# Patient Record
Sex: FEMALE | Race: WHITE | Employment: UNEMPLOYED | URBAN - METROPOLITAN AREA
[De-identification: names, ages, dates, MRNs, and addresses within clinical notes are randomized per-mention and may not be internally consistent; named-entity substitution may affect disease eponyms.]

---

## 2018-10-08 ENCOUNTER — HOSPITAL ENCOUNTER (OUTPATIENT)
Dept: PREADMISSION TESTING | Age: 48
Discharge: HOME | End: 2018-10-08
Payer: COMMERCIAL

## 2018-10-08 VITALS
SYSTOLIC BLOOD PRESSURE: 157 MMHG | TEMPERATURE: 98.2 F | DIASTOLIC BLOOD PRESSURE: 97 MMHG | BODY MASS INDEX: 33.73 KG/M2 | HEART RATE: 103 BPM | RESPIRATION RATE: 16 BRPM | HEIGHT: 62 IN | WEIGHT: 183.3 LBS

## 2018-10-08 LAB
ABO + RH BLD: NORMAL
ANION GAP SERPL CALC-SCNC: 8 MMOL/L (ref 5–15)
APPEARANCE UR: ABNORMAL
BACTERIA URNS QL MICRO: NEGATIVE /HPF
BILIRUB UR QL: NEGATIVE
BLOOD GROUP ANTIBODIES SERPL: NORMAL
BUN SERPL-MCNC: 18 MG/DL (ref 6–20)
BUN/CREAT SERPL: 24 (ref 12–20)
CALCIUM SERPL-MCNC: 8.7 MG/DL (ref 8.5–10.1)
CAOX CRY URNS QL MICRO: ABNORMAL
CHLORIDE SERPL-SCNC: 104 MMOL/L (ref 97–108)
CO2 SERPL-SCNC: 28 MMOL/L (ref 21–32)
COLOR UR: ABNORMAL
CREAT SERPL-MCNC: 0.75 MG/DL (ref 0.55–1.02)
EPITH CASTS URNS QL MICRO: ABNORMAL /LPF
ERYTHROCYTE [DISTWIDTH] IN BLOOD BY AUTOMATED COUNT: 13.8 % (ref 11.5–14.5)
EST. AVERAGE GLUCOSE BLD GHB EST-MCNC: NORMAL MG/DL
GLUCOSE SERPL-MCNC: 75 MG/DL (ref 65–100)
GLUCOSE UR STRIP.AUTO-MCNC: NEGATIVE MG/DL
HBA1C MFR BLD: 4.9 % (ref 4.2–6.3)
HCG SERPL QL: NEGATIVE
HCT VFR BLD AUTO: 37.7 % (ref 35–47)
HGB BLD-MCNC: 12 G/DL (ref 11.5–16)
HGB UR QL STRIP: ABNORMAL
INR PPP: 1.1 (ref 0.9–1.1)
KETONES UR QL STRIP.AUTO: NEGATIVE MG/DL
LEUKOCYTE ESTERASE UR QL STRIP.AUTO: ABNORMAL
MCH RBC QN AUTO: 28.6 PG (ref 26–34)
MCHC RBC AUTO-ENTMCNC: 31.8 G/DL (ref 30–36.5)
MCV RBC AUTO: 89.8 FL (ref 80–99)
NITRITE UR QL STRIP.AUTO: NEGATIVE
NRBC # BLD: 0 K/UL (ref 0–0.01)
NRBC BLD-RTO: 0 PER 100 WBC
PH UR STRIP: 6 [PH] (ref 5–8)
PLATELET # BLD AUTO: 246 K/UL (ref 150–400)
PMV BLD AUTO: 10.8 FL (ref 8.9–12.9)
POTASSIUM SERPL-SCNC: 4.2 MMOL/L (ref 3.5–5.1)
PROT UR STRIP-MCNC: ABNORMAL MG/DL
PROTHROMBIN TIME: 10.9 SEC (ref 9–11.1)
RBC # BLD AUTO: 4.2 M/UL (ref 3.8–5.2)
RBC #/AREA URNS HPF: >100 /HPF (ref 0–5)
SODIUM SERPL-SCNC: 140 MMOL/L (ref 136–145)
SP GR UR REFRACTOMETRY: 1.02 (ref 1–1.03)
SPECIMEN EXP DATE BLD: NORMAL
UA: UC IF INDICATED,UAUC: ABNORMAL
UROBILINOGEN UR QL STRIP.AUTO: 0.2 EU/DL (ref 0.2–1)
WBC # BLD AUTO: 8 K/UL (ref 3.6–11)
WBC URNS QL MICRO: ABNORMAL /HPF (ref 0–4)

## 2018-10-08 PROCEDURE — 80048 BASIC METABOLIC PNL TOTAL CA: CPT | Performed by: ORTHOPAEDIC SURGERY

## 2018-10-08 PROCEDURE — 81001 URINALYSIS AUTO W/SCOPE: CPT | Performed by: ORTHOPAEDIC SURGERY

## 2018-10-08 PROCEDURE — 84703 CHORIONIC GONADOTROPIN ASSAY: CPT | Performed by: ORTHOPAEDIC SURGERY

## 2018-10-08 PROCEDURE — 85610 PROTHROMBIN TIME: CPT | Performed by: ORTHOPAEDIC SURGERY

## 2018-10-08 PROCEDURE — 93005 ELECTROCARDIOGRAM TRACING: CPT

## 2018-10-08 PROCEDURE — 86900 BLOOD TYPING SEROLOGIC ABO: CPT | Performed by: ORTHOPAEDIC SURGERY

## 2018-10-08 PROCEDURE — 85027 COMPLETE CBC AUTOMATED: CPT | Performed by: ORTHOPAEDIC SURGERY

## 2018-10-08 PROCEDURE — 83036 HEMOGLOBIN GLYCOSYLATED A1C: CPT | Performed by: ORTHOPAEDIC SURGERY

## 2018-10-08 PROCEDURE — 36415 COLL VENOUS BLD VENIPUNCTURE: CPT | Performed by: ORTHOPAEDIC SURGERY

## 2018-10-08 RX ORDER — TRAZODONE HYDROCHLORIDE 100 MG/1
100 TABLET ORAL
COMMUNITY
End: 2019-12-04

## 2018-10-08 RX ORDER — FUROSEMIDE 20 MG/1
20 TABLET ORAL AS NEEDED
COMMUNITY

## 2018-10-08 RX ORDER — OXYCODONE HYDROCHLORIDE 15 MG/1
15 TABLET ORAL
COMMUNITY
End: 2018-11-24

## 2018-10-08 RX ORDER — AMPHETAMINE SULFATE 10 MG/1
10 TABLET ORAL AS NEEDED
COMMUNITY

## 2018-10-08 RX ORDER — BACLOFEN 10 MG/1
10 TABLET ORAL
COMMUNITY

## 2018-10-08 RX ORDER — POTASSIUM CHLORIDE 750 MG/1
10 TABLET, FILM COATED, EXTENDED RELEASE ORAL DAILY
COMMUNITY

## 2018-10-08 RX ORDER — LEVOTHYROXINE SODIUM 100 UG/1
100 TABLET ORAL
COMMUNITY

## 2018-10-08 NOTE — PERIOP NOTES
DO NOT EAT ANYTHING AFTER MIDNIGHT, except as instructed THE NIGHT BEFORE SURGERY. PT GIVEN OPPORTUNITY TO ASK ADDITIONAL QUESTIONS. PRE-OPERATIVE INSTRUCTIONS REVIEWED WITH PATIENT. PATIENT GIVEN 6-PACK OF CHG WIPES. INSTRUCTIONS REVIEWED ON USE OF CHG WIPES. PATIENT GIVEN SSI INFECTION FAQ SHEET. MRSA / MSSA TREATMENT INSTRUCTION SHEET GIVEN WITH AN EXPLANATION TO PATIENT THAT THEY WILL BE NOTIFIED IF TREATMENT INSTRUCTIONS NEED TO BE INITIATED. PATIENT WAS GIVEN THE OPPORTUNITY TO ASK QUESTIONS ON THE INFORMATION PROVIDED. Serial BPs: 157/105 
                    155/81 Pt denies c/o headache.

## 2018-10-09 PROBLEM — R31.9 HEMATURIA: Status: ACTIVE | Noted: 2018-10-09

## 2018-10-09 PROBLEM — Z22.322 MRSA CARRIER: Status: ACTIVE | Noted: 2018-10-09

## 2018-10-09 LAB
ATRIAL RATE: 96 BPM
BACTERIA SPEC CULT: ABNORMAL
BACTERIA SPEC CULT: ABNORMAL
CALCULATED P AXIS, ECG09: 46 DEGREES
CALCULATED R AXIS, ECG10: 2 DEGREES
CALCULATED T AXIS, ECG11: 30 DEGREES
DIAGNOSIS, 93000: NORMAL
P-R INTERVAL, ECG05: 132 MS
Q-T INTERVAL, ECG07: 346 MS
QRS DURATION, ECG06: 70 MS
QTC CALCULATION (BEZET), ECG08: 437 MS
SERVICE CMNT-IMP: ABNORMAL
VENTRICULAR RATE, ECG03: 96 BPM

## 2018-10-09 NOTE — ADVANCED PRACTICE NURSE
Patient was called (identity confirmed with 2 patient identifiers) and informed of positive MRSA, patient has current mupirocin ointment at home, states she had already been diagnosed as carrier, and will obtain Chlorhexidine liquid soap from pharmacy per protocol. Written instructions given at time of Preadmission Testing were reinforced with patient. Patient was given an opportunity to have questions answered and contact information if needed. Patient also informed of need for follow up with PCP regarding large blood and crystals in urine. States she is on chronic pain medication and cannot tell if she's having back or abdominal pain, but denies fever, and states she has noticed pink tinged urine. Advised she may have kidney stones and to see PCP.  Patient does not have PCP at this time, but states she is making appointment with [facility] nephrologist.

## 2018-10-12 RX ORDER — PREGABALIN 75 MG/1
75 CAPSULE ORAL ONCE
Status: CANCELLED | OUTPATIENT
Start: 2018-10-24 | End: 2018-10-24

## 2018-10-12 RX ORDER — DEXAMETHASONE SODIUM PHOSPHATE 10 MG/ML
4 INJECTION INTRAMUSCULAR; INTRAVENOUS ONCE
Status: CANCELLED | OUTPATIENT
Start: 2018-10-24 | End: 2018-10-24

## 2018-10-12 RX ORDER — CELECOXIB 200 MG/1
200 CAPSULE ORAL ONCE
Status: CANCELLED | OUTPATIENT
Start: 2018-10-24 | End: 2018-10-24

## 2018-10-12 RX ORDER — ACETAMINOPHEN 500 MG
1000 TABLET ORAL ONCE
Status: CANCELLED | OUTPATIENT
Start: 2018-10-24 | End: 2018-10-24

## 2018-10-12 RX ORDER — CEFAZOLIN SODIUM/WATER 2 G/20 ML
2 SYRINGE (ML) INTRAVENOUS ONCE
Status: CANCELLED | OUTPATIENT
Start: 2018-10-24 | End: 2018-10-24

## 2018-10-23 ENCOUNTER — ANESTHESIA EVENT (OUTPATIENT)
Dept: SURGERY | Age: 48
End: 2018-10-23
Payer: COMMERCIAL

## 2018-10-24 ENCOUNTER — HOSPITAL ENCOUNTER (OUTPATIENT)
Age: 48
Setting detail: OBSERVATION
Discharge: HOME | End: 2018-10-28
Attending: ORTHOPAEDIC SURGERY | Admitting: ORTHOPAEDIC SURGERY
Payer: COMMERCIAL

## 2018-10-24 ENCOUNTER — ANESTHESIA (OUTPATIENT)
Dept: SURGERY | Age: 48
End: 2018-10-24
Payer: COMMERCIAL

## 2018-10-24 PROBLEM — M17.0 PRIMARY OSTEOARTHRITIS OF BOTH KNEES: Status: ACTIVE | Noted: 2018-10-24

## 2018-10-24 LAB
ABO + RH BLD: NORMAL
BLOOD GROUP ANTIBODIES SERPL: NORMAL
GLUCOSE BLD STRIP.AUTO-MCNC: 88 MG/DL (ref 65–100)
HCG UR QL: NEGATIVE
SERVICE CMNT-IMP: NORMAL
SPECIMEN EXP DATE BLD: NORMAL

## 2018-10-24 PROCEDURE — 77030002933 HC SUT MCRYL J&J -A: Performed by: ORTHOPAEDIC SURGERY

## 2018-10-24 PROCEDURE — 77030000032 HC CUF TRNQT ZIMM -B: Performed by: ORTHOPAEDIC SURGERY

## 2018-10-24 PROCEDURE — 64450 NJX AA&/STRD OTHER PN/BRANCH: CPT | Performed by: ANESTHESIOLOGY

## 2018-10-24 PROCEDURE — 74011250636 HC RX REV CODE- 250/636

## 2018-10-24 PROCEDURE — 99218 HC RM OBSERVATION: CPT

## 2018-10-24 PROCEDURE — 74011250636 HC RX REV CODE- 250/636: Performed by: ORTHOPAEDIC SURGERY

## 2018-10-24 PROCEDURE — 77030018836 HC SOL IRR NACL ICUM -A: Performed by: ORTHOPAEDIC SURGERY

## 2018-10-24 PROCEDURE — 77030011943: Performed by: ORTHOPAEDIC SURGERY

## 2018-10-24 PROCEDURE — 36415 COLL VENOUS BLD VENIPUNCTURE: CPT | Performed by: ORTHOPAEDIC SURGERY

## 2018-10-24 PROCEDURE — 77030020782 HC GWN BAIR PAWS FLX 3M -B

## 2018-10-24 PROCEDURE — 76210000016 HC OR PH I REC 1 TO 1.5 HR: Performed by: ORTHOPAEDIC SURGERY

## 2018-10-24 PROCEDURE — C1713 ANCHOR/SCREW BN/BN,TIS/BN: HCPCS | Performed by: ORTHOPAEDIC SURGERY

## 2018-10-24 PROCEDURE — C1776 JOINT DEVICE (IMPLANTABLE): HCPCS | Performed by: ORTHOPAEDIC SURGERY

## 2018-10-24 PROCEDURE — 74011000250 HC RX REV CODE- 250: Performed by: ORTHOPAEDIC SURGERY

## 2018-10-24 PROCEDURE — 77030010783 HC BOWL MX BN CEM J&J -B: Performed by: ORTHOPAEDIC SURGERY

## 2018-10-24 PROCEDURE — 77030018846 HC SOL IRR STRL H20 ICUM -A: Performed by: ORTHOPAEDIC SURGERY

## 2018-10-24 PROCEDURE — 76010000173 HC OR TIME 3 TO 3.5 HR INTENSV-TIER 1: Performed by: ORTHOPAEDIC SURGERY

## 2018-10-24 PROCEDURE — 77030020788: Performed by: ORTHOPAEDIC SURGERY

## 2018-10-24 PROCEDURE — 77030007866 HC KT SPN ANES BBMI -B: Performed by: ANESTHESIOLOGY

## 2018-10-24 PROCEDURE — 74011000258 HC RX REV CODE- 258: Performed by: ORTHOPAEDIC SURGERY

## 2018-10-24 PROCEDURE — 86900 BLOOD TYPING SEROLOGIC ABO: CPT | Performed by: ORTHOPAEDIC SURGERY

## 2018-10-24 PROCEDURE — 77030006822 HC BLD SAW SAG BRSM -B: Performed by: ORTHOPAEDIC SURGERY

## 2018-10-24 PROCEDURE — 77030011640 HC PAD GRND REM COVD -A: Performed by: ORTHOPAEDIC SURGERY

## 2018-10-24 PROCEDURE — 81025 URINE PREGNANCY TEST: CPT

## 2018-10-24 PROCEDURE — 82962 GLUCOSE BLOOD TEST: CPT

## 2018-10-24 PROCEDURE — 77030028224 HC PDNG CST BSNM -A: Performed by: ORTHOPAEDIC SURGERY

## 2018-10-24 PROCEDURE — 74011250637 HC RX REV CODE- 250/637: Performed by: ORTHOPAEDIC SURGERY

## 2018-10-24 PROCEDURE — 77030031139 HC SUT VCRL2 J&J -A: Performed by: ORTHOPAEDIC SURGERY

## 2018-10-24 PROCEDURE — 77030038020 HC MANFLD NEPTUNE STRY -B: Performed by: ORTHOPAEDIC SURGERY

## 2018-10-24 PROCEDURE — C9290 INJ, BUPIVACAINE LIPOSOME: HCPCS | Performed by: ORTHOPAEDIC SURGERY

## 2018-10-24 PROCEDURE — 77030018842 HC SOL IRR SOD CL 9% BAXT -A: Performed by: ORTHOPAEDIC SURGERY

## 2018-10-24 PROCEDURE — 77030039266 HC ADH SKN EXOFIN S2SG -A: Performed by: ORTHOPAEDIC SURGERY

## 2018-10-24 PROCEDURE — 77030003601 HC NDL NRV BLK BBMI -A

## 2018-10-24 PROCEDURE — 77030035236 HC SUT PDS STRATFX BARB J&J -B: Performed by: ORTHOPAEDIC SURGERY

## 2018-10-24 PROCEDURE — 77030012935 HC DRSG AQUACEL BMS -B: Performed by: ORTHOPAEDIC SURGERY

## 2018-10-24 PROCEDURE — 74011000250 HC RX REV CODE- 250

## 2018-10-24 PROCEDURE — 76060000037 HC ANESTHESIA 3 TO 3.5 HR: Performed by: ORTHOPAEDIC SURGERY

## 2018-10-24 DEVICE — UNIVERSAL TIBIAL BASEPLATE
Type: IMPLANTABLE DEVICE | Site: KNEE | Status: FUNCTIONAL
Brand: TRIATHLON

## 2018-10-24 DEVICE — ASYMMETRIC PATELLA
Type: IMPLANTABLE DEVICE | Site: KNEE | Status: FUNCTIONAL
Brand: TRIATHLON

## 2018-10-24 DEVICE — TIBIAL BEARING INSERT - CR
Type: IMPLANTABLE DEVICE | Site: KNEE | Status: FUNCTIONAL
Brand: TRIATHLON

## 2018-10-24 DEVICE — SMARTSET GHV GENTAMICIN HIGH VISCOSITY BONE CEMENT 40G
Type: IMPLANTABLE DEVICE | Site: KNEE | Status: FUNCTIONAL
Brand: SMARTSET

## 2018-10-24 DEVICE — COMPONENT KNEE CEM X3 TRIATHLON: Type: IMPLANTABLE DEVICE | Site: KNEE | Status: FUNCTIONAL

## 2018-10-24 DEVICE — CRUCIATE RETAINING FEMORAL
Type: IMPLANTABLE DEVICE | Site: KNEE | Status: FUNCTIONAL
Brand: TRIATHLON

## 2018-10-24 RX ORDER — BUPIVACAINE HYDROCHLORIDE 5 MG/ML
INJECTION, SOLUTION EPIDURAL; INTRACAUDAL AS NEEDED
Status: DISCONTINUED | OUTPATIENT
Start: 2018-10-24 | End: 2018-10-24

## 2018-10-24 RX ORDER — TRAZODONE HYDROCHLORIDE 100 MG/1
100 TABLET ORAL
Status: DISCONTINUED | OUTPATIENT
Start: 2018-10-24 | End: 2018-10-28 | Stop reason: HOSPADM

## 2018-10-24 RX ORDER — SODIUM CHLORIDE 0.9 % (FLUSH) 0.9 %
5-10 SYRINGE (ML) INJECTION AS NEEDED
Status: DISCONTINUED | OUTPATIENT
Start: 2018-10-24 | End: 2018-10-28 | Stop reason: HOSPADM

## 2018-10-24 RX ORDER — ROPIVACAINE HYDROCHLORIDE 5 MG/ML
INJECTION, SOLUTION EPIDURAL; INFILTRATION; PERINEURAL
Status: COMPLETED | OUTPATIENT
Start: 2018-10-24 | End: 2018-10-24

## 2018-10-24 RX ORDER — NALOXONE HYDROCHLORIDE 0.4 MG/ML
0.4 INJECTION, SOLUTION INTRAMUSCULAR; INTRAVENOUS; SUBCUTANEOUS AS NEEDED
Status: DISCONTINUED | OUTPATIENT
Start: 2018-10-24 | End: 2018-10-28 | Stop reason: HOSPADM

## 2018-10-24 RX ORDER — BUPIVACAINE HYDROCHLORIDE 7.5 MG/ML
INJECTION, SOLUTION EPIDURAL; RETROBULBAR AS NEEDED
Status: DISCONTINUED | OUTPATIENT
Start: 2018-10-24 | End: 2018-10-24 | Stop reason: HOSPADM

## 2018-10-24 RX ORDER — AMOXICILLIN 250 MG
1 CAPSULE ORAL 2 TIMES DAILY
Status: DISCONTINUED | OUTPATIENT
Start: 2018-10-24 | End: 2018-10-28 | Stop reason: HOSPADM

## 2018-10-24 RX ORDER — SODIUM CHLORIDE 9 MG/ML
125 INJECTION, SOLUTION INTRAVENOUS CONTINUOUS
Status: DISPENSED | OUTPATIENT
Start: 2018-10-24 | End: 2018-10-25

## 2018-10-24 RX ORDER — BUPIVACAINE HYDROCHLORIDE AND EPINEPHRINE 5; 5 MG/ML; UG/ML
INJECTION, SOLUTION EPIDURAL; INTRACAUDAL; PERINEURAL AS NEEDED
Status: DISCONTINUED | OUTPATIENT
Start: 2018-10-24 | End: 2018-10-24 | Stop reason: HOSPADM

## 2018-10-24 RX ORDER — PROPOFOL 10 MG/ML
INJECTION, EMULSION INTRAVENOUS AS NEEDED
Status: DISCONTINUED | OUTPATIENT
Start: 2018-10-24 | End: 2018-10-24 | Stop reason: HOSPADM

## 2018-10-24 RX ORDER — SODIUM CHLORIDE, SODIUM LACTATE, POTASSIUM CHLORIDE, CALCIUM CHLORIDE 600; 310; 30; 20 MG/100ML; MG/100ML; MG/100ML; MG/100ML
INJECTION, SOLUTION INTRAVENOUS
Status: DISCONTINUED | OUTPATIENT
Start: 2018-10-24 | End: 2018-10-24 | Stop reason: HOSPADM

## 2018-10-24 RX ORDER — BACLOFEN 10 MG/1
10 TABLET ORAL
Status: DISCONTINUED | OUTPATIENT
Start: 2018-10-24 | End: 2018-10-28 | Stop reason: HOSPADM

## 2018-10-24 RX ORDER — SODIUM CHLORIDE 9 MG/ML
25 INJECTION, SOLUTION INTRAVENOUS CONTINUOUS
Status: DISCONTINUED | OUTPATIENT
Start: 2018-10-24 | End: 2018-10-24 | Stop reason: HOSPADM

## 2018-10-24 RX ORDER — SODIUM CHLORIDE 0.9 % (FLUSH) 0.9 %
5-10 SYRINGE (ML) INJECTION AS NEEDED
Status: DISCONTINUED | OUTPATIENT
Start: 2018-10-24 | End: 2018-10-24 | Stop reason: HOSPADM

## 2018-10-24 RX ORDER — SODIUM CHLORIDE 0.9 % (FLUSH) 0.9 %
5-10 SYRINGE (ML) INJECTION EVERY 8 HOURS
Status: DISCONTINUED | OUTPATIENT
Start: 2018-10-24 | End: 2018-10-24 | Stop reason: HOSPADM

## 2018-10-24 RX ORDER — ONDANSETRON 2 MG/ML
INJECTION INTRAMUSCULAR; INTRAVENOUS AS NEEDED
Status: DISCONTINUED | OUTPATIENT
Start: 2018-10-24 | End: 2018-10-24 | Stop reason: HOSPADM

## 2018-10-24 RX ORDER — MIDAZOLAM HYDROCHLORIDE 1 MG/ML
1 INJECTION, SOLUTION INTRAMUSCULAR; INTRAVENOUS AS NEEDED
Status: DISCONTINUED | OUTPATIENT
Start: 2018-10-24 | End: 2018-10-24 | Stop reason: HOSPADM

## 2018-10-24 RX ORDER — VANCOMYCIN HYDROCHLORIDE
1250 ONCE
Status: COMPLETED | OUTPATIENT
Start: 2018-10-24 | End: 2018-10-25

## 2018-10-24 RX ORDER — HYDROCHLOROTHIAZIDE 25 MG/1
25 TABLET ORAL DAILY
Status: DISCONTINUED | OUTPATIENT
Start: 2018-10-25 | End: 2018-10-26

## 2018-10-24 RX ORDER — DEXAMETHASONE SODIUM PHOSPHATE 10 MG/ML
4 INJECTION INTRAMUSCULAR; INTRAVENOUS ONCE
Status: COMPLETED | OUTPATIENT
Start: 2018-10-24 | End: 2018-10-24

## 2018-10-24 RX ORDER — CEFAZOLIN SODIUM/WATER 2 G/20 ML
2 SYRINGE (ML) INTRAVENOUS EVERY 8 HOURS
Status: COMPLETED | OUTPATIENT
Start: 2018-10-24 | End: 2018-10-25

## 2018-10-24 RX ORDER — LOSARTAN POTASSIUM 50 MG/1
100 TABLET ORAL DAILY
Status: DISCONTINUED | OUTPATIENT
Start: 2018-10-25 | End: 2018-10-26

## 2018-10-24 RX ORDER — KETOROLAC TROMETHAMINE 30 MG/ML
30 INJECTION, SOLUTION INTRAMUSCULAR; INTRAVENOUS EVERY 6 HOURS
Status: COMPLETED | OUTPATIENT
Start: 2018-10-24 | End: 2018-10-25

## 2018-10-24 RX ORDER — FAMOTIDINE 20 MG/1
20 TABLET, FILM COATED ORAL 2 TIMES DAILY
Status: DISCONTINUED | OUTPATIENT
Start: 2018-10-24 | End: 2018-10-28 | Stop reason: HOSPADM

## 2018-10-24 RX ORDER — FENTANYL CITRATE 50 UG/ML
50 INJECTION, SOLUTION INTRAMUSCULAR; INTRAVENOUS AS NEEDED
Status: DISCONTINUED | OUTPATIENT
Start: 2018-10-24 | End: 2018-10-24 | Stop reason: HOSPADM

## 2018-10-24 RX ORDER — EPHEDRINE SULFATE 50 MG/ML
INJECTION, SOLUTION INTRAVENOUS AS NEEDED
Status: DISCONTINUED | OUTPATIENT
Start: 2018-10-24 | End: 2018-10-24 | Stop reason: HOSPADM

## 2018-10-24 RX ORDER — TRANEXAMIC ACID 100 MG/ML
INJECTION, SOLUTION INTRAVENOUS AS NEEDED
Status: DISCONTINUED | OUTPATIENT
Start: 2018-10-24 | End: 2018-10-24 | Stop reason: HOSPADM

## 2018-10-24 RX ORDER — LEVOTHYROXINE SODIUM 100 UG/1
100 TABLET ORAL
Status: DISCONTINUED | OUTPATIENT
Start: 2018-10-25 | End: 2018-10-28 | Stop reason: HOSPADM

## 2018-10-24 RX ORDER — ACETAMINOPHEN 325 MG/1
650 TABLET ORAL EVERY 6 HOURS
Status: DISCONTINUED | OUTPATIENT
Start: 2018-10-24 | End: 2018-10-28 | Stop reason: HOSPADM

## 2018-10-24 RX ORDER — LIDOCAINE HYDROCHLORIDE 10 MG/ML
0.1 INJECTION, SOLUTION EPIDURAL; INFILTRATION; INTRACAUDAL; PERINEURAL AS NEEDED
Status: DISCONTINUED | OUTPATIENT
Start: 2018-10-24 | End: 2018-10-24 | Stop reason: HOSPADM

## 2018-10-24 RX ORDER — METHYLPREDNISOLONE ACETATE 40 MG/ML
INJECTION, SUSPENSION INTRA-ARTICULAR; INTRALESIONAL; INTRAMUSCULAR; SOFT TISSUE AS NEEDED
Status: DISCONTINUED | OUTPATIENT
Start: 2018-10-24 | End: 2018-10-24 | Stop reason: HOSPADM

## 2018-10-24 RX ORDER — ONDANSETRON 2 MG/ML
4 INJECTION INTRAMUSCULAR; INTRAVENOUS
Status: ACTIVE | OUTPATIENT
Start: 2018-10-24 | End: 2018-10-25

## 2018-10-24 RX ORDER — POTASSIUM CHLORIDE 750 MG/1
10 TABLET, FILM COATED, EXTENDED RELEASE ORAL DAILY
Status: DISCONTINUED | OUTPATIENT
Start: 2018-10-25 | End: 2018-10-28 | Stop reason: HOSPADM

## 2018-10-24 RX ORDER — MORPHINE SULFATE 10 MG/ML
2 INJECTION, SOLUTION INTRAMUSCULAR; INTRAVENOUS
Status: DISCONTINUED | OUTPATIENT
Start: 2018-10-24 | End: 2018-10-24 | Stop reason: HOSPADM

## 2018-10-24 RX ORDER — HYDROXYZINE HYDROCHLORIDE 10 MG/1
10 TABLET, FILM COATED ORAL
Status: DISCONTINUED | OUTPATIENT
Start: 2018-10-24 | End: 2018-10-28 | Stop reason: HOSPADM

## 2018-10-24 RX ORDER — EPINEPHRINE 1 MG/ML
INJECTION, SOLUTION, CONCENTRATE INTRAVENOUS AS NEEDED
Status: DISCONTINUED | OUTPATIENT
Start: 2018-10-24 | End: 2018-10-24 | Stop reason: HOSPADM

## 2018-10-24 RX ORDER — POLYETHYLENE GLYCOL 3350 17 G/17G
17 POWDER, FOR SOLUTION ORAL DAILY
Status: DISCONTINUED | OUTPATIENT
Start: 2018-10-25 | End: 2018-10-28 | Stop reason: HOSPADM

## 2018-10-24 RX ORDER — SODIUM CHLORIDE 0.9 % (FLUSH) 0.9 %
5-10 SYRINGE (ML) INJECTION EVERY 8 HOURS
Status: DISCONTINUED | OUTPATIENT
Start: 2018-10-24 | End: 2018-10-28 | Stop reason: HOSPADM

## 2018-10-24 RX ORDER — ROPIVACAINE HYDROCHLORIDE 5 MG/ML
30 INJECTION, SOLUTION EPIDURAL; INFILTRATION; PERINEURAL ONCE
Status: DISCONTINUED | OUTPATIENT
Start: 2018-10-24 | End: 2018-10-24 | Stop reason: HOSPADM

## 2018-10-24 RX ORDER — CEFAZOLIN SODIUM/WATER 2 G/20 ML
2 SYRINGE (ML) INTRAVENOUS ONCE
Status: DISCONTINUED | OUTPATIENT
Start: 2018-10-24 | End: 2018-10-24 | Stop reason: HOSPADM

## 2018-10-24 RX ORDER — SODIUM CHLORIDE, SODIUM LACTATE, POTASSIUM CHLORIDE, CALCIUM CHLORIDE 600; 310; 30; 20 MG/100ML; MG/100ML; MG/100ML; MG/100ML
75 INJECTION, SOLUTION INTRAVENOUS CONTINUOUS
Status: DISCONTINUED | OUTPATIENT
Start: 2018-10-24 | End: 2018-10-24 | Stop reason: HOSPADM

## 2018-10-24 RX ORDER — FACIAL-BODY WIPES
10 EACH TOPICAL DAILY PRN
Status: DISCONTINUED | OUTPATIENT
Start: 2018-10-25 | End: 2018-10-28 | Stop reason: HOSPADM

## 2018-10-24 RX ORDER — ALBUTEROL SULFATE 0.83 MG/ML
2.5 SOLUTION RESPIRATORY (INHALATION)
Status: DISCONTINUED | OUTPATIENT
Start: 2018-10-24 | End: 2018-10-28 | Stop reason: HOSPADM

## 2018-10-24 RX ORDER — SODIUM CHLORIDE, SODIUM LACTATE, POTASSIUM CHLORIDE, CALCIUM CHLORIDE 600; 310; 30; 20 MG/100ML; MG/100ML; MG/100ML; MG/100ML
125 INJECTION, SOLUTION INTRAVENOUS CONTINUOUS
Status: DISCONTINUED | OUTPATIENT
Start: 2018-10-24 | End: 2018-10-24 | Stop reason: HOSPADM

## 2018-10-24 RX ORDER — DIVALPROEX SODIUM 250 MG/1
250 TABLET, DELAYED RELEASE ORAL EVERY 12 HOURS
Status: DISCONTINUED | OUTPATIENT
Start: 2018-10-24 | End: 2018-10-28 | Stop reason: HOSPADM

## 2018-10-24 RX ORDER — ASPIRIN 81 MG/1
81 TABLET ORAL 2 TIMES DAILY
Status: DISCONTINUED | OUTPATIENT
Start: 2018-10-24 | End: 2018-10-28 | Stop reason: HOSPADM

## 2018-10-24 RX ORDER — VANCOMYCIN 1.75 GRAM/500 ML IN 0.9 % SODIUM CHLORIDE INTRAVENOUS
1750 ONCE
Status: COMPLETED | OUTPATIENT
Start: 2018-10-24 | End: 2018-10-24

## 2018-10-24 RX ORDER — FENTANYL CITRATE 50 UG/ML
25 INJECTION, SOLUTION INTRAMUSCULAR; INTRAVENOUS
Status: DISCONTINUED | OUTPATIENT
Start: 2018-10-24 | End: 2018-10-24 | Stop reason: HOSPADM

## 2018-10-24 RX ORDER — PREGABALIN 75 MG/1
75 CAPSULE ORAL ONCE
Status: COMPLETED | OUTPATIENT
Start: 2018-10-24 | End: 2018-10-24

## 2018-10-24 RX ORDER — PROPOFOL 10 MG/ML
INJECTION, EMULSION INTRAVENOUS
Status: DISCONTINUED | OUTPATIENT
Start: 2018-10-24 | End: 2018-10-24 | Stop reason: HOSPADM

## 2018-10-24 RX ORDER — ACETAMINOPHEN 500 MG
1000 TABLET ORAL ONCE
Status: COMPLETED | OUTPATIENT
Start: 2018-10-24 | End: 2018-10-24

## 2018-10-24 RX ORDER — SODIUM CHLORIDE, SODIUM LACTATE, POTASSIUM CHLORIDE, CALCIUM CHLORIDE 600; 310; 30; 20 MG/100ML; MG/100ML; MG/100ML; MG/100ML
INJECTION, SOLUTION INTRAVENOUS
Status: DISCONTINUED | OUTPATIENT
Start: 2018-10-24 | End: 2018-10-24

## 2018-10-24 RX ORDER — ONDANSETRON 2 MG/ML
4 INJECTION INTRAMUSCULAR; INTRAVENOUS AS NEEDED
Status: DISCONTINUED | OUTPATIENT
Start: 2018-10-24 | End: 2018-10-24 | Stop reason: HOSPADM

## 2018-10-24 RX ORDER — DIPHENHYDRAMINE HYDROCHLORIDE 50 MG/ML
12.5 INJECTION, SOLUTION INTRAMUSCULAR; INTRAVENOUS AS NEEDED
Status: DISCONTINUED | OUTPATIENT
Start: 2018-10-24 | End: 2018-10-24 | Stop reason: HOSPADM

## 2018-10-24 RX ORDER — OXYCODONE HYDROCHLORIDE 5 MG/1
15 TABLET ORAL
Status: DISCONTINUED | OUTPATIENT
Start: 2018-10-24 | End: 2018-10-26

## 2018-10-24 RX ORDER — MIDAZOLAM HYDROCHLORIDE 1 MG/ML
0.5 INJECTION, SOLUTION INTRAMUSCULAR; INTRAVENOUS
Status: DISCONTINUED | OUTPATIENT
Start: 2018-10-24 | End: 2018-10-24 | Stop reason: HOSPADM

## 2018-10-24 RX ADMIN — Medication 2 G: at 17:33

## 2018-10-24 RX ADMIN — STANDARDIZED SENNA CONCENTRATE AND DOCUSATE SODIUM 1 TABLET: 8.6; 5 TABLET, FILM COATED ORAL at 17:09

## 2018-10-24 RX ADMIN — ACETAMINOPHEN 1000 MG: 500 TABLET ORAL at 11:08

## 2018-10-24 RX ADMIN — EPHEDRINE SULFATE 10 MG: 50 INJECTION, SOLUTION INTRAVENOUS at 13:05

## 2018-10-24 RX ADMIN — BUPIVACAINE HYDROCHLORIDE 2 MG: 7.5 INJECTION, SOLUTION EPIDURAL; RETROBULBAR at 12:00

## 2018-10-24 RX ADMIN — DEXAMETHASONE SODIUM PHOSPHATE 4 MG: 10 INJECTION, SOLUTION INTRAMUSCULAR; INTRAVENOUS at 12:22

## 2018-10-24 RX ADMIN — VANCOMYCIN HYDROCHLORIDE 1250 MG: 10 INJECTION, POWDER, LYOPHILIZED, FOR SOLUTION INTRAVENOUS at 23:10

## 2018-10-24 RX ADMIN — PREGABALIN 75 MG: 75 CAPSULE ORAL at 11:08

## 2018-10-24 RX ADMIN — VANCOMYCIN HYDROCHLORIDE 1750 MG: 10 INJECTION, POWDER, LYOPHILIZED, FOR SOLUTION INTRAVENOUS at 11:51

## 2018-10-24 RX ADMIN — MIDAZOLAM HYDROCHLORIDE 2 MG: 1 INJECTION, SOLUTION INTRAMUSCULAR; INTRAVENOUS at 11:38

## 2018-10-24 RX ADMIN — PROPOFOL 50 MG: 10 INJECTION, EMULSION INTRAVENOUS at 12:04

## 2018-10-24 RX ADMIN — SODIUM CHLORIDE, SODIUM LACTATE, POTASSIUM CHLORIDE, CALCIUM CHLORIDE: 600; 310; 30; 20 INJECTION, SOLUTION INTRAVENOUS at 11:20

## 2018-10-24 RX ADMIN — SODIUM CHLORIDE, SODIUM LACTATE, POTASSIUM CHLORIDE, CALCIUM CHLORIDE: 600; 310; 30; 20 INJECTION, SOLUTION INTRAVENOUS at 12:57

## 2018-10-24 RX ADMIN — EPHEDRINE SULFATE 5 MG: 50 INJECTION, SOLUTION INTRAVENOUS at 12:48

## 2018-10-24 RX ADMIN — ROPIVACAINE HYDROCHLORIDE 30 ML: 5 INJECTION, SOLUTION EPIDURAL; INFILTRATION; PERINEURAL at 12:11

## 2018-10-24 RX ADMIN — TRAZODONE HYDROCHLORIDE 100 MG: 100 TABLET ORAL at 22:12

## 2018-10-24 RX ADMIN — PROPOFOL 150 MCG/KG/MIN: 10 INJECTION, EMULSION INTRAVENOUS at 12:11

## 2018-10-24 RX ADMIN — EPHEDRINE SULFATE 5 MG: 50 INJECTION, SOLUTION INTRAVENOUS at 13:09

## 2018-10-24 RX ADMIN — Medication 10 ML: at 22:13

## 2018-10-24 RX ADMIN — FENTANYL CITRATE 50 MCG: 50 INJECTION, SOLUTION INTRAMUSCULAR; INTRAVENOUS at 11:25

## 2018-10-24 RX ADMIN — ACETAMINOPHEN 650 MG: 325 TABLET ORAL at 17:08

## 2018-10-24 RX ADMIN — PROPOFOL 50 MG: 10 INJECTION, EMULSION INTRAVENOUS at 12:14

## 2018-10-24 RX ADMIN — TRANEXAMIC ACID 1 G: 100 INJECTION, SOLUTION INTRAVENOUS at 12:13

## 2018-10-24 RX ADMIN — DIVALPROEX SODIUM 250 MG: 250 TABLET, DELAYED RELEASE ORAL at 22:12

## 2018-10-24 RX ADMIN — OXYCODONE HYDROCHLORIDE 15 MG: 5 TABLET ORAL at 18:45

## 2018-10-24 RX ADMIN — KETOROLAC TROMETHAMINE 30 MG: 30 INJECTION, SOLUTION INTRAMUSCULAR at 17:08

## 2018-10-24 RX ADMIN — OXYCODONE HYDROCHLORIDE 15 MG: 5 TABLET ORAL at 22:12

## 2018-10-24 RX ADMIN — FAMOTIDINE 20 MG: 20 TABLET ORAL at 17:08

## 2018-10-24 RX ADMIN — PROPOFOL 50 MG: 10 INJECTION, EMULSION INTRAVENOUS at 11:56

## 2018-10-24 RX ADMIN — ASPIRIN 81 MG: 81 TABLET, COATED ORAL at 17:08

## 2018-10-24 RX ADMIN — ONDANSETRON 4 MG: 2 INJECTION INTRAMUSCULAR; INTRAVENOUS at 14:15

## 2018-10-24 NOTE — PROGRESS NOTES
Bedside shift change report given to Angelina Llanes (oncoming nurse) by Marlon Obrien (offgoing nurse). Report included the following information SBAR, Kardex, Intake/Output and MAR.

## 2018-10-24 NOTE — ANESTHESIA PROCEDURE NOTES
Spinal Block Start time: 10/24/2018 11:51 AM 
End time: 10/24/2018 11:56 AM 
Performed by: Sg Page MD 
Authorized by: Sg Page MD  
 
Pre-procedure: Indications: primary anesthetic  Preanesthetic Checklist: patient identified, risks and benefits discussed, anesthesia consent, site marked, patient being monitored and timeout performed Timeout Time: 11:51 Spinal Block:  
Patient Position:  Seated Prep: Betadine Location:  L3-4 Technique:  Single shot Needle:  
Needle Type:  Pencil-tip Needle Gauge:  25 G Attempts:  1 Events: CSF confirmed, no blood with aspiration and no paresthesia Assessment: 
Insertion:  Uncomplicated Patient tolerance:  Patient tolerated the procedure well with no immediate complications

## 2018-10-24 NOTE — H&P
Date of Surgery Update: 
Jeremiah Barker was seen and examined. History and physical has been reviewed. The patient has been examined. There have been no significant clinical changes since the completion of the originally dated History and Physical. 
Patient identified by surgeon; surgical site was confirmed by patient and surgeon.  
 
Signed By: Taina Moreno MD   
 October 24, 2018 10:00 AM

## 2018-10-24 NOTE — PROGRESS NOTES
Physical Therapy 10/24/2018 Chart reviewed. RN asked PT for assistance for patient transfer from stretcher to hospital bed. Patient remains numb (x 3 people to slide patient over to hospital bed) - unable to DF/PF or localize light touch or deep pressure sensations at this time. PT to follow up in 30 minutes. 1710: Patient only able to flex/extend digits. Not appropriate for skilled therapy at this time. PT to follow up tomorrow. Thank you.  
Maty Obrien, PT, DPT

## 2018-10-24 NOTE — ANESTHESIA POSTPROCEDURE EVALUATION
Post-Anesthesia Evaluation and Assessment Patient: Joyce Brennan MRN: [id number]  SSN: [id number] YOB: 1970  Age: 50 y.o. Sex: female I have evaluated the patient and they are stable and ready for discharge from the PACU. Cardiovascular Function/Vital Signs Visit Vitals /73 Pulse 66 Temp 36.7 °C (98.1 °F) Resp 10 Ht 5' 2\" (1.575 m) Wt 83 kg (183 lb) SpO2 100% BMI 33.47 kg/m² Patient is status post Spinal anesthesia for Procedure(s): LEFT TOTAL KNEE ARTHROPLASTY. Nausea/Vomiting: None Postoperative hydration reviewed and adequate. Pain: 
Pain Scale 1: Numeric (0 - 10) (10/24/18 1536) Pain Intensity 1: 0 (10/24/18 1536) Managed Neurological Status:  
Neuro [facility]): (numbness in both lower extremities) (10/24/18 1042) Neuro LUE Motor Response: Purposeful (10/24/18 1536) LLE Motor Response: Pharmacologically paralyzed (10/24/18 1536) RUE Motor Response: Purposeful (10/24/18 1536) RLE Motor Response: Pharmacologically paralyzed (10/24/18 1536) At baseline Mental Status, Level of Consciousness: Alert and  oriented to person, place, and time Pulmonary Status:  
O2 Device: Room air (10/24/18 1536) Adequate oxygenation and airway patent Complications related to anesthesia: None Post-anesthesia assessment completed. No concerns Signed By: Peyman Menard MD   
 October 24, 2018 Procedure(s): LEFT TOTAL KNEE ARTHROPLASTY. <BSHSIANPOST> Visit Vitals /73 Pulse 66 Temp 36.7 °C (98.1 °F) Resp 10 Ht 5' 2\" (1.575 m) Wt 83 kg (183 lb) SpO2 100% BMI 33.47 kg/m²

## 2018-10-24 NOTE — ANESTHESIA PREPROCEDURE EVALUATION
Anesthetic History No history of anesthetic complications Review of Systems / Medical History Patient summary reviewed, nursing notes reviewed and pertinent labs reviewed Pulmonary Within defined limits Neuro/Psych  
 
seizures: well controlled Cardiovascular Hypertension: well controlled GI/Hepatic/Renal 
  
GERD: well controlled Endo/Other Hypothyroidism: well controlled Arthritis Other Findings Physical Exam 
 
Airway Mallampati: I 
TM Distance: > 6 cm Neck ROM: normal range of motion Mouth opening: Normal 
 
 Cardiovascular Regular rate and rhythm,  S1 and S2 normal,  no murmur, click, rub, or gallop Dental 
No notable dental hx Pulmonary Breath sounds clear to auscultation Abdominal 
GI exam deferred Other Findings Anesthetic Plan ASA: 2 Anesthesia type: regional 
 
 
 
 
Induction: Intravenous Anesthetic plan and risks discussed with: Patient

## 2018-10-24 NOTE — PERIOP NOTES
TRANSFER - OUT REPORT: 
 
Verbal report given to Vanda (name) on Nicolette Jiang  being transferred to [facility](unit) for routine post - op Report consisted of patients Situation, Background, Assessment and  
Recommendations(SBAR). Time Pre op antibiotic given:1151 Anesthesia Stop time: 5794 Petty Present on Transfer to floor:n Order for Petty on Chart:n 
Discharge Prescriptions with Chart:n Information from the following report(s) SBAR, OR Summary, Intake/Output, MAR and Accordion was reviewed with the receiving nurse. Opportunity for questions and clarification was provided. Is the patient on 02? NO 
     L/Min Other Is the patient on a monitor? NO Is the nurse transporting with the patient? NO Surgical Waiting Area notified of patient's transfer from PACU? YES The following personal items collected during your admission accompanied patient upon transfer:  
Dental Appliance: Dental Appliances: None Vision: Visual Aid: Glasses Hearing Aid:   
Jewelry:   
Clothing: Clothing: (clothes to pacu) Other Valuables: Other Valuables: Reid Doing Valuables sent to safe:   
 
Clothes and glasses to floor with pt

## 2018-10-24 NOTE — ROUTINE PROCESS
Patient: Christy Valle MRN: [id number]  SSN: [id number] YOB: 1970  Age: 50 y.o. Sex: female Patient is status post Procedure(s): LEFT TOTAL KNEE ARTHROPLASTY. Surgeon(s) and Role: 
   *Bhavik Sauceda MD - Primary Local/Dose/Irrigation:  SEE MAR Peripheral IV 10/24/18 Left Hand (Active) Dressing/Packing:  Wound Knee Anterior; Left-DRESSING TYPE: Aquacel; Topical skin adhesive/glue (10/24/18 1300) Splint/Cast:  ] Other:  Cast Padding and Ace Wrap Used

## 2018-10-24 NOTE — ANESTHESIA PROCEDURE NOTES
Peripheral Block Start time: 10/24/2018 11:32 AM 
End time: 10/24/2018 11:38 AM 
Performed by: Nobie Collet, MD 
Authorized by: Nobie Collet, MD  
 
 
Pre-procedure: Indications: at surgeon's request and post-op pain management Preanesthetic Checklist: patient identified, risks and benefits discussed, site marked, timeout performed and patient being monitored Timeout Time: 11:32 Block Type:  
Block Type: Adductor canal 
Laterality:  Left Monitoring:  Standard ASA monitoring, continuous pulse ox, frequent vital sign checks, heart rate, responsive to questions and oxygen Injection Technique:  Single shot Procedures: ultrasound guided Patient Position: supine Prep: DuraPrep Needle Localization:  Ultrasound guidance Assessment: 
Number of attempts:  1 Injection Assessment:  Incremental injection every 5 mL, local visualized surrounding nerve on ultrasound, negative aspiration for blood, no paresthesia and no intravascular symptoms Patient tolerance:  Patient tolerated the procedure well with no immediate complications

## 2018-10-24 NOTE — PERIOP NOTES
Straight Shore Cath performed at end of procedure 1000mL's out, Left leg was left straight during shore procedure

## 2018-10-24 NOTE — PROGRESS NOTES
Ortho Post-Op Note 
 
10/24/2018 5:40 PM 
 
POD:  Day of Surgery S/P:  Procedure(s): LEFT TOTAL KNEE ARTHROPLASTY Afebrile/VSS, NAD, A&O x 3 Doing well without complaints of nausea Pain well controlled Calves soft/NTTP Bilaterally Compartments soft Dressing clean and dry Moving lower extremities a little, spinal still effective Neurocirculatory exam intact and within normal range. Lab Results Component Value Date/Time HGB 12.0 10/08/2018 03:47 PM  
 INR 1.1 10/08/2018 03:47 PM  
 
Recent Labs 10/08/18 [address] CREA 0.75 BUN 18 Estimated Creatinine Clearance: 91.7 mL/min (based on SCr of 0.75 mg/dL). PLAN: Ancef 2 grams Q 8 hours for 3 doses DVT prophylaxis: ASA 81 mg BID WBAT with PT-mobilization Pain Control- Toradol and Oxycodone 15 mg Q4 Plan to D/C home with HH/PT in 1-2 days LETICIA Umanzor

## 2018-10-25 LAB
ANION GAP SERPL CALC-SCNC: 8 MMOL/L (ref 5–15)
BUN SERPL-MCNC: 10 MG/DL (ref 6–20)
BUN/CREAT SERPL: 15 (ref 12–20)
CALCIUM SERPL-MCNC: 7.6 MG/DL (ref 8.5–10.1)
CHLORIDE SERPL-SCNC: 113 MMOL/L (ref 97–108)
CO2 SERPL-SCNC: 20 MMOL/L (ref 21–32)
CREAT SERPL-MCNC: 0.66 MG/DL (ref 0.55–1.02)
GLUCOSE SERPL-MCNC: 98 MG/DL (ref 65–100)
HGB BLD-MCNC: 9.4 G/DL (ref 11.5–16)
POTASSIUM SERPL-SCNC: 4.2 MMOL/L (ref 3.5–5.1)
SODIUM SERPL-SCNC: 141 MMOL/L (ref 136–145)

## 2018-10-25 PROCEDURE — 97110 THERAPEUTIC EXERCISES: CPT | Performed by: PHYSICAL THERAPIST

## 2018-10-25 PROCEDURE — 85018 HEMOGLOBIN: CPT | Performed by: ORTHOPAEDIC SURGERY

## 2018-10-25 PROCEDURE — 80048 BASIC METABOLIC PNL TOTAL CA: CPT | Performed by: PHYSICIAN ASSISTANT

## 2018-10-25 PROCEDURE — 97535 SELF CARE MNGMENT TRAINING: CPT

## 2018-10-25 PROCEDURE — G8978 MOBILITY CURRENT STATUS: HCPCS | Performed by: PHYSICAL THERAPIST

## 2018-10-25 PROCEDURE — 74011000250 HC RX REV CODE- 250: Performed by: ORTHOPAEDIC SURGERY

## 2018-10-25 PROCEDURE — 97161 PT EVAL LOW COMPLEX 20 MIN: CPT | Performed by: PHYSICAL THERAPIST

## 2018-10-25 PROCEDURE — 97530 THERAPEUTIC ACTIVITIES: CPT

## 2018-10-25 PROCEDURE — 99218 HC RM OBSERVATION: CPT

## 2018-10-25 PROCEDURE — G8988 SELF CARE GOAL STATUS: HCPCS

## 2018-10-25 PROCEDURE — G8987 SELF CARE CURRENT STATUS: HCPCS

## 2018-10-25 PROCEDURE — G8979 MOBILITY GOAL STATUS: HCPCS | Performed by: PHYSICAL THERAPIST

## 2018-10-25 PROCEDURE — 97530 THERAPEUTIC ACTIVITIES: CPT | Performed by: PHYSICAL THERAPIST

## 2018-10-25 PROCEDURE — 74011250637 HC RX REV CODE- 250/637: Performed by: ORTHOPAEDIC SURGERY

## 2018-10-25 PROCEDURE — 74011250636 HC RX REV CODE- 250/636: Performed by: PHYSICIAN ASSISTANT

## 2018-10-25 PROCEDURE — 74011250636 HC RX REV CODE- 250/636: Performed by: ORTHOPAEDIC SURGERY

## 2018-10-25 PROCEDURE — 36415 COLL VENOUS BLD VENIPUNCTURE: CPT | Performed by: ORTHOPAEDIC SURGERY

## 2018-10-25 PROCEDURE — 77030028907 HC WRP KNEE WO BGS SOLM -B

## 2018-10-25 PROCEDURE — 97116 GAIT TRAINING THERAPY: CPT | Performed by: PHYSICAL THERAPIST

## 2018-10-25 PROCEDURE — 97165 OT EVAL LOW COMPLEX 30 MIN: CPT

## 2018-10-25 RX ADMIN — DIVALPROEX SODIUM 250 MG: 250 TABLET, DELAYED RELEASE ORAL at 21:23

## 2018-10-25 RX ADMIN — OXYCODONE HYDROCHLORIDE 15 MG: 5 TABLET ORAL at 01:17

## 2018-10-25 RX ADMIN — DIVALPROEX SODIUM 250 MG: 250 TABLET, DELAYED RELEASE ORAL at 08:56

## 2018-10-25 RX ADMIN — HYDROCHLOROTHIAZIDE 25 MG: 25 TABLET ORAL at 08:56

## 2018-10-25 RX ADMIN — ASPIRIN 81 MG: 81 TABLET, COATED ORAL at 18:52

## 2018-10-25 RX ADMIN — ACETAMINOPHEN 650 MG: 325 TABLET ORAL at 01:09

## 2018-10-25 RX ADMIN — Medication 2 G: at 01:10

## 2018-10-25 RX ADMIN — OXYCODONE HYDROCHLORIDE 15 MG: 5 TABLET ORAL at 14:48

## 2018-10-25 RX ADMIN — FAMOTIDINE 20 MG: 20 TABLET ORAL at 08:56

## 2018-10-25 RX ADMIN — ASPIRIN 81 MG: 81 TABLET, COATED ORAL at 08:56

## 2018-10-25 RX ADMIN — POTASSIUM CHLORIDE 10 MEQ: 750 TABLET, FILM COATED, EXTENDED RELEASE ORAL at 08:56

## 2018-10-25 RX ADMIN — ACETAMINOPHEN 650 MG: 325 TABLET ORAL at 12:25

## 2018-10-25 RX ADMIN — ACETAMINOPHEN 650 MG: 325 TABLET ORAL at 18:52

## 2018-10-25 RX ADMIN — KETOROLAC TROMETHAMINE 30 MG: 30 INJECTION, SOLUTION INTRAMUSCULAR at 01:10

## 2018-10-25 RX ADMIN — LOSARTAN POTASSIUM 100 MG: 50 TABLET ORAL at 09:01

## 2018-10-25 RX ADMIN — KETOROLAC TROMETHAMINE 30 MG: 30 INJECTION, SOLUTION INTRAMUSCULAR at 05:28

## 2018-10-25 RX ADMIN — OXYCODONE HYDROCHLORIDE 15 MG: 5 TABLET ORAL at 21:23

## 2018-10-25 RX ADMIN — Medication 10 ML: at 21:24

## 2018-10-25 RX ADMIN — OXYCODONE HYDROCHLORIDE 15 MG: 5 TABLET ORAL at 05:28

## 2018-10-25 RX ADMIN — LEVOTHYROXINE SODIUM 100 MCG: 100 TABLET ORAL at 05:53

## 2018-10-25 RX ADMIN — TRAZODONE HYDROCHLORIDE 100 MG: 100 TABLET ORAL at 21:23

## 2018-10-25 RX ADMIN — Medication 2 G: at 08:56

## 2018-10-25 RX ADMIN — ACETAMINOPHEN 650 MG: 325 TABLET ORAL at 05:28

## 2018-10-25 RX ADMIN — OXYCODONE HYDROCHLORIDE 15 MG: 5 TABLET ORAL at 10:15

## 2018-10-25 RX ADMIN — STANDARDIZED SENNA CONCENTRATE AND DOCUSATE SODIUM 1 TABLET: 8.6; 5 TABLET, FILM COATED ORAL at 08:56

## 2018-10-25 RX ADMIN — FAMOTIDINE 20 MG: 20 TABLET ORAL at 18:52

## 2018-10-25 RX ADMIN — KETOROLAC TROMETHAMINE 30 MG: 30 INJECTION, SOLUTION INTRAMUSCULAR at 12:55

## 2018-10-25 RX ADMIN — Medication 10 ML: at 05:29

## 2018-10-25 NOTE — PROGRESS NOTES
Care Management Interventions PCP Verified by CM: Yes 
Palliative Care Criteria Met (RRAT>21 & CHF Dx)?: No 
Mode of Transport at Discharge: Self(family/TBD) Transition of Care Consult (CM Consult): SNF(Referral to [facility]) Partner SNF: Yes MyChart Signup: No 
Discharge Durable Medical Equipment: No 
Health Maintenance Reviewed: Yes Physical Therapy Consult: Yes Occupational Therapy Consult: Yes Speech Therapy Consult: No 
Current Support Network: Lives with Spouse Confirm Follow Up Transport: Family Plan discussed with Pt/Family/Caregiver: Yes Freedom of Choice Offered: Yes [employer] Information Provided?: No 
Discharge Location Discharge Placement: Skilled nursing facility Reason for Admission:   Left Total Knee Replacement RRAT Score:        5 Plan for utilizing home health:   No, patient requesting to go to SNF rehab at [facility]. The doctor informed her that she would go for 7 days. Likelihood of Readmission:  low Transition of Care Plan:     SNF, Referral sent to [facility] via [address] and [employer].

## 2018-10-25 NOTE — PROGRESS NOTES
Problem: Self Care Deficits Care Plan (Adult) Goal: *Acute Goals and Plan of Care (Insert Text) Occupational Therapy Goals Initiated 10/25/2018 1. Patient will perform lower body ADLs with supervision/set-up within 4 day(s). 2.  Patient will perform upper body ADLs standing 5 mins without fatigue or LOB with supervision/set-up within 4 day(s). 3.  Patient will perform all aspects of toileting with supervision/set-up within 4 day(s). 4.  Patient will participate in upper extremity therapeutic exercise/activities with supervision/set-up for 10 minutes within 4 day(s). 5.  Patient will utilize energy conservation techniques during functional activities without cues within 4 day(s). Occupational Therapy EVALUATION Patient: Megan Alcala (32 y.o. female) Date: 10/25/2018 Primary Diagnosis: Primary osteoarthritis of both knees [M17.0] Procedure(s) (LRB): LEFT TOTAL KNEE ARTHROPLASTY (Left) 1 Day Post-Op Precautions:  Fall, WBAT 
 
ASSESSMENT : 
Based on the objective data described below, the patient presents with overall SBA-CGA x1 with RW for functional mobility, up to Min A for lower body ADLs, and independent for upper body ADLs s/p L TKA POD 1. Patient received supine in bed, agreeable to therapy. Presents with generalized weakness, decreased distal reach to BLEs (L surgical pain and R DJD limitations), and decreased insight into need for safety. Patient has rollator at baseline, demonstrating some unsafe techniques with use during bathroom mobility. Patient reporting she plans to d/c to rehab when medically stable. Patient will benefit from skilled intervention to address the above impairments. Patients rehabilitation potential is considered to be Good Factors which may influence rehabilitation potential include:  
[]                None noted []                Mental ability/status []                Medical condition []                Home/family situation and support systems []                Safety awareness []                Pain tolerance/management 
[]                Other: PLAN : 
Recommendations and Planned Interventions: 
[x]                  Self Care Training                  [x]           Therapeutic Activities [x]                  Functional Mobility Training    []           Cognitive Retraining 
[x]                  Therapeutic Exercises           [x]           Endurance Activities [x]                  Balance Training                    []           Neuromuscular Re-Education []                  Visual/Perceptual Training     [x]      Home Safety Training 
[x]                  Patient Education                 [x]           Family Training/Education []                  Other (comment): Frequency/Duration: Patient will be followed by occupational therapy 5 times a week to address goals. Discharge Recommendations: No further needs (patient requesting rehab) Further Equipment Recommendations for Discharge: TBD SUBJECTIVE:  
Patient stated The doctor told me it was the worst he's ever seen.  OBJECTIVE DATA SUMMARY:  
HISTORY:  
Past Medical History:  
Diagnosis Date  ADHD (attention deficit hyperactivity disorder)  Arthritis OSTEO  Chronic pain  GERD (gastroesophageal reflux disease)  Graves disease NO LONGER AN ISSUE  
 Hematuria 10/9/2018  
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   
 depression  Seizure ([address]  Seizures ([address]  &  REACTIVE TO HYPOGLYCEMIA / ALSO FROM FLASHING LIGHTS Past Surgical History:  
Procedure Laterality Date  DELIVERY     
 [facility]   [facility] [address] [facility]  HX  SECTION    HX GASTRIC BYPASS    
  [address] CHOLEYCYSTECTOMY  HX ORTHOPAEDIC Right CARPEL TUNNEL  
 HX ORTHOPAEDIC Left CARPEL TUNNEL  
 HX ORTHOPAEDIC Left ULNA SHORTENING  
 HX ORTHOPAEDIC Right 2011 [address] [facility] TONSILLECTOMY  7118 Prior Level of Function/Environment/Context: Per patient report, lives at home with . Is independent and active at baseline. Has been using rollator. Home Situation Home Environment: Private residence # Steps to Enter: 5 One/Two Story Residence: Two story # of Interior Steps: 15 Interior Rails: Left Lift Chair Available: No 
Living Alone: No 
Support Systems: Spouse/Significant Other/Partner Patient Expects to be Discharged to[de-identified] Private residence Current DME Used/Available at Home: [place] [employer], rollator Hand dominance: RightEXAMINATION OF PERFORMANCE DEFICITS: 
Cognitive/Behavioral Status: 
Neurologic State: Alert Orientation Level: Oriented X4 Cognition: Appropriate for age attention/concentration; Follows commands Perception: Appears intact Perseveration: No perseveration noted Safety/Judgement: Awareness of environment; Insight into deficits;Good awareness of safety precautions; Home safety Skin: Appears intact Edema: None noted in BUEs Vision/Perceptual:   
Tracking: Able to track stimulus in all quadrants w/o difficulty Acuity: Within Defined Limits Range of Motion: In [address] AROM: Within functional limits PROM: Within functional limits Strength: In [address] Strength: Within functional limits Coordination: 
Coordination: Within functional limits Fine Motor Skills-Upper: Left Intact; Right Intact Gross Motor Skills-Upper: Left Intact; Right Intact Tone & Sensation: In [address] Tone: Normal 
Sensation: Intact Balance: 
Sitting: Intact Standing: Intact; With support Functional Mobility and Transfers for ADLs:Bed Mobility: 
Rolling: Supervision Supine to Sit: Supervision Scooting: Supervision Transfers: 
Sit to Stand: Stand-by assistance Stand to Sit: Stand-by assistance Toilet Transfer : Supervision ADL Assessment: 
Feeding: Independent Oral Facial Hygiene/Grooming: Independent(Standing at sink) Bathing: Minimum assistance(Inferred per obs of functional mobility/reach) Upper Body Dressing: Independent Lower Body Dressing: Minimum assistance(Decreased distal reach to BLEs) Toileting: Independent ADL Intervention and task modifications: 
Lower Body Dressing Assistance Underpants: Independent Position Performed: Standing Toileting Bladder Hygiene: Independent Bowel Hygiene: Independent Clothing Management: Independent Cognitive Retraining Safety/Judgement: Awareness of environment; Insight into deficits;Good awareness of safety precautions; Home safety Bathing: Patient instructed and indicated understanding when bathing to not submerge wound in water, stand to shower or sponge bathe, cover wound with plastic and tape to ensure no water reaches bandage/wound without cues. Dressing joint: Patient instructed and demonstrated understanding to don/doff Left LE first/last with Modified independent. Patient instructed and demonstrated to don all clothing while sitting prior to standing, doff all clothing to knees while standing, then sit to doff clothing off from knees to feet in order to facilitate fall prevention, pain management, and energy conservation with Modified independent. Dressing joint reach exercise: To increase independence with lower body dressing, patient instructed and demonstrated to reach down Left LE in a seated position slowly to prevent tearing/shearing until slight pull is felt, hold at end range for 10 seconds, then return to starting upright position with Modified independent. Patient instructed to complete three sets of three repetitions each daily.   
Home safety: Patient instructed and indicated understanding on home modifications and safety (raise height of ADL objects, appropriate height of chair surfaces, recliner safety, change of floor surfaces, clear pathways) to increase independence and fall prevention. Standing: Patient instructed and demonstrated during ADLs to walk up to sink/counter top/surfaces, step into walker to increase safety of joint and fall prevention with Modified independent. Patient educated about knee anatomy verbally and with pictures and educated to avoid rotation of Left LE. Instructed to apply concept to ADLs within the home (no twisting of knee during reaching across body, square off while using objects, slide objects along surfaces). Patient instructed and indicated understanding to increase amount of time standing, observe standing position during ADLs in order to increase even weight bearing through bilateral LEs in order to increase independence with ADLs. Goal to be reached 30 days post - op, per orthopedic surgeon or per PT. Functional Measure: 
Barthel Index: 
 
Bathin Bladder: 5 Bowels: 10 
Groomin Dressin Feeding: 10 Mobility: 10 Stairs: 5 Toilet Use: 5 Transfer (Bed to Chair and Back): 10 Total: 70 Barthel and G-code impairment scale: 
Percentage of impairment CH 
0% CI 
1-19% CJ 
20-39% CK 
40-59% CL 
60-79% CM 
80-99% CN 
100% Barthel Score 0-100 100 99-80 79-60 59-40 20-39 1-19 
 0 Barthel Score 0-20 20 17-19 13-16 9-12 5-8 1-4 0 The Barthel ADL Index: Guidelines 1. The index should be used as a record of what a patient does, not as a record of what a patient could do. 2. The main aim is to establish degree of independence from any help, physical or verbal, however minor and for whatever reason. 3. The need for supervision renders the patient not independent. 4. A patient's performance should be established using the best available evidence. Asking the patient, friends/relatives and nurses are the usual sources, but direct observation and common sense are also important. However direct testing is not needed. 5. Usually the patient's performance over the preceding 24-48 hours is important, but occasionally longer periods will be relevant. 6. Middle categories imply that the patient supplies over 50 per cent of the effort. 7. Use of aids to be independent is allowed. Kirsty Dietz., Barthel, D.W. (8532). Functional evaluation: the Barthel Index. [address] [facility] (14)2. Jess Manzano david SOLANGE Fajardo Gloriann Northern., Sumaya Wan., Angie, [address] [facility] (1999). Measuring the change indisability after inpatient rehabilitation; comparison of the responsiveness of the Barthel Index and Functional Bradford Measure. Journal of Neurology, Neurosurgery, and Psychiatry, 66(4), 152-733. ALANA Sanders, LEONARDO Duff, & Pavithra Lorenzo M.A. (2004.) Assessment of post-stroke quality of life in cost-effectiveness studies: The usefulness of the Barthel Index and the EuroQoL-5D. [facility], 13, 391-65 G codes: In compliance with CMSs Claims Based Outcome Reporting, the following G-code set was chosen for this patient based on their primary functional limitation being treated: The outcome measure chosen to determine the severity of the functional limitation was the Barthel Index with a score of 70/100 which was correlated with the impairment scale. ? Self Care:  
  - CURRENT STATUS: CJ - 20%-39% impaired, limited or restricted  - GOAL STATUS: CI - 1%-19% impaired, limited or restricted  - D/C STATUS:  ---------------To be determined--------------- Occupational Therapy Evaluation Charge Determination History Examination Decision-Making LOW Complexity : Brief history review  LOW Complexity : 1-3 performance deficits relating to physical, cognitive , or psychosocial skils that result in activity limitations and / or participation restrictions  LOW Complexity : No comorbidities that affect functional and no verbal or physical assistance needed to complete eval tasks Based on the above components, the patient evaluation is determined to be of the following complexity level: LOW Pain: 
Pain Scale 1: Numeric (0 - 10) Pain Intensity 1: 8 Pain Location 1: Leg 
Pain Orientation 1: Anterior Pain Description 1: Aching Pain Intervention(s) 1: Medication (see MAR) Activity Tolerance:  
Good. Please refer to the flowsheet for vital signs taken during this treatment. After treatment:  
[x] Patient left in no apparent distress sitting up in chair 
[] Patient left in no apparent distress in bed 
[x] Call bell left within reach [x] Nursing notified 
[x] Caregiver present 
[] Bed alarm activated COMMUNICATION/EDUCATION:  
The patients plan of care was discussed with: Physical Therapist and Registered Nurse. [x]    Home safety education was provided and the patient/caregiver indicated understanding. [x]    Patient/family have participated as able in goal setting and plan of care. []    Patient/family agree to work toward stated goals and plan of care. []    Patient understands intent and goals of therapy, but is neutral about his/her participation. []    Patient is unable to participate in goal setting and plan of care. This patients plan of care is appropriate for delegation to [facility]. Thank you for this referral. 
Joseline Rebolledo OT Time Calculation: 24 mins

## 2018-10-25 NOTE — PROGRESS NOTES
Resting comfortably GEN:  NAD. AOx3 ABD:  S/NT/ND  
LLE:  Dressing C/D/I , 5/5 motor, Calf nttp (Bilat), Sensation rossly intact to light touch throughout, 1+ dp/pt pulses, foot perfused Patient Vitals for the past 24 hrs: 
 Temp Pulse Resp BP SpO2  
10/25/18 0330 97.9 °F (36.6 °C) (!) 56 16 106/67 94 % 10/24/18 2350 97.8 °F (36.6 °C) 78 16 113/61 95 % 10/24/18 2102 98.8 °F (37.1 °C) 77 16 147/74 97 % 10/24/18 1928 98 °F (36.7 °C) 99 16 125/78 96 % 10/24/18 1819 98.8 °F (37.1 °C) 84 16 (!) 150/96 98 % 10/24/18 1706 97.6 °F (36.4 °C) 77 16 138/83 100 % 10/24/18 1611 98.4 °F (36.9 °C) 77 15 115/75 100 % 10/24/18 1545  76 19  98 % 10/24/18 1530  66 10 124/73 100 % 10/24/18 1515  64 12 127/82 100 % 10/24/18 1505  75 13 125/83 99 % 10/24/18 1503  72 15 130/73 94 % 10/24/18 1500  75 16 (!) 146/98 100 % 10/24/18 1455  71 15 135/76 100 % 10/24/18 1453 98.1 °F (36.7 °C) 75 10 121/85 99 % 10/24/18 1150  86 18 133/89 100 % 10/24/18 1138  88 18 131/77 100 % 10/24/18 1032 98.5 °F (36.9 °C) 85 18 (!) 153/109 100 % Current Facility-Administered Medications Medication Dose Route Frequency  albuterol (PROVENTIL VENTOLIN) nebulizer solution 2.5 mg  2.5 mg Nebulization Q6H PRN  
 . PHARMACY TO SUBSTITUTE PER PROTOCOL (Reordered from: amphetamine sulfate (EVEKEO) 10 mg tab)    Per Protocol  baclofen (LIORESAL) tablet 10 mg  10 mg Oral Q12H PRN  
 divalproex DR (DEPAKOTE) tablet 250 mg  250 mg Oral Q12H  hydroCHLOROthiazide (HYDRODIURIL) tablet 25 mg  25 mg Oral DAILY  levothyroxine (SYNTHROID) tablet 100 mcg  100 mcg Oral ACB  losartan (COZAAR) tablet 100 mg  100 mg Oral DAILY  oxyCODONE IR (ROXICODONE) tablet 15 mg  15 mg Oral Q4H PRN  potassium chloride SR (KLOR-CON 10) tablet 10 mEq  10 mEq Oral DAILY  . PHARMACY TO SUBSTITUTE PER PROTOCOL (Reordered from: tapentadol (NUCYNTA ER) 250 mg Tb12)    Per Protocol  traZODone (DESYREL) tablet 100 mg  100 mg Oral QHS  . PHARMACY TO SUBSTITUTE PER PROTOCOL (Reordered from: vortioxetine (TRINTELLIX) 10 mg tablet)    Per Protocol  0.9% sodium chloride infusion  125 mL/hr IntraVENous CONTINUOUS  
 sodium chloride 0.9 % bolus infusion 500 mL  500 mL IntraVENous ONCE PRN  
 sodium chloride (NS) flush 5-10 mL  5-10 mL IntraVENous Q8H  
 sodium chloride (NS) flush 5-10 mL  5-10 mL IntraVENous PRN  
 acetaminophen (TYLENOL) tablet 650 mg  650 mg Oral Q6H  
 naloxone (NARCAN) injection 0.4 mg  0.4 mg IntraVENous PRN  
 ondansetron (ZOFRAN) injection 4 mg  4 mg IntraVENous Q4H PRN  prochlorperazine (COMPAZINE) with saline injection 5 mg  5 mg IntraVENous Q6H PRN  
 hydrOXYzine HCl (ATARAX) tablet 10 mg  10 mg Oral Q8H PRN  
 famotidine (PEPCID) tablet 20 mg  20 mg Oral BID  senna-docusate (PERICOLACE) 8.6-50 mg per tablet 1 Tab  1 Tab Oral BID  polyethylene glycol (MIRALAX) packet 17 g  17 g Oral DAILY  bisacodyl (DULCOLAX) suppository 10 mg  10 mg Rectal DAILY PRN  
 aspirin delayed-release tablet 81 mg  81 mg Oral BID  ketorolac (TORADOL) injection 30 mg  30 mg IntraVENous Q6H  
 ceFAZolin (ANCEF) 2 g/20 mL in sterile water IV syringe  2 g IntraVENous Q8H Lab Results Component Value Date/Time HGB 9.4 (L) 10/25/2018 05:38 AM  
 INR 1.1 10/08/2018 03:47 PM  
 
 
Lab Results Component Value Date/Time Sodium 141 10/25/2018 05:38 AM  
 Potassium 4.2 10/25/2018 05:38 AM  
 Chloride 113 (H) 10/25/2018 05:38 AM  
 CO2 20 (L) 10/25/2018 05:38 AM  
 BUN 10 10/25/2018 05:38 AM  
 Creatinine 0.66 10/25/2018 05:38 AM  
 Calcium 7.6 (L) 10/25/2018 05:38 AM  
 
 
 
  
50 y.o. female s/p left total knee arthroplasty on 10/24/2018  . Doing well. ABX: Complete 24 hours perioperative abx PATHWAY: Straight cath per protocol if needed DVT Prophylaxis: Aspirin 81 mg enteric coated BID Weight Bearing: WBAT LLE  
 Pain Control: Home meds-Nucynta, Toradol (if Cr normal), Diclofenac to begin the evening of POD 1, Scheduled Tylenol, tramadol, oxy 15 mg for breakthrough Disposition: Home, PT

## 2018-10-25 NOTE — PROGRESS NOTES
Problem: Risk for Spread of Infection Goal: Prevent transmission of infectious organism to others Prevent the transmission of infectious organisms to other patients, staff members, and visitors.  
Outcome: Progressing Towards Goal 
p

## 2018-10-25 NOTE — PROGRESS NOTES
Problem: Mobility Impaired (Adult and Pediatric) Goal: *Acute Goals and Plan of Care (Insert Text) Physical Therapy Goals Initiated 10/25/2018 1. Patient will move from supine to sit and sit to supine  in bed with modified independence within 4 days. 2. Patient will perform sit to stand with modified independence within 4 days. 3. Patient will ambulate with modified independence for 200 feet with the least restrictive device within 4 days. 4. Patient will ascend/descend 8 stairs with 1 handrail(s) with modified independence within 4 days. 5. Patient will perform home exercise program per protocol with modified independence within 4 days. 6. Patient will demonstrate AROM 0-90 degrees in operative joint within 4 days. physical Therapy TREATMENT Patient: Boone Mcgowan (65 y.o. female) Date: 10/25/2018 Diagnosis: Primary osteoarthritis of both knees [M17.0] <principal problem not specified> Procedure(s) (LRB): LEFT TOTAL KNEE ARTHROPLASTY (Left) 1 Day Post-Op Precautions: Fall, WBAT Chart, physical therapy assessment, plan of care and goals were reviewed. ASSESSMENT: 
Patient continues to make steady progress toward goals. Instructed in HEP and reviewed use of ice. Patient currently supervision for bed mobility and SBA for sit to stand transfers. Amb approx 150 feet with rollator walker and CGA with slow ector and decreased step clearance. Patient able to progress to step through pattern. Reports increased pain with increased distance. Patient hopeful to DC to rehab. Will continue to follow. Progression toward goals: 
[]      Improving appropriately and progressing toward goals [x]      Improving slowly and progressing toward goals 
[]      Not making progress toward goals and plan of care will be adjusted PLAN: 
Patient continues to benefit from skilled intervention to address the above impairments. Continue treatment per established plan of care. Discharge Recommendations:  [address] Further Equipment Recommendations for Discharge: Owns rollator SUBJECTIVE:  
Patient stated It's really starting to hurt but I need to get up and do this.  OBJECTIVE DATA SUMMARY:  
Critical Behavior: 
Neurologic State: Alert Orientation Level: Oriented X4 Cognition: Appropriate for age attention/concentration, Follows commands Safety/Judgement: Awareness of environment, Insight into deficits, Good awareness of safety precautions, Home safety Range of Motion: 
AROM: Within functional limits PROM: Within functional limits Functional Mobility Training: 
Bed Mobility: 
Rolling: Supervision Supine to Sit: Supervision Scooting: Supervision Transfers: 
Sit to Stand: Stand-by assistance Stand to Sit: Stand-by assistance Balance: 
Sitting: Intact Standing: Intact; With support Ambulation/Gait Training: 
Distance (ft): 150 Feet (ft) Assistive Device: Gait belt;Walker, rollator Ambulation - Level of Assistance: Stand-by assistance Gait Description (WDL): Exceptions to [facility] Gait Abnormalities: Antalgic;Decreased step clearance Base of Support: Widened Speed/Radha: Pace decreased (<100 feet/min); Slow Step Length: Left shortened;Right shortened Therapeutic Exercises:  
 
EXERCISE Sets Reps Active Active Assist  
Passive Self ROM Comments Ankle Pumps 1 10 []                                        []                                        []                                        []                                          
Quad Sets 1 5 []                                        []                                        []                                        []                                          
Hamstring Sets   []                                        []                                        []                                        [] Short Arc Quads   []                                        []                                        []                                        []                                          
Knee Extension Stretch    
[]                                         
Heel Slides 1 10 []                                        []                                        []                                        []                                          
Long Arc Quads   []                                        []                                        []                                        []                                          
Knee Flexion Stretch 1 5 []                                        []                                        []                                        []                                          
Straight Leg Raises   []                                        []                                        []                                        []                                          
 
Pain: 
Pain Scale 1: Numeric (0 - 10) Pain Intensity 1: 8 Pain Location 1: Leg 
Pain Orientation 1: Anterior Pain Description 1: Aching Pain Intervention(s) 1: Medication (see MAR) Activity Tolerance: VSS Please refer to the flowsheet for vital signs taken during this treatment. After treatment:  
[] Patient left in no apparent distress sitting up in chair 
[x] Patient left in no apparent distress in bed 
[x] Call bell left within reach [x] Nursing notified 
[] Caregiver present 
[] Bed alarm activated COMMUNICATION/COLLABORATION:  
The patients plan of care was discussed with: Physical Therapist, Occupational Therapist and Registered Nurse Dianne Holman PT, DPT Time Calculation: 23 mins

## 2018-10-25 NOTE — PROGRESS NOTES
Problem: Falls - Risk of 
Goal: *Absence of Falls Document Gilberto Shadow Fall Risk and appropriate interventions in the flowsheet. Outcome: Progressing Towards Goal 
Fall Risk Interventions: 
Mobility Interventions: OT consult for ADLs Medication Interventions: Teach patient to arise slowly Elimination Interventions: Call light in reach

## 2018-10-25 NOTE — PROGRESS NOTES
Pt walked to Bathroom at 2000. No dizziness or nausea. Tolerated walking very well. Walked to the bathroom a second time at 2215. Voiding sufficiently. Pt eager to help her self. Constant reminder to call out when needing to go to the bathroom.

## 2018-10-25 NOTE — PROGRESS NOTES
Problem: Mobility Impaired (Adult and Pediatric) Goal: *Acute Goals and Plan of Care (Insert Text) Physical Therapy Goals Initiated 10/25/2018 1. Patient will move from supine to sit and sit to supine  in bed with modified independence within 4 days. 2. Patient will perform sit to stand with modified independence within 4 days. 3. Patient will ambulate with modified independence for 200 feet with the least restrictive device within 4 days. 4. Patient will ascend/descend 8 stairs with 1 handrail(s) with modified independence within 4 days. 5. Patient will perform home exercise program per protocol with modified independence within 4 days. 6. Patient will demonstrate AROM 0-90 degrees in operative joint within 4 days. physical Therapy knee EVALUATION Patient: Cristal Palomares (2 y.o. female) Date: 10/25/2018 Primary Diagnosis: Primary osteoarthritis of both knees [M17.0] Procedure(s) (LRB): LEFT TOTAL KNEE ARTHROPLASTY (Left) 1 Day Post-Op Precautions:   Fall, WBAT 
 
ASSESSMENT : 
Based on the objective data described below, the patient presents with decreased functional mobility from baseline level of function. Prior to admit patient was independent with mobiltiy. Lives with  in a 2 level home with 5 KRISTINE. Patient currently needing supervision for bed mobility and SBA for transfers. Amb approx 125 feet with rollator and CGA with slow ector but no overt LOB. Patient very impulsive and intermittently lets go of walker despite cues for safety. Patient reporting high pain levels in knee but otherwise no complaints. Patient hopeful to DC to rehab setting. Will continue to follow. Patient will benefit from skilled intervention to address the above impairments. Patients rehabilitation potential is considered to be Good Factors which may influence rehabilitation potential include:  
[x]         None noted 
[]         Mental ability/status []         Medical condition []         Home/family situation and support systems 
[]         Safety awareness 
[]         Pain tolerance/management 
[]         Other: PLAN : 
Recommendations and Planned Interventions: 
[x]           Bed Mobility Training             [x]    Neuromuscular Re-Education 
[x]           Transfer Training                   []    Orthotic/Prosthetic Training 
[x]           Gait Training                         []    Modalities [x]           Therapeutic Exercises           []    Edema Management/Control 
[x]           Therapeutic Activities            [x]    Patient and Family Training/Education 
[]           Other (comment): Frequency/Duration: Patient will be followed by physical therapy twice daily to address goals. Discharge Recommendations: Home Health (patient wants rehab) Further Equipment Recommendations for Discharge: TBD SUBJECTIVE:  
Patient stated I think my  can come tmrw so he can bring me to rehab.  OBJECTIVE DATA SUMMARY:  
HISTORY:   
Past Medical History:  
Diagnosis Date  ADHD (attention deficit hyperactivity disorder)  Arthritis OSTEO  Chronic pain  GERD (gastroesophageal reflux disease)  Graves disease NO LONGER AN ISSUE  
 Hematuria 10/9/2018  
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   
 depression  Seizure ([address]  Seizures ([address]  &  REACTIVE TO HYPOGLYCEMIA / ALSO FROM FLASHING LIGHTS Past Surgical History:  
Procedure Laterality Date  DELIVERY     
 HX ABDOMINOPLASTY   [address] [facility] [address] [facility]  HX  SECTION    HX GASTRIC BYPASS    
  [address] CHOLEYCYSTECTOMY  HX ORTHOPAEDIC Right CARPEL TUNNEL  
 HX ORTHOPAEDIC Left CARPEL TUNNEL  
 HX ORTHOPAEDIC Left  [facility] SHORTENING  
  HX ORTHOPAEDIC Right  [address] [facility] [address] TONSILLECTOMY  7425 Prior Level of Function/Home Situation: Amb with rollator at baseline. Lives with  Personal factors and/or comorbidities impacting plan of care:  
 
Home Situation Home Environment: Private residence # Steps to Enter: 5 One/Two Story Residence: Two story # of Interior Steps: 15 Interior Rails: Left Lift Chair Available: No 
Living Alone: No 
Support Systems: Spouse/Significant Other/Partner Patient Expects to be Discharged to[de-identified] Private residence Current DME Used/Available at Home: daquan Carballo EXAMINATION/PRESENTATION/DECISION MAKING: Critical Behavior: 
Neurologic State: Alert Orientation Level: Oriented X4 Range Of Motion: 
AROM: Generally decreased, functional 
  
  
  
  
  
  
  
Strength:   
Strength: Generally decreased, functional 
  
  
  
  
  
  
Tone & Sensation:  
  
  
  
  
  
  
  
  
  
   
Coordination: 
  
Vision:  
  
Functional Mobility: 
Bed Mobility: 
Rolling: Supervision Supine to Sit: Supervision Scooting: Supervision Transfers: 
Sit to Stand: Stand-by assistance Stand to Sit: Stand-by assistance Balance:  
Sitting: Intact Standing: Intact; With supportAmbulation/Gait Training:Distance (ft): 125 Feet (ft) Assistive Device: Gait belt;Walker, rollator Ambulation - Level of Assistance: Contact guard assistance Gait Description (WDL): Exceptions to [facility] Gait Abnormalities: Antalgic;Decreased step clearance Base of Support: Widened Speed/Radha: Pace decreased (<100 feet/min); Slow Step Length: Left shortened;Right shortened Functional Measure: 
Barthel Index: 
 
Bathin Bladder: 5 Bowels: 10 
Groomin Dressin Feeding: 10 Mobility: 10 Stairs: 5 Toilet Use: 5 Transfer (Bed to Chair and Back): 10 Total: 70 Barthel and G-code impairment scale: 
Percentage of impairment CH 
0% CI 
1-19% CJ 
20-39% CK 
 40-59% CL 
60-79% CM 
80-99% CN 
100% Barthel Score 0-100 100 99-80 79-60 59-40 20-39 1-19 
 0 Barthel Score 0-20 20 17-19 13-16 9-12 5-8 1-4 0 The Barthel ADL Index: Guidelines 1. The index should be used as a record of what a patient does, not as a record of what a patient could do. 2. The main aim is to establish degree of independence from any help, physical or verbal, however minor and for whatever reason. 3. The need for supervision renders the patient not independent. 4. A patient's performance should be established using the best available evidence. Asking the patient, friends/relatives and nurses are the usual sources, but direct observation and common sense are also important. However direct testing is not needed. 5. Usually the patient's performance over the preceding 24-48 hours is important, but occasionally longer periods will be relevant. 6. Middle categories imply that the patient supplies over 50 per cent of the effort. 7. Use of aids to be independent is allowed. John Rivas., Barthel, D.W. (9108). Functional evaluation: the Barthel Index. [address] [facility] (14)2. Amadou Orosco david SOLANGE Fajardo, Tahira Hylton., Maria Luisa Millan., [place], [address] [place] [address] (1999). Measuring the change indisability after inpatient rehabilitation; comparison of the responsiveness of the Barthel Index and Functional [place] Measure. Journal of Neurology, Neurosurgery, and Psychiatry, 66(4), 394-934. Lillian Kuo N.J.EVE, LEONARDO Duff, & Luke Lopez MMargieA. (2004.) Assessment of post-stroke quality of life in cost-effectiveness studies: The usefulness of the Barthel Index and the EuroQoL-5D. [facility], 13, 335-32 G codes: In compliance with CMSs Claims Based Outcome Reporting, the following G-code set was chosen for this patient based on their primary functional limitation being treated:  
 
The outcome measure chosen to determine the severity of the functional limitation was the Barthel with a score of 70/100 which was correlated with the impairment scale. ? Mobility - Walking and Moving Around:  
  - CURRENT STATUS: CJ - 20%-39% impaired, limited or restricted  - GOAL STATUS: CI - 1%-19% impaired, limited or restricted  - D/C STATUS:  ---------------To be determined---------------  
 
  
 
Pain: 
Pain Scale 1: Numeric (0 - 10) Pain Intensity 1: 8 Pain Location 1: Leg 
Pain Orientation 1: Anterior Pain Description 1: Aching Pain Intervention(s) 1: Medication (see MAR) Activity Tolerance: VSS Please refer to the flowsheet for vital signs taken during this treatment. After treatment:  
[x]         Patient left in no apparent distress sitting up in chair 
[]         Patient left in no apparent distress in bed 
[x]         Call bell left within reach [x]         Nursing notified 
[]         Caregiver present 
[]         Bed alarm activated COMMUNICATION/EDUCATION:  
The patients plan of care was discussed with: Physical Therapist, Occupational Therapist and Registered Nurse. [x]         Fall prevention education was provided and the patient/caregiver indicated understanding. [x]         Patient/family have participated as able in goal setting and plan of care. [x]         Patient/family agree to work toward stated goals and plan of care. []         Patient understands intent and goals of therapy, but is neutral about his/her participation. []         Patient is unable to participate in goal setting and plan of care. Thank you for this referral. 
Marcos Paredes, PT, DPT Time Calculation: 23 mins

## 2018-10-25 NOTE — PROGRESS NOTES
Bedside and Verbal shift change report given to Nannette Ramos RN (oncoming nurse) by Belkis Mane RN (offgoing nurse). Report included the following information SBAR, ED Summary, Procedure Summary, MAR and Recent Results.

## 2018-10-25 NOTE — PROGRESS NOTES
Bedside and Verbal shift change report given to Ricardo Kim and ROBER Aiken (oncoming nurse) by Stef Burgess RN (offgoing nurse). Report included the following information SBAR, Kardex, OR Summary, Procedure Summary, Intake/Output, MAR and Recent Results.

## 2018-10-26 LAB — HGB BLD-MCNC: 9.3 G/DL (ref 11.5–16)

## 2018-10-26 PROCEDURE — 99218 HC RM OBSERVATION: CPT

## 2018-10-26 PROCEDURE — 85018 HEMOGLOBIN: CPT | Performed by: ORTHOPAEDIC SURGERY

## 2018-10-26 PROCEDURE — 74011250637 HC RX REV CODE- 250/637: Performed by: ORTHOPAEDIC SURGERY

## 2018-10-26 PROCEDURE — 74011250636 HC RX REV CODE- 250/636: Performed by: HOSPITALIST

## 2018-10-26 PROCEDURE — 74011250636 HC RX REV CODE- 250/636: Performed by: ORTHOPAEDIC SURGERY

## 2018-10-26 PROCEDURE — 36415 COLL VENOUS BLD VENIPUNCTURE: CPT | Performed by: ORTHOPAEDIC SURGERY

## 2018-10-26 PROCEDURE — 97535 SELF CARE MNGMENT TRAINING: CPT

## 2018-10-26 PROCEDURE — 97116 GAIT TRAINING THERAPY: CPT

## 2018-10-26 PROCEDURE — 97530 THERAPEUTIC ACTIVITIES: CPT

## 2018-10-26 RX ORDER — OXYCODONE HYDROCHLORIDE 5 MG/1
15 TABLET ORAL
Status: DISCONTINUED | OUTPATIENT
Start: 2018-10-26 | End: 2018-10-27

## 2018-10-26 RX ORDER — FAMOTIDINE 20 MG/1
20 TABLET, FILM COATED ORAL 2 TIMES DAILY
Qty: 60 TAB | Refills: 0 | Status: ON HOLD | OUTPATIENT
Start: 2018-10-26 | End: 2019-01-08

## 2018-10-26 RX ORDER — ASPIRIN 81 MG/1
81 TABLET ORAL 2 TIMES DAILY
Qty: 60 TAB | Refills: 0 | Status: SHIPPED | OUTPATIENT
Start: 2018-10-26 | End: 2019-02-01

## 2018-10-26 RX ORDER — DICLOFENAC SODIUM 50 MG/1
50 TABLET, DELAYED RELEASE ORAL 2 TIMES DAILY
Status: DISCONTINUED | OUTPATIENT
Start: 2018-10-26 | End: 2018-10-28 | Stop reason: HOSPADM

## 2018-10-26 RX ORDER — DICLOFENAC SODIUM 50 MG/1
50 TABLET, DELAYED RELEASE ORAL 2 TIMES DAILY
Qty: 60 TAB | Refills: 0 | Status: SHIPPED | OUTPATIENT
Start: 2018-10-26 | End: 2018-11-24

## 2018-10-26 RX ADMIN — ASPIRIN 81 MG: 81 TABLET, COATED ORAL at 18:08

## 2018-10-26 RX ADMIN — ACETAMINOPHEN 650 MG: 325 TABLET ORAL at 18:08

## 2018-10-26 RX ADMIN — ACETAMINOPHEN 650 MG: 325 TABLET ORAL at 06:47

## 2018-10-26 RX ADMIN — ACETAMINOPHEN 650 MG: 325 TABLET ORAL at 11:35

## 2018-10-26 RX ADMIN — DIVALPROEX SODIUM 250 MG: 250 TABLET, DELAYED RELEASE ORAL at 22:19

## 2018-10-26 RX ADMIN — OXYCODONE HYDROCHLORIDE 15 MG: 5 TABLET ORAL at 18:08

## 2018-10-26 RX ADMIN — Medication 10 ML: at 06:48

## 2018-10-26 RX ADMIN — LEVOTHYROXINE SODIUM 100 MCG: 100 TABLET ORAL at 06:47

## 2018-10-26 RX ADMIN — OXYCODONE HYDROCHLORIDE 15 MG: 5 TABLET ORAL at 11:35

## 2018-10-26 RX ADMIN — OXYCODONE HYDROCHLORIDE 15 MG: 5 TABLET ORAL at 08:13

## 2018-10-26 RX ADMIN — DIVALPROEX SODIUM 250 MG: 250 TABLET, DELAYED RELEASE ORAL at 09:21

## 2018-10-26 RX ADMIN — STANDARDIZED SENNA CONCENTRATE AND DOCUSATE SODIUM 1 TABLET: 8.6; 5 TABLET, FILM COATED ORAL at 09:21

## 2018-10-26 RX ADMIN — Medication 10 ML: at 22:19

## 2018-10-26 RX ADMIN — FAMOTIDINE 20 MG: 20 TABLET ORAL at 18:08

## 2018-10-26 RX ADMIN — FAMOTIDINE 20 MG: 20 TABLET ORAL at 09:22

## 2018-10-26 RX ADMIN — ASPIRIN 81 MG: 81 TABLET, COATED ORAL at 09:21

## 2018-10-26 RX ADMIN — SODIUM CHLORIDE 1000 ML: 900 INJECTION, SOLUTION INTRAVENOUS at 23:42

## 2018-10-26 RX ADMIN — POTASSIUM CHLORIDE 10 MEQ: 750 TABLET, FILM COATED, EXTENDED RELEASE ORAL at 09:21

## 2018-10-26 RX ADMIN — SODIUM CHLORIDE 1000 ML: 900 INJECTION, SOLUTION INTRAVENOUS at 21:52

## 2018-10-26 RX ADMIN — OXYCODONE HYDROCHLORIDE 15 MG: 5 TABLET ORAL at 14:40

## 2018-10-26 RX ADMIN — Medication 10 ML: at 14:41

## 2018-10-26 RX ADMIN — ACETAMINOPHEN 650 MG: 325 TABLET ORAL at 00:20

## 2018-10-26 RX ADMIN — DICLOFENAC SODIUM 50 MG: 50 TABLET, DELAYED RELEASE ORAL at 18:08

## 2018-10-26 RX ADMIN — LOSARTAN POTASSIUM 100 MG: 50 TABLET ORAL at 09:22

## 2018-10-26 RX ADMIN — HYDROCHLOROTHIAZIDE 25 MG: 25 TABLET ORAL at 09:21

## 2018-10-26 RX ADMIN — OXYCODONE HYDROCHLORIDE 15 MG: 5 TABLET ORAL at 00:49

## 2018-10-26 RX ADMIN — BACLOFEN 10 MG: 10 TABLET ORAL at 00:19

## 2018-10-26 RX ADMIN — OXYCODONE HYDROCHLORIDE 15 MG: 5 TABLET ORAL at 04:32

## 2018-10-26 RX ADMIN — DICLOFENAC SODIUM 50 MG: 50 TABLET, DELAYED RELEASE ORAL at 10:40

## 2018-10-26 NOTE — PROGRESS NOTES
10/26/2018 Chart reviewed; pt cleared for PT. Attempted to see pt fo AM PT, however pt currently deferring amb until she is able to receive next dose of pain medicine, in which she reports frequency has changed from every 4 hours to every 3 hrs. Also reported lack of sleep last night d/t increased pain and is feeling very tired. Pt experienced brief moment of nausea while PT present, provided wash basin for pt (emesis bags later provided along w/ ginger ale and refill of ice water at pt request). RN aware. Will f/u at later time as able and appropriate. 1505 Follwed up w/pt this afternoon for PT. OT currently working w/pt. Will f/u at later time.  
 
 
Dasia Santana, PTA

## 2018-10-26 NOTE — PROGRESS NOTES
Problem: Self Care Deficits Care Plan (Adult) Goal: *Acute Goals and Plan of Care (Insert Text) Occupational Therapy Goals Initiated 10/25/2018 1. Patient will perform lower body ADLs with supervision/set-up within 4 day(s). 2.  Patient will perform upper body ADLs standing 5 mins without fatigue or LOB with supervision/set-up within 4 day(s). 3.  Patient will perform all aspects of toileting with supervision/set-up within 4 day(s). 4.  Patient will participate in upper extremity therapeutic exercise/activities with supervision/set-up for 10 minutes within 4 day(s). 5.  Patient will utilize energy conservation techniques during functional activities without cues within 4 day(s). Occupational Therapy TREATMENT Patient: Aidan Stafford (64 y.o. female) Date: 10/26/2018 Diagnosis: Primary osteoarthritis of both knees [M17.0] <principal problem not specified> Procedure(s) (LRB): LEFT TOTAL KNEE ARTHROPLASTY (Left) 2 Days Post-Op Precautions: Fall, WBAT Chart, occupational therapy assessment, plan of care, and goals were reviewed. ASSESSMENT: 
Patient received seated EOB. Participated in active and simulated self care as well as ambulation to/from bathroom with her rollator with CGA to mod assist. Performance impacted by impaired reach to LEs, impaired standing balance and posture, impaired ability to lift feet from floor to get clothing under them to don, and complex medical history. Patient uses rollator at baseline. Patient reports at baseline that she lives with  who works from second floor of home during the day. Recommend rehab versus HH. Recommend OOB in chair 3 times a day, participation in self care, and ambulating to bathroom with rollator and assist of 1 to toilet. Progression toward goals: 
[x]       Improving appropriately and progressing toward goals 
[]       Improving slowly and progressing toward goals []       Not making progress toward goals and plan of care will be adjusted PLAN: 
Patient continues to benefit from skilled intervention to address the above impairments. Continue treatment per established plan of care. Discharge Recommendations:  Rehab versus [place] Further Equipment Recommendations for Discharge:  none SUBJECTIVE:  
Patient stated My feet are falling apart. My body is falling apart.  OBJECTIVE DATA SUMMARY:  
Cognitive/Behavioral Status: 
Neurologic State: Alert Orientation Level: Oriented X4 Cognition: Appropriate decision making; Appropriate for age attention/concentration; Appropriate safety awareness; Follows commands Perception: Appears intact Perseveration: Perseverates during conversation Safety/Judgement: Awareness of environment Functional Mobility and Transfers for ADLs:Bed Mobility: 
  
 
Transfers: 
Sit to Stand: Contact guard assistance;Assist x1 Bed to Chair: Contact guard assistance;Assist x1 Balance: 
Sitting: Intact Standing: Impaired Standing - Static: Fair Standing - Dynamic : Fair ADL Intervention: 
  
 
Grooming Washing Hands: Supervision/set-up(standing at sink with rollator) Upper Body Bathing Bathing Assistance: Supervision/set-up(in simulation) Position Performed: Seated edge of bed;Seated in chair Lower Body Bathing Lower Body : Minimum assistance Position Performed: Bending forward method Lower Body Dressing Assistance Pants With Elastic Waist: Minimum assistance Socks: Moderate assistance Leg Crossed Method Used: No 
Position Performed: Bending forward method;Standing Adaptive Equipment Used: Apurva Patton Cognitive Retraining Safety/Judgement: Awareness of environment Activity Tolerance:  
fair Please refer to the flowsheet for vital signs taken during this treatment. After treatment:  
[x] Patient left in no apparent distress sitting up in chair 
[] Patient left in no apparent distress in bed [x] Call bell left within reach [x] Nursing notified 
[] Caregiver present 
[] Bed alarm activated COMMUNICATION/COLLABORATION:  
The patients plan of care was discussed with: Registered Nurse Zeyad Sanderson Time Calculation: 21 mins

## 2018-10-26 NOTE — PROGRESS NOTES
Bedside shift change report given to [address] [place] [address] Casey (oncoming nurse) by Anil Woodson (offgoing nurse). Report included the following information SBAR, Kardex, Intake/Output and MAR.

## 2018-10-26 NOTE — DISCHARGE INSTRUCTIONS
Discharge Instructions Knee Replacement  Dr. Tom Hinton    Patient Name: Telma Maldonado  Date of procedure:10/24/2018                                   Procedure:Procedure(s):  LEFT TOTAL KNEE ARTHROPLASTY  Surgeon:Surgeon(s) and Role:     * Lizbeth Paul MD - Primary               PCP: Allison Holman MD  Date of discharge: No discharge date for patient encounter. Follow up appointments   Follow up with Dr. Tom Hinton in 4 weeks. Call [phone] extension [phone] Luci Turpin) to make an appointment.  If home health has been arranged for you the agency will contact you to arrange dates/times for visits. Please call them if you do not hear from them within 24 hours after you are discharged. When to call your Orthopaedic Surgeon: Call [phone]. If you need to reach us after 5pm or on a weekend call [phone] and the on call physician will be contacted.  Unrelieved   Signs of infection-if your incision is red; continues to have drainage; drainage has a foul odor or if you have a persistent fever over 101 degrees   Signs of a blood clot in your leg-calf pain, tenderness, redness, swelling of lower leg    When to call your Primary Care Physician:   Concerns about medical conditions such as diabetes, high blood pressure, asthma, congestive heart failure   Call if blood sugars are elevated, persistent headache or dizziness, coughing or congestion, constipation or diarrhea, burning with urination, abnormal heart rate     When to call [id number] go to the nearest emergency room   Sudden onset of chest pain, shortness of breath, difficulty breathing     Activity   Weight bearing as tolerated with walker or crutches putting as much weight on your leg as you can tolerate.  Refer to your handbook for instructions and pictures   Complete your Home Exercise Program daily as instructed by the physical therapist.  Refer to your handbook for instructions and pictures   Get up every one hour and walk (except at night when sleeping)   Do not drive or operate heavy machinery    Incision Care   The Aquacel (brown, waterproof) surgical dressing is to remain on your knee for 7 days. On the 7th day have someone gently peel the dressing off by carefully lifting the edge and stretching it slightly to break the adhesive seal and leave incision open to air. You may take a shower with the Aquacel dressing in place   Once the Aquacel is removed, you may get your incision wet but do not submerge your incision under water in a bath tub, hot tub or swimming pool for 6 weeks after surgery. Preventing blood clots    Take aspirin 81 mg twice daily as prescribed for one month following surgery        Pain management   Please take scheduled 650 mg tylenol every 6 hours for 4 weeks   Please take scheduled diclofenac 50 mg or celebrex 200 mg twice daily for 4 weeks (whichever was prescribed)   For breakthrough pain not relieved by above medications, please take 15 mg oxy and home Nucynta     While taking aspirin and diclofenac, please take famotidine (PEPCID) 20 mg twice daily as prescribed to prevent stomach ulcers/irritation.  If you have a history of stomach ulcers, do not take diclofenac.  Avoid alcoholic beverages while taking pain medication   Do not take any over-the-counter medication for pain except Tylenol (acetaminophen)   Keep ice wrap in place except when walking. Change gel packs every 4 hours   Lie down and elevate your leg on pillows for about 30 minutes after walking to decrease swelling and pain       Diet   Resume usual diet; drink plenty of fluids; eat foods high in fiber   Please take a stool softener (such as Senokot-S or Colace) to prevent constipation while you are taking oxycodone. If constipation occurs, take a laxative (such as Dulcolax tablets, Milk of Magnesia, or a suppository).

## 2018-10-26 NOTE — PROGRESS NOTES
Chart reviewed. [facility] has accepted this patient. They have started insurance authorization. Care management will continue to follow. MITA Wilson/CRM

## 2018-10-26 NOTE — PROGRESS NOTES
Bedside shift change report given to Yarely Henning (oncoming nurse) by Mague Chandler (offgoing nurse). Report included the following information SBAR, Kardex, OR Summary, Intake/Output, MAR and Recent Results.

## 2018-10-26 NOTE — PROGRESS NOTES
Resting comfortably GEN:  NAD. AOx3 ABD:  S/NT/ND  
LLE:  Dressing C/D/I , 5/5 motor, Calf nttp (Bilat), Sensation rossly intact to light touch throughout, 1+ dp/pt pulses, foot perfused Patient Vitals for the past 24 hrs: 
 Temp Pulse Resp BP SpO2  
10/26/18 0919 98.3 °F (36.8 °C) 78 16 131/67 98 % 10/26/18 0211 98 °F (36.7 °C) 88 16 107/68 98 % 10/25/18 2040 98.3 °F (36.8 °C) 73 15 120/73 99 % 10/25/18 1506 98.4 °F (36.9 °C) 80 16 103/66 96 % Current Facility-Administered Medications Medication Dose Route Frequency  diclofenac EC (VOLTAREN) tablet 50 mg  50 mg Oral BID  
 oxyCODONE IR (ROXICODONE) tablet 15 mg  15 mg Oral Q3H PRN  
 albuterol (PROVENTIL VENTOLIN) nebulizer solution 2.5 mg  2.5 mg Nebulization Q6H PRN  
 . PHARMACY TO SUBSTITUTE PER PROTOCOL (Reordered from: amphetamine sulfate (EVEKEO) 10 mg tab)    Per Protocol  baclofen (LIORESAL) tablet 10 mg  10 mg Oral Q12H PRN  
 divalproex DR (DEPAKOTE) tablet 250 mg  250 mg Oral Q12H  hydroCHLOROthiazide (HYDRODIURIL) tablet 25 mg  25 mg Oral DAILY  levothyroxine (SYNTHROID) tablet 100 mcg  100 mcg Oral ACB  losartan (COZAAR) tablet 100 mg  100 mg Oral DAILY  potassium chloride SR (KLOR-CON 10) tablet 10 mEq  10 mEq Oral DAILY  . PHARMACY TO SUBSTITUTE PER PROTOCOL (Reordered from: tapentadol (NUCYNTA ER) 250 mg Tb12)    Per Protocol  traZODone (DESYREL) tablet 100 mg  100 mg Oral QHS  . PHARMACY TO SUBSTITUTE PER PROTOCOL (Reordered from: vortioxetine (TRINTELLIX) 10 mg tablet)    Per Protocol  sodium chloride (NS) flush 5-10 mL  5-10 mL IntraVENous Q8H  
 sodium chloride (NS) flush 5-10 mL  5-10 mL IntraVENous PRN  
 acetaminophen (TYLENOL) tablet 650 mg  650 mg Oral Q6H  
 naloxone (NARCAN) injection 0.4 mg  0.4 mg IntraVENous PRN  
 hydrOXYzine HCl (ATARAX) tablet 10 mg  10 mg Oral Q8H PRN  
 famotidine (PEPCID) tablet 20 mg  20 mg Oral BID  
  senna-docusate (PERICOLACE) 8.6-50 mg per tablet 1 Tab  1 Tab Oral BID  polyethylene glycol (MIRALAX) packet 17 g  17 g Oral DAILY  bisacodyl (DULCOLAX) suppository 10 mg  10 mg Rectal DAILY PRN  
 aspirin delayed-release tablet 81 mg  81 mg Oral BID Lab Results Component Value Date/Time HGB 9.3 (L) 10/26/2018 03:43 AM  
 INR 1.1 10/08/2018 03:47 PM  
 
 
Lab Results Component Value Date/Time Sodium 141 10/25/2018 05:38 AM  
 Potassium 4.2 10/25/2018 05:38 AM  
 Chloride 113 (H) 10/25/2018 05:38 AM  
 CO2 20 (L) 10/25/2018 05:38 AM  
 BUN 10 10/25/2018 05:38 AM  
 Creatinine 0.66 10/25/2018 05:38 AM  
 Calcium 7.6 (L) 10/25/2018 05:38 AM  
 
 
 
  
50 y.o. female s/p left total knee arthroplasty on 10/24/2018  . Doing well. More pain today. Restarted diclofenac ABX: Complete 24 hours perioperative abx PATHWAY: Straight cath per protocol if needed DVT Prophylaxis: Aspirin 81 mg enteric coated BID Weight Bearing: WBAT LLE Pain Control: Home meds-Nucynta, Toradol (if Cr normal), Diclofenac to begin the evening of POD 1, Scheduled Tylenol, tramadol, oxy 15 mg every 3 Disposition: Home, HHPT Dc today likely

## 2018-10-26 NOTE — PROGRESS NOTES
Problem: Mobility Impaired (Adult and Pediatric) Goal: *Acute Goals and Plan of Care (Insert Text) Physical Therapy Goals Initiated 10/25/2018 1. Patient will move from supine to sit and sit to supine  in bed with modified independence within 4 days. 2. Patient will perform sit to stand with modified independence within 4 days. 3. Patient will ambulate with modified independence for 200 feet with the least restrictive device within 4 days. 4. Patient will ascend/descend 8 stairs with 1 handrail(s) with modified independence within 4 days. 5. Patient will perform home exercise program per protocol with modified independence within 4 days. 6. Patient will demonstrate AROM 0-90 degrees in operative joint within 4 days. physical Therapy TREATMENT Patient: Reena Cruz (36 y.o. female) Date: 10/26/2018 Diagnosis: Primary osteoarthritis of both knees [M17.0] <principal problem not specified> Procedure(s) (LRB): LEFT TOTAL KNEE ARTHROPLASTY (Left) 2 Days Post-Op Precautions: Fall, WBAT Chart, physical therapy assessment, plan of care and goals were reviewed. ASSESSMENT: 
Pt received sitting up in chair at bedside,agreeable to PT. Pt feeling better this afternoon, reports pain improved. Able to complete sit<>stand w/ CGA-SBA and amb w/ CGA x150 FT using rollator. Pt demonstrates \"crouched\", flexed posture  (flexed at trunk, hips, and knees) but able to amb w/ step through gait. Pt returned to bed w/ Min A for LLE into bed. Pt plans for d/c to rehab prior to home to increase strength and independence (pt as 2 children w/ Autism and  home caring for children at this time). Pt currently awaiting insurance auth for rehab. Will f/u tomorrow as able and appropriate. Progression toward goals: 
[x]      Improving appropriately and progressing toward goals 
[]      Improving slowly and progressing toward goals 
[]      Not making progress toward goals and plan of care will be adjusted PLAN: 
Patient continues to benefit from skilled intervention to address the above impairments. Continue treatment per established plan of care. Discharge Recommendations:  Rehab and Home Health Further Equipment Recommendations for Discharge:  TBD (owns rollator) SUBJECTIVE:  
\"I call her Pauline\" Referring to Rollator OBJECTIVE DATA SUMMARY:  
Range of Motion: 
  
  
  
  
  
  
  
 Functional Mobility Training: 
Bed Mobility: 
  
Supine to Sit: (pt received OOB in chair) Sit to Supine: Minimum assistance(LLE assist only ) Transfers: 
Sit to Stand: Contact guard assistance Stand to Sit: Stand-by assistance Bed to Chair: Contact guard assistance;Assist x1 Ambulation/Gait Training: 
Distance (ft): 150 Feet (ft) Assistive Device: Gait belt;Walker, rollator Ambulation - Level of Assistance: Contact guard assistance Gait Abnormalities: Antalgic;Decreased step clearance Base of Support: Widened Speed/Radha: Pace decreased (<100 feet/min) Step Length: Left shortened;Right shortened Therapeutic Exercises:  
Exercises performed per protocol. See morning treatment note for description. Pain: 
Pain Scale 1: Numeric (0 - 10) Pain Intensity 1: 8 Pain Location 1: Leg 
Pain Orientation 1: Anterior Pain Description 1: Aching Pain Intervention(s) 1: Relaxation technique;Medication (see MAR) Activity Tolerance:  
Pain beginning to increase w/amb. No c/o dizziness/lightheadedness Please refer to the flowsheet for vital signs taken during this treatment. After treatment:  
[] Patient left in no apparent distress sitting up in chair 
[x] Patient left in no apparent distress in bed 
[x] Call bell left within reach [x] Nursing notified 
[] Caregiver present 
[] Bed alarm activated COMMUNICATION/COLLABORATION:  
The patients plan of care was discussed with: Registered Nurse Terrie Nelson Time Calculation: 25 mins

## 2018-10-26 NOTE — PROGRESS NOTES
Occupational therapy: Patient received in room with nurse present. Patient reporting poor pain control at this time. Requested defer at this time. Nurse working on pain control. Will re attempt as able.  
AREN Miller/SIRENA

## 2018-10-26 NOTE — PROGRESS NOTES
Bedside and Verbal shift change report given to Louise Sutton (oncoming nurse) by Brian Ritchie RN (offgoing nurse). Report included the following information SBAR, Kardex, OR Summary, Procedure Summary, Intake/Output, MAR and Recent Results.

## 2018-10-27 PROCEDURE — 74011250637 HC RX REV CODE- 250/637: Performed by: ORTHOPAEDIC SURGERY

## 2018-10-27 PROCEDURE — 74011250636 HC RX REV CODE- 250/636: Performed by: HOSPITALIST

## 2018-10-27 PROCEDURE — 99218 HC RM OBSERVATION: CPT

## 2018-10-27 PROCEDURE — 97116 GAIT TRAINING THERAPY: CPT

## 2018-10-27 RX ORDER — SODIUM CHLORIDE 9 MG/ML
75 INJECTION, SOLUTION INTRAVENOUS CONTINUOUS
Status: DISCONTINUED | OUTPATIENT
Start: 2018-10-27 | End: 2018-10-28 | Stop reason: HOSPADM

## 2018-10-27 RX ORDER — OXYCODONE HYDROCHLORIDE 5 MG/1
5-10 TABLET ORAL
Status: DISCONTINUED | OUTPATIENT
Start: 2018-10-27 | End: 2018-10-28 | Stop reason: HOSPADM

## 2018-10-27 RX ORDER — OXYCODONE HCL 10 MG/1
40 TABLET, FILM COATED, EXTENDED RELEASE ORAL EVERY 12 HOURS
Status: DISCONTINUED | OUTPATIENT
Start: 2018-10-27 | End: 2018-10-28 | Stop reason: HOSPADM

## 2018-10-27 RX ADMIN — FAMOTIDINE 20 MG: 20 TABLET ORAL at 09:04

## 2018-10-27 RX ADMIN — ACETAMINOPHEN 650 MG: 325 TABLET ORAL at 17:48

## 2018-10-27 RX ADMIN — OXYCODONE HYDROCHLORIDE 15 MG: 5 TABLET ORAL at 15:41

## 2018-10-27 RX ADMIN — OXYCODONE HYDROCHLORIDE 40 MG: 10 TABLET, FILM COATED, EXTENDED RELEASE ORAL at 21:23

## 2018-10-27 RX ADMIN — OXYCODONE HYDROCHLORIDE 15 MG: 5 TABLET ORAL at 19:07

## 2018-10-27 RX ADMIN — ACETAMINOPHEN 650 MG: 325 TABLET ORAL at 00:20

## 2018-10-27 RX ADMIN — OXYCODONE HYDROCHLORIDE 15 MG: 5 TABLET ORAL at 11:33

## 2018-10-27 RX ADMIN — OXYCODONE HYDROCHLORIDE 15 MG: 5 TABLET ORAL at 01:27

## 2018-10-27 RX ADMIN — DICLOFENAC SODIUM 50 MG: 50 TABLET, DELAYED RELEASE ORAL at 09:04

## 2018-10-27 RX ADMIN — STANDARDIZED SENNA CONCENTRATE AND DOCUSATE SODIUM 1 TABLET: 8.6; 5 TABLET, FILM COATED ORAL at 17:48

## 2018-10-27 RX ADMIN — ASPIRIN 81 MG: 81 TABLET, COATED ORAL at 17:48

## 2018-10-27 RX ADMIN — DIVALPROEX SODIUM 250 MG: 250 TABLET, DELAYED RELEASE ORAL at 21:18

## 2018-10-27 RX ADMIN — SODIUM CHLORIDE 75 ML/HR: 900 INJECTION, SOLUTION INTRAVENOUS at 03:52

## 2018-10-27 RX ADMIN — DICLOFENAC SODIUM 50 MG: 50 TABLET, DELAYED RELEASE ORAL at 18:00

## 2018-10-27 RX ADMIN — ACETAMINOPHEN 650 MG: 325 TABLET ORAL at 11:33

## 2018-10-27 RX ADMIN — Medication 10 ML: at 05:53

## 2018-10-27 RX ADMIN — FAMOTIDINE 20 MG: 20 TABLET ORAL at 17:48

## 2018-10-27 RX ADMIN — TRAZODONE HYDROCHLORIDE 50 MG: 100 TABLET ORAL at 21:18

## 2018-10-27 RX ADMIN — POTASSIUM CHLORIDE 10 MEQ: 750 TABLET, FILM COATED, EXTENDED RELEASE ORAL at 09:04

## 2018-10-27 RX ADMIN — LEVOTHYROXINE SODIUM 100 MCG: 100 TABLET ORAL at 05:54

## 2018-10-27 RX ADMIN — ACETAMINOPHEN 650 MG: 325 TABLET ORAL at 05:53

## 2018-10-27 RX ADMIN — OXYCODONE HYDROCHLORIDE 15 MG: 5 TABLET ORAL at 05:53

## 2018-10-27 RX ADMIN — ASPIRIN 81 MG: 81 TABLET, COATED ORAL at 09:04

## 2018-10-27 RX ADMIN — DIVALPROEX SODIUM 250 MG: 250 TABLET, DELAYED RELEASE ORAL at 09:04

## 2018-10-27 NOTE — PROGRESS NOTES
10/27/2018 
 
1354 Followed up for afternoon PT. Pt received sleeping but easily awakens. Pt requesting therapy to come back to allow her to rest more. Will f/u at later time as able and appropriate.   
 
 
Dasia Means, PTA

## 2018-10-27 NOTE — PROGRESS NOTES
Hospitalist Progress Note Mahsa Bingham MD 
Office: [phone] Date of Service:  10/27/2018 NAME:  Stefan Dietz :  1970 MRN:  [id number] Admission Summary:  
48yo female underwent left TKR, noted to be hypotensive postop therefore hospitalist consult requested Interval history / Subjective:  
Doing well. Denies any complaints. hypotensionresolved with ivf. Wants to go home Assessment & Plan: Hypotension 
-resolved with ivf 
-cont to hold bp meds for now hctz/losartan/lasix H/O SZ 
-cont her AED ADHD/OCD 
-cont home meds Hypothyroid 
-synthroid S/P L TKR 
-as per prim ortho team 
 
 
Ok to d/c home per hospitalist standpoint when ok with prim ortho. D/w pt will hold her bp meds for now but as her activity increases towards her baseline, she may req reinitiation of her bp meds. To f/u pcp next wk if d/c today o/w will cont to follow while inpt and make further recs as appropriate Code status: full DVT prophylaxis: scds, further as per ortho Care Plan discussed with: Patient/Family Disposition: TBD Hospital Problems  Date Reviewed: 10/24/2013 Codes Class Noted POA Primary osteoarthritis of both knees ICD-10-CM: M17.0 ICD-9-CM: 715.16  10/24/2018 Unknown Review of Systems: A comprehensive review of systems was negative except for that written in the HPI. Vital Signs:  
 Last 24hrs VS reviewed since prior progress note. Most recent are: 
Visit Vitals /74 (BP 1 Location: Right arm, BP Patient Position: At rest) Pulse 73 Temp 98.5 °F (36.9 °C) Resp 16 Ht 5' 2\" (1.575 m) Wt 83 kg (183 lb) SpO2 100% BMI 33.47 kg/m² Intake/Output Summary (Last 24 hours) at 10/27/2018 6083 Last data filed at 10/26/2018 1142 Gross per 24 hour Intake 540 ml Output  Net 540 ml Physical Examination: Constitutional:  No acute distress, cooperative, pleasant   
ENT:  Oral mucous moist, oropharynx benign. Neck supple, Resp:  CTA bilaterally. No wheezing/rhonchi/rales. No accessory muscle use CV:  Regular rhythm, normal rate, no murmurs, gallops, rubs GI:  Soft, non distended, non tender. normoactive bowel sounds, no hepatosplenomegaly Musculoskeletal:  No edema, warm, 2+ pulses throughout. dressing to [facility] Neurologic:  Moves all extremities. AAOx3, CN II-XII reviewed Data Review:  
 Review and/or order of clinical lab test 
 
 
Labs:  
 
Recent Labs 10/26/18 
9323 10/25/18 
7840 HGB 9.3* 9.4* Recent Labs 10/25/18 
6786   
K 4.2 * CO2 20* BUN 10  
CREA 0.66 GLU 98  
CA 7.6* No results for input(s): SGOT, GPT, ALT, AP, TBIL, TBILI, TP, ALB, GLOB, GGT, AML, LPSE in the last 72 hours. No lab exists for component: AMYP, HLPSE No results for input(s): INR, PTP, APTT in the last 72 hours. No lab exists for component: INREXT No results for input(s): FE, TIBC, PSAT, FERR in the last 72 hours. No results found for: FOL, RBCF No results for input(s): PH, PCO2, PO2 in the last 72 hours. No results for input(s): CPK, CKNDX, TROIQ in the last 72 hours. No lab exists for component: CPKMB No results found for: CHOL, CHOLX, CHLST, CHOLV, HDL, LDL, LDLC, DLDLP, TGLX, TRIGL, TRIGP, CHHD, CHHDX Lab Results Component Value Date/Time Glucose (POC) 88 10/24/2018 10:51 AM  
 
Lab Results Component Value Date/Time  Color YELLOW/STRAW 10/08/2018 03:47 PM  
 Appearance CLOUDY (A) 10/08/2018 03:47 PM  
 Specific gravity 1.024 10/08/2018 03:47 PM  
 pH (UA) 6.0 10/08/2018 03:47 PM  
 Protein TRACE (A) 10/08/2018 03:47 PM  
 Glucose NEGATIVE  10/08/2018 03:47 PM  
 Ketone NEGATIVE  10/08/2018 03:47 PM  
 Bilirubin NEGATIVE  10/08/2018 03:47 PM  
 Urobilinogen 0.2 10/08/2018 03:47 PM  
 Nitrites NEGATIVE  10/08/2018 03:47 PM  
 Leukocyte Esterase TRACE (A) 10/08/2018 03:47 PM  
 Epithelial cells FEW 10/08/2018 03:47 PM  
 Bacteria NEGATIVE  10/08/2018 03:47 PM  
 WBC 0-4 10/08/2018 03:47 PM  
 RBC >100 (H) 10/08/2018 03:47 PM  
 
 
 
Medications Reviewed:  
 
Current Facility-Administered Medications Medication Dose Route Frequency  0.9% sodium chloride infusion  75 mL/hr IntraVENous CONTINUOUS  
 diclofenac EC (VOLTAREN) tablet 50 mg  50 mg Oral BID  
 oxyCODONE IR (ROXICODONE) tablet 15 mg  15 mg Oral Q3H PRN  
 albuterol (PROVENTIL VENTOLIN) nebulizer solution 2.5 mg  2.5 mg Nebulization Q6H PRN  
 . PHARMACY TO SUBSTITUTE PER PROTOCOL (Reordered from: amphetamine sulfate (EVEKEO) 10 mg tab)    Per Protocol  baclofen (LIORESAL) tablet 10 mg  10 mg Oral Q12H PRN  
 divalproex DR (DEPAKOTE) tablet 250 mg  250 mg Oral Q12H  levothyroxine (SYNTHROID) tablet 100 mcg  100 mcg Oral ACB  potassium chloride SR (KLOR-CON 10) tablet 10 mEq  10 mEq Oral DAILY  . PHARMACY TO SUBSTITUTE PER PROTOCOL (Reordered from: tapentadol (NUCYNTA ER) 250 mg Tb12)    Per Protocol  traZODone (DESYREL) tablet 100 mg  100 mg Oral QHS  . PHARMACY TO SUBSTITUTE PER PROTOCOL (Reordered from: vortioxetine (TRINTELLIX) 10 mg tablet)    Per Protocol  sodium chloride (NS) flush 5-10 mL  5-10 mL IntraVENous Q8H  
 sodium chloride (NS) flush 5-10 mL  5-10 mL IntraVENous PRN  
 acetaminophen (TYLENOL) tablet 650 mg  650 mg Oral Q6H  
 naloxone (NARCAN) injection 0.4 mg  0.4 mg IntraVENous PRN  
 hydrOXYzine HCl (ATARAX) tablet 10 mg  10 mg Oral Q8H PRN  
 famotidine (PEPCID) tablet 20 mg  20 mg Oral BID  senna-docusate (PERICOLACE) 8.6-50 mg per tablet 1 Tab  1 Tab Oral BID  polyethylene glycol (MIRALAX) packet 17 g  17 g Oral DAILY  bisacodyl (DULCOLAX) suppository 10 mg  10 mg Rectal DAILY PRN  
 aspirin delayed-release tablet 81 mg  81 mg Oral BID  
 
______________________________________________________________________ EXPECTED LENGTH OF STAY: - - - Katarina Cohen MD

## 2018-10-27 NOTE — CONSULTS
MEDICAL CONSULTATION      Reason for consult: hypotension  Consulting provider: Dr. Collins Bernardo      CC: low blood pressure      HPI: 50 y.o lady who is here for a left knee replacement. She was noted to be hypotensive last evening. Her only complaint at the time was drowsiness. No fever, dizziness, n/v/d, dyspnea, chest pain. She is normally hypertensive and takes antihypertensives. She attributes her hypertension to her stimulants for ADHD which she states she stopped taking a few days prior to surgery. She was bolused with IVNS with improvement of her BP. PMH/PSH:  Past Medical History:   Diagnosis Date    ADHD (attention deficit hyperactivity disorder)     Arthritis     OSTEO    Chronic pain     GERD (gastroesophageal reflux disease)     Graves disease     NO LONGER AN ISSUE    Hematuria 10/9/2018    HTN (hypertension)     Hypothyroidism     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    OCD (obsessive compulsive disorder)     Other ill-defined conditions(799.89)     graves    Psychiatric disorder     depression    Seizure ([address]     Seizures ([address]  &     REACTIVE TO HYPOGLYCEMIA / ALSO FROM FLASHING LIGHTS     Past Surgical History:   Procedure Laterality Date    DELIVERY       HX ABDOMINOPLASTY      HX ADENOIDECTOMY  1982    HX  SECTION  2000    HX  SECTION  2006    HX GASTRIC BYPASS      2001    HX GI  1993    CHOLEYCYSTECTOMY    HX ORTHOPAEDIC Right     CARPEL TUNNEL    HX ORTHOPAEDIC Left     CARPEL TUNNEL    HX ORTHOPAEDIC Left     ULNA SHORTENING    HX ORTHOPAEDIC Right 2011    COLLAR BONE SURGERY     HX TONSILLECTOMY         Home meds:   Prior to Admission medications    Medication Sig Start Date End Date Taking? Authorizing Provider   diclofenac EC (VOLTAREN) 50 mg EC tablet Take 1 Tab by mouth two (2) times a day. 10/26/18  Yes Harriet aBker MD   aspirin delayed-release 81 mg tablet Take 1 Tab by mouth two (2) times a day.  10/26/18  Yes Collins Bernardo Myrtle Spaulding MD   famotidine (PEPCID) 20 mg tablet Take 1 Tab by mouth two (2) times a day. 10/26/18  Yes Juvenal Desai MD   levothyroxine (SYNTHROID) 100 mcg tablet Take 100 mcg by mouth Daily (before breakfast). Yes Provider, Historical   vortioxetine (TRINTELLIX) 10 mg tablet Take  by mouth daily. PT TAKES A TOTAL OF 30 mg DAILY   Yes Provider, Historical   oxyCODONE IR (OXY-IR) 15 mg immediate release tablet Take 15 mg by mouth every four (4) hours as needed for Pain. TAKES 1.5 TABS IN MORNING  TAKES 2 TABS AT ~ 1430  TAKES 1.5 TABS IN EVENING   Yes Provider, Historical   tapentadol (NUCYNTA ER) 250 mg Tb12 Take 250 mg by mouth. Takes 1 in the morning  Takes 1 in the evening   Yes Provider, Historical   baclofen (LIORESAL) 10 mg tablet Take 10 mg by mouth as needed. Yes Provider, Historical   divalproex DR (DEPAKOTE) 250 mg tablet Take 1 tablet by mouth in  the morning and 2 tablets  by mouth in the evening 9/11/15  Yes Buddy King MD   Lisdexamfetamine (VYVANSE) 50 mg capsule Take 70 mg by mouth two (2) times a day. Takes in the morning and at noon daily         Yes Provider, Historical   losartan (COZAAR) 50 mg tablet Take 100 mg by mouth daily. Yes Provider, Historical   simethicone (GAS-X) 80 mg chewable tablet Take 80 mg by mouth every six (6) hours as needed. Yes Provider, Historical   lansoprazole (PREVACID) 15 mg disintegrating tablet Take  by mouth Daily (before breakfast). Yes Provider, Historical   cetirizine (ZYRTEC) 10 mg tablet Take  by mouth daily. Yes Provider, Historical   amphetamine sulfate (EVEKEO) 10 mg tab Take 10 mg by mouth. PT TAKES 2 TABS (20 MG) BID AT 0200 AND 1200    Provider, Historical   traZODone (DESYREL) 100 mg tablet Take 100 mg by mouth nightly. May take 1/2 tab to 1 tab every evening    Provider, Historical   potassium chloride SR (KLOR-CON 10) 10 mEq tablet Take 10 mEq by mouth daily.     Provider, Historical   furosemide (LASIX) 20 mg tablet Take 20 mg by mouth daily. MAY TAKE ONLY IF NEEDED FOR SEVERE ANKLE SWELLING    Provider, Historical   hydrochlorothiazide (HYDRODIURIL) 25 mg tablet Take 25 mg by mouth daily. MAY TAKE 1 OR 2 TABS AS NEEDED FOR ANKLE SWELLING    Provider, Historical   albuterol (PROAIR HFA) 90 mcg/actuation inhaler Take  by inhalation. Provider, Historical   benzoyl peroxide (DUAL ACTION) 3.5 % Clsr by Apply Externally route. Provider, Historical   acyclovir (ZOVIRAX) 400 mg tablet Take 400 mg by mouth every four (4) hours as needed. Other, MD Pawel   MULTIVIT WITH CALCIUM,IRON,MIN (ONE-A-DAY WOMENS FORMULA PO) Take  by mouth. Provider, Historical       Allergies: Allergies   Allergen Reactions    Sulfa (Sulfonamide Antibiotics) Hives    Pcn [Penicillins] Unproven on Challenge       FH:  Family History   Problem Relation Age of Onset    Heart Disease Mother     Heart Attack Mother     Hypertension Mother     Arthritis-osteo Mother     Rashes/Skin Problems Mother         PLAQUE PSORIASIS    Heart Disease Father     Thyroid Disease Father     Diabetes Father     Hypertension Father     Diabetes Maternal Grandmother     Heart Attack Maternal Grandmother     Hypertension Maternal Grandmother     Arthritis-osteo Maternal Grandmother     Hypertension Maternal Grandfather     Stroke Maternal Grandfather     High Cholesterol Sister     Anesth Problems Neg Hx        SH:  Social History     Tobacco Use    Smoking status: Former Smoker     Types: Cigarettes     Last attempt to quit: 1994     Years since quittin.1    Smokeless tobacco: Never Used   Substance Use Topics    Alcohol use: No       ROS: A comprehensive review of systems was negative except for that written in the HPI.       PHYSICAL EXAM:  Visit Vitals  /77 (BP 1 Location: Right arm, BP Patient Position: At rest)   Pulse 67   Temp 97.7 °F (36.5 °C)   Resp 16   Ht 5' 2\" (1.575 m)   Wt 83 kg (183 lb)   SpO2 96%   BMI 33.47 kg/m²       Gen: NAD, non-toxic  HEENT: anicteric sclerae, normal conjunctiva, oropharynx clear, MM moist  Neck: supple, trachea midline, no adenopathy  Heart: RRR, no MRG, no JVD, no peripheral edema  Lungs: CTA b/l, non-labored respirations  Abd: soft, NT, ND, BS+  Extr: warm, LLE s/p surgery  Skin: dry, no rash  Neuro: CN II-XII grossly intact, normal speech, moves all extremities  Psych: normal mood, appropriate affect      Labs/Imaging:  Recent Results (from the past 24 hour(s))   HEMOGLOBIN    Collection Time: 10/26/18  3:43 AM   Result Value Ref Range    HGB 9.3 (L) 11.5 - 16.0 g/dL       Recent Labs     10/26/18  0343 10/25/18  0538   HGB 9.3* 9.4*     Recent Labs     10/25/18  0538      K 4.2   *   CO2 20*   BUN 10   CREA 0.66   GLU 98   CA 7.6*     No results for input(s): SGOT, GPT, ALT, AP, TBIL, TBILI, TP, ALB, GLOB, GGT, AML, LPSE in the last 72 hours. No lab exists for component: AMYP, HLPSE    No results for input(s): CPK, CKNDX, TROIQ in the last 72 hours. No lab exists for component: CPKMB    No results for input(s): INR, PTP, APTT in the last 72 hours. No lab exists for component: INREXT     No results for input(s): PH, PCO2, PO2 in the last 72 hours.           Assessment & Plan:     Hypotension: this resolved with IVNS x 2 L  -continue @ 75 mL/hr  -hold antihypertensives (HCTZ, losartan, Lasix)    Will follow    Signed By: Sachin Abbott MD     October 27, 2018

## 2018-10-27 NOTE — PROGRESS NOTES
Physical Therapy 10/27/2018 Chart reviewed. Pt received in bed, RN present. Pt reported she had just returned to bed after getting up to use bathroom. Pt had not eaten breakfast yet and requesting to do so at this time; declined transfer to chair for breakfast and preferred to remain in bed to eat. Will f/u at later time as able and appropriate.   
 
 
Dasia Means, PTA

## 2018-10-27 NOTE — PROGRESS NOTES
Problem: Mobility Impaired (Adult and Pediatric) Goal: *Acute Goals and Plan of Care (Insert Text) Physical Therapy Goals Initiated 10/25/2018 1. Patient will move from supine to sit and sit to supine  in bed with modified independence within 4 days. 2. Patient will perform sit to stand with modified independence within 4 days. 3. Patient will ambulate with modified independence for 200 feet with the least restrictive device within 4 days. 4. Patient will ascend/descend 8 stairs with 1 handrail(s) with modified independence within 4 days. 5. Patient will perform home exercise program per protocol with modified independence within 4 days. 6. Patient will demonstrate AROM 0-90 degrees in operative joint within 4 days. Outcome: Progressing Towards Goal 
physical Therapy TREATMENT Patient: Joyce Brennan (75 y.o. female) Date: 10/27/2018 Diagnosis: Primary osteoarthritis of both knees [M17.0] <principal problem not specified> Procedure(s) (LRB): LEFT TOTAL KNEE ARTHROPLASTY (Left) 3 Days Post-Op Precautions: Fall, WBAT Chart, physical therapy assessment, plan of care and goals were reviewed. ASSESSMENT: 
Pt continues to work w/ PT. Remains Supervision-SBA level for bed mobility and transfers. Pt amb approx 160 Ft again this afternoon w/ rollator. Pt returned to bed w/all items In reach. Pt awaiting Rehab at this time (insurance auth). 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 Will f/u tomorrow for BID PT. Progression toward goals: 
[x]      Improving appropriately and progressing toward goals 
[]      Improving slowly and progressing toward goals 
[]      Not making progress toward goals and plan of care will be adjusted PLAN: 
Patient continues to benefit from skilled intervention to address the above impairments. Continue treatment per established plan of care. Discharge Recommendations:  Rehab Further Equipment Recommendations for Discharge:  TBD (pt does own rollator) SUBJECTIVE: \"Do you know when I'll be able to go home? \" OBJECTIVE DATA SUMMARY:  
Range of Motion: 
AROM: Generally decreased, functional 
  
  
  
  
  
  
 Functional Mobility Training: 
Bed Mobility: 
  
Supine to Sit: Supervision Sit to Supine: Supervision Transfers: 
Sit to Stand: Stand-by assistance;Contact guard assistance Stand to Sit: Stand-by assistance;Contact guard assistance Ambulation/Gait Training: 
Distance (ft): 160 Feet (ft) Assistive Device: Gait belt;Walker, rolling Ambulation - Level of Assistance: Contact guard assistance Gait Abnormalities: Antalgic;Decreased step clearance Base of Support: Widened Speed/Radha: Pace decreased (<100 feet/min) Step Length: Left shortened;Right shortened Pain: 
Pain Scale 1: Numeric (0 - 10) Pain Intensity 1: 0 Pain Location 1: Knee Pain Orientation 1: Left Pain Description 1: Throbbing Pain Intervention(s) 1: Medication (see MAR) Activity Tolerance: WFL's 
Please refer to the flowsheet for vital signs taken during this treatment. After treatment:  
[] Patient left in no apparent distress sitting up in chair 
[x] Patient left in no apparent distress in bed 
[x] Call bell left within reach [x] Nursing notified 
[] Caregiver present 
[] Bed alarm activated COMMUNICATION/COLLABORATION:  
The patients plan of care was discussed with: Registered Nurse Lora Howard Time Calculation: 15 mins

## 2018-10-27 NOTE — PROGRESS NOTES
Pt's BP 84/45 and re-checked bp 79/39. Dr. Onofre Vale was called and order NS 1000 bolus and hospitalist consult. Hospitalist was paged and waiting for call back

## 2018-10-27 NOTE — PROGRESS NOTES
Problem: Mobility Impaired (Adult and Pediatric) Goal: *Acute Goals and Plan of Care (Insert Text) Physical Therapy Goals Initiated 10/25/2018 1. Patient will move from supine to sit and sit to supine  in bed with modified independence within 4 days. 2. Patient will perform sit to stand with modified independence within 4 days. 3. Patient will ambulate with modified independence for 200 feet with the least restrictive device within 4 days. 4. Patient will ascend/descend 8 stairs with 1 handrail(s) with modified independence within 4 days. 5. Patient will perform home exercise program per protocol with modified independence within 4 days. 6. Patient will demonstrate AROM 0-90 degrees in operative joint within 4 days. Outcome: Progressing Towards Goal 
physical Therapy TREATMENT Patient: Christy Valle (32 y.o. female) Date: 10/27/2018 Diagnosis: Primary osteoarthritis of both knees [M17.0] <principal problem not specified> Procedure(s) (LRB): LEFT TOTAL KNEE ARTHROPLASTY (Left) 3 Days Post-Op Precautions: Fall, WBAT Chart, physical therapy assessment, plan of care and goals were reviewed. ASSESSMENT: 
Pt received in bed, agreeable to amb w/ Rehab Tech assist. Pt Supervision for bed mobility and CGA-SBA for sit<>stand transfers. Pt experiencing increased pain in left knee, pt also reported her cat had to be put down. Pt tolerated approx 160FT total of gait using Rollator (CGA). Returned to bed to rest w/ all items in reach and needs met. Will f/u this afternoon as able and appropriate. Progression toward goals: 
[x]      Improving appropriately and progressing toward goals 
[]      Improving slowly and progressing toward goals 
[]      Not making progress toward goals and plan of care will be adjusted PLAN: 
Patient continues to benefit from skilled intervention to address the above impairments. Continue treatment per established plan of care. Discharge Recommendations:  Rehab Further Equipment Recommendations for Discharge:  Rollator SUBJECTIVE:  
Patient reports increased pain/discomfort in L knee w/activity. OBJECTIVE DATA SUMMARY:  
Critical Behavior: 
Neurologic State: Alert Orientation Level: Oriented X4 Cognition: Appropriate decision making Safety/Judgement: Awareness of environment Range of Motion: 
  
  
  
  
  
  
  
 Functional Mobility Training: 
Bed Mobility: 
  
Supine to Sit: Supervision Sit to Supine: Supervision Transfers: 
Sit to Stand: Stand-by assistance;Contact guard assistance Stand to Sit: Stand-by assistance;Contact guard assistance Balance: 
Sitting: Intact Standing: Impaired; With support Ambulation/Gait Training: 
Distance (ft): 160 Feet (ft) Assistive Device: Gait belt;Walker, rollator Ambulation - Level of Assistance: Contact guard assistance Gait Abnormalities: Antalgic;Decreased step clearance Base of Support: Widened Speed/Radha: Pace decreased (<100 feet/min) Step Length: Left shortened;Right shortened Pain: 
Pain Scale 1: Numeric (0 - 10) Pain Intensity 1: 0 Pain Location 1: Knee Pain Orientation 1: Left Pain Description 1: Throbbing Pain Intervention(s) 1: Medication (see MAR) Activity Tolerance:  
Fair; no complaints of dizziness/lightheadedness w/ activity Please refer to the flowsheet for vital signs taken during this treatment. After treatment:  
[] Patient left in no apparent distress sitting up in chair 
[x] Patient left in no apparent distress in bed 
[x] Call bell left within reach [x] Nursing notified 
[] Caregiver present 
[] Bed alarm activated COMMUNICATION/COLLABORATION:  
The patients plan of care was discussed with: Registered Nurse Sheldon Peraza Time Calculation: 21 mins

## 2018-10-27 NOTE — PROGRESS NOTES
Patient upset that she wasn't receiving her home meds, including Nucynta. Pharmacy does not carry that med. I paged Dr. Tom Hinton and was told to call pharmacy to find a comparable med to prescribe. Spoke to [facility] in pharmacy who suggested 40mg Oxycontin BID and Changing Oxycodone from 15mg Q3 to 5-10mg Oxycodone Q 6 for breakthrough pain.

## 2018-10-27 NOTE — PROGRESS NOTES
Resting comfortably GEN:  NAD. AOx3 ABD:  S/NT/ND  
LLE:  Dressing C/D/I , 5/5 motor, Calf nttp (Bilat), Sensation rossly intact to light touch throughout, 1+ dp/pt pulses, foot perfused Patient Vitals for the past 24 hrs: 
 Temp Pulse Resp BP SpO2  
10/27/18 0805 98.5 °F (36.9 °C) 73 16 121/74 100 % 10/27/18 0547  69  110/71   
10/27/18 0212 97.7 °F (36.5 °C) 67 16 115/77 96 % 10/27/18 0123  71  126/72 99 % 10/27/18 0016    99/58   
10/26/18 2342  66  90/49 99 % 10/26/18 2324  (!) 58  90/52   
10/26/18 2322  (!) 56  91/52   
10/26/18 2309  62  (!) 82/48   
10/26/18 2245  60  (!) 86/52   
10/26/18 2243  60  (!) 80/45 99 % 10/26/18 2207  60  (!) 82/48 96 % 10/26/18 2126  63  (!) 74/40 97 % 10/26/18 2109  71  (!) 79/39   
10/26/18 2017 98 °F (36.7 °C) 77 16 (!) 84/45 96 % 10/26/18 1439 98.3 °F (36.8 °C) 79 16 129/84 92 % Current Facility-Administered Medications Medication Dose Route Frequency  0.9% sodium chloride infusion  75 mL/hr IntraVENous CONTINUOUS  
 diclofenac EC (VOLTAREN) tablet 50 mg  50 mg Oral BID  
 oxyCODONE IR (ROXICODONE) tablet 15 mg  15 mg Oral Q3H PRN  
 albuterol (PROVENTIL VENTOLIN) nebulizer solution 2.5 mg  2.5 mg Nebulization Q6H PRN  
 . PHARMACY TO SUBSTITUTE PER PROTOCOL (Reordered from: amphetamine sulfate (EVEKEO) 10 mg tab)    Per Protocol  baclofen (LIORESAL) tablet 10 mg  10 mg Oral Q12H PRN  
 divalproex DR (DEPAKOTE) tablet 250 mg  250 mg Oral Q12H  levothyroxine (SYNTHROID) tablet 100 mcg  100 mcg Oral ACB  potassium chloride SR (KLOR-CON 10) tablet 10 mEq  10 mEq Oral DAILY  . PHARMACY TO SUBSTITUTE PER PROTOCOL (Reordered from: tapentadol (NUCYNTA ER) 250 mg Tb12)    Per Protocol  traZODone (DESYREL) tablet 100 mg  100 mg Oral QHS  . PHARMACY TO SUBSTITUTE PER PROTOCOL (Reordered from: vortioxetine (TRINTELLIX) 10 mg tablet)    Per Protocol  sodium chloride (NS) flush 5-10 mL  5-10 mL IntraVENous Q8H  
 sodium chloride (NS) flush 5-10 mL  5-10 mL IntraVENous PRN  
 acetaminophen (TYLENOL) tablet 650 mg  650 mg Oral Q6H  
 naloxone (NARCAN) injection 0.4 mg  0.4 mg IntraVENous PRN  
 hydrOXYzine HCl (ATARAX) tablet 10 mg  10 mg Oral Q8H PRN  
 famotidine (PEPCID) tablet 20 mg  20 mg Oral BID  senna-docusate (PERICOLACE) 8.6-50 mg per tablet 1 Tab  1 Tab Oral BID  polyethylene glycol (MIRALAX) packet 17 g  17 g Oral DAILY  bisacodyl (DULCOLAX) suppository 10 mg  10 mg Rectal DAILY PRN  
 aspirin delayed-release tablet 81 mg  81 mg Oral BID Lab Results Component Value Date/Time HGB 9.3 (L) 10/26/2018 03:43 AM  
 INR 1.1 10/08/2018 03:47 PM  
 
 
Lab Results Component Value Date/Time Sodium 141 10/25/2018 05:38 AM  
 Potassium 4.2 10/25/2018 05:38 AM  
 Chloride 113 (H) 10/25/2018 05:38 AM  
 CO2 20 (L) 10/25/2018 05:38 AM  
 BUN 10 10/25/2018 05:38 AM  
 Creatinine 0.66 10/25/2018 05:38 AM  
 Calcium 7.6 (L) 10/25/2018 05:38 AM  
 
 
 
  
50 y.o. female s/p left total knee arthroplasty on 10/24/2018  . Doing well. Pain improved. ABX: Complete 24 hours perioperative abx PATHWAY: Straight cath per protocol if needed DVT Prophylaxis: Aspirin 81 mg enteric coated BID Weight Bearing: WBAT LLE Pain Control: Home meds-Nucynta,Diclofenac, Scheduled Tylenol, tramadol, oxy 15 mg every 3 Disposition: Home, PT Dc home today

## 2018-10-28 ENCOUNTER — HOME HEALTH ADMISSION (OUTPATIENT)
Dept: HOME HEALTH SERVICES | Facility: HOME HEALTH | Age: 48
End: 2018-10-28
Payer: COMMERCIAL

## 2018-10-28 VITALS
WEIGHT: 191.1 LBS | SYSTOLIC BLOOD PRESSURE: 115 MMHG | DIASTOLIC BLOOD PRESSURE: 74 MMHG | TEMPERATURE: 97.9 F | HEIGHT: 62 IN | BODY MASS INDEX: 35.17 KG/M2 | HEART RATE: 84 BPM | OXYGEN SATURATION: 97 % | RESPIRATION RATE: 16 BRPM

## 2018-10-28 PROCEDURE — 97116 GAIT TRAINING THERAPY: CPT

## 2018-10-28 PROCEDURE — 74011250636 HC RX REV CODE- 250/636: Performed by: ORTHOPAEDIC SURGERY

## 2018-10-28 PROCEDURE — 99218 HC RM OBSERVATION: CPT

## 2018-10-28 PROCEDURE — 90686 IIV4 VACC NO PRSV 0.5 ML IM: CPT | Performed by: ORTHOPAEDIC SURGERY

## 2018-10-28 PROCEDURE — 74011250637 HC RX REV CODE- 250/637: Performed by: ORTHOPAEDIC SURGERY

## 2018-10-28 PROCEDURE — 90471 IMMUNIZATION ADMIN: CPT

## 2018-10-28 RX ADMIN — DIVALPROEX SODIUM 250 MG: 250 TABLET, DELAYED RELEASE ORAL at 09:09

## 2018-10-28 RX ADMIN — ACETAMINOPHEN 650 MG: 325 TABLET ORAL at 13:51

## 2018-10-28 RX ADMIN — INFLUENZA VIRUS VACCINE 0.5 ML: 15; 15; 15; 15 SUSPENSION INTRAMUSCULAR at 13:49

## 2018-10-28 RX ADMIN — OXYCODONE HYDROCHLORIDE 40 MG: 10 TABLET, FILM COATED, EXTENDED RELEASE ORAL at 09:08

## 2018-10-28 RX ADMIN — Medication 10 ML: at 06:31

## 2018-10-28 RX ADMIN — Medication 10 ML: at 00:42

## 2018-10-28 RX ADMIN — DICLOFENAC SODIUM 50 MG: 50 TABLET, DELAYED RELEASE ORAL at 09:09

## 2018-10-28 RX ADMIN — OXYCODONE HYDROCHLORIDE 10 MG: 5 TABLET ORAL at 06:30

## 2018-10-28 RX ADMIN — ACETAMINOPHEN 650 MG: 325 TABLET ORAL at 00:41

## 2018-10-28 RX ADMIN — ASPIRIN 81 MG: 81 TABLET, COATED ORAL at 09:09

## 2018-10-28 RX ADMIN — POTASSIUM CHLORIDE 10 MEQ: 750 TABLET, FILM COATED, EXTENDED RELEASE ORAL at 09:08

## 2018-10-28 RX ADMIN — ACETAMINOPHEN 650 MG: 325 TABLET ORAL at 06:30

## 2018-10-28 RX ADMIN — OXYCODONE HYDROCHLORIDE 10 MG: 5 TABLET ORAL at 00:41

## 2018-10-28 RX ADMIN — FAMOTIDINE 20 MG: 20 TABLET ORAL at 09:09

## 2018-10-28 RX ADMIN — OXYCODONE HYDROCHLORIDE 10 MG: 5 TABLET ORAL at 15:11

## 2018-10-28 RX ADMIN — LEVOTHYROXINE SODIUM 100 MCG: 100 TABLET ORAL at 06:30

## 2018-10-28 NOTE — PROGRESS NOTES
Resting comfortably GEN:  NAD. AOx3 ABD:  S/NT/ND  
LLE:  Dressing C/D/I , 5/5 motor, Calf nttp (Bilat), Sensation rossly intact to light touch throughout, 1+ dp/pt pulses, foot perfused Patient Vitals for the past 24 hrs: 
 Temp Pulse Resp BP SpO2  
10/28/18 0825 97.9 °F (36.6 °C) 84 16 115/74 97 % 10/28/18 0403 98.7 °F (37.1 °C) 88 16 122/76 98 % 10/27/18 2342 98.7 °F (37.1 °C) 88 16 122/77 98 % 10/27/18 2105 98.3 °F (36.8 °C) 85 16 120/77 99 % 10/27/18 1514 97.9 °F (36.6 °C) 72 16 127/82 97 % 10/27/18 1132    128/78  Current Facility-Administered Medications Medication Dose Route Frequency  0.9% sodium chloride infusion  75 mL/hr IntraVENous CONTINUOUS  
 oxyCODONE ER (OxyCONTIN) tablet 40 mg  40 mg Oral Q12H  
 oxyCODONE IR (ROXICODONE) tablet 5-10 mg  5-10 mg Oral Q6H PRN  
 diclofenac EC (VOLTAREN) tablet 50 mg  50 mg Oral BID  albuterol (PROVENTIL VENTOLIN) nebulizer solution 2.5 mg  2.5 mg Nebulization Q6H PRN  
 . PHARMACY TO SUBSTITUTE PER PROTOCOL (Reordered from: amphetamine sulfate (EVEKEO) 10 mg tab)    Per Protocol  baclofen (LIORESAL) tablet 10 mg  10 mg Oral Q12H PRN  
 divalproex DR (DEPAKOTE) tablet 250 mg  250 mg Oral Q12H  levothyroxine (SYNTHROID) tablet 100 mcg  100 mcg Oral ACB  potassium chloride SR (KLOR-CON 10) tablet 10 mEq  10 mEq Oral DAILY  . PHARMACY TO SUBSTITUTE PER PROTOCOL (Reordered from: tapentadol (NUCYNTA ER) 250 mg Tb12)    Per Protocol  traZODone (DESYREL) tablet 100 mg  100 mg Oral QHS  . PHARMACY TO SUBSTITUTE PER PROTOCOL (Reordered from: vortioxetine (TRINTELLIX) 10 mg tablet)    Per Protocol  sodium chloride (NS) flush 5-10 mL  5-10 mL IntraVENous Q8H  
 sodium chloride (NS) flush 5-10 mL  5-10 mL IntraVENous PRN  
 acetaminophen (TYLENOL) tablet 650 mg  650 mg Oral Q6H  
 naloxone (NARCAN) injection 0.4 mg  0.4 mg IntraVENous PRN  
 hydrOXYzine HCl (ATARAX) tablet 10 mg  10 mg Oral Q8H PRN  
  famotidine (PEPCID) tablet 20 mg  20 mg Oral BID  senna-docusate (PERICOLACE) 8.6-50 mg per tablet 1 Tab  1 Tab Oral BID  polyethylene glycol (MIRALAX) packet 17 g  17 g Oral DAILY  bisacodyl (DULCOLAX) suppository 10 mg  10 mg Rectal DAILY PRN  
 aspirin delayed-release tablet 81 mg  81 mg Oral BID Lab Results Component Value Date/Time HGB 9.3 (L) 10/26/2018 03:43 AM  
 INR 1.1 10/08/2018 03:47 PM  
 
 
Lab Results Component Value Date/Time Sodium 141 10/25/2018 05:38 AM  
 Potassium 4.2 10/25/2018 05:38 AM  
 Chloride 113 (H) 10/25/2018 05:38 AM  
 CO2 20 (L) 10/25/2018 05:38 AM  
 BUN 10 10/25/2018 05:38 AM  
 Creatinine 0.66 10/25/2018 05:38 AM  
 Calcium 7.6 (L) 10/25/2018 05:38 AM  
 
 
 
  
50 y.o. female s/p left total knee arthroplasty on 10/24/2018  . Doing well. Pain improved. ABX: Complete 24 hours perioperative abx PATHWAY: Straight cath per protocol if needed DVT Prophylaxis: Aspirin 81 mg enteric coated BID Weight Bearing: WBAT LLE Pain Control: Home meds-Nucynta,Diclofenac, Scheduled Tylenol, tramadol, oxy 15 mg every 3 Disposition: Home, HHPT - She has decided to go home as opposed to rehab Dc home today

## 2018-10-28 NOTE — PROGRESS NOTES
Problem: Mobility Impaired (Adult and Pediatric) Goal: *Acute Goals and Plan of Care (Insert Text) Physical Therapy Goals Initiated 10/25/2018 1. Patient will move from supine to sit and sit to supine  in bed with modified independence within 4 days. 2. Patient will perform sit to stand with modified independence within 4 days. 3. Patient will ambulate with modified independence for 200 feet with the least restrictive device within 4 days. 4. Patient will ascend/descend 8 stairs with 1 handrail(s) with modified independence within 4 days. 5. Patient will perform home exercise program per protocol with modified independence within 4 days. 6. Patient will demonstrate AROM 0-90 degrees in operative joint within 4 days. physical Therapy TREATMENT Patient: Jessa Covarrubias (15 y.o. female) Date: 10/28/2018 Diagnosis: Primary osteoarthritis of both knees [M17.0] <principal problem not specified> Procedure(s) (LRB): LEFT TOTAL KNEE ARTHROPLASTY (Left) 4 Days Post-Op Precautions: Fall, WBAT Chart, physical therapy assessment, plan of care and goals were reviewed. ASSESSMENT: 
Pt independent with transfers up out of bed and ambulating with her rollator to and from the bathroom. Pt tolerated ambulating 150' with contact guarding x 1. Pt was instructed in and practiced climbing 8 steps with use of 1 rail or with both, pt stating that some of her rails at home are too wide to reach both at the same time. Pt was able to climb the steps safely with close contact guarding; pt stated that her  would be helping her at home. Pt is planning on discharging home later this afternoon with follow up [address] [facility]. She does not require any DME. Progression toward goals: 
[x]      Improving appropriately and progressing toward goals 
[]      Improving slowly and progressing toward goals 
[]      Not making progress toward goals and plan of care will be adjusted PLAN: 
 Patient continues to benefit from skilled intervention to address the above impairments. Continue treatment per established plan of care. Discharge Recommendations:  HHPT Further Equipment Recommendations for Discharge:  none SUBJECTIVE:  
Patient stated I'm now planning on going home with follow up [facility]. They say I don't need to go to rehab now.  \"I have been getting up on my own to go to the bathroom using my rollator. \" Pt rated her pain at 6/10. OBJECTIVE DATA SUMMARY:  
Critical Behavior: 
Neurologic State: Alert Orientation Level: Oriented X4 Cognition: Appropriate decision making Safety/Judgement: Awareness of environment Functional Mobility Training: 
Bed Mobility: 
  
Supine to Sit: Independent Sit to Supine: (pt sat up in chair) Scooting: Independent Transfers: 
Sit to Stand: Supervision Stand to Sit: Supervision Balance: 
Standing - Static: Good Standing - Dynamic : Good Ambulation/Gait Training: 
Distance (ft): 150 Feet (ft) Assistive Device: Gait belt;Walker, rollator Ambulation - Level of Assistance: Contact guard assistance;Assist x1 Gait Abnormalities: Antalgic;Decreased step clearance Base of Support: Widened Speed/Radha: Slow Step Length: Right shortened;Left shortened Stairs: 
Number of Stairs Trained: 8 Stairs - Level of Assistance: Minimum assistance;Assist X1(+ stand by assist x 1) Rail Use: Both(and with 1 rail with both hands) Pain: 
Pain Scale 1: Numeric (0 - 10) Pain Intensity 1: 7 Pain Location 1: Knee Pain Orientation 1: Left Pain Description 1: Aching Pain Intervention(s) 1: Medication (see MAR) Activity Tolerance:  
Good - independent with transfers, tolerated ambulating 150' with rollator, and climbing 8 steps using bilateral rails and also sideways with both hands on 1 rail. Please refer to the flowsheet for vital signs taken during this treatment. After treatment:  
[x] Patient left in no apparent distress sitting up in chair 
[] Patient left in no apparent distress in bed 
[x] Call bell left within reach [x] Nursing notified 
[] Caregiver present 
[] Bed alarm activated COMMUNICATION/COLLABORATION:  
The patients plan of care was discussed with: Registered Nurse Aiden uCllen, PT Time Calculation: 30 mins

## 2018-10-28 NOTE — PROGRESS NOTES
Problem: Discharge Planning Goal: *Discharge to safe environment Outcome: Resolved/Met Date Met: 10/28/18 Home with family and home health

## 2018-10-28 NOTE — PROGRESS NOTES
Bedside shift change report given to [id number] y 72 (oncoming nurse) by Mateus Maradiaga (offgoing nurse). Report included the following information SBAR.

## 2018-10-28 NOTE — PROGRESS NOTES
Patient has been accepted to [address] [place] [address] [phone], but awaiting authorization from insurance company. CM talked to her nurse, Hieu Mkceon, this am.  Patient is ready for discharge and has been working with therapy. Nurse and physician will talk with patient and if she feels she can be discharged home, CM will arrange for home health service. Celso Corral CM waiting to hear back from Hieu Mckeon. UPDATE  11:05 am   Patient decided to return home with home health   Orders placed in [facility] and patient given freedom of choice --chose [facility]  Referral sent via [employer] . Alva of choice letter singed and placed in chart. Family transport home  --waiting for response from [facility].  11:20 am  Patient accepted by [address]     Name and number placed on discharge instructions and patient advised to call patient if not contacted by shubham he day after discharge to inquire as to the day and time of initial visit

## 2018-10-28 NOTE — PROGRESS NOTES
Hospitalist Progress Note Jose Landa MD 
Office: [phone] Date of Service:  10/28/2018 NAME:  Jacob Abel :  1970 MRN:  [id number] Admission Summary:  
46yo female underwent left TKR, noted to be hypotensive postop therefore hospitalist consult requested Interval history / Subjective: No new complaints Assessment & Plan: Hypotension 
-resolved with ivf 
-cont to hold bp meds for now hctz/losartan/lasix H/O SZ 
-cont her AED ADHD/OCD 
-cont home meds Hypothyroid 
-synthroid S/P L TKR 
-as per prim ortho team 
 
 
Ok to d/c home per hospitalist standpoint when ok with prim ortho. D/w pt will hold her bp meds for now but as her activity increases towards her baseline, she may req reinitiation of her bp meds. To f/u pcp next wk Code status: full DVT prophylaxis: scds, further as per ortho Care Plan discussed with: Patient/Family Disposition: TBD Hospital Problems  Date Reviewed: 10/24/2013 Codes Class Noted POA Primary osteoarthritis of both knees ICD-10-CM: M17.0 ICD-9-CM: 715.16  10/24/2018 Unknown Review of Systems: A comprehensive review of systems was negative except for that written in the HPI. Vital Signs:  
 Last 24hrs VS reviewed since prior progress note. Most recent are: 
Visit Vitals /77 (BP 1 Location: Right arm, BP Patient Position: At rest) Pulse 88 Temp 98.7 °F (37.1 °C) Resp 16 Ht 5' 2\" (1.575 m) Wt 86.7 kg (191 lb 1.6 oz) SpO2 98% BMI 34.95 kg/m² Intake/Output Summary (Last 24 hours) at 10/28/2018 0745 Last data filed at 10/27/2018 0900 Gross per 24 hour Intake 350 ml Output  Net 350 ml Physical Examination:  
 
 
     
Constitutional:  No acute distress, cooperative, pleasant   
ENT:  Oral mucous moist, oropharynx benign.  Neck supple,   
 Resp: CTA bilaterally. No wheezing/rhonchi/rales. No accessory muscle use CV:  Regular rhythm, normal rate, no murmurs, gallops, rubs GI:  Soft, non distended, non tender. normoactive bowel sounds, no hepatosplenomegaly Musculoskeletal:  No edema, warm, 2+ pulses throughout. dressing to [facility] Neurologic:  Moves all extremities. AAOx3, CN II-XII reviewed Data Review:  
 Review and/or order of clinical lab test 
 
 
Labs:  
 
Recent Labs 10/26/18 
2508 HGB 9.3* No results for input(s): NA, K, CL, CO2, BUN, CREA, GLU, CA, MG, PHOS, URICA in the last 72 hours. No results for input(s): SGOT, GPT, ALT, AP, TBIL, TBILI, TP, ALB, GLOB, GGT, AML, LPSE in the last 72 hours. No lab exists for component: AMYP, HLPSE No results for input(s): INR, PTP, APTT in the last 72 hours. No lab exists for component: INREXT, INREXT No results for input(s): FE, TIBC, PSAT, FERR in the last 72 hours. No results found for: FOL, RBCF No results for input(s): PH, PCO2, PO2 in the last 72 hours. No results for input(s): CPK, CKNDX, TROIQ in the last 72 hours. No lab exists for component: CPKMB No results found for: CHOL, CHOLX, CHLST, CHOLV, HDL, LDL, LDLC, DLDLP, TGLX, TRIGL, TRIGP, CHHD, CHHDX Lab Results Component Value Date/Time Glucose (POC) 88 10/24/2018 10:51 AM  
 
Lab Results Component Value Date/Time  Color YELLOW/STRAW 10/08/2018 03:47 PM  
 Appearance CLOUDY (A) 10/08/2018 03:47 PM  
 Specific gravity 1.024 10/08/2018 03:47 PM  
 pH (UA) 6.0 10/08/2018 03:47 PM  
 Protein TRACE (A) 10/08/2018 03:47 PM  
 Glucose NEGATIVE  10/08/2018 03:47 PM  
 Ketone NEGATIVE  10/08/2018 03:47 PM  
 Bilirubin NEGATIVE  10/08/2018 03:47 PM  
 Urobilinogen 0.2 10/08/2018 03:47 PM  
 Nitrites NEGATIVE  10/08/2018 03:47 PM  
 Leukocyte Esterase TRACE (A) 10/08/2018 03:47 PM  
 Epithelial cells FEW 10/08/2018 03:47 PM  
 Bacteria NEGATIVE  10/08/2018 03:47 PM  
 WBC 0-4 10/08/2018 03:47 PM  
 RBC >100 (H) 10/08/2018 03:47 PM  
 
 
 
Medications Reviewed:  
 
Current Facility-Administered Medications Medication Dose Route Frequency  0.9% sodium chloride infusion  75 mL/hr IntraVENous CONTINUOUS  
 oxyCODONE ER (OxyCONTIN) tablet 40 mg  40 mg Oral Q12H  
 oxyCODONE IR (ROXICODONE) tablet 5-10 mg  5-10 mg Oral Q6H PRN  
 diclofenac EC (VOLTAREN) tablet 50 mg  50 mg Oral BID  albuterol (PROVENTIL VENTOLIN) nebulizer solution 2.5 mg  2.5 mg Nebulization Q6H PRN  
 . PHARMACY TO SUBSTITUTE PER PROTOCOL (Reordered from: amphetamine sulfate (EVEKEO) 10 mg tab)    Per Protocol  baclofen (LIORESAL) tablet 10 mg  10 mg Oral Q12H PRN  
 divalproex DR (DEPAKOTE) tablet 250 mg  250 mg Oral Q12H  levothyroxine (SYNTHROID) tablet 100 mcg  100 mcg Oral ACB  potassium chloride SR (KLOR-CON 10) tablet 10 mEq  10 mEq Oral DAILY  . PHARMACY TO SUBSTITUTE PER PROTOCOL (Reordered from: tapentadol (NUCYNTA ER) 250 mg Tb12)    Per Protocol  traZODone (DESYREL) tablet 100 mg  100 mg Oral QHS  . PHARMACY TO SUBSTITUTE PER PROTOCOL (Reordered from: vortioxetine (TRINTELLIX) 10 mg tablet)    Per Protocol  sodium chloride (NS) flush 5-10 mL  5-10 mL IntraVENous Q8H  
 sodium chloride (NS) flush 5-10 mL  5-10 mL IntraVENous PRN  
 acetaminophen (TYLENOL) tablet 650 mg  650 mg Oral Q6H  
 naloxone (NARCAN) injection 0.4 mg  0.4 mg IntraVENous PRN  
 hydrOXYzine HCl (ATARAX) tablet 10 mg  10 mg Oral Q8H PRN  
 famotidine (PEPCID) tablet 20 mg  20 mg Oral BID  senna-docusate (PERICOLACE) 8.6-50 mg per tablet 1 Tab  1 Tab Oral BID  polyethylene glycol (MIRALAX) packet 17 g  17 g Oral DAILY  bisacodyl (DULCOLAX) suppository 10 mg  10 mg Rectal DAILY PRN  
 aspirin delayed-release tablet 81 mg  81 mg Oral BID  
 
______________________________________________________________________ EXPECTED LENGTH OF STAY: Coy Canela MD

## 2018-10-28 NOTE — PROGRESS NOTES
Occupational Therapy: Patient received in bathroom post shower. She completed dressing with Mod I and use of her rollator. One cue for dressing LEs by placing left LE in pants first. Patient preparing for discharge. Reports and demonstrates no further OT needs. [address] Delane Hammans 5 min

## 2018-10-29 ENCOUNTER — HOME CARE VISIT (OUTPATIENT)
Dept: SCHEDULING | Facility: HOME HEALTH | Age: 48
End: 2018-10-29
Payer: COMMERCIAL

## 2018-10-29 VITALS
SYSTOLIC BLOOD PRESSURE: 135 MMHG | HEIGHT: 62 IN | OXYGEN SATURATION: 96 % | WEIGHT: 191.14 LBS | DIASTOLIC BLOOD PRESSURE: 80 MMHG | RESPIRATION RATE: 18 BRPM | HEART RATE: 80 BPM | BODY MASS INDEX: 35.17 KG/M2 | TEMPERATURE: 98.4 F

## 2018-10-29 PROCEDURE — G0151 HHCP-SERV OF PT,EA 15 MIN: HCPCS

## 2018-10-29 PROCEDURE — 400013 HH SOC

## 2018-10-29 NOTE — OP NOTES
Name: Daren Montes  MRN:  [id number]  : 1970  Age:  50 y.o. Surgery Date: 10/28/2018      OPERATIVE REPORT - LEFT TOTAL KNEE REPLACEMENT    PREOPERATIVE DIAGNOSIS: Osteoarthritis, left knee. POSTOPERATIVE DIAGNOSIS: Osteoarthritis, left knee. PROCEDURE PERFORMED: Left total knee arthroplasty. SURGEON: Dunia Pryor MD    FIRST ASSISTANT:  [address] staff    ANESTHESIA: Spinal    PRE-OP ANTIBIOTIC: Ancef 2g    COMPLICATIONS: None. ESTIMATED BLOOD LOSS: 100 mL. SPECIMENS REMOVED: None. COMPONENTS IMPLANTED:   Implant Name Type Inv. Item Serial No.  Lot No. LRB No. Used Action   FEM KNE CHAD CR SZ 2 LT -- TRIATHLON - SN/A  FEM KNE CHAD CR SZ 2 LT -- TRIATHLON N/A [employer] HOW [id number] Left 1 Implanted   BASEPLT TIB UNIV TRIATHLN 3 --  - SN/A  BASEPLT TIB UNIV TRIATHLN 3 --  N/A [employer] HOW [id number] Left 1 Implanted   PAT ASYM TRITHLON X3 [id number] -- TRIATHLON ASYMMETRIC X3 - SN/A  PAT ASYM TRITHLON X3 [id number] -- TRIATHLON ASYMMETRIC X3 N/A [employer] HOW [id number] Left 1 Implanted   CEMENT BNE GENTAMC GHV 40GM -- SMARTSET - SN/A  CEMENT BNE GENTAMC GHV 40GM -- SMARTSET N/A [id number] [facility] ORTHOPEDICS [id number] Left 2 Implanted   INSERT TIB CR TRIATHLN X3 3 13 --  - SN/A  INSERT TIB CR TRIATHLN X3 3 13 --  N/A [employer] HOW [id number] Left 1 Implanted   KNEE CHAD TRIATHLON X3 -- K5 BSV - [id number]  KNEE CHAD TRIATHLON X3 -- K5 BSV  [employer] HOW  Left 1 Implanted       INDICATIONS: The patient is an 50 yrs female with progressive debilitating left knee pain due to severe osteoarthritis. Symptoms have progressed despite comprehensive conservative treatment and they presents for left total knee replacement. Risks, benefits, alternatives of the procedure were reviewed in detail and the patient desired to proceed. Risks including bleeding, infection, damage to surrounding structures, blood clots, pulmonary embolism, and death were discussed.  The patient understood understands the increased risk for perioperative medical complications due to his medical comorbidities. DESCRIPTION OF PROCEDURE:DESCRIPTION OF PROCEDURE: Epidural/spinal anesthesia was initiated. Two grams of cefazolin were administered within 30 minutes of incision. The left lower extremity was prepped and draped in the usual sterile fashion. The skin was covered with Ioban occlusive dressing. A tourniquet was only employed for cementation- total time- 16 minutes. A midline skin incision was made with a 10 blade and small flaps were elevated. A medial parapatellar incision was made to the knee. The knee was exposed and the distal femur was cut at 5 degrees distal femoral valgus. The tibia was subluxed and a neutral varus/valgus proximal tibial cut was made matching the patient's slope. The meniscal remnants were removed. The posterior osteophytes were removed from the femur. The extension gap was determined using spacer blocks and appropriate releases were made. Femur was sized per above. An AP cutting block was placed, rotated using a gap balancing techniqe. Anterior, posterior, and chamfer cuts were carried out. The tibial was then sized and correct rotation was identified using the medial 1/3 of the tibial tubercle as a landmark. The tibia was prepared using a drill followed by a keel punch. A reciprocating saw was used to begin the cuts for the keel punch to avoid tibial fracture. Trials were placed. The patella was sized using a caliper and an appropriate resection  was performed. Lug holes were drilled and a trial was fitted. The knee tracked well with all trial implants. The trials were then removed. Bony surfaces were drilled, lavaged, and dried. All components were cemented. Excess cement was removed. Polyethylene component was placed. After the cement had fully cured, the knee was ranged and  irrigated copiously with pulsatile lavage.      All surrounding soft tissues were infiltrated with local anesthetic. The arthrotomy  was closed with running quill suture and 0 vicryl suture. Skin and subcutaneous tissues were irrigated and closed in standard fashion with 2-0 vicryl and 3-0 monocryl. An aquacel dressing was placed. The patient underwent straight catheterization at the end of the case. There were no complications. The procedure was a LEFT TOTAL KNEE REPLACEMENT. A [facility] total knee construct was utilized. No specimens were obtained/sent. The patient was transferred to the recovery room in stable condition.       Emi Webster MD

## 2018-11-02 ENCOUNTER — HOME CARE VISIT (OUTPATIENT)
Dept: SCHEDULING | Facility: HOME HEALTH | Age: 48
End: 2018-11-02
Payer: COMMERCIAL

## 2018-11-02 PROCEDURE — G0157 HHC PT ASSISTANT EA 15: HCPCS

## 2018-11-03 VITALS
SYSTOLIC BLOOD PRESSURE: 132 MMHG | DIASTOLIC BLOOD PRESSURE: 82 MMHG | TEMPERATURE: 99.3 F | RESPIRATION RATE: 17 BRPM | OXYGEN SATURATION: 96 % | HEART RATE: 111 BPM

## 2018-11-05 ENCOUNTER — HOME CARE VISIT (OUTPATIENT)
Dept: SCHEDULING | Facility: HOME HEALTH | Age: 48
End: 2018-11-05
Payer: COMMERCIAL

## 2018-11-05 VITALS
TEMPERATURE: 99.6 F | OXYGEN SATURATION: 97 % | HEART RATE: 101 BPM | DIASTOLIC BLOOD PRESSURE: 115 MMHG | SYSTOLIC BLOOD PRESSURE: 163 MMHG

## 2018-11-05 PROCEDURE — G0157 HHC PT ASSISTANT EA 15: HCPCS

## 2018-11-08 ENCOUNTER — HOME CARE VISIT (OUTPATIENT)
Dept: SCHEDULING | Facility: HOME HEALTH | Age: 48
End: 2018-11-08
Payer: COMMERCIAL

## 2018-11-08 PROCEDURE — G0157 HHC PT ASSISTANT EA 15: HCPCS

## 2018-11-09 VITALS
DIASTOLIC BLOOD PRESSURE: 113 MMHG | HEART RATE: 110 BPM | RESPIRATION RATE: 16 BRPM | SYSTOLIC BLOOD PRESSURE: 142 MMHG | TEMPERATURE: 99.8 F | OXYGEN SATURATION: 95 %

## 2018-11-12 ENCOUNTER — HOME CARE VISIT (OUTPATIENT)
Dept: SCHEDULING | Facility: HOME HEALTH | Age: 48
End: 2018-11-12
Payer: COMMERCIAL

## 2018-11-12 VITALS
HEART RATE: 73 BPM | OXYGEN SATURATION: 96 % | DIASTOLIC BLOOD PRESSURE: 86 MMHG | TEMPERATURE: 97.3 F | RESPIRATION RATE: 18 BRPM | SYSTOLIC BLOOD PRESSURE: 132 MMHG

## 2018-11-12 PROCEDURE — G0157 HHC PT ASSISTANT EA 15: HCPCS

## 2018-11-16 ENCOUNTER — HOME CARE VISIT (OUTPATIENT)
Dept: SCHEDULING | Facility: HOME HEALTH | Age: 48
End: 2018-11-16
Payer: COMMERCIAL

## 2018-11-16 ENCOUNTER — HOSPITAL ENCOUNTER (INPATIENT)
Age: 48
LOS: 8 days | Discharge: NURSING, REHAB OR LONG-TERM CARE | DRG: 486 | End: 2018-11-24
Attending: ORTHOPAEDIC SURGERY | Admitting: ORTHOPAEDIC SURGERY
Payer: COMMERCIAL

## 2018-11-16 ENCOUNTER — ANESTHESIA EVENT (OUTPATIENT)
Dept: SURGERY | Age: 48
DRG: 486 | End: 2018-11-16
Payer: COMMERCIAL

## 2018-11-16 ENCOUNTER — ANESTHESIA (OUTPATIENT)
Dept: SURGERY | Age: 48
DRG: 486 | End: 2018-11-16
Payer: COMMERCIAL

## 2018-11-16 PROBLEM — T81.31XA DEHISCENCE OF INCISION: Status: ACTIVE | Noted: 2018-11-16

## 2018-11-16 PROCEDURE — 87186 SC STD MICRODIL/AGAR DIL: CPT

## 2018-11-16 PROCEDURE — 74011250636 HC RX REV CODE- 250/636: Performed by: ANESTHESIOLOGY

## 2018-11-16 PROCEDURE — 87075 CULTR BACTERIA EXCEPT BLOOD: CPT

## 2018-11-16 PROCEDURE — 87070 CULTURE OTHR SPECIMN AEROBIC: CPT

## 2018-11-16 PROCEDURE — 77030008467 HC STPLR SKN COVD -B: Performed by: ORTHOPAEDIC SURGERY

## 2018-11-16 PROCEDURE — 85018 HEMOGLOBIN: CPT

## 2018-11-16 PROCEDURE — 76010000149 HC OR TIME 1 TO 1.5 HR: Performed by: ORTHOPAEDIC SURGERY

## 2018-11-16 PROCEDURE — 77030011640 HC PAD GRND REM COVD -A: Performed by: ORTHOPAEDIC SURGERY

## 2018-11-16 PROCEDURE — 74011250636 HC RX REV CODE- 250/636

## 2018-11-16 PROCEDURE — 87147 CULTURE TYPE IMMUNOLOGIC: CPT

## 2018-11-16 PROCEDURE — 77010033678 HC OXYGEN DAILY

## 2018-11-16 PROCEDURE — 77030002916 HC SUT ETHLN J&J -A: Performed by: ORTHOPAEDIC SURGERY

## 2018-11-16 PROCEDURE — C1776 JOINT DEVICE (IMPLANTABLE): HCPCS | Performed by: ORTHOPAEDIC SURGERY

## 2018-11-16 PROCEDURE — 74011250637 HC RX REV CODE- 250/637: Performed by: ORTHOPAEDIC SURGERY

## 2018-11-16 PROCEDURE — 74011000250 HC RX REV CODE- 250

## 2018-11-16 PROCEDURE — 77030010509 HC AIRWY LMA MSK TELE -A: Performed by: ANESTHESIOLOGY

## 2018-11-16 PROCEDURE — 87077 CULTURE AEROBIC IDENTIFY: CPT

## 2018-11-16 PROCEDURE — 77030020782 HC GWN BAIR PAWS FLX 3M -B

## 2018-11-16 PROCEDURE — 77030018836 HC SOL IRR NACL ICUM -A: Performed by: ORTHOPAEDIC SURGERY

## 2018-11-16 PROCEDURE — 74011000250 HC RX REV CODE- 250: Performed by: ORTHOPAEDIC SURGERY

## 2018-11-16 PROCEDURE — 74011000258 HC RX REV CODE- 258: Performed by: ORTHOPAEDIC SURGERY

## 2018-11-16 PROCEDURE — 74011250636 HC RX REV CODE- 250/636: Performed by: ORTHOPAEDIC SURGERY

## 2018-11-16 PROCEDURE — 77030002966 HC SUT PDS J&J -A: Performed by: ORTHOPAEDIC SURGERY

## 2018-11-16 PROCEDURE — 76060000034 HC ANESTHESIA 1.5 TO 2 HR: Performed by: ORTHOPAEDIC SURGERY

## 2018-11-16 PROCEDURE — 76210000017 HC OR PH I REC 1.5 TO 2 HR: Performed by: ORTHOPAEDIC SURGERY

## 2018-11-16 PROCEDURE — 0SPD09Z REMOVAL OF LINER FROM LEFT KNEE JOINT, OPEN APPROACH: ICD-10-PCS | Performed by: ORTHOPAEDIC SURGERY

## 2018-11-16 PROCEDURE — 99218 HC RM OBSERVATION: CPT

## 2018-11-16 PROCEDURE — 0SUW09Z SUPPLEMENT LEFT KNEE JOINT, TIBIAL SURFACE WITH LINER, OPEN APPROACH: ICD-10-PCS | Performed by: ORTHOPAEDIC SURGERY

## 2018-11-16 DEVICE — TIBIAL BEARING INSERT
Type: IMPLANTABLE DEVICE | Site: KNEE | Status: FUNCTIONAL
Brand: TRIATHLON

## 2018-11-16 RX ORDER — PROPOFOL 10 MG/ML
INJECTION, EMULSION INTRAVENOUS AS NEEDED
Status: DISCONTINUED | OUTPATIENT
Start: 2018-11-16 | End: 2018-11-16 | Stop reason: HOSPADM

## 2018-11-16 RX ORDER — CALCIUM CARBONATE 200(500)MG
400 TABLET,CHEWABLE ORAL ONCE
Status: COMPLETED | OUTPATIENT
Start: 2018-11-16 | End: 2018-11-16

## 2018-11-16 RX ORDER — HYDROMORPHONE HYDROCHLORIDE 2 MG/ML
INJECTION, SOLUTION INTRAMUSCULAR; INTRAVENOUS; SUBCUTANEOUS AS NEEDED
Status: DISCONTINUED | OUTPATIENT
Start: 2018-11-16 | End: 2018-11-16 | Stop reason: HOSPADM

## 2018-11-16 RX ORDER — MIDAZOLAM HYDROCHLORIDE 1 MG/ML
0.5 INJECTION, SOLUTION INTRAMUSCULAR; INTRAVENOUS
Status: COMPLETED | OUTPATIENT
Start: 2018-11-16 | End: 2018-11-16

## 2018-11-16 RX ORDER — SODIUM CHLORIDE, SODIUM LACTATE, POTASSIUM CHLORIDE, CALCIUM CHLORIDE 600; 310; 30; 20 MG/100ML; MG/100ML; MG/100ML; MG/100ML
INJECTION, SOLUTION INTRAVENOUS
Status: DISCONTINUED | OUTPATIENT
Start: 2018-11-16 | End: 2018-11-16 | Stop reason: HOSPADM

## 2018-11-16 RX ORDER — MORPHINE SULFATE 10 MG/ML
2 INJECTION, SOLUTION INTRAMUSCULAR; INTRAVENOUS
Status: DISCONTINUED | OUTPATIENT
Start: 2018-11-16 | End: 2018-11-16 | Stop reason: HOSPADM

## 2018-11-16 RX ORDER — LORAZEPAM 2 MG/ML
INJECTION INTRAMUSCULAR
Status: COMPLETED
Start: 2018-11-16 | End: 2018-11-16

## 2018-11-16 RX ORDER — SODIUM CHLORIDE 0.9 % (FLUSH) 0.9 %
5-10 SYRINGE (ML) INJECTION AS NEEDED
Status: DISCONTINUED | OUTPATIENT
Start: 2018-11-16 | End: 2018-11-16 | Stop reason: HOSPADM

## 2018-11-16 RX ORDER — ONDANSETRON 2 MG/ML
INJECTION INTRAMUSCULAR; INTRAVENOUS AS NEEDED
Status: DISCONTINUED | OUTPATIENT
Start: 2018-11-16 | End: 2018-11-16 | Stop reason: HOSPADM

## 2018-11-16 RX ORDER — BACLOFEN 10 MG/1
10 TABLET ORAL
Status: DISCONTINUED | OUTPATIENT
Start: 2018-11-16 | End: 2018-11-24 | Stop reason: HOSPADM

## 2018-11-16 RX ORDER — POTASSIUM CHLORIDE 750 MG/1
10 TABLET, FILM COATED, EXTENDED RELEASE ORAL DAILY
Status: DISCONTINUED | OUTPATIENT
Start: 2018-11-17 | End: 2018-11-24 | Stop reason: HOSPADM

## 2018-11-16 RX ORDER — DEXAMETHASONE SODIUM PHOSPHATE 4 MG/ML
INJECTION, SOLUTION INTRA-ARTICULAR; INTRALESIONAL; INTRAMUSCULAR; INTRAVENOUS; SOFT TISSUE AS NEEDED
Status: DISCONTINUED | OUTPATIENT
Start: 2018-11-16 | End: 2018-11-16 | Stop reason: HOSPADM

## 2018-11-16 RX ORDER — ACYCLOVIR 800 MG/1
400 TABLET ORAL
Status: DISCONTINUED | OUTPATIENT
Start: 2018-11-16 | End: 2018-11-16

## 2018-11-16 RX ORDER — AMOXICILLIN 250 MG
1 CAPSULE ORAL 2 TIMES DAILY
Status: DISCONTINUED | OUTPATIENT
Start: 2018-11-16 | End: 2018-11-24 | Stop reason: HOSPADM

## 2018-11-16 RX ORDER — MIDAZOLAM HYDROCHLORIDE 1 MG/ML
1 INJECTION, SOLUTION INTRAMUSCULAR; INTRAVENOUS AS NEEDED
Status: DISCONTINUED | OUTPATIENT
Start: 2018-11-16 | End: 2018-11-16 | Stop reason: HOSPADM

## 2018-11-16 RX ORDER — TRAZODONE HYDROCHLORIDE 100 MG/1
50-100 TABLET ORAL
Status: DISCONTINUED | OUTPATIENT
Start: 2018-11-16 | End: 2018-11-24 | Stop reason: HOSPADM

## 2018-11-16 RX ORDER — KETOROLAC TROMETHAMINE 30 MG/ML
15 INJECTION, SOLUTION INTRAMUSCULAR; INTRAVENOUS EVERY 6 HOURS
Status: COMPLETED | OUTPATIENT
Start: 2018-11-17 | End: 2018-11-17

## 2018-11-16 RX ORDER — SODIUM CHLORIDE 0.9 % (FLUSH) 0.9 %
5-10 SYRINGE (ML) INJECTION AS NEEDED
Status: DISCONTINUED | OUTPATIENT
Start: 2018-11-16 | End: 2018-11-24 | Stop reason: HOSPADM

## 2018-11-16 RX ORDER — FAMOTIDINE 20 MG/1
20 TABLET, FILM COATED ORAL 2 TIMES DAILY
Status: DISCONTINUED | OUTPATIENT
Start: 2018-11-16 | End: 2018-11-16 | Stop reason: SDUPTHER

## 2018-11-16 RX ORDER — ROPIVACAINE HYDROCHLORIDE 5 MG/ML
30 INJECTION, SOLUTION EPIDURAL; INFILTRATION; PERINEURAL ONCE
Status: DISCONTINUED | OUTPATIENT
Start: 2018-11-16 | End: 2018-11-16 | Stop reason: HOSPADM

## 2018-11-16 RX ORDER — VANCOMYCIN/0.9 % SOD CHLORIDE 1.5G/250ML
1500 PLASTIC BAG, INJECTION (ML) INTRAVENOUS
Status: DISCONTINUED | OUTPATIENT
Start: 2018-11-16 | End: 2018-11-16

## 2018-11-16 RX ORDER — FENTANYL CITRATE 50 UG/ML
50 INJECTION, SOLUTION INTRAMUSCULAR; INTRAVENOUS AS NEEDED
Status: DISCONTINUED | OUTPATIENT
Start: 2018-11-16 | End: 2018-11-16 | Stop reason: HOSPADM

## 2018-11-16 RX ORDER — POLYETHYLENE GLYCOL 3350 17 G/17G
17 POWDER, FOR SOLUTION ORAL DAILY
Status: DISCONTINUED | OUTPATIENT
Start: 2018-11-17 | End: 2018-11-24 | Stop reason: HOSPADM

## 2018-11-16 RX ORDER — SODIUM CHLORIDE, SODIUM LACTATE, POTASSIUM CHLORIDE, CALCIUM CHLORIDE 600; 310; 30; 20 MG/100ML; MG/100ML; MG/100ML; MG/100ML
125 INJECTION, SOLUTION INTRAVENOUS CONTINUOUS
Status: DISCONTINUED | OUTPATIENT
Start: 2018-11-16 | End: 2018-11-16 | Stop reason: HOSPADM

## 2018-11-16 RX ORDER — PANTOPRAZOLE SODIUM 40 MG/1
40 TABLET, DELAYED RELEASE ORAL
Status: DISCONTINUED | OUTPATIENT
Start: 2018-11-17 | End: 2018-11-24 | Stop reason: HOSPADM

## 2018-11-16 RX ORDER — LIDOCAINE HYDROCHLORIDE 10 MG/ML
0.1 INJECTION, SOLUTION EPIDURAL; INFILTRATION; INTRACAUDAL; PERINEURAL AS NEEDED
Status: DISCONTINUED | OUTPATIENT
Start: 2018-11-16 | End: 2018-11-16 | Stop reason: HOSPADM

## 2018-11-16 RX ORDER — ASPIRIN 81 MG/1
81 TABLET ORAL 2 TIMES DAILY
Status: DISCONTINUED | OUTPATIENT
Start: 2018-11-16 | End: 2018-11-24 | Stop reason: HOSPADM

## 2018-11-16 RX ORDER — OXYCODONE HYDROCHLORIDE 5 MG/1
15 TABLET ORAL
Status: DISCONTINUED | OUTPATIENT
Start: 2018-11-16 | End: 2018-11-24 | Stop reason: HOSPADM

## 2018-11-16 RX ORDER — SODIUM CHLORIDE 9 MG/ML
25 INJECTION, SOLUTION INTRAVENOUS CONTINUOUS
Status: DISCONTINUED | OUTPATIENT
Start: 2018-11-16 | End: 2018-11-16 | Stop reason: HOSPADM

## 2018-11-16 RX ORDER — HYDROMORPHONE HYDROCHLORIDE 2 MG/ML
INJECTION, SOLUTION INTRAMUSCULAR; INTRAVENOUS; SUBCUTANEOUS
Status: COMPLETED
Start: 2018-11-16 | End: 2018-11-16

## 2018-11-16 RX ORDER — FENTANYL CITRATE 50 UG/ML
25 INJECTION, SOLUTION INTRAMUSCULAR; INTRAVENOUS
Status: COMPLETED | OUTPATIENT
Start: 2018-11-16 | End: 2018-11-16

## 2018-11-16 RX ORDER — LEVOTHYROXINE SODIUM 100 UG/1
100 TABLET ORAL
Status: DISCONTINUED | OUTPATIENT
Start: 2018-11-17 | End: 2018-11-24 | Stop reason: HOSPADM

## 2018-11-16 RX ORDER — DIVALPROEX SODIUM 250 MG/1
250 TABLET, DELAYED RELEASE ORAL DAILY
Status: DISCONTINUED | OUTPATIENT
Start: 2018-11-17 | End: 2018-11-24 | Stop reason: HOSPADM

## 2018-11-16 RX ORDER — KETAMINE HYDROCHLORIDE 10 MG/ML
INJECTION, SOLUTION INTRAMUSCULAR; INTRAVENOUS AS NEEDED
Status: DISCONTINUED | OUTPATIENT
Start: 2018-11-16 | End: 2018-11-16 | Stop reason: HOSPADM

## 2018-11-16 RX ORDER — FAMOTIDINE 20 MG/1
20 TABLET, FILM COATED ORAL 2 TIMES DAILY
Status: DISCONTINUED | OUTPATIENT
Start: 2018-11-16 | End: 2018-11-24 | Stop reason: HOSPADM

## 2018-11-16 RX ORDER — HYDROMORPHONE HYDROCHLORIDE 2 MG/ML
2 INJECTION, SOLUTION INTRAMUSCULAR; INTRAVENOUS; SUBCUTANEOUS
Status: COMPLETED | OUTPATIENT
Start: 2018-11-16 | End: 2018-11-16

## 2018-11-16 RX ORDER — DICLOFENAC SODIUM 50 MG/1
50 TABLET, DELAYED RELEASE ORAL 2 TIMES DAILY
Status: DISCONTINUED | OUTPATIENT
Start: 2018-11-16 | End: 2018-11-16 | Stop reason: SDUPTHER

## 2018-11-16 RX ORDER — FUROSEMIDE 20 MG/1
20 TABLET ORAL DAILY
Status: DISCONTINUED | OUTPATIENT
Start: 2018-11-17 | End: 2018-11-24 | Stop reason: HOSPADM

## 2018-11-16 RX ORDER — ONDANSETRON 2 MG/ML
4 INJECTION INTRAMUSCULAR; INTRAVENOUS
Status: DISPENSED | OUTPATIENT
Start: 2018-11-16 | End: 2018-11-17

## 2018-11-16 RX ORDER — SODIUM CHLORIDE 0.9 % (FLUSH) 0.9 %
5-10 SYRINGE (ML) INJECTION EVERY 8 HOURS
Status: DISCONTINUED | OUTPATIENT
Start: 2018-11-16 | End: 2018-11-16 | Stop reason: HOSPADM

## 2018-11-16 RX ORDER — ALBUTEROL SULFATE 90 UG/1
1 AEROSOL, METERED RESPIRATORY (INHALATION)
Status: DISCONTINUED | OUTPATIENT
Start: 2018-11-16 | End: 2018-11-16 | Stop reason: CLARIF

## 2018-11-16 RX ORDER — CEFAZOLIN SODIUM/WATER 2 G/20 ML
2 SYRINGE (ML) INTRAVENOUS
Status: COMPLETED | OUTPATIENT
Start: 2018-11-16 | End: 2018-11-16

## 2018-11-16 RX ORDER — DICLOFENAC SODIUM 50 MG/1
50 TABLET, DELAYED RELEASE ORAL 2 TIMES DAILY
Status: DISCONTINUED | OUTPATIENT
Start: 2018-11-18 | End: 2018-11-21

## 2018-11-16 RX ORDER — LIDOCAINE HYDROCHLORIDE 20 MG/ML
INJECTION, SOLUTION EPIDURAL; INFILTRATION; INTRACAUDAL; PERINEURAL AS NEEDED
Status: DISCONTINUED | OUTPATIENT
Start: 2018-11-16 | End: 2018-11-16 | Stop reason: HOSPADM

## 2018-11-16 RX ORDER — NALOXONE HYDROCHLORIDE 0.4 MG/ML
0.4 INJECTION, SOLUTION INTRAMUSCULAR; INTRAVENOUS; SUBCUTANEOUS AS NEEDED
Status: DISCONTINUED | OUTPATIENT
Start: 2018-11-16 | End: 2018-11-24 | Stop reason: HOSPADM

## 2018-11-16 RX ORDER — MIDAZOLAM HYDROCHLORIDE 1 MG/ML
INJECTION, SOLUTION INTRAMUSCULAR; INTRAVENOUS
Status: COMPLETED
Start: 2018-11-16 | End: 2018-11-16

## 2018-11-16 RX ORDER — HYDROXYZINE HYDROCHLORIDE 10 MG/1
10 TABLET, FILM COATED ORAL
Status: COMPLETED | OUTPATIENT
Start: 2018-11-16 | End: 2018-11-22

## 2018-11-16 RX ORDER — LOSARTAN POTASSIUM 50 MG/1
100 TABLET ORAL DAILY
Status: DISCONTINUED | OUTPATIENT
Start: 2018-11-17 | End: 2018-11-24 | Stop reason: HOSPADM

## 2018-11-16 RX ORDER — LORAZEPAM 2 MG/ML
1 INJECTION INTRAMUSCULAR
Status: COMPLETED | OUTPATIENT
Start: 2018-11-16 | End: 2018-11-16

## 2018-11-16 RX ORDER — SODIUM CHLORIDE, SODIUM LACTATE, POTASSIUM CHLORIDE, CALCIUM CHLORIDE 600; 310; 30; 20 MG/100ML; MG/100ML; MG/100ML; MG/100ML
75 INJECTION, SOLUTION INTRAVENOUS CONTINUOUS
Status: DISCONTINUED | OUTPATIENT
Start: 2018-11-16 | End: 2018-11-16 | Stop reason: HOSPADM

## 2018-11-16 RX ORDER — HYDROCHLOROTHIAZIDE 25 MG/1
25 TABLET ORAL DAILY
Status: DISCONTINUED | OUTPATIENT
Start: 2018-11-17 | End: 2018-11-24 | Stop reason: HOSPADM

## 2018-11-16 RX ORDER — ALBUTEROL SULFATE 0.83 MG/ML
2.5 SOLUTION RESPIRATORY (INHALATION)
Status: DISCONTINUED | OUTPATIENT
Start: 2018-11-16 | End: 2018-11-24 | Stop reason: HOSPADM

## 2018-11-16 RX ORDER — SODIUM CHLORIDE 9 MG/ML
125 INJECTION, SOLUTION INTRAVENOUS CONTINUOUS
Status: DISPENSED | OUTPATIENT
Start: 2018-11-16 | End: 2018-11-17

## 2018-11-16 RX ORDER — DEXMEDETOMIDINE HYDROCHLORIDE 4 UG/ML
INJECTION, SOLUTION INTRAVENOUS AS NEEDED
Status: DISCONTINUED | OUTPATIENT
Start: 2018-11-16 | End: 2018-11-16 | Stop reason: HOSPADM

## 2018-11-16 RX ORDER — CEFAZOLIN SODIUM/WATER 2 G/20 ML
2 SYRINGE (ML) INTRAVENOUS EVERY 8 HOURS
Status: COMPLETED | OUTPATIENT
Start: 2018-11-16 | End: 2018-11-17

## 2018-11-16 RX ORDER — SODIUM CHLORIDE 0.9 % (FLUSH) 0.9 %
5-10 SYRINGE (ML) INJECTION EVERY 8 HOURS
Status: DISCONTINUED | OUTPATIENT
Start: 2018-11-17 | End: 2018-11-24 | Stop reason: HOSPADM

## 2018-11-16 RX ORDER — ONDANSETRON 2 MG/ML
4 INJECTION INTRAMUSCULAR; INTRAVENOUS AS NEEDED
Status: DISCONTINUED | OUTPATIENT
Start: 2018-11-16 | End: 2018-11-16 | Stop reason: HOSPADM

## 2018-11-16 RX ORDER — MIDAZOLAM HYDROCHLORIDE 1 MG/ML
INJECTION, SOLUTION INTRAMUSCULAR; INTRAVENOUS AS NEEDED
Status: DISCONTINUED | OUTPATIENT
Start: 2018-11-16 | End: 2018-11-16 | Stop reason: HOSPADM

## 2018-11-16 RX ORDER — DIVALPROEX SODIUM 250 MG/1
500 TABLET, DELAYED RELEASE ORAL
Status: DISCONTINUED | OUTPATIENT
Start: 2018-11-16 | End: 2018-11-24 | Stop reason: HOSPADM

## 2018-11-16 RX ORDER — ACETAMINOPHEN 325 MG/1
650 TABLET ORAL EVERY 6 HOURS
Status: DISCONTINUED | OUTPATIENT
Start: 2018-11-16 | End: 2018-11-24 | Stop reason: HOSPADM

## 2018-11-16 RX ORDER — VANCOMYCIN/0.9 % SOD CHLORIDE 1.5G/250ML
1500 PLASTIC BAG, INJECTION (ML) INTRAVENOUS EVERY 12 HOURS
Status: COMPLETED | OUTPATIENT
Start: 2018-11-16 | End: 2018-11-17

## 2018-11-16 RX ORDER — FENTANYL CITRATE 50 UG/ML
INJECTION, SOLUTION INTRAMUSCULAR; INTRAVENOUS AS NEEDED
Status: DISCONTINUED | OUTPATIENT
Start: 2018-11-16 | End: 2018-11-16 | Stop reason: HOSPADM

## 2018-11-16 RX ORDER — FACIAL-BODY WIPES
10 EACH TOPICAL DAILY PRN
Status: DISCONTINUED | OUTPATIENT
Start: 2018-11-18 | End: 2018-11-24 | Stop reason: HOSPADM

## 2018-11-16 RX ADMIN — OXYCODONE HYDROCHLORIDE 15 MG: 5 TABLET ORAL at 22:56

## 2018-11-16 RX ADMIN — KETAMINE HYDROCHLORIDE 10 MG: 10 INJECTION, SOLUTION INTRAMUSCULAR; INTRAVENOUS at 14:49

## 2018-11-16 RX ADMIN — FENTANYL CITRATE 25 MCG: 50 INJECTION INTRAMUSCULAR; INTRAVENOUS at 16:12

## 2018-11-16 RX ADMIN — FAMOTIDINE 20 MG: 20 TABLET ORAL at 22:42

## 2018-11-16 RX ADMIN — ONDANSETRON 4 MG: 2 INJECTION INTRAMUSCULAR; INTRAVENOUS at 15:20

## 2018-11-16 RX ADMIN — DEXMEDETOMIDINE HYDROCHLORIDE 8 MCG: 4 INJECTION, SOLUTION INTRAVENOUS at 14:37

## 2018-11-16 RX ADMIN — LIDOCAINE HYDROCHLORIDE 100 MG: 20 INJECTION, SOLUTION EPIDURAL; INFILTRATION; INTRACAUDAL; PERINEURAL at 14:15

## 2018-11-16 RX ADMIN — CALCIUM CARBONATE (ANTACID) CHEW TAB 500 MG 400 MG: 500 CHEW TAB at 21:18

## 2018-11-16 RX ADMIN — TRAZODONE HYDROCHLORIDE 100 MG: 100 TABLET ORAL at 21:19

## 2018-11-16 RX ADMIN — MIDAZOLAM HYDROCHLORIDE 0.5 MG: 1 INJECTION, SOLUTION INTRAMUSCULAR; INTRAVENOUS at 15:49

## 2018-11-16 RX ADMIN — DIVALPROEX SODIUM 500 MG: 250 TABLET, DELAYED RELEASE ORAL at 21:18

## 2018-11-16 RX ADMIN — SODIUM CHLORIDE 25 ML/HR: 900 INJECTION, SOLUTION INTRAVENOUS at 16:06

## 2018-11-16 RX ADMIN — HYDROMORPHONE HYDROCHLORIDE 0.5 MG: 2 INJECTION, SOLUTION INTRAMUSCULAR; INTRAVENOUS; SUBCUTANEOUS at 15:44

## 2018-11-16 RX ADMIN — HYDROMORPHONE HYDROCHLORIDE 0.5 MG: 2 INJECTION, SOLUTION INTRAMUSCULAR; INTRAVENOUS; SUBCUTANEOUS at 14:47

## 2018-11-16 RX ADMIN — MIDAZOLAM HYDROCHLORIDE 2 MG: 1 INJECTION, SOLUTION INTRAMUSCULAR; INTRAVENOUS at 14:10

## 2018-11-16 RX ADMIN — FENTANYL CITRATE 50 MCG: 50 INJECTION, SOLUTION INTRAMUSCULAR; INTRAVENOUS at 14:38

## 2018-11-16 RX ADMIN — Medication 2 G: at 14:21

## 2018-11-16 RX ADMIN — HYDROMORPHONE HYDROCHLORIDE 0.5 MG: 2 INJECTION, SOLUTION INTRAMUSCULAR; INTRAVENOUS; SUBCUTANEOUS at 15:34

## 2018-11-16 RX ADMIN — MORPHINE SULFATE 2 MG: 10 INJECTION INTRAVENOUS at 16:44

## 2018-11-16 RX ADMIN — PROPOFOL 30 MG: 10 INJECTION, EMULSION INTRAVENOUS at 14:38

## 2018-11-16 RX ADMIN — SODIUM CHLORIDE, SODIUM LACTATE, POTASSIUM CHLORIDE, CALCIUM CHLORIDE: 600; 310; 30; 20 INJECTION, SOLUTION INTRAVENOUS at 14:10

## 2018-11-16 RX ADMIN — ASPIRIN 81 MG: 81 TABLET, COATED ORAL at 19:03

## 2018-11-16 RX ADMIN — MIDAZOLAM HYDROCHLORIDE 0.5 MG: 1 INJECTION, SOLUTION INTRAMUSCULAR; INTRAVENOUS at 15:53

## 2018-11-16 RX ADMIN — FENTANYL CITRATE 25 MCG: 50 INJECTION INTRAMUSCULAR; INTRAVENOUS at 16:02

## 2018-11-16 RX ADMIN — HYDROMORPHONE HYDROCHLORIDE 2 MG: 2 INJECTION, SOLUTION INTRAMUSCULAR; INTRAVENOUS; SUBCUTANEOUS at 16:55

## 2018-11-16 RX ADMIN — FENTANYL CITRATE 25 MCG: 50 INJECTION INTRAMUSCULAR; INTRAVENOUS at 15:58

## 2018-11-16 RX ADMIN — MIDAZOLAM HYDROCHLORIDE 0.5 MG: 1 INJECTION, SOLUTION INTRAMUSCULAR; INTRAVENOUS at 15:59

## 2018-11-16 RX ADMIN — VANCOMYCIN HYDROCHLORIDE 1500 MG: 10 INJECTION, POWDER, LYOPHILIZED, FOR SOLUTION INTRAVENOUS at 22:40

## 2018-11-16 RX ADMIN — DICLOFENAC SODIUM 50 MG: 50 TABLET, DELAYED RELEASE ORAL at 19:03

## 2018-11-16 RX ADMIN — FENTANYL CITRATE 25 MCG: 50 INJECTION INTRAMUSCULAR; INTRAVENOUS at 16:07

## 2018-11-16 RX ADMIN — DEXMEDETOMIDINE HYDROCHLORIDE 8 MCG: 4 INJECTION, SOLUTION INTRAVENOUS at 14:28

## 2018-11-16 RX ADMIN — HYDROMORPHONE HYDROCHLORIDE 2 MG: 2 INJECTION INTRAMUSCULAR; INTRAVENOUS; SUBCUTANEOUS at 16:55

## 2018-11-16 RX ADMIN — DEXAMETHASONE SODIUM PHOSPHATE 4 MG: 4 INJECTION, SOLUTION INTRA-ARTICULAR; INTRALESIONAL; INTRAMUSCULAR; INTRAVENOUS; SOFT TISSUE at 14:35

## 2018-11-16 RX ADMIN — PIPERACILLIN SODIUM,TAZOBACTAM SODIUM 3.38 G: 3; .375 INJECTION, POWDER, FOR SOLUTION INTRAVENOUS at 22:42

## 2018-11-16 RX ADMIN — MIDAZOLAM HYDROCHLORIDE 0.5 MG: 1 INJECTION, SOLUTION INTRAMUSCULAR; INTRAVENOUS at 16:01

## 2018-11-16 RX ADMIN — DEXAMETHASONE SODIUM PHOSPHATE 4 MG: 4 INJECTION, SOLUTION INTRA-ARTICULAR; INTRALESIONAL; INTRAMUSCULAR; INTRAVENOUS; SOFT TISSUE at 14:23

## 2018-11-16 RX ADMIN — HYDROMORPHONE HYDROCHLORIDE 0.5 MG: 2 INJECTION, SOLUTION INTRAMUSCULAR; INTRAVENOUS; SUBCUTANEOUS at 15:12

## 2018-11-16 RX ADMIN — FENTANYL CITRATE 50 MCG: 50 INJECTION, SOLUTION INTRAMUSCULAR; INTRAVENOUS at 14:37

## 2018-11-16 RX ADMIN — LORAZEPAM 1 MG: 2 INJECTION INTRAMUSCULAR; INTRAVENOUS at 17:15

## 2018-11-16 RX ADMIN — KETAMINE HYDROCHLORIDE 20 MG: 10 INJECTION, SOLUTION INTRAMUSCULAR; INTRAVENOUS at 14:20

## 2018-11-16 RX ADMIN — MIDAZOLAM HYDROCHLORIDE 2 MG: 1 INJECTION, SOLUTION INTRAMUSCULAR; INTRAVENOUS at 15:34

## 2018-11-16 RX ADMIN — PROPOFOL 50 MG: 10 INJECTION, EMULSION INTRAVENOUS at 14:45

## 2018-11-16 RX ADMIN — DEXMEDETOMIDINE HYDROCHLORIDE 4 MCG: 4 INJECTION, SOLUTION INTRAVENOUS at 14:34

## 2018-11-16 RX ADMIN — SODIUM CHLORIDE 125 ML/HR: 900 INJECTION, SOLUTION INTRAVENOUS at 22:43

## 2018-11-16 RX ADMIN — ACETAMINOPHEN 650 MG: 325 TABLET ORAL at 19:03

## 2018-11-16 RX ADMIN — MORPHINE SULFATE 2 MG: 10 INJECTION INTRAVENOUS at 16:30

## 2018-11-16 RX ADMIN — PROPOFOL 120 MG: 10 INJECTION, EMULSION INTRAVENOUS at 14:15

## 2018-11-16 RX ADMIN — OXYCODONE HYDROCHLORIDE 15 MG: 5 TABLET ORAL at 19:13

## 2018-11-16 RX ADMIN — MORPHINE SULFATE 2 MG: 10 INJECTION INTRAVENOUS at 16:38

## 2018-11-16 RX ADMIN — MORPHINE SULFATE 2 MG: 10 INJECTION INTRAVENOUS at 16:25

## 2018-11-16 RX ADMIN — ONDANSETRON 4 MG: 2 INJECTION INTRAMUSCULAR; INTRAVENOUS at 14:23

## 2018-11-16 RX ADMIN — PROPOFOL 50 MG: 10 INJECTION, EMULSION INTRAVENOUS at 14:50

## 2018-11-16 RX ADMIN — FAMOTIDINE 20 MG: 20 TABLET ORAL at 19:03

## 2018-11-16 RX ADMIN — MORPHINE SULFATE 2 MG: 10 INJECTION INTRAVENOUS at 16:49

## 2018-11-16 RX ADMIN — Medication 2 G: at 22:38

## 2018-11-16 RX ADMIN — LORAZEPAM 1 MG: 2 INJECTION INTRAMUSCULAR at 17:15

## 2018-11-16 RX ADMIN — MIDAZOLAM 0.5 MG: 1 INJECTION INTRAMUSCULAR; INTRAVENOUS at 15:49

## 2018-11-16 NOTE — ANESTHESIA PREPROCEDURE EVALUATION
Anesthetic History No history of anesthetic complications Review of Systems / Medical History Patient summary reviewed, nursing notes reviewed and pertinent labs reviewed Pulmonary Comments: Smoking Status: Former Smoker Quit [id number] Neuro/Psych  
 
seizures: well controlled Psychiatric history Cardiovascular Hypertension Exercise tolerance: >4 METS 
  
GI/Hepatic/Renal 
  
GERD: well controlled Endo/Other Hypothyroidism Obesity and arthritis Other Findings Anesthetic History No history of anesthetic complications Review of Systems / Medical History Anesthetic History No history of anesthetic complications Review of Systems / Medical History Patient summary reviewed, nursing notes reviewed and pertinent labs reviewed Pulmonary Within defined limits Neuro/Psych  
 
seizures: well controlled Cardiovascular Hypertension: well controlled GI/Hepatic/Renal 
  
GERD: well controlled Endo/Other Hypothyroidism: well controlled Arthritis Other Findings Physical Exam 
 
Airway Mallampati: I 
TM Distance: > 6 cm Neck ROM: normal range of motion Mouth opening: Normal 
 
 Cardiovascular Regular rate and rhythm,  S1 and S2 normal,  no murmur, click, rub, or gallop Dental 
No notable dental hx Pulmonary Breath sounds clear to auscultation Abdominal 
GI exam deferred Other Findings Anesthetic Plan ASA: 2 Anesthesia type: regional 
 
 
 
 
Induction: Intravenous Anesthetic plan and risks discussed with: Patient Patient summary reviewed, nursing notes reviewed and pertinent labs reviewed Pulmonary Within defined limits Neuro/Psych  
 
seizures: well controlled Cardiovascular Hypertension: well controlled GI/Hepatic/Renal 
  
GERD: well controlled Endo/Other Hypothyroidism: well controlled Arthritis Other Findings Physical Exam 
 
Airway Mallampati: I 
TM Distance: > 6 cm Neck ROM: normal range of motion Mouth opening: Normal 
 
 Cardiovascular Regular rate and rhythm,  S1 and S2 normal,  no murmur, click, rub, or gallop Dental 
No notable dental hx Pulmonary Breath sounds clear to auscultation Abdominal 
GI exam deferred Other Findings Anesthetic Plan ASA: 2 Anesthesia type: regional 
 
 
 
 
Induction: Intravenous Anesthetic plan and risks discussed with: Patient Physical Exam 
 
Airway Mallampati: III 
TM Distance: 4 - 6 cm Neck ROM: normal range of motion Mouth opening: Diminished (comment) Cardiovascular Regular rate and rhythm,  S1 and S2 normal,  no murmur, click, rub, or gallop Rhythm: regular Rate: normal 
 
 
 
 Dental 
No notable dental hx Pulmonary Breath sounds clear to auscultation Abdominal 
GI exam deferred Other Findings Anesthetic Plan ASA: 2 Anesthesia type: general 
 
 
 
 
Induction: Intravenous Anesthetic plan and risks discussed with: Patient

## 2018-11-16 NOTE — PERIOP NOTES
TRANSFER - OUT REPORT: 
 
Verbal report given to 5 S RN Emely(name) on Shanelle Grewal  being transferred to [facility](unit) for routine post - op Report consisted of patients Situation, Background, Assessment and  
Recommendations(SBAR). Time Pre op antibiotic given:1421 Anesthesia Stop time: [phone] Petty Present on Transfer to floor:n Order for Petty on Chart:n 
Discharge Prescriptions with Chart:n Information from the following report(s) SBAR, OR Summary, Intake/Output, MAR and Cardiac Rhythm NSR was reviewed with the receiving nurse. Opportunity for questions and clarification was provided. Is the patient on 02? YES 
     L/Min 2 Other Is the patient on a monitor? NO Is the nurse transporting with the patient? NO Surgical Waiting Area notified of patient's transfer from PACU? YES, pacu rn phoned surgical waiting, unable to reach family The following personal items collected during your admission accompanied patient upon transfer:  
Dental Appliance:   
Vision: Hearing Aid:   
Jewelry:   
Clothing:  CLOTHING BAG PLACED ON PT'S STRETCHER IN PACU;  [address] Other Valuables:   
Valuables sent to safe:

## 2018-11-16 NOTE — PERIOP NOTES
Family called and informed of patient's progress. RN spoke with patient's contact listed on yellow form.

## 2018-11-16 NOTE — H&P
H and P Subjective:  
 
 
 
Tony Montoya is a 50 y.o. female who presents with knee wound dehiscence left knee after TKA 3 weeks ago Patient Active Problem List  
 Diagnosis Date Noted  Dehiscence of incision 2018  Primary osteoarthritis of both knees 10/24/2018  Hematuria 10/09/2018  MRSA carrier 10/09/2018  Hyperthyroidism 10/17/2012 Family History Problem Relation Age of Onset  Heart Disease Mother  Heart Attack Mother  Hypertension Mother [employer] Arthritis-osteo Mother  Rashes/Skin Problems Mother PLAQUE PSORIASIS  
 Heart Disease Father  Thyroid Disease Father  Diabetes Father  Hypertension Father  Diabetes Maternal Grandmother  Heart Attack Maternal Grandmother  Hypertension Maternal Grandmother  Arthritis-osteo Maternal Grandmother  Hypertension Maternal Grandfather  Stroke Maternal Grandfather  High Cholesterol Sister  Anesth Problems Neg Hx Social History Tobacco Use  Smoking status: Former Smoker Types: Cigarettes Last attempt to quit: 1994 Years since quittin.1  Smokeless tobacco: Never Used Substance Use Topics  Alcohol use: No  
 
Past Medical History:  
Diagnosis Date  ADHD (attention deficit hyperactivity disorder)  Arthritis OSTEO  Chronic pain  GERD (gastroesophageal reflux disease)  Graves disease NO LONGER AN ISSUE  
 Hematuria 10/9/2018  
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   
 depression  Seizure ([address]  Seizures ([address]  &  REACTIVE TO HYPOGLYCEMIA / ALSO FROM FLASHING LIGHTS Past Surgical History:  
Procedure Laterality Date  DELIVERY     
 HX ABDOMINOPLASTY   [address]  HX  SECTION    HX GASTRIC BYPASS    
  [address] CHOLEYCYSTECTOMY  HX ORTHOPAEDIC Right CARPEL TUNNEL  
 HX ORTHOPAEDIC Left CARPEL TUNNEL  
 HX ORTHOPAEDIC Left ULNA SHORTENING  
 HX ORTHOPAEDIC Right  [facility] TONSILLECTOMY  0812 Prior to Admission medications Medication Sig Start Date End Date Taking? Authorizing Provider  
cholecalciferol, VITAMIN D3, (VITAMIN D3) 5,000 unit tab tablet Take 1 Cap by mouth daily. Yes Kaveh Freeman MD  
diclofenac EC (VOLTAREN) 50 mg EC tablet Take 1 Tab by mouth two (2) times a day. 10/26/18  Yes Carlos Bhat MD  
famotidine (PEPCID) 20 mg tablet Take 1 Tab by mouth two (2) times a day. 10/26/18  Yes Carlos Bhat MD  
levothyroxine (SYNTHROID) 100 mcg tablet Take 100 mcg by mouth Daily (before breakfast). Yes Provider, Historical  
vortioxetine (TRINTELLIX) 10 mg tablet Take  by mouth daily. PT TAKES A TOTAL OF 30 mg DAILY   Yes Provider, Historical  
oxyCODONE IR (OXY-IR) 15 mg immediate release tablet Take 15 mg by mouth every four (4) hours as needed for Pain. TAKES 1.5 TABS IN MORNING 
TAKES 2 TABS AT ~ 1430 TAKES 1.5 TABS IN EVENING   Yes Provider, Historical  
tapentadol (NUCYNTA ER) 250 mg Tb12 Take 250 mg by mouth. Takes 1 in the morning Takes 1 in the evening   Yes Provider, Historical  
traZODone (DESYREL) 100 mg tablet Take 100 mg by mouth nightly. May take 1/2 tab to 1 tab every evening   Yes Provider, Historical  
divalproex DR (DEPAKOTE) 250 mg tablet Take 1 tablet by mouth in  the morning and 2 tablets  by mouth in the evening 9/11/15  Yes Sean Brandt MD  
losartan (COZAAR) 50 mg tablet Take 100 mg by mouth daily. Yes Provider, Historical  
cetirizine (ZYRTEC) 10 mg tablet Take 1 Tab by mouth daily. Yes Provider, Historical  
alpha-d-galactosidase (BEANO) 150 unit tab tablet Take 2 Tabs by mouth as needed for Flatulence.     Kaveh Freeman MD  
 acetaminophen (TYLENOL) 325 mg tablet Take 2 Tabs by mouth every six (6) hours as needed for Pain. for 14 days    Moe Angel MD  
aspirin delayed-release 81 mg tablet Take 1 Tab by mouth two (2) times a day. 10/26/18   Barbara Moss MD  
amphetamine sulfate (EVEKEO) 10 mg tab Take 10 mg by mouth. PT TAKES 2 TABS (20 MG) BID AT 0200 AND 1200    Provider, Historical  
potassium chloride SR (KLOR-CON 10) 10 mEq tablet Take 10 mEq by mouth daily. Provider, Historical  
furosemide (LASIX) 20 mg tablet Take 20 mg by mouth daily. MAY TAKE ONLY IF NEEDED FOR SEVERE ANKLE SWELLING    Provider, Historical  
baclofen (LIORESAL) 10 mg tablet Take 10 mg by mouth as needed for Pain. Provider, Historical  
Lisdexamfetamine (VYVANSE) 50 mg capsule Take 70 mg by mouth two (2) times a day. Takes in the morning and at noon daily Provider, Historical  
hydrochlorothiazide (HYDRODIURIL) 25 mg tablet Take 25 mg by mouth daily. MAY TAKE 1 OR 2 TABS AS NEEDED FOR ANKLE SWELLING    Provider, Historical  
albuterol (PROAIR HFA) 90 mcg/actuation inhaler Take 1 Puff by inhalation every four (4) hours as needed for Wheezing. Provider, Historical  
benzoyl peroxide (DUAL ACTION) 3.5 % Clsr by Apply Externally route. Provider, Historical  
acyclovir (ZOVIRAX) 400 mg tablet Take 400 mg by mouth every four (4) hours as needed for Other (fever blisters). Other, MD Pawel  
simethicone (GAS-X) 80 mg chewable tablet Take 80 mg by mouth every six (6) hours as needed. Provider, Historical  
lansoprazole (PREVACID) 15 mg disintegrating tablet Take 1 Tab by mouth Daily (before breakfast). Provider, Historical  
MULTIVIT WITH CALCIUM,IRON,MIN (ONE-A-DAY WOMENS FORMULA PO) Take  by mouth. Provider, Historical  
 
Current Facility-Administered Medications Medication Dose Route Frequency  lactated Ringers infusion  125 mL/hr IntraVENous CONTINUOUS  
  0.9% sodium chloride infusion  25 mL/hr IntraVENous CONTINUOUS  
 sodium chloride (NS) flush 5-10 mL  5-10 mL IntraVENous Q8H  
 sodium chloride (NS) flush 5-10 mL  5-10 mL IntraVENous PRN  
 lidocaine (PF) (XYLOCAINE) 10 mg/mL (1 %) injection 0.1 mL  0.1 mL SubCUTAneous PRN  
 fentaNYL citrate (PF) injection 50 mcg  50 mcg IntraVENous PRN  
 midazolam (VERSED) injection 1 mg  1 mg IntraVENous PRN  
 midazolam (VERSED) injection 1 mg  1 mg IntraVENous PRN  
 ropivacaine (PF) (NAROPIN) 5 mg/mL (0.5 %) injection 150 mg  30 mL Peripheral Nerve Block ONCE  
 ceFAZolin (ANCEF) 2 g/20 mL in sterile water IV syringe  2 g IntraVENous ONCE Allergies Allergen Reactions  Sulfa (Sulfonamide Antibiotics) Hives  Pcn [Penicillins] Unproven on Challenge Review of Systems:   
Negative for fevers, chills, nausea, vomiting, chest pain, shortness of breath, headaches. Objective:  
 
Patient Vitals for the past 24 hrs: 
 Temp Pulse Resp BP SpO2  
18 1248 98.7 °F (37.1 °C) 73 17 125/70 95 % Temp (24hrs), Av.7 °F (37.1 °C), Min:98.7 °F (37.1 °C), Max:98.7 °F (37.1 °C) Gen: NAD, A&Ox3 Resp: Non-labored breathing CV: Extremities well perfused Abd: soft, NT 
LLE:draiange from incision- clear, minimal erythema, skin intact, warm well perfused, SILT in al distributions L3-S1, palpable pedal pulses, no calf tenderness Imaging Review:  None Labs: No results found for this or any previous visit (from the past 24 hour(s)). Current Facility-Administered Medications Medication Dose Route Frequency  lactated Ringers infusion  125 mL/hr IntraVENous CONTINUOUS  
 0.9% sodium chloride infusion  25 mL/hr IntraVENous CONTINUOUS  
 sodium chloride (NS) flush 5-10 mL  5-10 mL IntraVENous Q8H  
 sodium chloride (NS) flush 5-10 mL  5-10 mL IntraVENous PRN  
 lidocaine (PF) (XYLOCAINE) 10 mg/mL (1 %) injection 0.1 mL  0.1 mL SubCUTAneous PRN  
  fentaNYL citrate (PF) injection 50 mcg  50 mcg IntraVENous PRN  
 midazolam (VERSED) injection 1 mg  1 mg IntraVENous PRN  
 midazolam (VERSED) injection 1 mg  1 mg IntraVENous PRN  
 ropivacaine (PF) (NAROPIN) 5 mg/mL (0.5 %) injection 150 mg  30 mL Peripheral Nerve Block ONCE  
 ceFAZolin (ANCEF) 2 g/20 mL in sterile water IV syringe  2 g IntraVENous ONCE Impression: Active Problems: 
  Dehiscence of incision (11/16/2018) Plan:  
Plan for I and D with possible poly exchange left knee González Moran MD

## 2018-11-16 NOTE — PROGRESS NOTES
TRANSFER - IN REPORT: 
 
Verbal report received from Randy(name) on Gail Gandhi  being received from [email or web address]) for routine post - op Report consisted of patients Situation, Background, Assessment and  
Recommendations(SBAR). Information from the following report(s) SBAR, Kardex, Intake/Output, MAR and Recent Results was reviewed with the receiving nurse. Opportunity for questions and clarification was provided. Assessment completed upon patients arrival to unit and care assumed.

## 2018-11-16 NOTE — PERIOP NOTES
Old implant removed, inspected by surgeon, and discarded per hospital policy. New implant is Ref # [id number], lot # [id number] by [facility]

## 2018-11-17 LAB
ANION GAP SERPL CALC-SCNC: 8 MMOL/L (ref 5–15)
BUN SERPL-MCNC: 11 MG/DL (ref 6–20)
BUN/CREAT SERPL: 18 (ref 12–20)
CALCIUM SERPL-MCNC: 7.7 MG/DL (ref 8.5–10.1)
CHLORIDE SERPL-SCNC: 108 MMOL/L (ref 97–108)
CO2 SERPL-SCNC: 23 MMOL/L (ref 21–32)
CREAT SERPL-MCNC: 0.61 MG/DL (ref 0.55–1.02)
GLUCOSE SERPL-MCNC: 128 MG/DL (ref 65–100)
HGB BLD-MCNC: 8.6 G/DL (ref 11.5–16)
POTASSIUM SERPL-SCNC: 4.3 MMOL/L (ref 3.5–5.1)
SODIUM SERPL-SCNC: 139 MMOL/L (ref 136–145)

## 2018-11-17 PROCEDURE — 74011250637 HC RX REV CODE- 250/637: Performed by: ORTHOPAEDIC SURGERY

## 2018-11-17 PROCEDURE — 74011000258 HC RX REV CODE- 258: Performed by: ORTHOPAEDIC SURGERY

## 2018-11-17 PROCEDURE — 65270000029 HC RM PRIVATE

## 2018-11-17 PROCEDURE — 99218 HC RM OBSERVATION: CPT

## 2018-11-17 PROCEDURE — 74011250636 HC RX REV CODE- 250/636: Performed by: INTERNAL MEDICINE

## 2018-11-17 PROCEDURE — 80048 BASIC METABOLIC PNL TOTAL CA: CPT

## 2018-11-17 PROCEDURE — 74011250636 HC RX REV CODE- 250/636: Performed by: ORTHOPAEDIC SURGERY

## 2018-11-17 PROCEDURE — 36415 COLL VENOUS BLD VENIPUNCTURE: CPT

## 2018-11-17 PROCEDURE — 85018 HEMOGLOBIN: CPT

## 2018-11-17 RX ADMIN — OXYCODONE HYDROCHLORIDE 15 MG: 5 TABLET ORAL at 14:00

## 2018-11-17 RX ADMIN — PIPERACILLIN SODIUM,TAZOBACTAM SODIUM 3.38 G: 3; .375 INJECTION, POWDER, FOR SOLUTION INTRAVENOUS at 05:18

## 2018-11-17 RX ADMIN — TRAZODONE HYDROCHLORIDE 100 MG: 100 TABLET ORAL at 21:00

## 2018-11-17 RX ADMIN — ONDANSETRON 4 MG: 2 INJECTION INTRAMUSCULAR; INTRAVENOUS at 03:19

## 2018-11-17 RX ADMIN — KETOROLAC TROMETHAMINE 15 MG: 30 INJECTION, SOLUTION INTRAMUSCULAR at 20:56

## 2018-11-17 RX ADMIN — DIVALPROEX SODIUM 250 MG: 250 TABLET, DELAYED RELEASE ORAL at 09:30

## 2018-11-17 RX ADMIN — ACETAMINOPHEN 650 MG: 325 TABLET ORAL at 18:33

## 2018-11-17 RX ADMIN — FAMOTIDINE 20 MG: 20 TABLET ORAL at 18:33

## 2018-11-17 RX ADMIN — ACETAMINOPHEN 650 MG: 325 TABLET ORAL at 00:34

## 2018-11-17 RX ADMIN — KETOROLAC TROMETHAMINE 15 MG: 30 INJECTION, SOLUTION INTRAMUSCULAR at 14:00

## 2018-11-17 RX ADMIN — OXYCODONE HYDROCHLORIDE 15 MG: 5 TABLET ORAL at 05:16

## 2018-11-17 RX ADMIN — VANCOMYCIN HYDROCHLORIDE 1000 MG: 1 INJECTION, POWDER, LYOPHILIZED, FOR SOLUTION INTRAVENOUS at 16:52

## 2018-11-17 RX ADMIN — ACETAMINOPHEN 650 MG: 325 TABLET ORAL at 05:17

## 2018-11-17 RX ADMIN — FAMOTIDINE 20 MG: 20 TABLET ORAL at 09:30

## 2018-11-17 RX ADMIN — ASPIRIN 81 MG: 81 TABLET, COATED ORAL at 09:29

## 2018-11-17 RX ADMIN — ASPIRIN 81 MG: 81 TABLET, COATED ORAL at 18:33

## 2018-11-17 RX ADMIN — ACETAMINOPHEN 650 MG: 325 TABLET ORAL at 23:47

## 2018-11-17 RX ADMIN — OXYCODONE HYDROCHLORIDE 15 MG: 5 TABLET ORAL at 09:29

## 2018-11-17 RX ADMIN — Medication 10 ML: at 05:18

## 2018-11-17 RX ADMIN — OXYCODONE HYDROCHLORIDE 15 MG: 5 TABLET ORAL at 18:33

## 2018-11-17 RX ADMIN — DIVALPROEX SODIUM 500 MG: 250 TABLET, DELAYED RELEASE ORAL at 20:56

## 2018-11-17 RX ADMIN — VANCOMYCIN HYDROCHLORIDE 1000 MG: 1 INJECTION, POWDER, LYOPHILIZED, FOR SOLUTION INTRAVENOUS at 23:48

## 2018-11-17 RX ADMIN — KETOROLAC TROMETHAMINE 15 MG: 30 INJECTION, SOLUTION INTRAMUSCULAR at 09:29

## 2018-11-17 RX ADMIN — PANTOPRAZOLE SODIUM 40 MG: 40 TABLET, DELAYED RELEASE ORAL at 06:29

## 2018-11-17 RX ADMIN — Medication 10 ML: at 21:00

## 2018-11-17 RX ADMIN — POTASSIUM CHLORIDE 10 MEQ: 750 TABLET, EXTENDED RELEASE ORAL at 09:29

## 2018-11-17 RX ADMIN — Medication 2 G: at 05:18

## 2018-11-17 RX ADMIN — KETOROLAC TROMETHAMINE 15 MG: 30 INJECTION, SOLUTION INTRAMUSCULAR at 03:16

## 2018-11-17 RX ADMIN — OXYCODONE HYDROCHLORIDE 15 MG: 5 TABLET ORAL at 23:48

## 2018-11-17 RX ADMIN — LEVOTHYROXINE SODIUM 100 MCG: 100 TABLET ORAL at 06:29

## 2018-11-17 RX ADMIN — ACETAMINOPHEN 650 MG: 325 TABLET ORAL at 14:00

## 2018-11-17 NOTE — PROGRESS NOTES
Spiritual Care Assessment/Progress Note [address] 
 
 
NAME: Hamp Cabot      MRN: [id number] AGE: 50 y.o. SEX: female Lutheran Affiliation: Taoist  
Language: English  
 
11/17/2018     Total Time (in minutes): 45  
 
[facility] [address] conversation with: 
  
    [x]Patient        [] Family    [] Friend(s) Reason for Consult: Request by patient Spiritual beliefs: (Please include comment if needed) [x] Identifies with a genaro tradition:     
   [] Supported by a genaro community:        
   [] Claims no spiritual orientation:       
   [] Seeking spiritual identity:            
   [] Adheres to an individual form of spirituality:       
   [] Not able to assess:                   
 
    
Identified resources for coping:  
   [x] Prayer                           
   [] Music                  [] Guided Imagery 
   [] Family/friends                 [] Pet visits [] Devotional reading                         [] Unknown 
   [] Other:                                        
 
 
Interventions offered during this visit: (See comments for more details) Patient Interventions: Affirmation of emotions/emotional suffering, Affirmation of genaro, Catharsis/review of pertinent events in supportive environment, Coping skills reviewed/reinforced, Iconic (affirming the presence of God/Higher Power), Normalization of emotional/spiritual concerns, Prayer (assurance of) Plan of Care: 
 
 [x] Support spiritual and/or cultural needs  
 [] Support AMD and/or advance care planning process    
 [] Support grieving process 
 [] Coordinate Rites and/or Rituals  
 [] Coordination with community clergy [] No spiritual needs identified at this time 
 [] Detailed Plan of Care below (See Comments)  [] Make referral to Music Therapy 
[] Make referral to Pet Therapy    
[] Make referral to Addiction services 
[] Make referral to [facility] [] Make referral to Spiritual Care Partner 
[] No future visits requested       
[x] Follow up visits as needed Visited pt on 5S at her request. Pt was tearful, somewhat overwhelmed by things in her life now. She is disappointed by the medical setback in her knee and she anticipates six weeks of IV at home or elsewhere. She is disabled. She and her  have two sons, 25and 15years of age. Both are on the autism spectrum and now her  is the primary caregiver for both. She spoke at length about the struggle she has had caring for them and ensuring that they received the best and most appropriate education. No family in the area and almost no friends. She reports that her  is very supportive, however she worries about him having to care for her as well as the two sons. Chaplains will follow as needed. Chaplain Darío, MDiv, MS, [facility] 
[phone] [phone])

## 2018-11-17 NOTE — PROGRESS NOTES
Bedside and Verbal shift change report given to [facility] (oncoming nurse) by Zoya Diaz (offgoing nurse). Report included the following information SBAR, Kardex, Intake/Output, MAR and Recent Results.

## 2018-11-17 NOTE — ANESTHESIA POSTPROCEDURE EVALUATION
Procedure(s): 
INCISION AND DRAINAGE AND POLY EXCHANGE LEFT KNEE. 
 
<BSHSIANPOST> Visit Vitals /77 (BP 1 Location: Right arm, BP Patient Position: At rest) Pulse 88 Temp 36.9 °C (98.5 °F) Resp 16 Ht 5' 2\" (1.575 m) Wt 86.7 kg (191 lb 2.2 oz) SpO2 92% BMI 34.96 kg/m²

## 2018-11-17 NOTE — PROGRESS NOTES
Pharmacist Note - Vancomycin Dosing Consult provided for this 50 y.o. female for indication of left TKA site. -s/p I and D  Left knee on 2018 Antibiotic regimen(s): Vanc (Ancef x3 doses pre/post op; Zosyn x 2 doses) Recent Labs 18 
0326 CREA 0.61  
BUN 11 Frequency of BMP: daily x3 Height: 157.5 cm Weight: 86.7 kg Est CrCl: >100 ml/min Temp (24hrs), Av.3 °F (36.8 °C), Min:97.8 °F (36.6 °C), Max:98.5 °F (36.9 °C) Cultures: 
 wound - MRSA by antigen detection Goal trough = 15 - 20 mcg/mL Vancomycin 1500 mg x 1 dose given post-op yesterday @ 0178. Pre-op dose had been ordered but not give. Will order a maintenance dose of 1000 mg IV every 8 hours. Pharmacy to follow patient daily and order levels / make dose adjustments as appropriate.

## 2018-11-17 NOTE — PROGRESS NOTES
Nurse Sanjay Kim gave bedside report to oncoming nurse Juana José RN by [employer] [facility]

## 2018-11-17 NOTE — PROGRESS NOTES
Infectious Diseases Consultation Please see dictated note Impression 1. Early postop MRSA infection of the left TKR with surgical I&D and poly exchange on 11/16/2018 -- left total knee arthroplasty originally done on 10/24/2018 2. DJD 3. HTN 
 
4. Hypothyroidism S/P treatment for Graves disease 5. 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 Vancomycin IV 
 
2. Will consider adding Rifampin in a few days unless potential drug interactions preclude its use.   
 
Thanks,  
Jaja Mahan MD 
11/17/2018 
2:25 PM

## 2018-11-17 NOTE — PROGRESS NOTES
Informed Dr. Jarod Carpio that pt's BP 96/60. Per Dr. Jarod Carpio, hold pt's lasix, losartan, and hydrochlorothiazide. Per Dr. Jarod Carpio, ok for pt to take at home nucynta as pt takes at home.

## 2018-11-17 NOTE — PROGRESS NOTES
Resting comfortably GEN:  NAD. AOx3 ABD:  S/NT/ND  
LLE:  Dressing C/D/I , 5/5 motor, Calf nttp (Bilat), Sensation rossly intact to light touch throughout, 1+ dp/pt pulses, foot perfused Patient Vitals for the past 24 hrs: 
 Temp Pulse Resp BP SpO2  
11/17/18 0840 98.2 °F (36.8 °C) 76 18 103/55 95 % 11/17/18 0324 98.3 °F (36.8 °C) 78 16 112/54 98 % 11/16/18 2342 98.1 °F (36.7 °C) 72 16 112/62 94 % 11/16/18 2100 98.4 °F (36.9 °C) 89 16 109/67 95 % 11/16/18 2026 98.5 °F (36.9 °C) 88 16 124/77 92 % 11/16/18 1904 98.4 °F (36.9 °C) 84 14 131/83 96 % 11/16/18 1757 97.8 °F (36.6 °C) 70 12 143/87 100 % 11/16/18 1731  78 12 158/81 97 % 11/16/18 1715  77 12 153/87 97 % 11/16/18 1700  82 18 140/90 98 % 11/16/18 1650  84 17  98 % 11/16/18 1645  84 19 (!) 146/92 96 % 11/16/18 1640  89 17  96 % 11/16/18 1635  95 30  94 % 11/16/18 1630  93 12 (!) 128/99 97 % 11/16/18 1625  88 20  96 % 11/16/18 1620  80 15  97 % 11/16/18 1615  82 18  96 % 11/16/18 1610  83 18  93 % 11/16/18 1605  77 14  95 % 11/16/18 1600    154/88   
11/16/18 1555  87 17  95 % 11/16/18 1550  95 15 (!) 148/95 97 % 11/16/18 1545  92 16 (!) 128/100 97 % 11/16/18 1541 98.1 °F (36.7 °C) 87 18 151/79 96 % 11/16/18 1540    (!) 166/97   
11/16/18 1248 98.7 °F (37.1 °C) 73 17 125/70 95 % Current Facility-Administered Medications Medication Dose Route Frequency  acetaminophen (TYLENOL) tablet 650 mg  650 mg Oral Q6H  
 . PHARMACY TO SUBSTITUTE PER PROTOCOL (Reordered from: amphetamine sulfate (EVEKEO) 10 mg tab)    Per Protocol  aspirin delayed-release tablet 81 mg  81 mg Oral BID  
 baclofen (LIORESAL) tablet 10 mg  10 mg Oral Q8H PRN  
 divalproex DR (DEPAKOTE) tablet 250 mg  250 mg Oral DAILY  furosemide (LASIX) tablet 20 mg  20 mg Oral DAILY  hydroCHLOROthiazide (HYDRODIURIL) tablet 25 mg  25 mg Oral DAILY  pantoprazole (PROTONIX) tablet 40 mg  40 mg Oral ACB  levothyroxine (SYNTHROID) tablet 100 mcg  100 mcg Oral ACB  losartan (COZAAR) tablet 100 mg  100 mg Oral DAILY  oxyCODONE IR (ROXICODONE) tablet 15 mg  15 mg Oral Q4H PRN  potassium chloride SR (KLOR-CON 10) tablet 10 mEq  10 mEq Oral DAILY  . PHARMACY TO SUBSTITUTE PER PROTOCOL (Reordered from: tapentadol (NUCYNTA ER) 250 mg Tb12)    Per Protocol  traZODone (DESYREL) tablet  mg   mg Oral QHS  . PHARMACY TO SUBSTITUTE PER PROTOCOL (Reordered from: vortioxetine (TRINTELLIX) 10 mg tablet)    Per Protocol  divalproex DR (DEPAKOTE) tablet 500 mg  500 mg Oral QHS  albuterol (PROVENTIL VENTOLIN) nebulizer solution 2.5 mg  2.5 mg Nebulization Q4H PRN  
 0.9% sodium chloride infusion  125 mL/hr IntraVENous CONTINUOUS  
 sodium chloride 0.9 % bolus infusion 500 mL  500 mL IntraVENous ONCE PRN  
 sodium chloride (NS) flush 5-10 mL  5-10 mL IntraVENous Q8H  
 sodium chloride (NS) flush 5-10 mL  5-10 mL IntraVENous PRN  
 naloxone (NARCAN) injection 0.4 mg  0.4 mg IntraVENous PRN  
 hydrOXYzine HCl (ATARAX) tablet 10 mg  10 mg Oral Q8H PRN  
 famotidine (PEPCID) tablet 20 mg  20 mg Oral BID  senna-docusate (PERICOLACE) 8.6-50 mg per tablet 1 Tab  1 Tab Oral BID  polyethylene glycol (MIRALAX) packet 17 g  17 g Oral DAILY  [START ON 11/18/2018] bisacodyl (DULCOLAX) suppository 10 mg  10 mg Rectal DAILY PRN  
 ketorolac (TORADOL) injection 15 mg  15 mg IntraVENous Q6H  
 ondansetron (ZOFRAN) injection 4 mg  4 mg IntraVENous Q4H PRN  piperacillin-tazobactam (ZOSYN) 3.375 g in 0.9% sodium chloride (MBP/ADV) 100 mL  3.375 g IntraVENous Q8H  
 [START ON 11/18/2018] diclofenac EC (VOLTAREN) tablet 50 mg  50 mg Oral BID Lab Results Component Value Date/Time HGB 8.6 (L) 11/17/2018 03:26 AM  
 INR 1.1 10/08/2018 03:47 PM  
 
 
Lab Results Component Value Date/Time  Sodium 139 11/17/2018 03:26 AM  
 Potassium 4.3 11/17/2018 03:26 AM  
 Chloride 108 11/17/2018 03:26 AM  
 CO2 23 11/17/2018 03:26 AM  
 BUN 11 11/17/2018 03:26 AM  
 Creatinine 0.61 11/17/2018 03:26 AM  
 Calcium 7.7 (L) 11/17/2018 03:26 AM  
 
 
 
  
50 y.o. female s/p I and D  Left knee on 11/16/2018  . Doing well. Prelim cx growing gram pos cocci- awaiting ID input, currently on ancef, zosyn, vanc ABX: Complete 24 hours perioperative abx PATHWAY: Straight cath per protocol if needed DVT Prophylaxis: Aspirin 81 mg enteric coated BID Weight Bearing: WBAT LLE in knee immobilizer, no bending Pain Control: Toradol (if Cr normal), Diclofenac to begin the evening of POD 1, Home regimen of 15 mg oxy, dilaudid Disposition: Home, HHPT

## 2018-11-18 LAB
ANION GAP SERPL CALC-SCNC: 4 MMOL/L (ref 5–15)
BACTERIA SPEC CULT: ABNORMAL
BACTERIA SPEC CULT: ABNORMAL
BACTERIA SPEC CULT: NORMAL
BUN SERPL-MCNC: 9 MG/DL (ref 6–20)
BUN/CREAT SERPL: 17 (ref 12–20)
CALCIUM SERPL-MCNC: 7.5 MG/DL (ref 8.5–10.1)
CHLORIDE SERPL-SCNC: 112 MMOL/L (ref 97–108)
CO2 SERPL-SCNC: 27 MMOL/L (ref 21–32)
CREAT SERPL-MCNC: 0.54 MG/DL (ref 0.55–1.02)
ERYTHROCYTE [DISTWIDTH] IN BLOOD BY AUTOMATED COUNT: 14.6 % (ref 11.5–14.5)
GLUCOSE SERPL-MCNC: 84 MG/DL (ref 65–100)
GRAM STN SPEC: ABNORMAL
GRAM STN SPEC: ABNORMAL
HCT VFR BLD AUTO: 21.5 % (ref 35–47)
HGB BLD-MCNC: 6.4 G/DL (ref 11.5–16)
HGB BLD-MCNC: 6.9 G/DL (ref 11.5–16)
MCH RBC QN AUTO: 26.7 PG (ref 26–34)
MCHC RBC AUTO-ENTMCNC: 29.8 G/DL (ref 30–36.5)
MCV RBC AUTO: 89.6 FL (ref 80–99)
NRBC # BLD: 0 K/UL (ref 0–0.01)
NRBC BLD-RTO: 0 PER 100 WBC
PLATELET # BLD AUTO: 317 K/UL (ref 150–400)
PMV BLD AUTO: 9.3 FL (ref 8.9–12.9)
POTASSIUM SERPL-SCNC: 4.2 MMOL/L (ref 3.5–5.1)
RBC # BLD AUTO: 2.4 M/UL (ref 3.8–5.2)
SERVICE CMNT-IMP: ABNORMAL
SERVICE CMNT-IMP: NORMAL
SODIUM SERPL-SCNC: 143 MMOL/L (ref 136–145)
WBC # BLD AUTO: 8.6 K/UL (ref 3.6–11)

## 2018-11-18 PROCEDURE — 97535 SELF CARE MNGMENT TRAINING: CPT

## 2018-11-18 PROCEDURE — 80048 BASIC METABOLIC PNL TOTAL CA: CPT

## 2018-11-18 PROCEDURE — G8979 MOBILITY GOAL STATUS: HCPCS

## 2018-11-18 PROCEDURE — 97116 GAIT TRAINING THERAPY: CPT

## 2018-11-18 PROCEDURE — 97165 OT EVAL LOW COMPLEX 30 MIN: CPT

## 2018-11-18 PROCEDURE — 85018 HEMOGLOBIN: CPT

## 2018-11-18 PROCEDURE — 74011250636 HC RX REV CODE- 250/636: Performed by: INTERNAL MEDICINE

## 2018-11-18 PROCEDURE — 97161 PT EVAL LOW COMPLEX 20 MIN: CPT

## 2018-11-18 PROCEDURE — 86901 BLOOD TYPING SEROLOGIC RH(D): CPT

## 2018-11-18 PROCEDURE — 36415 COLL VENOUS BLD VENIPUNCTURE: CPT

## 2018-11-18 PROCEDURE — G8978 MOBILITY CURRENT STATUS: HCPCS

## 2018-11-18 PROCEDURE — 30233N1 TRANSFUSION OF NONAUTOLOGOUS RED BLOOD CELLS INTO PERIPHERAL VEIN, PERCUTANEOUS APPROACH: ICD-10-PCS | Performed by: ORTHOPAEDIC SURGERY

## 2018-11-18 PROCEDURE — 65270000029 HC RM PRIVATE

## 2018-11-18 PROCEDURE — 74011250637 HC RX REV CODE- 250/637: Performed by: ORTHOPAEDIC SURGERY

## 2018-11-18 PROCEDURE — 36430 TRANSFUSION BLD/BLD COMPNT: CPT

## 2018-11-18 PROCEDURE — 86923 COMPATIBILITY TEST ELECTRIC: CPT

## 2018-11-18 PROCEDURE — P9016 RBC LEUKOCYTES REDUCED: HCPCS

## 2018-11-18 PROCEDURE — 85027 COMPLETE CBC AUTOMATED: CPT

## 2018-11-18 RX ORDER — SODIUM CHLORIDE 9 MG/ML
250 INJECTION, SOLUTION INTRAVENOUS AS NEEDED
Status: DISCONTINUED | OUTPATIENT
Start: 2018-11-18 | End: 2018-11-24 | Stop reason: HOSPADM

## 2018-11-18 RX ADMIN — OXYCODONE HYDROCHLORIDE 15 MG: 5 TABLET ORAL at 06:40

## 2018-11-18 RX ADMIN — VANCOMYCIN HYDROCHLORIDE 1000 MG: 1 INJECTION, POWDER, LYOPHILIZED, FOR SOLUTION INTRAVENOUS at 07:50

## 2018-11-18 RX ADMIN — ACETAMINOPHEN 650 MG: 325 TABLET ORAL at 13:27

## 2018-11-18 RX ADMIN — Medication 10 ML: at 06:41

## 2018-11-18 RX ADMIN — DICLOFENAC SODIUM 50 MG: 50 TABLET, DELAYED RELEASE ORAL at 18:01

## 2018-11-18 RX ADMIN — PANTOPRAZOLE SODIUM 40 MG: 40 TABLET, DELAYED RELEASE ORAL at 06:41

## 2018-11-18 RX ADMIN — OXYCODONE HYDROCHLORIDE 15 MG: 5 TABLET ORAL at 15:59

## 2018-11-18 RX ADMIN — ACETAMINOPHEN 650 MG: 325 TABLET ORAL at 06:40

## 2018-11-18 RX ADMIN — VANCOMYCIN HYDROCHLORIDE 1000 MG: 1 INJECTION, POWDER, LYOPHILIZED, FOR SOLUTION INTRAVENOUS at 17:55

## 2018-11-18 RX ADMIN — FAMOTIDINE 20 MG: 20 TABLET ORAL at 09:40

## 2018-11-18 RX ADMIN — Medication 10 ML: at 21:21

## 2018-11-18 RX ADMIN — FAMOTIDINE 20 MG: 20 TABLET ORAL at 18:01

## 2018-11-18 RX ADMIN — DIVALPROEX SODIUM 250 MG: 250 TABLET, DELAYED RELEASE ORAL at 09:40

## 2018-11-18 RX ADMIN — POTASSIUM CHLORIDE 10 MEQ: 750 TABLET, EXTENDED RELEASE ORAL at 09:40

## 2018-11-18 RX ADMIN — DICLOFENAC SODIUM 50 MG: 50 TABLET, DELAYED RELEASE ORAL at 09:40

## 2018-11-18 RX ADMIN — OXYCODONE HYDROCHLORIDE 15 MG: 5 TABLET ORAL at 20:33

## 2018-11-18 RX ADMIN — ASPIRIN 81 MG: 81 TABLET, COATED ORAL at 18:01

## 2018-11-18 RX ADMIN — DIVALPROEX SODIUM 500 MG: 250 TABLET, DELAYED RELEASE ORAL at 20:33

## 2018-11-18 RX ADMIN — OXYCODONE HYDROCHLORIDE 15 MG: 5 TABLET ORAL at 10:21

## 2018-11-18 RX ADMIN — ACETAMINOPHEN 650 MG: 325 TABLET ORAL at 18:01

## 2018-11-18 RX ADMIN — LEVOTHYROXINE SODIUM 100 MCG: 100 TABLET ORAL at 06:41

## 2018-11-18 RX ADMIN — TRAZODONE HYDROCHLORIDE 100 MG: 100 TABLET ORAL at 21:21

## 2018-11-18 RX ADMIN — ASPIRIN 81 MG: 81 TABLET, COATED ORAL at 09:40

## 2018-11-18 NOTE — PROGRESS NOTES
Resting comfortably, appears pale GEN:  NAD. AOx3 ABD:  S/NT/ND  
LLE:  Dressing C/D/I , 5/5 motor, Calf nttp (Bilat), Sensation rossly intact to light touch throughout, 1+ dp/pt pulses, foot perfused Patient Vitals for the past 24 hrs: 
 Temp Pulse Resp BP SpO2  
11/18/18 0831 98.5 °F (36.9 °C) 69 16 125/69 99 % 11/18/18 0428 98.1 °F (36.7 °C) 64 16 103/61 100 % 11/17/18 2053 98 °F (36.7 °C) 75 16 96/53 97 % 11/17/18 1459 98.3 °F (36.8 °C) 91 18 114/74 92 % 11/17/18 1233 98.5 °F (36.9 °C) 93 18 96/60 91 % Current Facility-Administered Medications Medication Dose Route Frequency  0.9% sodium chloride infusion 250 mL  250 mL IntraVENous PRN  Vancomycin - pharmacy to dose   Other Rx Dosing/Monitoring  vancomycin (VANCOCIN) 1,000 mg in 0.9% sodium chloride 250 mL ([id number])  1,000 mg IntraVENous Q8H  
 acetaminophen (TYLENOL) tablet 650 mg  650 mg Oral Q6H  
 . PHARMACY TO SUBSTITUTE PER PROTOCOL (Reordered from: amphetamine sulfate (EVEKEO) 10 mg tab)    Per Protocol  aspirin delayed-release tablet 81 mg  81 mg Oral BID  
 baclofen (LIORESAL) tablet 10 mg  10 mg Oral Q8H PRN  
 divalproex DR (DEPAKOTE) tablet 250 mg  250 mg Oral DAILY  furosemide (LASIX) tablet 20 mg  20 mg Oral DAILY  hydroCHLOROthiazide (HYDRODIURIL) tablet 25 mg  25 mg Oral DAILY  pantoprazole (PROTONIX) tablet 40 mg  40 mg Oral ACB  levothyroxine (SYNTHROID) tablet 100 mcg  100 mcg Oral ACB  losartan (COZAAR) tablet 100 mg  100 mg Oral DAILY  oxyCODONE IR (ROXICODONE) tablet 15 mg  15 mg Oral Q4H PRN  potassium chloride SR (KLOR-CON 10) tablet 10 mEq  10 mEq Oral DAILY  tapentadol Tb12 250 mg (Patient Supplied)  250 mg Oral BID  traZODone (DESYREL) tablet  mg   mg Oral QHS  . PHARMACY TO SUBSTITUTE PER PROTOCOL (Reordered from: vortioxetine (TRINTELLIX) 10 mg tablet)    Per Protocol  divalproex DR (DEPAKOTE) tablet 500 mg  500 mg Oral QHS  albuterol (PROVENTIL VENTOLIN) nebulizer solution 2.5 mg  2.5 mg Nebulization Q4H PRN  
 sodium chloride (NS) flush 5-10 mL  5-10 mL IntraVENous Q8H  
 sodium chloride (NS) flush 5-10 mL  5-10 mL IntraVENous PRN  
 naloxone (NARCAN) injection 0.4 mg  0.4 mg IntraVENous PRN  
 hydrOXYzine HCl (ATARAX) tablet 10 mg  10 mg Oral Q8H PRN  
 famotidine (PEPCID) tablet 20 mg  20 mg Oral BID  senna-docusate (PERICOLACE) 8.6-50 mg per tablet 1 Tab  1 Tab Oral BID  polyethylene glycol (MIRALAX) packet 17 g  17 g Oral DAILY  bisacodyl (DULCOLAX) suppository 10 mg  10 mg Rectal DAILY PRN  
 diclofenac EC (VOLTAREN) tablet 50 mg  50 mg Oral BID Lab Results Component Value Date/Time HGB 6.9 (L) 11/18/2018 08:11 AM  
 INR 1.1 10/08/2018 03:47 PM  
 
 
Lab Results Component Value Date/Time Sodium 143 11/18/2018 04:22 AM  
 Potassium 4.2 11/18/2018 04:22 AM  
 Chloride 112 (H) 11/18/2018 04:22 AM  
 CO2 27 11/18/2018 04:22 AM  
 BUN 9 11/18/2018 04:22 AM  
 Creatinine 0.54 (L) 11/18/2018 04:22 AM  
 Calcium 7.5 (L) 11/18/2018 04:22 AM  
 
 
 
  
50 y.o. female s/p I and D  Left knee on 11/16/2018  . Doing well. Prelim cx growing gram pos cocci- awaiting ID input, currently on ancef, zosyn, vanc Acute bloos loss anemia- will transfuse 2 units- Hgb 6.9 ABX: Per ID 
PATHWAY: Straight cath per protocol if needed DVT Prophylaxis: Aspirin 81 mg enteric coated BID Weight Bearing: WBAT LLE in knee immobilizer, no bending Pain Control: Toradol (if Cr normal), Diclofenac to begin the evening of POD 1, Home regimen of 15 mg oxy, dilaudid Disposition: Home, HHPT

## 2018-11-18 NOTE — PROGRESS NOTES
Bedside and Verbal shift change report given to [facility] (oncoming nurse) by Juana José (offgoing nurse). Report included the following information SBAR, Kardex, Intake/Output, MAR and Recent Results.

## 2018-11-18 NOTE — PROGRESS NOTES
PT Note: 
 
Chart reviewed and attempted to see for BID session. Patient currently receiving blood transfusion. Rehab tech reviewed supine exercises: Ankle pumps, quad sets, and glute sets. Patient agreeable to complete throughout the evening. May be OOB in chair with nursing assistance for dinner. Will follow up tomorrow.

## 2018-11-18 NOTE — PROGRESS NOTES
Problem: Mobility Impaired (Adult and Pediatric) Goal: *Acute Goals and Plan of Care (Insert Text) Physical Therapy Goals Initiated 11/18/2018 1. Patient will move from supine to sit and sit to supine , scoot up and down and roll side to side in bed with modified independence within 4 days. 2. Patient will perform sit to stand with modified independence within 4 days. 3. Patient will ambulate with modified independence for 150 feet with the least restrictive device within 4 days. 4. Patient will ascend/descend 13 stairs with 1 handrail(s) with modified independence within 4 days. 5. Patient will perform home exercise program per protocol with modified independence within 4 days. physical Therapy knee EVALUATION Patient: Suman Jimenez (85 y.o. female) Date: 11/18/2018 Primary Diagnosis: status post left knee replacement Dehiscence of incision Dehiscence of incision Procedure(s) (LRB): 
INCISION AND DRAINAGE AND POLY EXCHANGE LEFT KNEE (Left) 2 Days Post-Op Precautions:   Fall, WBAT(knee immobilizer, no flexion) ASSESSMENT : 
Based on the objective data described below, the patient presents with increased pain with all mobility and decreased independence with mobility following I&*D with poly exchange of L knee POD 2. PTA patient was using rollator for ambulation. She's agreeable to participate with encouragement, primarily limited by pain. Patient overall min A for bed mobility and transfers with RW. Patient ambulated 12 feet around room with RW and CGA/min A. Patient with very little WB through L LE during gait. Tearful once returning to chair, agreeable to remain OOB in chair x 30 minutes if able. Patient currently with order to keep knee immobilizer donned and for no knee flexion-patient verbalizing understanding. Anticipate good gains once pain improves. Recommend HHPT. Patient will benefit from skilled intervention to address the above impairments. Patients rehabilitation potential is considered to be Good Factors which may influence rehabilitation potential include:  
[]         None noted 
[]         Mental ability/status []         Medical condition 
[]         Home/family situation and support systems 
[]         Safety awareness [x]         Pain tolerance/management 
[]         Other: PLAN : 
Recommendations and Planned Interventions: 
[x]           Bed Mobility Training             [x]    Neuromuscular Re-Education 
[x]           Transfer Training                   []    Orthotic/Prosthetic Training 
[x]           Gait Training                         []    Modalities [x]           Therapeutic Exercises           []    Edema Management/Control 
[x]           Therapeutic Activities            [x]    Patient and Family Training/Education 
[]           Other (comment): Frequency/Duration: Patient will be followed by physical therapy twice daily to address goals. Discharge Recommendations: Home Health Further Equipment Recommendations for Discharge: Owns all SUBJECTIVE:  
Patient stated The pain from this brace is just so bad.  OBJECTIVE DATA SUMMARY:  
HISTORY:   
Past Medical History:  
Diagnosis Date  ADHD (attention deficit hyperactivity disorder)  Arthritis OSTEO  Chronic pain  GERD (gastroesophageal reflux disease)  Graves disease NO LONGER AN ISSUE  
 Hematuria 10/9/2018  
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   
 depression  Seizure ([address]  Seizures ([facility] [address]  &  REACTIVE TO HYPOGLYCEMIA / ALSO FROM FLASHING LIGHTS Past Surgical History:  
Procedure Laterality Date  DELIVERY     
 HX ABDOMINOPLASTY   [address]  HX  SECTION    HX GASTRIC BYPASS [address] [place] [address] CHOLEYCYSTECTOMY  HX ORTHOPAEDIC Right CARPEL TUNNEL  
 HX ORTHOPAEDIC Left CARPEL TUNNEL  
 HX ORTHOPAEDIC Left ULNA SHORTENING  
 HX ORTHOPAEDIC Right 2011 [address] TONSILLECTOMY  6[address] Prior Level of Function/Home Situation: mod I with rollator Personal factors and/or comorbidities impacting plan of care:  
 
Home Situation Home Environment: Private residence # Steps to Enter: 5 Rails to Enter: Yes Hand Rails : Bilateral(too wide to reach both) One/Two Story Residence: Two story # of Interior Steps: 15 Interior Rails: Left Living Alone: No 
Support Systems: Spouse/Significant Other/Partner, Family member(s) Current DME Used/Available at Home: Shower chair, [employer], Walker, rollator, Wheelchair, Vergie Late, [employer] [place] aides, Adaptive bathing aides Tub or Shower Type: Tub/Shower combination EXAMINATION/PRESENTATION/DECISION MAKING: Critical Behavior: 
Neurologic State: Alert Orientation Level: Oriented X4 Cognition: Appropriate for age attention/concentration, Follows commands Hearing: 
 Skin:   
Range Of Motion: 
AROM: Generally decreased, functional(except L knee) Strength:   
Strength: Generally decreased, functional(except L knee) Tone & Sensation:  
  
  
  
  
  
  
  
  
  
   
Coordination: 
  
Vision:  
Tracking: Able to track stimulus in all quadrants w/o difficulty Diplopia: No 
Acuity: Within Defined Limits Corrective Lenses: Glasses Functional Mobility: 
Bed Mobility: 
Rolling: Stand-by assistance Supine to Sit: Minimum assistance(Simultaneous filing. User may not have seen previous data.) Scooting: Minimum assistance(to manage LLE 2* immobilizer) Transfers: 
Sit to Stand: Minimum assistance(Simultaneous filing. User may not have seen previous data.) Stand to Sit: Minimum assistance(Simultaneous filing. User may not have seen previous data.) Stand Pivot Transfers: Minimum assistance Bed to Chair: Contact guard assistance;Assist x1 Balance:  
Sitting: Intact Standing: Impaired; With support Standing - Static: Good Standing - Dynamic : FairAmbulation/Gait Training:Distance (ft): 12 Feet (ft) Assistive Device: Gait belt;Brace/Splint; Walker, rolling Ambulation - Level of Assistance: Minimal assistance Gait Abnormalities: Antalgic;Decreased step clearance; Step to gait Left Side Weight Bearing: As tolerated Base of Support: Shift to right Stance: Left decreased Speed/Radha: Pace decreased (<100 feet/min) Step Length: Right shortened;Left shortened Stairs: Therapeutic Exercises:  
 
 
Functional Measure: 
Timed up and go: 
 
Timed Get Up And Go Test: 20 Timed Up and Go and G-code impairment scale: 
Percentage of Impairment CH 
 
0% 
 CI 
 
1-19% CJ 
 
20-39% CK 
 
40-59% CL 
 
60-79% CM 
 
80-99% CN  
 
100% Timed Score 0-56 10 11-12 13-14 15-16 17-18 19 20  
 
 
< than 10 seconds=Normal  Greater then 13.5 seconds (in elderly)=Increased fall risk Lisa SOTO. Predicting the probability for falls in community dwelling older adults using the Timed Up and Go Test. Phys Ther. 2000;80:896-903. G codes: In compliance with CMSs Claims Based Outcome Reporting, the following G-code set was chosen for this patient based on their primary functional limitation being treated: The outcome measure chosen to determine the severity of the functional limitation was the TUG with a score of 20 seconds/unable which was correlated with the impairment scale. ? Mobility - Walking and Moving Around:  
  - CURRENT STATUS: CN - 100% impaired, limited or restricted  - GOAL STATUS: CM - 80%-99% impaired, limited or restricted  - D/C STATUS:  ---------------To be determined---------------  
 
  
 
 
Pain: 
Pain Scale 1: Numeric (0 - 10) Pain Intensity 1: 4 Pain Location 1: Knee Pain Orientation 1: Left Pain Description 1: Aching Pain Intervention(s) 1: Repositioned Activity Tolerance:  
Limited by pain Please refer to the flowsheet for vital signs taken during this treatment. After treatment:  
[x]         Patient left in no apparent distress sitting up in chair 
[]         Patient left in no apparent distress in bed 
[x]         Call bell left within reach [x]         Nursing notified 
[]         Caregiver present 
[]         Bed alarm activated COMMUNICATION/EDUCATION:  
The patients plan of care was discussed with: Registered Nurse. [x]         Fall prevention education was provided and the patient/caregiver indicated understanding. [x]         Patient/family have participated as able in goal setting and plan of care. [x]         Patient/family agree to work toward stated goals and plan of care. []         Patient understands intent and goals of therapy, but is neutral about his/her participation. []         Patient is unable to participate in goal setting and plan of care. Thank you for this referral. 
Mini Berman, PT Time Calculation: 18 mins

## 2018-11-18 NOTE — CONSULTS
[address]    Ofelia Vaughan  MR#: [id number]  : 1970  ACCOUNT #: [de-identified]   DATE OF SERVICE: 2018    The patient is currently in room 557    ATTENDING:  Cass De Luna MD.      CHIEF COMPLAINT:  Pain and stiffness in the left knee with drainage from the left knee incision. REASON FOR CONSULTATION:  Possible infection of the left total knee replacement status post left total knee arthroplasty on 10/24/2018. The consultation was requested by Dr. Cass De Luna. The history is  obtained by interview of the patient and review of the medical records. HISTORY OF PRESENT ILLNESS:  This patient is a 59-year-old white female with a history of obesity and previous gastric bypass surgery in ,  degenerative joint disease, hypertension, and hypothyroidism following treatment for Graves' disease. This patient underwent a left total knee arthroplasty on 10/24/2018 by Dr. Cass De Luna. By her recollection she did well postop and went home on 10/27/2018. She began getting physical therapy at home. The patient recalls having physical therapy on 2018 or 2018. The following day her leg felt very stiff. On 2018 she had spontaneous drainage from the inferior aspect of the incision. She describes brownish-colored fluid. The patient was seen in the office by Dr. Hermilo Coates on 11/15/2018. He was concerned by the appearance and scheduled her for surgical debridement of the knee. She was admitted to the hospital on 2018. By then, the proximal aspect of the incision had also opened. The patient underwent surgical debridement of the left total knee replacement with poly exchange on 2018. The op note is not yet available for full review of the events of the procedure. The patient is now resting comfortably postop and has no new symptoms.   She does recall having a temperature of 100.4 on 2018 but does not recall any other fevers at home. Of note, the patient has a history of positive nasal swab for MRSA. Tobacco:  Patient smoked up to a pack per day and smoked for about 5 years before she quit in 1994. Alcohol:  None. MEDICATIONS PRIOR TO ADMISSION:  1. Acyclovir 400 mg p.o. q. 4 hours p.r.n. fever blisters. 2.  ProAir inhaler 1 inhalation every q. 4 hours for wheezing. 3.  Beano 150 units 2 p.o. daily for flatulence. 4.  Aspirin 81 mg p.o. b.i.d.  5.  Baclofen 10 mg p.r.n.  6.  Dual Action 3.5%. 7.  Zyrtec 10 mg p.o. daily. 8.  Vitamin D 5000 units p.o. daily. 9.  Diclofenac EC 50 mg p.o. b.i.d. 10.  Valproex (Depakote) 250 mg 1 tablet in the morning and 2 in the evening. 11.  Pepcid 20 mg p.o. b.i.d.  12.  Lasix 20 mg p.r.n. swelling. 13.  Hydrochlorothiazide 25 mg p.o. daily. 14.  Prevacid 15 mg p.o. daily. 15.  Synthroid 0.1 mg p.o. daily. 16.  Vyvanse 70 mg p.o. b.i.d. 17.  Losartan 100 mg p.o. daily. 18.  Multivitamin. 19.  Oxycodone IR 15 mg p.o. q. 4 hours p.r.n. pain. 20.  KCl 10 mEq p.o. daily. 21.  Simethicone. 22.  Tapentadol (Nucynta) 250 mg p.o. b.i.d.  23.  Trazodone 100 mg p.o. nightly. 24.  Trintellix 10 mg, she takes 30 mg a day. ALLERGIES:  1. PENICILLIN: SHE STATES THAT SHE HAD A RASH FROM PENICILLIN MANY YEARS AGO BUT CAN TAKE CEPHALOSPORINS WITHOUT DIFFICULTY. 2.  SULFA:  HIVES AND SIGNIFICANT PRURITUS. ILLNESSES:    1. Degenerative joint disease. 2.  History of nonunion of the right clavicle. 3.  Hypertension. 4.  Hypothyroidism following treatment for Grave's disease. 5.  Depression. SURGERIES:  1. Gastric bypass surgery in 2001.  2.  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   3.  Left total knee arthroplasty on 10/24/2018.  4.  Surgical I and D and poly exchange of the left total knee replacement on 11/16/2018.  5.  Surgery for nonunion of the right clavicle x3.  6.  Left ulna shortening. 7.   x2.  8.  Tummy tuck following her gastric bypass surgery. 9.  Surgery for right and left carpal tunnel syndrome. SOCIAL HISTORY:  The patient lives in [place] with her . She has 2 sons who are 15years old and 25years old and both are autistic. FAMILY HISTORY:  Her mother  of heart failure at the age of 48. Her father is  but she does not know the cause of his death. She knows that he did have hypertension and diabetes. She states that both grandmothers had DVT. Her sister also had Grave's disease. REVIEW OF SYSTEMS:  CONSTITUTIONAL:  She did have one temperature  to 100.4 on 2018 but no other fever. HEENT:  No headache, visual change, sore throat. LYMPHATIC:  No history of adenopathy. DERMATOLOGIC:  No recent rashes. RESPIRATORY:  No cough, pleuritic chest pain, hemoptysis. CARDIOVASCULAR:  No chest pain or history of heart murmurs. GASTROINTESTINAL:  No dysphagia, odynophagia, nausea, vomiting, abdominal pain, diarrhea. GENITOURINARY:  No dysuria. MUSCULOSKELETAL:  As in HPI. NEUROLOGIC:  No history of seizure or stroke. PHYSICAL EXAMINATION:    GENERAL:  No acute distress. VITAL SIGNS:   Blood pressure is 96/60, heart rate 93, respiratory rate 18. She has been afebrile. Weight 191 pounds, height 5 feet 2 inches. HEENT:  Sclerae and conjunctivae benign. EOMI. Oropharynx unremarkable. NECK:  Supple. NODES:  No adenopathy. SKIN:  No rashes. LUNGS:  Clear. HEART:  Regular rate and rhythm with no murmurs. ABDOMEN:  Soft, nontender. No masses are felt. Bowel sounds are present. BACK:  No CVA tenderness. EXTREMITIES:  Right leg: There is hammertoe deformity, right 2nd toe. Left leg: There is a fresh surgical dressing on the left knee and this was not removed. There is hammertoe deformity of the left 2nd toe as well. Pedal pulses are palpable.   MUSCULOSKELETAL:  Muscles nontender and other joints unremarkable. NEUROLOGIC:  Alert and oriented. LABORATORY DATA:  CBC reveals a hemoglobin of 8.6 but no other information is available. Chemistry data reveals BUN 11, creatinine 0.61. Microbiology data:  Nasal swab was positive for MRSA on 10/08/2018. Gram stain of the left knee synovial fluid revealed no white cells and rare gram-positive cocci but the culture has moderate growth of MRSA by preliminary report. Anaerobic cultures pending. ASSESSMENT AND PLAN:  1. Early postop Methicillin-resistant Staphylococcus aureus infection of the left total knee replacement with surgical incision and drainage and poly exchange on 11/16/2018: The left total knee arthroplasty was originally done on 10/24/2018. This represents a difficult problem. She has an early postop infection of the prosthetic left knee with an aggressive organism. She is not systemically ill. Hopefully she will respond to surgical debridement, poly exchange and antibiotics. If not she would need to consider resection arthroplasty. I will treat her with vancomycin for now. In a few days we will consider adding rifampin unless potential drug interactions preclude its safe use. 2.  Degenerative joint disease. 3.  Hypertension. 4.  Hypothyroidism status post treatment for Grave's disease. 5.  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 PLAN:  1. Resume IV vancomycin. 2.  I will consider adding rifampin in a few days unless potential drug interactions preclude its use. Thank you very much for letting us see this patient with you.       MD ALAN Smith /   D: 11/17/2018 14:49     T: 11/17/2018 20:24  JOB #: [id number]  CC: Hanna Jacome MD  CC: Yani Sen MD

## 2018-11-18 NOTE — PROGRESS NOTES
Orders received, chart reviewed and patient evaluated by occupational therapy. Recommend patient to discharge with [address] [place] [address] pending progression with skilled acute occupational therapy and pain management. Recommend with nursing patient to complete as able in order to maintain strength, endurance and independence: OOB to chair 3x/day, ADLs with supervision/setup and mobilizing to the bathroom for toileting with 2 assist (with RW, 2nd for lines). Thank you for your assistance. Full evaluation to follow. Steve Morales MS, OTR/L

## 2018-11-18 NOTE — PROGRESS NOTES
Pt has vanc due and blood running.  Three RNs have tried to get another IV.  
 
1520-Paged IV team.

## 2018-11-18 NOTE — PROGRESS NOTES
Problem: Self Care Deficits Care Plan (Adult) Goal: *Acute Goals and Plan of Care (Insert Text) Occupational Therapy Goals Initiated 11/18/2018 1. Patient will perform grooming with independence within 7 day(s). 2.  Patient will perform upper body ADLs with independence within 7 day(s). 3.  Patient will perform lower body ADLs using AE PRN with modified independence within 7 day(s). 4.  Patient will perform toilet transfers with modified independence within 7 day(s). 5.  Patient will perform all aspects of toileting with modified independence within 7 day(s). 6.  Patient will participate in upper extremity therapeutic exercise/activities with independence for 5 minutes within 7 day(s). 7.  Patient will utilize energy conservation techniques during functional activities with verbal cues within 7 day(s). Occupational Therapy EVALUATION Patient: Leydi Mckinley (47 y.o. female) Date: 11/18/2018 Primary Diagnosis: status post left knee replacement Dehiscence of incision Dehiscence of incision Procedure(s) (LRB): 
INCISION AND DRAINAGE AND POLY EXCHANGE LEFT KNEE (Left) 2 Days Post-Op Precautions:  Fall, WBAT(knee immobilizer, no flexion) ASSESSMENT : 
Based on the objective data described below, the patient presents with overall min A for bed mobility, min A for functional mobility with RW, s/u-IND for upper body ADLs and min- mod A for lower body ADLs s/p I&D of L knee POD 2. Patient with hx of L TKA 3 weeks ago. Today, patient ADLs limited by pain, limited ROm in LLE, anxiety, and generalized weakness. Anticipate with medical/pain management and continues work with rehab, patient will be able to return home with assist from family and [address] [place] [address].   
 
Recommend with nursing patient to complete as able in order to maintain strength, endurance and independence: ADLs with supervision/setup, OOB to chair 3x/day and mobilizing to the bathroom for toileting with 1 assist (RW and gait belt). Thank you for your assistance. Patient will benefit from skilled intervention to address the above impairments. Patients rehabilitation potential is considered to be Good Factors which may influence rehabilitation potential include:  
[]             None noted []             Mental ability/status []             Medical condition []             Home/family situation and support systems []             Safety awareness []             Pain tolerance/management 
[]             Other: PLAN : 
Recommendations and Planned Interventions: 
[x]               Self Care Training                  [x]        Therapeutic Activities [x]               Functional Mobility Training    []        Cognitive Retraining 
[x]               Therapeutic Exercises           [x]        Endurance Activities [x]               Balance Training                   []        Neuromuscular Re-Education []               Visual/Perceptual Training     [x]   Home Safety Training 
[x]               Patient Education                 [x]        Family Training/Education []               Other (comment): Frequency/Duration: Patient will be followed by occupational therapy 5 times a week to address goals. Discharge Recommendations: Home Health Further Equipment Recommendations for Discharge: none SUBJECTIVE:  
Patient stated It just hurts so bad, I can't just sit here.  OBJECTIVE DATA SUMMARY:  
HISTORY:  
Past Medical History:  
Diagnosis Date  ADHD (attention deficit hyperactivity disorder)  Arthritis OSTEO  Chronic pain  GERD (gastroesophageal reflux disease)  Graves disease NO LONGER AN ISSUE  
 Hematuria 10/9/2018  
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   
 depression  Seizure ([address]  Seizures ([facility] [address] 2003 & 2004 REACTIVE TO HYPOGLYCEMIA / ALSO FROM FLASHING LIGHTS Past Surgical History:  
Procedure Laterality Date  DELIVERY     
 HX ABDOMINOPLASTY   [address]  HX  SECTION  2006  HX GASTRIC BYPASS    
  [address] CHOLEYCYSTECTOMY  HX ORTHOPAEDIC Right CARPEL TUNNEL  
 HX ORTHOPAEDIC Left CARPEL TUNNEL  
 HX ORTHOPAEDIC Left ULNA SHORTENING  
 HX ORTHOPAEDIC Right  [address] [facility] TONSILLECTOMY  9363 Prior Level of Function/Environment/Context: Patient lives in 2 level home with  and 2 sons (both with Autism). Patient was requiring min-mod A PTA from  and has all needed DME. Home Situation Home Environment: Private residence # Steps to Enter: 5 Rails to Enter: Yes Hand Rails : Bilateral(too wide to reach both) One/Two Story Residence: Two story # of Interior Steps: 15 Interior Rails: Left Living Alone: No 
Support Systems: Spouse/Significant Other/Partner, Family member(s) Current DME Used/Available at Home: Shower chair, [employer], Walker, rollator, Wheelchair, Rocio , Maximino Minerva aides, Adaptive bathing aides Tub or Shower Type: Tub/Shower combination Hand dominance: RightEXAMINATION OF PERFORMANCE DEFICITS: 
Cognitive/Behavioral Status: 
Neurologic State: Alert Orientation Level: Oriented X4 Cognition: Appropriate for age attention/concentration; Follows commands Perception: Appears intact Perseveration: No perseveration noted Safety/Judgement: Awareness of environment; Fall prevention Skin: Appears grossly intact Edema: none noted in BUEs Hearing: 
  
Vision/Perceptual:   
Tracking: Able to track stimulus in all quadrants w/o difficulty Diplopia: No   
Acuity: Within Defined Limits Corrective Lenses: Glasses Range of Motion: In BUEs - WFL 
AROM: Generally decreased, functional(except L knee) Strength: In BUEs ( equal 5/5) Strength: Generally decreased, functional(except L knee) Coordination: 
Coordination: Within functional limits Fine Motor Skills-Upper: Left Intact; Right Intact Gross Motor Skills-Upper: Left Intact; Right Intact Tone & Sensation: In [address] Tone: Normal 
Sensation: Intact Balance: 
Sitting: Intact Standing: Impaired; With support Standing - Static: Good Standing - Dynamic : Fair Functional Mobility and Transfers for ADLs:Bed Mobility: 
Rolling: Stand-by assistance Supine to Sit: Minimum assistance(Simultaneous filing. User may not have seen previous data.) Scooting: Minimum assistance(to manage LLE 2* immobilizer) Transfers: 
Sit to Stand: Minimum assistance(Simultaneous filing. User may not have seen previous data.) Stand to Sit: Minimum assistance(Simultaneous filing. User may not have seen previous data.) Bed to Chair: Contact guard assistance;Assist x1 Toilet Transfer : Contact guard assistance;Assist x1(Infer) ADL Assessment: 
Feeding: Independent Oral Facial Hygiene/Grooming: Independent* Bathing: Minimum assistance* Upper Body Dressing: Independent* Lower Body Dressing: Moderate assistance Toileting: Minimum assistance* 
*Infer per obs of functional mobility, functional reach, BUE ROM, strength, balance, anxiety and pain tolerance ADL Intervention and task modifications: 
Feeding Feeding Assistance: Independent Container Management: Independent Cutting Food: Independent Utensil Management: Independent Food to Mouth: Independent Drink to Mouth: Independent Lower Body Dressing Assistance Socks: Moderate assistance(s/u with RLE, total with LLE 28 immobilizer) Leg Crossed Method Used: No 
Position Performed: Long sitting on bed Cues: Don;Physical assistance Cognitive Retraining Safety/Judgement: Awareness of environment; Fall prevention Functional Measure: 
Barthel Index: 
 
Bathin Bladder: 5 Bowels: 10 
Groomin Dressin Feeding: 10 
 Mobility: 10 Stairs: 0 Toilet Use: 5 Transfer (Bed to Chair and Back): 10 Total: 60 Barthel and G-code impairment scale: 
Percentage of impairment CH 
0% CI 
1-19% CJ 
20-39% CK 
40-59% CL 
60-79% CM 
80-99% CN 
100% Barthel Score 0-100 100 99-80 79-60 59-40 20-39 1-19 
 0 Barthel Score 0-20 20 17-19 13-16 9-12 5-8 1-4 0 The Barthel ADL Index: Guidelines 1. The index should be used as a record of what a patient does, not as a record of what a patient could do. 2. The main aim is to establish degree of independence from any help, physical or verbal, however minor and for whatever reason. 3. The need for supervision renders the patient not independent. 4. A patient's performance should be established using the best available evidence. Asking the patient, friends/relatives and nurses are the usual sources, but direct observation and common sense are also important. However direct testing is not needed. 5. Usually the patient's performance over the preceding 24-48 hours is important, but occasionally longer periods will be relevant. 6. Middle categories imply that the patient supplies over 50 per cent of the effort. 7. Use of aids to be independent is allowed. Sangita Quinteros., Barthel, D.W. (6761). Functional evaluation: the Barthel Index. [facility] (14)2. [facility] Scotty, CHRISTENF, Derrick Quintana., [facility] [place], [address] [place] [address] (1999). Measuring the change indisability after inpatient rehabilitation; comparison of the responsiveness of the Barthel Index and Functional [place] Measure. Journal of Neurology, Neurosurgery, and Psychiatry, 66(4), 159-845. Lee Lugo, N.J.A, María Rios,  W.J.M, & Godfrey Rodriguez MLEE ANN. (2004.) Assessment of post-stroke quality of life in cost-effectiveness studies: The usefulness of the Barthel Index and the EuroQoL-5D. [facility], 13, 221-98 G codes: In compliance with CMSs Claims Based Outcome Reporting, the following G-code set was chosen for this patient based on their primary functional limitation being treated: The outcome measure chosen to determine the severity of the functional limitation was the Barthel Index with a score of 60/100 which was correlated with the impairment scale. ? Self Care:  
  - CURRENT STATUS: CJ - 20%-39% impaired, limited or restricted  - GOAL STATUS: CI - 1%-19% impaired, limited or restricted  - D/C STATUS:  ---------------To be determined--------------- Occupational Therapy Evaluation Charge Determination History Examination Decision-Making LOW Complexity : Brief history review  LOW Complexity : 1-3 performance deficits relating to physical, cognitive , or psychosocial skils that result in activity limitations and / or participation restrictions  MEDIUM Complexity : Patient may present with comorbidities that affect occupational performnce. Miniml to moderate modification of tasks or assistance (eg, physical or verbal ) with assesment(s) is necessary to enable patient to complete evaluation Based on the above components, the patient evaluation is determined to be of the following complexity level: LOW Pain: 
Pain Scale 1: Numeric (0 - 10) Pain Intensity 1: 2 Pain Location 1: Knee Pain Orientation 1: Left Pain Description 1: Aching Pain Intervention(s) 1: Repositioned Activity Tolerance:  
Fair, VSS Please refer to the flowsheet for vital signs taken during this treatment. After treatment:  
[x] Patient left in no apparent distress sitting up in chair 
[] Patient left in no apparent distress in bed 
[x] Call bell left within reach [x] Nursing notified 
[] Caregiver present 
[] Bed alarm activated COMMUNICATION/EDUCATION:  
The patients plan of care was discussed with: Physical Therapist and Registered Nurse. [x] Home safety education was provided and the patient/caregiver indicated understanding. [x] Patient/family have participated as able in goal setting and plan of care. [] Patient/family agree to work toward stated goals and plan of care. [] Patient understands intent and goals of therapy, but is neutral about his/her participation. [] Patient is unable to participate in goal setting and plan of care. This patients plan of care is appropriate for delegation to BEATRICE. Thank you for this referral. 
Charlotte Longoria OT Time Calculation: 20 mins

## 2018-11-19 LAB
ABO + RH BLD: NORMAL
ANION GAP SERPL CALC-SCNC: 6 MMOL/L (ref 5–15)
BLD PROD TYP BPU: NORMAL
BLD PROD TYP BPU: NORMAL
BLOOD GROUP ANTIBODIES SERPL: NORMAL
BPU ID: NORMAL
BPU ID: NORMAL
BUN SERPL-MCNC: 8 MG/DL (ref 6–20)
BUN/CREAT SERPL: 12 (ref 12–20)
CALCIUM SERPL-MCNC: 8.3 MG/DL (ref 8.5–10.1)
CHLORIDE SERPL-SCNC: 108 MMOL/L (ref 97–108)
CO2 SERPL-SCNC: 28 MMOL/L (ref 21–32)
CREAT SERPL-MCNC: 0.68 MG/DL (ref 0.55–1.02)
CROSSMATCH RESULT,%XM: NORMAL
CROSSMATCH RESULT,%XM: NORMAL
CRP SERPL-MCNC: 7.22 MG/DL (ref 0–0.6)
DATE LAST DOSE: ABNORMAL
ERYTHROCYTE [SEDIMENTATION RATE] IN BLOOD: 52 MM/HR (ref 0–20)
GLUCOSE SERPL-MCNC: 80 MG/DL (ref 65–100)
HCT VFR BLD AUTO: 29.1 % (ref 35–47)
HGB BLD-MCNC: 8.9 G/DL (ref 11.5–16)
HGB BLD-MCNC: 9.3 G/DL (ref 11.5–16)
POTASSIUM SERPL-SCNC: 4 MMOL/L (ref 3.5–5.1)
REPORTED DOSE,DOSE: ABNORMAL UNITS
REPORTED DOSE/TIME,TMG: ABNORMAL
SODIUM SERPL-SCNC: 142 MMOL/L (ref 136–145)
SPECIMEN EXP DATE BLD: NORMAL
STATUS OF UNIT,%ST: NORMAL
STATUS OF UNIT,%ST: NORMAL
UNIT DIVISION, %UDIV: 0
UNIT DIVISION, %UDIV: 0
VANCOMYCIN TROUGH SERPL-MCNC: 19.3 UG/ML (ref 5–10)

## 2018-11-19 PROCEDURE — 36415 COLL VENOUS BLD VENIPUNCTURE: CPT

## 2018-11-19 PROCEDURE — 85652 RBC SED RATE AUTOMATED: CPT

## 2018-11-19 PROCEDURE — 74011250636 HC RX REV CODE- 250/636: Performed by: ORTHOPAEDIC SURGERY

## 2018-11-19 PROCEDURE — 97535 SELF CARE MNGMENT TRAINING: CPT | Performed by: OCCUPATIONAL THERAPIST

## 2018-11-19 PROCEDURE — 74011250636 HC RX REV CODE- 250/636: Performed by: INTERNAL MEDICINE

## 2018-11-19 PROCEDURE — 80048 BASIC METABOLIC PNL TOTAL CA: CPT

## 2018-11-19 PROCEDURE — 80202 ASSAY OF VANCOMYCIN: CPT

## 2018-11-19 PROCEDURE — 85014 HEMATOCRIT: CPT

## 2018-11-19 PROCEDURE — 86140 C-REACTIVE PROTEIN: CPT

## 2018-11-19 PROCEDURE — 65270000029 HC RM PRIVATE

## 2018-11-19 PROCEDURE — 74011250637 HC RX REV CODE- 250/637: Performed by: ORTHOPAEDIC SURGERY

## 2018-11-19 RX ORDER — VANCOMYCIN/0.9 % SOD CHLORIDE 1.5G/250ML
1500 PLASTIC BAG, INJECTION (ML) INTRAVENOUS EVERY 12 HOURS
Status: DISCONTINUED | OUTPATIENT
Start: 2018-11-19 | End: 2018-11-24 | Stop reason: HOSPADM

## 2018-11-19 RX ADMIN — ASPIRIN 81 MG: 81 TABLET, COATED ORAL at 18:33

## 2018-11-19 RX ADMIN — OXYCODONE HYDROCHLORIDE 15 MG: 5 TABLET ORAL at 05:10

## 2018-11-19 RX ADMIN — LOSARTAN POTASSIUM 100 MG: 50 TABLET, FILM COATED ORAL at 08:30

## 2018-11-19 RX ADMIN — FUROSEMIDE 20 MG: 20 TABLET ORAL at 08:31

## 2018-11-19 RX ADMIN — FAMOTIDINE 20 MG: 20 TABLET ORAL at 18:33

## 2018-11-19 RX ADMIN — VANCOMYCIN HYDROCHLORIDE 1500 MG: 10 INJECTION, POWDER, LYOPHILIZED, FOR SOLUTION INTRAVENOUS at 23:20

## 2018-11-19 RX ADMIN — VANCOMYCIN HYDROCHLORIDE 1000 MG: 1 INJECTION, POWDER, LYOPHILIZED, FOR SOLUTION INTRAVENOUS at 01:04

## 2018-11-19 RX ADMIN — OXYCODONE HYDROCHLORIDE 15 MG: 5 TABLET ORAL at 15:44

## 2018-11-19 RX ADMIN — OXYCODONE HYDROCHLORIDE 15 MG: 5 TABLET ORAL at 01:01

## 2018-11-19 RX ADMIN — DIVALPROEX SODIUM 500 MG: 250 TABLET, DELAYED RELEASE ORAL at 21:41

## 2018-11-19 RX ADMIN — POTASSIUM CHLORIDE 10 MEQ: 750 TABLET, EXTENDED RELEASE ORAL at 08:31

## 2018-11-19 RX ADMIN — LEVOTHYROXINE SODIUM 100 MCG: 100 TABLET ORAL at 06:51

## 2018-11-19 RX ADMIN — TRAZODONE HYDROCHLORIDE 100 MG: 100 TABLET ORAL at 21:41

## 2018-11-19 RX ADMIN — FAMOTIDINE 20 MG: 20 TABLET ORAL at 08:30

## 2018-11-19 RX ADMIN — ACETAMINOPHEN 650 MG: 325 TABLET ORAL at 06:51

## 2018-11-19 RX ADMIN — DICLOFENAC SODIUM 50 MG: 50 TABLET, DELAYED RELEASE ORAL at 18:33

## 2018-11-19 RX ADMIN — SENNOSIDES AND DOCUSATE SODIUM 1 TABLET: 8.6; 5 TABLET ORAL at 08:30

## 2018-11-19 RX ADMIN — DIVALPROEX SODIUM 250 MG: 250 TABLET, DELAYED RELEASE ORAL at 08:31

## 2018-11-19 RX ADMIN — Medication 10 ML: at 21:42

## 2018-11-19 RX ADMIN — ASPIRIN 81 MG: 81 TABLET, COATED ORAL at 08:31

## 2018-11-19 RX ADMIN — ACETAMINOPHEN 650 MG: 325 TABLET ORAL at 01:01

## 2018-11-19 RX ADMIN — OXYCODONE HYDROCHLORIDE 15 MG: 5 TABLET ORAL at 19:24

## 2018-11-19 RX ADMIN — DICLOFENAC SODIUM 50 MG: 50 TABLET, DELAYED RELEASE ORAL at 08:30

## 2018-11-19 RX ADMIN — PANTOPRAZOLE SODIUM 40 MG: 40 TABLET, DELAYED RELEASE ORAL at 06:51

## 2018-11-19 RX ADMIN — ACETAMINOPHEN 650 MG: 325 TABLET ORAL at 18:32

## 2018-11-19 RX ADMIN — ACETAMINOPHEN 650 MG: 325 TABLET ORAL at 12:09

## 2018-11-19 RX ADMIN — OXYCODONE HYDROCHLORIDE 15 MG: 5 TABLET ORAL at 12:10

## 2018-11-19 RX ADMIN — VANCOMYCIN HYDROCHLORIDE 1000 MG: 1 INJECTION, POWDER, LYOPHILIZED, FOR SOLUTION INTRAVENOUS at 10:16

## 2018-11-19 RX ADMIN — Medication 10 ML: at 05:10

## 2018-11-19 NOTE — PROGRESS NOTES
Problem: Self Care Deficits Care Plan (Adult) Goal: *Acute Goals and Plan of Care (Insert Text) Occupational Therapy Goals Initiated 11/18/2018 1. Patient will perform grooming with independence within 7 day(s). 2.  Patient will perform upper body ADLs with independence within 7 day(s). 3.  Patient will perform lower body ADLs using AE PRN with modified independence within 7 day(s). 4.  Patient will perform toilet transfers with modified independence within 7 day(s). 5.  Patient will perform all aspects of toileting with modified independence within 7 day(s). 6.  Patient will participate in upper extremity therapeutic exercise/activities with independence for 5 minutes within 7 day(s). 7.  Patient will utilize energy conservation techniques during functional activities with verbal cues within 7 day(s). Occupational Therapy TREATMENT Patient: Aidan Stafford (31 y.o. female) Date: 11/19/2018 Diagnosis: status post left knee replacement Dehiscence of incision Dehiscence of incision <principal problem not specified> Procedure(s) (LRB): 
INCISION AND DRAINAGE AND POLY EXCHANGE LEFT KNEE (Left) 3 Days Post-Op Precautions: Fall, WBAT(knee immobilizer, no flexion) Chart, occupational therapy assessment, plan of care, and goals were reviewed. ASSESSMENT: 
Pt stated she was exhausted from going to bathroom 3x already this am and declined OOB activity. Educated pt on LE ADLs following her LLE precautions and utilizing energy conservation techniques. Pt demonstrated good understanding. Recommend HH therapy at discharge. Progression toward goals: 
[]          Improving appropriately and progressing toward goals [x]          Improving slowly and progressing toward goals 
[]          Not making progress toward goals and plan of care will be adjusted PLAN: 
Patient continues to benefit from skilled intervention to address the above impairments. Continue treatment per established plan of care. Discharge Recommendations:  Home Health Further Equipment Recommendations for Discharge:  TBD SUBJECTIVE:  
Patient stated I am so tired. They gave me a water pill.  OBJECTIVE DATA SUMMARY:  
Cognitive/Behavioral Status: 
Neurologic State: Alert, Appropriate for age Orientation Level: Oriented X4 Cognition: Appropriate for age attention/concentration, Follows commands Safety/Judgement: Awareness of environment, Fall prevention, Insight into deficitsADL Intervention and Instruction: Lower Body Dressing Assistance Dressing Assistance: Minimum assistance Pants With Elastic Waist: Minimum assistance(to pull over hips) Socks: Supervision/set-up Leg Crossed Method Used: No 
Position Performed: Long sitting on bed;Supine Cues: Don;Physical assistance;Verbal cues provided;Visual cues provided Cognitive Retraining Safety/Judgement: Awareness of environment; Fall prevention; Insight into deficits Bathing: Patient instructed when bathing to not submerge wound in water, stand to shower or sponge bathe, cover wound with plastic and tape to ensure no water reaches bandage/wound without cues. Patient indicated understanding. Dressing joint: Patient instructed and demonstrated to don/doff Left LE first/last verbal cues. Patient instructed and demonstrated to don all clothing while sitting prior to standing, doff all clothing to knees while standing, then sit to doff clothing off from knees to feet in order to facilitate fall prevention, pain management, and energy conservation with Supervision. Dressing joint reach exercise:  To increase independence with lower body dressing, patient instructed and demonstrated to reach down Left LE in a seated position slowly to prevent tearing/shearing until slight pull is felt, hold at end range for 10 seconds, then return to starting upright position with Supervision. Patient instructed to complete three sets of three repetitions each daily. Home safety: Patient instructed on home modifications and safety (raise height of ADL objects, appropriate height of chair surfaces, recliner safety, change of floor surfaces, clear pathways) to increase independence and fall prevention. Patient indicated understanding. Standing: Patient instructed and demonstrated during ADLs to walk up to sink/counter top/surfaces, step into walker to increase safety of joint and fall prevention with Supervision. Patient educated about knee anatomy verbally and educated to avoid rotation of Left LE. Instructed to apply concept to ADLs within the home (no twisting of knee during reaching across body, square off while using objects, slide objects along surfaces). Patient instructed to increase amount of time standing, observe standing position during ADLs in order to increase even weight bearing through bilateral LEs in order to increase independence with ADLs. Goal to be reached 30 days post - op, per orthopedic surgeon or per PT. Patient indicated understanding. Tub transfer: Patient instructed regarding when it is safe to begin transfer into tub (complete stairs with PT, advance exercises with PT high enough to clear tub height). Patient instructed to use the same technique as used with stairs when entering and exiting tub (\"up with the non-surgical, down with the surgical leg\"). Patient indicated understanding. Pain: 
Pain Scale 1: Numeric (0 - 10) Pain Intensity 1: 7 Pain Location 1: Knee Pain Orientation 1: Left Pain Description 1: Throbbing Pain Intervention(s) 1: Medication (see MAR) Activity Tolerance:  
Fair Please refer to the flowsheet for vital signs taken during this treatment. After treatment:  
[] Patient left in no apparent distress sitting up in chair 
[x] Patient left in no apparent distress in bed 
[x] Call bell left within reach [x] Nursing notified 
[] Caregiver present 
[] Bed alarm activated COMMUNICATION/COLLABORATION:  
The patients plan of care was discussed with: Registered Nurse Norris Smith OT Time Calculation: 12 mins

## 2018-11-19 NOTE — PROGRESS NOTES
Bedside and Verbal shift change report given to [place]  (oncoming nurse) by Zoya Diaz (offgoing nurse). Report included the following information SBAR, Kardex, Intake/Output, MAR and Recent Results.

## 2018-11-19 NOTE — PROGRESS NOTES
11/19/2018 Chart reviewed. Noted pt Hgb 6.9. Spoke w/ RN who reported labs to be drawn later this AM for updated Hgb. Will defer therapy this AM until updated Hgb known. Will f/u at later time as able and appropriate. 1457 Chart reviewed. Noted Hgb 9.3 this afternoon. F/u w/ pt for OOB mobility. Pt received in supine w/ HOB elevated. Pt declining activity at this time, reports she is not having a good day and has been crying all night and day (pt teary-eyed while speaking w/ therapist). Pt also reported she is to receive pain meds and may be willing to work w/ PT after receiving. Would f/u at later time as able and appropriate. 1535 F/u w/pt. Pt sleeping as PT entered room. Pt reported she still had not received pain meds while EC. Pt wanting therapy to check back with her tomorrow. RN aware. Gold Hernandez

## 2018-11-19 NOTE — PROGRESS NOTES
Care Management Interventions PCP Verified by CM: Yes 
Palliative Care Criteria Met (RRAT>21 & CHF Dx)?: No 
Mode of Transport at Discharge: Self Transition of Care Consult (CM Consult): Home Health [address]: Yes Physical Therapy Consult: Yes Occupational Therapy Consult: Yes Speech Therapy Consult: No 
Current Support Network: Lives with Spouse Confirm Follow Up Transport: Family Plan discussed with Pt/Family/Caregiver: Yes Freedom of Choice Offered: Yes [employer] Information Provided?: No 
Discharge Location Discharge Placement: Home with home health Reason for Admission:   Dehiscence of Incision of Left Total Knee surgery (10/24) RRAT Score:        11 Plan for utilizing home health:      Yes, [facility], referral sent. She used them with the 10/24 surgery Likelihood of Readmission:  low Transition of Care Plan:       Home Health, ID plan pending This patient is open to [facility].  SKYLA

## 2018-11-19 NOTE — HOME CARE
Patient is open to [facility] for [address] with certification period through 12/20/18. Home care liaison will follow patients progress for Resumption of HH orders and any additional orders placed prior to discharge. Esthela Rodríguez RN Liaison

## 2018-11-19 NOTE — PROGRESS NOTES
Infectious Diseases Progress Note Antibiotic Summary: Ancef   --  (3 doses) Zosyn   --  Vancomycin  -- present Left TKR on 10/24/2018 I&D left TKR with poly exchange on 11/15/2018 Subjective: She feels about the same. Pain in the left knee is the same. No N, V, D. 
 
Objective:  
 
Vitals:  
Visit Vitals /63 Pulse 83 Temp 98.6 °F (37 °C) Resp 16 Ht 5' 2\" (1.575 m) Wt 86.7 kg (191 lb 2.2 oz) SpO2 96% BMI 34.96 kg/m² Tmax:  Temp (24hrs), Av.3 °F (36.8 °C), Min:97.8 °F (36.6 °C), Max:98.8 °F (37.1 °C) Exam:  General appearance: alert, no distress Eyes: sclera and conj WNL Throat: oropharynx benign Back: no CVAT Lungs: clear to auscultation bilaterally Heart: regular rate and rhythm Abdomen: soft, non-tender. Bowel sounds normal. No masses,  no organomegaly Left knee: dressed Skin: no rash Neurologic: A&O IV Lines: peripheral 
 
Labs:   
Recent Labs 18 
0199 18 
0689 18 
0422 18 
0326 WBC  --  8.6  --   --   
HGB 6.9* 6.4*  --  8.6* PLT  --  317  --   --   
BUN  --   --  9 11 CREA  --   --  0.54* 0.61 Intraop culture of left TKR on 2018: 
 Gram stain = no WBCs; rare GPC in pairs Aerobic culture = moderate MRSA Vancomycin MAYCOL = 1 Daptomycin MAYCOL = <=0,5 Rifampin MAYCOL = <=1.0 Bactrim MAYCOL = <=0.5/9.5 Tetracycline MAYCOL = >8 (resistant) Linezolid MAYCOL = 2 Anaerobic culture = no anaerobes Assessment:  
 
1. Early postop MRSA infection of the left TKR with surgical I&D and poly exchange on 2018 -- left total knee arthroplasty originally done on 10/24/2018  
  
2. DJD 
  
3. HTN 
  
4. Hypothyroidism S/P treatment for Graves disease 5. Anemia -- Hbg 6.9 on  -- then got 2 units of pRBCs on  
  
6. 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 Continue Vancomycin 2. Anticipate adding Rifampin in a couple of days unless drug interactions prohibit its use 3.  Baseline ESR and CRP in AM 
 
 
Emily Velasco MD

## 2018-11-19 NOTE — PROGRESS NOTES
Night nurse passed message to day shift nurse  that night nurse did not draw 4 am lab because pt also has 10am vancomycine trough at 10am. Day shift nurse can dralw all lab at same time.

## 2018-11-19 NOTE — PROGRESS NOTES
Day #3 of Vancomycin Indication:   Early postop MRSA infection of left total knee replacement with surgical I&D and poly exchange on 2018 Current regimen:  Vancomycin 1gm IV q8h Abx regimen:  Vancomycin monotherapy ID Following ?: YES Concomitant nephrotoxic drugs (requires more frequent monitoring):  none Frequency of BMP?: daily thru  Recent Labs 18 
4014 18 
3190 18 
0422 18 
0326 WBC  --  8.6  --   --   
CREA 0.68  --  0.54* 0.61  
BUN 8  --  9 11 Est CrCl: >100 ml/min. UO (unmeasured occurrences) Temp (24hrs), Av.3 °F (36.8 °C), Min:97.8 °F (36.6 °C), Max:98.8 °F (37.1 °C) Cultures: 
 left knee fluid positive for moderate MRSA (Vanc MAYCOL=1); negative for anaerobes (final) Goal trough = 15 - 20 mcg/mL Recent trough history: 
 @ 09:49 = 19.3 mcg/ml (drawn almost 9 hours after previous dose; extrapolated trough slightly higher) - [id number] changed to [id number] Plan:  Change to Vancomycin 1500mg IV q12h starting tonight at 10pm.

## 2018-11-20 LAB
ANION GAP SERPL CALC-SCNC: 8 MMOL/L (ref 5–15)
BUN SERPL-MCNC: 8 MG/DL (ref 6–20)
BUN/CREAT SERPL: 16 (ref 12–20)
CALCIUM SERPL-MCNC: 7.9 MG/DL (ref 8.5–10.1)
CHLORIDE SERPL-SCNC: 108 MMOL/L (ref 97–108)
CO2 SERPL-SCNC: 28 MMOL/L (ref 21–32)
CREAT SERPL-MCNC: 0.5 MG/DL (ref 0.55–1.02)
GLUCOSE SERPL-MCNC: 89 MG/DL (ref 65–100)
POTASSIUM SERPL-SCNC: 4 MMOL/L (ref 3.5–5.1)
SODIUM SERPL-SCNC: 144 MMOL/L (ref 136–145)

## 2018-11-20 PROCEDURE — 97116 GAIT TRAINING THERAPY: CPT

## 2018-11-20 PROCEDURE — 77030038269 HC DRN EXT URIN PURWCK BARD -A

## 2018-11-20 PROCEDURE — 80048 BASIC METABOLIC PNL TOTAL CA: CPT

## 2018-11-20 PROCEDURE — 65270000029 HC RM PRIVATE

## 2018-11-20 PROCEDURE — 74011250636 HC RX REV CODE- 250/636: Performed by: ORTHOPAEDIC SURGERY

## 2018-11-20 PROCEDURE — 36415 COLL VENOUS BLD VENIPUNCTURE: CPT

## 2018-11-20 PROCEDURE — 74011250637 HC RX REV CODE- 250/637: Performed by: ORTHOPAEDIC SURGERY

## 2018-11-20 RX ADMIN — ASPIRIN 81 MG: 81 TABLET, COATED ORAL at 09:16

## 2018-11-20 RX ADMIN — PANTOPRAZOLE SODIUM 40 MG: 40 TABLET, DELAYED RELEASE ORAL at 06:55

## 2018-11-20 RX ADMIN — ACETAMINOPHEN 650 MG: 325 TABLET ORAL at 12:16

## 2018-11-20 RX ADMIN — VANCOMYCIN HYDROCHLORIDE 1500 MG: 10 INJECTION, POWDER, LYOPHILIZED, FOR SOLUTION INTRAVENOUS at 11:26

## 2018-11-20 RX ADMIN — TRAZODONE HYDROCHLORIDE 100 MG: 100 TABLET ORAL at 21:17

## 2018-11-20 RX ADMIN — OXYCODONE HYDROCHLORIDE 15 MG: 5 TABLET ORAL at 12:16

## 2018-11-20 RX ADMIN — ACETAMINOPHEN 650 MG: 325 TABLET ORAL at 06:55

## 2018-11-20 RX ADMIN — ACETAMINOPHEN 650 MG: 325 TABLET ORAL at 00:12

## 2018-11-20 RX ADMIN — OXYCODONE HYDROCHLORIDE 15 MG: 5 TABLET ORAL at 06:55

## 2018-11-20 RX ADMIN — OXYCODONE HYDROCHLORIDE 15 MG: 5 TABLET ORAL at 00:18

## 2018-11-20 RX ADMIN — POTASSIUM CHLORIDE 10 MEQ: 750 TABLET, EXTENDED RELEASE ORAL at 09:15

## 2018-11-20 RX ADMIN — LOSARTAN POTASSIUM 100 MG: 50 TABLET, FILM COATED ORAL at 09:15

## 2018-11-20 RX ADMIN — DIVALPROEX SODIUM 500 MG: 250 TABLET, DELAYED RELEASE ORAL at 20:13

## 2018-11-20 RX ADMIN — OXYCODONE HYDROCHLORIDE 15 MG: 5 TABLET ORAL at 20:13

## 2018-11-20 RX ADMIN — VANCOMYCIN HYDROCHLORIDE 1500 MG: 10 INJECTION, POWDER, LYOPHILIZED, FOR SOLUTION INTRAVENOUS at 22:27

## 2018-11-20 RX ADMIN — DIVALPROEX SODIUM 250 MG: 250 TABLET, DELAYED RELEASE ORAL at 09:15

## 2018-11-20 RX ADMIN — DICLOFENAC SODIUM 50 MG: 50 TABLET, DELAYED RELEASE ORAL at 09:16

## 2018-11-20 RX ADMIN — LEVOTHYROXINE SODIUM 100 MCG: 100 TABLET ORAL at 06:55

## 2018-11-20 RX ADMIN — FAMOTIDINE 20 MG: 20 TABLET ORAL at 09:16

## 2018-11-20 RX ADMIN — OXYCODONE HYDROCHLORIDE 15 MG: 5 TABLET ORAL at 16:18

## 2018-11-20 RX ADMIN — ASPIRIN 81 MG: 81 TABLET, COATED ORAL at 17:25

## 2018-11-20 RX ADMIN — FAMOTIDINE 20 MG: 20 TABLET ORAL at 17:25

## 2018-11-20 RX ADMIN — DICLOFENAC SODIUM 50 MG: 50 TABLET, DELAYED RELEASE ORAL at 17:25

## 2018-11-20 NOTE — PROGRESS NOTES
Problem: Mobility Impaired (Adult and Pediatric) Goal: *Acute Goals and Plan of Care (Insert Text) Physical Therapy Goals Initiated 11/18/2018 1. Patient will move from supine to sit and sit to supine , scoot up and down and roll side to side in bed with modified independence within 4 days. 2. Patient will perform sit to stand with modified independence within 4 days. 3. Patient will ambulate with modified independence for 150 feet with the least restrictive device within 4 days. 4. Patient will ascend/descend 13 stairs with 1 handrail(s) with modified independence within 4 days. 5. Patient will perform home exercise program per protocol with modified independence within 4 days. Outcome: Progressing Towards Goal 
physical Therapy TREATMENT Patient: Talisha Pagan (24 y.o. female) Date: 11/20/2018 Diagnosis: status post left knee replacement Dehiscence of incision Dehiscence of incision <principal problem not specified> Procedure(s) (LRB): 
INCISION AND DRAINAGE AND POLY EXCHANGE LEFT KNEE (Left) 4 Days Post-Op Precautions: Fall, WBAT(knee immobilizer, no flexion) Chart, physical therapy assessment, plan of care and goals were reviewed. ASSESSMENT: 
Pt received in bed. Reports clean dressing has been applied, knee immobilizer in place and secure. Pt able to come to EOB from semi-supine w/ Mod I and able to stand w/ CGA. Pt completed approx 150Ft of gait using RW (CGA) while demonstrating antalgic,step to gait. Pt declined sitting up in chair and preferred to return to bed to \"allow back of her leg to stretch it out\" (reports tightness calf and back of knee). All items in reach and met. Will continue to follow. Zackary Gomez Pt reported she prefers to go to rehab facility prior to returning home as she will have limited assistance from  who works from home and is currently caring for 2 children with Autism. Progression toward goals: [x]      Improving appropriately and progressing toward goals 
[]      Improving slowly and progressing toward goals 
[]      Not making progress toward goals and plan of care will be adjusted PLAN: 
Patient continues to benefit from skilled intervention to address the above impairments. Continue treatment per established plan of care. Discharge Recommendations:  Rehab and Home Health Further Equipment Recommendations for Discharge:  None SUBJECTIVE:  
\"I'd really like to go to a facility. It's just too much for my  to work, be with the boys, and take care of me. OBJECTIVE DATA SUMMARY:  
Range of Motion: No flexion per precautions/orders. Functional Mobility Training: 
Bed Mobility: 
  
Supine to Sit: Modified independent Sit to Supine: Modified independent Transfers: 
Sit to Stand: Contact guard assistance Stand to Sit: Contact guard assistance Ambulation/Gait Training: 
Distance (ft): 150 Feet (ft) Assistive Device: Gait belt;Walker, rolling Ambulation - Level of Assistance: Contact guard assistance Gait Abnormalities: Antalgic;Decreased step clearance; Step to gait Base of Support: Widened Stance: Left decreased Speed/Radha: Pace decreased (<100 feet/min) Step Length: Left shortened;Right shortened Swing Pattern: Left asymmetrical 
  
  
  
  
  
  
  
 Therapeutic Exercises:  
Exercises performed per protocol. See morning treatment note for description. Pain: 
Pain Scale 1: Numeric (0 - 10) Pain Intensity 1: 3 Activity Tolerance: WFL's 
Please refer to the flowsheet for vital signs taken during this treatment. After treatment:  
[] Patient left in no apparent distress sitting up in chair 
[x] Patient left in no apparent distress in bed 
[x] Call bell left within reach [x] Nursing notified 
[] Caregiver present 
[] Bed alarm activated COMMUNICATION/COLLABORATION:  
 The patients plan of care was discussed with: Registered Nurse Lance Severs Time Calculation: 15 mins

## 2018-11-20 NOTE — PROGRESS NOTES
Problem: Falls - Risk of 
Goal: *Absence of Falls Document Pramod Nicholson Fall Risk and appropriate interventions in the flowsheet. Outcome: Progressing Towards Goal 
Fall Risk Interventions: 
Mobility Interventions: Patient to call before getting OOB Medication Interventions: Patient to call before getting OOB Elimination Interventions: Patient to call for help with toileting needs

## 2018-11-20 NOTE — PROGRESS NOTES
Bedside and Verbal shift change report given to ROBER Prakash (oncoming nurse) by Clarisa Stockton RN (offgoing nurse). Report included the following information SBAR, Kardex, Procedure Summary, Intake/Output, MAR, Accordion and Recent Results.

## 2018-11-20 NOTE — PROGRESS NOTES
11/20/2018 Chart reviewed. Attempted to see pt this morning for PT, however pt currently w/o dressing to Left knee removed. Pt reported physician removed to allow incision to get some air. Informed RN. Will f/u at later this afternoon for OOB mobility (pt reports she is to receive lunch shortly and waiting on next dose of pain meds).   
 
Dasia Santana, PTA

## 2018-11-20 NOTE — PROGRESS NOTES
Infectious Diseases Progress Note Antibiotic Summary: Ancef   --  (3 doses) Zosyn   --  Vancomycin  -- present Left TKR on 10/24/2018 I&D left TKR with poly exchange on 11/15/2018 Subjective: No new symptoms Objective:  
 
Vitals:  
Visit Vitals /72 (BP 1 Location: Right arm, BP Patient Position: Post activity) Pulse 75 Temp 98.6 °F (37 °C) Resp 16 Ht 5' 2\" (1.575 m) Wt 86.7 kg (191 lb 2.2 oz) SpO2 97% BMI 34.96 kg/m² Tmax:  Temp (24hrs), Av.3 °F (36.8 °C), Min:97.8 °F (36.6 °C), Max:98.6 °F (37 °C) Exam:  General appearance: alert, no distress Lungs: clear to auscultation bilaterally Heart: regular rate and rhythm Abdomen: soft, non-tender. Bowel sounds normal. No masses,  no organomegaly Left knee: dressed Skin: no rash Neurologic: A&O IV Lines: peripheral 
 
Labs:   
Recent Labs 18 
5778 18 
1048 18 
6690 18 
0422 18 
0326 WBC  --   --  8.6  --   --   
HGB 9.3* 6.9* 6.4*  --  8.6* PLT  --   --  317  --   --   
BUN 8  --   --  9 11 CREA 0.68  --   --  0.54* 0.61 Intraop culture of left TKR on 2018: 
 Gram stain = no WBCs; rare GPC in pairs Aerobic culture = moderate MRSA Vancomycin MAYCOL = 1 Daptomycin MAYCOL = <=0,5 Rifampin MAYCOL = <=1.0 Bactrim MAYCOL = <=0.5/9.5 Tetracycline MAYCOL = >8 (resistant) Linezolid MAYCOL = 2 Anaerobic culture = no anaerobes Assessment:  
 
1. Early postop MRSA infection of the left TKR with surgical I&D and poly exchange on 2018 -- left total knee arthroplasty originally done on 10/24/2018  
  
2. DJD 
  
3. HTN 
  
4. Hypothyroidism S/P treatment for Graves disease 5. Anemia -- Hbg 6.9 on  -- then got 2 units of pRBCs on  
  
6. 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 Continue Vancomycin 2. Anticipate adding Rifampin in a couple of days unless drug interactions prohibit its use Ethan Guerra MD

## 2018-11-20 NOTE — PROGRESS NOTES
Spoke with pharmacy regarding Trintellix prescription, questioned patient regarding if she thinks she needs the medication and if her family member is able to bring in her medication from home. Patient stated she \"doesn't need the medication since she is going home in a few days\". Patient states her family member most likely will not be able to come to the hospital until she is discharged.

## 2018-11-20 NOTE — PROGRESS NOTES
Bedside and Verbal shift change report given to Jaylan Carbajal RN (oncoming nurse) by Sally Kapoor RN (offgoing nurse). Report included the following information SBAR, Kardex, MAR and Recent Results.

## 2018-11-21 LAB
ANION GAP SERPL CALC-SCNC: 7 MMOL/L (ref 5–15)
BUN SERPL-MCNC: 7 MG/DL (ref 6–20)
BUN/CREAT SERPL: 11 (ref 12–20)
CALCIUM SERPL-MCNC: 7.8 MG/DL (ref 8.5–10.1)
CHLORIDE SERPL-SCNC: 108 MMOL/L (ref 97–108)
CO2 SERPL-SCNC: 28 MMOL/L (ref 21–32)
CREAT SERPL-MCNC: 0.64 MG/DL (ref 0.55–1.02)
DATE LAST DOSE: ABNORMAL
GLUCOSE SERPL-MCNC: 85 MG/DL (ref 65–100)
POTASSIUM SERPL-SCNC: 4.3 MMOL/L (ref 3.5–5.1)
REPORTED DOSE,DOSE: ABNORMAL UNITS
REPORTED DOSE/TIME,TMG: ABNORMAL
SODIUM SERPL-SCNC: 143 MMOL/L (ref 136–145)
VANCOMYCIN TROUGH SERPL-MCNC: 17.4 UG/ML (ref 5–10)

## 2018-11-21 PROCEDURE — 36569 INSJ PICC 5 YR+ W/O IMAGING: CPT | Performed by: ORTHOPAEDIC SURGERY

## 2018-11-21 PROCEDURE — C1751 CATH, INF, PER/CENT/MIDLINE: HCPCS

## 2018-11-21 PROCEDURE — 97535 SELF CARE MNGMENT TRAINING: CPT

## 2018-11-21 PROCEDURE — 77030018719 HC DRSG PTCH ANTIMIC J&J -A

## 2018-11-21 PROCEDURE — 76937 US GUIDE VASCULAR ACCESS: CPT

## 2018-11-21 PROCEDURE — 80048 BASIC METABOLIC PNL TOTAL CA: CPT

## 2018-11-21 PROCEDURE — 65270000029 HC RM PRIVATE

## 2018-11-21 PROCEDURE — 02HV33Z INSERTION OF INFUSION DEVICE INTO SUPERIOR VENA CAVA, PERCUTANEOUS APPROACH: ICD-10-PCS | Performed by: ORTHOPAEDIC SURGERY

## 2018-11-21 PROCEDURE — 77030038269 HC DRN EXT URIN PURWCK BARD -A

## 2018-11-21 PROCEDURE — 74011250636 HC RX REV CODE- 250/636: Performed by: ORTHOPAEDIC SURGERY

## 2018-11-21 PROCEDURE — 80202 ASSAY OF VANCOMYCIN: CPT

## 2018-11-21 PROCEDURE — 36415 COLL VENOUS BLD VENIPUNCTURE: CPT

## 2018-11-21 PROCEDURE — 77030020365 HC SOL INJ SOD CL 0.9% 50ML

## 2018-11-21 PROCEDURE — 74011250637 HC RX REV CODE- 250/637: Performed by: ORTHOPAEDIC SURGERY

## 2018-11-21 PROCEDURE — 97116 GAIT TRAINING THERAPY: CPT

## 2018-11-21 RX ORDER — FLUCONAZOLE 100 MG/1
150 TABLET ORAL DAILY
Status: COMPLETED | OUTPATIENT
Start: 2018-11-21 | End: 2018-11-23

## 2018-11-21 RX ORDER — SODIUM CHLORIDE 0.9 % (FLUSH) 0.9 %
10 SYRINGE (ML) INJECTION EVERY 24 HOURS
Status: DISCONTINUED | OUTPATIENT
Start: 2018-11-21 | End: 2018-11-24 | Stop reason: HOSPADM

## 2018-11-21 RX ORDER — SODIUM CHLORIDE 0.9 % (FLUSH) 0.9 %
10 SYRINGE (ML) INJECTION EVERY 8 HOURS
Status: DISCONTINUED | OUTPATIENT
Start: 2018-11-21 | End: 2018-11-24 | Stop reason: HOSPADM

## 2018-11-21 RX ORDER — RIFAMPIN 300 MG/1
300 CAPSULE ORAL EVERY 12 HOURS
Status: DISCONTINUED | OUTPATIENT
Start: 2018-11-22 | End: 2018-11-24 | Stop reason: HOSPADM

## 2018-11-21 RX ORDER — SODIUM CHLORIDE 0.9 % (FLUSH) 0.9 %
20 SYRINGE (ML) INJECTION AS NEEDED
Status: DISCONTINUED | OUTPATIENT
Start: 2018-11-21 | End: 2018-11-24 | Stop reason: HOSPADM

## 2018-11-21 RX ORDER — FLUCONAZOLE 100 MG/1
200 TABLET ORAL DAILY
Status: DISCONTINUED | OUTPATIENT
Start: 2018-11-22 | End: 2018-11-21 | Stop reason: DRUGHIGH

## 2018-11-21 RX ORDER — BACITRACIN 500 UNIT/G
1 PACKET (EA) TOPICAL AS NEEDED
Status: DISCONTINUED | OUTPATIENT
Start: 2018-11-21 | End: 2018-11-24 | Stop reason: HOSPADM

## 2018-11-21 RX ORDER — SODIUM CHLORIDE 0.9 % (FLUSH) 0.9 %
10 SYRINGE (ML) INJECTION AS NEEDED
Status: DISCONTINUED | OUTPATIENT
Start: 2018-11-21 | End: 2018-11-24 | Stop reason: HOSPADM

## 2018-11-21 RX ADMIN — Medication 10 ML: at 18:57

## 2018-11-21 RX ADMIN — Medication 10 ML: at 21:00

## 2018-11-21 RX ADMIN — OXYCODONE HYDROCHLORIDE 15 MG: 5 TABLET ORAL at 13:13

## 2018-11-21 RX ADMIN — DIVALPROEX SODIUM 500 MG: 250 TABLET, DELAYED RELEASE ORAL at 20:59

## 2018-11-21 RX ADMIN — DICLOFENAC SODIUM 50 MG: 50 TABLET, DELAYED RELEASE ORAL at 08:48

## 2018-11-21 RX ADMIN — LEVOTHYROXINE SODIUM 100 MCG: 100 TABLET ORAL at 06:18

## 2018-11-21 RX ADMIN — VANCOMYCIN HYDROCHLORIDE 1500 MG: 10 INJECTION, POWDER, LYOPHILIZED, FOR SOLUTION INTRAVENOUS at 23:01

## 2018-11-21 RX ADMIN — FAMOTIDINE 20 MG: 20 TABLET ORAL at 17:00

## 2018-11-21 RX ADMIN — OXYCODONE HYDROCHLORIDE 15 MG: 5 TABLET ORAL at 04:48

## 2018-11-21 RX ADMIN — LOSARTAN POTASSIUM 100 MG: 50 TABLET, FILM COATED ORAL at 08:48

## 2018-11-21 RX ADMIN — OXYCODONE HYDROCHLORIDE 15 MG: 5 TABLET ORAL at 08:48

## 2018-11-21 RX ADMIN — FLUCONAZOLE 150 MG: 100 TABLET ORAL at 16:59

## 2018-11-21 RX ADMIN — OXYCODONE HYDROCHLORIDE 15 MG: 5 TABLET ORAL at 20:59

## 2018-11-21 RX ADMIN — FAMOTIDINE 20 MG: 20 TABLET ORAL at 08:49

## 2018-11-21 RX ADMIN — ACETAMINOPHEN 650 MG: 325 TABLET ORAL at 13:13

## 2018-11-21 RX ADMIN — DIVALPROEX SODIUM 250 MG: 250 TABLET, DELAYED RELEASE ORAL at 08:50

## 2018-11-21 RX ADMIN — ACETAMINOPHEN 650 MG: 325 TABLET ORAL at 18:57

## 2018-11-21 RX ADMIN — TRAZODONE HYDROCHLORIDE 50 MG: 100 TABLET ORAL at 21:00

## 2018-11-21 RX ADMIN — ASPIRIN 81 MG: 81 TABLET, COATED ORAL at 17:00

## 2018-11-21 RX ADMIN — ACETAMINOPHEN 650 MG: 325 TABLET ORAL at 06:18

## 2018-11-21 RX ADMIN — VANCOMYCIN HYDROCHLORIDE 1500 MG: 10 INJECTION, POWDER, LYOPHILIZED, FOR SOLUTION INTRAVENOUS at 11:10

## 2018-11-21 RX ADMIN — PANTOPRAZOLE SODIUM 40 MG: 40 TABLET, DELAYED RELEASE ORAL at 06:18

## 2018-11-21 RX ADMIN — DICLOFENAC SODIUM 50 MG: 50 TABLET, DELAYED RELEASE ORAL at 17:00

## 2018-11-21 RX ADMIN — OXYCODONE HYDROCHLORIDE 15 MG: 5 TABLET ORAL at 16:59

## 2018-11-21 RX ADMIN — ACETAMINOPHEN 650 MG: 325 TABLET ORAL at 01:00

## 2018-11-21 RX ADMIN — POTASSIUM CHLORIDE 10 MEQ: 750 TABLET, EXTENDED RELEASE ORAL at 08:49

## 2018-11-21 RX ADMIN — ASPIRIN 81 MG: 81 TABLET, COATED ORAL at 08:50

## 2018-11-21 RX ADMIN — OXYCODONE HYDROCHLORIDE 15 MG: 5 TABLET ORAL at 00:56

## 2018-11-21 RX ADMIN — Medication 10 ML: at 06:00

## 2018-11-21 RX ADMIN — HYDROXYZINE HYDROCHLORIDE 10 MG: 10 TABLET, FILM COATED ORAL at 13:13

## 2018-11-21 NOTE — PROGRESS NOTES
Bedside and Verbal shift change report given to [employer], RN (oncoming nurse) by Dylan Ambrocio RN (offgoing nurse). Report included the following information SBAR, Kardex, MAR and Recent Results.

## 2018-11-21 NOTE — PROGRESS NOTES
Bedside and Verbal shift change report given to Carl ortiz RN (oncoming nurse) by ROBER Osman (offgoing nurse). Report included the following information SBAR, Kardex, OR Summary, Procedure Summary, Intake/Output, MAR and Recent Results.

## 2018-11-21 NOTE — PROGRESS NOTES
Infectious Diseases Progress Note Antibiotic Summary: Ancef   --  (3 doses) Zosyn   --  Vancomycin  -- present Left TKR on 10/24/2018 I&D left TKR with poly exchange on 11/15/2018 Subjective: More comfortable Objective:  
 
Vitals:  
Visit Vitals /75 Pulse 81 Temp 98.1 °F (36.7 °C) Resp 16 Ht 5' 2\" (1.575 m) Wt 86.7 kg (191 lb 2.2 oz) SpO2 95% BMI 34.96 kg/m² Tmax:  Temp (24hrs), Av.1 °F (36.7 °C), Min:97.9 °F (36.6 °C), Max:98.3 °F (36.8 °C) Exam:  General appearance: alert, no distress Lungs: clear to auscultation bilaterally Heart: regular rate and rhythm Abdomen: soft, non-tender. Bowel sounds normal. No masses,  no organomegaly Left knee: dressed Skin: no rash Neurologic: A&O IV Lines: peripheral 
 
Labs:   
Recent Labs  
  18 
0307 18 
0949 18 
0811 18 
0433 18 
0422 WBC  --   --   --  8.6  --   
HGB  --  9.3* 6.9* 6.4*  --   
PLT  --   --   --  317  --   
BUN 8 8  --   --  9  
CREA 0.50* 0.68  --   --  0.54* Intraop culture of left TKR on 2018: 
 Gram stain = no WBCs; rare GPC in pairs Aerobic culture = moderate MRSA Vancomycin MAYCOL = 1 Daptomycin MAYCOL = <=0,5 Rifampin MAYCOL = <=1.0 Bactrim MAYCOL = <=0.5/9.5 Tetracycline MAYCOL = >8 (resistant) Linezolid MAYCOL = 2 Anaerobic culture = no anaerobes Assessment:  
 
1. Early postop MRSA infection of the left TKR with surgical I&D and poly exchange on 2018 -- left total knee arthroplasty originally done on 10/24/2018  
  
2. DJD 
  
3. HTN 
  
4. Hypothyroidism S/P treatment for Graves disease 5. Anemia -- Hbg 6.9 on  -- then got 2 units of pRBCs on  
  
6. 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 Continue Vancomycin 2. Anticipate adding Rifampin in a couple of days unless drug interactions prohibit its use Audrey Olsen MD

## 2018-11-21 NOTE — PROGRESS NOTES
Problem: Mobility Impaired (Adult and Pediatric) Goal: *Acute Goals and Plan of Care (Insert Text) Physical Therapy Goals Initiated 11/18/2018 1. Patient will move from supine to sit and sit to supine , scoot up and down and roll side to side in bed with modified independence within 4 days. 2. Patient will perform sit to stand with modified independence within 4 days. 3. Patient will ambulate with modified independence for 150 feet with the least restrictive device within 4 days. 4. Patient will ascend/descend 13 stairs with 1 handrail(s) with modified independence within 4 days. 5. Patient will perform home exercise program per protocol with modified independence within 4 days. Outcome: Progressing Towards Goal 
physical Therapy TREATMENT Patient: Maye Solis (34 y.o. female) Date: 11/21/2018 Diagnosis: status post left knee replacement Dehiscence of incision Dehiscence of incision <principal problem not specified> Procedure(s) (LRB): 
INCISION AND DRAINAGE AND POLY EXCHANGE LEFT KNEE (Left) 5 Days Post-Op Precautions: Fall, WBAT(knee immobilizer, no flexion) Chart, physical therapy assessment, plan of care and goals were reviewed. ASSESSMENT: 
Pt received amb out of BR using RW w/ RN assist. Pt agreeable to PT. Pt able to improve gait distance this afternoon. Amb approx 300 FT around [facility] station w/ RW. Pt amb w/ knee immobilizer donned however noted mild flexion of L knee. Pt pt declined isometric exercises at this time. Reinforced no flexion restrictions. Pt in bed at sessions end all needs in reach and met. Pt w/ knee immobilizer opened for pressure relief. Informed pt to secure immobilizer prior to OOB mobility. Progression toward goals: 
[x]    Improving appropriately and progressing toward goals 
[]    Improving slowly and progressing toward goals 
[]    Not making progress toward goals and plan of care will be adjusted PLAN: 
 Patient continues to benefit from skilled intervention to address the above impairments. Continue treatment per established plan of care. Discharge Recommendations:  Home Health w/ daily support/assist  
Further Equipment Recommendations for Discharge: Owns DME (owns Rollator) SUBJECTIVE:  
Patient stated It's funny how bitterness can push you. Roland Snellen OBJECTIVE DATA SUMMARY:  
Critical Behavior: 
Neurologic State: Alert Orientation Level: Oriented X4 Cognition: Follows commands, Appropriate for age attention/concentration, Appropriate safety awareness Safety/Judgement: Awareness of environment, Fall prevention, Insight into deficits Functional Mobility Training: 
Bed Mobility: 
  
Supine to Sit: (pt received amb outof BR w/ RN ) Sit to Supine: Modified independent Transfers: 
Sit to Stand: (pt received standing w/ RW and RN) Stand to Sit: Stand-by assistance Balance: 
Sitting: Intact Standing: Impaired; With support Standing - Static: Constant support;Good Standing - Dynamic : FairAmbulation/Gait Training: 
Distance (ft): 300 Feet (ft) Assistive Device: Gait belt;Walker, rolling Ambulation - Level of Assistance: Contact guard assistance Gait Abnormalities: Antalgic;Decreased step clearance;Lurching;Trunk sway increased Base of Support: Widened Stance: Left decreased Speed/Radha: Pace decreased (<100 feet/min) Step Length: Left shortened;Right shortened Swing Pattern: Left asymmetrical 
  
  
  
  
   
   
 
 
 
 
 
 
Pain: 
Pain Scale 1: Numeric (0 - 10) Pain Intensity 1: 5 Pain Location 1: Leg 
Pain Orientation 1: Left Pain Description 1: Aching Pain Intervention(s) 1: Rest;Repositioned Activity Tolerance:  
Fair Please refer to the flowsheet for vital signs taken during this treatment. After treatment:  
[]    Patient left in no apparent distress sitting up in chair 
[x]    Patient left in no apparent distress in bed [x]    Call bell left within reach [x]    Nursing notified 
[]    Caregiver present  
[]    Bed alarm activated COMMUNICATION/COLLABORATION:  
The patients plan of care was discussed with: Registered Nurse Joseph Perry Time Calculation: 21 mins

## 2018-11-21 NOTE — PROGRESS NOTES
Chart reviewed. CRM met with the patient. She feels that she will need to go to a SNF for therapy and IVABX treatment. The patient would like to go to [facility]. The patient stated that her [employer] should be primary and Medicare secondary. ALLI emailed Alecia Quesada with Utilization Management regarding the insurance. CRM also sent referral to [facility] via [address] ID/orders pending. If the [employer] is the primary insurance, the patient will need insurance authorization for SNF admission.  SKYLA

## 2018-11-21 NOTE — PROGRESS NOTES
11/21/2018 Chart reviewed;pt cleared for PT. Attempted to see pt this morning however pt reported she just got back into bed. Pt also reported dermatitis on LLE beginning to itch and asking to pass message on to RN (RN aware). Will f/u w/ pt later this afternoon as able and appropriate.   
 
Dasia Means, PTA

## 2018-11-21 NOTE — PROGRESS NOTES
Bedside shift change report given to Lucinda Nicole RN (oncoming nurse) by Lin De Souza RN (offgoing nurse). Report included the following information SBAR, Kardex and Recent Results.

## 2018-11-21 NOTE — PROGRESS NOTES
Bedside and Verbal shift change report given to Teresa RN (oncoming nurse) by Otto Lyn RN and Maria Elena RN (offgoing nurse). Report included the following information SBAR, Kardex and MAR.

## 2018-11-21 NOTE — PROGRESS NOTES
Feeling anxious about infection, understandable GEN:  NAD. AOx3 ABD:  S/NT/ND  
LLE:  Dressing C/D/I , 5/5 motor, Calf nttp (Bilat), Sensation rossly intact to light touch throughout, 1+ dp/pt pulses, foot perfused Patient Vitals for the past 24 hrs: 
 Temp Pulse Resp BP SpO2  
11/20/18 1901 98.1 °F (36.7 °C) 81 16 148/75   
11/20/18 1440 98.3 °F (36.8 °C) 86 16 131/83 95 % 11/20/18 0820 98.1 °F (36.7 °C) 67 16 157/65 94 % 11/20/18 0240 97.9 °F (36.6 °C) 69 16 107/69 94 % Current Facility-Administered Medications Medication Dose Route Frequency  vancomycin (VANCOCIN) 1500 mg in  ml infusion  1,500 mg IntraVENous Q12H  
 0.9% sodium chloride infusion 250 mL  250 mL IntraVENous PRN  Vancomycin - pharmacy to dose   Other Rx Dosing/Monitoring  acetaminophen (TYLENOL) tablet 650 mg  650 mg Oral Q6H  
 . PHARMACY TO SUBSTITUTE PER PROTOCOL (Reordered from: amphetamine sulfate (EVEKEO) 10 mg tab)    Per Protocol  aspirin delayed-release tablet 81 mg  81 mg Oral BID  
 baclofen (LIORESAL) tablet 10 mg  10 mg Oral Q8H PRN  
 divalproex DR (DEPAKOTE) tablet 250 mg  250 mg Oral DAILY  furosemide (LASIX) tablet 20 mg  20 mg Oral DAILY  hydroCHLOROthiazide (HYDRODIURIL) tablet 25 mg  25 mg Oral DAILY  pantoprazole (PROTONIX) tablet 40 mg  40 mg Oral ACB  levothyroxine (SYNTHROID) tablet 100 mcg  100 mcg Oral ACB  losartan (COZAAR) tablet 100 mg  100 mg Oral DAILY  oxyCODONE IR (ROXICODONE) tablet 15 mg  15 mg Oral Q4H PRN  potassium chloride SR (KLOR-CON 10) tablet 10 mEq  10 mEq Oral DAILY  tapentadol Tb12 250 mg (Patient Supplied)  250 mg Oral BID  traZODone (DESYREL) tablet  mg   mg Oral QHS  . PHARMACY TO SUBSTITUTE PER PROTOCOL (Reordered from: vortioxetine (TRINTELLIX) 10 mg tablet)    Per Protocol  divalproex DR (DEPAKOTE) tablet 500 mg  500 mg Oral QHS  albuterol (PROVENTIL VENTOLIN) nebulizer solution 2.5 mg  2.5 mg Nebulization Q4H PRN  
 sodium chloride (NS) flush 5-10 mL  5-10 mL IntraVENous Q8H  
 sodium chloride (NS) flush 5-10 mL  5-10 mL IntraVENous PRN  
 naloxone (NARCAN) injection 0.4 mg  0.4 mg IntraVENous PRN  
 hydrOXYzine HCl (ATARAX) tablet 10 mg  10 mg Oral Q8H PRN  
 famotidine (PEPCID) tablet 20 mg  20 mg Oral BID  senna-docusate (PERICOLACE) 8.6-50 mg per tablet 1 Tab  1 Tab Oral BID  polyethylene glycol (MIRALAX) packet 17 g  17 g Oral DAILY  bisacodyl (DULCOLAX) suppository 10 mg  10 mg Rectal DAILY PRN  
 diclofenac EC (VOLTAREN) tablet 50 mg  50 mg Oral BID Lab Results Component Value Date/Time HGB 9.3 (L) 11/19/2018 09:49 AM  
 INR 1.1 10/08/2018 03:47 PM  
 
 
Lab Results Component Value Date/Time Sodium 144 11/20/2018 03:07 AM  
 Potassium 4.0 11/20/2018 03:07 AM  
 Chloride 108 11/20/2018 03:07 AM  
 CO2 28 11/20/2018 03:07 AM  
 BUN 8 11/20/2018 03:07 AM  
 Creatinine 0.50 (L) 11/20/2018 03:07 AM  
 Calcium 7.9 (L) 11/20/2018 03:07 AM  
 
 
 
  
50 y.o. female s/p I and D  Left knee with poly exchange on 11/16/2018  . Growing MRSA. IV abx per ID- Vanco 
 
Needs PICC line- ABX: Vanco 
PATHWAY: Straight cath per protocol if needed DVT Prophylaxis: Aspirin 81 mg enteric coated BID Weight Bearing: WBAT LLE in knee immobilizer, no bending Pain Control: Toradol (if Cr normal), Diclofenac to begin the evening of POD 1, Home regimen of 15 mg oxy, dilaudid Disposition: Home, HHPT

## 2018-11-21 NOTE — PROGRESS NOTES
Problem: Self Care Deficits Care Plan (Adult) Goal: *Acute Goals and Plan of Care (Insert Text) Occupational Therapy Goals Initiated 11/18/2018 1. Patient will perform grooming with independence within 7 day(s). 2.  Patient will perform upper body ADLs with independence within 7 day(s). 3.  Patient will perform lower body ADLs using AE PRN with modified independence within 7 day(s). 4.  Patient will perform toilet transfers with modified independence within 7 day(s). 5.  Patient will perform all aspects of toileting with modified independence within 7 day(s). 6.  Patient will participate in upper extremity therapeutic exercise/activities with independence for 5 minutes within 7 day(s). 7.  Patient will utilize energy conservation techniques during functional activities with verbal cues within 7 day(s). Occupational Therapy TREATMENT Patient: Christy Valle (89 y.o. female) Date: 11/21/2018 Diagnosis: status post left knee replacement Dehiscence of incision Dehiscence of incision <principal problem not specified> Procedure(s) (LRB): 
INCISION AND DRAINAGE AND POLY EXCHANGE LEFT KNEE (Left) 5 Days Post-Op Precautions: Fall, WBAT(knee immobilizer, no flexion) Chart, occupational therapy assessment, plan of care, and goals were reviewed. ASSESSMENT: 
Patient received supine in bed, requesting to use bathroom. SBA with RW for all functional mobility, up to [address] [place] [address] for lower body ADLs, and independent for toileting. Patient continues to progress well with acute OT services Recommend with nursing patient to complete as able in order to maintain strength, endurance and independence: ADLs with supervision/setup, OOB to chair 3x/day and mobilizing to the bathroom for toileting with SBA and RW assist. Thank you for your assistance. Patient stating plans for d/c to SNF rehab for IV antibiotics. Will continue to follow. Progression toward goals: [x]       Improving appropriately and progressing toward goals 
[]       Improving slowly and progressing toward goals 
[]       Not making progress toward goals and plan of care will be adjusted PLAN: 
Patient continues to benefit from skilled intervention to address the above impairments. Continue treatment per established plan of care. Discharge Recommendations:  SNF Rehab (patient reporting plans for d/c there for IV antibiotics) Further Equipment Recommendations for Discharge:  TBD SUBJECTIVE:  
Patient stated I really need to pee.  OBJECTIVE DATA SUMMARY:  
Cognitive/Behavioral Status: 
Neurologic State: Alert Orientation Level: Oriented X4 Cognition: Follows commands; Appropriate for age attention/concentration; Appropriate safety awareness Functional Mobility and Transfers for ADLs:Bed Mobility: 
Supine to Sit: (pt received amb outof BR w/ RN ) Sit to Supine: Modified independent Transfers: 
Sit to Stand: (pt received standing w/ RW and RN) Functional Transfers Toilet Transfer : Stand-by assistance Balance: 
Sitting: Intact Standing: Impaired; With support Standing - Static: Constant support;Good Standing - Dynamic : Fair ADL Intervention: Lower Body Dressing Assistance Slip on Shoes with Back: Minimum assistance Toileting Bladder Hygiene: Independent Clothing Management: Independent Pain: 
Pain Scale 1: Numeric (0 - 10) Pain Intensity 1: 5 Pain Location 1: Leg 
Pain Orientation 1: Left Pain Description 1: Aching Pain Intervention(s) 1: Rest;Repositioned Activity Tolerance:  
Good. Please refer to the flowsheet for vital signs taken during this treatment. After treatment:  
[] Patient left in no apparent distress sitting up in chair 
[x] Patient left in no apparent distress in bed 
[x] Call bell left within reach [x] Nursing notified 
[] Caregiver present 
[] Bed alarm activated COMMUNICATION/COLLABORATION:  
 The patients plan of care was discussed with: Physical Therapist and Registered Nurse Claribel Troncoso OT Time Calculation: 8 mins

## 2018-11-21 NOTE — PROCEDURES
PICC Placement Note    PRE-PROCEDURE VERIFICATION  Correct Procedure: yes  Correct Site:  yes  Temperature: Temp: 96.4 °F (35.8 °C), Temperature Source: Temp Source: Oral  Recent Labs     11/21/18  0454   BUN 7   CREA 0.64     Allergies: Sulfa (sulfonamide antibiotics) and Pcn [penicillins]  Education materials, including PICC Booklet, for PICC Care given to patient: yes. See Patient Education activity for further details. PROCEDURE DETAIL  A double lumen PICC line was started for vascular access, desire for reliable access, nurse/physician request and Home IV Therapy. The following documentation is in addition to the PICC properties in the lines/airways flowsheet :  Lot #: [id number]  Was xylocaine 1% used intradermally:  yes  Catheter Length: 38 (cm)  External Length: 1 cm  Vein Selection for PICC:right cephalic  Central Line Bundle followed yes  Complication Related to Insertion: other: Difficulty threading wire after accessing right basilic    The placement was verified by ECG/Sapiens technology: The  tip location is in the superior vena cava. See ECG results for PICC tip placement. Report given to nurse Teresa RN     Line is okay to use.     Ortiz Hogue RN

## 2018-11-22 PROCEDURE — 74011250637 HC RX REV CODE- 250/637: Performed by: INTERNAL MEDICINE

## 2018-11-22 PROCEDURE — 97116 GAIT TRAINING THERAPY: CPT

## 2018-11-22 PROCEDURE — 74011250636 HC RX REV CODE- 250/636: Performed by: ORTHOPAEDIC SURGERY

## 2018-11-22 PROCEDURE — L1830 KO IMMOB CANVAS LONG PRE OTS: HCPCS

## 2018-11-22 PROCEDURE — 74011250637 HC RX REV CODE- 250/637: Performed by: ORTHOPAEDIC SURGERY

## 2018-11-22 PROCEDURE — 65270000029 HC RM PRIVATE

## 2018-11-22 RX ADMIN — OXYCODONE HYDROCHLORIDE 15 MG: 5 TABLET ORAL at 01:17

## 2018-11-22 RX ADMIN — DIVALPROEX SODIUM 250 MG: 250 TABLET, DELAYED RELEASE ORAL at 08:26

## 2018-11-22 RX ADMIN — ASPIRIN 81 MG: 81 TABLET, COATED ORAL at 17:01

## 2018-11-22 RX ADMIN — Medication 10 ML: at 21:00

## 2018-11-22 RX ADMIN — PANTOPRAZOLE SODIUM 40 MG: 40 TABLET, DELAYED RELEASE ORAL at 07:31

## 2018-11-22 RX ADMIN — Medication 10 ML: at 05:22

## 2018-11-22 RX ADMIN — VANCOMYCIN HYDROCHLORIDE 1500 MG: 10 INJECTION, POWDER, LYOPHILIZED, FOR SOLUTION INTRAVENOUS at 15:17

## 2018-11-22 RX ADMIN — FAMOTIDINE 20 MG: 20 TABLET ORAL at 17:01

## 2018-11-22 RX ADMIN — FAMOTIDINE 20 MG: 20 TABLET ORAL at 08:27

## 2018-11-22 RX ADMIN — ACETAMINOPHEN 650 MG: 325 TABLET ORAL at 01:17

## 2018-11-22 RX ADMIN — RIFAMPIN 300 MG: 300 CAPSULE ORAL at 12:57

## 2018-11-22 RX ADMIN — OXYCODONE HYDROCHLORIDE 15 MG: 5 TABLET ORAL at 05:21

## 2018-11-22 RX ADMIN — LEVOTHYROXINE SODIUM 100 MCG: 100 TABLET ORAL at 07:31

## 2018-11-22 RX ADMIN — ASPIRIN 81 MG: 81 TABLET, COATED ORAL at 08:27

## 2018-11-22 RX ADMIN — ACETAMINOPHEN 650 MG: 325 TABLET ORAL at 12:57

## 2018-11-22 RX ADMIN — DIVALPROEX SODIUM 500 MG: 250 TABLET, DELAYED RELEASE ORAL at 20:59

## 2018-11-22 RX ADMIN — ACETAMINOPHEN 650 MG: 325 TABLET ORAL at 19:00

## 2018-11-22 RX ADMIN — FLUCONAZOLE 150 MG: 100 TABLET ORAL at 17:01

## 2018-11-22 RX ADMIN — HYDROXYZINE HYDROCHLORIDE 10 MG: 10 TABLET, FILM COATED ORAL at 10:27

## 2018-11-22 RX ADMIN — LOSARTAN POTASSIUM 100 MG: 50 TABLET, FILM COATED ORAL at 08:27

## 2018-11-22 RX ADMIN — TRAZODONE HYDROCHLORIDE 100 MG: 100 TABLET ORAL at 21:00

## 2018-11-22 RX ADMIN — OXYCODONE HYDROCHLORIDE 15 MG: 5 TABLET ORAL at 19:00

## 2018-11-22 RX ADMIN — POTASSIUM CHLORIDE 10 MEQ: 750 TABLET, EXTENDED RELEASE ORAL at 08:26

## 2018-11-22 RX ADMIN — OXYCODONE HYDROCHLORIDE 15 MG: 5 TABLET ORAL at 10:22

## 2018-11-22 RX ADMIN — RIFAMPIN 300 MG: 300 CAPSULE ORAL at 20:59

## 2018-11-22 RX ADMIN — Medication 10 ML: at 17:01

## 2018-11-22 RX ADMIN — OXYCODONE HYDROCHLORIDE 15 MG: 5 TABLET ORAL at 15:14

## 2018-11-22 RX ADMIN — ACETAMINOPHEN 650 MG: 325 TABLET ORAL at 07:31

## 2018-11-22 NOTE — PROGRESS NOTES
Infectious Diseases Progress Note Antibiotic Summary: Ancef   --  (3 doses) Zosyn   --  Vancomycin  -- present Rifampin To begin  Left TKR on 10/24/2018 I&D left TKR with poly exchange on 11/15/2018 Subjective: She had a better day. No new symptoms. She walked 1 lap around the floor Objective:  
 
Vitals:  
Visit Vitals /73 Pulse 84 Temp 98 °F (36.7 °C) Resp 16 Ht 5' 2\" (1.575 m) Wt 86.7 kg (191 lb 2.2 oz) SpO2 96% BMI 34.96 kg/m² Tmax:  Temp (24hrs), Av °F (36.7 °C), Min:97.4 °F (36.3 °C), Max:98.3 °F (36.8 °C) Exam:  General appearance: alert, no distress Lungs: clear to auscultation bilaterally Heart: regular rate and rhythm Abdomen: soft, non-tender. Bowel sounds normal.  
Left knee: dressed Skin: no rash Neurologic: A&O IV Lines: peripheral 
 
Labs:   
Recent Labs  
  18 
0454 18 
1952 BUN 7 8 CREA 0.64 0.50* Intraop culture of left TKR on 2018: 
 Gram stain = no WBCs; rare GPC in pairs Aerobic culture = moderate MRSA Vancomycin MAYCOL = 1 Daptomycin MAYCOL = <=0,5 Rifampin MAYCOL = <=1.0 Bactrim MAYCOL = <=0.5/9.5 Tetracycline MAYCOL = >8 (resistant) Linezolid MAYCOL = 2 Anaerobic culture = no anaerobes Assessment:  
 
1. Early postop MRSA infection of the left TKR with surgical I&D and poly exchange on 2018 -- left total knee arthroplasty originally done on 10/24/2018  
  
2. DJD 
  
3. HTN 
  
4. Hypothyroidism S/P treatment for Graves disease 5. Anemia -- Hbg 6.9 on  -- then got 2 units of pRBCs on  
  
6. 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 Continue Vancomycin 2. Continue Rifampin 300 mg po bid on  Mily Goncalves MD

## 2018-11-22 NOTE — PROGRESS NOTES
POD 6 Days Post-Op Procedure:  Procedure(s): 
INCISION AND DRAINAGE AND POLY EXCHANGE LEFT KNEE Subjective:  
 
Patient has no complaints today. Denies N/V/SOB/CP. Has been tolerating IV abx well. PICC line placed yesterday. Objective:  
 
Blood pressure 117/63, pulse 75, temperature 98.2 °F (36.8 °C), resp. rate 14, height 5' 2\" (1.575 m), weight 86.7 kg (191 lb 2.2 oz), SpO2 96 %. Temp (24hrs), Av.6 °F (36.4 °C), Min:96.4 °F (35.8 °C), Max:98.3 °F (36.8 °C) Physical Exam:  Examination of the left knee reveals that the incision is clean and intact. Small area of erythema over lateral aspect of inferior incision - no active drainage. Sensation is intact to light touch. Brisk capillary refill. Labs:  
Lab Results Component Value Date/Time HGB 9.3 (L) 2018 09:49 AM  
 
 
 
Assessment:  
 
Active Problems: 
  Dehiscence of incision (2018) Plan/Recommendations/Medical Decision Making:  
 
Continue physical therapy Ice and elevate Begin discharge planning Plan to start Rifampin today per ID recs. Continue IV Vanc

## 2018-11-22 NOTE — ROUTINE PROCESS
Bedside shift change report given to [facility] (oncoming nurse) by Harrison Bradley (offgoing nurse). Report included the following information SBAR.

## 2018-11-22 NOTE — PROGRESS NOTES
Bedside and Verbal shift change report given to [employer], rn (oncoming nurse) by Sulma Christina RN (offgoing nurse). Report included the following information SBAR, Kardex and MAR.

## 2018-11-22 NOTE — PROGRESS NOTES
Infectious Diseases Progress Note Antibiotic Summary: Ancef   --  (3 doses) Zosyn   --  Vancomycin  -- present Rifampin To begin  Left TKR on 10/24/2018 I&D left TKR with poly exchange on 11/15/2018 Subjective: No new symptoms. No N, V,D. 
 
Objective:  
 
Vitals:  
Visit Vitals /84 (BP 1 Location: Left arm, BP Patient Position: At rest) Pulse 98 Temp 98.3 °F (36.8 °C) Resp 16 Ht 5' 2\" (1.575 m) Wt 86.7 kg (191 lb 2.2 oz) SpO2 98% BMI 34.96 kg/m² Tmax:  Temp (24hrs), Av.8 °F (36.6 °C), Min:96.4 °F (35.8 °C), Max:98.6 °F (37 °C) Exam:  General appearance: alert, no distress Lungs: clear to auscultation bilaterally Heart: regular rate and rhythm Abdomen: soft, non-tender. Bowel sounds normal.  
Left knee: dressed Skin: no rash Neurologic: A&O IV Lines: peripheral 
 
Labs:   
Recent Labs  
  18 
0454 18 
0307 18 
7249 HGB  --   --  9.3*  
BUN 7 8 8 CREA 0.64 0.50* 0.68 Intraop culture of left TKR on 2018: 
 Gram stain = no WBCs; rare GPC in pairs Aerobic culture = moderate MRSA Vancomycin MAYCOL = 1 Daptomycin MAYCOL = <=0,5 Rifampin MAYCOL = <=1.0 Bactrim MAYCOL = <=0.5/9.5 Tetracycline MAYCOL = >8 (resistant) Linezolid MAYCOL = 2 Anaerobic culture = no anaerobes Assessment:  
 
1. Early postop MRSA infection of the left TKR with surgical I&D and poly exchange on 2018 -- left total knee arthroplasty originally done on 10/24/2018  
  
2. DJD 
  
3. HTN 
  
4. Hypothyroidism S/P treatment for Graves disease 5. Anemia -- Hbg 6.9 on  -- then got 2 units of pRBCs on  
  
6. 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 Continue Vancomycin 2. Add Rifampin 300 mg po bid on  
 
3. D/C dicofenac -- would prefer no NSAIDs while on Vancomycin Cisco Mortensen MD

## 2018-11-22 NOTE — PROGRESS NOTES
Problem: Mobility Impaired (Adult and Pediatric) Goal: *Acute Goals and Plan of Care (Insert Text) Physical Therapy Goals Initiated 11/18/2018 1. Patient will move from supine to sit and sit to supine , scoot up and down and roll side to side in bed with modified independence within 4 days. 2. Patient will perform sit to stand with modified independence within 4 days. 3. Patient will ambulate with modified independence for 150 feet with the least restrictive device within 4 days. 4. Patient will ascend/descend 13 stairs with 1 handrail(s) with modified independence within 4 days. 5. Patient will perform home exercise program per protocol with modified independence within 4 days. Outcome: Progressing Towards Goal 
physical Therapy TREATMENT Patient: Mary Paulino (1 y.o. female) Date: 11/22/2018 Diagnosis: status post left knee replacement Dehiscence of incision Dehiscence of incision <principal problem not specified> Procedure(s) (LRB): 
INCISION AND DRAINAGE AND POLY EXCHANGE LEFT KNEE (Left) 6 Days Post-Op Precautions: Fall, WBAT(knee immobilizer, no knee flexion) Chart, physical therapy assessment, plan of care and goals were reviewed. ASSESSMENT: 
Patient performed isometric exercises on L LE prior to gait training. Fitted walker prior, as walker too high for appropriate gait mechanics/body mechanics. Despite knee immobilizer, patient continues to ambulate with slight knee flexion; therefore, reinforced knee flexion restriction and suggested continued quad set during ambulation for adherence. Patient continues to require support of rolling walker for safe functional mobility. Patient reports discharge to a rehab facility for IV antibiotics requirement. Progression toward goals: 
[x]    Improving appropriately and progressing toward goals 
[]    Improving slowly and progressing toward goals 
[]    Not making progress toward goals and plan of care will be adjusted PLAN: 
 Patient continues to benefit from skilled intervention to address the above impairments. Continue treatment per established plan of care. Discharge Recommendations:  Rehab (IV abx) vs. Home with family support Further Equipment Recommendations for Discharge: Owns DME (owns Rollator) SUBJECTIVE:  
Patient stated Lenin Neves would be happy, be sure to tell her what I did today.  OBJECTIVE DATA SUMMARY:  
Critical Behavior: 
Neurologic State: Appropriate for age Orientation Level: Oriented X4 Cognition: Appropriate for age attention/concentration Safety/Judgement: Awareness of environment, Fall prevention, Insight into deficits Functional Mobility Training: 
Bed Mobility: 
Supine to Sit: Modified independent; Additional time Sit to Supine: Modified independent; Adaptive equipment; Additional time Transfers: 
Sit to Stand: Stand-by assistance Stand to Sit: Stand-by assistance Balance: 
Sitting: Intact Standing: Impaired; With support Standing - Static: Good;Constant support Standing - Dynamic : Fair Ambulation/Gait Training: 
Distance (ft): 300 Feet (ft) Assistive Device: Gait belt;Walker, rolling;Brace/Splint(L knee immobilizer) Ambulation - Level of Assistance: Contact guard assistance Gait Abnormalities: Antalgic;Early heel rise;Trunk sway increased Left Side Weight Bearing: As tolerated Base of Support: Widened Stance: Left decreased Speed/Radha: Pace decreased (<100 feet/min) Step Length: Right shortened;Left shortened Swing Pattern: Left asymmetrical 
Activity Tolerance:  
Fair - CHANG with ambulation (O2 sats well above 90%); impaired functional endurance. Please refer to the flowsheet for vital signs taken during this treatment. After treatment:  
[]    Patient left in no apparent distress sitting up in chair 
[x]    Patient left in no apparent distress in bed 
[x]    Call bell left within reach [x]    Nursing notified 
[]    Caregiver present  
[]    Bed alarm activated COMMUNICATION/COLLABORATION:  
The patients plan of care was discussed with: Registered Nurse Angela Mccray, PT, DPT Time Calculation: 13 mins

## 2018-11-22 NOTE — PROGRESS NOTES
Pharmacist Note - Vancomycin Dosing Therapy day 6 Indication: Early postop MRSA infection of left total knee replacement with surgical I&D and poly exchange on 11/16/2018 Current regimen: 1500 mg IV q 12 h A Trough Level resulted at 17.4 mcg/mL which was obtained 12 hrs post-dose. Goal trough: 15 - 20 mcg/mL Plan: Continue current regimen. Pharmacy will continue to monitor this patient daily for changes in clinical status and renal function.

## 2018-11-22 NOTE — PROGRESS NOTES
Bedside shift change report given to Maycol Gonzalez RN (oncoming nurse) by YONATHAN Beverly RN (offgoing nurse). Report included the following information SBAR, Kardex and Recent Results.

## 2018-11-23 LAB
ANION GAP SERPL CALC-SCNC: 6 MMOL/L (ref 5–15)
BUN SERPL-MCNC: 14 MG/DL (ref 6–20)
BUN/CREAT SERPL: 20 (ref 12–20)
CALCIUM SERPL-MCNC: 8 MG/DL (ref 8.5–10.1)
CHLORIDE SERPL-SCNC: 107 MMOL/L (ref 97–108)
CO2 SERPL-SCNC: 28 MMOL/L (ref 21–32)
CREAT SERPL-MCNC: 0.69 MG/DL (ref 0.55–1.02)
GLUCOSE SERPL-MCNC: 83 MG/DL (ref 65–100)
POTASSIUM SERPL-SCNC: 4.4 MMOL/L (ref 3.5–5.1)
SODIUM SERPL-SCNC: 141 MMOL/L (ref 136–145)

## 2018-11-23 PROCEDURE — 74011250637 HC RX REV CODE- 250/637: Performed by: INTERNAL MEDICINE

## 2018-11-23 PROCEDURE — 97116 GAIT TRAINING THERAPY: CPT

## 2018-11-23 PROCEDURE — 80048 BASIC METABOLIC PNL TOTAL CA: CPT

## 2018-11-23 PROCEDURE — 74011250637 HC RX REV CODE- 250/637: Performed by: ORTHOPAEDIC SURGERY

## 2018-11-23 PROCEDURE — 65270000029 HC RM PRIVATE

## 2018-11-23 PROCEDURE — 74011250636 HC RX REV CODE- 250/636: Performed by: ORTHOPAEDIC SURGERY

## 2018-11-23 PROCEDURE — 36415 COLL VENOUS BLD VENIPUNCTURE: CPT

## 2018-11-23 RX ORDER — HYDROXYZINE HYDROCHLORIDE 10 MG/1
10 TABLET, FILM COATED ORAL
Status: DISCONTINUED | OUTPATIENT
Start: 2018-11-23 | End: 2018-11-24 | Stop reason: HOSPADM

## 2018-11-23 RX ADMIN — ACETAMINOPHEN 650 MG: 325 TABLET ORAL at 13:08

## 2018-11-23 RX ADMIN — OXYCODONE HYDROCHLORIDE 15 MG: 5 TABLET ORAL at 04:17

## 2018-11-23 RX ADMIN — Medication 10 ML: at 06:42

## 2018-11-23 RX ADMIN — RIFAMPIN 300 MG: 300 CAPSULE ORAL at 20:12

## 2018-11-23 RX ADMIN — VANCOMYCIN HYDROCHLORIDE 1500 MG: 10 INJECTION, POWDER, LYOPHILIZED, FOR SOLUTION INTRAVENOUS at 03:15

## 2018-11-23 RX ADMIN — VANCOMYCIN HYDROCHLORIDE 1500 MG: 10 INJECTION, POWDER, LYOPHILIZED, FOR SOLUTION INTRAVENOUS at 15:28

## 2018-11-23 RX ADMIN — Medication 10 ML: at 21:00

## 2018-11-23 RX ADMIN — HYDROXYZINE HYDROCHLORIDE 10 MG: 10 TABLET, FILM COATED ORAL at 20:12

## 2018-11-23 RX ADMIN — ASPIRIN 81 MG: 81 TABLET, COATED ORAL at 17:53

## 2018-11-23 RX ADMIN — POTASSIUM CHLORIDE 10 MEQ: 750 TABLET, EXTENDED RELEASE ORAL at 08:58

## 2018-11-23 RX ADMIN — Medication 10 ML: at 14:00

## 2018-11-23 RX ADMIN — OXYCODONE HYDROCHLORIDE 15 MG: 5 TABLET ORAL at 00:25

## 2018-11-23 RX ADMIN — OXYCODONE HYDROCHLORIDE 15 MG: 5 TABLET ORAL at 13:13

## 2018-11-23 RX ADMIN — PANTOPRAZOLE SODIUM 40 MG: 40 TABLET, DELAYED RELEASE ORAL at 06:42

## 2018-11-23 RX ADMIN — FAMOTIDINE 20 MG: 20 TABLET ORAL at 08:58

## 2018-11-23 RX ADMIN — Medication 10 ML: at 18:00

## 2018-11-23 RX ADMIN — OXYCODONE HYDROCHLORIDE 15 MG: 5 TABLET ORAL at 22:24

## 2018-11-23 RX ADMIN — FAMOTIDINE 20 MG: 20 TABLET ORAL at 17:53

## 2018-11-23 RX ADMIN — OXYCODONE HYDROCHLORIDE 15 MG: 5 TABLET ORAL at 08:59

## 2018-11-23 RX ADMIN — DIVALPROEX SODIUM 500 MG: 250 TABLET, DELAYED RELEASE ORAL at 20:12

## 2018-11-23 RX ADMIN — Medication 10 ML: at 06:43

## 2018-11-23 RX ADMIN — FLUCONAZOLE 150 MG: 100 TABLET ORAL at 17:52

## 2018-11-23 RX ADMIN — ACETAMINOPHEN 650 MG: 325 TABLET ORAL at 18:35

## 2018-11-23 RX ADMIN — DIVALPROEX SODIUM 250 MG: 250 TABLET, DELAYED RELEASE ORAL at 08:57

## 2018-11-23 RX ADMIN — RIFAMPIN 300 MG: 300 CAPSULE ORAL at 08:59

## 2018-11-23 RX ADMIN — ACETAMINOPHEN 650 MG: 325 TABLET ORAL at 06:42

## 2018-11-23 RX ADMIN — ACETAMINOPHEN 650 MG: 325 TABLET ORAL at 00:25

## 2018-11-23 RX ADMIN — TRAZODONE HYDROCHLORIDE 100 MG: 100 TABLET ORAL at 21:00

## 2018-11-23 RX ADMIN — LEVOTHYROXINE SODIUM 100 MCG: 100 TABLET ORAL at 06:42

## 2018-11-23 RX ADMIN — ASPIRIN 81 MG: 81 TABLET, COATED ORAL at 08:57

## 2018-11-23 RX ADMIN — LOSARTAN POTASSIUM 100 MG: 50 TABLET, FILM COATED ORAL at 08:58

## 2018-11-23 RX ADMIN — OXYCODONE HYDROCHLORIDE 15 MG: 5 TABLET ORAL at 18:36

## 2018-11-23 NOTE — PROGRESS NOTES
Problem: Mobility Impaired (Adult and Pediatric) Goal: *Acute Goals and Plan of Care (Insert Text) Physical Therapy Goals Initiated 11/18/2018 1. Patient will move from supine to sit and sit to supine , scoot up and down and roll side to side in bed with modified independence within 4 days. 2. Patient will perform sit to stand with modified independence within 4 days. 3. Patient will ambulate with modified independence for 150 feet with the least restrictive device within 4 days. 4. Patient will ascend/descend 13 stairs with 1 handrail(s) with modified independence within 4 days. 5. Patient will perform home exercise program per protocol with modified independence within 4 days. Outcome: Progressing Towards Goal 
physical Therapy TREATMENT Patient: Stefan Dietz (21 y.o. female) Date: 11/23/2018 Diagnosis: status post left knee replacement Dehiscence of incision Dehiscence of incision <principal problem not specified> Procedure(s) (LRB): 
INCISION AND DRAINAGE AND POLY EXCHANGE LEFT KNEE (Left) 7 Days Post-Op Precautions: Fall, WBAT(knee immobilizer, no flexion) Chart, physical therapy assessment, plan of care and goals were reviewed. ASSESSMENT: 
Pt received semi-supine in bed;agreeable therapy. Knee immobilizer donned upon arrival. Continues to demonstrate Mod I for bed mobility and SBA for transfers. Pt amb 300 FT w/ RW and CGA-SBA. Continue to note mild flexion of Left knee and immobilizer slid to ankle. Returned to bed w/all items in reach and needs met/ Noted when pt opened immobilizer, incision w/ minimal drainage. RN aware. Pt w/ all needs in reach and met. Progression toward goals: 
[x]    Improving appropriately and progressing toward goals 
[]    Improving slowly and progressing toward goals 
[]    Not making progress toward goals and plan of care will be adjusted PLAN: 
Patient continues to benefit from skilled intervention to address the above impairments. Continue treatment per established plan of care. Discharge Recommendations:  Rehab (d/t IV Abx and limited assist at home from  who is caring for children w/autism (x2)) vs  Home Health w/ family support/assist 
Further Equipment Recommendations for Discharge:  TBD (pt owns rollator) SUBJECTIVE:  
Patient stated It's stiff today. Vernestine Cooks OBJECTIVE DATA SUMMARY:  
Critical Behavior: 
Neurologic State: Alert Orientation Level: Oriented X4 Cognition: Appropriate decision making Safety/Judgement: Awareness of environment, Fall prevention, Insight into deficits Functional Mobility Training: 
Bed Mobility: 
  
Supine to Sit: Modified independent Sit to Supine: Modified independent Transfers: 
Sit to Stand: Stand-by assistance Stand to Sit: Stand-by assistance Balance: 
Sitting: Intact Standing: Intact; With support Standing - Static: Constant support;Good Standing - Dynamic : FairAmbulation/Gait Training: 
Distance (ft): 300 Feet (ft) Assistive Device: Brace/Splint;Gait belt;Walker, rolling(knee immobilizer (L)) Ambulation - Level of Assistance: Contact guard assistance Gait Abnormalities: Antalgic;Decreased step clearance;Trunk sway increased Left Side Weight Bearing: As tolerated Base of Support: Widened Stance: Left decreased Speed/Radha: Pace decreased (<100 feet/min) Step Length: Left shortened;Right shortened Swing Pattern: Left asymmetrical 
  
  
  
  
 
  
  
   
 
 
Pain: 
Pain Scale 1: Numeric (0 - 10) Pain Intensity 1: 0 Pain Location 1: Knee Pain Orientation 1: Left Pain Description 1: Aching Pain Intervention(s) 1: Medication (see MAR) Activity Tolerance: WFL's 
Please refer to the flowsheet for vital signs taken during this treatment. After treatment:  
[]    Patient left in no apparent distress sitting up in chair 
[x]    Patient left in no apparent distress in bed 
[x]    Call bell left within reach [x]    Nursing notified 
[x]    Caregiver present 
[]    Bed alarm activated COMMUNICATION/COLLABORATION:  
The patients plan of care was discussed with: Registered Nurse Denise Bustamante Time Calculation: 23 mins

## 2018-11-23 NOTE — PROGRESS NOTES
Bedside shift change report given to Shashi Pruitt RN (oncoming nurse) by Hong Bustillo RN (offgoing nurse). Report included the following information SBAR, Kardex and Recent Results.

## 2018-11-23 NOTE — PROGRESS NOTES
POD 7 Days Post-Op Procedure:  Procedure(s): 
INCISION AND DRAINAGE AND POLY EXCHANGE LEFT KNEE Subjective:  
 
Patient doing well, complaining of appropriate post-op pain. Tolerating IV antibiotics well. PICC line in placed Objective:  
 
Blood pressure (!) 159/95, pulse 72, temperature 98.5 °F (36.9 °C), resp. rate 16, height 5' 2\" (1.575 m), weight 86.7 kg (191 lb 2.2 oz), SpO2 95 %. Temp (24hrs), Av.4 °F (36.9 °C), Min:98 °F (36.7 °C), Max:99 °F (37.2 °C) Physical Exam:  Examination of the left knee reveals that the incision is clean, dry and intact. Sensation is intact to light touch.  mild swelling. No calf pain. NVSI Labs:  
Lab Results Component Value Date/Time HGB 9.3 (L) 2018 09:49 AM  
 
 
 
Assessment:  
 
Active Problems: 
  Dehiscence of incision (2018) Procedure(s): 
INCISION AND DRAINAGE AND POLY EXCHANGE LEFT KNEE Plan/Recommendations/Medical Decision Making:  
 
- Continue pain control 
- ice and elevation 
- pt/ot -continue  dvt prophylaxis  
-Follow ID recommendations with Vancomycin and oral Rifampin 
- d/c planning - waiting on insurance authorization for to go to SNF for therapy and IVABX treatment.  
 
 
Carolee Daniel MD

## 2018-11-23 NOTE — PROGRESS NOTES
Received call to check on status of discharge to SNF, referral had been made on 11/21/2018 to [facility]. Pt's blue [employer] is her primary and will require insurance. CM called facility [address] [place] [address] [phone] and admissions staff not available. CM called admissions director Connor Da Silva [phone] and left message requesting update. Outcome pending. Chart still indicates Medicare is primary. If medicare is primary, authorization would not be required. CASSI Gibson 
 
 
4:30pm  Spoke with Juan Sam and [facility] can accept pt under her medicare SNF benefit and is able to accept over w/e. Pt meeds SNF benefit under her medicare, even though it is secondary. Spoke with pt who is in agreement and  will transport by car. Pt will need both copies of hard copy narcotic scripts to travel with her (she is normally followed by an outpatient pain management doc) and take oxycodone and Nusynta ER (Tapentadol TB12 250mg). Nurse communicated this with orthopedic discharging CM and CM left FYI. ID abx orders (IV Vanc) will also need to go to facility prior to discharge but [facility] is aware pt has PICC and requires IV vanc and the two pain management meds. [facility] [place] can be contacted Saturday morning to facilitate discharge, phone [phone].

## 2018-11-24 VITALS
SYSTOLIC BLOOD PRESSURE: 151 MMHG | HEART RATE: 68 BPM | WEIGHT: 191.14 LBS | OXYGEN SATURATION: 97 % | DIASTOLIC BLOOD PRESSURE: 98 MMHG | BODY MASS INDEX: 35.17 KG/M2 | RESPIRATION RATE: 16 BRPM | TEMPERATURE: 98.4 F | HEIGHT: 62 IN

## 2018-11-24 PROBLEM — Z96.652 H/O TOTAL KNEE REPLACEMENT, LEFT: Status: ACTIVE | Noted: 2018-11-24

## 2018-11-24 PROCEDURE — 74011250636 HC RX REV CODE- 250/636: Performed by: ORTHOPAEDIC SURGERY

## 2018-11-24 PROCEDURE — 74011250637 HC RX REV CODE- 250/637: Performed by: INTERNAL MEDICINE

## 2018-11-24 PROCEDURE — 74011250637 HC RX REV CODE- 250/637: Performed by: ORTHOPAEDIC SURGERY

## 2018-11-24 RX ADMIN — ACETAMINOPHEN 650 MG: 325 TABLET ORAL at 01:18

## 2018-11-24 RX ADMIN — LOSARTAN POTASSIUM 100 MG: 50 TABLET, FILM COATED ORAL at 08:54

## 2018-11-24 RX ADMIN — PANTOPRAZOLE SODIUM 40 MG: 40 TABLET, DELAYED RELEASE ORAL at 06:24

## 2018-11-24 RX ADMIN — RIFAMPIN 300 MG: 300 CAPSULE ORAL at 08:54

## 2018-11-24 RX ADMIN — ACETAMINOPHEN 650 MG: 325 TABLET ORAL at 06:23

## 2018-11-24 RX ADMIN — DIVALPROEX SODIUM 250 MG: 250 TABLET, DELAYED RELEASE ORAL at 08:54

## 2018-11-24 RX ADMIN — ACETAMINOPHEN 650 MG: 325 TABLET ORAL at 13:56

## 2018-11-24 RX ADMIN — OXYCODONE HYDROCHLORIDE 15 MG: 5 TABLET ORAL at 10:15

## 2018-11-24 RX ADMIN — OXYCODONE HYDROCHLORIDE 15 MG: 5 TABLET ORAL at 06:23

## 2018-11-24 RX ADMIN — LEVOTHYROXINE SODIUM 100 MCG: 100 TABLET ORAL at 06:24

## 2018-11-24 RX ADMIN — OXYCODONE HYDROCHLORIDE 15 MG: 5 TABLET ORAL at 13:56

## 2018-11-24 RX ADMIN — POTASSIUM CHLORIDE 10 MEQ: 750 TABLET, EXTENDED RELEASE ORAL at 08:55

## 2018-11-24 RX ADMIN — FAMOTIDINE 20 MG: 20 TABLET ORAL at 08:55

## 2018-11-24 RX ADMIN — Medication 10 ML: at 06:23

## 2018-11-24 RX ADMIN — VANCOMYCIN HYDROCHLORIDE 1500 MG: 10 INJECTION, POWDER, LYOPHILIZED, FOR SOLUTION INTRAVENOUS at 02:19

## 2018-11-24 RX ADMIN — OXYCODONE HYDROCHLORIDE 15 MG: 5 TABLET ORAL at 02:18

## 2018-11-24 RX ADMIN — ASPIRIN 81 MG: 81 TABLET, COATED ORAL at 08:55

## 2018-11-24 RX ADMIN — SENNOSIDES AND DOCUSATE SODIUM 1 TABLET: 8.6; 5 TABLET ORAL at 08:58

## 2018-11-24 NOTE — PROGRESS NOTES
POD 8 Days Post-Op Procedure:  Procedure(s): 
INCISION AND DRAINAGE AND POLY EXCHANGE LEFT KNEE Subjective:  
 
Patient doing well, complaining of appropriate post-op pain. Denies any shortness of breath, nausea or vomiting. Objective:  
 
Blood pressure 113/67, pulse 67, temperature 98.2 °F (36.8 °C), resp. rate 16, height 5' 2\" (1.575 m), weight 86.7 kg (191 lb 2.2 oz), SpO2 94 %. Temp (24hrs), Av.2 °F (36.8 °C), Min:98 °F (36.7 °C), Max:98.5 °F (36.9 °C) Physical Exam:  Examination of the left knee reveals that the incision is clean, dry and intact. No active drainage. Sensation is intact to light touch.  mild swelling. No calf pain. NVSI Labs:  
Lab Results Component Value Date/Time HGB 9.3 (L) 2018 09:49 AM  
 
 
 
Assessment:  
 
Active Problems: 
  Dehiscence of incision (2018) H/O total knee replacement, left (2018) Procedure(s): 
INCISION AND DRAINAGE AND POLY EXCHANGE LEFT KNEE Plan/Recommendations/Medical Decision Making: - Discharge today and transfer to [facility] for therapy and IV antibiotics. 
-Follow up in clinics in 2 weeks Fahad Vasquez MD

## 2018-11-24 NOTE — PROGRESS NOTES
Transfer Antibiotic Orders - [facility] November 24, 2018 1. Diagnosis:  MRSA infection of the left total knee replacement (original left total knee arthroplasty on 10/24/2018) -- S/P surgical I&D and poly exchange on 11/16/2018 2. Routine PICC care 3. Antibiotic:   Vancomycin 1500 mg IV q 12 hours PLUS Rifampin 300 mg po bid 4. Lab each Monday: CBC/diff/platelets CMP 
 ESR 
 CRP (please send quantitative CRP; NOT cardiac or high sensitivity CRP) Trough Vancomycin level 5. Lab each Thursday: CBC/diff/platelets CMP Trough Vancomycin level 6. Fax lab to Dr. Adrianne Sharp @ [phone] 7. Call Dr. Adrianne Sharp @ [phone] for WBC <3500, creatinine >0.9, or platelets <332,405 8. Duration of therapy: Not yet determined; possibly til 12/28/2018 Please call Dr. Adrianne Sharp @ [phone] before stopping therapy, before transferring patient, or before discharging patient 9. Allergies: Allergies Allergen Reactions  Sulfa (Sulfonamide Antibiotics) Hives  Pcn [Penicillins] Unproven on Challenge 10. Pharmacy Consult for Vancomycin dosing -- aim for trough Vancomycin 15-18. Edwina Sharp in 2 weeks Cisco Mortensen MD  
PHONE [phone]

## 2018-11-24 NOTE — PROGRESS NOTES
Cm noted discharge orders for today, contact the liaison at [facility] and they are able to accept. Cm faxed discharge instructions and prescriptions and met with patient to discuss. Patients'  will provide transportation to [facility] this morning and she is calling him now.  
[employer] , [place]

## 2018-11-24 NOTE — PROGRESS NOTES
Infectious Diseases Progress Note Antibiotic Summary: Ancef   --  (3 doses) Zosyn   --  Vancomycin  -- present Rifampin  -- present Left TKR on 10/24/2018 I&D left TKR with poly exchange on 11/15/2018 Subjective: She generally feels better with less pain. No N, V, D Objective:  
 
Vitals:  
Visit Vitals /71 Pulse 79 Temp 98 °F (36.7 °C) Resp 16 Ht 5' 2\" (1.575 m) Wt 86.7 kg (191 lb 2.2 oz) SpO2 98% BMI 34.96 kg/m² Tmax:  Temp (24hrs), Av.2 °F (36.8 °C), Min:98 °F (36.7 °C), Max:98.5 °F (36.9 °C) Exam:  General appearance: alert, no distress Lungs: clear to auscultation bilaterally Heart: regular rate and rhythm Abdomen: soft, non-tender. Bowel sounds normal.  
Left knee: incision benign Skin: no rash Neurologic: A&O IV Lines: peripheral 
 
Labs:   
Recent Labs  
  18 
0321 18 
0454 BUN 14 7 CREA 0.69 0.64 Intraop culture of left TKR on 2018: 
 Gram stain = no WBCs; rare GPC in pairs Aerobic culture = moderate MRSA Vancomycin MAYCOL = 1 Daptomycin MAYCOL = <=0,5 Rifampin MAYCOL = <=1.0 Bactrim MAYCOL = <=0.5/9.5 Tetracycline MAYCOL = >8 (resistant) Linezolid MAYCOL = 2 Anaerobic culture = no anaerobes Assessment:  
 
1. Early postop MRSA infection of the left TKR with surgical I&D and poly exchange on 2018 -- left total knee arthroplasty originally done on 10/24/2018  
  
2. DJD 
  
3. HTN 
  
4. Hypothyroidism S/P treatment for Graves disease 5. Anemia -- Hbg 6.9 on  -- then got 2 units of pRBCs on  
  
6. 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 Continue IV Vancomycin and po Rifampin 2. Note plans for possible transfer to [facility] on  -- transfer antibiotic orders written Shayna Castano MD

## 2018-11-24 NOTE — PROGRESS NOTES
I have reviewed discharge instructions with the patient. The patient verbalized understanding. Reviewed prescriptions, signs and symptoms to report and gave opportunity for questions. Patient left via wheelchair with RN assistance and driven to [facility] for rehab services.

## 2018-11-24 NOTE — PROGRESS NOTES
TRANSFER - OUT REPORT: 
 
Verbal report given to Matthew(name) on Tony Montoya  being transferred to [facility]) for routine progression of care Report consisted of patients Situation, Background, Assessment and  
Recommendations(SBAR). Information from the following report(s) SBAR, Kardex, Intake/Output, MAR and Recent Results was reviewed with the receiving nurse. Lines: PICC Double Lumen 96/61/00 Cephalic;Right (Active) Central Line Being Utilized Yes 11/24/2018  8:49 AM  
Criteria for Appropriate Use Long term IV/antibiotic administration 11/24/2018  8:49 AM  
Site Assessment Clean, dry, & intact 11/24/2018  8:49 AM  
Phlebitis Assessment 0 11/24/2018  8:49 AM  
Infiltration Assessment 0 11/24/2018  8:49 AM  
Arm Circumference (cm) 46 cm 11/21/2018  6:05 PM  
Date of Last Dressing Change 11/21/18 11/22/2018  4:11 PM  
Dressing Status Clean, dry, & intact 11/23/2018  8:30 PM  
Action Taken Open ports on tubing capped 11/22/2018  4:11 PM  
External Catheter Length (cm) 1 centimeters 11/21/2018  6:05 PM  
Dressing Type Transparent 11/24/2018  8:49 AM  
Hub Color/Line Status Red 11/23/2018  8:30 PM  
Positive Blood Return (Site #1) Yes 11/23/2018  8:30 PM  
Hub Color/Line Status Purple 11/23/2018  8:30 PM  
Positive Blood Return (Site #2) Yes 11/23/2018  8:30 PM  
Alcohol Cap Used Yes 11/22/2018  4:11 PM  
  
 
Opportunity for questions and clarification was provided.

## 2018-11-28 ENCOUNTER — PATIENT OUTREACH (OUTPATIENT)
Dept: CASE MANAGEMENT | Age: 48
End: 2018-11-28

## 2018-11-28 NOTE — PROGRESS NOTES
Community Care Team documentation for patient in [facility] Initial Follow Up Patient was admitted to [facility] on 11/16 and discharged 11/24 to [address], [facility]. Hospital Discharge diagnosis:  MRSA infection of the left total knee replacement (original left total knee arthroplasty on 10/24/2018), s/p I & D and poly exchange left knee RRAT score: 17 Advance Care Planning:  Not on file. PCP : Regina Andrew MD  
 
Spoke with SNF team. Ensured patient arrived to SNF safely with admission packet in order. Provided needed follow up appointments; Love Mckinley MD in 2 weeks;   BERNARDO Nelson MD in 2 weeks. Pt is being skilled by PT/OT. Currently independent with bed mobility. Min assist with transfers. Ambulating 100ft with RW and contact guard assist. Supervision with upper body bathing and dressing. Min assist with lower body ADLs. Contact guard with toileting. Ascending and descending 1 stair with min assist. Discussed IV ABX. Per ID note, Duration of therapy: Not yet determined; possibly till 12/28/2018. Please call Dr. Lavaughn Schirmer @ [phone] before stopping therapy. Plan to return home with  [facility]. Community Care Team will follow up weekly with [facility] until discharge. Medications were not reconciled and general patient assessment was not completed during this [facility] outreach.

## 2018-12-03 ENCOUNTER — HOME HEALTH ADMISSION (OUTPATIENT)
Dept: HOME HEALTH SERVICES | Facility: HOME HEALTH | Age: 48
End: 2018-12-03
Payer: COMMERCIAL

## 2018-12-03 NOTE — OP NOTES
[address]   OPERATIVE REPORT    Doreen Montoya  MR#: [id number]  : 1970  ACCOUNT #: [de-identified]   DATE OF SERVICE: 2018    PREOPERATIVE DIAGNOSIS:  Left knee superficial infection. POSTOPERATIVE DIAGNOSIS:  Left knee superficial infection with partial dehiscence of arthrotomy. PROCEDURE PERFORMED:  Poly exchange, left knee with irrigation and debridement. SURGEON:  Roxana Miller MD    ASSISTANT:  [facility] staff. ANESTHESIA:  General.    COMPLICATIONS:  None. SPECIMENS REMOVED:  Included fluid sent for culture as well as tissue. ESTIMATED BLOOD LOSS:  200. IMPLANTS:  Cortney triathlon CS insert. INDICATIONS FOR PROCEDURE:  This is a 19-year-old female who underwent primary left total knee arthroplasty on 10/24. She was doing well, but began to develop drainage about 3 weeks postop. She had associated erythema. We discussed irrigation and debridement with possible poly exchange. She understood this and wished to proceed. DESCRIPTION OF PROCEDURE:  The patient was identified in the preoperative holding. The correct site was marked and confirmed. She was taken to the operating room and placed in supine position. General endotracheal anesthesia was induced. She was prepped and draped in a standard sterile fashion. She did receive 2 g cefazolin prior to incision. Timeout held. Correct site confirmed. We utilized her previous incision and ellipsed out the tissue that was compromised at the area of dehiscence. There was murky appearing fluid which was sent for culture. Flaps were elevated. The arthrotomy also was noted to have communication with the superficial wound. The decision was made to reopen the arthrotomy to remove all sutures. A thorough debridement was carried out of nonviable tissue using a #10 blade removing subcutaneous tissue, fascia and muscle. Polyethylene was removed.   I then thoroughly irrigated again with normal saline followed by dilute Betadine soak. I did increase the polyethylene by 1 size as the trial showed some mild laxity. This felt stable. After another thorough irrigation with 3 more L of saline mixed with bacitracin, I closed with PDS suture. I used 0 PDS for the arthrotomy and then used 2-0 PDS followed by 3-0 nylon in the skin. A soft dressing was placed. The patient taken to PACU in stable condition.       MD TOM Araujo / OMARI  D: 12/03/2018 09:59     T: 12/03/2018 16:03  JOB #: [id number]

## 2018-12-04 ENCOUNTER — HOME CARE VISIT (OUTPATIENT)
Dept: SCHEDULING | Facility: HOME HEALTH | Age: 48
End: 2018-12-04
Payer: COMMERCIAL

## 2018-12-04 PROCEDURE — G0299 HHS/HOSPICE OF RN EA 15 MIN: HCPCS

## 2018-12-04 PROCEDURE — 400013 HH SOC

## 2018-12-05 ENCOUNTER — HOME CARE VISIT (OUTPATIENT)
Dept: HOME HEALTH SERVICES | Facility: HOME HEALTH | Age: 48
End: 2018-12-05
Payer: COMMERCIAL

## 2018-12-05 ENCOUNTER — PATIENT OUTREACH (OUTPATIENT)
Dept: CASE MANAGEMENT | Age: 48
End: 2018-12-05

## 2018-12-05 VITALS
TEMPERATURE: 98.5 F | HEART RATE: 67 BPM | DIASTOLIC BLOOD PRESSURE: 110 MMHG | SYSTOLIC BLOOD PRESSURE: 200 MMHG | RESPIRATION RATE: 18 BRPM | OXYGEN SATURATION: 98 %

## 2018-12-05 NOTE — PROGRESS NOTES
Community Care Team Documentation for Patient in [facility] Discharge Note Patient has been discharged from [address] ([facility]). See previous [facility] Team notes. PCP : Melanie Lugo MD 
 
Spoke with SNF team. Pt discharged per pt choice over the weekend home with  and IV infusions though [facility] and [place] through [facility]. Community Care Team will sign off at this time. Medications were not reconciled and general patient assessment was not completed during this skilled nursing facility outreach.

## 2018-12-06 ENCOUNTER — HOME CARE VISIT (OUTPATIENT)
Dept: SCHEDULING | Facility: HOME HEALTH | Age: 48
End: 2018-12-06
Payer: COMMERCIAL

## 2018-12-06 PROCEDURE — G0299 HHS/HOSPICE OF RN EA 15 MIN: HCPCS

## 2018-12-07 ENCOUNTER — HOME CARE VISIT (OUTPATIENT)
Dept: SCHEDULING | Facility: HOME HEALTH | Age: 48
End: 2018-12-07
Payer: COMMERCIAL

## 2018-12-07 ENCOUNTER — HOME CARE VISIT (OUTPATIENT)
Dept: HOME HEALTH SERVICES | Facility: HOME HEALTH | Age: 48
End: 2018-12-07
Payer: COMMERCIAL

## 2018-12-07 VITALS
SYSTOLIC BLOOD PRESSURE: 130 MMHG | HEART RATE: 76 BPM | OXYGEN SATURATION: 97 % | RESPIRATION RATE: 20 BRPM | TEMPERATURE: 97.9 F | DIASTOLIC BLOOD PRESSURE: 67 MMHG

## 2018-12-07 PROCEDURE — G0151 HHCP-SERV OF PT,EA 15 MIN: HCPCS

## 2018-12-07 PROCEDURE — G0299 HHS/HOSPICE OF RN EA 15 MIN: HCPCS

## 2018-12-07 NOTE — DISCHARGE SUMMARY
Patient ID:  Lesia Dial  [id number]  50 y.o.  1970    Admit Date: 11/16/2018    Discharge Date: 12/6/2018    Admission Diagnoses: status post left knee replacement  Dehiscence of incision  Dehiscence of incision    Discharge Diagnoses: Active Problems:    Dehiscence of incision (11/16/2018)      H/O total knee replacement, left (11/24/2018)         Admission Condition: Fair    Discharge Condition: Fair    Last Procedure: Procedure(s):  INCISION AND DRAINAGE AND POLY EXCHANGE LEFT KNEE      Medications:   No current facility-administered medications for this encounter. Current Outpatient Medications   Medication Sig    cholecalciferol, VITAMIN D3, (VITAMIN D3) 5,000 unit tab tablet Take 1 Cap by mouth daily.  famotidine (PEPCID) 20 mg tablet Take 1 Tab by mouth two (2) times a day.  levothyroxine (SYNTHROID) 100 mcg tablet Take 100 mcg by mouth Daily (before breakfast).  vortioxetine (TRINTELLIX) 10 mg tablet Take  by mouth daily. PT TAKES A TOTAL OF 30 mg DAILY    traZODone (DESYREL) 100 mg tablet Take 100 mg by mouth nightly. May take 1/2 tab to 1 tab every evening    divalproex DR (DEPAKOTE) 250 mg tablet Take 1 tablet by mouth in  the morning and 2 tablets  by mouth in the evening    losartan (COZAAR) 50 mg tablet Take 100 mg by mouth daily.  cetirizine (ZYRTEC) 10 mg tablet Take 1 Tab by mouth daily.  vancomycin/0.9 % sod chloride (VANCOMYCIN IN 0.9 % SODIUM CHL) 1.5 gram/500 mL soln 500 mL by IntraVENous route every twelve (12) hours every twelve (12) hours.  acetaminophen (TYLENOL) 500 mg tablet Take 500 mg by mouth every six (6) hours as needed for Pain.  diclofenac EC (VOLTAREN) 75 mg EC tablet Take 75 mg by mouth two (2) times a day.  rifAMPin (RIFADIN) 300 mg capsule Take 300 mg by mouth two (2) times a day.  diclofenac 10% cyclobenzaprine 2% lidocaine 5% gabapentin 6% cream compound Apply 2 g to affected area four (4) times daily as needed for Pain.     tapentadol (NUCYNTA ER) 250 mg Tb12 Take 1 Tab by mouth two (2) times a day.  oxyCODONE IR (OXY-IR) 15 mg immediate release tablet Take 15 mg by mouth every four (4) hours as needed for Pain.  mupirocin calcium (BACTROBAN) 2 % topical cream Apply 1 Each to affected area two (2) times a day.  alpha-d-galactosidase (BEANO) 150 unit tab tablet Take 2 Tabs by mouth as needed for Flatulence.  aspirin delayed-release 81 mg tablet Take 1 Tab by mouth two (2) times a day.  amphetamine sulfate (EVEKEO) 10 mg tab Take 10 mg by mouth. PT TAKES 2 TABS (20 MG) BID AT 0200 AND 1200    potassium chloride SR (KLOR-CON 10) 10 mEq tablet Take 10 mEq by mouth daily.  furosemide (LASIX) 20 mg tablet Take 20 mg by mouth daily. MAY TAKE ONLY IF NEEDED FOR SEVERE ANKLE SWELLING    baclofen (LIORESAL) 10 mg tablet Take 10 mg by mouth as needed for Pain.  Lisdexamfetamine (VYVANSE) 50 mg capsule Take 70 mg by mouth two (2) times a day. Takes in the morning and at noon daily          hydrochlorothiazide (HYDRODIURIL) 25 mg tablet Take 25 mg by mouth daily. MAY TAKE 1 OR 2 TABS AS NEEDED FOR ANKLE SWELLING    albuterol (PROAIR HFA) 90 mcg/actuation inhaler Take 1 Puff by inhalation every four (4) hours as needed for Wheezing.  benzoyl peroxide (DUAL ACTION) 3.5 % Clsr by Apply Externally route.  acyclovir (ZOVIRAX) 400 mg tablet Take 400 mg by mouth every four (4) hours as needed for Other (fever blisters).  simethicone (GAS-X) 80 mg chewable tablet Take 80 mg by mouth every six (6) hours as needed.  lansoprazole (PREVACID) 15 mg disintegrating tablet Take 1 Tab by mouth Daily (before breakfast).  MULTIVIT WITH CALCIUM,IRON,MIN (ONE-A-DAY WOMENS FORMULA PO) Take  by mouth. Hospital Course: The patient was admitted to the hospital with persistent drainage and concern for prosthetic joint infection. She was taken to the OR and underwent I and D with poly exchange.  She tolerated the procedure well. Cultures grew MRSA. ID was consulted and recommended vancomycin. A PICC line was placed. They tolerated the procedure well. Pain was controlled post-operatively with a multimodal pain regiment including tylenol,tramadol, anti-inflammatory medication, and oxycodone for breakthrough. Physical therapy began to work with the patient on POD#0 and continued daily. Aspirin was given for DVT prophylaxis beginning on POD#0. The patient progressed well and was discharged home in stable condition once they cleared therapy with a PICC line. Follow up was scheduled with infectious disease and orthopaedics. Consults: Infectious Disease    Significant Diagnostic Studies: post op xrays showed a stable Procedure(s):  INCISION AND DRAINAGE AND POLY EXCHANGE LEFT KNEE    Disposition: home    The patient was discharged with the following instructions:   1.) She will take aspirin for DVT prophylaxis, tylenol, diclofenac, and oxycodone for breakthrough pain. 2.) Shower and wound instructions were given. 3.) Activity: Activity as tolerated  4.) Diet: Regular  5.) She will receive IV antibiotics for a minimum of 6 weeks      Follow-up with Carolina Polk MD in 3 weeks after her discharge. Sooner if there is a problem. Cannot display discharge medications since this patient is not currently admitted.       Signed:  Carolina Polk MD  12/6/2018, 7:33 PM

## 2018-12-09 VITALS
HEART RATE: 72 BPM | TEMPERATURE: 98.2 F | DIASTOLIC BLOOD PRESSURE: 78 MMHG | OXYGEN SATURATION: 98 % | SYSTOLIC BLOOD PRESSURE: 130 MMHG | RESPIRATION RATE: 18 BRPM | SYSTOLIC BLOOD PRESSURE: 116 MMHG | DIASTOLIC BLOOD PRESSURE: 76 MMHG | OXYGEN SATURATION: 98 % | TEMPERATURE: 98.1 F | HEART RATE: 76 BPM | RESPIRATION RATE: 18 BRPM

## 2018-12-11 ENCOUNTER — HOME CARE VISIT (OUTPATIENT)
Dept: SCHEDULING | Facility: HOME HEALTH | Age: 48
End: 2018-12-11
Payer: COMMERCIAL

## 2018-12-11 VITALS
HEART RATE: 84 BPM | RESPIRATION RATE: 18 BRPM | OXYGEN SATURATION: 98 % | TEMPERATURE: 98.1 F | DIASTOLIC BLOOD PRESSURE: 106 MMHG | SYSTOLIC BLOOD PRESSURE: 180 MMHG

## 2018-12-11 PROCEDURE — G0300 HHS/HOSPICE OF LPN EA 15 MIN: HCPCS

## 2018-12-11 RX ORDER — CEFAZOLIN SODIUM/WATER 2 G/20 ML
2 SYRINGE (ML) INTRAVENOUS ONCE
Status: CANCELLED | OUTPATIENT
Start: 2018-12-12 | End: 2018-12-12

## 2018-12-11 RX ORDER — PREGABALIN 75 MG/1
75 CAPSULE ORAL ONCE
Status: CANCELLED | OUTPATIENT
Start: 2018-12-12 | End: 2018-12-12

## 2018-12-11 RX ORDER — CELECOXIB 200 MG/1
200 CAPSULE ORAL ONCE
Status: CANCELLED | OUTPATIENT
Start: 2018-12-12 | End: 2018-12-12

## 2018-12-11 RX ORDER — DEXAMETHASONE SODIUM PHOSPHATE 10 MG/ML
4 INJECTION INTRAMUSCULAR; INTRAVENOUS ONCE
Status: CANCELLED | OUTPATIENT
Start: 2018-12-12 | End: 2018-12-12

## 2018-12-11 RX ORDER — ACETAMINOPHEN 500 MG
1000 TABLET ORAL ONCE
Status: CANCELLED | OUTPATIENT
Start: 2018-12-12 | End: 2018-12-12

## 2018-12-12 ENCOUNTER — HOSPITAL ENCOUNTER (INPATIENT)
Age: 48
LOS: 6 days | Discharge: HOME HEALTH | DRG: 467 | End: 2018-12-18
Attending: ORTHOPAEDIC SURGERY | Admitting: ORTHOPAEDIC SURGERY
Payer: COMMERCIAL

## 2018-12-12 ENCOUNTER — ANESTHESIA (OUTPATIENT)
Dept: SURGERY | Age: 48
DRG: 467 | End: 2018-12-12
Payer: COMMERCIAL

## 2018-12-12 ENCOUNTER — ANESTHESIA EVENT (OUTPATIENT)
Dept: SURGERY | Age: 48
DRG: 467 | End: 2018-12-12
Payer: COMMERCIAL

## 2018-12-12 ENCOUNTER — HOME CARE VISIT (OUTPATIENT)
Dept: HOME HEALTH SERVICES | Facility: HOME HEALTH | Age: 48
End: 2018-12-12
Payer: COMMERCIAL

## 2018-12-12 DIAGNOSIS — M17.0 PRIMARY OSTEOARTHRITIS OF BOTH KNEES: Primary | ICD-10-CM

## 2018-12-12 PROBLEM — Z96.652 STATUS POST LEFT KNEE REPLACEMENT: Status: ACTIVE | Noted: 2018-12-12

## 2018-12-12 LAB — HCG UR QL: NEGATIVE

## 2018-12-12 PROCEDURE — 74011250636 HC RX REV CODE- 250/636

## 2018-12-12 PROCEDURE — 77030006788 HC BLD SAW OSC STRY -B: Performed by: ORTHOPAEDIC SURGERY

## 2018-12-12 PROCEDURE — 77030006822 HC BLD SAW SAG BRSM -B: Performed by: ORTHOPAEDIC SURGERY

## 2018-12-12 PROCEDURE — 77030000032 HC CUF TRNQT ZIMM -B: Performed by: ORTHOPAEDIC SURGERY

## 2018-12-12 PROCEDURE — 81025 URINE PREGNANCY TEST: CPT

## 2018-12-12 PROCEDURE — 77030029192 HC DRSG WND NGP PICO S&N -C: Performed by: ORTHOPAEDIC SURGERY

## 2018-12-12 PROCEDURE — 77030002916 HC SUT ETHLN J&J -A: Performed by: ORTHOPAEDIC SURGERY

## 2018-12-12 PROCEDURE — 77030020274 HC MISC IMPL ORTHOPEDIC: Performed by: ORTHOPAEDIC SURGERY

## 2018-12-12 PROCEDURE — 77030018842 HC SOL IRR SOD CL 9% BAXT -A: Performed by: ORTHOPAEDIC SURGERY

## 2018-12-12 PROCEDURE — 74011250636 HC RX REV CODE- 250/636: Performed by: ORTHOPAEDIC SURGERY

## 2018-12-12 PROCEDURE — C1776 JOINT DEVICE (IMPLANTABLE): HCPCS | Performed by: ORTHOPAEDIC SURGERY

## 2018-12-12 PROCEDURE — 77030035236 HC SUT PDS STRATFX BARB J&J -B: Performed by: ORTHOPAEDIC SURGERY

## 2018-12-12 PROCEDURE — 77030018836 HC SOL IRR NACL ICUM -A: Performed by: ORTHOPAEDIC SURGERY

## 2018-12-12 PROCEDURE — 74011250636 HC RX REV CODE- 250/636: Performed by: ANESTHESIOLOGY

## 2018-12-12 PROCEDURE — 77030039266 HC ADH SKN EXOFIN S2SG -A: Performed by: ORTHOPAEDIC SURGERY

## 2018-12-12 PROCEDURE — 77030020788: Performed by: ORTHOPAEDIC SURGERY

## 2018-12-12 PROCEDURE — 77030012935 HC DRSG AQUACEL BMS -B: Performed by: ORTHOPAEDIC SURGERY

## 2018-12-12 PROCEDURE — 77030011640 HC PAD GRND REM COVD -A: Performed by: ORTHOPAEDIC SURGERY

## 2018-12-12 PROCEDURE — 77030002966 HC SUT PDS J&J -A: Performed by: ORTHOPAEDIC SURGERY

## 2018-12-12 PROCEDURE — 77030020275 HC MISC ORTHOPEDIC: Performed by: ORTHOPAEDIC SURGERY

## 2018-12-12 PROCEDURE — 74011000250 HC RX REV CODE- 250

## 2018-12-12 PROCEDURE — 76210000016 HC OR PH I REC 1 TO 1.5 HR: Performed by: ORTHOPAEDIC SURGERY

## 2018-12-12 PROCEDURE — C1713 ANCHOR/SCREW BN/BN,TIS/BN: HCPCS | Performed by: ORTHOPAEDIC SURGERY

## 2018-12-12 PROCEDURE — 74011000250 HC RX REV CODE- 250: Performed by: ORTHOPAEDIC SURGERY

## 2018-12-12 PROCEDURE — P9045 ALBUMIN (HUMAN), 5%, 250 ML: HCPCS

## 2018-12-12 PROCEDURE — 77030018822 HC SLV COMPR FT COVD -B

## 2018-12-12 PROCEDURE — 77030028907 HC WRP KNEE WO BGS SOLM -B

## 2018-12-12 PROCEDURE — 87205 SMEAR GRAM STAIN: CPT

## 2018-12-12 PROCEDURE — 77030031139 HC SUT VCRL2 J&J -A: Performed by: ORTHOPAEDIC SURGERY

## 2018-12-12 PROCEDURE — 77030026438 HC STYL ET INTUB CARD -A: Performed by: ANESTHESIOLOGY

## 2018-12-12 PROCEDURE — 77030008684 HC TU ET CUF COVD -B: Performed by: ANESTHESIOLOGY

## 2018-12-12 PROCEDURE — 76060000040 HC ANESTHESIA 4.5 TO 5 HR: Performed by: ORTHOPAEDIC SURGERY

## 2018-12-12 PROCEDURE — 77030002933 HC SUT MCRYL J&J -A: Performed by: ORTHOPAEDIC SURGERY

## 2018-12-12 PROCEDURE — 77030034850: Performed by: ORTHOPAEDIC SURGERY

## 2018-12-12 PROCEDURE — 87075 CULTR BACTERIA EXCEPT BLOOD: CPT

## 2018-12-12 PROCEDURE — 74011250637 HC RX REV CODE- 250/637: Performed by: ORTHOPAEDIC SURGERY

## 2018-12-12 PROCEDURE — 0SPD0JZ REMOVAL OF SYNTHETIC SUBSTITUTE FROM LEFT KNEE JOINT, OPEN APPROACH: ICD-10-PCS | Performed by: ORTHOPAEDIC SURGERY

## 2018-12-12 PROCEDURE — 76010000175 HC OR TIME 4 TO 4.5 HR INTENSV-TIER 1: Performed by: ORTHOPAEDIC SURGERY

## 2018-12-12 PROCEDURE — 65270000029 HC RM PRIVATE

## 2018-12-12 PROCEDURE — 77030018846 HC SOL IRR STRL H20 ICUM -A: Performed by: ORTHOPAEDIC SURGERY

## 2018-12-12 PROCEDURE — 0SRD0EZ REPLACEMENT OF LEFT KNEE JOINT WITH ARTICULATING SPACER, OPEN APPROACH: ICD-10-PCS | Performed by: ORTHOPAEDIC SURGERY

## 2018-12-12 PROCEDURE — 77030027138 HC INCENT SPIROMETER -A

## 2018-12-12 PROCEDURE — 77030010783 HC BOWL MX BN CEM J&J -B: Performed by: ORTHOPAEDIC SURGERY

## 2018-12-12 DEVICE — CEMENT BNE 40GM FULL DOSE PMMA W/ GENT HI VISC RADPQ LNG: Type: IMPLANTABLE DEVICE | Site: KNEE | Status: FUNCTIONAL

## 2018-12-12 DEVICE — SIGMA FEMORAL POSTERIOR STABILIZED LUGGED CEMENTED SIZE 2 LEFT
Type: IMPLANTABLE DEVICE | Site: KNEE | Status: FUNCTIONAL
Brand: SIGMA

## 2018-12-12 DEVICE — P.F.C. SIGMA TIBIAL INSERT ROTATING PLATFORM STABILIZED 2.5 (STAB) 25MM GVF
Type: IMPLANTABLE DEVICE | Site: KNEE | Status: FUNCTIONAL
Brand: P.F.C. SIGMA

## 2018-12-12 RX ORDER — FENTANYL CITRATE 50 UG/ML
INJECTION, SOLUTION INTRAMUSCULAR; INTRAVENOUS AS NEEDED
Status: DISCONTINUED | OUTPATIENT
Start: 2018-12-12 | End: 2018-12-12 | Stop reason: HOSPADM

## 2018-12-12 RX ORDER — OXYCODONE HYDROCHLORIDE 5 MG/1
15 TABLET ORAL
Status: DISCONTINUED | OUTPATIENT
Start: 2018-12-12 | End: 2018-12-18 | Stop reason: HOSPADM

## 2018-12-12 RX ORDER — LIDOCAINE HYDROCHLORIDE 20 MG/ML
INJECTION, SOLUTION EPIDURAL; INFILTRATION; INTRACAUDAL; PERINEURAL AS NEEDED
Status: DISCONTINUED | OUTPATIENT
Start: 2018-12-12 | End: 2018-12-12 | Stop reason: HOSPADM

## 2018-12-12 RX ORDER — DICLOFENAC SODIUM 75 MG/1
75 TABLET, DELAYED RELEASE ORAL 2 TIMES DAILY
Status: DISCONTINUED | OUTPATIENT
Start: 2018-12-12 | End: 2018-12-18 | Stop reason: HOSPADM

## 2018-12-12 RX ORDER — ACETAMINOPHEN 325 MG/1
650 TABLET ORAL EVERY 6 HOURS
Status: DISCONTINUED | OUTPATIENT
Start: 2018-12-12 | End: 2018-12-18 | Stop reason: HOSPADM

## 2018-12-12 RX ORDER — ONDANSETRON 2 MG/ML
INJECTION INTRAMUSCULAR; INTRAVENOUS AS NEEDED
Status: DISCONTINUED | OUTPATIENT
Start: 2018-12-12 | End: 2018-12-12 | Stop reason: HOSPADM

## 2018-12-12 RX ORDER — DIVALPROEX SODIUM 250 MG/1
250 TABLET, DELAYED RELEASE ORAL 2 TIMES DAILY
Status: DISCONTINUED | OUTPATIENT
Start: 2018-12-12 | End: 2018-12-18 | Stop reason: HOSPADM

## 2018-12-12 RX ORDER — ONDANSETRON 2 MG/ML
4 INJECTION INTRAMUSCULAR; INTRAVENOUS AS NEEDED
Status: DISCONTINUED | OUTPATIENT
Start: 2018-12-12 | End: 2018-12-12 | Stop reason: HOSPADM

## 2018-12-12 RX ORDER — GLYCOPYRROLATE 0.2 MG/ML
INJECTION INTRAMUSCULAR; INTRAVENOUS AS NEEDED
Status: DISCONTINUED | OUTPATIENT
Start: 2018-12-12 | End: 2018-12-12 | Stop reason: HOSPADM

## 2018-12-12 RX ORDER — TRAZODONE HYDROCHLORIDE 100 MG/1
100 TABLET ORAL
Status: DISCONTINUED | OUTPATIENT
Start: 2018-12-12 | End: 2018-12-18 | Stop reason: HOSPADM

## 2018-12-12 RX ORDER — NEOSTIGMINE METHYLSULFATE 1 MG/ML
INJECTION INTRAVENOUS AS NEEDED
Status: DISCONTINUED | OUTPATIENT
Start: 2018-12-12 | End: 2018-12-12 | Stop reason: HOSPADM

## 2018-12-12 RX ORDER — ALBUMIN HUMAN 50 G/1000ML
SOLUTION INTRAVENOUS AS NEEDED
Status: DISCONTINUED | OUTPATIENT
Start: 2018-12-12 | End: 2018-12-12 | Stop reason: HOSPADM

## 2018-12-12 RX ORDER — SODIUM CHLORIDE 0.9 % (FLUSH) 0.9 %
5-10 SYRINGE (ML) INJECTION AS NEEDED
Status: DISCONTINUED | OUTPATIENT
Start: 2018-12-12 | End: 2018-12-12

## 2018-12-12 RX ORDER — LIDOCAINE HYDROCHLORIDE 10 MG/ML
0.1 INJECTION, SOLUTION EPIDURAL; INFILTRATION; INTRACAUDAL; PERINEURAL AS NEEDED
Status: DISCONTINUED | OUTPATIENT
Start: 2018-12-12 | End: 2018-12-12 | Stop reason: HOSPADM

## 2018-12-12 RX ORDER — LIDOCAINE HYDROCHLORIDE ANHYDROUS AND DEXTROSE MONOHYDRATE .8; 5 G/100ML; G/100ML
INJECTION, SOLUTION INTRAVENOUS
Status: DISCONTINUED | OUTPATIENT
Start: 2018-12-12 | End: 2018-12-12 | Stop reason: HOSPADM

## 2018-12-12 RX ORDER — MIDAZOLAM HYDROCHLORIDE 1 MG/ML
INJECTION, SOLUTION INTRAMUSCULAR; INTRAVENOUS AS NEEDED
Status: DISCONTINUED | OUTPATIENT
Start: 2018-12-12 | End: 2018-12-12 | Stop reason: HOSPADM

## 2018-12-12 RX ORDER — SODIUM CHLORIDE, SODIUM LACTATE, POTASSIUM CHLORIDE, CALCIUM CHLORIDE 600; 310; 30; 20 MG/100ML; MG/100ML; MG/100ML; MG/100ML
125 INJECTION, SOLUTION INTRAVENOUS CONTINUOUS
Status: DISCONTINUED | OUTPATIENT
Start: 2018-12-12 | End: 2018-12-12 | Stop reason: HOSPADM

## 2018-12-12 RX ORDER — HYDROXYZINE HYDROCHLORIDE 10 MG/1
10 TABLET, FILM COATED ORAL
Status: DISCONTINUED | OUTPATIENT
Start: 2018-12-12 | End: 2018-12-18 | Stop reason: HOSPADM

## 2018-12-12 RX ORDER — MIDAZOLAM HYDROCHLORIDE 1 MG/ML
INJECTION, SOLUTION INTRAMUSCULAR; INTRAVENOUS
Status: COMPLETED
Start: 2018-12-12 | End: 2018-12-12

## 2018-12-12 RX ORDER — BACLOFEN 10 MG/1
10 TABLET ORAL AS NEEDED
Status: DISCONTINUED | OUTPATIENT
Start: 2018-12-12 | End: 2018-12-18 | Stop reason: HOSPADM

## 2018-12-12 RX ORDER — MIDAZOLAM HYDROCHLORIDE 1 MG/ML
0.5 INJECTION, SOLUTION INTRAMUSCULAR; INTRAVENOUS
Status: DISCONTINUED | OUTPATIENT
Start: 2018-12-12 | End: 2018-12-12 | Stop reason: HOSPADM

## 2018-12-12 RX ORDER — HYDROCHLOROTHIAZIDE 25 MG/1
25 TABLET ORAL DAILY
Status: DISCONTINUED | OUTPATIENT
Start: 2018-12-13 | End: 2018-12-18 | Stop reason: HOSPADM

## 2018-12-12 RX ORDER — LOSARTAN POTASSIUM 50 MG/1
100 TABLET ORAL DAILY
Status: DISCONTINUED | OUTPATIENT
Start: 2018-12-13 | End: 2018-12-18 | Stop reason: HOSPADM

## 2018-12-12 RX ORDER — PROPOFOL 10 MG/ML
INJECTION, EMULSION INTRAVENOUS AS NEEDED
Status: DISCONTINUED | OUTPATIENT
Start: 2018-12-12 | End: 2018-12-12 | Stop reason: HOSPADM

## 2018-12-12 RX ORDER — ASPIRIN 81 MG/1
81 TABLET ORAL 2 TIMES DAILY
Status: DISCONTINUED | OUTPATIENT
Start: 2018-12-12 | End: 2018-12-18 | Stop reason: HOSPADM

## 2018-12-12 RX ORDER — DEXMEDETOMIDINE HYDROCHLORIDE 4 UG/ML
INJECTION, SOLUTION INTRAVENOUS AS NEEDED
Status: DISCONTINUED | OUTPATIENT
Start: 2018-12-12 | End: 2018-12-12 | Stop reason: HOSPADM

## 2018-12-12 RX ORDER — DIAZEPAM 10 MG/2ML
INJECTION INTRAMUSCULAR
Status: DISCONTINUED
Start: 2018-12-12 | End: 2018-12-12 | Stop reason: WASHOUT

## 2018-12-12 RX ORDER — SODIUM CHLORIDE 9 MG/ML
25 INJECTION, SOLUTION INTRAVENOUS CONTINUOUS
Status: DISCONTINUED | OUTPATIENT
Start: 2018-12-12 | End: 2018-12-12 | Stop reason: HOSPADM

## 2018-12-12 RX ORDER — FENTANYL CITRATE 50 UG/ML
50 INJECTION, SOLUTION INTRAMUSCULAR; INTRAVENOUS AS NEEDED
Status: DISCONTINUED | OUTPATIENT
Start: 2018-12-12 | End: 2018-12-12 | Stop reason: HOSPADM

## 2018-12-12 RX ORDER — FENTANYL CITRATE 50 UG/ML
INJECTION, SOLUTION INTRAMUSCULAR; INTRAVENOUS
Status: COMPLETED
Start: 2018-12-12 | End: 2018-12-12

## 2018-12-12 RX ORDER — ONDANSETRON 2 MG/ML
4 INJECTION INTRAMUSCULAR; INTRAVENOUS
Status: DISPENSED | OUTPATIENT
Start: 2018-12-12 | End: 2018-12-13

## 2018-12-12 RX ORDER — PREGABALIN 75 MG/1
75 CAPSULE ORAL ONCE
Status: COMPLETED | OUTPATIENT
Start: 2018-12-12 | End: 2018-12-12

## 2018-12-12 RX ORDER — SODIUM CHLORIDE 0.9 % (FLUSH) 0.9 %
5-10 SYRINGE (ML) INJECTION EVERY 8 HOURS
Status: DISCONTINUED | OUTPATIENT
Start: 2018-12-12 | End: 2018-12-18 | Stop reason: HOSPADM

## 2018-12-12 RX ORDER — ROCURONIUM BROMIDE 10 MG/ML
INJECTION, SOLUTION INTRAVENOUS AS NEEDED
Status: DISCONTINUED | OUTPATIENT
Start: 2018-12-12 | End: 2018-12-12 | Stop reason: HOSPADM

## 2018-12-12 RX ORDER — KETAMINE HYDROCHLORIDE 10 MG/ML
INJECTION, SOLUTION INTRAMUSCULAR; INTRAVENOUS AS NEEDED
Status: DISCONTINUED | OUTPATIENT
Start: 2018-12-12 | End: 2018-12-12 | Stop reason: HOSPADM

## 2018-12-12 RX ORDER — MIDAZOLAM HYDROCHLORIDE 1 MG/ML
1 INJECTION, SOLUTION INTRAMUSCULAR; INTRAVENOUS AS NEEDED
Status: DISCONTINUED | OUTPATIENT
Start: 2018-12-12 | End: 2018-12-12 | Stop reason: HOSPADM

## 2018-12-12 RX ORDER — AMOXICILLIN 250 MG
1 CAPSULE ORAL 2 TIMES DAILY
Status: DISCONTINUED | OUTPATIENT
Start: 2018-12-12 | End: 2018-12-18 | Stop reason: HOSPADM

## 2018-12-12 RX ORDER — SODIUM CHLORIDE 0.9 % (FLUSH) 0.9 %
5-10 SYRINGE (ML) INJECTION EVERY 8 HOURS
Status: DISCONTINUED | OUTPATIENT
Start: 2018-12-12 | End: 2018-12-12

## 2018-12-12 RX ORDER — CEFAZOLIN SODIUM/WATER 2 G/20 ML
2 SYRINGE (ML) INTRAVENOUS EVERY 8 HOURS
Status: COMPLETED | OUTPATIENT
Start: 2018-12-12 | End: 2018-12-13

## 2018-12-12 RX ORDER — PHENYLEPHRINE HCL IN 0.9% NACL 0.4MG/10ML
SYRINGE (ML) INTRAVENOUS AS NEEDED
Status: DISCONTINUED | OUTPATIENT
Start: 2018-12-12 | End: 2018-12-12 | Stop reason: HOSPADM

## 2018-12-12 RX ORDER — HYDROMORPHONE HYDROCHLORIDE 2 MG/ML
2 INJECTION, SOLUTION INTRAMUSCULAR; INTRAVENOUS; SUBCUTANEOUS
Status: DISCONTINUED | OUTPATIENT
Start: 2018-12-12 | End: 2018-12-18 | Stop reason: HOSPADM

## 2018-12-12 RX ORDER — OXYCODONE AND ACETAMINOPHEN 5; 325 MG/1; MG/1
1 TABLET ORAL AS NEEDED
Status: DISCONTINUED | OUTPATIENT
Start: 2018-12-12 | End: 2018-12-12 | Stop reason: HOSPADM

## 2018-12-12 RX ORDER — VANCOMYCIN HYDROCHLORIDE 1 G/20ML
INJECTION, POWDER, LYOPHILIZED, FOR SOLUTION INTRAVENOUS AS NEEDED
Status: DISCONTINUED | OUTPATIENT
Start: 2018-12-12 | End: 2018-12-12 | Stop reason: HOSPADM

## 2018-12-12 RX ORDER — FUROSEMIDE 20 MG/1
20 TABLET ORAL DAILY
Status: DISCONTINUED | OUTPATIENT
Start: 2018-12-13 | End: 2018-12-18 | Stop reason: HOSPADM

## 2018-12-12 RX ORDER — ACETAMINOPHEN 500 MG
1000 TABLET ORAL ONCE
Status: COMPLETED | OUTPATIENT
Start: 2018-12-12 | End: 2018-12-12

## 2018-12-12 RX ORDER — SODIUM CHLORIDE 0.9 % (FLUSH) 0.9 %
5-10 SYRINGE (ML) INJECTION AS NEEDED
Status: DISCONTINUED | OUTPATIENT
Start: 2018-12-12 | End: 2018-12-18 | Stop reason: HOSPADM

## 2018-12-12 RX ORDER — FACIAL-BODY WIPES
10 EACH TOPICAL DAILY PRN
Status: DISCONTINUED | OUTPATIENT
Start: 2018-12-14 | End: 2018-12-18 | Stop reason: HOSPADM

## 2018-12-12 RX ORDER — ALBUTEROL SULFATE 0.83 MG/ML
2.5 SOLUTION RESPIRATORY (INHALATION)
Status: DISCONTINUED | OUTPATIENT
Start: 2018-12-12 | End: 2018-12-18 | Stop reason: HOSPADM

## 2018-12-12 RX ORDER — NALOXONE HYDROCHLORIDE 0.4 MG/ML
0.4 INJECTION, SOLUTION INTRAMUSCULAR; INTRAVENOUS; SUBCUTANEOUS AS NEEDED
Status: DISCONTINUED | OUTPATIENT
Start: 2018-12-12 | End: 2018-12-18 | Stop reason: HOSPADM

## 2018-12-12 RX ORDER — MORPHINE SULFATE 10 MG/ML
2 INJECTION, SOLUTION INTRAMUSCULAR; INTRAVENOUS
Status: DISCONTINUED | OUTPATIENT
Start: 2018-12-12 | End: 2018-12-12 | Stop reason: HOSPADM

## 2018-12-12 RX ORDER — POTASSIUM CHLORIDE 750 MG/1
10 TABLET, FILM COATED, EXTENDED RELEASE ORAL DAILY
Status: DISCONTINUED | OUTPATIENT
Start: 2018-12-13 | End: 2018-12-18 | Stop reason: HOSPADM

## 2018-12-12 RX ORDER — RIFAMPIN 300 MG/1
300 CAPSULE ORAL 2 TIMES DAILY
Status: DISCONTINUED | OUTPATIENT
Start: 2018-12-12 | End: 2018-12-14

## 2018-12-12 RX ORDER — SUCCINYLCHOLINE CHLORIDE 20 MG/ML
INJECTION INTRAMUSCULAR; INTRAVENOUS AS NEEDED
Status: DISCONTINUED | OUTPATIENT
Start: 2018-12-12 | End: 2018-12-12 | Stop reason: HOSPADM

## 2018-12-12 RX ORDER — DEXAMETHASONE SODIUM PHOSPHATE 10 MG/ML
4 INJECTION INTRAMUSCULAR; INTRAVENOUS ONCE
Status: COMPLETED | OUTPATIENT
Start: 2018-12-12 | End: 2018-12-12

## 2018-12-12 RX ORDER — CEFAZOLIN SODIUM/WATER 2 G/20 ML
2 SYRINGE (ML) INTRAVENOUS ONCE
Status: COMPLETED | OUTPATIENT
Start: 2018-12-12 | End: 2018-12-12

## 2018-12-12 RX ORDER — POLYETHYLENE GLYCOL 3350 17 G/17G
17 POWDER, FOR SOLUTION ORAL DAILY
Status: DISCONTINUED | OUTPATIENT
Start: 2018-12-13 | End: 2018-12-18 | Stop reason: HOSPADM

## 2018-12-12 RX ORDER — FENTANYL CITRATE 50 UG/ML
25 INJECTION, SOLUTION INTRAMUSCULAR; INTRAVENOUS
Status: DISCONTINUED | OUTPATIENT
Start: 2018-12-12 | End: 2018-12-12 | Stop reason: HOSPADM

## 2018-12-12 RX ORDER — HYDROMORPHONE HYDROCHLORIDE 2 MG/ML
INJECTION, SOLUTION INTRAMUSCULAR; INTRAVENOUS; SUBCUTANEOUS AS NEEDED
Status: DISCONTINUED | OUTPATIENT
Start: 2018-12-12 | End: 2018-12-12 | Stop reason: HOSPADM

## 2018-12-12 RX ORDER — SODIUM CHLORIDE 9 MG/ML
125 INJECTION, SOLUTION INTRAVENOUS CONTINUOUS
Status: DISPENSED | OUTPATIENT
Start: 2018-12-12 | End: 2018-12-13

## 2018-12-12 RX ORDER — LEVOTHYROXINE SODIUM 100 UG/1
100 TABLET ORAL
Status: DISCONTINUED | OUTPATIENT
Start: 2018-12-13 | End: 2018-12-18 | Stop reason: HOSPADM

## 2018-12-12 RX ORDER — MELATONIN
5000 DAILY
Status: DISCONTINUED | OUTPATIENT
Start: 2018-12-13 | End: 2018-12-18 | Stop reason: HOSPADM

## 2018-12-12 RX ORDER — FAMOTIDINE 20 MG/1
20 TABLET, FILM COATED ORAL 2 TIMES DAILY
Status: DISCONTINUED | OUTPATIENT
Start: 2018-12-12 | End: 2018-12-18 | Stop reason: HOSPADM

## 2018-12-12 RX ADMIN — HYDROMORPHONE HYDROCHLORIDE 0.5 MG: 2 INJECTION, SOLUTION INTRAMUSCULAR; INTRAVENOUS; SUBCUTANEOUS at 18:19

## 2018-12-12 RX ADMIN — ONDANSETRON 4 MG: 2 INJECTION INTRAMUSCULAR; INTRAVENOUS at 21:36

## 2018-12-12 RX ADMIN — ROCURONIUM BROMIDE 10 MG: 10 INJECTION, SOLUTION INTRAVENOUS at 15:11

## 2018-12-12 RX ADMIN — ROCURONIUM BROMIDE 10 MG: 10 INJECTION, SOLUTION INTRAVENOUS at 15:54

## 2018-12-12 RX ADMIN — HYDROMORPHONE HYDROCHLORIDE 0.5 MG: 2 INJECTION, SOLUTION INTRAMUSCULAR; INTRAVENOUS; SUBCUTANEOUS at 19:23

## 2018-12-12 RX ADMIN — ALBUMIN HUMAN 250 ML: 50 SOLUTION INTRAVENOUS at 17:19

## 2018-12-12 RX ADMIN — FENTANYL CITRATE 25 MCG: 50 INJECTION, SOLUTION INTRAMUSCULAR; INTRAVENOUS at 19:55

## 2018-12-12 RX ADMIN — MIDAZOLAM HYDROCHLORIDE 0.5 MG: 1 INJECTION, SOLUTION INTRAMUSCULAR; INTRAVENOUS at 20:03

## 2018-12-12 RX ADMIN — MIDAZOLAM HYDROCHLORIDE 0.5 MG: 1 INJECTION, SOLUTION INTRAMUSCULAR; INTRAVENOUS at 19:48

## 2018-12-12 RX ADMIN — PROPOFOL 150 MG: 10 INJECTION, EMULSION INTRAVENOUS at 15:11

## 2018-12-12 RX ADMIN — FENTANYL CITRATE 100 MCG: 50 INJECTION, SOLUTION INTRAMUSCULAR; INTRAVENOUS at 19:09

## 2018-12-12 RX ADMIN — ALBUMIN HUMAN 250 ML: 50 SOLUTION INTRAVENOUS at 17:52

## 2018-12-12 RX ADMIN — KETAMINE HYDROCHLORIDE 20 MG: 10 INJECTION, SOLUTION INTRAMUSCULAR; INTRAVENOUS at 15:51

## 2018-12-12 RX ADMIN — HYDROMORPHONE HYDROCHLORIDE 0.5 MG: 2 INJECTION, SOLUTION INTRAMUSCULAR; INTRAVENOUS; SUBCUTANEOUS at 18:53

## 2018-12-12 RX ADMIN — FAMOTIDINE 20 MG: 20 TABLET ORAL at 21:34

## 2018-12-12 RX ADMIN — DICLOFENAC SODIUM 75 MG: 75 TABLET, DELAYED RELEASE ORAL at 22:59

## 2018-12-12 RX ADMIN — Medication 81 MG: at 21:35

## 2018-12-12 RX ADMIN — ACETAMINOPHEN 650 MG: 325 TABLET ORAL at 21:46

## 2018-12-12 RX ADMIN — MIDAZOLAM HYDROCHLORIDE 0.5 MG: 1 INJECTION, SOLUTION INTRAMUSCULAR; INTRAVENOUS at 19:28

## 2018-12-12 RX ADMIN — MIDAZOLAM HYDROCHLORIDE 3 MG: 1 INJECTION, SOLUTION INTRAMUSCULAR; INTRAVENOUS at 14:59

## 2018-12-12 RX ADMIN — ALBUMIN HUMAN 250 ML: 50 SOLUTION INTRAVENOUS at 17:35

## 2018-12-12 RX ADMIN — OXYCODONE HYDROCHLORIDE 15 MG: 5 TABLET ORAL at 21:34

## 2018-12-12 RX ADMIN — LIDOCAINE HYDROCHLORIDE ANHYDROUS AND DEXTROSE MONOHYDRATE 1 MG/KG/HR: .8; 5 INJECTION, SOLUTION INTRAVENOUS at 16:32

## 2018-12-12 RX ADMIN — PREGABALIN 75 MG: 75 CAPSULE ORAL at 14:58

## 2018-12-12 RX ADMIN — ROCURONIUM BROMIDE 20 MG: 10 INJECTION, SOLUTION INTRAVENOUS at 15:21

## 2018-12-12 RX ADMIN — Medication 80 MCG: at 17:34

## 2018-12-12 RX ADMIN — LIDOCAINE HYDROCHLORIDE 100 MG: 20 INJECTION, SOLUTION EPIDURAL; INFILTRATION; INTRACAUDAL; PERINEURAL at 15:11

## 2018-12-12 RX ADMIN — DEXMEDETOMIDINE HYDROCHLORIDE 20 MCG: 4 INJECTION, SOLUTION INTRAVENOUS at 16:32

## 2018-12-12 RX ADMIN — FENTANYL CITRATE 25 MCG: 50 INJECTION, SOLUTION INTRAMUSCULAR; INTRAVENOUS at 19:50

## 2018-12-12 RX ADMIN — SUCCINYLCHOLINE CHLORIDE 140 MG: 20 INJECTION INTRAMUSCULAR; INTRAVENOUS at 15:12

## 2018-12-12 RX ADMIN — SODIUM CHLORIDE 125 ML/HR: 900 INJECTION, SOLUTION INTRAVENOUS at 20:24

## 2018-12-12 RX ADMIN — ROCURONIUM BROMIDE 20 MG: 10 INJECTION, SOLUTION INTRAVENOUS at 17:34

## 2018-12-12 RX ADMIN — Medication 120 MCG: at 17:19

## 2018-12-12 RX ADMIN — ROCURONIUM BROMIDE 20 MG: 10 INJECTION, SOLUTION INTRAVENOUS at 17:07

## 2018-12-12 RX ADMIN — NEOSTIGMINE METHYLSULFATE 3 MG: 1 INJECTION INTRAVENOUS at 18:56

## 2018-12-12 RX ADMIN — HYDROMORPHONE HYDROCHLORIDE 1 MG: 2 INJECTION, SOLUTION INTRAMUSCULAR; INTRAVENOUS; SUBCUTANEOUS at 16:14

## 2018-12-12 RX ADMIN — RIFAMPIN 300 MG: 300 CAPSULE ORAL at 21:34

## 2018-12-12 RX ADMIN — FENTANYL CITRATE 50 MCG: 50 INJECTION, SOLUTION INTRAMUSCULAR; INTRAVENOUS at 15:53

## 2018-12-12 RX ADMIN — FENTANYL CITRATE 100 MCG: 50 INJECTION, SOLUTION INTRAMUSCULAR; INTRAVENOUS at 19:14

## 2018-12-12 RX ADMIN — GLYCOPYRROLATE 0.4 MG: 0.2 INJECTION INTRAMUSCULAR; INTRAVENOUS at 18:56

## 2018-12-12 RX ADMIN — HYDROMORPHONE HYDROCHLORIDE 2 MG: 2 INJECTION INTRAMUSCULAR; INTRAVENOUS; SUBCUTANEOUS at 22:59

## 2018-12-12 RX ADMIN — ONDANSETRON 4 MG: 2 INJECTION INTRAMUSCULAR; INTRAVENOUS at 19:12

## 2018-12-12 RX ADMIN — FENTANYL CITRATE 25 MCG: 50 INJECTION, SOLUTION INTRAMUSCULAR; INTRAVENOUS at 15:02

## 2018-12-12 RX ADMIN — ROCURONIUM BROMIDE 10 MG: 10 INJECTION, SOLUTION INTRAVENOUS at 18:06

## 2018-12-12 RX ADMIN — ROCURONIUM BROMIDE 20 MG: 10 INJECTION, SOLUTION INTRAVENOUS at 16:32

## 2018-12-12 RX ADMIN — SODIUM CHLORIDE, POTASSIUM CHLORIDE, SODIUM LACTATE AND CALCIUM CHLORIDE: 600; 310; 30; 20 INJECTION, SOLUTION INTRAVENOUS at 14:50

## 2018-12-12 RX ADMIN — FENTANYL CITRATE 50 MCG: 50 INJECTION, SOLUTION INTRAMUSCULAR; INTRAVENOUS at 15:22

## 2018-12-12 RX ADMIN — FENTANYL CITRATE 50 MCG: 50 INJECTION, SOLUTION INTRAMUSCULAR; INTRAVENOUS at 15:11

## 2018-12-12 RX ADMIN — Medication 2 G: at 23:00

## 2018-12-12 RX ADMIN — FENTANYL CITRATE 50 MCG: 50 INJECTION, SOLUTION INTRAMUSCULAR; INTRAVENOUS at 19:17

## 2018-12-12 RX ADMIN — ACETAMINOPHEN 1000 MG: 500 TABLET ORAL at 14:58

## 2018-12-12 RX ADMIN — DEXAMETHASONE SODIUM PHOSPHATE 4 MG: 10 INJECTION, SOLUTION INTRAMUSCULAR; INTRAVENOUS at 15:20

## 2018-12-12 RX ADMIN — ROCURONIUM BROMIDE 10 MG: 10 INJECTION, SOLUTION INTRAVENOUS at 16:13

## 2018-12-12 RX ADMIN — MIDAZOLAM HYDROCHLORIDE 1 MG: 1 INJECTION, SOLUTION INTRAMUSCULAR; INTRAVENOUS at 15:02

## 2018-12-12 RX ADMIN — MIDAZOLAM HYDROCHLORIDE 1 MG: 1 INJECTION, SOLUTION INTRAMUSCULAR; INTRAVENOUS at 15:05

## 2018-12-12 RX ADMIN — KETAMINE HYDROCHLORIDE 30 MG: 10 INJECTION, SOLUTION INTRAMUSCULAR; INTRAVENOUS at 15:15

## 2018-12-12 RX ADMIN — Medication 2 G: at 15:15

## 2018-12-12 RX ADMIN — ONDANSETRON 4 MG: 2 INJECTION INTRAMUSCULAR; INTRAVENOUS at 15:20

## 2018-12-12 RX ADMIN — FENTANYL CITRATE 25 MCG: 50 INJECTION, SOLUTION INTRAMUSCULAR; INTRAVENOUS at 14:59

## 2018-12-12 RX ADMIN — DIVALPROEX SODIUM 250 MG: 250 TABLET, DELAYED RELEASE ORAL at 21:35

## 2018-12-12 RX ADMIN — FENTANYL CITRATE 25 MCG: 50 INJECTION, SOLUTION INTRAMUSCULAR; INTRAVENOUS at 20:01

## 2018-12-12 RX ADMIN — FENTANYL CITRATE 25 MCG: 50 INJECTION INTRAMUSCULAR; INTRAVENOUS at 19:50

## 2018-12-12 RX ADMIN — SODIUM CHLORIDE, POTASSIUM CHLORIDE, SODIUM LACTATE AND CALCIUM CHLORIDE: 600; 310; 30; 20 INJECTION, SOLUTION INTRAVENOUS at 17:46

## 2018-12-12 RX ADMIN — FENTANYL CITRATE 50 MCG: 50 INJECTION, SOLUTION INTRAMUSCULAR; INTRAVENOUS at 15:54

## 2018-12-12 RX ADMIN — MIDAZOLAM 0.5 MG: 1 INJECTION INTRAMUSCULAR; INTRAVENOUS at 19:28

## 2018-12-12 NOTE — PERIOP NOTES
Contacted patient's spouse on phone number provided to advise of patient status and procedure start.

## 2018-12-12 NOTE — ANESTHESIA PREPROCEDURE EVALUATION
Anesthetic History   No history of anesthetic complications            Review of Systems / Medical History  Patient summary reviewed, nursing notes reviewed and pertinent labs reviewed    Pulmonary                Comments: Smoking Status: Former Smoker  Quit [id number]     Neuro/Psych     seizures: well controlled    Psychiatric history     Cardiovascular    Hypertension              Exercise tolerance: >4 METS     GI/Hepatic/Renal     GERD: well controlled           Endo/Other      Hypothyroidism  Obesity and arthritis     Other Findings          Anesthetic History   No history of anesthetic complications            Review of Systems / Medical History  Anesthetic History   No history of anesthetic complications            Review of Systems / Medical History  Patient summary reviewed, nursing notes reviewed and pertinent labs reviewed    Pulmonary  Within defined limits                 Neuro/Psych     seizures: well controlled         Cardiovascular    Hypertension: well controlled                   GI/Hepatic/Renal     GERD: well controlled           Endo/Other      Hypothyroidism: well controlled  Arthritis     Other Findings              Physical Exam    Airway  Mallampati: I  TM Distance: > 6 cm  Neck ROM: normal range of motion   Mouth opening: Normal     Cardiovascular  Regular rate and rhythm,  S1 and S2 normal,  no murmur, click, rub, or gallop             Dental  No notable dental hx       Pulmonary  Breath sounds clear to auscultation               Abdominal  GI exam deferred       Other Findings            Anesthetic Plan    ASA: 2  Anesthesia type: regional          Induction: Intravenous  Anesthetic plan and risks discussed with: Patient          Patient summary reviewed, nursing notes reviewed and pertinent labs reviewed    Pulmonary  Within defined limits                 Neuro/Psych     seizures: well controlled         Cardiovascular    Hypertension: well controlled GI/Hepatic/Renal     GERD: well controlled           Endo/Other      Hypothyroidism: well controlled  Arthritis     Other Findings              Physical Exam    Airway  Mallampati: I  TM Distance: > 6 cm  Neck ROM: normal range of motion   Mouth opening: Normal     Cardiovascular  Regular rate and rhythm,  S1 and S2 normal,  no murmur, click, rub, or gallop             Dental  No notable dental hx       Pulmonary  Breath sounds clear to auscultation               Abdominal  GI exam deferred       Other Findings            Anesthetic Plan    ASA: 2  Anesthesia type: regional          Induction: Intravenous  Anesthetic plan and risks discussed with: Patient              Physical Exam    Airway  Mallampati: II  TM Distance: 4 - 6 cm  Neck ROM: normal range of motion   Mouth opening: Diminished (comment)     Cardiovascular  Regular rate and rhythm,  S1 and S2 normal,  no murmur, click, rub, or gallop  Rhythm: regular  Rate: normal         Dental  No notable dental hx       Pulmonary  Breath sounds clear to auscultation               Abdominal  GI exam deferred       Other Findings            Anesthetic Plan    ASA: 2  Anesthesia type: general          Induction: Intravenous  Anesthetic plan and risks discussed with: Patient

## 2018-12-12 NOTE — H&P
H and P    Subjective:         Yahaira Garcia is a 50 y.o. female who presents for resection left knee with pos MRSA    Patient Active Problem List    Diagnosis Date Noted    Status post left knee replacement 2018    H/O total knee replacement, left 2018    Dehiscence of incision 2018    Primary osteoarthritis of both knees 10/24/2018    Hematuria 10/09/2018    MRSA carrier 10/09/2018    Hyperthyroidism 10/17/2012     Family History   Problem Relation Age of Onset    Heart Disease Mother     Heart Attack Mother     Hypertension Mother     Arthritis-osteo Mother     Rashes/Skin Problems Mother         PLAQUE PSORIASIS    Heart Disease Father     Thyroid Disease Father     Diabetes Father     Hypertension Father     Diabetes Maternal Grandmother     Heart Attack Maternal Grandmother     Hypertension Maternal Grandmother     Arthritis-osteo Maternal Grandmother     Hypertension Maternal Grandfather     Stroke Maternal Grandfather     High Cholesterol Sister     Anesth Problems Neg Hx       Social History     Tobacco Use    Smoking status: Former Smoker     Types: Cigarettes     Last attempt to quit: 1994     Years since quittin.2    Smokeless tobacco: Never Used   Substance Use Topics    Alcohol use: No     Past Medical History:   Diagnosis Date    ADHD (attention deficit hyperactivity disorder)     Arthritis     OSTEO    Chronic pain     GERD (gastroesophageal reflux disease)     Graves disease     NO LONGER AN ISSUE    Hematuria 10/9/2018    HTN (hypertension)     Hypothyroidism 2018    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    OCD (obsessive compulsive disorder)     Other ill-defined conditions(799.89)     graves    Psychiatric disorder     depression    Seizure ([address]     Seizures ([address]  &     REACTIVE TO HYPOGLYCEMIA / ALSO FROM FLASHING LIGHTS      Past Surgical History:   Procedure Laterality Date    DELIVERY       HX ABDOMINOPLASTY    HX ADENOIDECTOMY      HX  SECTION      HX  SECTION  2006    HX GASTRIC BYPASS          HX GI  1993    CHOLEYCYSTECTOMY    HX ORTHOPAEDIC Right     CARPEL TUNNEL    HX ORTHOPAEDIC Left     CARPEL TUNNEL    HX ORTHOPAEDIC Left     ULNA SHORTENING    HX ORTHOPAEDIC Right 2011    COLLAR BONE SURGERY     HX TONSILLECTOMY        Prior to Admission medications    Medication Sig Start Date End Date Taking? Authorizing Provider   heparin, porcine, pf (HEPARIN LOCKFLUSH,PORCINE,,PF,) 10 unit/mL injection 5 mL by IntraVENous route two (2) times a day. Flush PICC Line using SASH method. Yes Provider, Historical   vancomycin/0.9 % sod chloride (VANCOMYCIN IN 0.9 % SODIUM CHL) 1.5 gram/500 mL soln 500 mL by IntraVENous route every twelve (12) hours every twelve (12) hours. Yes Provider, Historical   acetaminophen (TYLENOL) 500 mg tablet Take 500 mg by mouth every six (6) hours as needed for Pain. Yes Provider, Historical   diclofenac EC (VOLTAREN) 75 mg EC tablet Take 75 mg by mouth two (2) times a day. Yes Provider, Historical   rifAMPin (RIFADIN) 300 mg capsule Take 300 mg by mouth two (2) times a day. Yes Provider, Historical   diclofenac 10% cyclobenzaprine 2% lidocaine 5% gabapentin 6% cream compound Apply 2 g to affected area four (4) times daily as needed for Pain. Yes Provider, Historical   tapentadol (NUCYNTA ER) 250 mg Tb12 Take 1 Tab by mouth two (2) times a day. Yes Provider, Historical   oxyCODONE IR (OXY-IR) 15 mg immediate release tablet Take 15 mg by mouth every four (4) hours as needed for Pain. Yes Provider, Historical   aspirin delayed-release 81 mg tablet Take 1 Tab by mouth two (2) times a day. 10/26/18  Yes Juvenal Desai MD   famotidine (PEPCID) 20 mg tablet Take 1 Tab by mouth two (2) times a day. 10/26/18  Yes Juvenal Desai MD   amphetamine sulfate (EVEKEO) 10 mg tab Take 10 mg by mouth.   PT TAKES 2 TABS (20 MG) BID AT 0200 AND 1200 Yes Provider, Historical   vortioxetine (TRINTELLIX) 10 mg tablet Take  by mouth daily. PT TAKES A TOTAL OF 30 mg DAILY   Yes Provider, Historical   traZODone (DESYREL) 100 mg tablet Take 100 mg by mouth nightly. May take 1/2 tab to 1 tab every evening   Yes Provider, Historical   baclofen (LIORESAL) 10 mg tablet Take 10 mg by mouth as needed for Pain. Yes Provider, Historical   divalproex DR (DEPAKOTE) 250 mg tablet Take 1 tablet by mouth in  the morning and 2 tablets  by mouth in the evening 9/11/15  Yes Bryanna King MD   Lisdexamfetamine (VYVANSE) 50 mg capsule Take 70 mg by mouth two (2) times a day. Takes in the morning and at noon daily         Yes Provider, Historical   losartan (COZAAR) 50 mg tablet Take 100 mg by mouth daily. Yes Provider, Historical   simethicone (GAS-X) 80 mg chewable tablet Take 80 mg by mouth every six (6) hours as needed. Yes Provider, Historical   lansoprazole (PREVACID) 15 mg disintegrating tablet Take 1 Tab by mouth Daily (before breakfast). Yes Provider, Historical   cetirizine (ZYRTEC) 10 mg tablet Take 1 Tab by mouth daily. Yes Provider, Historical   sodium chloride (NORMAL SALINE FLUSH) 10-20 mL by IntraVENous route two (2) times a day. Flush PICC using SASH method. Provider, Historical   mupirocin calcium (BACTROBAN) 2 % topical cream Apply 1 Each to affected area two (2) times a day. Provider, Historical   alpha-d-galactosidase (BEANO) 150 unit tab tablet Take 2 Tabs by mouth as needed for Flatulence. Brian Stein MD   cholecalciferol, VITAMIN D3, (VITAMIN D3) 5,000 unit tab tablet Take 1 Cap by mouth daily. Brian Stein MD   levothyroxine (SYNTHROID) 100 mcg tablet Take 100 mcg by mouth Daily (before breakfast). Provider, Historical   potassium chloride SR (KLOR-CON 10) 10 mEq tablet Take 10 mEq by mouth daily. Provider, Historical   furosemide (LASIX) 20 mg tablet Take 20 mg by mouth daily.  MAY TAKE ONLY IF NEEDED FOR SEVERE ANKLE SWELLING    Provider, Historical   hydrochlorothiazide (HYDRODIURIL) 25 mg tablet Take 25 mg by mouth daily. MAY TAKE 1 OR 2 TABS AS NEEDED FOR ANKLE SWELLING    Provider, Historical   albuterol (PROAIR HFA) 90 mcg/actuation inhaler Take 1 Puff by inhalation every four (4) hours as needed for Wheezing. Provider, Historical   benzoyl peroxide (DUAL ACTION) 3.5 % Clsr by Apply Externally route. Provider, Historical   acyclovir (ZOVIRAX) 400 mg tablet Take 400 mg by mouth every four (4) hours as needed for Other (fever blisters). Other, MD Pawel   MULTIVIT WITH CALCIUM,IRON,MIN (ONE-A-DAY WOMENS FORMULA PO) Take  by mouth. Provider, Historical     Current Facility-Administered Medications   Medication Dose Route Frequency    acetaminophen (TYLENOL) tablet 1,000 mg  1,000 mg Oral ONCE    ceFAZolin (ANCEF) 2 g/20 mL in sterile water IV syringe  2 g IntraVENous ONCE    dexamethasone (PF) (DECADRON) injection 4 mg  4 mg IntraVENous ONCE    pregabalin (LYRICA) capsule 75 mg  75 mg Oral ONCE      Allergies   Allergen Reactions    Sulfa (Sulfonamide Antibiotics) Hives    Pcn [Penicillins] Unproven on Challenge        Review of Systems:    Negative for  nausea, vomiting, chest pain, shortness of breath, headaches.     Pos for chills , no active fevers            Objective:     Patient Vitals for the past 24 hrs:   Temp Pulse Resp BP SpO2   18 1413    (!) 181/105    18 1402 98.6 °F (37 °C) 78 18  100 %       Temp (24hrs), Av.6 °F (37 °C), Min:98.6 °F (37 °C), Max:98.6 °F (37 °C)      Gen: NAD, A&Ox3  Resp: Non-labored breathing  CV: Extremities well perfused  Abd: soft, NT  LLE: swelling with active drainage,  swelling about knee or ankle, skin intact, warm well perfused, SILT in al distributions L3-S1, palpable pedal pulses, no calf tenderness    Imaging Review: None    Labs:   Recent Results (from the past 24 hour(s))   HCG URINE, QL. - POC    Collection Time: 18  2:04 PM   Result Value Ref Range    Pregnancy test,urine (POC) NEGATIVE  NEG           Current Facility-Administered Medications   Medication Dose Route Frequency    acetaminophen (TYLENOL) tablet 1,000 mg  1,000 mg Oral ONCE    ceFAZolin (ANCEF) 2 g/20 mL in sterile water IV syringe  2 g IntraVENous ONCE    dexamethasone (PF) (DECADRON) injection 4 mg  4 mg IntraVENous ONCE    pregabalin (LYRICA) capsule 75 mg  75 mg Oral ONCE         Impression:     Active Problems:    Status post left knee replacement (12/12/2018)        Plan:   Plan for resection arhtroplasty left knee with abx spacer        Davide Mesa MD

## 2018-12-13 ENCOUNTER — HOME CARE VISIT (OUTPATIENT)
Dept: HOME HEALTH SERVICES | Facility: HOME HEALTH | Age: 48
End: 2018-12-13
Payer: COMMERCIAL

## 2018-12-13 LAB
ANION GAP SERPL CALC-SCNC: 6 MMOL/L (ref 5–15)
BUN SERPL-MCNC: 9 MG/DL (ref 6–20)
BUN/CREAT SERPL: 14 (ref 12–20)
CALCIUM SERPL-MCNC: 7.3 MG/DL (ref 8.5–10.1)
CHLORIDE SERPL-SCNC: 108 MMOL/L (ref 97–108)
CO2 SERPL-SCNC: 25 MMOL/L (ref 21–32)
CREAT SERPL-MCNC: 0.65 MG/DL (ref 0.55–1.02)
GLUCOSE SERPL-MCNC: 123 MG/DL (ref 65–100)
HGB BLD-MCNC: 6.8 G/DL (ref 11.5–16)
HGB BLD-MCNC: 7.2 G/DL (ref 11.5–16)
POTASSIUM SERPL-SCNC: 4 MMOL/L (ref 3.5–5.1)
SODIUM SERPL-SCNC: 139 MMOL/L (ref 136–145)
VANCOMYCIN SERPL-MCNC: 15.1 UG/ML

## 2018-12-13 PROCEDURE — 74011250636 HC RX REV CODE- 250/636: Performed by: PHYSICIAN ASSISTANT

## 2018-12-13 PROCEDURE — P9016 RBC LEUKOCYTES REDUCED: HCPCS

## 2018-12-13 PROCEDURE — 86923 COMPATIBILITY TEST ELECTRIC: CPT

## 2018-12-13 PROCEDURE — 65270000029 HC RM PRIVATE

## 2018-12-13 PROCEDURE — 80202 ASSAY OF VANCOMYCIN: CPT

## 2018-12-13 PROCEDURE — 85018 HEMOGLOBIN: CPT

## 2018-12-13 PROCEDURE — 74011250637 HC RX REV CODE- 250/637: Performed by: ORTHOPAEDIC SURGERY

## 2018-12-13 PROCEDURE — 36415 COLL VENOUS BLD VENIPUNCTURE: CPT

## 2018-12-13 PROCEDURE — 74011250636 HC RX REV CODE- 250/636: Performed by: ORTHOPAEDIC SURGERY

## 2018-12-13 PROCEDURE — 36430 TRANSFUSION BLD/BLD COMPNT: CPT

## 2018-12-13 PROCEDURE — 30233N1 TRANSFUSION OF NONAUTOLOGOUS RED BLOOD CELLS INTO PERIPHERAL VEIN, PERCUTANEOUS APPROACH: ICD-10-PCS | Performed by: ORTHOPAEDIC SURGERY

## 2018-12-13 PROCEDURE — 77030027138 HC INCENT SPIROMETER -A

## 2018-12-13 PROCEDURE — 86901 BLOOD TYPING SEROLOGIC RH(D): CPT

## 2018-12-13 PROCEDURE — P9040 RBC LEUKOREDUCED IRRADIATED: HCPCS

## 2018-12-13 PROCEDURE — 80048 BASIC METABOLIC PNL TOTAL CA: CPT

## 2018-12-13 RX ORDER — SODIUM CHLORIDE 9 MG/ML
250 INJECTION, SOLUTION INTRAVENOUS AS NEEDED
Status: DISCONTINUED | OUTPATIENT
Start: 2018-12-13 | End: 2018-12-18 | Stop reason: HOSPADM

## 2018-12-13 RX ORDER — VANCOMYCIN/0.9 % SOD CHLORIDE 1.5G/250ML
1500 PLASTIC BAG, INJECTION (ML) INTRAVENOUS ONCE
Status: COMPLETED | OUTPATIENT
Start: 2018-12-13 | End: 2018-12-13

## 2018-12-13 RX ORDER — VANCOMYCIN/0.9 % SOD CHLORIDE 1.5G/250ML
1500 PLASTIC BAG, INJECTION (ML) INTRAVENOUS EVERY 12 HOURS
Status: DISCONTINUED | OUTPATIENT
Start: 2018-12-13 | End: 2018-12-18 | Stop reason: HOSPADM

## 2018-12-13 RX ORDER — CETIRIZINE HCL 10 MG
10 TABLET ORAL
Status: DISCONTINUED | OUTPATIENT
Start: 2018-12-13 | End: 2018-12-18 | Stop reason: HOSPADM

## 2018-12-13 RX ADMIN — OXYCODONE HYDROCHLORIDE 15 MG: 5 TABLET ORAL at 01:37

## 2018-12-13 RX ADMIN — DICLOFENAC SODIUM 75 MG: 75 TABLET, DELAYED RELEASE ORAL at 19:42

## 2018-12-13 RX ADMIN — DICLOFENAC SODIUM 75 MG: 75 TABLET, DELAYED RELEASE ORAL at 09:20

## 2018-12-13 RX ADMIN — HYDROMORPHONE HYDROCHLORIDE 2 MG: 2 INJECTION INTRAMUSCULAR; INTRAVENOUS; SUBCUTANEOUS at 19:29

## 2018-12-13 RX ADMIN — ACETAMINOPHEN 650 MG: 325 TABLET ORAL at 05:29

## 2018-12-13 RX ADMIN — LOSARTAN POTASSIUM 100 MG: 50 TABLET ORAL at 09:10

## 2018-12-13 RX ADMIN — RIFAMPIN 300 MG: 300 CAPSULE ORAL at 09:10

## 2018-12-13 RX ADMIN — FAMOTIDINE 20 MG: 20 TABLET ORAL at 19:40

## 2018-12-13 RX ADMIN — LEVOTHYROXINE SODIUM 100 MCG: 100 TABLET ORAL at 07:13

## 2018-12-13 RX ADMIN — TRAZODONE HYDROCHLORIDE 100 MG: 100 TABLET ORAL at 22:59

## 2018-12-13 RX ADMIN — FAMOTIDINE 20 MG: 20 TABLET ORAL at 09:10

## 2018-12-13 RX ADMIN — VITAMIN D, TAB 1000IU (100/BT) 5000 UNITS: 25 TAB at 09:08

## 2018-12-13 RX ADMIN — HYDROMORPHONE HYDROCHLORIDE 2 MG: 2 INJECTION INTRAMUSCULAR; INTRAVENOUS; SUBCUTANEOUS at 08:32

## 2018-12-13 RX ADMIN — OXYCODONE HYDROCHLORIDE 15 MG: 5 TABLET ORAL at 05:29

## 2018-12-13 RX ADMIN — ACETAMINOPHEN 650 MG: 325 TABLET ORAL at 23:17

## 2018-12-13 RX ADMIN — RIFAMPIN 300 MG: 300 CAPSULE ORAL at 19:41

## 2018-12-13 RX ADMIN — OXYCODONE HYDROCHLORIDE 15 MG: 5 TABLET ORAL at 14:48

## 2018-12-13 RX ADMIN — SODIUM CHLORIDE 125 ML/HR: 900 INJECTION, SOLUTION INTRAVENOUS at 06:25

## 2018-12-13 RX ADMIN — OXYCODONE HYDROCHLORIDE 15 MG: 5 TABLET ORAL at 19:46

## 2018-12-13 RX ADMIN — ACETAMINOPHEN 650 MG: 325 TABLET ORAL at 00:05

## 2018-12-13 RX ADMIN — Medication 10 ML: at 16:47

## 2018-12-13 RX ADMIN — DIVALPROEX SODIUM 250 MG: 250 TABLET, DELAYED RELEASE ORAL at 09:08

## 2018-12-13 RX ADMIN — HYDROMORPHONE HYDROCHLORIDE 2 MG: 2 INJECTION INTRAMUSCULAR; INTRAVENOUS; SUBCUTANEOUS at 04:11

## 2018-12-13 RX ADMIN — OXYCODONE HYDROCHLORIDE 15 MG: 5 TABLET ORAL at 23:41

## 2018-12-13 RX ADMIN — VANCOMYCIN HYDROCHLORIDE 1500 MG: 10 INJECTION, POWDER, LYOPHILIZED, FOR SOLUTION INTRAVENOUS at 19:29

## 2018-12-13 RX ADMIN — ACETAMINOPHEN 650 MG: 325 TABLET ORAL at 13:01

## 2018-12-13 RX ADMIN — Medication 81 MG: at 19:42

## 2018-12-13 RX ADMIN — OXYCODONE HYDROCHLORIDE 15 MG: 5 TABLET ORAL at 09:08

## 2018-12-13 RX ADMIN — MULTIPLE VITAMINS W/ MINERALS TAB 1 TABLET: TAB at 09:10

## 2018-12-13 RX ADMIN — Medication 2 G: at 07:13

## 2018-12-13 RX ADMIN — Medication 81 MG: at 09:10

## 2018-12-13 RX ADMIN — DIVALPROEX SODIUM 250 MG: 250 TABLET, DELAYED RELEASE ORAL at 19:40

## 2018-12-13 RX ADMIN — ACETAMINOPHEN 650 MG: 325 TABLET ORAL at 19:41

## 2018-12-13 RX ADMIN — POTASSIUM CHLORIDE 10 MEQ: 750 TABLET, EXTENDED RELEASE ORAL at 09:10

## 2018-12-13 RX ADMIN — VANCOMYCIN HYDROCHLORIDE 1500 MG: 10 INJECTION, POWDER, LYOPHILIZED, FOR SOLUTION INTRAVENOUS at 03:49

## 2018-12-13 NOTE — PROGRESS NOTES
Patients hemoglobin resulted 6.8 at 0247. Paged Dr Chester Landon who instructed to run it by Dr Dulce López when he arrives.   No orders given

## 2018-12-13 NOTE — PROGRESS NOTES
Problem: Falls - Risk of  Goal: *Absence of Falls  Document Shanell Fall Risk and appropriate interventions in the flowsheet.   Outcome: Progressing Towards Goal  Fall Risk Interventions:  Mobility Interventions: Patient to call before getting OOB         Medication Interventions: Patient to call before getting OOB    Elimination Interventions: Call light in reach

## 2018-12-13 NOTE — PROGRESS NOTES
Occupational Therapy Note 913    Orders acknowledged, chart reviewed. Patient with 6.8 HGB, plans to receive pRBC. Will follow up with OT evaluation later today as able & appropriate.      Thank you  Mikey Sanders, OT

## 2018-12-13 NOTE — PROGRESS NOTES
TRANSFER - IN REPORT:    Verbal report received from Allen Bobo RN(name) on Suman Jimenez  being received from [employer]) for routine post - op      Report consisted of patients Situation, Background, Assessment and   Recommendations(SBAR). Information from the following report(s) SBAR, Kardex, Procedure Summary, Intake/Output, MAR, Accordion and Recent Results was reviewed with the receiving nurse. Opportunity for questions and clarification was provided. Assessment completed upon patients arrival to unit and care assumed.

## 2018-12-13 NOTE — PROGRESS NOTES
Resting comfortably      GEN:  NAD.  AOx3   ABD:  S/NT/ND   LLE:  Dressing C/D/I , 5/5 motor, Calf nttp (Bilat), Sensation rossly intact to light touch throughout, 1+ dp/pt pulses, foot perfused    Patient Vitals for the past 24 hrs:   Temp Pulse Resp BP SpO2   12/13/18 0455 99.3 °F (37.4 °C) 97 16 111/61 95 %   12/13/18 0412    124/64    12/13/18 0111 98.6 °F (37 °C) 93 16 118/69 97 %   12/13/18 0006 98.5 °F (36.9 °C) (!) 101 16 151/82 98 %   12/12/18 2306 98.5 °F (36.9 °C) 95 14 (!) 157/100 99 %   12/12/18 2150 98.6 °F (37 °C) (!) 101 14 (!) 159/94 100 %   12/12/18 2106 98.7 °F (37.1 °C) 94 14 (!) 165/110 98 %   12/12/18 2045  93 10 148/71 96 %   12/12/18 2030  100 (!) 6 147/70 96 %   12/12/18 2015  (!) 101 12 146/90 95 %   12/12/18 2000 98.4 °F (36.9 °C) 96 11 142/73 95 %   12/12/18 1945  96 8 161/84 99 %   12/12/18 1930  (!) 116 21 (!) 175/101 99 %   12/12/18 1925  (!) 121 19 (!) 182/97 99 %   12/12/18 1920  (!) 110 17 175/86 97 %   12/12/18 1917 99.2 °F (37.3 °C) 100 9 149/81 98 %   12/12/18 1915  (!) 102  146/75 98 %   12/12/18 1413    (!) 181/105    12/12/18 1402 98.6 °F (37 °C) 78 18  100 %       Current Facility-Administered Medications   Medication Dose Route Frequency    cetirizine (ZYRTEC) tablet 10 mg  10 mg Oral DAILY PRN    0.9% sodium chloride infusion 250 mL  250 mL IntraVENous PRN    vancomycin (VANCOCIN) 1500 mg in  ml infusion  1,500 mg IntraVENous Q12H    Vancomycin - pharmacy to dose   Other Rx Dosing/Monitoring    albuterol (PROVENTIL VENTOLIN) nebulizer solution 2.5 mg  2.5 mg Nebulization Q4H PRN    aspirin delayed-release tablet 81 mg  81 mg Oral BID    baclofen (LIORESAL) tablet 10 mg  10 mg Oral PRN    cholecalciferol (VITAMIN D3) tablet 5,000 Units  5,000 Units Oral DAILY    diclofenac EC (VOLTAREN) tablet 75 mg  75 mg Oral BID    divalproex DR (DEPAKOTE) tablet 250 mg  250 mg Oral BID    famotidine (PEPCID) tablet 20 mg  20 mg Oral BID    furosemide (LASIX) tablet 20 mg  20 mg Oral DAILY    hydroCHLOROthiazide (HYDRODIURIL) tablet 25 mg  25 mg Oral DAILY    levothyroxine (SYNTHROID) tablet 100 mcg  100 mcg Oral ACB    losartan (COZAAR) tablet 100 mg  100 mg Oral DAILY    multivitamin, tx-iron-ca-min (THERA-M w/ IRON) tablet   Oral DAILY    oxyCODONE IR (ROXICODONE) tablet 15 mg  15 mg Oral Q4H PRN    rifAMPin (RIFADIN) capsule 300 mg  300 mg Oral BID    potassium chloride SR (KLOR-CON 10) tablet 10 mEq  10 mEq Oral DAILY    tapentadol Tb12 250 mg (Patient Supplied)  250 mg Oral BID    traZODone (DESYREL) tablet 100 mg  100 mg Oral QHS    . PHARMACY TO SUBSTITUTE PER PROTOCOL (Reordered from: vortioxetine (TRINTELLIX) 10 mg tablet)    Per Protocol    0.9% sodium chloride infusion  125 mL/hr IntraVENous CONTINUOUS    sodium chloride 0.9 % bolus infusion 500 mL  500 mL IntraVENous ONCE PRN    sodium chloride (NS) flush 5-10 mL  5-10 mL IntraVENous Q8H    sodium chloride (NS) flush 5-10 mL  5-10 mL IntraVENous PRN    acetaminophen (TYLENOL) tablet 650 mg  650 mg Oral Q6H    naloxone (NARCAN) injection 0.4 mg  0.4 mg IntraVENous PRN    hydrOXYzine HCl (ATARAX) tablet 10 mg  10 mg Oral Q8H PRN    senna-docusate (PERICOLACE) 8.6-50 mg per tablet 1 Tab  1 Tab Oral BID    polyethylene glycol (MIRALAX) packet 17 g  17 g Oral DAILY    [START ON 12/14/2018] bisacodyl (DULCOLAX) suppository 10 mg  10 mg Rectal DAILY PRN    HYDROmorphone (DILAUDID) injection 2 mg  2 mg IntraVENous Q3H PRN    ondansetron (ZOFRAN) injection 4 mg  4 mg IntraVENous Q4H PRN       Lab Results   Component Value Date/Time    HGB 6.8 (L) 12/13/2018 02:47 AM    INR 1.1 10/08/2018 03:47 PM       Lab Results   Component Value Date/Time    Sodium 139 12/13/2018 02:47 AM    Potassium 4.0 12/13/2018 02:47 AM    Chloride 108 12/13/2018 02:47 AM    CO2 25 12/13/2018 02:47 AM    BUN 9 12/13/2018 02:47 AM    Creatinine 0.65 12/13/2018 02:47 AM    Calcium 7.3 (L) 12/13/2018 02:47 AM 50 y.o. female s/p resection left knee arthroplasty on 12/12/2018  .  Acute bloos loss anemia with concomitant chronic anemia.-Will tranfuse      ABX: Vanco rifampin  PATHWAY: Straight cath per protocol if needed  DVT Prophylaxis: Aspirin 81 mg enteric coated BID  Weight Bearing: WBAT LLE, Keep knee immobilizer on at all times   Pain Control: Toradol (if Cr normal), Diclofenac to begin the evening of POD 1, Scheduled Tylenol, tramadol, oxy 15 , dilaudid IV  Disposition: Home, HHPT

## 2018-12-13 NOTE — PROGRESS NOTES
Attempted to see patient for post op PT assessment, but patient refused at this time stating she is in too much pain and too fatigued with low Hgb today. She is being transfused this afternoon and we will follow up post transfusion as available and if she is willing.     Fernie Lundberg, PT

## 2018-12-13 NOTE — PROGRESS NOTES
Care Management Interventions  PCP Verified by CM: Yes  Palliative Care Criteria Met (RRAT>21 & CHF Dx)?: No  Mode of Transport at Discharge: Other (see comment)(TBD)  Transition of Care Consult (CM Consult): Home Health(open to [address] and [facility])  [address]: Yes  MyChart Signup: No  Discharge Durable Medical Equipment: No  Health Maintenance Reviewed: Yes  Physical Therapy Consult: Yes  Occupational Therapy Consult: Yes  Speech Therapy Consult: No  Current Support Network: Lives with Spouse  Confirm Follow Up Transport: Family  Plan discussed with Pt/Family/Caregiver: Yes  Freedom of Choice Offered: Yes   Resource Information Provided?: No  Discharge Location  Discharge Placement: Home with home health    Reason for Admission:   Left Knee Resection                  RRAT Score:    14              Do you (patient/family) have any concerns for transition/discharge?    No, does not want to go to SNF                Plan for utilizing home health: yes, was open to [address] and [facility]        Likelihood of readmission?   moderate            Transition of Care Plan:      [address] [facility]

## 2018-12-13 NOTE — PERIOP NOTES
TRANSFER - OUT REPORT:    Verbal report given to Tim Easley(name) on Bebo Waterman  being transferred to 55(unit) for routine post - op       Report consisted of patients Situation, Background, Assessment and   Recommendations(SBAR). Time Pre op antibiotic [id number]  Anesthesia Stop time: 1931  Petty Present on Transfer to floor:yes  Order for Petty on Chart:yes  Discharge Prescriptions with Chart:no    Information from the following report(s) SBAR, Kardex, OR Summary, Cardiac Rhythm NSR/SINUS U [place] and Alarm Parameters  was reviewed with the receiving nurse. Opportunity for questions and clarification was provided. Is the patient on 02? NO       L/Min 0       Other 0    Is the patient on a monitor? NO    Is the nurse transporting with the patient? NO    Surgical Waiting Area notified of patient's transfer from PACU? YES      The following personal items collected during your admission accompanied patient upon transfer:   Dental Appliance: Dental Appliances: None  Vision:    Hearing Aid:    Jewelry:    Clothing: Clothing: (clothing to [employer])  Other Valuables:  Other Valuables: Eyeglasses(glasses to pacu)  Valuables sent to safe:

## 2018-12-13 NOTE — PROGRESS NOTES
Bedside and Verbal shift change report given to Onesimo Bedoya RN (oncoming nurse) by Esther Orosco RN (offgoing nurse). Report included the following information SBAR, Kardex, Procedure Summary, Intake/Output, MAR, Accordion and Recent Results.

## 2018-12-13 NOTE — ANESTHESIA POSTPROCEDURE EVALUATION
Post-Anesthesia Evaluation and Assessment    Patient: Jeremiah Barker MRN: [id number]  SSN: [id number]    YOB: 1970  Age: 50 y.o. Sex: female      I have evaluated the patient and they are stable and ready for discharge from the PACU. Cardiovascular Function/Vital Signs  Visit Vitals  /86   Pulse (!) 110   Temp 37.3 °C (99.2 °F)   Resp 17   Ht 5' 2\" (1.575 m)   Wt 83.9 kg (185 lb)   SpO2 97%   BMI 33.84 kg/m²       Patient is status post Spinal anesthesia for Procedure(s):  LEFT KNEE RESECTION ARTHROPLASTY WITH ANTIBIOTIC SPACER PLACEMENT. Nausea/Vomiting: None    Postoperative hydration reviewed and adequate. Pain:  Pain Scale 1: Numeric (0 - 10) (12/12/18 1402)  Pain Intensity 1: 9 (12/12/18 1402)   Managed    Neurological Status:   Neuro (WDL): Within Defined Limits (12/12/18 1424)   At baseline    Mental Status, Level of Consciousness: Alert and  oriented to person, place, and time    Pulmonary Status:   O2 Device: Nasal cannula (12/12/18 1917)   Adequate oxygenation and airway patent    Complications related to anesthesia: None    Post-anesthesia assessment completed.  No concerns    Signed By: Ade Herman MD     December 12, 2018              Procedure(s):  LEFT KNEE RESECTION ARTHROPLASTY WITH ANTIBIOTIC SPACER PLACEMENT.    <BSHSIANPOST>    Visit Vitals  /86   Pulse (!) 110   Temp 37.3 °C (99.2 °F)   Resp 17   Ht 5' 2\" (1.575 m)   Wt 83.9 kg (185 lb)   SpO2 97%   BMI 33.84 kg/m²

## 2018-12-13 NOTE — PROGRESS NOTES
Ortho Daily Progress Note    12/13/2018  8:27 AM    POD:  1 Day Post-Op  S/P:  Procedure(s):  LEFT KNEE RESECTION ARTHROPLASTY WITH ANTIBIOTIC SPACER PLACEMENT    Afebrile/, BP's low this am with moderate tachycardia at   Doing well without complaints of nausea  Pain not well controlled- on Oxycodone 15 mg Q3 hours  Knee immobilzer intact  Dressing clean and dry  Moving lower extremities well. Neurocirculatory exam intact and within normal range. Lab Results   Component Value Date/Time    HGB 6.8 (L) 12/13/2018 02:47 AM    INR 1.1 10/08/2018 03:47 PM     Recent Labs     12/13/18  0247 11/23/18  0321 11/21/18  0454 11/20/18  0307 11/19/18  0949 11/18/18  0422 11/17/18  0326 10/25/18  0538 10/08/18  1547   CREA 0.65 0.69 0.64 0.50* 0.68 0.54* 0.61 0.66 0.75   BUN 9 14 7 8 8 9 11 10 18     Estimated Creatinine Clearance: 106.3 mL/min (based on SCr of 0.65 mg/dL). PLAN:Transfuse 1 unit of PRBC's for anticipated acute post operative blood loss anemia.   Check Hgb this afternoon  DVT prophylaxis: ASA 81 mg bid  WBAT with PT-mobilization  Pain Control  Plan to D/C Home with HH/PT      LETICIA Wolfe

## 2018-12-13 NOTE — OP NOTES
[address]  OPERATIVE REPORT    Noemí Sheikh  MR#: [id number]  : 1970  ACCOUNT #: [de-identified]   DATE OF SERVICE: 2018    SURGEON:  Chantelle Sousa MD    ASSISTANT:  [facility] staff. 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 PROCEDURE PERFORMED:  Resection arthroplasty of the left knee with placement of dynamic antibiotic spacer. ANESTHESIA:  General.    COMPLICATIONS:  None. SPECIMENS REMOVED:  Included fluid sample sent for culture. ESTIMATED BLOOD LOSS:  400 mL. IMPLANTS:  Depuy Sigma size 2 femur, 25 RP poly    INDICATIONS FOR PROCEDURE:  This is a 19-year-old female who had a previous primary total knee arthroplasty done approximately 6 weeks ago. She had persistent postoperative drainage and was taken for irrigation and debridement with exploration. This did extend through arthrotomy site and I performed poly exchange. Unfortunately, cultures grew MRSA. I discussed these results with the patient. She continued to have persistent drainage and we elected to return to the operating room for resection arthroplasty with dynamic spacer. Risks and benefits discussed at length. She wished to proceed. DESCRIPTION OF PROCEDURE:  The patient was identified in the preoperative holding area and the correct site was marked and confirmed. She was taken to the operating room and was placed in supine position. General endotracheal anesthesia was induced. She was prepped and draped in standard sterile fashion. Preoperative timeout was held and the correct site was confirmed. She was receiving vancomycin and rifampin already and did give 2 grams of cefazolin prior to skin incision. I utilized her previous incision and carried this proximally and distally. Medial and lateral flaps were elevated. The arthrotomy was reopened. There was fluid that was sent for culture. I performed a synovectomy. The polyethylene was then removed. I used a microsagittal saw to remove the femoral prosthesis with relatively little bone loss. I used a combination of the microsagittal saw as well as a sagittal saw and osteotomes to remove the tibia component. There was also relatively little bone loss. At this point, I recut the tibia, removing 1 mm of bone. I also removed 1 mm of bone from the distal femur. I sized this to a size 2 Sigma knee. I cut for the box. I then placed a femoral trial with a tibial trial.  The most appropriate tension was used with a 25 mm rotating platform poly. I then drilled the canals with a reamer. I used Betadine soap followed by vigorous irrigation with bacitracin. I mixed 1 batch of cement with gentamicin impregnation and 2 grams of vancomycin. This was used to create 2 dowels using a Steinmann pin. These were placed in the canals. I then opened the implants and cemented the tibial and femoral components into place. This was using 2 batches of cement with 4 grams total vancomycin and gentamicin impregnation. This was allowed to harden in full extension. This was grossly stable in both flexion and extension. I again thoroughly irrigated with normal saline as well as a dilute Betadine soap. She did have some tissue loss from her arthrotomy site. I closed in full extension to try to get this completely closed. Despite this, there was still difficulty getting the distal portion to close completely using #1 PDS. I then used #2 PDS to close the soft tissue and 2-0 nylon suture. I used a TUNG incisional wound VAC and placed a knee immobilizer. The patient was taken to the PACU in stable condition. All counts were correct x2.       MD TOM Stearns / OMARI  D: 12/13/2018 12:42     T: 12/13/2018 17:15  JOB #: [id number]

## 2018-12-14 LAB
ANION GAP SERPL CALC-SCNC: 6 MMOL/L (ref 5–15)
BUN SERPL-MCNC: 9 MG/DL (ref 6–20)
BUN/CREAT SERPL: 18 (ref 12–20)
CALCIUM SERPL-MCNC: 6.6 MG/DL (ref 8.5–10.1)
CHLORIDE SERPL-SCNC: 113 MMOL/L (ref 97–108)
CO2 SERPL-SCNC: 24 MMOL/L (ref 21–32)
CREAT SERPL-MCNC: 0.49 MG/DL (ref 0.55–1.02)
GLUCOSE SERPL-MCNC: 81 MG/DL (ref 65–100)
POTASSIUM SERPL-SCNC: 3.6 MMOL/L (ref 3.5–5.1)
SODIUM SERPL-SCNC: 143 MMOL/L (ref 136–145)

## 2018-12-14 PROCEDURE — 36415 COLL VENOUS BLD VENIPUNCTURE: CPT

## 2018-12-14 PROCEDURE — A4452 WATERPROOF TAPE: HCPCS

## 2018-12-14 PROCEDURE — 80048 BASIC METABOLIC PNL TOTAL CA: CPT

## 2018-12-14 PROCEDURE — 74011000250 HC RX REV CODE- 250: Performed by: ORTHOPAEDIC SURGERY

## 2018-12-14 PROCEDURE — 74011250636 HC RX REV CODE- 250/636: Performed by: PHYSICIAN ASSISTANT

## 2018-12-14 PROCEDURE — G8978 MOBILITY CURRENT STATUS: HCPCS

## 2018-12-14 PROCEDURE — 65270000029 HC RM PRIVATE

## 2018-12-14 PROCEDURE — 97535 SELF CARE MNGMENT TRAINING: CPT

## 2018-12-14 PROCEDURE — G8979 MOBILITY GOAL STATUS: HCPCS

## 2018-12-14 PROCEDURE — 97165 OT EVAL LOW COMPLEX 30 MIN: CPT

## 2018-12-14 PROCEDURE — 74011250636 HC RX REV CODE- 250/636: Performed by: ORTHOPAEDIC SURGERY

## 2018-12-14 PROCEDURE — 74011250637 HC RX REV CODE- 250/637: Performed by: ORTHOPAEDIC SURGERY

## 2018-12-14 PROCEDURE — 97116 GAIT TRAINING THERAPY: CPT

## 2018-12-14 PROCEDURE — 97161 PT EVAL LOW COMPLEX 20 MIN: CPT

## 2018-12-14 RX ADMIN — DICLOFENAC SODIUM 75 MG: 75 TABLET, DELAYED RELEASE ORAL at 18:03

## 2018-12-14 RX ADMIN — OXYCODONE HYDROCHLORIDE 15 MG: 5 TABLET ORAL at 04:05

## 2018-12-14 RX ADMIN — HYDROMORPHONE HYDROCHLORIDE 2 MG: 2 INJECTION INTRAMUSCULAR; INTRAVENOUS; SUBCUTANEOUS at 01:46

## 2018-12-14 RX ADMIN — FAMOTIDINE 20 MG: 20 TABLET ORAL at 08:46

## 2018-12-14 RX ADMIN — Medication 10 ML: at 13:28

## 2018-12-14 RX ADMIN — VANCOMYCIN HYDROCHLORIDE 1500 MG: 10 INJECTION, POWDER, LYOPHILIZED, FOR SOLUTION INTRAVENOUS at 06:37

## 2018-12-14 RX ADMIN — OXYCODONE HYDROCHLORIDE 15 MG: 5 TABLET ORAL at 13:28

## 2018-12-14 RX ADMIN — OXYCODONE HYDROCHLORIDE 15 MG: 5 TABLET ORAL at 21:54

## 2018-12-14 RX ADMIN — OXYCODONE HYDROCHLORIDE 15 MG: 5 TABLET ORAL at 08:46

## 2018-12-14 RX ADMIN — MULTIPLE VITAMINS W/ MINERALS TAB 1 TABLET: TAB at 08:46

## 2018-12-14 RX ADMIN — LEVOTHYROXINE SODIUM 100 MCG: 100 TABLET ORAL at 06:37

## 2018-12-14 RX ADMIN — HYDROMORPHONE HYDROCHLORIDE 2 MG: 2 INJECTION INTRAMUSCULAR; INTRAVENOUS; SUBCUTANEOUS at 19:10

## 2018-12-14 RX ADMIN — Medication 10 ML: at 21:00

## 2018-12-14 RX ADMIN — POTASSIUM CHLORIDE 10 MEQ: 750 TABLET, EXTENDED RELEASE ORAL at 08:47

## 2018-12-14 RX ADMIN — FAMOTIDINE 20 MG: 20 TABLET ORAL at 18:01

## 2018-12-14 RX ADMIN — Medication 81 MG: at 08:46

## 2018-12-14 RX ADMIN — TRAZODONE HYDROCHLORIDE 100 MG: 100 TABLET ORAL at 21:00

## 2018-12-14 RX ADMIN — ACETAMINOPHEN 650 MG: 325 TABLET ORAL at 18:01

## 2018-12-14 RX ADMIN — HYDROMORPHONE HYDROCHLORIDE 2 MG: 2 INJECTION INTRAMUSCULAR; INTRAVENOUS; SUBCUTANEOUS at 23:03

## 2018-12-14 RX ADMIN — DIVALPROEX SODIUM 250 MG: 250 TABLET, DELAYED RELEASE ORAL at 18:01

## 2018-12-14 RX ADMIN — ACETAMINOPHEN 650 MG: 325 TABLET ORAL at 13:28

## 2018-12-14 RX ADMIN — DIVALPROEX SODIUM 250 MG: 250 TABLET, DELAYED RELEASE ORAL at 08:46

## 2018-12-14 RX ADMIN — Medication 10 ML: at 14:33

## 2018-12-14 RX ADMIN — VITAMIN D, TAB 1000IU (100/BT) 5000 UNITS: 25 TAB at 08:46

## 2018-12-14 RX ADMIN — ACETAMINOPHEN 650 MG: 325 TABLET ORAL at 06:37

## 2018-12-14 RX ADMIN — OXYCODONE HYDROCHLORIDE 15 MG: 5 TABLET ORAL at 18:01

## 2018-12-14 RX ADMIN — Medication 81 MG: at 18:01

## 2018-12-14 RX ADMIN — VANCOMYCIN HYDROCHLORIDE 1500 MG: 10 INJECTION, POWDER, LYOPHILIZED, FOR SOLUTION INTRAVENOUS at 19:12

## 2018-12-14 RX ADMIN — HYDROMORPHONE HYDROCHLORIDE 2 MG: 2 INJECTION INTRAMUSCULAR; INTRAVENOUS; SUBCUTANEOUS at 14:33

## 2018-12-14 RX ADMIN — HYDROMORPHONE HYDROCHLORIDE 2 MG: 2 INJECTION INTRAMUSCULAR; INTRAVENOUS; SUBCUTANEOUS at 06:04

## 2018-12-14 RX ADMIN — DICLOFENAC SODIUM 75 MG: 75 TABLET, DELAYED RELEASE ORAL at 08:46

## 2018-12-14 RX ADMIN — HYDROMORPHONE HYDROCHLORIDE 2 MG: 2 INJECTION INTRAMUSCULAR; INTRAVENOUS; SUBCUTANEOUS at 09:53

## 2018-12-14 NOTE — PROGRESS NOTES
Occupational Therapy Note:    Chart reviewed, spoke with pt's nurse. Attempted 3 times to see pt for OT evaluation. First attempt pt awaiting pain medication that was due at 9:50, requested defer of therapy until medication given. Attempted to see pt after pain medication was given and she again deferred. Third attempt to see pt she reports she is still in pain and declining participation in session. Nurse informed. Will continue to follow pt and attempt again as able. Thank you.     Thea Tomas, OTR/L

## 2018-12-14 NOTE — PROGRESS NOTES
Infectious Diseases Consultation     Please see dictated note     Impression      1. MRSA infection left TKR -- resection arthroplasty 12/12/2018    2. DJD    3. HTN    4. Hypothyroidism S/P Rx for Graves disease    5. Anemia    6. 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  Continue Vancomycin -- can stop Rifampin     Thanks,   Vishal Mansfield MD  12/13/2018  9:49 PM

## 2018-12-14 NOTE — PROGRESS NOTES
Problem: Mobility Impaired (Adult and Pediatric)  Goal: *Acute Goals and Plan of Care (Insert Text)  Physical Therapy Goals  Initiated 12/14/2018  1. Patient will move from supine to sit and sit to supine , scoot up and down and roll side to side in bed with independence within 7 day(s). 2.  Patient will transfer from bed to chair and chair to bed with modified independence using the least restrictive device within 7 day(s). 3.  Patient will perform sit to stand with modified independence within 7 day(s). 4.  Patient will ambulate with modified independence for 300 feet with the least restrictive device within 7 day(s). 5.  Patient will ascend/descend 6 stairs with one handrail(s) with modified independence within 7 day(s). physical Therapy EVALUATION  Patient: Suman Jimenez (50 y.o. female)  Date: 12/14/2018  Primary Diagnosis: STATUS POST LEFT KNEE REPLACEMENT  Status post left knee replacement  Procedure(s) (LRB):  LEFT KNEE RESECTION ARTHROPLASTY WITH ANTIBIOTIC SPACER PLACEMENT (Left) 2 Days Post-Op   Precautions:   Contact, Fall, WBAT(in knee immobilizer at all times), seizures    ASSESSMENT :  Based on the objective data described below, the patient presents with pain rated at 7/10 at rest shortly after receiving iv dilaudid but agreeable to PT eval. Pt reports that this is her third surgery on her left knee in as many months and that she has been in a knee immobilizer since her second surgery. For eval overall level of assist was supervision to contact guard. Anticipate steady gains with mobility allowing for discharge to home. Will need to time sessions with pain meds. Of note, her BP was elevated with activity, see chart below. Above discussed with her nurse. .      Vitals:    12/14/18 1455 12/14/18 1503 12/14/18 1505 12/14/18 1508   BP: 133/81 (!) 147/101 (!) 162/99 155/90   BP 1 Location: Left arm Left arm Left arm Left arm   BP Patient Position: Supine;Pre-activity Sitting Standing Sitting;Post activity   Pulse: 81 89 93 86   Resp:       Temp:       SpO2: on room air 99% 98%                         Patient will benefit from skilled intervention to address the above impairments. Patients rehabilitation potential is considered to be Good  Factors which may influence rehabilitation potential include:   []         None noted  []         Mental ability/status  [x]         Medical condition  []         Home/family situation and support systems  []         Safety awareness  [x]         Pain tolerance/management  []         Other:      PLAN :  Recommendations and Planned Interventions:  [x]           Bed Mobility Training             []    Neuromuscular Re-Education  [x]           Transfer Training                   []    Orthotic/Prosthetic Training  [x]           Gait Training                         []    Modalities  [x]           Therapeutic Exercises           []    Edema Management/Control  [x]           Therapeutic Activities            [x]    Patient and Family Training/Education  []           Other (comment):    Frequency/Duration: Patient will be followed by physical therapy  daily to address goals. Discharge Recommendations: Home Health, To Be Determined and None  Further Equipment Recommendations for Discharge: to be determined, pt has a standard walker (no wheels)  and a rollator but not a rolling walker. SUBJECTIVE:   Patient stated you better do this quick.     OBJECTIVE DATA SUMMARY:   Consult received, chart reviewed, pt cleared by nursing  HISTORY:    Past Medical History:   Diagnosis Date    ADHD (attention deficit hyperactivity disorder)     Arthritis     OSTEO    Chronic pain     GERD (gastroesophageal reflux disease)     Graves disease     NO LONGER AN ISSUE    Hematuria 10/9/2018    HTN (hypertension)     Hypothyroidism 2018    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    OCD (obsessive compulsive disorder)     Other ill-defined conditions(799.89)     graves    Psychiatric disorder     depression    [facility]ure ([facility] [address]     Seizures ([facility] [address] 2003 & 2004    REACTIVE TO HYPOGLYCEMIA / ALSO FROM FLASHING LIGHTS     Past Surgical History:   Procedure Laterality Date    DELIVERY       HX ABDOMINOPLASTY      HX ADENOIDECTOMY  1982    HX  SECTION      HX  SECTION  2006    HX GASTRIC BYPASS      2001    HX GI  1993    CHOLEYCYSTECTOMY    HX ORTHOPAEDIC Right     CARPEL TUNNEL    HX ORTHOPAEDIC Left     CARPEL TUNNEL    HX ORTHOPAEDIC Left     ULNA SHORTENING    HX ORTHOPAEDIC Right 2011    COLLAR BONE SURGERY     HX TONSILLECTOMY  1975     Prior Level of Function/Home Situation: per pt: this is the third surgery on her left knee in as many months, after second surgery was sent to rehab at a SNF, was admitted from home. Personal factors and/or comorbidities impacting plan of care: psych history, HTN    Home Situation  Home Environment: Private residence  # Steps to Enter: 6  Rails to Enter: Yes  Hand Rails : Bilateral  Wheelchair Ramp: No  One/Two Story Residence: Two story, live on 1st floor  # of Interior Steps: [id number] [facility] Floor: Left  Lift Chair Available: No  Living Alone: No  Support Systems: Child(jazzmine), Spouse/Significant Other/Partner  Patient Expects to be Discharged to[de-identified] Private residence  Current DME Used/Available at Home: Shara Elizabeth, rollator, Wheelchair, [place], straight, Safety frame [employer], Grab bars, Shower chair(knee immobilizer at all times)  Tub or Shower Type: Tub    EXAMINATION/PRESENTATION/DECISION MAKING:   Critical Behavior:  Neurologic State: Alert, Appropriate for age  Orientation Level: Appropriate for age, Oriented X4  Cognition: Appropriate decision making, Appropriate for age attention/concentration, Appropriate safety awareness, Follows commands     Hearing:   Auditory  Auditory Impairment: None  Skin:  Refer to MD and nursing notes  Edema: refer to MD and nursing notes  Range Of Motion:  AROM: Generally decreased, functional                       Strength:    Strength: Generally decreased, functional                    Tone & Sensation:                  Sensation: Impaired(hands prior to admission)               Coordination:     Vision:      Functional Mobility:  Bed Mobility:     Supine to Sit: Supervision        Transfers:  Sit to Stand: Contact guard assistance  Stand to Sit: Contact guard assistance                       Balance:   Sitting: Intact; Without support  Standing: Intact; With support  Ambulation/Gait Training:  Distance (ft): 25 Feet (ft)  Assistive Device: Gait belt;Walker, rolling  Ambulation - Level of Assistance: Contact guard assistance        Gait Abnormalities: Decreased step clearance     Left Side Weight Bearing: As tolerated(in knee immobilzer)  Base of Support: Widened     Speed/Radha: Slow;Pace decreased (<100 feet/min)  Step Length: Left shortened;Right shortened                     Stairs: Therapeutic Exercises:   Reminded to engage in ankle pumps    Functional Measure:  Timed up and go:    Timed Get Up And Go Test: 18(extrapolated )     Timed Up and Go and G-code impairment scale:  Percentage of Impairment CH    0%   CI    1-19% CJ    20-39% CK    40-59% CL    60-79% CM    80-99% CN     100%   Timed   Score 0-56 10 11-12 13-14 15-16 17-18 19 20       < than 10 seconds=Normal  Greater then 13.5 seconds (in elderly)=Increased fall risk   Pastora PRADO, Emily Pop. Predicting the probability for falls in community dwelling older adults using the Timed Up and Go Test. Phys Ther. 2000;80:896-903. G codes: In compliance with CMSs Claims Based Outcome Reporting, the following G-code set was chosen for this patient based on their primary functional limitation being treated: The outcome measure chosen to determine the severity of the functional limitation was the TUG with a score of 18 which was correlated with the impairment scale.     ? Mobility - Walking and Moving Around:     - CURRENT STATUS: CL - 60%-79% impaired, limited or restricted    - GOAL STATUS: CI - 1%-19% impaired, limited or restricted    - D/C STATUS:  ---------------To be determined---------------      Physical Therapy Evaluation Charge Determination   History Examination Presentation Decision-Making   HIGH Complexity :3+ comorbidities / personal factors will impact the outcome/ POC  LOW Complexity : 1-2 Standardized tests and measures addressing body structure, function, activity limitation and / or participation in recreation  MEDIUM Complexity : Evolving with changing characteristics  LOW Complexity : FOTO score of       Based on the above components, the patient evaluation is determined to be of the following complexity level: LOW     Pain:  Pain Scale 1: Numeric (0 - 10)  Pain Intensity 1: 7 at rest, and up as high as 10 during activity  Pain Location 1: Knee  Pain Orientation 1: Left  Pain Description 1: Aching  Pain Intervention(s) 1: Medication (see MAR)  Activity Tolerance:   See assessment above  Please refer to the flowsheet for vital signs taken during this treatment. After treatment:   [x]         Patient left in no apparent distress sitting up in chair with her left leg elevated on a stool  []         Patient left in no apparent distress in bed  [x]         Call bell left within reach  [x]         Nursing notified  []         Caregiver present  []         Bed alarm activated    COMMUNICATION/EDUCATION:   The patients plan of care was discussed with: Occupational Therapist and Registered Nurse. [x]         Fall prevention education was provided and the patient/caregiver indicated understanding. [x]         Patient/family have participated as able in goal setting and plan of care. [x]         Patient/family agree to work toward stated goals and plan of care. []         Patient understands intent and goals of therapy, but is neutral about his/her participation.   [] Patient is unable to participate in goal setting and plan of care.     Thank you for this referral.  B[facility]   Time Calculation: 25 mins

## 2018-12-14 NOTE — PROGRESS NOTES
Problem: Falls - Risk of  Goal: *Absence of Falls  Document Shanell Fall Risk and appropriate interventions in the flowsheet.   Outcome: Progressing Towards Goal  Fall Risk Interventions:  Mobility Interventions: PT Consult for mobility concerns         Medication Interventions: Patient to call before getting OOB    Elimination Interventions: Call light in reach    History of Falls Interventions: Door open when patient unattended

## 2018-12-14 NOTE — PROGRESS NOTES
Bedside and Verbal shift change report given to [employer] (oncoming nurse) by Juan Gutierrez (offgoing nurse). Report included the following information SBAR.

## 2018-12-14 NOTE — PROGRESS NOTES
Consult received, chart reviewed, pt cleared by nursing. Attempted eval and pt adamantly yet politely declined at this time stating \"not until I get my dilaudid\" stating that she is able to have in at 9:50. Pt's nurse present.  PT deferred at this time per pt request and will follow up as appropriate for eval. Stanley Melendez, PT.

## 2018-12-14 NOTE — PROGRESS NOTES
Just made third attempt to see pt for eval (second attempt was after her iv dilaudid as she had requested) and just got deferred for the third time. She stated that her pain is just not controlled and she is not able to participate in therapy at this time, rated her pain at 7/10 also stating that she had discussed with her nurse. PT will continue to follow and see for eval when pt willing.  Pritesh Giang, PT

## 2018-12-14 NOTE — CONSULTS
[address]    Preston Masters  MR#: [id number]  : 1970  ACCOUNT #: [de-identified]   DATE OF SERVICE: 2018    The patient is in room 558. ATTENDING:  Zoe Swan MD    CHIEF COMPLAINT:  Persistent drainage from the left knee incision. REASON FOR CONSULTATION:  Continued antibiotic therapy for MRSA infection of the left total knee replacement following resection arthroplasty on 2018. The consultation was requested by Dr. Alex Marroquin. The history is obtained by interview of the patient and review of the medical records. HISTORY OF PRESENT ILLNESS:  This patient is a 55-year-old white female with a history of previous gastric bypass surgery in , degenerative joint disease, hypertension, and hypothyroidism following treatment for Graves' disease. The patient underwent a left total knee arthroplasty on 10/24/2018. However, she had spontaneous drainage from the inferior aspect of the left knee excision on 2018. On 11/15/2018 she underwent surgical I and D of the left total knee replacement with polyethylene exchange and retention of the rest of the hardware. Cultures revealed methicillin-resistant Staphylococcus aureus with a vancomycin MAYCOL of 1. The patient was treated with vancomycin and rifampin. She did fairly well postop and was initially discharged to [facility]. However, after about a week, she went home and continued IV vancomycin and oral rifampin at home. The patient saw Dr. Bridget Martins in the office in followup this week. She was noted to have persistent drainage from the left knee incision. It was felt that she had failed antibiotic therapy attempting to salvage the prosthesis. On 2018, the patient underwent resection arthroplasty of the left total knee replacement with placement of a dynamic antibiotic spacer. We are now asked to see her.     PAST MEDICAL HISTORY:  Tobacco:  The patient smoked up to a pack per day for 5 years, but quit in . Alcohol:  None. MEDICATIONS:  Prior to admission:  1. Vancomycin 1500 mg IV q.12 hours. 2.  Rifampin 300 mg p.o. b.i.d.  3.  Acyclovir 400 mg p.o. q.4 hours for fever blisters. 4.  ProAir inhaler. 5.  Amphetamine sulfate (Evekeo) 10 mg 2 p.o. b.i.d.  6.  Aspirin 81 mg p.o. b.i.d.  7.  Baclofen 10 mg p.o. p.r.n.  8.  Zyrtec 10 mg p.o. daily. 9.  Vitamin D 5000 units p.o. daily. 10.  Diclofenac 75 mg p.o. b.i.d. 11.  Depakote 250 mg 2 tablets in the evening. 12.  Pepcid 20 mg p.o. b.i.d. 13.  Lasix 20 mg p.o. daily. 14.  Hydrochlorothiazide 25 mg p.o. daily. 15.  Synthroid 0.1 mg p.o. daily. 16.  Vyvanse 70 mg p.o. b.i.d. 17.  Losartan 100 mg p.o. daily. 18.  Multivitamins. 19.  Oxycodone IR 15 mg p.o. every 4 hours p.r.n. pain. 20.  KCl 10 mEq p.o. daily. 21.  Nucynta  mg p.o. b.i.d.  22.  Trazodone 100 mg p.o. nightly. 23.  Trintellix 10 mg - she apparently takes 3 a day. ALLERGIES:    1. PENICILLIN -- RASH FROM PENICILLIN MANY YEARS AGO, BUT SHE CAN TAKE CEPHALOSPORINS WITHOUT DIFFICULTY. 2.  SULFA. ILLNESSES:  1. Degenerative joint disease. 2.  History of nonunion of the right clavicle. 3.  Hypertension. 4.  Hypothyroidism following treatment for Graves' disease. 5.  Depression. SURGERIES:  1. Gastric bypass surgery in .  2.  Laparoscopic hiatal hernia repair as well as partial gastrectomy for correction of the gastric fistula with lysis of adhesions on 2008. 3.  Left total knee arthroplasty on 10/24/2018.   4.  Surgical I and D polyethylene exchange of the left total knee replacement 2018.  5.  Surgery for nonunion right clavicle x3.  6.  Left ulnar shortening. 7.   x2.  8.  Tummy tuck following gastric bypass surgery. 9.  Surgery for right and left carpal tunnel syndrome surgery.   10.  Resection arthroplasty, left total knee replacement with placement of antibiotic spacer on 2018 as in HPI.    SOCIAL HISTORY:  The patient lives in [place] with her . She has a 15year-old and 25year-old son who are both autistic and the family history is otherwise unremarkable. REVIEW OF SYSTEMS:  A full 12 point review of systems was negative except as in the HPI. PHYSICAL EXAMINATION:  GENERAL:  No acute distress. VITAL SIGNS:  Blood pressure is 110/62, heart rate 83, respiratory rate 14. She is afebrile. HEENT:  Sclerae and conjunctivae benign, oropharynx benign. NECK:  Supple. NODES:  No adenopathy. SKIN:  No rashes. LUNGS:  Clear. HEART:  Regular rate and rhythm without murmurs. ABDOMEN:  Soft, nontender, no masses, bowel sounds are present. EXTREMITIES:  Right leg unremarkable. Left leg she has got a surgical dressing in place on the left knee. NEUROLOGIC:  Alert and oriented. LABORATORY DATA:  Hemoglobin this evening 7.2. White blood cell count and platelet count were not done. Chemistry data reveals BUN 9, creatinine 0.65. MICROBIOLOGY DATA:  Culture of the left knee from surgery are pending. Gram stain had rare white cells and no organisms. PROBLEM:  1. 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 2.  Degenerative joint disease. 3.  Hypertension. 4.  Hypothyroidism following treatment for Graves' disease. 5.  Anemia. 6.  Status post gastric bypass surgery in 2001. PLAN:  1. Continue vancomycin. 2.  Discontinue rifampin. 3.  Await intraoperative culture results. Thank you very much for letting us see this patient with you.       MD ALAN Russell / OMARI  D: 12/14/2018 00:38     T: 12/14/2018 01:05  JOB #: [id number]  CC: Shae Andrew MD  CC: Elizabet Mays MD

## 2018-12-14 NOTE — PROGRESS NOTES
Bedside and Verbal shift change report given to Hima Saldivar RN (oncoming nurse) by Smiley Howell RN (offgoing nurse). Report included the following information SBAR, Kardex, Procedure Summary, Intake/Output, MAR, Accordion and Recent Results.

## 2018-12-14 NOTE — PROGRESS NOTES
Problem: Self Care Deficits Care Plan (Adult)  Goal: *Acute Goals and Plan of Care (Insert Text)  Occupational Therapy Goals  Initiated 12/14/2018  1. Patient will perform lower body ADLs with supervision/set-up within 4 day(s). 2.  Patient will perform upper body ADLs standing 3 mins without fatigue or LOB with supervision/set-up within 4 day(s). 3.  Patient will perform all aspects of toileting with supervision/set-up within 4 day(s). 4.  Patient will participate in upper extremity therapeutic exercise/activities with supervision/set-up for 10 minutes within 4 day(s). 5.  Patient will utilize energy conservation techniques during functional activities without cues within 4 day(s). Occupational Therapy EVALUATION  Patient: Cristal Palomares (79 y.o. female)  Date: 12/14/2018  Primary Diagnosis: STATUS POST LEFT KNEE REPLACEMENT  Status post left knee replacement  Procedure(s) (LRB):  LEFT KNEE RESECTION ARTHROPLASTY WITH ANTIBIOTIC SPACER PLACEMENT (Left) 2 Days Post-Op   Precautions:  Contact, Fall, WBAT(in knee immobilizer at all times)    ASSESSMENT :  Cleared by RN to see pt for therapy session. Based on the objective data described below, the patient presents with LLE pain, decreased LLE ROM, and decreased endurance and strength following admission for L knee resection with spacer placement. Pt lives with her  and children, is previously I with ADLs and mobility. Has had 3 knee surgeries in the past several months and has been wearing knee immobilizer since second surgery, uses walker for functional mobility. Pt agreeable to therapy session this afternoon after several attempts this morning. Reports 7/10 pain but agreeable to participate, eager to mobilize. Adjusted socks while long sitting in bed, no difficulty reaching to LEs, also adjusted velcro on knee immobilizer. Transferred to sitting EOB with supervision, good sitting balance.  Stood to [facility] with CGA and ambulated to doorway of room and back, then transferred to chair with safe technique. Pt reported increased pain in static standing that subsided with movement and when seated. Discussed compensatory dressing techniques, pt reports she has reacher at home. LLE placed on foot stool with pillow, pt seated in chair with call bell in reach. Vitals stable during session although BP elevated, see below. She will benefit from skilled intervention to address the above impairments. Recommend HHOT at discharge. Patient Vitals for the past 4 hrs:   Temp Pulse Resp BP SpO2                   12/14/18 1505  93  (!) 162/99    12/14/18 1503  89  (!) 147/101 98 %   12/14/18 1455  81  133/81 99 %         Patients rehabilitation potential is considered to be Good  Factors which may influence rehabilitation potential include:   [x]             None noted  []             Mental ability/status  []             Medical condition  []             Home/family situation and support systems  []             Safety awareness  []             Pain tolerance/management  []             Other:      PLAN :  Recommendations and Planned Interventions:  [x]               Self Care Training                  [x]        Therapeutic Activities  [x]               Functional Mobility Training    []        Cognitive Retraining  [x]               Therapeutic Exercises           [x]        Endurance Activities  [x]               Balance Training                   []        Neuromuscular Re-Education  []               Visual/Perceptual Training     [x]   Home Safety Training  [x]               Patient Education                 [x]        Family Training/Education  []               Other (comment):    Frequency/Duration: Patient will be followed by occupational therapy 5 times a week to address goals. Discharge Recommendations: Home Health  Further Equipment Recommendations for Discharge: pt likely has all needed equipment     SUBJECTIVE:   Patient stated I want to walk.     OBJECTIVE DATA SUMMARY:   HISTORY:   Past Medical History:   Diagnosis Date    ADHD (attention deficit hyperactivity disorder)     Arthritis     OSTEO    Chronic pain     GERD (gastroesophageal reflux disease)     Graves disease     NO LONGER AN ISSUE    Hematuria 10/9/2018    HTN (hypertension)     Hypothyroidism 2018    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    OCD (obsessive compulsive disorder)     Other ill-defined conditions(799.89)     graves    Psychiatric disorder     depression    Seizure ([address]     Seizures ([address]  &     REACTIVE TO HYPOGLYCEMIA / ALSO FROM FLASHING LIGHTS     Past Surgical History:   Procedure Laterality Date    DELIVERY       HX ABDOMINOPLASTY      HX ADENOIDECTOMY      HX  SECTION      HX  SECTION  2006    HX GASTRIC BYPASS          HX GI  1993    CHOLEYCYSTECTOMY    HX ORTHOPAEDIC Right     CARPEL TUNNEL    HX ORTHOPAEDIC Left     CARPEL TUNNEL    HX ORTHOPAEDIC Left     ULNA SHORTENING    HX ORTHOPAEDIC Right 2011    COLLAR BONE SURGERY     HX TONSILLECTOMY  1975       Prior Level of Function/Environment/Context: Pt lives with her  and children, is previously I with ADLs and mobility. Has had 3 knee surgeries in the past several months and has been wearing knee immobilizer since second surgery, uses walker for functional mobility.   Home Situation  Home Environment: Private residence  # Steps to Enter: 6  Rails to Enter: Yes  Hand Rails : Bilateral  Wheelchair Ramp: No  One/Two Story Residence: Two story, live on 1st floor  # of Interior Steps: 13  Interior Rails: Left  Lift Chair Available: No  Living Alone: No  Support Systems: Child(jazzmine), Spouse/Significant Other/Partner  Patient Expects to be Discharged to[de-identified] Private residence  Current DME Used/Available at Home: Vignesh Jones, rollator, Wheelchair, [employer], straight, Safety frame [employer], Grab bars, Shower chair(knee immobilizer at all times)  Tub or Shower Type: Tub    [employer] dominance: Right    EXAMINATION OF PERFORMANCE DEFICITS:  Cognitive/Behavioral Status:  Neurologic State: Alert  Orientation Level: Oriented X4  Cognition: Follows commands  Perception: Appears intact  Perseveration: No perseveration noted  Safety/Judgement: Awareness of environment; Fall prevention      Hearing: Auditory  Auditory Impairment: None    Vision/Perceptual:         Acuity: Within Defined Limits         Range of Motion:  AROM: Generally decreased, functional  PROM: Within functional limits          Strength:  Strength: Generally decreased, functional       Coordination:  Coordination: Within functional limits  Fine Motor Skills-Upper: Left Intact; Right Intact    Gross Motor Skills-Upper: Left Intact; Right Intact    Tone & Sensation:  Tone: Normal  Sensation: Impaired(hands prior to admission)     Balance:  Sitting: Intact; Without support  Standing: Intact; With support    Functional Mobility and Transfers for ADLs:  Bed Mobility:  Supine to Sit: Supervision    Transfers:  Sit to Stand: Contact guard assistance  Stand to Sit: Contact guard assistance  Bed to Chair: Contact guard assistance; Additional time; Adaptive equipment(RW)    ADL Assessment:  Feeding: Independent    Oral Facial Hygiene/Grooming: Setup    Bathing: Moderate assistance; Additional time; Adaptive equipment    Upper Body Dressing: Setup    Lower Body Dressing: Moderate assistance; Additional time; Adaptive equipment    Toileting: Minimum assistance          ADL Intervention and task modifications:       Grooming  Washing Face: Supervision/set-up(seated)      Lower Body Dressing Assistance  Socks: (demonstrated ability to reach to feet in long sitting)   Compensatory technique training for donning pants with reacher and potential use of sock aid to don socks in sitting. Cognitive Retraining  Safety/Judgement: Awareness of environment; Fall prevention  Functional Measure:  Barthel Index:    Bathin  Bladder: 5  Bowels: 10  Grooming: 5  Dressin  Feeding: 10  Mobility: 5  Stairs: 0  Toilet Use: 5  Transfer (Bed to Chair and Back): 10  Total: 55       Barthel and G-code impairment scale:  Percentage of impairment CH  0% CI  1-19% CJ  20-39% CK  40-59% CL  60-79% CM  80-99% CN  100%   Barthel Score 0-100 100 99-80 79-60 59-40 20-39 1-19   0   Barthel Score 0-20 20 17-19 13-16 9-12 5-8 1-4 0      The Barthel ADL Index: Guidelines  1. The index should be used as a record of what a patient does, not as a record of what a patient could do. 2. The main aim is to establish degree of independence from any help, physical or verbal, however minor and for whatever reason. 3. The need for supervision renders the patient not independent. 4. A patient's performance should be established using the best available evidence. Asking the patient, friends/relatives and nurses are the usual sources, but direct observation and common sense are also important. However direct testing is not needed. 5. Usually the patient's performance over the preceding 24-48 hours is important, but occasionally longer periods will be relevant. 6. Middle categories imply that the patient supplies over 50 per cent of the effort. 7. Use of aids to be independent is allowed. Bettie Torres., Barthel, D.W. (7215). Functional evaluation: the Barthel Index. [address] [facility] (14)2. [facility] John david SOLANGE Fajardo, Delbert Korey., [facility] [address] (). Measuring the change indisability after inpatient rehabilitation; comparison of the responsiveness of the Barthel Index and Functional Mitchell Measure. Journal of Neurology, Neurosurgery, and Psychiatry, 66(4), 949-108. Isha Mendez, N.J.A, María Rios,  WMargieJ.M, & Estrella Bobby MMargieA. (2004.) Assessment of post-stroke quality of life in cost-effectiveness studies: The usefulness of the Barthel Index and the EuroQoL-5D. Quality of Life Research, 13, 217-93       G codes:   In compliance with CMSs Claims Based Outcome Reporting, the following G-code set was chosen for this patient based on their primary functional limitation being treated: The outcome measure chosen to determine the severity of the functional limitation was the Barthel Index with a score of 55/100 which was correlated with the impairment scale. ? Self Care:     - CURRENT STATUS: CK - 40%-59% impaired, limited or restricted    - GOAL STATUS: CI - 1%-19% impaired, limited or restricted    - D/C STATUS:  ---------------To be determined---------------     Occupational Therapy Evaluation Charge Determination   History Examination Decision-Making   LOW Complexity : Brief history review  LOW Complexity : 1-3 performance deficits relating to physical, cognitive , or psychosocial skils that result in activity limitations and / or participation restrictions  LOW Complexity : No comorbidities that affect functional and no verbal or physical assistance needed to complete eval tasks       Based on the above components, the patient evaluation is determined to be of the following complexity level: LOW   Pain:  Pain Scale 1: Numeric (0 - 10)  Pain Intensity 1: 5  Pain Location 1: Knee  Pain Orientation 1: Left  Pain Description 1: Aching  Pain Intervention(s) 1: Medication (see MAR)  Activity Tolerance:   Good  Please refer to the flowsheet for vital signs taken during this treatment. After treatment:   [x] Patient left in no apparent distress sitting up in chair  [] Patient left in no apparent distress in bed  [x] Call bell left within reach  [x] Nursing notified  [] Caregiver present  [] Bed alarm activated    COMMUNICATION/EDUCATION:   The patients plan of care was discussed with: Physical Therapist and Registered Nurse. [x] Home safety education was provided and the patient/caregiver indicated understanding. [x] Patient/family have participated as able in goal setting and plan of care. [x] Patient/family agree to work toward stated goals and plan of care.   [] Patient understands intent and goals of therapy, but is neutral about his/her participation. [] Patient is unable to participate in goal setting and plan of care. This patients plan of care is appropriate for delegation to BEATRICE.     Thank you for this referral.  Zachariah Zaragoza OT  Time Calculation: 30 mins

## 2018-12-15 LAB
ANION GAP SERPL CALC-SCNC: 5 MMOL/L (ref 5–15)
BUN SERPL-MCNC: 10 MG/DL (ref 6–20)
BUN/CREAT SERPL: 21 (ref 12–20)
CALCIUM SERPL-MCNC: 7.3 MG/DL (ref 8.5–10.1)
CHLORIDE SERPL-SCNC: 113 MMOL/L (ref 97–108)
CO2 SERPL-SCNC: 25 MMOL/L (ref 21–32)
CREAT SERPL-MCNC: 0.47 MG/DL (ref 0.55–1.02)
DATE LAST DOSE: ABNORMAL
ERYTHROCYTE [DISTWIDTH] IN BLOOD BY AUTOMATED COUNT: 15.6 % (ref 11.5–14.5)
GLUCOSE SERPL-MCNC: 82 MG/DL (ref 65–100)
HCT VFR BLD AUTO: 22.8 % (ref 35–47)
HGB BLD-MCNC: 7 G/DL (ref 11.5–16)
MCH RBC QN AUTO: 27.7 PG (ref 26–34)
MCHC RBC AUTO-ENTMCNC: 30.7 G/DL (ref 30–36.5)
MCV RBC AUTO: 90.1 FL (ref 80–99)
NRBC # BLD: 0 K/UL (ref 0–0.01)
NRBC BLD-RTO: 0 PER 100 WBC
PLATELET # BLD AUTO: 134 K/UL (ref 150–400)
PMV BLD AUTO: 10.8 FL (ref 8.9–12.9)
POTASSIUM SERPL-SCNC: 3.9 MMOL/L (ref 3.5–5.1)
RBC # BLD AUTO: 2.53 M/UL (ref 3.8–5.2)
REPORTED DOSE,DOSE: ABNORMAL UNITS
REPORTED DOSE/TIME,TMG: ABNORMAL
SODIUM SERPL-SCNC: 143 MMOL/L (ref 136–145)
VANCOMYCIN TROUGH SERPL-MCNC: 14.1 UG/ML (ref 5–10)
WBC # BLD AUTO: 4.8 K/UL (ref 3.6–11)

## 2018-12-15 PROCEDURE — 65270000029 HC RM PRIVATE

## 2018-12-15 PROCEDURE — 36415 COLL VENOUS BLD VENIPUNCTURE: CPT

## 2018-12-15 PROCEDURE — 94762 N-INVAS EAR/PLS OXIMTRY CONT: CPT

## 2018-12-15 PROCEDURE — 74011250637 HC RX REV CODE- 250/637: Performed by: ORTHOPAEDIC SURGERY

## 2018-12-15 PROCEDURE — 85027 COMPLETE CBC AUTOMATED: CPT

## 2018-12-15 PROCEDURE — 74011250636 HC RX REV CODE- 250/636: Performed by: ORTHOPAEDIC SURGERY

## 2018-12-15 PROCEDURE — 80048 BASIC METABOLIC PNL TOTAL CA: CPT

## 2018-12-15 PROCEDURE — 80202 ASSAY OF VANCOMYCIN: CPT

## 2018-12-15 PROCEDURE — 74011250636 HC RX REV CODE- 250/636: Performed by: PHYSICIAN ASSISTANT

## 2018-12-15 RX ADMIN — ACETAMINOPHEN 650 MG: 325 TABLET ORAL at 06:49

## 2018-12-15 RX ADMIN — ACETAMINOPHEN 650 MG: 325 TABLET ORAL at 17:43

## 2018-12-15 RX ADMIN — OXYCODONE HYDROCHLORIDE 15 MG: 5 TABLET ORAL at 03:33

## 2018-12-15 RX ADMIN — ACETAMINOPHEN 650 MG: 325 TABLET ORAL at 12:07

## 2018-12-15 RX ADMIN — LOSARTAN POTASSIUM 100 MG: 50 TABLET ORAL at 08:48

## 2018-12-15 RX ADMIN — DICLOFENAC SODIUM 75 MG: 75 TABLET, DELAYED RELEASE ORAL at 08:47

## 2018-12-15 RX ADMIN — FAMOTIDINE 20 MG: 20 TABLET ORAL at 08:47

## 2018-12-15 RX ADMIN — VANCOMYCIN HYDROCHLORIDE 1500 MG: 10 INJECTION, POWDER, LYOPHILIZED, FOR SOLUTION INTRAVENOUS at 06:49

## 2018-12-15 RX ADMIN — Medication 81 MG: at 08:47

## 2018-12-15 RX ADMIN — Medication 10 ML: at 14:15

## 2018-12-15 RX ADMIN — LEVOTHYROXINE SODIUM 100 MCG: 100 TABLET ORAL at 06:49

## 2018-12-15 RX ADMIN — OXYCODONE HYDROCHLORIDE 15 MG: 5 TABLET ORAL at 15:47

## 2018-12-15 RX ADMIN — OXYCODONE HYDROCHLORIDE 15 MG: 5 TABLET ORAL at 12:07

## 2018-12-15 RX ADMIN — HYDROMORPHONE HYDROCHLORIDE 2 MG: 2 INJECTION INTRAMUSCULAR; INTRAVENOUS; SUBCUTANEOUS at 06:49

## 2018-12-15 RX ADMIN — DIVALPROEX SODIUM 250 MG: 250 TABLET, DELAYED RELEASE ORAL at 17:43

## 2018-12-15 RX ADMIN — MULTIPLE VITAMINS W/ MINERALS TAB 1 TABLET: TAB at 08:47

## 2018-12-15 RX ADMIN — HYDROMORPHONE HYDROCHLORIDE 2 MG: 2 INJECTION INTRAMUSCULAR; INTRAVENOUS; SUBCUTANEOUS at 17:43

## 2018-12-15 RX ADMIN — OXYCODONE HYDROCHLORIDE 15 MG: 5 TABLET ORAL at 20:40

## 2018-12-15 RX ADMIN — DIVALPROEX SODIUM 250 MG: 250 TABLET, DELAYED RELEASE ORAL at 08:47

## 2018-12-15 RX ADMIN — VITAMIN D, TAB 1000IU (100/BT) 5000 UNITS: 25 TAB at 08:47

## 2018-12-15 RX ADMIN — FAMOTIDINE 20 MG: 20 TABLET ORAL at 17:43

## 2018-12-15 RX ADMIN — TRAZODONE HYDROCHLORIDE 100 MG: 100 TABLET ORAL at 21:12

## 2018-12-15 RX ADMIN — POTASSIUM CHLORIDE 10 MEQ: 750 TABLET, EXTENDED RELEASE ORAL at 08:47

## 2018-12-15 RX ADMIN — HYDROMORPHONE HYDROCHLORIDE 2 MG: 2 INJECTION INTRAMUSCULAR; INTRAVENOUS; SUBCUTANEOUS at 10:50

## 2018-12-15 RX ADMIN — DICLOFENAC SODIUM 75 MG: 75 TABLET, DELAYED RELEASE ORAL at 17:43

## 2018-12-15 RX ADMIN — HYDROMORPHONE HYDROCHLORIDE 2 MG: 2 INJECTION INTRAMUSCULAR; INTRAVENOUS; SUBCUTANEOUS at 02:16

## 2018-12-15 RX ADMIN — ACETAMINOPHEN 650 MG: 325 TABLET ORAL at 00:00

## 2018-12-15 RX ADMIN — HYDROMORPHONE HYDROCHLORIDE 2 MG: 2 INJECTION INTRAMUSCULAR; INTRAVENOUS; SUBCUTANEOUS at 14:14

## 2018-12-15 RX ADMIN — VANCOMYCIN HYDROCHLORIDE 1500 MG: 10 INJECTION, POWDER, LYOPHILIZED, FOR SOLUTION INTRAVENOUS at 18:55

## 2018-12-15 RX ADMIN — Medication 10 ML: at 06:00

## 2018-12-15 RX ADMIN — Medication 81 MG: at 17:43

## 2018-12-15 NOTE — PROGRESS NOTES
Infectious Diseases Progress Note    Antibiotic Summary:  Ancef                --  (3 doses)  Zosyn               --   Vancomycin     -- present  Rifampin           --     Left TKR on 10/24/2018     I&D left TKR with poly exchange on 11/15/2018    Resection arthroplasty of the left knee with placement of dynamic antibiotic spacer on 2018      Subjective:     No new symptoms. Pain is controlled. No N, V, D.    Objective:     Vitals:   Visit Vitals  /75   Pulse 81   Temp 98.2 °F (36.8 °C)   Resp 16   Ht 5' 2\" (1.575 m)   Wt 83.9 kg (185 lb)   SpO2 98%   BMI 33.84 kg/m²        Tmax:  Temp (24hrs), Av.9 °F (37.2 °C), Min:98.2 °F (36.8 °C), Max:100.2 °F (37.9 °C)      Exam:  General appearance: alert, no distress  Lungs: clear to auscultation bilaterally  Heart: RRR without murmur  Abdomen: nontender  Left knee: dressing becoming saturated with blood. IV Lines: Right PICC inserted 2018    Labs:    Recent Labs     18  0317 18  2027 18  0247   HGB  --  7.2* 6.8*   BUN 9  --  9   CREA 0.49*  --  0.65     Intraop cultures of the left knee on 2018:   Gram stain = rare WBCs; NOS   Aerobic = NGSF   Anaerobic = pending    Assessment:     1. MRSA infection left TKR -- resection arthroplasty 2018     2. DJD     3. HTN     4. Hypothyroidism S/P Rx for Graves disease     5. Anemia     6. 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 Continue Vancomycin pending final cultures      I'll check the patient again on Monday. Please call if problems arise over the weekend.     Vishal Mansfield MD

## 2018-12-15 NOTE — PROGRESS NOTES
Bedside and Verbal shift change report given to ROBER Orozco (oncoming nurse) by Karon Favre, RN (offgoing nurse). Report included the following information SBAR, Kardex, OR Summary, Procedure Summary, Intake/Output, MAR and Recent Results.

## 2018-12-15 NOTE — PROGRESS NOTES
Pharmacist Note - Vancomycin Dosing  Therapy day 28 (?)  Indication: Septic left knee joint  - 12/12: s/p resection arthroplasty with ABX spacer  Current regimen: 1500 mg IV Q 12 hr    A Trough Level resulted at 14.1 mcg/mL which was obtained ~12 hrs post-dose. Goal trough: 15 - 20 mcg/mL     Plan: Continue current regimen. Pharmacy will continue to monitor this patient daily for changes in clinical status and renal function.

## 2018-12-15 NOTE — PROGRESS NOTES
Resting comfortably      GEN:  NAD.  AOx3   ABD:  S/NT/ND   LLE:  Dressing C/D/I , 5/5 motor, Calf nttp (Bilat), Sensation rossly intact to light touch throughout, 1+ dp/pt pulses, foot perfused    Patient Vitals for the past 24 hrs:   Temp Pulse Resp BP SpO2   12/15/18 0843 99 °F (37.2 °C) 95 16 128/76 98 %   12/15/18 0219 97.7 °F (36.5 °C) 74 16 100/66 96 %   12/14/18 1909 98.2 °F (36.8 °C) 81 16 137/75 98 %   12/14/18 1621 98.5 °F (36.9 °C) 82 16 145/85 98 %   12/14/18 1508  86  155/90    12/14/18 1505  93  (!) 162/99    12/14/18 1503  89  (!) 147/101 98 %   12/14/18 1455  81  133/81 99 %       Current Facility-Administered Medications   Medication Dose Route Frequency    cetirizine (ZYRTEC) tablet 10 mg  10 mg Oral DAILY PRN    0.9% sodium chloride infusion 250 mL  250 mL IntraVENous PRN    vancomycin (VANCOCIN) 1500 mg in  ml infusion  1,500 mg IntraVENous Q12H    Vancomycin - pharmacy to dose   Other Rx Dosing/Monitoring    albuterol (PROVENTIL VENTOLIN) nebulizer solution 2.5 mg  2.5 mg Nebulization Q4H PRN    aspirin delayed-release tablet 81 mg  81 mg Oral BID    baclofen (LIORESAL) tablet 10 mg  10 mg Oral PRN    cholecalciferol (VITAMIN D3) tablet 5,000 Units  5,000 Units Oral DAILY    diclofenac EC (VOLTAREN) tablet 75 mg  75 mg Oral BID    divalproex DR (DEPAKOTE) tablet 250 mg  250 mg Oral BID    famotidine (PEPCID) tablet 20 mg  20 mg Oral BID    furosemide (LASIX) tablet 20 mg  20 mg Oral DAILY    hydroCHLOROthiazide (HYDRODIURIL) tablet 25 mg  25 mg Oral DAILY    levothyroxine (SYNTHROID) tablet 100 mcg  100 mcg Oral ACB    losartan (COZAAR) tablet 100 mg  100 mg Oral DAILY    multivitamin, tx-iron-ca-min (THERA-M w/ IRON) tablet   Oral DAILY    oxyCODONE IR (ROXICODONE) tablet 15 mg  15 mg Oral Q4H PRN    potassium chloride SR (KLOR-CON 10) tablet 10 mEq  10 mEq Oral DAILY    tapentadol Tb12 250 mg (Patient Supplied)  250 mg Oral BID    traZODone (DESYREL) tablet 100 mg  100 mg Oral QHS    . PHARMACY TO SUBSTITUTE PER PROTOCOL (Reordered from: vortioxetine (TRINTELLIX) 10 mg tablet)    Per Protocol    sodium chloride (NS) flush 5-10 mL  5-10 mL IntraVENous Q8H    sodium chloride (NS) flush 5-10 mL  5-10 mL IntraVENous PRN    acetaminophen (TYLENOL) tablet 650 mg  650 mg Oral Q6H    naloxone (NARCAN) injection 0.4 mg  0.4 mg IntraVENous PRN    hydrOXYzine HCl (ATARAX) tablet 10 mg  10 mg Oral Q8H PRN    senna-docusate (PERICOLACE) 8.6-50 mg per tablet 1 Tab  1 Tab Oral BID    polyethylene glycol (MIRALAX) packet 17 g  17 g Oral DAILY    bisacodyl (DULCOLAX) suppository 10 mg  10 mg Rectal DAILY PRN    HYDROmorphone (DILAUDID) injection 2 mg  2 mg IntraVENous Q3H PRN       Lab Results   Component Value Date/Time    HGB 7.2 (L) 12/13/2018 08:27 PM    INR 1.1 10/08/2018 03:47 PM       Lab Results   Component Value Date/Time    Sodium 143 12/15/2018 06:57 AM    Potassium 3.9 12/15/2018 06:57 AM    Chloride 113 (H) 12/15/2018 06:57 AM    CO2 25 12/15/2018 06:57 AM    BUN 10 12/15/2018 06:57 AM    Creatinine 0.47 (L) 12/15/2018 06:57 AM    Calcium 7.3 (L) 12/15/2018 06:57 AM            50 y.o. female s/p resection left knee with abx spacer on 12/12/2018  .  No acute issues    Need nutrition consult  Check cbc    ABX: Complete 24 hours perioperative abx  PATHWAY: Straight cath per protocol if needed  DVT Prophylaxis: Aspirin 81 mg enteric coated BID  Weight Bearing: WBAT LLE- knee immobilizer at all times   Pain Control: Toradol (if Cr normal), Diclofenac to begin the evening of POD 1, Scheduled Tylenol, tramadol, oxy 5 mg for breakthrough  Disposition: Home, HHPT

## 2018-12-15 NOTE — PROGRESS NOTES
Physical Therapy: Defer    867: Chart reviewed and attempted to see patient. Patient declining mobility at this time until she sees the MD as she sees drainage from incision and does not want to aggravate it. Will check back as able and appropriate. 1100: Spoke with RN about timing session with patient's pain medication. Patient currently resting. Will check back this afternoon as able and appropriate. 1312: Attempted to see patient a third time at time coordinated with nursing pain medication administration. Patient deferring PT at this time so she can eat lunch. Will continue to follow. Thank you.   Goldy Amin, PT, DPT

## 2018-12-15 NOTE — PROGRESS NOTES
Bedside shift change report given to Stef Burgess RN (oncoming nurse) by Cat Stewart RN (offgoing nurse). Report included the following information SBAR, Kardex and Recent Results.

## 2018-12-16 LAB
ANION GAP SERPL CALC-SCNC: 5 MMOL/L (ref 5–15)
BUN SERPL-MCNC: 10 MG/DL (ref 6–20)
BUN/CREAT SERPL: 21 (ref 12–20)
CALCIUM SERPL-MCNC: 7.6 MG/DL (ref 8.5–10.1)
CHLORIDE SERPL-SCNC: 112 MMOL/L (ref 97–108)
CO2 SERPL-SCNC: 26 MMOL/L (ref 21–32)
CREAT SERPL-MCNC: 0.48 MG/DL (ref 0.55–1.02)
ERYTHROCYTE [DISTWIDTH] IN BLOOD BY AUTOMATED COUNT: 15.4 % (ref 11.5–14.5)
GLUCOSE SERPL-MCNC: 92 MG/DL (ref 65–100)
HCT VFR BLD AUTO: 21.9 % (ref 35–47)
HGB BLD-MCNC: 6.6 G/DL (ref 11.5–16)
MCH RBC QN AUTO: 26.8 PG (ref 26–34)
MCHC RBC AUTO-ENTMCNC: 30.1 G/DL (ref 30–36.5)
MCV RBC AUTO: 89 FL (ref 80–99)
NRBC # BLD: 0 K/UL (ref 0–0.01)
NRBC BLD-RTO: 0 PER 100 WBC
PLATELET # BLD AUTO: 132 K/UL (ref 150–400)
PMV BLD AUTO: 10.8 FL (ref 8.9–12.9)
POTASSIUM SERPL-SCNC: 4.1 MMOL/L (ref 3.5–5.1)
RBC # BLD AUTO: 2.46 M/UL (ref 3.8–5.2)
SODIUM SERPL-SCNC: 143 MMOL/L (ref 136–145)
WBC # BLD AUTO: 3.7 K/UL (ref 3.6–11)

## 2018-12-16 PROCEDURE — 80048 BASIC METABOLIC PNL TOTAL CA: CPT

## 2018-12-16 PROCEDURE — 36415 COLL VENOUS BLD VENIPUNCTURE: CPT

## 2018-12-16 PROCEDURE — P9016 RBC LEUKOCYTES REDUCED: HCPCS

## 2018-12-16 PROCEDURE — 85027 COMPLETE CBC AUTOMATED: CPT

## 2018-12-16 PROCEDURE — 36430 TRANSFUSION BLD/BLD COMPNT: CPT

## 2018-12-16 PROCEDURE — 97116 GAIT TRAINING THERAPY: CPT

## 2018-12-16 PROCEDURE — 74011250636 HC RX REV CODE- 250/636: Performed by: ORTHOPAEDIC SURGERY

## 2018-12-16 PROCEDURE — 65270000029 HC RM PRIVATE

## 2018-12-16 PROCEDURE — 74011250637 HC RX REV CODE- 250/637: Performed by: ORTHOPAEDIC SURGERY

## 2018-12-16 PROCEDURE — 94762 N-INVAS EAR/PLS OXIMTRY CONT: CPT

## 2018-12-16 PROCEDURE — 74011250636 HC RX REV CODE- 250/636: Performed by: PHYSICIAN ASSISTANT

## 2018-12-16 RX ORDER — SODIUM CHLORIDE 9 MG/ML
250 INJECTION, SOLUTION INTRAVENOUS AS NEEDED
Status: DISCONTINUED | OUTPATIENT
Start: 2018-12-16 | End: 2018-12-18 | Stop reason: HOSPADM

## 2018-12-16 RX ADMIN — OXYCODONE HYDROCHLORIDE 15 MG: 5 TABLET ORAL at 03:39

## 2018-12-16 RX ADMIN — POTASSIUM CHLORIDE 10 MEQ: 750 TABLET, EXTENDED RELEASE ORAL at 08:35

## 2018-12-16 RX ADMIN — ACETAMINOPHEN 650 MG: 325 TABLET ORAL at 18:45

## 2018-12-16 RX ADMIN — MULTIPLE VITAMINS W/ MINERALS TAB 1 TABLET: TAB at 08:34

## 2018-12-16 RX ADMIN — HYDROMORPHONE HYDROCHLORIDE 2 MG: 2 INJECTION INTRAMUSCULAR; INTRAVENOUS; SUBCUTANEOUS at 06:57

## 2018-12-16 RX ADMIN — Medication 10 ML: at 22:00

## 2018-12-16 RX ADMIN — Medication 81 MG: at 18:46

## 2018-12-16 RX ADMIN — HYDROMORPHONE HYDROCHLORIDE 2 MG: 2 INJECTION INTRAMUSCULAR; INTRAVENOUS; SUBCUTANEOUS at 00:37

## 2018-12-16 RX ADMIN — ACETAMINOPHEN 650 MG: 325 TABLET ORAL at 11:05

## 2018-12-16 RX ADMIN — Medication 10 ML: at 00:37

## 2018-12-16 RX ADMIN — HYDROMORPHONE HYDROCHLORIDE 2 MG: 2 INJECTION INTRAMUSCULAR; INTRAVENOUS; SUBCUTANEOUS at 23:12

## 2018-12-16 RX ADMIN — LEVOTHYROXINE SODIUM 100 MCG: 100 TABLET ORAL at 07:01

## 2018-12-16 RX ADMIN — OXYCODONE HYDROCHLORIDE 15 MG: 5 TABLET ORAL at 14:10

## 2018-12-16 RX ADMIN — VANCOMYCIN HYDROCHLORIDE 1500 MG: 10 INJECTION, POWDER, LYOPHILIZED, FOR SOLUTION INTRAVENOUS at 18:45

## 2018-12-16 RX ADMIN — HYDROMORPHONE HYDROCHLORIDE 2 MG: 2 INJECTION INTRAMUSCULAR; INTRAVENOUS; SUBCUTANEOUS at 11:04

## 2018-12-16 RX ADMIN — ACETAMINOPHEN 650 MG: 325 TABLET ORAL at 23:12

## 2018-12-16 RX ADMIN — OXYCODONE HYDROCHLORIDE 15 MG: 5 TABLET ORAL at 10:00

## 2018-12-16 RX ADMIN — Medication 81 MG: at 11:05

## 2018-12-16 RX ADMIN — Medication 10 ML: at 06:00

## 2018-12-16 RX ADMIN — OXYCODONE HYDROCHLORIDE 15 MG: 5 TABLET ORAL at 19:37

## 2018-12-16 RX ADMIN — LEVOTHYROXINE SODIUM 100 MCG: 100 TABLET ORAL at 06:57

## 2018-12-16 RX ADMIN — Medication 10 ML: at 14:10

## 2018-12-16 RX ADMIN — ACETAMINOPHEN 650 MG: 325 TABLET ORAL at 06:57

## 2018-12-16 RX ADMIN — DIVALPROEX SODIUM 250 MG: 250 TABLET, DELAYED RELEASE ORAL at 18:46

## 2018-12-16 RX ADMIN — HYDROMORPHONE HYDROCHLORIDE 2 MG: 2 INJECTION INTRAMUSCULAR; INTRAVENOUS; SUBCUTANEOUS at 15:42

## 2018-12-16 RX ADMIN — LOSARTAN POTASSIUM 100 MG: 50 TABLET ORAL at 08:35

## 2018-12-16 RX ADMIN — HYDROCHLOROTHIAZIDE 25 MG: 25 TABLET ORAL at 08:35

## 2018-12-16 RX ADMIN — TRAZODONE HYDROCHLORIDE 100 MG: 100 TABLET ORAL at 21:10

## 2018-12-16 RX ADMIN — FAMOTIDINE 20 MG: 20 TABLET ORAL at 08:35

## 2018-12-16 RX ADMIN — VITAMIN D, TAB 1000IU (100/BT) 5000 UNITS: 25 TAB at 08:34

## 2018-12-16 RX ADMIN — DICLOFENAC SODIUM 75 MG: 75 TABLET, DELAYED RELEASE ORAL at 18:45

## 2018-12-16 RX ADMIN — FAMOTIDINE 20 MG: 20 TABLET ORAL at 18:46

## 2018-12-16 RX ADMIN — DICLOFENAC SODIUM 75 MG: 75 TABLET, DELAYED RELEASE ORAL at 08:35

## 2018-12-16 RX ADMIN — HYDROMORPHONE HYDROCHLORIDE 2 MG: 2 INJECTION INTRAMUSCULAR; INTRAVENOUS; SUBCUTANEOUS at 18:50

## 2018-12-16 RX ADMIN — VANCOMYCIN HYDROCHLORIDE 1500 MG: 10 INJECTION, POWDER, LYOPHILIZED, FOR SOLUTION INTRAVENOUS at 06:57

## 2018-12-16 RX ADMIN — ACETAMINOPHEN 650 MG: 325 TABLET ORAL at 00:37

## 2018-12-16 RX ADMIN — DIVALPROEX SODIUM 250 MG: 250 TABLET, DELAYED RELEASE ORAL at 08:34

## 2018-12-16 NOTE — PROGRESS NOTES
Infomed Dr. Kimberli Romano of the patient's Hgb being 6.6.   Will transfuse 1 unit PRBCs per Dr. Kimberli Romano

## 2018-12-16 NOTE — PROGRESS NOTES
Problem: Mobility Impaired (Adult and Pediatric)  Goal: *Acute Goals and Plan of Care (Insert Text)  Physical Therapy Goals  Initiated 12/14/2018  1. Patient will move from supine to sit and sit to supine , scoot up and down and roll side to side in bed with independence within 7 day(s). 2.  Patient will transfer from bed to chair and chair to bed with modified independence using the least restrictive device within 7 day(s). 3.  Patient will perform sit to stand with modified independence within 7 day(s). 4.  Patient will ambulate with modified independence for 300 feet with the least restrictive device within 7 day(s). 5.  Patient will ascend/descend 6 stairs with one handrail(s) with modified independence within 7 day(s). physical Therapy TREATMENT  Patient: Cat Mercedes (20 y.o. female)  Date: 12/16/2018  Diagnosis: STATUS POST LEFT KNEE REPLACEMENT  Status post left knee replacement <principal problem not specified>  Procedure(s) (LRB):  LEFT KNEE RESECTION ARTHROPLASTY WITH ANTIBIOTIC SPACER PLACEMENT (Left) 4 Days Post-Op  Precautions: Contact, WBAT, Other (comment)(KI on at all times)  Chart, physical therapy assessment, plan of care and goals were reviewed. ASSESSMENT:  S/p 1 unit RBC's due to Hgb 6.6. Nursing ok to proceed working with patient. Patient was received sitting up in bed. Agrees to PT asking not to do much to avoid her wound area from bleeding . Reports 7/10 pain. Requests to sit up so she can wash up. Patient moved to edge of bed and mod I. Sit to stand CGA, ambulates 10' with RW step to with KI on. Good stability with RW and with brace. Returned to bedside chair and set up with supplies to wash up. VSS throughout activity. Patient educated on fall precaution and to get up with staff present only. Patient indicates understanding.     Progression toward goals:  []      Improving appropriately and progressing toward goals  [x]      Improving slowly and progressing toward goals  []      Not making progress toward goals and plan of care will be adjusted     PLAN:  Patient continues to benefit from skilled intervention to address the above impairments. Continue treatment per established plan of care. Discharge Recommendations:  HHPT  Further Equipment Recommendations for Discharge:  May need RW     SUBJECTIVE:   Patient stated I would really like to wash up and brush my teeth.     OBJECTIVE DATA SUMMARY:   Critical Behavior:  Neurologic State: Alert  Orientation Level: Appropriate for age, Oriented X4  Cognition: Appropriate for age attention/concentration, Appropriate decision making, Appropriate safety awareness, Follows commands  Safety/Judgement: Awareness of environment, Fall prevention  Range of Motion:NT                          Functional Mobility Training:  Bed Mobility:     Supine to Sit: Modified independent              Transfers:  Sit to Stand: Contact guard assistance  Stand to Sit: Stand-by assistance                             Balance:  Sitting: Intact  Standing: Intact; With support  Ambulation/Gait Training:  Distance (ft): 12 Feet (ft)  Assistive Device: Brace/Splint;Gait belt;Walker, rolling  Ambulation - Level of Assistance: Contact guard assistance        Gait Abnormalities: Antalgic; Altered arm swing; Step to gait        Base of Support: Shift to right     Speed/Radha: Slow  Step Length: Left shortened;Right shortened                    Stairs:NT             Pain:  Pain Scale 1: Numeric (0 - 10)  Pain Intensity 1: 7  Pain Location 1: Leg  Pain Orientation 1: Anterior  Pain Description 1: Aching  Pain Intervention(s) 1: Medication (see MAR)  Activity Tolerance:   See above  Please refer to the flowsheet for vital signs taken during this treatment.   After treatment:   [x] Patient left in no apparent distress sitting up in chair  [] Patient left in no apparent distress in bed  [x] Call bell left within reach  [x] Nursing notified  [] Caregiver present  [] Bed alarm activated    COMMUNICATION/COLLABORATION:   The patients plan of care was discussed with: Registered Nurse    Oscar Esquivel DPT   Time Calculation: 10 mins

## 2018-12-16 NOTE — PROGRESS NOTES
Bedside shift change report given to ROBER Orozco (oncoming nurse) by Charmaine Clayton RN (offgoing nurse). Report included the following information SBAR, Kardex, OR Summary, Procedure Summary, Intake/Output, MAR and Recent Results.

## 2018-12-16 NOTE — PROGRESS NOTES
Physical Therapy: 12:18  Patient still receiving RBC at this time and unable to participate in therapy. We will follow this patient and attempt again as our schedule allows. Thank you.     Ariel Vasquez, PT, DPT, CLT

## 2018-12-16 NOTE — PROGRESS NOTES
Bedside and Verbal shift change report given to Carlitos Elaine (oncoming nurse) by Madelyn Aldridge (offgoing nurse). Report included the following information SBAR, Kardex, Procedure Summary, Intake/Output, MAR and Recent Results.

## 2018-12-16 NOTE — ROUTINE PROCESS
Physical Therapy 0926  Per chart review, Hgb 6.6 and order seen for RBC. Per discussion with nurse will hold seeing patient at this time and check back later this afternoon. Thank you.     Mohan Gardiner, PT, DPT, CLT

## 2018-12-17 ENCOUNTER — APPOINTMENT (OUTPATIENT)
Dept: GENERAL RADIOLOGY | Age: 48
DRG: 467 | End: 2018-12-17
Attending: ORTHOPAEDIC SURGERY
Payer: COMMERCIAL

## 2018-12-17 LAB
ABO + RH BLD: NORMAL
ANION GAP SERPL CALC-SCNC: 5 MMOL/L (ref 5–15)
BLD PROD TYP BPU: NORMAL
BLD PROD TYP BPU: NORMAL
BLOOD GROUP ANTIBODIES SERPL: NORMAL
BPU ID: NORMAL
BPU ID: NORMAL
BUN SERPL-MCNC: 9 MG/DL (ref 6–20)
BUN/CREAT SERPL: 20 (ref 12–20)
CALCIUM SERPL-MCNC: 7.6 MG/DL (ref 8.5–10.1)
CHLORIDE SERPL-SCNC: 107 MMOL/L (ref 97–108)
CO2 SERPL-SCNC: 28 MMOL/L (ref 21–32)
CREAT SERPL-MCNC: 0.46 MG/DL (ref 0.55–1.02)
CROSSMATCH RESULT,%XM: NORMAL
CROSSMATCH RESULT,%XM: NORMAL
ERYTHROCYTE [DISTWIDTH] IN BLOOD BY AUTOMATED COUNT: 15.6 % (ref 11.5–14.5)
GLUCOSE SERPL-MCNC: 81 MG/DL (ref 65–100)
HCT VFR BLD AUTO: 25.7 % (ref 35–47)
HGB BLD-MCNC: 8 G/DL (ref 11.5–16)
MCH RBC QN AUTO: 27.8 PG (ref 26–34)
MCHC RBC AUTO-ENTMCNC: 31.1 G/DL (ref 30–36.5)
MCV RBC AUTO: 89.2 FL (ref 80–99)
NRBC # BLD: 0 K/UL (ref 0–0.01)
NRBC BLD-RTO: 0 PER 100 WBC
PLATELET # BLD AUTO: 151 K/UL (ref 150–400)
PMV BLD AUTO: 9.6 FL (ref 8.9–12.9)
POTASSIUM SERPL-SCNC: 4.2 MMOL/L (ref 3.5–5.1)
RBC # BLD AUTO: 2.88 M/UL (ref 3.8–5.2)
SODIUM SERPL-SCNC: 140 MMOL/L (ref 136–145)
SPECIMEN EXP DATE BLD: NORMAL
STATUS OF UNIT,%ST: NORMAL
STATUS OF UNIT,%ST: NORMAL
UNIT DIVISION, %UDIV: 0
UNIT DIVISION, %UDIV: 0
WBC # BLD AUTO: 4.1 K/UL (ref 3.6–11)

## 2018-12-17 PROCEDURE — 71045 X-RAY EXAM CHEST 1 VIEW: CPT

## 2018-12-17 PROCEDURE — 97116 GAIT TRAINING THERAPY: CPT

## 2018-12-17 PROCEDURE — 74011250636 HC RX REV CODE- 250/636: Performed by: ORTHOPAEDIC SURGERY

## 2018-12-17 PROCEDURE — 80048 BASIC METABOLIC PNL TOTAL CA: CPT

## 2018-12-17 PROCEDURE — 97530 THERAPEUTIC ACTIVITIES: CPT

## 2018-12-17 PROCEDURE — 74011000250 HC RX REV CODE- 250: Performed by: ORTHOPAEDIC SURGERY

## 2018-12-17 PROCEDURE — 36415 COLL VENOUS BLD VENIPUNCTURE: CPT

## 2018-12-17 PROCEDURE — 74011250637 HC RX REV CODE- 250/637: Performed by: ORTHOPAEDIC SURGERY

## 2018-12-17 PROCEDURE — 93971 EXTREMITY STUDY: CPT

## 2018-12-17 PROCEDURE — 65270000029 HC RM PRIVATE

## 2018-12-17 PROCEDURE — 77030011256 HC DRSG MEPILEX <16IN NO BORD MOLN -A

## 2018-12-17 PROCEDURE — 85027 COMPLETE CBC AUTOMATED: CPT

## 2018-12-17 PROCEDURE — 74011250636 HC RX REV CODE- 250/636: Performed by: PHYSICIAN ASSISTANT

## 2018-12-17 RX ORDER — SODIUM CHLORIDE 0.9 % (FLUSH) 0.9 %
10 SYRINGE (ML) INJECTION EVERY 24 HOURS
Status: DISCONTINUED | OUTPATIENT
Start: 2018-12-17 | End: 2018-12-18 | Stop reason: HOSPADM

## 2018-12-17 RX ORDER — SODIUM CHLORIDE 0.9 % (FLUSH) 0.9 %
10 SYRINGE (ML) INJECTION EVERY 8 HOURS
Status: DISCONTINUED | OUTPATIENT
Start: 2018-12-17 | End: 2018-12-18 | Stop reason: HOSPADM

## 2018-12-17 RX ORDER — SODIUM CHLORIDE 0.9 % (FLUSH) 0.9 %
10 SYRINGE (ML) INJECTION AS NEEDED
Status: DISCONTINUED | OUTPATIENT
Start: 2018-12-17 | End: 2018-12-18 | Stop reason: HOSPADM

## 2018-12-17 RX ORDER — SODIUM CHLORIDE 0.9 % (FLUSH) 0.9 %
20 SYRINGE (ML) INJECTION AS NEEDED
Status: DISCONTINUED | OUTPATIENT
Start: 2018-12-17 | End: 2018-12-18 | Stop reason: HOSPADM

## 2018-12-17 RX ORDER — BACITRACIN 500 UNIT/G
1 PACKET (EA) TOPICAL AS NEEDED
Status: DISCONTINUED | OUTPATIENT
Start: 2018-12-17 | End: 2018-12-18 | Stop reason: HOSPADM

## 2018-12-17 RX ADMIN — MULTIPLE VITAMINS W/ MINERALS TAB 1 TABLET: TAB at 09:25

## 2018-12-17 RX ADMIN — Medication 10 ML: at 15:00

## 2018-12-17 RX ADMIN — DIVALPROEX SODIUM 250 MG: 250 TABLET, DELAYED RELEASE ORAL at 18:50

## 2018-12-17 RX ADMIN — OXYCODONE HYDROCHLORIDE 15 MG: 5 TABLET ORAL at 14:59

## 2018-12-17 RX ADMIN — Medication 81 MG: at 09:26

## 2018-12-17 RX ADMIN — ACETAMINOPHEN 650 MG: 325 TABLET ORAL at 18:50

## 2018-12-17 RX ADMIN — HYDROMORPHONE HYDROCHLORIDE 2 MG: 2 INJECTION INTRAMUSCULAR; INTRAVENOUS; SUBCUTANEOUS at 03:51

## 2018-12-17 RX ADMIN — DICLOFENAC SODIUM 75 MG: 75 TABLET, DELAYED RELEASE ORAL at 18:49

## 2018-12-17 RX ADMIN — Medication 81 MG: at 18:50

## 2018-12-17 RX ADMIN — DIVALPROEX SODIUM 250 MG: 250 TABLET, DELAYED RELEASE ORAL at 09:26

## 2018-12-17 RX ADMIN — ACETAMINOPHEN 650 MG: 325 TABLET ORAL at 13:12

## 2018-12-17 RX ADMIN — OXYCODONE HYDROCHLORIDE 15 MG: 5 TABLET ORAL at 18:49

## 2018-12-17 RX ADMIN — OXYCODONE HYDROCHLORIDE 15 MG: 5 TABLET ORAL at 06:32

## 2018-12-17 RX ADMIN — TRAZODONE HYDROCHLORIDE 100 MG: 100 TABLET ORAL at 21:55

## 2018-12-17 RX ADMIN — POTASSIUM CHLORIDE 10 MEQ: 750 TABLET, EXTENDED RELEASE ORAL at 09:25

## 2018-12-17 RX ADMIN — ACETAMINOPHEN 650 MG: 325 TABLET ORAL at 06:32

## 2018-12-17 RX ADMIN — HYDROMORPHONE HYDROCHLORIDE 2 MG: 2 INJECTION INTRAMUSCULAR; INTRAVENOUS; SUBCUTANEOUS at 16:10

## 2018-12-17 RX ADMIN — VANCOMYCIN HYDROCHLORIDE 1500 MG: 10 INJECTION, POWDER, LYOPHILIZED, FOR SOLUTION INTRAVENOUS at 20:13

## 2018-12-17 RX ADMIN — LEVOTHYROXINE SODIUM 100 MCG: 100 TABLET ORAL at 06:32

## 2018-12-17 RX ADMIN — LOSARTAN POTASSIUM 100 MG: 50 TABLET ORAL at 09:25

## 2018-12-17 RX ADMIN — VITAMIN D, TAB 1000IU (100/BT) 5000 UNITS: 25 TAB at 09:25

## 2018-12-17 RX ADMIN — HYDROMORPHONE HYDROCHLORIDE 2 MG: 2 INJECTION INTRAMUSCULAR; INTRAVENOUS; SUBCUTANEOUS at 09:24

## 2018-12-17 RX ADMIN — DICLOFENAC SODIUM 75 MG: 75 TABLET, DELAYED RELEASE ORAL at 09:25

## 2018-12-17 RX ADMIN — FAMOTIDINE 20 MG: 20 TABLET ORAL at 09:25

## 2018-12-17 RX ADMIN — Medication 10 ML: at 21:58

## 2018-12-17 RX ADMIN — HYDROMORPHONE HYDROCHLORIDE 2 MG: 2 INJECTION INTRAMUSCULAR; INTRAVENOUS; SUBCUTANEOUS at 20:12

## 2018-12-17 RX ADMIN — FAMOTIDINE 20 MG: 20 TABLET ORAL at 18:49

## 2018-12-17 RX ADMIN — OXYCODONE HYDROCHLORIDE 15 MG: 5 TABLET ORAL at 02:33

## 2018-12-17 RX ADMIN — HYDROMORPHONE HYDROCHLORIDE 2 MG: 2 INJECTION INTRAMUSCULAR; INTRAVENOUS; SUBCUTANEOUS at 13:12

## 2018-12-17 RX ADMIN — Medication 10 ML: at 06:00

## 2018-12-17 RX ADMIN — VANCOMYCIN HYDROCHLORIDE 1500 MG: 10 INJECTION, POWDER, LYOPHILIZED, FOR SOLUTION INTRAVENOUS at 06:33

## 2018-12-17 RX ADMIN — OXYCODONE HYDROCHLORIDE 15 MG: 5 TABLET ORAL at 23:00

## 2018-12-17 RX ADMIN — OXYCODONE HYDROCHLORIDE 15 MG: 5 TABLET ORAL at 11:01

## 2018-12-17 RX ADMIN — ALTEPLASE 1 MG: 2.2 INJECTION, POWDER, LYOPHILIZED, FOR SOLUTION INTRAVENOUS at 17:36

## 2018-12-17 NOTE — PROGRESS NOTES
Problem: Mobility Impaired (Adult and Pediatric)  Goal: *Acute Goals and Plan of Care (Insert Text)  Physical Therapy Goals  Initiated 12/14/2018  1. Patient will move from supine to sit and sit to supine , scoot up and down and roll side to side in bed with independence within 7 day(s). 2.  Patient will transfer from bed to chair and chair to bed with modified independence using the least restrictive device within 7 day(s). 3.  Patient will perform sit to stand with modified independence within 7 day(s). 4.  Patient will ambulate with modified independence for 300 feet with the least restrictive device within 7 day(s). 5.  Patient will ascend/descend 6 stairs with one handrail(s) with modified independence within 7 day(s). Outcome: Progressing Towards Goal  physical Therapy TREATMENT  Patient: Olu Cruz (50 y.o. female)  Date: 12/17/2018  Diagnosis: STATUS POST LEFT KNEE REPLACEMENT  Status post left knee replacement <principal problem not specified>  Procedure(s) (LRB):  LEFT KNEE RESECTION ARTHROPLASTY WITH ANTIBIOTIC SPACER PLACEMENT (Left) 5 Days Post-Op  Precautions: Contact, WBAT, Other (comment)(KI on at all times)  Chart, physical therapy assessment, plan of care and goals were reviewed. ASSESSMENT:  RN cleared pt for PT. Pt Mod I for bed mobility and transfers. PT amb approx 180 Ft w/ RW and SBA-Supervision;demosntrated step through,antalgic gait. Pt remained sitting up in chair at sessions end w/ all items in reach. Reviewed quad sets, glut squeezes, AP's. Pt reports no pain in UE at this time. Pt reports having Rollator at home and feels she will be able to amb w/ her rollator once returned home. Also reported she owns standard walker and would look into wheeled legs if RW is needed.      Progression toward goals:  [x]    Improving appropriately and progressing toward goals  []    Improving slowly and progressing toward goals  []    Not making progress toward goals and plan of care will be adjusted     PLAN:  Patient continues to benefit from skilled intervention to address the above impairments. Continue treatment per established plan of care. Discharge Recommendations:  Home Health  Further Equipment Recommendations for Discharge: Owns Rollator (pt reported that she feels comfortable using her rollator once home)     SUBJECTIVE:   Patient stated It's a monthly thing. ..    OBJECTIVE DATA SUMMARY:   Critical Behavior:  Neurologic State: Alert, Appropriate for age  Orientation Level: Appropriate for age  Cognition: Appropriate decision making, Appropriate safety awareness, Appropriate for age attention/concentration  Safety/Judgement: Awareness of environment, Fall prevention  Functional Mobility Training:  Bed Mobility:     Supine to Sit: Modified independent  Sit to Supine: (pt returned to chair )           Transfers:  Sit to Stand: Modified independent  Stand to Sit: Modified independent                             Balance:  Sitting: Intact  Standing: Intact; With support  Ambulation/Gait Training:     Assistive Device: Brace/Splint;Gait belt;Walker, rolling  Ambulation - Level of Assistance: Contact guard assistance        Gait Abnormalities: Antalgic;Decreased step clearance;Trunk sway increased  Right Side Weight Bearing: Full  Left Side Weight Bearing: As tolerated  Base of Support: Shift to right;Widened  Stance: Left decreased  Speed/Radha: Pace decreased (<100 feet/min)  Step Length: Left shortened;Right shortened                                 Pain:  Pain Scale 1: Numeric (0 - 10)  Pain Intensity 1: 7  Pain Location 1: Leg  Pain Orientation 1: Anterior  Pain Description 1: Aching  Pain Intervention(s) 1: Medication (see MAR)  Activity Tolerance:     Please refer to the flowsheet for vital signs taken during this treatment.   After treatment:   [x]    Patient left in no apparent distress sitting up in chair  []    Patient left in no apparent distress in bed  [x]    Call bell left within reach  [x]    Nursing notified  [x]    Caregiver present  []    Bed alarm activated    COMMUNICATION/COLLABORATION:   The patients plan of care was discussed with: Registered Nurse    Carmen PRADO Means,PTA   Time Calculation: 25 mins

## 2018-12-17 NOTE — PROGRESS NOTES
Orthopedic Joint Progress Note    2018  Admit Date: 2018  Admit Diagnosis: STATUS POST LEFT KNEE REPLACEMENT  Status post left knee replacement    4 Days Post-Op    Subjective:     Jessa Covarrubias  Was seen and examined at bedside. Pain better today. No complaints. Has been OOB with PT. Review of Systems: Pertinent items are noted in HPI. Objective:     PT/OT:     PATIENT MOBILITY    Bed Mobility  Supine to Sit: Modified independent  Transfers  Sit to Stand: Contact guard assistance  Stand to Sit: Stand-by assistance  Bed to Chair: Contact guard assistance, Additional time, Adaptive equipment(RW)      Gait  Base of Support: Shift to right  Speed/Radha: Slow  Step Length: Left shortened, Right shortened  Gait Abnormalities: Antalgic, Altered arm swing, Step to gait  Ambulation - Level of Assistance: Contact guard assistance  Distance (ft): 12 Feet (ft)  Assistive Device: Brace/Splint, Gait belt, Walker, rolling   Weight Bearing Status  Left Side Weight Bearing: As tolerated(in knee immobilzer)        Vital Signs:    Blood pressure 160/87, pulse 98, temperature 98.9 °F (37.2 °C), resp. rate 16, height 5' 2\" (1.575 m), weight 84.6 kg (186 lb 8.2 oz), SpO2 100 %.   Temp (24hrs), Av.5 °F (36.9 °C), Min:98.2 °F (36.8 °C), Max:98.9 °F (37.2 °C)      Pain Control:   Pain Assessment  Pain Scale 1: Numeric (0 - 10)  Pain Intensity 1: 7  Pain Onset 1: post op  Pain Location 1: Leg  Pain Orientation 1: Anterior  Pain Description 1: Aching  Pain Intervention(s) 1: Medication (see MAR)    Meds:  Current Facility-Administered Medications   Medication Dose Route Frequency    0.9% sodium chloride infusion 250 mL  250 mL IntraVENous PRN    cetirizine (ZYRTEC) tablet 10 mg  10 mg Oral DAILY PRN    0.9% sodium chloride infusion 250 mL  250 mL IntraVENous PRN    vancomycin (VANCOCIN) 1500 mg in  ml infusion  1,500 mg IntraVENous Q12H    Vancomycin - pharmacy to dose   Other Rx Dosing/Monitoring    albuterol (PROVENTIL VENTOLIN) nebulizer solution 2.5 mg  2.5 mg Nebulization Q4H PRN    aspirin delayed-release tablet 81 mg  81 mg Oral BID    baclofen (LIORESAL) tablet 10 mg  10 mg Oral PRN    cholecalciferol (VITAMIN D3) tablet 5,000 Units  5,000 Units Oral DAILY    diclofenac EC (VOLTAREN) tablet 75 mg  75 mg Oral BID    divalproex DR (DEPAKOTE) tablet 250 mg  250 mg Oral BID    famotidine (PEPCID) tablet 20 mg  20 mg Oral BID    furosemide (LASIX) tablet 20 mg  20 mg Oral DAILY    hydroCHLOROthiazide (HYDRODIURIL) tablet 25 mg  25 mg Oral DAILY    levothyroxine (SYNTHROID) tablet 100 mcg  100 mcg Oral ACB    losartan (COZAAR) tablet 100 mg  100 mg Oral DAILY    multivitamin, tx-iron-ca-min (THERA-M w/ IRON) tablet   Oral DAILY    oxyCODONE IR (ROXICODONE) tablet 15 mg  15 mg Oral Q4H PRN    potassium chloride SR (KLOR-CON 10) tablet 10 mEq  10 mEq Oral DAILY    tapentadol Tb12 250 mg (Patient Supplied)  250 mg Oral BID    traZODone (DESYREL) tablet 100 mg  100 mg Oral QHS    . PHARMACY TO SUBSTITUTE PER PROTOCOL (Reordered from: vortioxetine (TRINTELLIX) 10 mg tablet)    Per Protocol    sodium chloride (NS) flush 5-10 mL  5-10 mL IntraVENous Q8H    sodium chloride (NS) flush 5-10 mL  5-10 mL IntraVENous PRN    acetaminophen (TYLENOL) tablet 650 mg  650 mg Oral Q6H    naloxone (NARCAN) injection 0.4 mg  0.4 mg IntraVENous PRN    hydrOXYzine HCl (ATARAX) tablet 10 mg  10 mg Oral Q8H PRN    senna-docusate (PERICOLACE) 8.6-50 mg per tablet 1 Tab  1 Tab Oral BID    polyethylene glycol (MIRALAX) packet 17 g  17 g Oral DAILY    bisacodyl (DULCOLAX) suppository 10 mg  10 mg Rectal DAILY PRN    HYDROmorphone (DILAUDID) injection 2 mg  2 mg IntraVENous Q3H PRN        LAB:    Lab Results   Component Value Date/Time    INR 1.1 10/08/2018 03:47 PM     Lab Results   Component Value Date/Time    HGB 6.6 (L) 12/16/2018 03:49 AM    HGB 7.0 (L) 12/15/2018 12:17 PM    HGB 7.2 (L) 12/13/2018 08:27 PM       Wound Knee Left (Active)   Number of days: 30       Wound Leg Left (Active)   DRESSING STATUS Clean, dry, and intact 12/16/2018  1:15 PM   DRESSING TYPE Dry dressing;Elastic bandage;Negative pressure wound therapy 12/16/2018  1:15 PM   SPLINT TYPE/MATERIAL Knee immobilizer 12/16/2018  1:15 PM   Drainage Amount  None 12/16/2018  1:15 PM   Wound Odor None 12/16/2018  1:15 PM   Number of days: 4       [REMOVED] Wound Knee Anterior; Left (Removed)   Number of days: 41         Physical Exam:  General: awake, alert, NAD  LLE: dressing intact with good seal. Motor intact TA/GS/EHL/FHL. SILT tib/saph/valerie/spn/dpn. DP/PT pulses 2/2 palpable. Foot warm and well perfused. Calf supple and nontender. Assessment:      Active Problems:    Status post left knee replacement (12/12/2018)         Plan:   51 yo female s/p resection arthroplasty of left knee with antibiotic spacer POD#4  1. WBAT LLE with assistive device and knee immobilizer at all times. 2. Analagesia  3. DVT prophylaxis - Aspirin 81mg BID  4. Hb 6.6 g/dL today. Transfused 1u PRBC. Will check CBC in AM  5. Antibiotics - receiving vancomycin IV pharmacy dosed  6. Plan for discharge to home with home health.      Patient Expects to be Discharged to[de-identified] Private residence     Signed By: Tanisha Yoo DO

## 2018-12-17 NOTE — PROGRESS NOTES
Bedside and Verbal shift change report given to Carl ortiz RN (oncoming nurse) by Rohan Lou RN (offgoing nurse). Report included the following information SBAR, Kardex, OR Summary, Procedure Summary, Intake/Output, MAR and Recent Results.

## 2018-12-17 NOTE — PROGRESS NOTES
Occupational Therapy Note:     Pt with UE doppler study to be done today to rule out DVT due to swelling and PICC placement. Will defer activity at this time following doppler results.        Romie Curiel, OT

## 2018-12-17 NOTE — PROGRESS NOTES
Malina  PICC looks swollen but not tender. Patient denies tenderness. Measures at 41 cm. Left upper arm measures 37 cm. Nurse to assess site. Will re-check later.

## 2018-12-17 NOTE — PROGRESS NOTES
NUTRITION COMPLETE ASSESSMENT    RECOMMENDATIONS:   1. Regular diet  2. Suggest check B12 and replete PRN  3. RD add Boost Glucose Control x 1-2 BID  4. RD reviewed CHO/pro needs with GB sx and reactive hypglycemia  5. Please obtain measured weight     Interventions/Plan:   Food/Nutrient Delivery:  General/healthful diet Commercial supplement, Multivitamin/mineral        Nutrition Education:     Coordination of Care:    Nutrition Counseling:        Assessment:   Reason for Assessment:   [x] Provider Consult    Diet: Regular  Supplements: RD add Boost Glucose Control (chocolate) x 1 day  Nutritionally Significant Medications: [x] Reviewed & Includes: D3, Pepcid, Lasix 20 mg; TheraM Plus, Miralax, KCL, Synthroid, Vancomycin  Meal Intake: No data found. Subjective:  Weighed ~300# (2001) at time Kavita-enCounts include [facility] beds at the [facility]). UBW now 185-190, lowest wt was 150# when I got Grave's dx (?~2012). Have reactive hypoglycemia ~3 hrs after eating certain foods. Drink Lactaid milk, tolerate cheese and yogurt; has Lactaid tablets. Take Flintstones w/iron every other day and daily MV other days, also Unjury including Unjury shakes. Don't take calcium anymore, too complicated with all my other Rx, and no B12. Don't tolerate meats well, especially deli meats, hate fish. Living on cheese and apples. Can't get around with bad knees, so not cooking and  doesn't cook. Drinkin g Ensure Max Protein but size (11 Oz.) is too much at one time. Objective:  Hx Kavita-en-Y [facility] 2001; HTN; OCD, depression, ADHD; MRSA; Admit wound dehiscence incision, s/p resect L-knee with arthroplasty ABX spacer. Hx morbid obesity, BMI ~55 (2001). UBW now ~185-190#. Admit wt is pt stated. Request measured weight. Claims lowest wt was ~150# at time of thyroid dx, \"Graves\". Hx poor diet tolerance to meats and dislikes fish, lactose intolerant and drinks Lactaid milk. Diet is iron poor PTA. Claims was living on cheese and apples.  Reviewed Fe++ needs. Noted blood loss anemia, HGB 6.8 (12/13) with PRBCs. Suggest check B12. Claims she takes MV that include Fe++. Reports reactive hypoglycemia and reviewed CHO needs. Note Ensure Max Protein 6 gm CHO and UNJURY shakes only 2 gm CHO, which are very low CHO with hypoglycemia. Plan to add Boost Glucose Control 14 gm pro and 23 gm CHO. Left PB crackers at bedside. Does not want to modify diet for sugar, although has been asking for sugar substitute. Plan to increase protein for healing and balance diet with CHO; noted Rx TheraM-Plus and vit D3. Estimated Nutrition Needs:   Kcals/day: 1900 Kcals/day  Protein: 105 g(~1.2 gm/kg)  Fluid: 1900 ml(Minimum ~1 mL/kcal)     Based On: Jordana Monique(MSJ x 1.2 (limited mobility))  Weight Used: Patient Stated    Pt expected to meet estimated nutrient needs: [x] Unable to predict at this time  Comparative Standards:  ;  ;      Nutrition Diagnosis:   1. Altered GI function related to surgical hx as evidenced by hx Kavita-en-Y; reactive hypoglycemia; food intoleraces; lifelong MV/minerals; lactose intolerance    2.  Increased nutrient needs related to Protein surgical healing and infection; adequate CHO intake as evidenced by s/p resect arthroplasty with ABX spacer; reactive hypoglycemia    Goals:     No S/S hypoglycemia; consume 100% protein ONS of choice daily starting in next 24 hr.      Monitoring & Evaluation:    - Total energy intake, Carbohydrate intake, Oral fluids amount, Protein intake, Liquid meal replacement   - Weight/weight change, Lean body mass, fat free mass, GI, Glucose profile     Previous Nutrition Goals Met:  N/A  Previous Recommendations:      N/A    Education & Discharge Needs:   [x] Identified and addressed    [x] Participated in care plan, discharge planning, and/or interdisciplinary rounds        Cultural, Mosque and ethnic food preferences identified:   None    Skin Integrity: [x]Intact  Incision L-knee  Edema: [x]None   Last BM: 12/16/18  Food Allergies: [x]NKFA Food Intolerance: Lactose (uses Lactaid milk) tolerates cheese and yogurt    Anthropometrics:    Weight Loss Metrics 12/15/2018 12/12/2018 11/16/2018 10/29/2018 10/28/2018 10/24/2018 10/8/2018   Today's Wt 186 lb 8.2 oz - 191 lb 2.2 oz 191 lb 2.2 oz 191 lb 1.6 oz - 183 lb 4.8 oz   BMI - 34.11 kg/m2 34.96 kg/m2 34.95 kg/m2 - 34.95 kg/m2 33.53 kg/m2      Last 3 Recorded Weights in this Encounter    12/12/18 1412 12/15/18 0642   Weight: 83.9 kg (185 lb) 84.6 kg (186 lb 8.2 oz)      Weight Source: Patient stated  Height: 5' 2\" (157.5 cm),    Body mass index is 34.11 kg/m². IBW : 49.9 kg (110 lb),    Usual Body Weight: 86.2 kg (190 lb)(185-190#),      Labs:    Lab Results   Component Value Date/Time    Sodium 140 12/17/2018 06:41 AM    Potassium 4.2 12/17/2018 06:41 AM    Chloride 107 12/17/2018 06:41 AM    CO2 28 12/17/2018 06:41 AM    Glucose 81 12/17/2018 06:41 AM    BUN 9 12/17/2018 06:41 AM    Creatinine 0.46 (L) 12/17/2018 06:41 AM    Calcium 7.6 (L) 12/17/2018 06:41 AM    Albumin 3.1 (L) 10/06/2011 08:03 PM     Lab Results   Component Value Date/Time    Hemoglobin A1c 4.9 10/08/2018 03:47 PM     Lab Results   Component Value Date/Time    Glucose 81 12/17/2018 06:41 AM    Glucose (POC) 88 10/24/2018 10:51 AM      Lab Results   Component Value Date/Time    ALT (SGPT) 62 10/06/2011 08:03 PM    AST (SGOT) 24 10/06/2011 08:03 PM    Alk.  phosphatase 191 (H) 10/06/2011 08:03 PM    Bilirubin, total 0.3 10/06/2011 08:03 PM        Lynda Dolan RD

## 2018-12-18 VITALS
HEART RATE: 77 BPM | DIASTOLIC BLOOD PRESSURE: 84 MMHG | RESPIRATION RATE: 16 BRPM | SYSTOLIC BLOOD PRESSURE: 133 MMHG | OXYGEN SATURATION: 97 % | TEMPERATURE: 98.4 F | HEIGHT: 62 IN | WEIGHT: 186.51 LBS | BODY MASS INDEX: 34.32 KG/M2

## 2018-12-18 LAB
ANION GAP SERPL CALC-SCNC: 6 MMOL/L (ref 5–15)
BASOPHILS # BLD: 0 K/UL (ref 0–0.1)
BASOPHILS NFR BLD: 1 % (ref 0–1)
BUN SERPL-MCNC: 14 MG/DL (ref 6–20)
BUN/CREAT SERPL: 23 (ref 12–20)
CALCIUM SERPL-MCNC: 7.5 MG/DL (ref 8.5–10.1)
CHLORIDE SERPL-SCNC: 109 MMOL/L (ref 97–108)
CO2 SERPL-SCNC: 27 MMOL/L (ref 21–32)
CREAT SERPL-MCNC: 0.6 MG/DL (ref 0.55–1.02)
DIFFERENTIAL METHOD BLD: ABNORMAL
EOSINOPHIL # BLD: 0.6 K/UL (ref 0–0.4)
EOSINOPHIL NFR BLD: 12 % (ref 0–7)
ERYTHROCYTE [DISTWIDTH] IN BLOOD BY AUTOMATED COUNT: 15.9 % (ref 11.5–14.5)
GLUCOSE SERPL-MCNC: 105 MG/DL (ref 65–100)
HCT VFR BLD AUTO: 27.3 % (ref 35–47)
HGB BLD-MCNC: 8.5 G/DL (ref 11.5–16)
IMM GRANULOCYTES # BLD: 0 K/UL (ref 0–0.04)
IMM GRANULOCYTES NFR BLD AUTO: 0 % (ref 0–0.5)
LYMPHOCYTES # BLD: 1.8 K/UL (ref 0.8–3.5)
LYMPHOCYTES NFR BLD: 34 % (ref 12–49)
MCH RBC QN AUTO: 28.4 PG (ref 26–34)
MCHC RBC AUTO-ENTMCNC: 31.1 G/DL (ref 30–36.5)
MCV RBC AUTO: 91.3 FL (ref 80–99)
MONOCYTES # BLD: 0.5 K/UL (ref 0–1)
MONOCYTES NFR BLD: 10 % (ref 5–13)
NEUTS SEG # BLD: 2.3 K/UL (ref 1.8–8)
NEUTS SEG NFR BLD: 43 % (ref 32–75)
NRBC # BLD: 0 K/UL (ref 0–0.01)
NRBC BLD-RTO: 0 PER 100 WBC
PLATELET # BLD AUTO: 193 K/UL (ref 150–400)
PMV BLD AUTO: 9.9 FL (ref 8.9–12.9)
POTASSIUM SERPL-SCNC: 4.1 MMOL/L (ref 3.5–5.1)
RBC # BLD AUTO: 2.99 M/UL (ref 3.8–5.2)
SODIUM SERPL-SCNC: 142 MMOL/L (ref 136–145)
WBC # BLD AUTO: 5.3 K/UL (ref 3.6–11)

## 2018-12-18 PROCEDURE — 77030029914 HC PMP F NGP WND DRSG PICO S&N -C

## 2018-12-18 PROCEDURE — 74011250636 HC RX REV CODE- 250/636: Performed by: PHYSICIAN ASSISTANT

## 2018-12-18 PROCEDURE — 74011250636 HC RX REV CODE- 250/636: Performed by: ORTHOPAEDIC SURGERY

## 2018-12-18 PROCEDURE — 74011000250 HC RX REV CODE- 250: Performed by: ORTHOPAEDIC SURGERY

## 2018-12-18 PROCEDURE — 74011250637 HC RX REV CODE- 250/637: Performed by: ORTHOPAEDIC SURGERY

## 2018-12-18 PROCEDURE — 85025 COMPLETE CBC W/AUTO DIFF WBC: CPT

## 2018-12-18 PROCEDURE — 36415 COLL VENOUS BLD VENIPUNCTURE: CPT

## 2018-12-18 PROCEDURE — 80048 BASIC METABOLIC PNL TOTAL CA: CPT

## 2018-12-18 RX ORDER — DICLOFENAC SODIUM 75 MG/1
75 TABLET, DELAYED RELEASE ORAL 2 TIMES DAILY
Qty: 60 TAB | Refills: 0 | Status: SHIPPED | OUTPATIENT
Start: 2018-12-18 | End: 2019-02-01

## 2018-12-18 RX ORDER — FAMOTIDINE 20 MG/1
20 TABLET, FILM COATED ORAL 2 TIMES DAILY
Qty: 60 TAB | Refills: 0 | Status: ON HOLD | OUTPATIENT
Start: 2018-12-18 | End: 2019-01-08

## 2018-12-18 RX ORDER — OXYCODONE HYDROCHLORIDE 15 MG/1
15 TABLET ORAL
Qty: 100 TAB | Refills: 0 | Status: SHIPPED | OUTPATIENT
Start: 2018-12-18 | End: 2019-08-26

## 2018-12-18 RX ADMIN — DICLOFENAC SODIUM 75 MG: 75 TABLET, DELAYED RELEASE ORAL at 08:32

## 2018-12-18 RX ADMIN — LOSARTAN POTASSIUM 100 MG: 50 TABLET ORAL at 08:33

## 2018-12-18 RX ADMIN — OXYCODONE HYDROCHLORIDE 15 MG: 5 TABLET ORAL at 13:53

## 2018-12-18 RX ADMIN — Medication 10 ML: at 07:27

## 2018-12-18 RX ADMIN — HYDROMORPHONE HYDROCHLORIDE 2 MG: 2 INJECTION INTRAMUSCULAR; INTRAVENOUS; SUBCUTANEOUS at 04:16

## 2018-12-18 RX ADMIN — ACETAMINOPHEN 650 MG: 325 TABLET ORAL at 11:25

## 2018-12-18 RX ADMIN — DIVALPROEX SODIUM 250 MG: 250 TABLET, DELAYED RELEASE ORAL at 08:35

## 2018-12-18 RX ADMIN — FAMOTIDINE 20 MG: 20 TABLET ORAL at 08:33

## 2018-12-18 RX ADMIN — HYDROMORPHONE HYDROCHLORIDE 2 MG: 2 INJECTION INTRAMUSCULAR; INTRAVENOUS; SUBCUTANEOUS at 07:18

## 2018-12-18 RX ADMIN — POTASSIUM CHLORIDE 10 MEQ: 750 TABLET, EXTENDED RELEASE ORAL at 08:33

## 2018-12-18 RX ADMIN — Medication 81 MG: at 08:32

## 2018-12-18 RX ADMIN — ACETAMINOPHEN 650 MG: 325 TABLET ORAL at 00:23

## 2018-12-18 RX ADMIN — VANCOMYCIN HYDROCHLORIDE 1500 MG: 10 INJECTION, POWDER, LYOPHILIZED, FOR SOLUTION INTRAVENOUS at 07:18

## 2018-12-18 RX ADMIN — OXYCODONE HYDROCHLORIDE 15 MG: 5 TABLET ORAL at 03:02

## 2018-12-18 RX ADMIN — HYDROMORPHONE HYDROCHLORIDE 2 MG: 2 INJECTION INTRAMUSCULAR; INTRAVENOUS; SUBCUTANEOUS at 11:25

## 2018-12-18 RX ADMIN — OXYCODONE HYDROCHLORIDE 15 MG: 5 TABLET ORAL at 08:33

## 2018-12-18 RX ADMIN — Medication 10 ML: at 06:00

## 2018-12-18 RX ADMIN — VITAMIN D, TAB 1000IU (100/BT) 5000 UNITS: 25 TAB at 08:32

## 2018-12-18 RX ADMIN — Medication 10 ML: at 13:52

## 2018-12-18 RX ADMIN — HYDROMORPHONE HYDROCHLORIDE 2 MG: 2 INJECTION INTRAMUSCULAR; INTRAVENOUS; SUBCUTANEOUS at 00:23

## 2018-12-18 RX ADMIN — MULTIPLE VITAMINS W/ MINERALS TAB 1 TABLET: TAB at 08:33

## 2018-12-18 RX ADMIN — Medication 10 ML: at 13:53

## 2018-12-18 RX ADMIN — ACETAMINOPHEN 650 MG: 325 TABLET ORAL at 07:18

## 2018-12-18 RX ADMIN — LEVOTHYROXINE SODIUM 100 MCG: 100 TABLET ORAL at 07:18

## 2018-12-18 NOTE — WOUND CARE
WOCN Note:     New consult placed for placement of Patricia VAC dressing to Left knee. Remedios Bustamante RN at the bedside to assist.  S/P Resection arthroplasty of the left knee with placement of dynamic antibiotic spacer on 12.12.2018. Chart reviewed. Admitted DX:  S/P Left knee replacement. Assessment:   Patient is A&O x 3, communicative and mobile. Bed: [facility] [place]  Patient reports no pain. 1. Left knee surgical incision, well approximated with sutures. Cleansed with saline; applied Patricia VAC dressing. Skin Care & Pressure Prevention:  Minimize layers of linen/pads under patient to optimize support surface. Turn/reposition approximately every 2 hours and offload heels. Discussed above plan with patient and RN.     Transition of Care: Plan to follow as needed while admitted to hospital.    RYANN RamírezN RN [id number] [facility] [phone]  Pager [phone]

## 2018-12-18 NOTE — PROGRESS NOTES
Infectious Diseases Progress Note    Antibiotic Summary:  Ancef                --  (3 doses)  Zosyn               --   Vancomycin     -- present  Rifampin           --     Left TKR on 10/24/2018     I&D left TKR with poly exchange on 11/15/2018    Resection arthroplasty of the left knee with placement of dynamic antibiotic spacer on 2018      Subjective:     No new symptoms. Tolerating partial weight bearing on left leg    Objective:     Vitals:   Visit Vitals  /90   Pulse 88   Temp 98.4 °F (36.9 °C)   Resp 16   Ht 5' 2\" (1.575 m)   Wt 84.6 kg (186 lb 8.2 oz)   SpO2 95%   BMI 34.11 kg/m²        Tmax:  Temp (24hrs), Av.1 °F (36.7 °C), Min:97.9 °F (36.6 °C), Max:98.4 °F (36.9 °C)      Exam:  General appearance: alert, no distress  Lungs: clear to auscultation bilaterally  Heart: RRR without murmur  Abdomen: nontender  Left knee: dressing in place     IV Lines: Right PICC inserted 2018    Labs:    Recent Labs     18  0641 18  0349 12/15/18  1217 12/15/18  0657   WBC 4.1 3.7 4.8  --    HGB 8.0* 6.6* 7.0*  --     132* 134*  --    BUN 9 10  --  10   CREA 0.46* 0.48*  --  0.47*     Intraop cultures of the left knee on 2018:   Gram stain = rare WBCs; NOS   Aerobic = NGSF   Anaerobic = no anaerobes    Assessment:     1. MRSA infection left TKR -- resection arthroplasty 2018     2. DJD     3. HTN     4. Hypothyroidism S/P Rx for Graves disease     5. Anemia     6. 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  Continue Vancomycin       Marcelo Beavers MD

## 2018-12-18 NOTE — PROGRESS NOTES
Physical Therapy  12/18/2018    Chart reviewed. Attempted to see pt this afternoon, however pt currently in bathroom and needing more time. Will check back at later time as able and appropriate. 1625 Followed up w/ pt. Pt received sitting in chair at bedside, dressed and waiting for volunteers to transport to pt discharge. Pt reports no concerns regarding therapy and reports ability to navigate steps to enter home. Pt reports having stairs inside of home but plans to stay on main level. Volunteers arrived and pt assisted to w/c w/SBA-supervision. RN aware.      Time: 8 minutes      Dasia Santana, TOMASZ

## 2018-12-18 NOTE — PROGRESS NOTES
Discharge order noted. CRM will need resumption orders for home health and home IV for [facility] and HCP. Josh Torres RN has left a message for Dr. Schofield Heads office to clarify the resumption of the IV's. SKYLA    CRM notified Mardee Morning with [facility]. SKYLA    Updated orders available in the chart for [facility] and CRM sent updated IV orders to HCP.  SKYLA

## 2018-12-18 NOTE — PROGRESS NOTES
Bedside shift change report given to Ron Cooper (oncoming nurse) by Hay Martinez RN(offgoing nurse). Report given with SBAR.

## 2018-12-18 NOTE — PROGRESS NOTES
Bedside and Verbal shift change report given to Raheem RN (oncoming nurse) by Elisabeth Hernández RN (offgoing nurse). Report included the following information SBAR, Kardex and MAR.

## 2018-12-18 NOTE — DISCHARGE INSTRUCTIONS
Discharge Instructions Knee Replacement  Dr. Edgardo Larson    Patient Name: Olu Cruz  Date of procedure:12/12/2018                                   Procedure:Procedure(s):  LEFT KNEE RESECTION ARTHROPLASTY WITH ANTIBIOTIC SPACER PLACEMENT  Surgeon:Surgeon(s) and Role:     * Kristy Haines MD - Primary               PCP: Peyman Guerrero MD  Date of discharge: No discharge date for patient encounter. Follow up appointments   Follow up with Dr. Edgardo Larson in 4 weeks. Call [phone] extension [phone] Gwenette Handsome) to make an appointment.  If home health has been arranged for you the agency will contact you to arrange dates/times for visits. Please call them if you do not hear from them within 24 hours after you are discharged. When to call your Orthopaedic Surgeon: Call [phone]. If you need to reach us after 5pm or on a weekend call [phone] and the on call physician will be contacted.  Unrelieved   Signs of infection-if your incision is red; continues to have drainage; drainage has a foul odor or if you have a persistent fever over 101 degrees   Signs of a blood clot in your leg-calf pain, tenderness, redness, swelling of lower leg    When to call your Primary Care Physician:   Concerns about medical conditions such as diabetes, high blood pressure, asthma, congestive heart failure   Call if blood sugars are elevated, persistent headache or dizziness, coughing or congestion, constipation or diarrhea, burning with urination, abnormal heart rate     When to call [id number] go to the nearest emergency room   Sudden onset of chest pain, shortness of breath, difficulty breathing     Activity   Weight bearing as tolerated with walker or crutches putting as much weight on your leg as you can tolerate.  Refer to your handbook for instructions and pictures   Complete your Home Exercise Program daily as instructed by the physical therapist.  Refer to your handbook for instructions and pictures   Get up every one hour and walk (except at night when sleeping)   Do not drive or operate heavy machinery    Incision Care   Clean soft dressing  Knee immobilizer at all times. Preventing blood clots    Take aspirin 81 mg twice daily as prescribed for one month following surgery        Pain management   Please take scheduled 650 mg tylenol every 6 hours for 4 weeks   Please take scheduled diclofenac 50 mg or celebrex 200 mg twice daily for 4 weeks (whichever was prescribed)   Please take tramadol every 6 hours for pain as needed for pain.  For breakthrough pain not relieved by above medications, please take 15 mg oxycodone      While taking aspirin and diclofenac, please take famotidine (PEPCID) 20 mg twice daily as prescribed to prevent stomach ulcers/irritation.  If you have a history of stomach ulcers, do not take diclofenac.  Avoid alcoholic beverages while taking pain medication   Do not take any over-the-counter medication for pain except Tylenol (acetaminophen)   Keep ice wrap in place except when walking. Change gel packs every 4 hours   Lie down and elevate your leg on pillows for about 30 minutes after walking to decrease swelling and pain       Diet   Resume usual diet; drink plenty of fluids; eat foods high in fiber   Please take a stool softener (such as Senokot-S or Colace) to prevent constipation while you are taking oxycodone. If constipation occurs, take a laxative (such as Dulcolax tablets, Milk of Magnesia, or a suppository).

## 2018-12-18 NOTE — PROGRESS NOTES
I have reviewed discharge instructions with the patient. The patient verbalized understanding. Signed discharge on paper chart.

## 2018-12-18 NOTE — PROGRESS NOTES
Home IV Antibiotic Orders - [facility]  December 18, 2018     1. 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 Routine PICC care     3. Antibiotic:   Vancomycin 1500 mg IV q 12 hours                                     4.  Lab each Monday:              CBC/diff/platelets              CMP              ESR              CRP (please send quantitative CRP; NOT cardiac or high sensitivity CRP)              Trough Vancomycin level     5.  Lab each Thursday:              CBC/diff/platelets              CMP              Trough Vancomycin level     6. Fax lab to Dr. Adrianne Sharp @ [phone]     7.  Call Dr. Adrianne Sharp @ [phone] for WBC <3500, creatinine >0.9, or platelets <613,745     8. Duration of therapy: Not yet determined; possibly til 1/23/2019              Please call Dr. Adrianne Sharp @ [phone] before stopping therapy, before transferring patient, or before discharging patient     9. Allergies: Allergies   Allergen Reactions    Sulfa (Sulfonamide Antibiotics) Hives    Pcn [Penicillins] Unproven on Challenge      10. Pharmacy Consult for Vancomycin dosing -- aim for trough Vancomycin 15-18.     11.  Appt Dr. Adrianne Sharp in 2 weeks      Cisco Mortensen MD   PHONE [phone]

## 2018-12-19 ENCOUNTER — HOME CARE VISIT (OUTPATIENT)
Dept: SCHEDULING | Facility: HOME HEALTH | Age: 48
End: 2018-12-19
Payer: COMMERCIAL

## 2018-12-19 VITALS
HEART RATE: 104 BPM | WEIGHT: 186 LBS | SYSTOLIC BLOOD PRESSURE: 184 MMHG | OXYGEN SATURATION: 98 % | DIASTOLIC BLOOD PRESSURE: 104 MMHG | RESPIRATION RATE: 20 BRPM | BODY MASS INDEX: 34.23 KG/M2 | HEIGHT: 62 IN | TEMPERATURE: 98.9 F

## 2018-12-19 PROCEDURE — G0151 HHCP-SERV OF PT,EA 15 MIN: HCPCS

## 2018-12-19 PROCEDURE — G0162 HHC RN E&M PLAN SVS, 15 MIN: HCPCS

## 2018-12-19 NOTE — ADT AUTH CERT NOTES
Additional Clinical - 12/14 by Jed Lama RN        Review Status Review Entered   In Primary 12/18/2018 15:35      Criteria Review   12/14   PT Note:   ASSESSMENT :  Based on the objective data described below, the patient presents with pain rated at 7/10 at rest shortly after receiving iv dilaudid but agreeable to PT eval. Pt reports that this is her third surgery on her left knee in as many months and that she has been in a knee immobilizer since her second surgery. For eval overall level of assist was supervision to contact guard. Anticipate steady gains with mobility allowing for discharge to home. Will need to time sessions with pain meds. Of note, her BP was elevated with activity  Frequency/Duration: Patient will be followed by physical therapy  daily to address goals. Discharge Recommendations: Home Health, To Be Determined and None  Further Equipment Recommendations for Discharge: to be determined, pt has a standard walker (no wheels)  and a rollator but not a rolling walker.     ID:  PLAN:  1.  Continue vancomycin. 2.  Discontinue rifampin. 3.  Await intraoperative culture results.       Musculoskeletal Surgery or Procedure [facility] - Care Day 6 (12/17/2018) by Delfin Dai        Review Status Review Entered   Completed 12/17/2018 12:56      Criteria Review      Care Day: 6 Care Date: 12/17/2018 Level of Care: Inpatient Floor   Guideline Day 2    Level Of Care   (X) Floor   12/17/2018 12:56 PM EST by Jaylen Mari     Ortho unit         Clinical Status   (X) * No ICU or intermediate care needs   12/17/2018 12:56 PM EST by Jaylen Mari     none noted         Interventions   (X) Inpatient interventions continue   12/17/2018 12:56 PM EST by Jaylen Mari     vanco 1500mg IVx1, dilaudid 2mg IVx2      (X) Transition to oral routes   12/17/2018 12:56 PM EST by Jaylen Mari     tylenol 650mg PO x1, ASA 81mg po x1, vit D3 5000units PO x1, voltaren 75mg PO x1, depakote 250mg PO x1, pepcid 20mg PO x1, synthroid 100mcg PO x1, cozaar 100mg PO x1, , vanco 1500mg IVx1         12/17/2018 12:56 PM EST by Zenon Mishra   Subject: Additional Clinical Information   12/17/18Vitals-Resting comfortably. GEN:    NAD. AOx3 ABD:    S/NT/ND LLE:      Dressing C/D/I , 5/5 motor, Calf nttp (Bilat), Sensation rossly intact to light touch throughout, 1+ dp/pt pulses, foot [id number] y.o. female s/p resection left knee with abx spacer on 12/12/2018   . No acute issues  Need nutrition consultReceived bloodDressing changed  ABX: Vanco per IDPATHWAY: Straight cath per protocol if neededDVT Prophylaxis: Aspirin 81 mg enteric coated BIDWeight Bearing: WBAT LLE- knee immobilizer at all times Pain Control: Toradol (if Cr normal), Diclofenac to begin the evening of POD 1, Scheduled Tylenol, tramadol, oxy 5 mg for breakthroughDisposition: Home, HHPT     Plan-diet reg, contact isolation, shore cath care, IS, PT/OT, OOB in chair, OOB with assistance. Meds-Oxy IR 15mg PO x3, potassium chloride 10meq PO x1, Tb 12 250mg PO x1                  * Milestone      Additional Notes   12/17/18    TYPE OF TEST: Peripheral Venous Testing       REASON FOR TEST   Pain in limb, Limb swelling       Right Arm:-   Deep venous thrombosis:           No   Superficial venous thrombosis:    No           INTERPRETATION/FINDINGS   PROCEDURE:  Color duplex ultrasound imaging of upper extremity veins.       FINDINGS:        Right:  The internal jugular, subclavian, axillary, brachial,   radial, ulnar, basilic, and cephalic veins are patent and without   evidence of thrombus;  each is fully compressible and there is no   narrowing of the flow channel on color Doppler imaging. Phasic/pulsatile flow is observed in the subclavian vein.        Left:    The subclavian vein is patent and without evidence of   thrombus.  Phasic/pulsatile flow is observed.  This side was not   otherwise evaluated.       IMPRESSION:  No evidence of right upper extremity vein thrombosis.      Musculoskeletal Surgery or Procedure [facility]G - Care Day 5 (12/16/2018) by Rene Hubbard        Review Status Review Entered   Completed 12/17/2018 12:48      Criteria Review      Care Day: 5 Care Date: 12/16/2018 Level of Care: Inpatient Floor   Guideline Day 2    Level Of Care   (X) Floor   12/17/2018 12:48 PM EST by Yue Holland     orho unit         Clinical Status   (X) * No ICU or intermediate care needs   12/17/2018 12:48 PM EST by Yue Holland     none noted         Interventions   (X) Inpatient interventions continue   12/17/2018 12:48 PM EST by Yue Holland     Vanco 1500mg IVx2, dilaudid 2mg IVx6      (X) Transition to oral routes   12/17/2018 12:48 PM EST by Yue Holland     tylenol 650mg POx5, ASA 81mg PO x2, vit D3 5000 units PO x1, voltaren 75mg pO x2, depakote 250mg PO x2, pepcid 20mg PO x2, hydrodiuril 25mg PO x1, synthroid 100mcg PO x2, cozaar 100mg PO x1, Oxy IR 15mg PO x4, potassium chloride 10meq POx1         12/17/2018 12:48 PM EST by Yue Holland   Subject: Additional Clinical Information   12/16/18Vitals-98.9, 98, 16, 100%RA, 160/87Admit Diagnosis: STATUS POST LEFT KNEE REPLACEMENTStatus post left knee replacement.   Pain better today. No complaints. Has been OOB with PT.  General: awake, alert, NADLLE: dressing intact with good seal. Motor intact TA/GS/EHL/FHL. SILT tib/saph/valerie/spn/dpn. DP/PT pulses 2/2 palpable. Foot warm and well perfused. Calf supple and nontender. 49 yo female s/p resection arthroplasty of left knee with antibiotic spacer POD#41. WBAT LLE with assistive device and knee immobilizer at all times. 2. Analagesia3. DVT prophylaxis - Aspirin 81mg BID4. Hb 6.6 g/dL today. Transfused 1u PRBC. Will check CBC in AM5. Antibiotics - receiving vancomycin IV pharmacy dosed6. Plan for discharge to home with home health. Meds-desyrel 100mg PO x1, Tb12 250mg PO x2.  Plan-diet reg, contact isolation, shore cath care, IS, PT/OT, OOB in chair, OOB with assistance               * Milestone Additional Notes   12/16/18 Patient Mobility Note   PATIENT MOBILITY       Bed Mobility   Supine to Sit: Modified independent   Transfers   Sit to Stand: Contact guard assistance   Stand to Sit: Stand-by assistance   Bed to Chair: Contact guard assistance, Additional time, Adaptive equipment(RW)        Gait   Base of Support: Shift to right   Speed/Radha: Slow   Step Length: Left shortened, Right shortened   Gait Abnormalities: Antalgic, Altered arm swing, Step to gait   Ambulation - Level of Assistance: Contact guard assistance   Distance (ft): 12 Feet (ft)   Assistive Device: Brace/Splint, Gait belt, Walker, rolling    Weight Bearing Status   Left Side Weight Bearing: As tolerated(in knee immobilzer            Musculoskeletal Surgery or Procedure GRG - Care Day 4 (12/15/2018) by Sherri Fofana        Review Status Review Entered   Completed 12/17/2018 12:39      Criteria Review      Care Day: 4 Care Date: 12/15/2018 Level of Care: Inpatient Floor   Guideline Day 2    Level Of Care   (X) Floor   12/17/2018 12:39 PM EST by Benoit Juan     ortho unit         Clinical Status   (X) * No ICU or intermediate care needs   12/17/2018 12:39 PM EST by Benoit Juan     none noted         Interventions   (X) Inpatient interventions continue   12/17/2018 12:39 PM EST by Benoit Juan     vanco 1500mg IVx2, dilaudid 2mg IVx5      (X) Transition to oral routes   12/17/2018 12:39 PM EST by Benoit Juan     tylenol 650mg PO x4, ASA 81mg PO x2, vit D3 5000units PO x1, voltaren 75mg pO x2, pepcid 20mg pO x2, synthroid 100mcg PO x1, cozaar 100mg PO x1, OxyIR 15mg PO x4, potassium chloride 10meq PO x1, Tb 12 250mg PO x2, desyrel 100mg PO x1         12/17/2018 12:39 PM EST by Benoit Juan   Subject: Additional Clinical Information   12/15/18Vitals-99.0, 95, 16, 128/76, 98%Resting comfortably. GEN:    NAD.  AOx3 ABD:    S/NT/ND LLE:      Dressing C/D/I , 5/5 motor, Calf nttp (Bilat), Sensation rossly intact to light touch throughout, 1+ dp/pt pulses, foot perfused. 50 y.o. female s/p resection left knee with abx spacer on 12/12/2018   . No acute issuesNeed nutrition consultCheck cbcABX: Complete 24 hours perioperative abxPATHWAY: Straight cath per protocol if neededDVT Prophylaxis: Aspirin 81 mg enteric coated BIDWeight Bearing: WBAT LLE- knee immobilizer at all timesPain Control: Toradol (if Cr normal), Diclofenac to begin the evening of POD 1, Scheduled Tylenol, tramadol, oxy 5 mg for breakthroughDisposition: Home, HHPT  Plan-OOB with assitance, OOB in chair, PT/OT, do not place pillow under operative knee, SCDS, IS, shore cath care, contact isolation, diet reg. Abnormal labs/radiology-hgb 6.6, Hct 21.9, Ca 7.6,               * Milestone         Musculoskeletal Surgery or Procedure [facility] - Care Day 3 (12/14/2018) by Hailey Lowe        Review Status Review Entered   Completed 12/17/2018 12:04      Criteria Review      Care Day: 3 Care Date: 12/14/2018 Level of Care: Inpatient Floor   Guideline Day 2    Level Of Care   (X) Floor   12/17/2018 12:04 PM EST by Miranda Britt     ortho unit         Clinical Status   (X) * No ICU or intermediate care needs   12/17/2018 12:04 PM EST by Miranda Britt     none noted         Interventions   (X) Inpatient interventions continue   12/17/2018 12:04 PM EST by Miranda Britt     vanco [id number], dilaudid [id number],      (X) Transition to oral routes   12/17/2018 12:04 PM EST by Miranda Britt     tylenol 650mg PO x3, ASA 81mg PO x2, vit D3 5000 units POx1, voltaren 75mg PO x2, depakote 250mg PO x2, pepcid 20mg PO x2, synthroid 100mcg PO x1, Oxy IR 15mg PO x5, potssium 10meq POx1, tapentadol 12 250mg PO x2, desyrel 100mg PO x1         12/17/2018 12:04 PM EST by Miranda Britt   Subject: Additional Clinical Information   12/14/18Vitals-98.5, 80, 16, 98%, 145/85PROBLEM:1.   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 2.   Degenerative joint disease. 3.   Hypertension. 4.   Hypothyroidism following treatment for Graves' disease. 5.   Anemia. 6.   Status post gastric bypass surgery in 2001.  PLAN:1.   Continue vancomycin. 2.   Discontinue rifampin. 3.   Await intraoperative culture resultsPlan-diet reg, contact isolation, shore cath care, IS, SCDs, OOB in c hair, PT/OT,               * Milestone

## 2018-12-20 ENCOUNTER — HOME CARE VISIT (OUTPATIENT)
Dept: SCHEDULING | Facility: HOME HEALTH | Age: 48
End: 2018-12-20
Payer: COMMERCIAL

## 2018-12-20 PROCEDURE — G0300 HHS/HOSPICE OF LPN EA 15 MIN: HCPCS

## 2018-12-22 VITALS
SYSTOLIC BLOOD PRESSURE: 118 MMHG | OXYGEN SATURATION: 97 % | DIASTOLIC BLOOD PRESSURE: 72 MMHG | TEMPERATURE: 97.8 F | RESPIRATION RATE: 17 BRPM | HEART RATE: 64 BPM

## 2018-12-24 ENCOUNTER — HOME CARE VISIT (OUTPATIENT)
Dept: SCHEDULING | Facility: HOME HEALTH | Age: 48
End: 2018-12-24
Payer: COMMERCIAL

## 2018-12-24 PROCEDURE — G0300 HHS/HOSPICE OF LPN EA 15 MIN: HCPCS

## 2018-12-26 LAB
BACTERIA SPEC CULT: NORMAL
GRAM STN SPEC: NORMAL
GRAM STN SPEC: NORMAL
SERVICE CMNT-IMP: NORMAL
SERVICE CMNT-IMP: NORMAL

## 2018-12-27 ENCOUNTER — HOME CARE VISIT (OUTPATIENT)
Dept: SCHEDULING | Facility: HOME HEALTH | Age: 48
End: 2018-12-27
Payer: COMMERCIAL

## 2018-12-27 VITALS
OXYGEN SATURATION: 99 % | DIASTOLIC BLOOD PRESSURE: 78 MMHG | TEMPERATURE: 98.7 F | RESPIRATION RATE: 18 BRPM | SYSTOLIC BLOOD PRESSURE: 136 MMHG | HEART RATE: 70 BPM

## 2018-12-27 PROCEDURE — G0300 HHS/HOSPICE OF LPN EA 15 MIN: HCPCS

## 2018-12-28 VITALS — TEMPERATURE: 98.6 F

## 2018-12-29 NOTE — DISCHARGE SUMMARY
Patient ID:  Joyce Brennan  [id number]  50 y.o.  1970    Admit Date: 12/12/2018    Discharge Date: 12/29/2018    Admission Diagnoses: STATUS POST LEFT KNEE REPLACEMENT  Status post left knee replacement    Discharge Diagnoses: Active Problems:    Status post left knee replacement (12/12/2018)         Admission Condition: Fair    Discharge Condition: Fair    Last Procedure: Procedure(s):  LEFT KNEE RESECTION ARTHROPLASTY WITH ANTIBIOTIC SPACER PLACEMENT      Medications:   No current facility-administered medications for this encounter. Current Outpatient Medications   Medication Sig    diclofenac EC (VOLTAREN) 75 mg EC tablet Take 1 Tab by mouth two (2) times a day.  famotidine (PEPCID) 20 mg tablet Take 1 Tab by mouth two (2) times a day.  oxyCODONE IR (OXY-IR) 15 mg immediate release tablet Take 1 Tab by mouth every four (4) hours as needed for Pain. Max Daily Amount: 90 mg.    heparin, porcine, pf (HEPARIN LOCKFLUSH,PORCINE,,PF,) 10 unit/mL injection 5 mL by IntraVENous route two (2) times a day. Flush PICC Line using SASH method.  vancomycin/0.9 % sod chloride (VANCOMYCIN IN 0.9 % SODIUM CHL) 1.5 gram/500 mL soln 500 mL by IntraVENous route every twelve (12) hours every twelve (12) hours.  acetaminophen (TYLENOL) 500 mg tablet Take 500 mg by mouth every six (6) hours as needed for Pain.  diclofenac EC (VOLTAREN) 75 mg EC tablet Take 75 mg by mouth two (2) times a day.  rifAMPin (RIFADIN) 300 mg capsule Take 300 mg by mouth two (2) times a day.  diclofenac 10% cyclobenzaprine 2% lidocaine 5% gabapentin 6% cream compound Apply 2 g to affected area four (4) times daily as needed for Pain.  tapentadol (NUCYNTA ER) 250 mg Tb12 Take 1 Tab by mouth two (2) times a day.  oxyCODONE IR (OXY-IR) 15 mg immediate release tablet Take 15 mg by mouth every four (4) hours as needed for Pain.  aspirin delayed-release 81 mg tablet Take 1 Tab by mouth two (2) times a day.     famotidine (PEPCID) 20 mg tablet Take 1 Tab by mouth two (2) times a day.  amphetamine sulfate (EVEKEO) 10 mg tab Take 10 mg by mouth. PT TAKES 2 TABS (20 MG) BID AT 0200 AND 1200    vortioxetine (TRINTELLIX) 10 mg tablet Take  by mouth daily. PT TAKES A TOTAL OF 30 mg DAILY    traZODone (DESYREL) 100 mg tablet Take 100 mg by mouth nightly. May take 1/2 tab to 1 tab every evening    baclofen (LIORESAL) 10 mg tablet Take 10 mg by mouth two (2) times daily as needed for Pain.  divalproex DR (DEPAKOTE) 250 mg tablet Take 1 tablet by mouth in  the morning and 2 tablets  by mouth in the evening    Lisdexamfetamine (VYVANSE) 50 mg capsule Take 50 mg by mouth two (2) times a day. Takes in the morning and at noon daily    losartan (COZAAR) 50 mg tablet Take 100 mg by mouth daily.  simethicone (GAS-X) 80 mg chewable tablet Take 80 mg by mouth every six (6) hours as needed for Flatulence.  lansoprazole (PREVACID) 15 mg disintegrating tablet Take 1 Tab by mouth Daily (before breakfast).  cetirizine (ZYRTEC) 10 mg tablet Take 1 Tab by mouth daily.  sodium chloride (NORMAL SALINE FLUSH) 10-20 mL by IntraVENous route two (2) times a day. Flush PICC using SASH method.  mupirocin calcium (BACTROBAN) 2 % topical cream Apply 1 Each to affected area two (2) times a day.  alpha-d-galactosidase (BEANO) 150 unit tab tablet Take 2 Tabs by mouth three (3) times daily as needed for Flatulence.  cholecalciferol, VITAMIN D3, (VITAMIN D3) 5,000 unit tab tablet Take 1 Cap by mouth daily.  levothyroxine (SYNTHROID) 100 mcg tablet Take 100 mcg by mouth Daily (before breakfast).  potassium chloride SR (KLOR-CON 10) 10 mEq tablet Take 10 mEq by mouth daily.  furosemide (LASIX) 20 mg tablet Take 20 mg by mouth daily. MAY TAKE ONLY IF NEEDED FOR SEVERE ANKLE SWELLING    hydrochlorothiazide (HYDRODIURIL) 25 mg tablet Take 25 mg by mouth daily.  MAY TAKE 1 OR 2 TABS AS NEEDED FOR ANKLE SWELLING    albuterol (PROAIR HFA) 90 mcg/actuation inhaler Take 1 Puff by inhalation every four (4) hours as needed for Wheezing.  benzoyl peroxide (DUAL ACTION) 3.5 % Clsr 1 Each by Apply Externally route daily.  acyclovir (ZOVIRAX) 400 mg tablet Take 400 mg by mouth every four (4) hours as needed for Other (fever blisters).  MULTIVIT WITH CALCIUM,IRON,MIN (ONE-A-DAY WOMENS FORMULA PO) Take 1 Tab by mouth daily. Hospital Course: The patient was admitted to the hospital with MRSA infected TKA. She underwent resection arthroplasty with antibiotic spacer placement on 12/12. She tolerated the procedure well. Pain was controlled post-operatively with a multimodal pain regiment including tylenol,tramadol, anti-inflammatory medication,  Oxycodone,a dn dilaudid  for breakthrough. Infectious disease was consulted and chose antibiotic regimen accordingly. A PICC line was placed. Physical therapy began to work with the patient on POD#0 and continued daily. The patient progressed slowly with PT and eventually was discharged home in stable condition once they cleared therapy. Consults: Hospitalist and Infectious Disease    Significant Diagnostic Studies: post op xrays showed a stable Procedure(s):  LEFT KNEE RESECTION ARTHROPLASTY WITH ANTIBIOTIC SPACER PLACEMENT    Disposition: home    The patient was discharged with the following instructions:   1.) She will take aspirin for DVT prophylaxis, tylenol, diclofenac, and oxycodone for breakthrough pain. 2.) Shower and wound instructions were given. 3.) Activity: Activity as tolerated  4.) Diet: Regular  5.) IV vancomycin per ID via PICC line      Follow-up with Sherlyn Trivedi MD in 3 weeks after her discharge. Sooner if there is a problem. Cannot display discharge medications since this patient is not currently admitted.       Signed:  Sherlyn Trivedi MD  12/29/2018, 12:04 PM

## 2018-12-31 ENCOUNTER — HOME CARE VISIT (OUTPATIENT)
Dept: SCHEDULING | Facility: HOME HEALTH | Age: 48
End: 2018-12-31
Payer: COMMERCIAL

## 2018-12-31 VITALS
HEART RATE: 63 BPM | DIASTOLIC BLOOD PRESSURE: 84 MMHG | TEMPERATURE: 97.6 F | SYSTOLIC BLOOD PRESSURE: 136 MMHG | RESPIRATION RATE: 18 BRPM | OXYGEN SATURATION: 99 %

## 2018-12-31 PROCEDURE — G0300 HHS/HOSPICE OF LPN EA 15 MIN: HCPCS

## 2019-01-03 ENCOUNTER — HOME CARE VISIT (OUTPATIENT)
Dept: SCHEDULING | Facility: HOME HEALTH | Age: 49
End: 2019-01-03
Payer: COMMERCIAL

## 2019-01-03 VITALS — TEMPERATURE: 98.9 F | OXYGEN SATURATION: 99 % | RESPIRATION RATE: 18 BRPM | HEART RATE: 78 BPM

## 2019-01-03 PROCEDURE — G0300 HHS/HOSPICE OF LPN EA 15 MIN: HCPCS

## 2019-01-07 ENCOUNTER — HOME CARE VISIT (OUTPATIENT)
Dept: SCHEDULING | Facility: HOME HEALTH | Age: 49
End: 2019-01-07
Payer: COMMERCIAL

## 2019-01-07 VITALS
OXYGEN SATURATION: 97 % | HEART RATE: 67 BPM | RESPIRATION RATE: 17 BRPM | TEMPERATURE: 98 F | SYSTOLIC BLOOD PRESSURE: 160 MMHG | DIASTOLIC BLOOD PRESSURE: 70 MMHG

## 2019-01-07 PROCEDURE — G0300 HHS/HOSPICE OF LPN EA 15 MIN: HCPCS

## 2019-01-08 ENCOUNTER — HOSPITAL ENCOUNTER (OUTPATIENT)
Age: 49
Setting detail: OBSERVATION
Discharge: HOME | End: 2019-01-12
Attending: ORTHOPAEDIC SURGERY | Admitting: ORTHOPAEDIC SURGERY
Payer: COMMERCIAL

## 2019-01-08 ENCOUNTER — ANESTHESIA EVENT (OUTPATIENT)
Dept: SURGERY | Age: 49
End: 2019-01-08
Payer: COMMERCIAL

## 2019-01-08 ENCOUNTER — ANESTHESIA (OUTPATIENT)
Dept: SURGERY | Age: 49
End: 2019-01-08
Payer: COMMERCIAL

## 2019-01-08 PROBLEM — Z98.890 STATUS POST INCISION AND DRAINAGE: Status: ACTIVE | Noted: 2019-01-08

## 2019-01-08 PROCEDURE — 74011250636 HC RX REV CODE- 250/636: Performed by: ANESTHESIOLOGY

## 2019-01-08 PROCEDURE — 99218 HC RM OBSERVATION: CPT

## 2019-01-08 PROCEDURE — 76010000149 HC OR TIME 1 TO 1.5 HR: Performed by: ORTHOPAEDIC SURGERY

## 2019-01-08 PROCEDURE — 77030018836 HC SOL IRR NACL ICUM -A: Performed by: ORTHOPAEDIC SURGERY

## 2019-01-08 PROCEDURE — 74011250636 HC RX REV CODE- 250/636

## 2019-01-08 PROCEDURE — 77030019952 HC CANSTR VAC ASST KCON -B: Performed by: ORTHOPAEDIC SURGERY

## 2019-01-08 PROCEDURE — 77030018717 HC DRSG GRNUFM KCON -B: Performed by: ORTHOPAEDIC SURGERY

## 2019-01-08 PROCEDURE — 77030002966 HC SUT PDS J&J -A: Performed by: ORTHOPAEDIC SURGERY

## 2019-01-08 PROCEDURE — 77030002933 HC SUT MCRYL J&J -A: Performed by: ORTHOPAEDIC SURGERY

## 2019-01-08 PROCEDURE — 77030010509 HC AIRWY LMA MSK TELE -A: Performed by: ANESTHESIOLOGY

## 2019-01-08 PROCEDURE — 77030019934 HC DRSG VAC ASST KCON -B: Performed by: ORTHOPAEDIC SURGERY

## 2019-01-08 PROCEDURE — 74011000250 HC RX REV CODE- 250: Performed by: ORTHOPAEDIC SURGERY

## 2019-01-08 PROCEDURE — 76060000033 HC ANESTHESIA 1 TO 1.5 HR: Performed by: ORTHOPAEDIC SURGERY

## 2019-01-08 PROCEDURE — 76210000002 HC OR PH I REC 3 TO 3.5 HR: Performed by: ORTHOPAEDIC SURGERY

## 2019-01-08 PROCEDURE — 77030002916 HC SUT ETHLN J&J -A: Performed by: ORTHOPAEDIC SURGERY

## 2019-01-08 PROCEDURE — 77030031139 HC SUT VCRL2 J&J -A: Performed by: ORTHOPAEDIC SURGERY

## 2019-01-08 PROCEDURE — 77030011640 HC PAD GRND REM COVD -A: Performed by: ORTHOPAEDIC SURGERY

## 2019-01-08 PROCEDURE — 74011250637 HC RX REV CODE- 250/637: Performed by: ORTHOPAEDIC SURGERY

## 2019-01-08 PROCEDURE — 74011000250 HC RX REV CODE- 250

## 2019-01-08 PROCEDURE — 74011250636 HC RX REV CODE- 250/636: Performed by: ORTHOPAEDIC SURGERY

## 2019-01-08 RX ORDER — HYDROMORPHONE HYDROCHLORIDE 2 MG/ML
INJECTION, SOLUTION INTRAMUSCULAR; INTRAVENOUS; SUBCUTANEOUS AS NEEDED
Status: DISCONTINUED | OUTPATIENT
Start: 2019-01-08 | End: 2019-01-08 | Stop reason: HOSPADM

## 2019-01-08 RX ORDER — ACETAMINOPHEN 10 MG/ML
INJECTION, SOLUTION INTRAVENOUS AS NEEDED
Status: DISCONTINUED | OUTPATIENT
Start: 2019-01-08 | End: 2019-01-08 | Stop reason: HOSPADM

## 2019-01-08 RX ORDER — SODIUM CHLORIDE, SODIUM LACTATE, POTASSIUM CHLORIDE, CALCIUM CHLORIDE 600; 310; 30; 20 MG/100ML; MG/100ML; MG/100ML; MG/100ML
125 INJECTION, SOLUTION INTRAVENOUS CONTINUOUS
Status: DISCONTINUED | OUTPATIENT
Start: 2019-01-08 | End: 2019-01-08 | Stop reason: HOSPADM

## 2019-01-08 RX ORDER — SODIUM CHLORIDE 0.9 % (FLUSH) 0.9 %
10-20 SYRINGE (ML) INJECTION 2 TIMES DAILY
Status: DISCONTINUED | OUTPATIENT
Start: 2019-01-09 | End: 2019-01-12 | Stop reason: HOSPADM

## 2019-01-08 RX ORDER — HYDROMORPHONE HYDROCHLORIDE 2 MG/1
2 TABLET ORAL
Status: DISCONTINUED | OUTPATIENT
Start: 2019-01-08 | End: 2019-01-12 | Stop reason: HOSPADM

## 2019-01-08 RX ORDER — DEXTROSE, SODIUM CHLORIDE, SODIUM LACTATE, POTASSIUM CHLORIDE, AND CALCIUM CHLORIDE 5; .6; .31; .03; .02 G/100ML; G/100ML; G/100ML; G/100ML; G/100ML
125 INJECTION, SOLUTION INTRAVENOUS CONTINUOUS
Status: DISCONTINUED | OUTPATIENT
Start: 2019-01-08 | End: 2019-01-08 | Stop reason: HOSPADM

## 2019-01-08 RX ORDER — ACYCLOVIR 800 MG/1
400 TABLET ORAL
Status: DISCONTINUED | OUTPATIENT
Start: 2019-01-08 | End: 2019-01-12 | Stop reason: HOSPADM

## 2019-01-08 RX ORDER — ACETAMINOPHEN 500 MG
500 TABLET ORAL
Status: DISCONTINUED | OUTPATIENT
Start: 2019-01-08 | End: 2019-01-12 | Stop reason: HOSPADM

## 2019-01-08 RX ORDER — VANCOMYCIN/0.9 % SOD CHLORIDE 1.5G/250ML
1500 PLASTIC BAG, INJECTION (ML) INTRAVENOUS ONCE
Status: COMPLETED | OUTPATIENT
Start: 2019-01-08 | End: 2019-01-09

## 2019-01-08 RX ORDER — SODIUM CHLORIDE 0.9 % (FLUSH) 0.9 %
5-40 SYRINGE (ML) INJECTION EVERY 8 HOURS
Status: DISCONTINUED | OUTPATIENT
Start: 2019-01-08 | End: 2019-01-08 | Stop reason: HOSPADM

## 2019-01-08 RX ORDER — SODIUM CHLORIDE 9 MG/ML
25 INJECTION, SOLUTION INTRAVENOUS CONTINUOUS
Status: DISCONTINUED | OUTPATIENT
Start: 2019-01-08 | End: 2019-01-08 | Stop reason: HOSPADM

## 2019-01-08 RX ORDER — HYDROMORPHONE HYDROCHLORIDE 2 MG/ML
INJECTION, SOLUTION INTRAMUSCULAR; INTRAVENOUS; SUBCUTANEOUS
Status: COMPLETED
Start: 2019-01-08 | End: 2019-01-08

## 2019-01-08 RX ORDER — FUROSEMIDE 20 MG/1
20 TABLET ORAL DAILY
Status: DISCONTINUED | OUTPATIENT
Start: 2019-01-09 | End: 2019-01-12

## 2019-01-08 RX ORDER — MIDAZOLAM HYDROCHLORIDE 1 MG/ML
1 INJECTION, SOLUTION INTRAMUSCULAR; INTRAVENOUS AS NEEDED
Status: DISCONTINUED | OUTPATIENT
Start: 2019-01-08 | End: 2019-01-08 | Stop reason: HOSPADM

## 2019-01-08 RX ORDER — SODIUM CHLORIDE 0.9 % (FLUSH) 0.9 %
5-40 SYRINGE (ML) INJECTION AS NEEDED
Status: DISCONTINUED | OUTPATIENT
Start: 2019-01-08 | End: 2019-01-08 | Stop reason: HOSPADM

## 2019-01-08 RX ORDER — LOSARTAN POTASSIUM 50 MG/1
100 TABLET ORAL DAILY
Status: DISCONTINUED | OUTPATIENT
Start: 2019-01-09 | End: 2019-01-12 | Stop reason: HOSPADM

## 2019-01-08 RX ORDER — MORPHINE SULFATE 10 MG/ML
2 INJECTION, SOLUTION INTRAMUSCULAR; INTRAVENOUS
Status: DISCONTINUED | OUTPATIENT
Start: 2019-01-08 | End: 2019-01-08 | Stop reason: HOSPADM

## 2019-01-08 RX ORDER — LANSOPRAZOLE 15 MG/1
15 TABLET, ORALLY DISINTEGRATING, DELAYED RELEASE ORAL
Status: DISCONTINUED | OUTPATIENT
Start: 2019-01-09 | End: 2019-01-08 | Stop reason: CLARIF

## 2019-01-08 RX ORDER — FENTANYL CITRATE 50 UG/ML
50 INJECTION, SOLUTION INTRAMUSCULAR; INTRAVENOUS AS NEEDED
Status: DISCONTINUED | OUTPATIENT
Start: 2019-01-08 | End: 2019-01-08 | Stop reason: HOSPADM

## 2019-01-08 RX ORDER — MIDAZOLAM HYDROCHLORIDE 1 MG/ML
1 INJECTION, SOLUTION INTRAMUSCULAR; INTRAVENOUS
Status: DISCONTINUED | OUTPATIENT
Start: 2019-01-08 | End: 2019-01-08 | Stop reason: HOSPADM

## 2019-01-08 RX ORDER — ONDANSETRON 2 MG/ML
INJECTION INTRAMUSCULAR; INTRAVENOUS AS NEEDED
Status: DISCONTINUED | OUTPATIENT
Start: 2019-01-08 | End: 2019-01-08 | Stop reason: HOSPADM

## 2019-01-08 RX ORDER — TRAZODONE HYDROCHLORIDE 100 MG/1
100 TABLET ORAL
Status: DISCONTINUED | OUTPATIENT
Start: 2019-01-08 | End: 2019-01-12 | Stop reason: HOSPADM

## 2019-01-08 RX ORDER — HYDROMORPHONE HYDROCHLORIDE 2 MG/ML
2 INJECTION, SOLUTION INTRAMUSCULAR; INTRAVENOUS; SUBCUTANEOUS ONCE
Status: DISCONTINUED | OUTPATIENT
Start: 2019-01-08 | End: 2019-01-08 | Stop reason: HOSPADM

## 2019-01-08 RX ORDER — OXYCODONE AND ACETAMINOPHEN 5; 325 MG/1; MG/1
1 TABLET ORAL AS NEEDED
Status: DISCONTINUED | OUTPATIENT
Start: 2019-01-08 | End: 2019-01-08 | Stop reason: HOSPADM

## 2019-01-08 RX ORDER — OXYCODONE HYDROCHLORIDE 5 MG/1
5 TABLET ORAL AS NEEDED
Status: DISCONTINUED | OUTPATIENT
Start: 2019-01-08 | End: 2019-01-08 | Stop reason: HOSPADM

## 2019-01-08 RX ORDER — PANTOPRAZOLE SODIUM 40 MG/1
40 TABLET, DELAYED RELEASE ORAL
Status: DISCONTINUED | OUTPATIENT
Start: 2019-01-09 | End: 2019-01-12 | Stop reason: HOSPADM

## 2019-01-08 RX ORDER — FENTANYL CITRATE 50 UG/ML
INJECTION, SOLUTION INTRAMUSCULAR; INTRAVENOUS AS NEEDED
Status: DISCONTINUED | OUTPATIENT
Start: 2019-01-08 | End: 2019-01-08 | Stop reason: HOSPADM

## 2019-01-08 RX ORDER — CEFAZOLIN SODIUM 1 G/3ML
INJECTION, POWDER, FOR SOLUTION INTRAMUSCULAR; INTRAVENOUS AS NEEDED
Status: DISCONTINUED | OUTPATIENT
Start: 2019-01-08 | End: 2019-01-08 | Stop reason: HOSPADM

## 2019-01-08 RX ORDER — MELATONIN
1000 DAILY
Status: DISCONTINUED | OUTPATIENT
Start: 2019-01-09 | End: 2019-01-12 | Stop reason: HOSPADM

## 2019-01-08 RX ORDER — BACLOFEN 10 MG/1
10 TABLET ORAL
Status: DISCONTINUED | OUTPATIENT
Start: 2019-01-08 | End: 2019-01-12 | Stop reason: HOSPADM

## 2019-01-08 RX ORDER — DEXMEDETOMIDINE HYDROCHLORIDE 4 UG/ML
INJECTION, SOLUTION INTRAVENOUS AS NEEDED
Status: DISCONTINUED | OUTPATIENT
Start: 2019-01-08 | End: 2019-01-08 | Stop reason: HOSPADM

## 2019-01-08 RX ORDER — ASPIRIN 81 MG/1
81 TABLET ORAL 2 TIMES DAILY
Status: DISCONTINUED | OUTPATIENT
Start: 2019-01-09 | End: 2019-01-12 | Stop reason: HOSPADM

## 2019-01-08 RX ORDER — LIDOCAINE HYDROCHLORIDE 10 MG/ML
0.5 INJECTION, SOLUTION EPIDURAL; INFILTRATION; INTRACAUDAL; PERINEURAL AS NEEDED
Status: DISCONTINUED | OUTPATIENT
Start: 2019-01-08 | End: 2019-01-08 | Stop reason: HOSPADM

## 2019-01-08 RX ORDER — ALBUTEROL SULFATE 0.83 MG/ML
2.5 SOLUTION RESPIRATORY (INHALATION)
Status: DISCONTINUED | OUTPATIENT
Start: 2019-01-08 | End: 2019-01-12 | Stop reason: HOSPADM

## 2019-01-08 RX ORDER — KETAMINE HYDROCHLORIDE 10 MG/ML
INJECTION, SOLUTION INTRAMUSCULAR; INTRAVENOUS AS NEEDED
Status: DISCONTINUED | OUTPATIENT
Start: 2019-01-08 | End: 2019-01-08 | Stop reason: HOSPADM

## 2019-01-08 RX ORDER — PROPOFOL 10 MG/ML
INJECTION, EMULSION INTRAVENOUS AS NEEDED
Status: DISCONTINUED | OUTPATIENT
Start: 2019-01-08 | End: 2019-01-08 | Stop reason: HOSPADM

## 2019-01-08 RX ORDER — MIDAZOLAM HYDROCHLORIDE 1 MG/ML
INJECTION, SOLUTION INTRAMUSCULAR; INTRAVENOUS AS NEEDED
Status: DISCONTINUED | OUTPATIENT
Start: 2019-01-08 | End: 2019-01-08 | Stop reason: HOSPADM

## 2019-01-08 RX ORDER — FENTANYL CITRATE 50 UG/ML
25 INJECTION, SOLUTION INTRAMUSCULAR; INTRAVENOUS
Status: COMPLETED | OUTPATIENT
Start: 2019-01-08 | End: 2019-01-08

## 2019-01-08 RX ORDER — POTASSIUM CHLORIDE 750 MG/1
10 TABLET, FILM COATED, EXTENDED RELEASE ORAL DAILY
Status: DISCONTINUED | OUTPATIENT
Start: 2019-01-09 | End: 2019-01-12 | Stop reason: HOSPADM

## 2019-01-08 RX ORDER — DIVALPROEX SODIUM 250 MG/1
250 TABLET, DELAYED RELEASE ORAL 2 TIMES DAILY
Status: DISCONTINUED | OUTPATIENT
Start: 2019-01-09 | End: 2019-01-09

## 2019-01-08 RX ORDER — LIDOCAINE HYDROCHLORIDE 10 MG/ML
0.1 INJECTION, SOLUTION EPIDURAL; INFILTRATION; INTRACAUDAL; PERINEURAL AS NEEDED
Status: DISCONTINUED | OUTPATIENT
Start: 2019-01-08 | End: 2019-01-08 | Stop reason: HOSPADM

## 2019-01-08 RX ORDER — MIDAZOLAM HYDROCHLORIDE 1 MG/ML
0.5 INJECTION, SOLUTION INTRAMUSCULAR; INTRAVENOUS
Status: DISCONTINUED | OUTPATIENT
Start: 2019-01-08 | End: 2019-01-08 | Stop reason: HOSPADM

## 2019-01-08 RX ORDER — OXYCODONE HYDROCHLORIDE 5 MG/1
15 TABLET ORAL
Status: DISCONTINUED | OUTPATIENT
Start: 2019-01-08 | End: 2019-01-12 | Stop reason: HOSPADM

## 2019-01-08 RX ORDER — LEVOTHYROXINE SODIUM 50 UG/1
100 TABLET ORAL
Status: DISCONTINUED | OUTPATIENT
Start: 2019-01-09 | End: 2019-01-12 | Stop reason: HOSPADM

## 2019-01-08 RX ORDER — ONDANSETRON 2 MG/ML
4 INJECTION INTRAMUSCULAR; INTRAVENOUS AS NEEDED
Status: DISCONTINUED | OUTPATIENT
Start: 2019-01-08 | End: 2019-01-08 | Stop reason: HOSPADM

## 2019-01-08 RX ORDER — HYDROCHLOROTHIAZIDE 25 MG/1
25 TABLET ORAL DAILY
Status: DISCONTINUED | OUTPATIENT
Start: 2019-01-09 | End: 2019-01-12

## 2019-01-08 RX ORDER — HYDROMORPHONE HYDROCHLORIDE 2 MG/ML
2 INJECTION, SOLUTION INTRAMUSCULAR; INTRAVENOUS; SUBCUTANEOUS ONCE
Status: COMPLETED | OUTPATIENT
Start: 2019-01-08 | End: 2019-01-08

## 2019-01-08 RX ORDER — LORAZEPAM 2 MG/ML
INJECTION INTRAMUSCULAR
Status: DISPENSED
Start: 2019-01-08 | End: 2019-01-09

## 2019-01-08 RX ORDER — DICLOFENAC SODIUM 75 MG/1
75 TABLET, DELAYED RELEASE ORAL 2 TIMES DAILY
Status: DISCONTINUED | OUTPATIENT
Start: 2019-01-09 | End: 2019-01-12 | Stop reason: HOSPADM

## 2019-01-08 RX ORDER — HEPARIN SODIUM,PORCINE/PF 10 UNIT/ML
50 SYRINGE (ML) INTRAVENOUS 2 TIMES DAILY
Status: DISCONTINUED | OUTPATIENT
Start: 2019-01-09 | End: 2019-01-08 | Stop reason: SDUPTHER

## 2019-01-08 RX ORDER — HYDRALAZINE HYDROCHLORIDE 20 MG/ML
INJECTION INTRAMUSCULAR; INTRAVENOUS AS NEEDED
Status: DISCONTINUED | OUTPATIENT
Start: 2019-01-08 | End: 2019-01-08 | Stop reason: HOSPADM

## 2019-01-08 RX ORDER — LIDOCAINE HYDROCHLORIDE 20 MG/ML
INJECTION, SOLUTION EPIDURAL; INFILTRATION; INTRACAUDAL; PERINEURAL AS NEEDED
Status: DISCONTINUED | OUTPATIENT
Start: 2019-01-08 | End: 2019-01-08 | Stop reason: HOSPADM

## 2019-01-08 RX ORDER — ALBUTEROL SULFATE 90 UG/1
1 AEROSOL, METERED RESPIRATORY (INHALATION)
Status: DISCONTINUED | OUTPATIENT
Start: 2019-01-08 | End: 2019-01-08 | Stop reason: CLARIF

## 2019-01-08 RX ORDER — FENTANYL CITRATE 50 UG/ML
25 INJECTION, SOLUTION INTRAMUSCULAR; INTRAVENOUS
Status: DISCONTINUED | OUTPATIENT
Start: 2019-01-08 | End: 2019-01-08 | Stop reason: HOSPADM

## 2019-01-08 RX ORDER — LORAZEPAM 2 MG/ML
1 INJECTION INTRAMUSCULAR ONCE
Status: COMPLETED | OUTPATIENT
Start: 2019-01-08 | End: 2019-01-08

## 2019-01-08 RX ADMIN — KETAMINE HYDROCHLORIDE 10 MG: 10 INJECTION, SOLUTION INTRAMUSCULAR; INTRAVENOUS at 18:09

## 2019-01-08 RX ADMIN — FENTANYL CITRATE 25 MCG: 50 INJECTION, SOLUTION INTRAMUSCULAR; INTRAVENOUS at 19:30

## 2019-01-08 RX ADMIN — MIDAZOLAM HYDROCHLORIDE 2 MG: 1 INJECTION, SOLUTION INTRAMUSCULAR; INTRAVENOUS at 19:00

## 2019-01-08 RX ADMIN — FENTANYL CITRATE 50 MCG: 50 INJECTION, SOLUTION INTRAMUSCULAR; INTRAVENOUS at 18:09

## 2019-01-08 RX ADMIN — HYDROMORPHONE HYDROCHLORIDE 2 MG: 2 INJECTION INTRAMUSCULAR; INTRAVENOUS; SUBCUTANEOUS at 19:35

## 2019-01-08 RX ADMIN — HYDRALAZINE HYDROCHLORIDE 10 MG: 20 INJECTION INTRAMUSCULAR; INTRAVENOUS at 18:52

## 2019-01-08 RX ADMIN — SODIUM CHLORIDE, POTASSIUM CHLORIDE, SODIUM LACTATE AND CALCIUM CHLORIDE: 600; 310; 30; 20 INJECTION, SOLUTION INTRAVENOUS at 17:30

## 2019-01-08 RX ADMIN — LIDOCAINE HYDROCHLORIDE 100 MG: 20 INJECTION, SOLUTION EPIDURAL; INFILTRATION; INTRACAUDAL; PERINEURAL at 17:52

## 2019-01-08 RX ADMIN — ACETAMINOPHEN 1000 MG: 10 INJECTION, SOLUTION INTRAVENOUS at 17:59

## 2019-01-08 RX ADMIN — MIDAZOLAM 0.5 MG: 1 INJECTION INTRAMUSCULAR; INTRAVENOUS at 21:15

## 2019-01-08 RX ADMIN — KETAMINE HYDROCHLORIDE 10 MG: 10 INJECTION, SOLUTION INTRAMUSCULAR; INTRAVENOUS at 18:05

## 2019-01-08 RX ADMIN — ONDANSETRON 4 MG: 2 INJECTION INTRAMUSCULAR; INTRAVENOUS at 18:00

## 2019-01-08 RX ADMIN — LORAZEPAM 1 MG: 2 INJECTION INTRAMUSCULAR; INTRAVENOUS at 20:24

## 2019-01-08 RX ADMIN — FENTANYL CITRATE 25 MCG: 50 INJECTION, SOLUTION INTRAMUSCULAR; INTRAVENOUS at 19:12

## 2019-01-08 RX ADMIN — KETAMINE HYDROCHLORIDE 30 MG: 10 INJECTION, SOLUTION INTRAMUSCULAR; INTRAVENOUS at 17:58

## 2019-01-08 RX ADMIN — HYDROMORPHONE HYDROCHLORIDE 2 MG: 2 INJECTION, SOLUTION INTRAMUSCULAR; INTRAVENOUS; SUBCUTANEOUS at 18:56

## 2019-01-08 RX ADMIN — MIDAZOLAM HYDROCHLORIDE 2 MG: 1 INJECTION, SOLUTION INTRAMUSCULAR; INTRAVENOUS at 17:46

## 2019-01-08 RX ADMIN — CEFAZOLIN SODIUM 2 G: 1 INJECTION, POWDER, FOR SOLUTION INTRAMUSCULAR; INTRAVENOUS at 18:00

## 2019-01-08 RX ADMIN — HYDROMORPHONE HYDROCHLORIDE 2 MG: 2 INJECTION INTRAMUSCULAR; INTRAVENOUS; SUBCUTANEOUS at 20:13

## 2019-01-08 RX ADMIN — HYDROMORPHONE HYDROCHLORIDE 2 MG: 2 INJECTION, SOLUTION INTRAMUSCULAR; INTRAVENOUS; SUBCUTANEOUS at 19:53

## 2019-01-08 RX ADMIN — VANCOMYCIN HYDROCHLORIDE 1500 MG: 10 INJECTION, POWDER, LYOPHILIZED, FOR SOLUTION INTRAVENOUS at 22:49

## 2019-01-08 RX ADMIN — FENTANYL CITRATE 50 MCG: 50 INJECTION, SOLUTION INTRAMUSCULAR; INTRAVENOUS at 17:59

## 2019-01-08 RX ADMIN — FENTANYL CITRATE 25 MCG: 50 INJECTION, SOLUTION INTRAMUSCULAR; INTRAVENOUS at 19:17

## 2019-01-08 RX ADMIN — DEXMEDETOMIDINE HYDROCHLORIDE 20 MCG: 4 INJECTION, SOLUTION INTRAVENOUS at 18:43

## 2019-01-08 RX ADMIN — FENTANYL CITRATE 25 MCG: 50 INJECTION, SOLUTION INTRAMUSCULAR; INTRAVENOUS at 19:23

## 2019-01-08 RX ADMIN — PROPOFOL 150 MG: 10 INJECTION, EMULSION INTRAVENOUS at 17:52

## 2019-01-08 RX ADMIN — HYDROMORPHONE HYDROCHLORIDE 2 MG: 2 TABLET ORAL at 22:33

## 2019-01-08 NOTE — ANESTHESIA PREPROCEDURE EVALUATION
Anesthetic History No history of anesthetic complications Review of Systems / Medical History Patient summary reviewed, nursing notes reviewed and pertinent labs reviewed Pulmonary Comments: Smoking Status: Former Smoker Quit [id number] Neuro/Psych Psychiatric history Cardiovascular Hypertension Exercise tolerance: >4 METS 
  
GI/Hepatic/Renal 
  
GERD: well controlled Endo/Other Hypothyroidism Obesity and arthritis Other Findings Comments: Chronic pain Anesthetic History No history of anesthetic complications Review of Systems / Medical History Anesthetic History No history of anesthetic complications Review of Systems / Medical History Patient summary reviewed, nursing notes reviewed and pertinent labs reviewed Pulmonary Within defined limits Neuro/Psych  
 
seizures: well controlled Cardiovascular Hypertension: well controlled GI/Hepatic/Renal 
  
GERD: well controlled Endo/Other Hypothyroidism: well controlled Arthritis Other Findings Physical Exam 
 
Airway Mallampati: I 
TM Distance: > 6 cm Neck ROM: normal range of motion Mouth opening: Normal 
 
 Cardiovascular Regular rate and rhythm,  S1 and S2 normal,  no murmur, click, rub, or gallop Dental 
No notable dental hx Pulmonary Breath sounds clear to auscultation Abdominal 
GI exam deferred Other Findings Anesthetic Plan ASA: 2 Anesthesia type: regional 
 
 
 
 
Induction: Intravenous Anesthetic plan and risks discussed with: Patient Patient summary reviewed, nursing notes reviewed and pertinent labs reviewed Pulmonary Within defined limits Neuro/Psych  
 
seizures: well controlled Cardiovascular Hypertension: well controlled GI/Hepatic/Renal 
  
GERD: well controlled Endo/Other Hypothyroidism: well controlled Arthritis Other Findings Physical Exam 
 
Airway Mallampati: I 
TM Distance: > 6 cm Neck ROM: normal range of motion Mouth opening: Normal 
 
 Cardiovascular Regular rate and rhythm,  S1 and S2 normal,  no murmur, click, rub, or gallop Dental 
No notable dental hx Pulmonary Breath sounds clear to auscultation Abdominal 
GI exam deferred Other Findings Anesthetic Plan ASA: 2 Anesthesia type: regional 
 
 
 
 
Induction: Intravenous Anesthetic plan and risks discussed with: Patient Physical Exam 
 
Airway Mallampati: II 
TM Distance: 4 - 6 cm Neck ROM: normal range of motion Mouth opening: Diminished (comment) Cardiovascular Regular rate and rhythm,  S1 and S2 normal,  no murmur, click, rub, or gallop Rhythm: regular Rate: normal 
 
 
 
 Dental 
No notable dental hx Pulmonary Breath sounds clear to auscultation Abdominal 
GI exam deferred Other Findings Anesthetic Plan ASA: 2 Anesthesia type: general 
 
 
 
 
Induction: Intravenous Anesthetic plan and risks discussed with: Patient

## 2019-01-08 NOTE — ROUTINE PROCESS
Patient: Siobhan Palma MRN: [id number]  SSN: [id number] YOB: 1970  Age: 50 y.o. Sex: female Patient is status post Procedure(s): 
INCISION AND DRAINAGE LEFT KNEE WITH WOUND VAC. Surgeon(s) and Role: * Joyce Tolbert MD - Primary Local/Dose/Irrigation: PICC Double Lumen 40/74/76 Cephalic;Right (Active) Vacuum Assisted Closure Left Knee (Active) Vac Status Suction, continuous 1/8/2019  6:55 PM  
Suction (mmHg) 75 1/8/2019  6:55 PM  
Site Assessment Clean, dry, & intact 1/8/2019  6:55 PM  
Dressing Status Clean, dry, & intact 1/8/2019  6:55 PM  
Drainage Description Other (Comment) 1/8/2019  6:55 PM  
           
 
 
 
Dressing/Packing:  Wound Knee Left-DRESSING TYPE: Non-adherent; Other (Comment)(adaptic and wound vac) (01/08/19 1700) Splint/Cast:  ] Other:

## 2019-01-08 NOTE — H&P
H and P Subjective:  
 
 
 
Marcy Mejia is a 50 y.o. female who presents for I and D with vac placement left knee Patient Active Problem List  
 Diagnosis Date Noted  Status post left knee replacement 2018  H/O total knee replacement, left 2018  Dehiscence of incision 2018  Primary osteoarthritis of both knees 10/24/2018  Hematuria 10/09/2018  MRSA carrier 10/09/2018  Hyperthyroidism 10/17/2012 Family History Problem Relation Age of Onset  Heart Disease Mother  Heart Attack Mother  Hypertension Mother [facility] Arthritis-osteo Mother  Rashes/Skin Problems Mother PLAQUE PSORIASIS  
 Heart Disease Father  Thyroid Disease Father  Diabetes Father  Hypertension Father  Diabetes Maternal Grandmother  Heart Attack Maternal Grandmother  Hypertension Maternal Grandmother  Arthritis-osteo Maternal Grandmother  Hypertension Maternal Grandfather  Stroke Maternal Grandfather  High Cholesterol Sister  Anesth Problems Neg Hx Social History Tobacco Use  Smoking status: Former Smoker Types: Cigarettes Last attempt to quit: 1994 Years since quittin.3  Smokeless tobacco: Never Used Substance Use Topics  Alcohol use: No  
 
Past Medical History:  
Diagnosis Date  ADHD (attention deficit hyperactivity disorder)  Arthritis OSTEO  Chronic pain  GERD (gastroesophageal reflux disease)  Graves disease NO LONGER AN ISSUE  
 Hematuria 10/9/2018  
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   
 depression  Seizure ([address]  Seizures ([address]  &  REACTIVE TO HYPOGLYCEMIA / ALSO FROM FLASHING LIGHTS Past Surgical History:  
Procedure Laterality Date  DELIVERY     
  HX ABDOMINOPLASTY   [address] [facility] [address] [facility]  HX  SECTION  2006  HX GASTRIC BYPASS    
  [address] CHOLEYCYSTECTOMY  HX ORTHOPAEDIC Right CARPEL TUNNEL  
 HX ORTHOPAEDIC Left CARPEL TUNNEL  
 HX ORTHOPAEDIC Left ULNA SHORTENING  
 HX ORTHOPAEDIC Right  [address] [facility] TONSILLECTOMY  3853 Prior to Admission medications Medication Sig Start Date End Date Taking? Authorizing Provider  
diclofenac EC (VOLTAREN) 75 mg EC tablet Take 1 Tab by mouth two (2) times a day. 18  Yes Taina Carpenter MD  
oxyCODONE IR (OXY-IR) 15 mg immediate release tablet Take 1 Tab by mouth every four (4) hours as needed for Pain. Max Daily Amount: 90 mg. 18  Yes Taina Carpenter MD  
vancomycin/0.9 % sod chloride (VANCOMYCIN IN 0.9 % SODIUM CHL) 1.5 gram/500 mL soln 500 mL by IntraVENous route every twelve (12) hours every twelve (12) hours. Yes Provider, Historical  
acetaminophen (TYLENOL) 500 mg tablet Take 500 mg by mouth every six (6) hours as needed for Pain. Yes Provider, Historical  
diclofenac 10% cyclobenzaprine 2% lidocaine 5% gabapentin 6% cream compound Apply 2 g to affected area four (4) times daily as needed for Pain. Yes Provider, Historical  
tapentadol (NUCYNTA ER) 250 mg Tb12 Take 1 Tab by mouth two (2) times a day. Yes Provider, Historical  
alpha-d-galactosidase (BEANO) 150 unit tab tablet Take 2 Tabs by mouth three (3) times daily as needed for Flatulence. Yes Seun Banks MD  
cholecalciferol, VITAMIN D3, (VITAMIN D3) 5,000 unit tab tablet Take 1 Cap by mouth daily. Yes Seun Banks MD  
aspirin delayed-release 81 mg tablet Take 1 Tab by mouth two (2) times a day. 10/26/18  Yes Taina Carpenter MD  
amphetamine sulfate (EVEKEO) 10 mg tab Take 10 mg by mouth.   PT TAKES 2 TABS (20 MG) BID AT 0200 AND 1200   Yes Provider, Historical  
 levothyroxine (SYNTHROID) 100 mcg tablet Take 100 mcg by mouth Daily (before breakfast). Yes Provider, Historical  
vortioxetine (TRINTELLIX) 10 mg tablet Take  by mouth daily. PT TAKES A TOTAL OF 30 mg DAILY   Yes Provider, Historical  
traZODone (DESYREL) 100 mg tablet Take 100 mg by mouth nightly. May take 1/2 tab to 1 tab every evening   Yes Provider, Historical  
potassium chloride SR (KLOR-CON 10) 10 mEq tablet Take 10 mEq by mouth daily. Yes Provider, Historical  
furosemide (LASIX) 20 mg tablet Take 20 mg by mouth daily. MAY TAKE ONLY IF NEEDED FOR SEVERE ANKLE SWELLING   Yes Provider, Historical  
baclofen (LIORESAL) 10 mg tablet Take 10 mg by mouth two (2) times daily as needed for Pain. Yes Provider, Historical  
divalproex DR (DEPAKOTE) 250 mg tablet Take 1 tablet by mouth in  the morning and 2 tablets  by mouth in the evening 9/11/15  Yes Alyssa Haas MD  
losartan (COZAAR) 50 mg tablet Take 100 mg by mouth daily. Yes Provider, Historical  
simethicone (GAS-X) 80 mg chewable tablet Take 80 mg by mouth every six (6) hours as needed for Flatulence. Yes Provider, Historical  
lansoprazole (PREVACID) 15 mg disintegrating tablet Take 1 Tab by mouth Daily (before breakfast). Yes Provider, Historical  
MULTIVIT WITH CALCIUM,IRON,MIN (ONE-A-DAY WOMENS FORMULA PO) Take 1 Tab by mouth daily. Yes Provider, Historical  
cetirizine (ZYRTEC) 10 mg tablet Take 1 Tab by mouth daily. Yes Provider, Historical  
heparin, porcine, pf (HEPARIN LOCKFLUSH,PORCINE,,PF,) 10 unit/mL injection 5 mL by IntraVENous route two (2) times a day. Flush PICC Line using SASH method. Provider, Historical  
sodium chloride (NORMAL SALINE FLUSH) 10-20 mL by IntraVENous route two (2) times a day. Flush PICC using SASH method. Provider, Historical  
mupirocin calcium (BACTROBAN) 2 % topical cream Apply 1 Each to affected area two (2) times a day.     Provider, Historical  
 Lisdexamfetamine (VYVANSE) 50 mg capsule Take 50 mg by mouth two (2) times a day. Takes in the morning and at noon daily    Provider, Historical  
hydrochlorothiazide (HYDRODIURIL) 25 mg tablet Take 25 mg by mouth daily. MAY TAKE 1 OR 2 TABS AS NEEDED FOR ANKLE SWELLING    Provider, Historical  
albuterol (PROAIR HFA) 90 mcg/actuation inhaler Take 1 Puff by inhalation every four (4) hours as needed for Wheezing. Provider, Historical  
benzoyl peroxide (DUAL ACTION) 3.5 % Clsr 1 Each by Apply Externally route daily. Provider, Historical  
acyclovir (ZOVIRAX) 400 mg tablet Take 400 mg by mouth every four (4) hours as needed for Other (fever blisters). Other, MD Pawel  
 
Current Facility-Administered Medications Medication Dose Route Frequency  lactated Ringers infusion  125 mL/hr IntraVENous CONTINUOUS  
 dextrose 5% lactated ringers infusion  125 mL/hr IntraVENous CONTINUOUS  
 sodium chloride (NS) flush 5-40 mL  5-40 mL IntraVENous Q8H  
 sodium chloride (NS) flush 5-40 mL  5-40 mL IntraVENous PRN  
 lidocaine (PF) (XYLOCAINE) 10 mg/mL (1 %) injection 0.5 mL  0.5 mL SubCUTAneous PRN  
 fentaNYL citrate (PF) injection 50 mcg  50 mcg IntraVENous PRN  
 midazolam (VERSED) injection 1 mg  1 mg IntraVENous PRN  
 midazolam (VERSED) injection 1 mg  1 mg IntraVENous PRN  
 lactated Ringers infusion  125 mL/hr IntraVENous CONTINUOUS  
 0.9% sodium chloride infusion  25 mL/hr IntraVENous CONTINUOUS  
 sodium chloride (NS) flush 5-40 mL  5-40 mL IntraVENous Q8H  
 sodium chloride (NS) flush 5-40 mL  5-40 mL IntraVENous PRN  
 lidocaine (PF) (XYLOCAINE) 10 mg/mL (1 %) injection 0.1 mL  0.1 mL SubCUTAneous PRN  
 fentaNYL citrate (PF) injection 50 mcg  50 mcg IntraVENous PRN  
 midazolam (VERSED) injection 1 mg  1 mg IntraVENous PRN Allergies Allergen Reactions  Sulfa (Sulfonamide Antibiotics) Hives  Pcn [Penicillins] Unproven on Challenge Review of Systems: Negative for fevers, chills, nausea, vomiting, chest pain, shortness of breath, headaches. Objective:  
 
Patient Vitals for the past 24 hrs: 
 Temp Pulse Resp BP SpO2  
19 1630 99.3 °F (37.4 °C) 69 16 (!) 189/96 98 % Temp (24hrs), Av.3 °F (37.4 °C), Min:99.3 °F (37.4 °C), Max:99.3 °F (37.4 °C) Gen: NAD, A&Ox3 Resp: Non-labored breathing CV: Extremities well perfused Abd: soft, NT 
LLE: n3 cm opening of skin incision, no erythema, no purulence, skin intact, warm well perfused, SILT in al distributions L3-S1, palpable pedal pulses, no calf tenderness Labs: No results found for this or any previous visit (from the past 24 hour(s)). Current Facility-Administered Medications Medication Dose Route Frequency  lactated Ringers infusion  125 mL/hr IntraVENous CONTINUOUS  
 dextrose 5% lactated ringers infusion  125 mL/hr IntraVENous CONTINUOUS  
 sodium chloride (NS) flush 5-40 mL  5-40 mL IntraVENous Q8H  
 sodium chloride (NS) flush 5-40 mL  5-40 mL IntraVENous PRN  
 lidocaine (PF) (XYLOCAINE) 10 mg/mL (1 %) injection 0.5 mL  0.5 mL SubCUTAneous PRN  
 fentaNYL citrate (PF) injection 50 mcg  50 mcg IntraVENous PRN  
 midazolam (VERSED) injection 1 mg  1 mg IntraVENous PRN  
 midazolam (VERSED) injection 1 mg  1 mg IntraVENous PRN  
 lactated Ringers infusion  125 mL/hr IntraVENous CONTINUOUS  
 0.9% sodium chloride infusion  25 mL/hr IntraVENous CONTINUOUS  
 sodium chloride (NS) flush 5-40 mL  5-40 mL IntraVENous Q8H  
 sodium chloride (NS) flush 5-40 mL  5-40 mL IntraVENous PRN  
 lidocaine (PF) (XYLOCAINE) 10 mg/mL (1 %) injection 0.1 mL  0.1 mL SubCUTAneous PRN  
 fentaNYL citrate (PF) injection 50 mcg  50 mcg IntraVENous PRN  
 midazolam (VERSED) injection 1 mg  1 mg IntraVENous PRN Impression: Active Problems: * No active hospital problems. * Left knee wound dehiscence Plan:  
Plan for I and D left knee with vac placement Gasper Angulo MD

## 2019-01-09 ENCOUNTER — APPOINTMENT (OUTPATIENT)
Dept: GENERAL RADIOLOGY | Age: 49
End: 2019-01-09
Attending: ORTHOPAEDIC SURGERY
Payer: COMMERCIAL

## 2019-01-09 ENCOUNTER — HOME CARE VISIT (OUTPATIENT)
Dept: HOME HEALTH SERVICES | Facility: HOME HEALTH | Age: 49
End: 2019-01-09
Payer: COMMERCIAL

## 2019-01-09 LAB
ANION GAP SERPL CALC-SCNC: 6 MMOL/L (ref 5–15)
ANION GAP SERPL CALC-SCNC: 7 MMOL/L (ref 5–15)
BASOPHILS # BLD: 0 K/UL (ref 0–0.1)
BASOPHILS NFR BLD: 1 % (ref 0–1)
BUN SERPL-MCNC: 12 MG/DL (ref 6–20)
BUN SERPL-MCNC: 12 MG/DL (ref 6–20)
BUN/CREAT SERPL: 13 (ref 12–20)
BUN/CREAT SERPL: 14 (ref 12–20)
CALCIUM SERPL-MCNC: 7.8 MG/DL (ref 8.5–10.1)
CALCIUM SERPL-MCNC: 8.2 MG/DL (ref 8.5–10.1)
CHLORIDE SERPL-SCNC: 108 MMOL/L (ref 97–108)
CHLORIDE SERPL-SCNC: 110 MMOL/L (ref 97–108)
CO2 SERPL-SCNC: 25 MMOL/L (ref 21–32)
CO2 SERPL-SCNC: 25 MMOL/L (ref 21–32)
COMMENT, HOLDF: NORMAL
CREAT SERPL-MCNC: 0.84 MG/DL (ref 0.55–1.02)
CREAT SERPL-MCNC: 0.96 MG/DL (ref 0.55–1.02)
DIFFERENTIAL METHOD BLD: ABNORMAL
EOSINOPHIL # BLD: 0.5 K/UL (ref 0–0.4)
EOSINOPHIL NFR BLD: 8 % (ref 0–7)
ERYTHROCYTE [DISTWIDTH] IN BLOOD BY AUTOMATED COUNT: 16 % (ref 11.5–14.5)
GLUCOSE SERPL-MCNC: 148 MG/DL (ref 65–100)
GLUCOSE SERPL-MCNC: 155 MG/DL (ref 65–100)
HCT VFR BLD AUTO: 30.9 % (ref 35–47)
HGB BLD-MCNC: 9.2 G/DL (ref 11.5–16)
IMM GRANULOCYTES # BLD AUTO: 0 K/UL (ref 0–0.04)
IMM GRANULOCYTES NFR BLD AUTO: 0 % (ref 0–0.5)
LYMPHOCYTES # BLD: 1.6 K/UL (ref 0.8–3.5)
LYMPHOCYTES NFR BLD: 26 % (ref 12–49)
MCH RBC QN AUTO: 26.4 PG (ref 26–34)
MCHC RBC AUTO-ENTMCNC: 29.8 G/DL (ref 30–36.5)
MCV RBC AUTO: 88.8 FL (ref 80–99)
MONOCYTES # BLD: 0.5 K/UL (ref 0–1)
MONOCYTES NFR BLD: 9 % (ref 5–13)
NEUTS SEG # BLD: 3.3 K/UL (ref 1.8–8)
NEUTS SEG NFR BLD: 56 % (ref 32–75)
NRBC # BLD: 0 K/UL (ref 0–0.01)
NRBC BLD-RTO: 0 PER 100 WBC
PLATELET # BLD AUTO: 224 K/UL (ref 150–400)
PMV BLD AUTO: 11.2 FL (ref 8.9–12.9)
POTASSIUM SERPL-SCNC: 4 MMOL/L (ref 3.5–5.1)
POTASSIUM SERPL-SCNC: 4.1 MMOL/L (ref 3.5–5.1)
RBC # BLD AUTO: 3.48 M/UL (ref 3.8–5.2)
SAMPLES BEING HELD,HOLD: NORMAL
SODIUM SERPL-SCNC: 140 MMOL/L (ref 136–145)
SODIUM SERPL-SCNC: 141 MMOL/L (ref 136–145)
VANCOMYCIN SERPL-MCNC: 21.8 UG/ML
WBC # BLD AUTO: 6 K/UL (ref 3.6–11)

## 2019-01-09 PROCEDURE — 80048 BASIC METABOLIC PNL TOTAL CA: CPT

## 2019-01-09 PROCEDURE — 71045 X-RAY EXAM CHEST 1 VIEW: CPT

## 2019-01-09 PROCEDURE — 99218 HC RM OBSERVATION: CPT

## 2019-01-09 PROCEDURE — 77030018717 HC DRSG GRNUFM KCON -B

## 2019-01-09 PROCEDURE — 85025 COMPLETE CBC W/AUTO DIFF WBC: CPT

## 2019-01-09 PROCEDURE — 74011250636 HC RX REV CODE- 250/636: Performed by: ORTHOPAEDIC SURGERY

## 2019-01-09 PROCEDURE — 36415 COLL VENOUS BLD VENIPUNCTURE: CPT

## 2019-01-09 PROCEDURE — 74011000250 HC RX REV CODE- 250: Performed by: ORTHOPAEDIC SURGERY

## 2019-01-09 PROCEDURE — 80202 ASSAY OF VANCOMYCIN: CPT

## 2019-01-09 PROCEDURE — 74011250637 HC RX REV CODE- 250/637: Performed by: ORTHOPAEDIC SURGERY

## 2019-01-09 RX ORDER — DIVALPROEX SODIUM 250 MG/1
250 TABLET, DELAYED RELEASE ORAL DAILY
Status: DISCONTINUED | OUTPATIENT
Start: 2019-01-09 | End: 2019-01-12 | Stop reason: HOSPADM

## 2019-01-09 RX ORDER — VANCOMYCIN HYDROCHLORIDE
1250 EVERY 12 HOURS
Status: DISCONTINUED | OUTPATIENT
Start: 2019-01-09 | End: 2019-01-12 | Stop reason: HOSPADM

## 2019-01-09 RX ORDER — DIVALPROEX SODIUM 500 MG/1
500 TABLET, DELAYED RELEASE ORAL
Status: DISCONTINUED | OUTPATIENT
Start: 2019-01-09 | End: 2019-01-12 | Stop reason: HOSPADM

## 2019-01-09 RX ADMIN — MULTIPLE VITAMINS W/ MINERALS TAB 1 TABLET: TAB at 09:22

## 2019-01-09 RX ADMIN — DICLOFENAC SODIUM 75 MG: 75 TABLET, DELAYED RELEASE ORAL at 17:43

## 2019-01-09 RX ADMIN — ACETAMINOPHEN 500 MG: 500 TABLET ORAL at 00:07

## 2019-01-09 RX ADMIN — VANCOMYCIN HYDROCHLORIDE 1250 MG: 10 INJECTION, POWDER, LYOPHILIZED, FOR SOLUTION INTRAVENOUS at 15:12

## 2019-01-09 RX ADMIN — HYDROMORPHONE HYDROCHLORIDE 2 MG: 2 TABLET ORAL at 05:57

## 2019-01-09 RX ADMIN — DICLOFENAC SODIUM 75 MG: 75 TABLET, DELAYED RELEASE ORAL at 09:22

## 2019-01-09 RX ADMIN — Medication 10 ML: at 10:49

## 2019-01-09 RX ADMIN — ASPIRIN 81 MG: 81 TABLET, COATED ORAL at 17:43

## 2019-01-09 RX ADMIN — HYDROMORPHONE HYDROCHLORIDE 2 MG: 2 TABLET ORAL at 02:08

## 2019-01-09 RX ADMIN — BACLOFEN 10 MG: 10 TABLET ORAL at 00:03

## 2019-01-09 RX ADMIN — ACETAMINOPHEN 500 MG: 500 TABLET ORAL at 17:43

## 2019-01-09 RX ADMIN — TRAZODONE HYDROCHLORIDE 100 MG: 100 TABLET ORAL at 22:00

## 2019-01-09 RX ADMIN — HYDROMORPHONE HYDROCHLORIDE 2 MG: 2 TABLET ORAL at 17:34

## 2019-01-09 RX ADMIN — ALTEPLASE 1 MG: 2.2 INJECTION, POWDER, LYOPHILIZED, FOR SOLUTION INTRAVENOUS at 17:47

## 2019-01-09 RX ADMIN — POTASSIUM CHLORIDE 10 MEQ: 750 TABLET, EXTENDED RELEASE ORAL at 10:50

## 2019-01-09 RX ADMIN — HYDROMORPHONE HYDROCHLORIDE 2 MG: 2 TABLET ORAL at 13:18

## 2019-01-09 RX ADMIN — LOSARTAN POTASSIUM 100 MG: 50 TABLET ORAL at 09:22

## 2019-01-09 RX ADMIN — VITAMIN D, TAB 1000IU (100/BT) 1000 UNITS: 25 TAB at 09:22

## 2019-01-09 RX ADMIN — HYDROMORPHONE HYDROCHLORIDE 2 MG: 2 TABLET ORAL at 09:23

## 2019-01-09 RX ADMIN — DIVALPROEX SODIUM 250 MG: 250 TABLET, DELAYED RELEASE ORAL at 11:27

## 2019-01-09 RX ADMIN — HYDROMORPHONE HYDROCHLORIDE 2 MG: 2 TABLET ORAL at 21:02

## 2019-01-09 RX ADMIN — PANTOPRAZOLE SODIUM 40 MG: 40 TABLET, DELAYED RELEASE ORAL at 07:37

## 2019-01-09 RX ADMIN — LEVOTHYROXINE SODIUM 100 MCG: 50 TABLET ORAL at 07:37

## 2019-01-09 RX ADMIN — TRAZODONE HYDROCHLORIDE 100 MG: 100 TABLET ORAL at 00:04

## 2019-01-09 RX ADMIN — DIVALPROEX SODIUM 500 MG: 500 TABLET, DELAYED RELEASE ORAL at 21:02

## 2019-01-09 RX ADMIN — ASPIRIN 81 MG: 81 TABLET, COATED ORAL at 09:22

## 2019-01-09 RX ADMIN — Medication 10 ML: at 17:37

## 2019-01-09 RX ADMIN — OXYCODONE HYDROCHLORIDE 15 MG: 5 TABLET ORAL at 11:26

## 2019-01-09 NOTE — ANESTHESIA POSTPROCEDURE EVALUATION
Procedure(s): 
INCISION AND DRAINAGE LEFT KNEE WITH WOUND VAC. Anesthesia Post Evaluation Patient location during evaluation: PACU Patient participation: complete - patient participated Level of consciousness: awake and alert Pain management: adequate Airway patency: patent Anesthetic complications: no 
Cardiovascular status: acceptable Respiratory status: acceptable Hydration status: acceptable Comments: I have seen and evaluated the patient and is ready for discharge. Kusum Covington MD 
 
Post anesthesia nausea and vomiting:  none Visit Vitals BP (!) 163/99 Pulse 83 Temp 36.5 °C (97.7 °F) Resp 17 Ht 5' 2\" (1.575 m) Wt 84.4 kg (186 lb) SpO2 98% BMI 34.02 kg/m²

## 2019-01-09 NOTE — OP NOTES
[address]  OPERATIVE REPORT    Tiera Ross  MR#: [id number]  : 1970  ACCOUNT #: [de-identified]   DATE OF SERVICE: 2019    SURGEON:  Rahel Garza MD    ASSISTANT:  [facility] staff    PREOPERATIVE DIAGNOSIS:  Left knee wound dehiscence. POSTOPERATIVE DIAGNOSIS:  Left knee wound dehiscence. PROCEDURE PERFORMED:  Irrigation and debridement, left knee with placement of incisional wound VAC. ANESTHESIA:  General.    COMPLICATIONS:  None. SPECIMENS REMOVED:  None. ESTIMATED BLOOD LOSS:  Minimal.    IMPLANTS:  None. INDICATIONS FOR PROCEDURE:  This is a 28-year-old female with significant medical history, who underwent total knee arthroplasty and unfortunately developed an MRSA infection. She eventually had a resection arthroplasty with placement of spacer. Her sutures were left in for 3 weeks secondary to her issues with wound healing. As soon as these were taken out this past week, she reopened her wound. We discussed irrigation and debridement with placement of incisional wound VAC. She understood and wished to proceed. DESCRIPTION OF PROCEDURE:  The patient was identified in the preoperative holding area. The correct site was marked and confirmed. She was taken to the operating room and placed in supine position. General endotracheal anesthesia was induced. She received 2 g cefazolin prior to incision. Timeout was held. Correct site was confirmed. There was a 2 cm opening with no erythema and minimal drainage. The little bit of drainage that was present was clear yellow. I reopened the entirety of the incision and washed the superficial knee throughout. The deep sutures were intact and there was no communication. After thorough irrigation with normal saline and bacitracin, we closed the wound. Prior to doing this, I ellipsed out the previous skin to healthy bleeding skin. I then closed with 2-0 PDS followed by 2-0 nylon.   An incisional wound VAC was placed with Adaptic at the skin sponge barrier. The patient was taken to PACU in stable condition. All counts correct x2.       MD TOM Holder / PN  D: 01/08/2019 19:21     T: 01/09/2019 08:25  JOB #: [id number]

## 2019-01-09 NOTE — PROGRESS NOTES
Day #2 of Vancomycin Indication:  MRSA infection of a total knee replacement, followed by ID. Note, therapy could continue through at least  per recent discharge notes. Current regimen:  1500mg IV [id number] at discharge; however, per patient, she had been adjusted to 1250mg IV [id number] Abx regimen:  Vancomycin monotherapy ID Following ?: YES (no note yet for the current inpatient encounter) Concomitant nephrotoxic drugs (requires more frequent monitoring): None Frequency of BMP?: daily through 2019 Recent Labs 19 
1503 CREA 0.84 BUN 12 Est CrCl: ~80 ml/min; UO: No urine output being recorded at this time Temp (24hrs), Av.2 °F (36.8 °C), Min:97.6 °F (36.4 °C), Max:99.3 °F (37.4 °C) Cultures: No current cultures (MRSA positive cultures from last ) Goal trough = 15 - 20 mcg/mL Recent trough history (date/time/level/dose/action taken): Therapeutic on 1500mg IV [id number] during most recent hospitalization.  Random 12 hour level  =21.8mcg/mL Plan: As the 12 hour level was supra-therapeutic after the initial 1500mg dose, will restart the patient reported home regimen of 1250mg IV [id number] with the first dose to be scheduled for 4pm to allow additional clearance (and ease of lab draws with am labs if needed). Pharmacy to follow closely and order troughs / dose adjustments as appropriate.

## 2019-01-09 NOTE — HOME CARE
Patient opened to [facility] for SN and if admitted will need resumption orders for skilled nursing prior to discharge. Delroy Edwards RN 
[facility]

## 2019-01-09 NOTE — PROGRESS NOTES
NUTRITION COMPLETE ASSESSMENT 
 
RECOMMENDATIONS:  
1. Encourage continued good intake of protein foods at every meal 
 - document % meals and supplements consumed 2. For wound healing add: 
 - 220mg zinc sulfate x 10 days - 500mg 2x/day vitamin C x 10 days 3. Add post-op gastric bypass vitamin/mineral regimen: 
 - switch MVI to chewable Flintstones MVI 2x/day 
 - 500mcg Vit B12 
 - 600mg Calcium Carbonate BID (switch to calcium citrate after discharge) - increase Vit D to 3000IU/day Interventions/Plan:  
Food/Nutrient Delivery:    Commercial supplement(see below)   (bariatric vitamins, wound healing supplements) Nutrition Education:(high protein)  reinforce importance of protein Assessment:  
Reason for Assessment: [x] Provider Consult Diet: Regular Supplements: none Nutritionally Significant Medications: [x] Reviewed & Includes: vit D(1000IU), depakote, lasix, HCTZ, synthroid, MVI + iron, protonix, KCl, vanco, trazadone Meal Intake: No data found. Pre-Hospitalization: 
Usual Appetite: Fair Diet at Home: REGULAR, HIGH PROTEIN Vitamins/Supplements: Yes(ENSURE MAX 1/2-1 PER DAY) Current Hospitalization:  
Appetite: Good PO Ability: Independent Average po intake:% Average supplements intake:    
 Subjective: \"I'm eating more meat to try and get the protein, there are some meats that I just can't do though. .. I do maybe 1/2-1 Ensure Max each day. \" 
 
Objective: 
Pt admitted for I&D of left knee. PMHx: gastric bypass 2001, HTN; OCD, depression, ADHD; MRSA. S/p total knee replacement with development of a MRSA infection. Had resection with placement of spacer. Wound reopened with hx of poor healing noted. S/p I&D yesterday with wound VAC placed. Pt visited today. Eating breakfast and doing well with eggs, yogurt, pelaez. Seen by RD on admit in December with diet ed provided.  Happy to hear pt has been including more protein and understands the importance for wound healing. Takes Unjury MVI and Flinstones MVI at home. Pt agreeable to Boost Glucose Control while inpatient (750kcal, 42g protein) and Judson BID (160kcal, 5g protein, 28g AA). No questions or concerns at this time. Recommend adding additional vitamins for wound healing, along with bariatric vitamins/minerals - see above. Severe wt loss of 5% x 1 month noted, but may be partially related to loss of muscle mass with limited mobility since. Wt Readings from Last 10 Encounters:  
01/09/19 80.1 kg (176 lb 9.4 oz) 12/19/18 84.4 kg (186 lb) 12/15/18 84.6 kg (186 lb 8.2 oz)  
11/16/18 86.7 kg (191 lb 2.2 oz) 10/29/18 86.7 kg (191 lb 2.2 oz) 10/28/18 86.7 kg (191 lb 1.6 oz) 10/08/18 83.1 kg (183 lb 4.8 oz) 10/24/13 77.1 kg (170 lb) 07/09/12 70.6 kg (155 lb 11.2 oz) 02/13/12 72.4 kg (159 lb 9.8 oz) Will follow for PO intake, supplement consumption, protein intake, wt trends, and wound healing. Estimated Nutrition Needs:  
Kcals/day: 1600 Kcals/day(1440-1600kcal) Protein: 80 g(80-96g (1-1.2g/kg)) Fluid: 2000 ml(25ml/kg) Based On: Kcal/kg - specify (Comment)(18-20kcal/kg for gradual wt loss) Weight Used: Actual wt(80kg) Pt expected to meet estimated nutrient needs:  []   Yes     []  No [] Unable to predict at this time Nutrition Diagnosis:  
1. (Increased protein needs) related to wound healing as evidenced by non-healing surgical wound, wound VAC in place 2. (Impaired nutrient utilization) related to altered GI fx as evidenced by hx of gastric bypass Goals:   
 Consumption of at least 100% protein foods and 2 supplements/day in 5-7 days; controlled wt loss of 1-2# per week Monitoring & Evaluation: - Total energy intake, Liquid meal replacement, Protein intake, Vitamin intake - Weight/weight change(wound healing) Previous Nutrition Goals Met:   N/A Previous Recommendations:    N/A Education & Discharge Needs: 
 [] None Identified [x] Identified and addressed [x] Participated in care plan, discharge planning, and/or interdisciplinary rounds Cultural, Scientologist and ethnic food preferences identified: None Skin Integrity: []Intact  [x]Other: knee wound Edema: [x]None []Other Last BM: PTA Food Allergies: [x]None []Other Diet Restrictions: Food Intolerance: (deli meats) Cultural/Gnosticist Preference(s): None Anthropometrics:   
Weight Loss Metrics 1/9/2019 1/8/2019 12/19/2018 12/15/2018 12/12/2018 11/16/2018 10/29/2018 Today's Wt 176 lb 9.4 oz - 186 lb 186 lb 8.2 oz - 191 lb 2.2 oz 191 lb 2.2 oz  
BMI - 32.3 kg/m2 34.02 kg/m2 - 34.11 kg/m2 34.96 kg/m2 34.95 kg/m2 Weight Source: Bed Height: 5' 2\" (157.5 cm), Body mass index is 32.3 kg/m². IBW : 49.9 kg (110 lb), % IBW (Calculated): 160.54 % Usual Body Weight: 88.9 kg (196 lb),   
 
Labs:   
Lab Results Component Value Date/Time Sodium 141 01/09/2019 02:18 AM  
 Potassium 4.0 01/09/2019 02:18 AM  
 Chloride 110 (H) 01/09/2019 02:18 AM  
 CO2 25 01/09/2019 02:18 AM  
 Glucose 148 (H) 01/09/2019 02:18 AM  
 BUN 12 01/09/2019 02:18 AM  
 Creatinine 0.84 01/09/2019 02:18 AM  
 Calcium 8.2 (L) 01/09/2019 02:18 AM  
 Albumin 3.1 (L) 10/06/2011 08:03 PM  
 
Lab Results Component Value Date/Time Hemoglobin A1c 4.9 10/08/2018 03:47 PM  
 
Feliberto Kramer, [address] Pager #[phone] ja [phone]

## 2019-01-09 NOTE — WOUND CARE
WOUND CARE CONSULT for incisional wound VAC placement. Wound VAC alarming blockage, TRAC pad removed, no blood pooling under black granufoam, TRAC pad reapplied at proximal end, wound VAC would not clear opening screen, a new wound VAC was obtained and occlusively sealed at 75 mmhg. Left message for Dr. Chanelle Linder nurse to obtain negative pressure for wound VAC. Patient is A & O x 4, communicative, incontinent, repositions independently. Bed: [place] Diet:  Regular with nutritional supplements Bilateral heels and buttock skin intact without erythema. Sacral skin with small amount of blanchable erythema. 1.  Left knee incision was not assessed, incisional wound VAC in place. Spoke with Dr. Chanelle Linder office, wound VAC setting is 75 mmhg. Patient may go home with incision VAC (TUNG?). Recommendation: 
Maintain wound VAC 
 
VAC (NPWT) Dressing Orders: 
VAC Settings: 75 mmHg continuous/low suction Change canisters when full and measure output q shift. (located  or [facility] supply room). For sudden development of blood or an increased amount of mary jane bleedin. Immediately turn off VAC system. 2.  Leave dressing in place. 3.  Hold pressure over wound. 4.  Call physician. If vacuum fails to maintain seal or unable to manage alarm for clogged tubing for >2hrs:    
 1. Contact Physician 2. Cleanse incision with normal saline. 3.  Cover with dry dressing. 4.  Secure with transparent film. 5.  Change q 8 hours and as needed. 6.  Notify Physician and WOCN that wound VAC dressing needs to be reapplied. If patient is discharged from our facility: 1. Remove all of equipment and sponge. 2.  Place moist dressing. 3.  Put VAC system and power cord in clear bag in utility room. (no red bags) 4. Please call [employer] to  system and stop billing @ [phone] For procedures or when pt is off the floor: Battery backup on our current inpatient systems lasts for 4 hours and may be   used to prevent interruption of treatment- VAC should NOT be turned off. If disconnecting for more than 2 hours, with discharge, or a pt is admitted with a VAC: 
Discontinue the therapy/ begin wound care orders above, return any outpatient equipment to family or transferring facility, and notify the [phone] [id number] Nurse and ordering practitioner. Plan:  Wound care will follow patient while admitted to hospital. 
 
SOCORRO Rose, RN, [facility], [facility]. Office x [address] [facility] Pager x [phone]

## 2019-01-09 NOTE — PROGRESS NOTES
Bedside shift change report given to Spencer De Luna (oncoming nurse) by Gabriel Stephenson (offgoing nurse). Report included the following information SBAR, Kardex, MAR and Recent Results.

## 2019-01-09 NOTE — PROGRESS NOTES
Problem: Falls - Risk of 
Goal: *Absence of Falls Document Angelia Masters Fall Risk and appropriate interventions in the flowsheet. Outcome: Progressing Towards Goal 
Fall Risk Interventions: 
Mobility Interventions: Bed/chair exit alarm Medication Interventions: Bed/chair exit alarm Elimination Interventions: Bed/chair exit alarm

## 2019-01-09 NOTE — PROGRESS NOTES
TRANSFER - OUT REPORT: 
 
Verbal report given to Julio rn(name) on [facility]  being transferred to [facility](unit) for routine progression of care Report consisted of patients Situation, Background, Assessment and  
Recommendations(SBAR). Time Pre op antibiotic given:1810 Anesthesia Stop time: 1904 Petty Present on Transfer to floor:n Order for Petty on Chart:n 
Discharge Prescriptions with Chart:n Information from the following report(s) SBAR, Kardex, OR Summary, Procedure Summary and Intake/Output was reviewed with the receiving nurse. Opportunity for questions and clarification was provided. Is the patient on 02? YES 
     L/Min 2 Other Is the patient on a monitor? NO Is the nurse transporting with the patient?no Surgical Waiting Area notified of patient's transfer from PACU? YES The following personal items collected during your admission accompanied patient upon transfer:  
Dental Appliance: Dental Appliances: None Vision:   
Hearing Aid:   
Jewelry:   
Clothing: Clothing: Other (comment)(clothing bag to [employer]) Other Valuables:   
Valuables sent to safe:

## 2019-01-10 ENCOUNTER — HOME CARE VISIT (OUTPATIENT)
Dept: HOME HEALTH SERVICES | Facility: HOME HEALTH | Age: 49
End: 2019-01-10
Payer: COMMERCIAL

## 2019-01-10 LAB
ANION GAP SERPL CALC-SCNC: 5 MMOL/L (ref 5–15)
BUN SERPL-MCNC: 18 MG/DL (ref 6–20)
BUN/CREAT SERPL: 23 (ref 12–20)
CALCIUM SERPL-MCNC: 7.9 MG/DL (ref 8.5–10.1)
CHLORIDE SERPL-SCNC: 107 MMOL/L (ref 97–108)
CO2 SERPL-SCNC: 29 MMOL/L (ref 21–32)
CREAT SERPL-MCNC: 0.8 MG/DL (ref 0.55–1.02)
GLUCOSE SERPL-MCNC: 85 MG/DL (ref 65–100)
POTASSIUM SERPL-SCNC: 4.2 MMOL/L (ref 3.5–5.1)
SODIUM SERPL-SCNC: 141 MMOL/L (ref 136–145)

## 2019-01-10 PROCEDURE — 80048 BASIC METABOLIC PNL TOTAL CA: CPT

## 2019-01-10 PROCEDURE — 74011250636 HC RX REV CODE- 250/636: Performed by: ORTHOPAEDIC SURGERY

## 2019-01-10 PROCEDURE — 74011250637 HC RX REV CODE- 250/637: Performed by: ORTHOPAEDIC SURGERY

## 2019-01-10 PROCEDURE — 99218 HC RM OBSERVATION: CPT

## 2019-01-10 PROCEDURE — 36415 COLL VENOUS BLD VENIPUNCTURE: CPT

## 2019-01-10 RX ADMIN — DICLOFENAC SODIUM 75 MG: 75 TABLET, DELAYED RELEASE ORAL at 18:01

## 2019-01-10 RX ADMIN — VITAMIN D, TAB 1000IU (100/BT) 1000 UNITS: 25 TAB at 09:27

## 2019-01-10 RX ADMIN — HYDROMORPHONE HYDROCHLORIDE 2 MG: 2 TABLET ORAL at 10:57

## 2019-01-10 RX ADMIN — DICLOFENAC SODIUM 75 MG: 75 TABLET, DELAYED RELEASE ORAL at 09:28

## 2019-01-10 RX ADMIN — MULTIPLE VITAMINS W/ MINERALS TAB 1 TABLET: TAB at 09:28

## 2019-01-10 RX ADMIN — HYDROMORPHONE HYDROCHLORIDE 2 MG: 2 TABLET ORAL at 15:24

## 2019-01-10 RX ADMIN — LEVOTHYROXINE SODIUM 100 MCG: 50 TABLET ORAL at 06:30

## 2019-01-10 RX ADMIN — DIVALPROEX SODIUM 500 MG: 500 TABLET, DELAYED RELEASE ORAL at 22:12

## 2019-01-10 RX ADMIN — OXYCODONE HYDROCHLORIDE 15 MG: 5 TABLET ORAL at 09:28

## 2019-01-10 RX ADMIN — HYDROMORPHONE HYDROCHLORIDE 2 MG: 2 TABLET ORAL at 02:06

## 2019-01-10 RX ADMIN — DIVALPROEX SODIUM 250 MG: 250 TABLET, DELAYED RELEASE ORAL at 09:28

## 2019-01-10 RX ADMIN — Medication 10 ML: at 18:01

## 2019-01-10 RX ADMIN — VANCOMYCIN HYDROCHLORIDE 1250 MG: 10 INJECTION, POWDER, LYOPHILIZED, FOR SOLUTION INTRAVENOUS at 18:01

## 2019-01-10 RX ADMIN — VANCOMYCIN HYDROCHLORIDE 1250 MG: 10 INJECTION, POWDER, LYOPHILIZED, FOR SOLUTION INTRAVENOUS at 04:00

## 2019-01-10 RX ADMIN — TRAZODONE HYDROCHLORIDE 100 MG: 100 TABLET ORAL at 21:32

## 2019-01-10 RX ADMIN — HYDROMORPHONE HYDROCHLORIDE 2 MG: 2 TABLET ORAL at 21:32

## 2019-01-10 RX ADMIN — PANTOPRAZOLE SODIUM 40 MG: 40 TABLET, DELAYED RELEASE ORAL at 06:30

## 2019-01-10 RX ADMIN — HYDROMORPHONE HYDROCHLORIDE 2 MG: 2 TABLET ORAL at 06:29

## 2019-01-10 RX ADMIN — POTASSIUM CHLORIDE 10 MEQ: 750 TABLET, EXTENDED RELEASE ORAL at 09:28

## 2019-01-10 RX ADMIN — ASPIRIN 81 MG: 81 TABLET, COATED ORAL at 18:01

## 2019-01-10 RX ADMIN — OXYCODONE HYDROCHLORIDE 15 MG: 5 TABLET ORAL at 18:28

## 2019-01-10 RX ADMIN — ASPIRIN 81 MG: 81 TABLET, COATED ORAL at 09:28

## 2019-01-10 RX ADMIN — LOSARTAN POTASSIUM 100 MG: 50 TABLET ORAL at 09:27

## 2019-01-10 NOTE — WOUND CARE
Paged to assist with [facility] application for incisional VAC. Completed the form and gave to , [facility]. [facility] called later to say the approval was in question. I called Dr. Du Barbosa to discuss a TUNG placement. He reports this was tried in the past and was not successful.    
I provided the  @ [facility] to [facility], Dr. Carlos Contreras nurse, upon his request for a [id number] to seek [facility] approval.  
Stacey Kennedy, [facility] [place]

## 2019-01-10 NOTE — PROGRESS NOTES
Resting comfortably GEN:  NAD. AOx3 ABD:  S/NT/ND  
LLE:  Vac in place, , 1+ dp/pt pulses, foot perfused Patient Vitals for the past 24 hrs: 
 Temp Pulse Resp BP SpO2  
01/09/19 1458 98 °F (36.7 °C) 81 16 144/80 97 % 01/09/19 0857 97.6 °F (36.4 °C) 87 16 111/66 95 % 01/09/19 0515 98.6 °F (37 °C) 74 16 100/66 92 % 01/09/19 0115 97.8 °F (36.6 °C) 73 15 112/69 93 % 01/09/19 0015 98.4 °F (36.9 °C) 81 16 122/77 92 % 01/08/19 2315 98.3 °F (36.8 °C) 89 16 128/80 93 % 01/08/19 2215 97.9 °F (36.6 °C) 69 18 (!) 172/100 94 % 01/08/19 2155  70 13  99 % 01/08/19 2145  61 18 130/86  Current Facility-Administered Medications Medication Dose Route Frequency  divalproex DR (DEPAKOTE) tablet 250 mg  250 mg Oral DAILY  alteplase (CATHFLO) 1 mg in sterile water (preservative free) 1 mL injection  1 mg InterCATHeter PRN  
 vancomycin (VANCOCIN) 1250 mg in  ml infusion  1,250 mg IntraVENous Q12H  divalproex DR (DEPAKOTE) tablet 500 mg  500 mg Oral QHS  tapentadol Tb12 1 Tab (Patient Supplied)  1 Tab Oral Q12H  
 losartan (COZAAR) tablet 100 mg  100 mg Oral DAILY  multivitamin, tx-iron-ca-min (THERA-M w/ IRON) tablet 1 Tab  1 Tab Oral DAILY  acyclovir (ZOVIRAX) tablet 400 mg  400 mg Oral Q4H PRN  
 hydroCHLOROthiazide (HYDRODIURIL) tablet 25 mg  25 mg Oral DAILY  levothyroxine (SYNTHROID) tablet 100 mcg  100 mcg Oral ACB  traZODone (DESYREL) tablet 100 mg  100 mg Oral QHS  potassium chloride SR (KLOR-CON 10) tablet 10 mEq  10 mEq Oral DAILY  furosemide (LASIX) tablet 20 mg  20 mg Oral DAILY  baclofen (LIORESAL) tablet 10 mg  10 mg Oral BID PRN  
 aspirin delayed-release tablet 81 mg  81 mg Oral BID  acetaminophen (TYLENOL) tablet 500 mg  500 mg Oral Q6H PRN  
 sodium chloride (NS) flush 10-20 mL  10-20 mL IntraVENous BID  diclofenac EC (VOLTAREN) tablet 75 mg  75 mg Oral BID  
 oxyCODONE IR (ROXICODONE) tablet 15 mg  15 mg Oral Q4H PRN  
  cholecalciferol (VITAMIN D3) tablet 1,000 Units  1,000 Units Oral DAILY  HYDROmorphone (DILAUDID) tablet 2 mg  2 mg Oral Q4H PRN  Vancomycin- pharmacy to dose   Other Rx Dosing/Monitoring  pantoprazole (PROTONIX) tablet 40 mg  40 mg Oral ACB  albuterol (PROVENTIL VENTOLIN) nebulizer solution 2.5 mg  2.5 mg Nebulization Q4H PRN Lab Results Component Value Date/Time HGB 9.2 (L) 01/09/2019 10:52 AM  
 INR 1.1 10/08/2018 03:47 PM  
 
 
Lab Results Component Value Date/Time Sodium 140 01/09/2019 10:52 AM  
 Potassium 4.1 01/09/2019 10:52 AM  
 Chloride 108 01/09/2019 10:52 AM  
 CO2 25 01/09/2019 10:52 AM  
 BUN 12 01/09/2019 10:52 AM  
 Creatinine 0.96 01/09/2019 10:52 AM  
 Calcium 7.8 (L) 01/09/2019 10:52 AM  
 
 
 
  
50 y.o. female s/p I and D with placement wound vac left knee on 1/8/2019  . Doing well. Can dc as soon as home vac therapy set up

## 2019-01-10 NOTE — PROGRESS NOTES
Problem: Falls - Risk of 
Goal: *Absence of Falls Document Marshall Vergara Fall Risk and appropriate interventions in the flowsheet. Outcome: Progressing Towards Goal 
Fall Risk Interventions: 
Mobility Interventions: Bed/chair exit alarm Medication Interventions: Bed/chair exit alarm Elimination Interventions: Bed/chair exit alarm History of Falls Interventions: Bed/chair exit alarm Problem: Pain Goal: *Control of Pain Outcome: Progressing Towards Goal 
Pt will rate pain < 3 during shift.

## 2019-01-10 NOTE — PROGRESS NOTES
Problem: Falls - Risk of 
Goal: *Absence of Falls Document Zenaida Branch Fall Risk and appropriate interventions in the flowsheet. Outcome: Progressing Towards Goal 
Fall Risk Interventions: 
Mobility Interventions: Bed/chair exit alarm Medication Interventions: Bed/chair exit alarm Elimination Interventions: Bed/chair exit alarm History of Falls Interventions: Bed/chair exit alarm

## 2019-01-10 NOTE — PROGRESS NOTES
Resting comfortably GEN:  NAD. AOx3 ABD:  S/NT/ND  
LLE:  Vac in place, , 1+ dp/pt pulses, foot perfused Patient Vitals for the past 24 hrs: 
 Temp Pulse Resp BP SpO2  
01/10/19 0854 98.9 °F (37.2 °C) 82 16 (!) 167/108 98 % 01/09/19 2000 97.7 °F (36.5 °C) 69 16 (!) 169/98 97 % 01/09/19 1458 98 °F (36.7 °C) 81 16 144/80 97 % Current Facility-Administered Medications Medication Dose Route Frequency  divalproex DR (DEPAKOTE) tablet 250 mg  250 mg Oral DAILY  alteplase (CATHFLO) 1 mg in sterile water (preservative free) 1 mL injection  1 mg InterCATHeter PRN  
 vancomycin (VANCOCIN) 1250 mg in  ml infusion  1,250 mg IntraVENous Q12H  divalproex DR (DEPAKOTE) tablet 500 mg  500 mg Oral QHS  tapentadol Tb12 1 Tab (Patient Supplied)  1 Tab Oral Q12H  
 losartan (COZAAR) tablet 100 mg  100 mg Oral DAILY  multivitamin, tx-iron-ca-min (THERA-M w/ IRON) tablet 1 Tab  1 Tab Oral DAILY  acyclovir (ZOVIRAX) tablet 400 mg  400 mg Oral Q4H PRN  
 hydroCHLOROthiazide (HYDRODIURIL) tablet 25 mg  25 mg Oral DAILY  levothyroxine (SYNTHROID) tablet 100 mcg  100 mcg Oral ACB  traZODone (DESYREL) tablet 100 mg  100 mg Oral QHS  potassium chloride SR (KLOR-CON 10) tablet 10 mEq  10 mEq Oral DAILY  furosemide (LASIX) tablet 20 mg  20 mg Oral DAILY  baclofen (LIORESAL) tablet 10 mg  10 mg Oral BID PRN  
 aspirin delayed-release tablet 81 mg  81 mg Oral BID  acetaminophen (TYLENOL) tablet 500 mg  500 mg Oral Q6H PRN  
 sodium chloride (NS) flush 10-20 mL  10-20 mL IntraVENous BID  diclofenac EC (VOLTAREN) tablet 75 mg  75 mg Oral BID  
 oxyCODONE IR (ROXICODONE) tablet 15 mg  15 mg Oral Q4H PRN  cholecalciferol (VITAMIN D3) tablet 1,000 Units  1,000 Units Oral DAILY  HYDROmorphone (DILAUDID) tablet 2 mg  2 mg Oral Q4H PRN  Vancomycin- pharmacy to dose   Other Rx Dosing/Monitoring  pantoprazole (PROTONIX) tablet 40 mg  40 mg Oral ACB  albuterol (PROVENTIL VENTOLIN) nebulizer solution 2.5 mg  2.5 mg Nebulization Q4H PRN Lab Results Component Value Date/Time HGB 9.2 (L) 01/09/2019 10:52 AM  
 INR 1.1 10/08/2018 03:47 PM  
 
 
Lab Results Component Value Date/Time Sodium 141 01/10/2019 06:34 AM  
 Potassium 4.2 01/10/2019 06:34 AM  
 Chloride 107 01/10/2019 06:34 AM  
 CO2 29 01/10/2019 06:34 AM  
 BUN 18 01/10/2019 06:34 AM  
 Creatinine 0.80 01/10/2019 06:34 AM  
 Calcium 7.9 (L) 01/10/2019 06:34 AM  
 
 
 
  
50 y.o. female s/p I and D with placement wound vac left knee on 1/8/2019  . Doing well. Can dc as soon as home vac therapy set up

## 2019-01-10 NOTE — ROUTINE PROCESS
Bedside shift change report given to Bennett (oncoming nurse) by Charmayne Angst (offgoing nurse). Report included the following information SBAR, Kardex, MAR and Recent Results.

## 2019-01-10 NOTE — PHYSICIAN ADVISORY
Letter of Status Determination: Current Status OBSERVATION is Appropriate Pt Name:  Marcy eMjia  
MR#  [id number] [facility]#   [id number] Room and Hospital  205/01  @ [facility]  
Hospitalization date  1/8/2019  3:52 PM  
Current Attending Physician  Vicente Rubio MD  
Principal diagnosis  I & D of the knee Clinicals Marcy Mejia is a 50 y.o. female who presents for I and D with vac placement left knee, Pt s/p TKA STABLE [place] MCG criteria STATUS DETERMINATION  On the basis of clinical data, available documentaion, we believe that the current status of this patient as OBSERVATION is Appropriate The final decision of the patient's hospitalization status depends on the attending physician's judgment Additional comments Insurance  Payor: BLUE CROSS / Plan: [address] [place] [address] / Product Type: PPO / Insurance Information [facility] [address] [place] [address] Phone:   
 Subscriber: Alix Holley Subscriber#: [id number] Group#: [id number] Precert#:   
   
 Norman Jurado MEDICARE PART A & B Phone:   
 Subscriber: mEily Stern Subscriber#: [id number] Group#:  Precert#:   
  
 
  
 
 
 
 
Patty Vasquez MD 
Cell: [phone] Physician Advisor

## 2019-01-10 NOTE — PROGRESS NOTES
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 Note, therapy could continue through at least  per recent discharge notes. Current regimen:  1500mg IV [id number] at discharge; however, per patient, she had been adjusted to 1250mg IV [id number]. Current inpatient regimen = 1250mg IV [id number]. Abx regimen:  Vancomycin monotherapy ID Following ?: YES (no note yet for the current inpatient encounter) Concomitant nephrotoxic drugs (requires more frequent monitoring): None Frequency of BMP?: daily through 2019 Recent Labs  
  01/10/19 
0634 19 
1052 19 
0700 WBC  --  6.0  --   
CREA 0.80 0.96 0.84 BUN 18 12 12 Est CrCl: ~80-84 ml/min; UO: No urine output being recorded at this time Temp (24hrs), Av.2 °F (36.8 °C), Min:97.7 °F (36.5 °C), Max:98.9 °F (37.2 °C) Cultures: No current cultures (MRSA positive cultures from last ) Goal trough = 15 - 20 mcg/mL Recent trough history (date/time/level/dose/action taken): Therapeutic on 1500mg IV [id number] during most recent hospitalization.  Random 12 hour level  =21.8mcg/mL Plan: Continue current regimen. Will order a trough to be drawn prior to the 4am dose on 2019

## 2019-01-10 NOTE — PROGRESS NOTES
CM reviewed chart and spoke with Attending, who asked that a home wound vac be ordered. CM faxed completed Wound Vac form to [facility] along with Clinicals. Pt was open to [facility] prior to admission and plans to continue with them when she returns home. CM forwarded home health orders to [facility] and they will continue to see patient at time of discharge. Plan is to return home with her , family will provide transport. CM will continue to follow.  
MITA Aviles, ACM

## 2019-01-11 LAB
ANION GAP SERPL CALC-SCNC: 7 MMOL/L (ref 5–15)
BUN SERPL-MCNC: 15 MG/DL (ref 6–20)
BUN/CREAT SERPL: 21 (ref 12–20)
CALCIUM SERPL-MCNC: 8.1 MG/DL (ref 8.5–10.1)
CHLORIDE SERPL-SCNC: 106 MMOL/L (ref 97–108)
CO2 SERPL-SCNC: 28 MMOL/L (ref 21–32)
CREAT SERPL-MCNC: 0.7 MG/DL (ref 0.55–1.02)
DATE LAST DOSE: ABNORMAL
GLUCOSE SERPL-MCNC: 77 MG/DL (ref 65–100)
POTASSIUM SERPL-SCNC: 4 MMOL/L (ref 3.5–5.1)
REPORTED DOSE,DOSE: ABNORMAL UNITS
REPORTED DOSE/TIME,TMG: ABNORMAL
SODIUM SERPL-SCNC: 141 MMOL/L (ref 136–145)
VANCOMYCIN TROUGH SERPL-MCNC: 18.3 UG/ML (ref 5–10)

## 2019-01-11 PROCEDURE — 80202 ASSAY OF VANCOMYCIN: CPT

## 2019-01-11 PROCEDURE — 36415 COLL VENOUS BLD VENIPUNCTURE: CPT

## 2019-01-11 PROCEDURE — 74011250637 HC RX REV CODE- 250/637: Performed by: ORTHOPAEDIC SURGERY

## 2019-01-11 PROCEDURE — 80048 BASIC METABOLIC PNL TOTAL CA: CPT

## 2019-01-11 PROCEDURE — 99218 HC RM OBSERVATION: CPT

## 2019-01-11 PROCEDURE — 74011250636 HC RX REV CODE- 250/636: Performed by: ORTHOPAEDIC SURGERY

## 2019-01-11 RX ORDER — NYSTATIN 100000 [USP'U]/G
POWDER TOPICAL
Status: DISCONTINUED | OUTPATIENT
Start: 2019-01-11 | End: 2019-01-12 | Stop reason: HOSPADM

## 2019-01-11 RX ADMIN — HYDROMORPHONE HYDROCHLORIDE 2 MG: 2 TABLET ORAL at 17:45

## 2019-01-11 RX ADMIN — VANCOMYCIN HYDROCHLORIDE 1250 MG: 10 INJECTION, POWDER, LYOPHILIZED, FOR SOLUTION INTRAVENOUS at 04:53

## 2019-01-11 RX ADMIN — PANTOPRAZOLE SODIUM 40 MG: 40 TABLET, DELAYED RELEASE ORAL at 07:33

## 2019-01-11 RX ADMIN — HYDROMORPHONE HYDROCHLORIDE 2 MG: 2 TABLET ORAL at 07:33

## 2019-01-11 RX ADMIN — DICLOFENAC SODIUM 75 MG: 75 TABLET, DELAYED RELEASE ORAL at 08:33

## 2019-01-11 RX ADMIN — Medication 10 ML: at 08:34

## 2019-01-11 RX ADMIN — ACETAMINOPHEN 500 MG: 500 TABLET ORAL at 10:12

## 2019-01-11 RX ADMIN — NYSTATIN: 100000 POWDER TOPICAL at 20:01

## 2019-01-11 RX ADMIN — HYDROMORPHONE HYDROCHLORIDE 2 MG: 2 TABLET ORAL at 03:01

## 2019-01-11 RX ADMIN — POTASSIUM CHLORIDE 10 MEQ: 750 TABLET, EXTENDED RELEASE ORAL at 08:34

## 2019-01-11 RX ADMIN — LOSARTAN POTASSIUM 100 MG: 50 TABLET ORAL at 08:33

## 2019-01-11 RX ADMIN — MULTIPLE VITAMINS W/ MINERALS TAB 1 TABLET: TAB at 08:34

## 2019-01-11 RX ADMIN — DIVALPROEX SODIUM 250 MG: 250 TABLET, DELAYED RELEASE ORAL at 08:34

## 2019-01-11 RX ADMIN — Medication 10 ML: at 17:46

## 2019-01-11 RX ADMIN — TRAZODONE HYDROCHLORIDE 100 MG: 100 TABLET ORAL at 22:06

## 2019-01-11 RX ADMIN — LEVOTHYROXINE SODIUM 100 MCG: 50 TABLET ORAL at 07:33

## 2019-01-11 RX ADMIN — ASPIRIN 81 MG: 81 TABLET, COATED ORAL at 08:33

## 2019-01-11 RX ADMIN — HYDROMORPHONE HYDROCHLORIDE 2 MG: 2 TABLET ORAL at 13:31

## 2019-01-11 RX ADMIN — OXYCODONE HYDROCHLORIDE 15 MG: 5 TABLET ORAL at 20:01

## 2019-01-11 RX ADMIN — HYDROMORPHONE HYDROCHLORIDE 2 MG: 2 TABLET ORAL at 22:06

## 2019-01-11 RX ADMIN — VANCOMYCIN HYDROCHLORIDE 1250 MG: 10 INJECTION, POWDER, LYOPHILIZED, FOR SOLUTION INTRAVENOUS at 15:48

## 2019-01-11 RX ADMIN — VITAMIN D, TAB 1000IU (100/BT) 1000 UNITS: 25 TAB at 08:33

## 2019-01-11 RX ADMIN — DICLOFENAC SODIUM 75 MG: 75 TABLET, DELAYED RELEASE ORAL at 17:45

## 2019-01-11 RX ADMIN — DIVALPROEX SODIUM 500 MG: 500 TABLET, DELAYED RELEASE ORAL at 22:06

## 2019-01-11 RX ADMIN — ASPIRIN 81 MG: 81 TABLET, COATED ORAL at 17:45

## 2019-01-11 NOTE — PROGRESS NOTES
Problem: Falls - Risk of 
Goal: *Absence of Falls Document [place] Rank Fall Risk and appropriate interventions in the flowsheet. Outcome: Progressing Towards Goal 
Fall Risk Interventions: 
Mobility Interventions: Bed/chair exit alarm Medication Interventions: Bed/chair exit alarm Elimination Interventions: Bed/chair exit alarm History of Falls Interventions: Bed/chair exit alarm

## 2019-01-11 NOTE — PROGRESS NOTES
Per [employer], [employer] does not require pre-authorization, so in normal situations, they go ahead and release the vac to be delivered, however per [employer] rep, they are not willing to release this vac to patient because they do not believe it meets medical criteria and they do not believe it will be approved for insurance reimbursment. [employer] said the only way they will release the vac is if patient signs a form stating she will be responsible for the full amount if insurance denies it (total cost for vac and all supplies for 1 month is $6,436). Pt is not willing to pay that amount. [employer] said pt's insurance has 7-10 days to approve or deny vac, and they will not release vac until process is complete. CM called Surgeon's office and provided this info to the Nurse, they plan on calling their rep with [employer] to see if there is anything that they can do. Pt would like to speak with surgeon about other options, she is eager to discharge home, and does not want to stay 7-10 days waiting on insurance. [address] is following and will resume home health services at time of discharge. [place] 4:30pm  Received call from nurse as pt expressed frustration regarding status of wound vac from [facility]. Reviewed most recent documentation and appears that [employer] needs further documentation from surgeon if [facility] is needed or if alternative therapy is available, pt would like to talk to surgeon about that as pt doesn't want to be delayed in the hospital, as [employer] reports that may take 7-10 days.   
 
CASSI Aguilar

## 2019-01-11 NOTE — PROGRESS NOTES
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 Note, therapy could continue through at least  per recent discharge notes. Current regimen:  1500mg IV [id number] at discharge; however, per patient, she had been adjusted to 1250mg IV [id number]. Current inpatient regimen = 1250mg IV [id number]. Abx regimen:  Vancomycin monotherapy ID Following ?: YES (no note yet for the current inpatient encounter) Concomitant nephrotoxic drugs (requires more frequent monitoring): None Frequency of BMP?: daily through 2019 Recent Labs  
  19 
0453 01/10/19 
0634 19 
1052 WBC  --   --  6.0  
CREA 0.70 0.80 0.96 BUN 15 18 12 Est CrCl: ~90 ml/min; UO: 0.3-0.7mL/kg/hr Temp (24hrs), Av.6 °F (37 °C), Min:98.2 °F (36.8 °C), Max:99 °F (37.2 °C) Cultures: No current cultures (MRSA positive cultures from last ) Goal trough = 15 - 20 mcg/mL Recent trough history (date/time/level/dose/action taken): Therapeutic on 1500mg IV [id number] during most recent hospitalization.  Random 12 hour level  =21.8mcg/mL (dose restarted at 1250mg IV [id number])  @ 04:53 = 18.3mcg/mL (~11hour level). A true 12 hour trough would be expected to be slightly lower but within the goal trough range. Plan: Continue current regimen as validated overnight.

## 2019-01-11 NOTE — PROGRESS NOTES
Bedside shift change report given to cresencio matthews rn (oncoming nurse) by Urban Sweeney (offgoing nurse). Report included the following information SBAR and Kardex.

## 2019-01-12 VITALS
OXYGEN SATURATION: 95 % | DIASTOLIC BLOOD PRESSURE: 89 MMHG | WEIGHT: 205 LBS | SYSTOLIC BLOOD PRESSURE: 160 MMHG | BODY MASS INDEX: 37.73 KG/M2 | TEMPERATURE: 99 F | RESPIRATION RATE: 16 BRPM | HEART RATE: 100 BPM | HEIGHT: 62 IN

## 2019-01-12 LAB
ANION GAP SERPL CALC-SCNC: 8 MMOL/L (ref 5–15)
BUN SERPL-MCNC: 24 MG/DL (ref 6–20)
BUN/CREAT SERPL: 28 (ref 12–20)
CALCIUM SERPL-MCNC: 7.9 MG/DL (ref 8.5–10.1)
CHLORIDE SERPL-SCNC: 108 MMOL/L (ref 97–108)
CO2 SERPL-SCNC: 26 MMOL/L (ref 21–32)
CREAT SERPL-MCNC: 0.85 MG/DL (ref 0.55–1.02)
GLUCOSE SERPL-MCNC: 92 MG/DL (ref 65–100)
POTASSIUM SERPL-SCNC: 4.3 MMOL/L (ref 3.5–5.1)
SODIUM SERPL-SCNC: 142 MMOL/L (ref 136–145)

## 2019-01-12 PROCEDURE — 36415 COLL VENOUS BLD VENIPUNCTURE: CPT

## 2019-01-12 PROCEDURE — 99218 HC RM OBSERVATION: CPT

## 2019-01-12 PROCEDURE — 80048 BASIC METABOLIC PNL TOTAL CA: CPT

## 2019-01-12 PROCEDURE — 74011250636 HC RX REV CODE- 250/636: Performed by: ORTHOPAEDIC SURGERY

## 2019-01-12 PROCEDURE — 74011250637 HC RX REV CODE- 250/637: Performed by: ORTHOPAEDIC SURGERY

## 2019-01-12 RX ORDER — FUROSEMIDE 20 MG/1
20 TABLET ORAL DAILY PRN
Status: DISCONTINUED | OUTPATIENT
Start: 2019-01-12 | End: 2019-01-12 | Stop reason: HOSPADM

## 2019-01-12 RX ORDER — BACITRACIN 500 UNIT/G
PACKET (EA) TOPICAL
Status: DISCONTINUED
Start: 2019-01-12 | End: 2019-01-12 | Stop reason: HOSPADM

## 2019-01-12 RX ORDER — HYDROCHLOROTHIAZIDE 25 MG/1
25 TABLET ORAL DAILY PRN
Status: DISCONTINUED | OUTPATIENT
Start: 2019-01-12 | End: 2019-01-12 | Stop reason: HOSPADM

## 2019-01-12 RX ADMIN — VANCOMYCIN HYDROCHLORIDE 1250 MG: 10 INJECTION, POWDER, LYOPHILIZED, FOR SOLUTION INTRAVENOUS at 05:50

## 2019-01-12 RX ADMIN — DIVALPROEX SODIUM 250 MG: 250 TABLET, DELAYED RELEASE ORAL at 09:48

## 2019-01-12 RX ADMIN — HYDROMORPHONE HYDROCHLORIDE 2 MG: 2 TABLET ORAL at 07:15

## 2019-01-12 RX ADMIN — POTASSIUM CHLORIDE 10 MEQ: 750 TABLET, EXTENDED RELEASE ORAL at 09:48

## 2019-01-12 RX ADMIN — OXYCODONE HYDROCHLORIDE 15 MG: 5 TABLET ORAL at 09:48

## 2019-01-12 RX ADMIN — PANTOPRAZOLE SODIUM 40 MG: 40 TABLET, DELAYED RELEASE ORAL at 07:15

## 2019-01-12 RX ADMIN — MULTIPLE VITAMINS W/ MINERALS TAB 1 TABLET: TAB at 09:48

## 2019-01-12 RX ADMIN — HYDROMORPHONE HYDROCHLORIDE 2 MG: 2 TABLET ORAL at 14:01

## 2019-01-12 RX ADMIN — OXYCODONE HYDROCHLORIDE 15 MG: 5 TABLET ORAL at 00:12

## 2019-01-12 RX ADMIN — OXYCODONE HYDROCHLORIDE 15 MG: 5 TABLET ORAL at 04:42

## 2019-01-12 RX ADMIN — HYDROMORPHONE HYDROCHLORIDE 2 MG: 2 TABLET ORAL at 03:00

## 2019-01-12 RX ADMIN — ASPIRIN 81 MG: 81 TABLET, COATED ORAL at 09:48

## 2019-01-12 RX ADMIN — VITAMIN D, TAB 1000IU (100/BT) 1000 UNITS: 25 TAB at 09:48

## 2019-01-12 RX ADMIN — LOSARTAN POTASSIUM 100 MG: 50 TABLET ORAL at 09:48

## 2019-01-12 RX ADMIN — DICLOFENAC SODIUM 75 MG: 75 TABLET, DELAYED RELEASE ORAL at 09:48

## 2019-01-12 RX ADMIN — LEVOTHYROXINE SODIUM 100 MCG: 50 TABLET ORAL at 07:15

## 2019-01-12 NOTE — PROGRESS NOTES
Discharge note - Mrs. Salvador Muñiz was seen. She is open to [facility] for IV anitbiotics and wound care. She was offered choice for home health. She wanted to continue to use [employer]. A referral was sent to them. A resume home health order was requested. Signed By: Leah Atwood LCSW January 12, 2019   
'

## 2019-01-12 NOTE — DISCHARGE INSTRUCTIONS
Discharge Instructions Knee Debridement  Dr. Juan Luis Mendoza    Patient Name: Alejandro Hurt  Date of procedure:1/8/2019                                   Procedure:Procedure(s):  INCISION AND DRAINAGE LEFT KNEE WITH WOUND VAC  Surgeon:Surgeon(s) and Role:     * Samia Orellana MD - Primary               PCP: Rogers Cisneros MD  Date of discharge: No discharge date for patient encounter. Follow up appointments   Follow up with Dr. Juan Luis Mendoza in 5 days. Call [phone] extension [phone] [address] Lopez) to make an appointment.  If home health has been arranged for you the agency will contact you to arrange dates/times for visits. Please call them if you do not hear from them within 24 hours after you are discharged. When to call your Orthopaedic Surgeon: Call [phone]. If you need to reach us after 5pm or on a weekend call [phone] and the on call physician will be contacted.  Unrelieved   Signs of infection-if your incision is red; continues to have drainage; drainage has a foul odor or if you have a persistent fever over 101 degrees   Signs of a blood clot in your leg-calf pain, tenderness, redness, swelling of lower leg    When to call your Primary Care Physician:   Concerns about medical conditions such as diabetes, high blood pressure, asthma, congestive heart failure   Call if blood sugars are elevated, persistent headache or dizziness, coughing or congestion, constipation or diarrhea, burning with urination, abnormal heart rate     When to call [id number] go to the nearest emergency room   Sudden onset of chest pain, shortness of breath, difficulty breathing     Activity   Weight bearing as tolerated, use knee immobilizer   Do not drive or operate heavy machinery    Incision Care   You will use TUNG dressing. We are awaiting approval for home wound vac. Pat Anaya         Preventing blood clots    Take aspirin 81 mg twice daily as prescribed for one month following surgery Pain management   Please take scheduled 650 mg tylenol every 6 hours for 4 weeks   Please take scheduled diclofenac 50 mg or celebrex 200 mg twice daily for 4 weeks (whichever was prescribed)   Please take tramadol every 6 hours for pain as needed for pain.  For breakthrough pain not relieved by above medications, please take 5-10 mg oxycodone      While taking aspirin and diclofenac, please take famotidine (PEPCID) 20 mg twice daily as prescribed to prevent stomach ulcers/irritation.  If you have a history of stomach ulcers, do not take diclofenac.  Avoid alcoholic beverages while taking pain medication   Do not take any over-the-counter medication for pain except Tylenol (acetaminophen)   Keep ice wrap in place except when walking. Change gel packs every 4 hours   Lie down and elevate your leg on pillows for about 30 minutes after walking to decrease swelling and pain       Diet   Resume usual diet; drink plenty of fluids; eat foods high in fiber   Please take a stool softener (such as Senokot-S or Colace) to prevent constipation while you are taking oxycodone. If constipation occurs, take a laxative (such as Dulcolax tablets, Milk of Magnesia, or a suppository).

## 2019-01-12 NOTE — PROGRESS NOTES
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 Note, therapy could continue through at least  per recent discharge notes. Current regimen: vanc 1.25g IV [id number] Abx regimen:  Vancomycin monotherapy ID Following ?: NO (was followed by ID last admission and same recs followed currently) Concomitant nephrotoxic drugs (requires more frequent monitoring): None Frequency of BMP?: daily Recent Labs  
  19 
0450 19 
0453 01/10/19 
0983 CREA 0.85 0.70 0.80 BUN 24* 15 18 Est CrCl: ~90 ml/min; UO: 0.3-0.7mL/kg/hr Temp (24hrs), Av.6 °F (37 °C), Min:98.3 °F (36.8 °C), Max:99 °F (37.2 °C) Cultures: No current cultures (MRSA positive cultures from last ) Goal trough = 15 - 20 mcg/mL Recent trough history (date/time/level/dose/action taken): Therapeutic on 1500mg IV [id number] during most recent hospitalization.  Random 12 hour level  =21.8mcg/mL (dose restarted at 1250mg IV [id number])  @ 04:53 = 18.3mcg/mL (~11hour level). A true 12 hour trough would be expected to be slightly lower but within the goal trough range. Plan: Continue current regimen for goal trough range 15-20. Will continue to follow patient and assess levels qweekly or sooner if clinically indicated. Sonali Wyatt, [address]

## 2019-01-12 NOTE — PROGRESS NOTES
Bedside shift change report given to Manuel Hollins (oncoming nurse) by Lee Arevalo (offgoing nurse). Report included the following information SBAR, Kardex and MAR.

## 2019-01-12 NOTE — PROGRESS NOTES
I have reviewed discharge instructions with the patient. The patient verbalized understanding. Current Discharge Medication List  
  
CONTINUE these medications which have NOT CHANGED Details  
diclofenac EC (VOLTAREN) 75 mg EC tablet Take 1 Tab by mouth two (2) times a day. Qty: 60 Tab, Refills: 0  
  
oxyCODONE IR (OXY-IR) 15 mg immediate release tablet Take 1 Tab by mouth every four (4) hours as needed for Pain. Max Daily Amount: 90 mg. 
Qty: 100 Tab, Refills: 0 Associated Diagnoses: Primary osteoarthritis of both knees  
  
acetaminophen (TYLENOL) 500 mg tablet Take 500 mg by mouth every six (6) hours as needed for Pain. diclofenac 10% cyclobenzaprine 2% lidocaine 5% gabapentin 6% cream compound Apply 2 g to affected area four (4) times daily as needed for Pain. tapentadol (NUCYNTA ER) 250 mg Tb12 Take 1 Tab by mouth two (2) times a day. alpha-d-galactosidase (BEANO) 150 unit tab tablet Take 2 Tabs by mouth three (3) times daily as needed for Flatulence. cholecalciferol, VITAMIN D3, (VITAMIN D3) 5,000 unit tab tablet Take 1 Cap by mouth daily. aspirin delayed-release 81 mg tablet Take 1 Tab by mouth two (2) times a day. Qty: 60 Tab, Refills: 0  
  
amphetamine sulfate (EVEKEO) 10 mg tab Take 10 mg by mouth. PT TAKES 2 TABS (20 MG) BID AT 0200 AND 1200  
  
levothyroxine (SYNTHROID) 100 mcg tablet Take 100 mcg by mouth Daily (before breakfast). vortioxetine (TRINTELLIX) 10 mg tablet Take  by mouth daily. PT TAKES A TOTAL OF 30 mg DAILY  
  
traZODone (DESYREL) 100 mg tablet Take 100 mg by mouth nightly. May take 1/2 tab to 1 tab every evening  
  
potassium chloride SR (KLOR-CON 10) 10 mEq tablet Take 10 mEq by mouth daily. furosemide (LASIX) 20 mg tablet Take 20 mg by mouth daily. MAY TAKE ONLY IF NEEDED FOR SEVERE ANKLE SWELLING  
  
baclofen (LIORESAL) 10 mg tablet Take 10 mg by mouth two (2) times daily as needed for Pain. divalproex DR (DEPAKOTE) 250 mg tablet Take 1 tablet by mouth in  the morning and 2 tablets  by mouth in the evening 
Qty: 180 Tab, Refills: 10  
  
losartan (COZAAR) 50 mg tablet Take 100 mg by mouth daily. simethicone (GAS-X) 80 mg chewable tablet Take 80 mg by mouth every six (6) hours as needed for Flatulence. lansoprazole (PREVACID) 15 mg disintegrating tablet Take 1 Tab by mouth Daily (before breakfast). MULTIVIT WITH CALCIUM,IRON,MIN (ONE-A-DAY WOMENS FORMULA PO) Take 1 Tab by mouth daily. cetirizine (ZYRTEC) 10 mg tablet Take 1 Tab by mouth daily. heparin, porcine, pf (HEPARIN LOCKFLUSH,PORCINE,,PF,) 10 unit/mL injection 5 mL by IntraVENous route two (2) times a day. Flush PICC Line using SASH method.   
  
sodium chloride (NORMAL SALINE FLUSH) 10-20 mL by IntraVENous route two (2) times a day. Flush PICC using SASH method. mupirocin calcium (BACTROBAN) 2 % topical cream Apply 1 Each to affected area two (2) times a day. Lisdexamfetamine (VYVANSE) 50 mg capsule Take 50 mg by mouth two (2) times a day. Takes in the morning and at noon daily  
  
hydrochlorothiazide (HYDRODIURIL) 25 mg tablet Take 25 mg by mouth daily. MAY TAKE 1 OR 2 TABS AS NEEDED FOR ANKLE SWELLING  
  
albuterol (PROAIR HFA) 90 mcg/actuation inhaler Take 1 Puff by inhalation every four (4) hours as needed for Wheezing. benzoyl peroxide (DUAL ACTION) 3.5 % Clsr 1 Each by Apply Externally route daily. acyclovir (ZOVIRAX) 400 mg tablet Take 400 mg by mouth every four (4) hours as needed for Other (fever blisters). dena Bagley/Dr Moss, pt to continue IV vanc at home and leave with picc line

## 2019-01-13 ENCOUNTER — HOME CARE VISIT (OUTPATIENT)
Dept: HOME HEALTH SERVICES | Facility: HOME HEALTH | Age: 49
End: 2019-01-13
Payer: COMMERCIAL

## 2019-01-14 ENCOUNTER — HOME CARE VISIT (OUTPATIENT)
Dept: HOME HEALTH SERVICES | Facility: HOME HEALTH | Age: 49
End: 2019-01-14
Payer: COMMERCIAL

## 2019-01-15 ENCOUNTER — HOME CARE VISIT (OUTPATIENT)
Dept: SCHEDULING | Facility: HOME HEALTH | Age: 49
End: 2019-01-15
Payer: COMMERCIAL

## 2019-01-15 VITALS
DIASTOLIC BLOOD PRESSURE: 88 MMHG | OXYGEN SATURATION: 97 % | HEART RATE: 77 BPM | RESPIRATION RATE: 20 BRPM | TEMPERATURE: 99 F | SYSTOLIC BLOOD PRESSURE: 150 MMHG

## 2019-01-15 LAB
GLUCOSE BLD STRIP.AUTO-MCNC: NORMAL MG/DL (ref 65–100)
SERVICE CMNT-IMP: NORMAL

## 2019-01-15 PROCEDURE — G0299 HHS/HOSPICE OF RN EA 15 MIN: HCPCS

## 2019-01-17 ENCOUNTER — HOME CARE VISIT (OUTPATIENT)
Dept: SCHEDULING | Facility: HOME HEALTH | Age: 49
End: 2019-01-17
Payer: COMMERCIAL

## 2019-01-17 PROCEDURE — G0300 HHS/HOSPICE OF LPN EA 15 MIN: HCPCS

## 2019-01-21 ENCOUNTER — HOME CARE VISIT (OUTPATIENT)
Dept: SCHEDULING | Facility: HOME HEALTH | Age: 49
End: 2019-01-21
Payer: COMMERCIAL

## 2019-01-21 VITALS
DIASTOLIC BLOOD PRESSURE: 68 MMHG | OXYGEN SATURATION: 97 % | SYSTOLIC BLOOD PRESSURE: 134 MMHG | HEART RATE: 66 BPM | RESPIRATION RATE: 17 BRPM | TEMPERATURE: 98.4 F

## 2019-01-21 VITALS
SYSTOLIC BLOOD PRESSURE: 132 MMHG | HEART RATE: 78 BPM | OXYGEN SATURATION: 98 % | TEMPERATURE: 97.8 F | DIASTOLIC BLOOD PRESSURE: 68 MMHG | RESPIRATION RATE: 17 BRPM

## 2019-01-21 PROCEDURE — G0300 HHS/HOSPICE OF LPN EA 15 MIN: HCPCS

## 2019-01-24 ENCOUNTER — HOME CARE VISIT (OUTPATIENT)
Dept: SCHEDULING | Facility: HOME HEALTH | Age: 49
End: 2019-01-24
Payer: COMMERCIAL

## 2019-01-24 PROCEDURE — G0300 HHS/HOSPICE OF LPN EA 15 MIN: HCPCS

## 2019-01-26 ENCOUNTER — HOSPITAL ENCOUNTER (INPATIENT)
Age: 49
LOS: 6 days | Discharge: HOME HEALTH | DRG: 300 | End: 2019-02-01
Attending: EMERGENCY MEDICINE | Admitting: INTERNAL MEDICINE
Payer: COMMERCIAL

## 2019-01-26 DIAGNOSIS — I82.621 ACUTE DEEP VEIN THROMBOSIS (DVT) OF RIGHT UPPER EXTREMITY, UNSPECIFIED VEIN (HCC): Primary | ICD-10-CM

## 2019-01-26 PROBLEM — I82.409 DVT (DEEP VENOUS THROMBOSIS) (HCC): Status: ACTIVE | Noted: 2019-01-26

## 2019-01-26 LAB
ALBUMIN SERPL-MCNC: 3.8 G/DL (ref 3.5–5)
ALBUMIN/GLOB SERPL: 0.9 {RATIO} (ref 1.1–2.2)
ALP SERPL-CCNC: 125 U/L (ref 45–117)
ALT SERPL-CCNC: 23 U/L (ref 12–78)
ANION GAP SERPL CALC-SCNC: 7 MMOL/L (ref 5–15)
APTT PPP: 31.3 SEC (ref 22.1–32)
AST SERPL-CCNC: 32 U/L (ref 15–37)
BASOPHILS # BLD: 0.1 K/UL (ref 0–0.1)
BASOPHILS NFR BLD: 1 % (ref 0–1)
BILIRUB SERPL-MCNC: 0.3 MG/DL (ref 0.2–1)
BUN SERPL-MCNC: 28 MG/DL (ref 6–20)
BUN/CREAT SERPL: 28 (ref 12–20)
CALCIUM SERPL-MCNC: 8.8 MG/DL (ref 8.5–10.1)
CHLORIDE SERPL-SCNC: 100 MMOL/L (ref 97–108)
CO2 SERPL-SCNC: 27 MMOL/L (ref 21–32)
CREAT SERPL-MCNC: 1 MG/DL (ref 0.55–1.02)
DIFFERENTIAL METHOD BLD: ABNORMAL
EOSINOPHIL # BLD: 0.7 K/UL (ref 0–0.4)
EOSINOPHIL NFR BLD: 8 % (ref 0–7)
ERYTHROCYTE [DISTWIDTH] IN BLOOD BY AUTOMATED COUNT: 15.5 % (ref 11.5–14.5)
GLOBULIN SER CALC-MCNC: 4.4 G/DL (ref 2–4)
GLUCOSE SERPL-MCNC: 76 MG/DL (ref 65–100)
HCT VFR BLD AUTO: 36.7 % (ref 35–47)
HGB BLD-MCNC: 11.3 G/DL (ref 11.5–16)
IMM GRANULOCYTES # BLD AUTO: 0 K/UL (ref 0–0.04)
IMM GRANULOCYTES NFR BLD AUTO: 0 % (ref 0–0.5)
INR PPP: 1.1 (ref 0.9–1.1)
LYMPHOCYTES # BLD: 2.9 K/UL (ref 0.8–3.5)
LYMPHOCYTES NFR BLD: 34 % (ref 12–49)
MCH RBC QN AUTO: 25.9 PG (ref 26–34)
MCHC RBC AUTO-ENTMCNC: 30.8 G/DL (ref 30–36.5)
MCV RBC AUTO: 84.2 FL (ref 80–99)
MONOCYTES # BLD: 0.8 K/UL (ref 0–1)
MONOCYTES NFR BLD: 9 % (ref 5–13)
NEUTS SEG # BLD: 4.1 K/UL (ref 1.8–8)
NEUTS SEG NFR BLD: 48 % (ref 32–75)
NRBC # BLD: 0 K/UL (ref 0–0.01)
NRBC BLD-RTO: 0 PER 100 WBC
PLATELET # BLD AUTO: 315 K/UL (ref 150–400)
PMV BLD AUTO: 10.3 FL (ref 8.9–12.9)
POTASSIUM SERPL-SCNC: 3.8 MMOL/L (ref 3.5–5.1)
PROT SERPL-MCNC: 8.2 G/DL (ref 6.4–8.2)
PROTHROMBIN TIME: 10.9 SEC (ref 9–11.1)
RBC # BLD AUTO: 4.36 M/UL (ref 3.8–5.2)
SODIUM SERPL-SCNC: 134 MMOL/L (ref 136–145)
THERAPEUTIC RANGE,PTTT: NORMAL SECS (ref 58–77)
WBC # BLD AUTO: 8.5 K/UL (ref 3.6–11)

## 2019-01-26 PROCEDURE — 85730 THROMBOPLASTIN TIME PARTIAL: CPT

## 2019-01-26 PROCEDURE — 80053 COMPREHEN METABOLIC PANEL: CPT

## 2019-01-26 PROCEDURE — 74011250636 HC RX REV CODE- 250/636: Performed by: INTERNAL MEDICINE

## 2019-01-26 PROCEDURE — 85610 PROTHROMBIN TIME: CPT

## 2019-01-26 PROCEDURE — 65660000000 HC RM CCU STEPDOWN

## 2019-01-26 PROCEDURE — 93971 EXTREMITY STUDY: CPT

## 2019-01-26 PROCEDURE — 74011250637 HC RX REV CODE- 250/637: Performed by: INTERNAL MEDICINE

## 2019-01-26 PROCEDURE — 85025 COMPLETE CBC W/AUTO DIFF WBC: CPT

## 2019-01-26 PROCEDURE — 99283 EMERGENCY DEPT VISIT LOW MDM: CPT

## 2019-01-26 PROCEDURE — 36415 COLL VENOUS BLD VENIPUNCTURE: CPT

## 2019-01-26 RX ORDER — SIMETHICONE 80 MG
80 TABLET,CHEWABLE ORAL
Status: DISCONTINUED | OUTPATIENT
Start: 2019-01-26 | End: 2019-02-01 | Stop reason: HOSPADM

## 2019-01-26 RX ORDER — SODIUM CHLORIDE 0.9 % (FLUSH) 0.9 %
5-40 SYRINGE (ML) INJECTION AS NEEDED
Status: DISCONTINUED | OUTPATIENT
Start: 2019-01-26 | End: 2019-02-01 | Stop reason: HOSPADM

## 2019-01-26 RX ORDER — BACLOFEN 10 MG/1
10 TABLET ORAL
Status: DISCONTINUED | OUTPATIENT
Start: 2019-01-26 | End: 2019-02-01 | Stop reason: HOSPADM

## 2019-01-26 RX ORDER — ACETAMINOPHEN 500 MG
500 TABLET ORAL
Status: DISCONTINUED | OUTPATIENT
Start: 2019-01-26 | End: 2019-01-30

## 2019-01-26 RX ORDER — CETIRIZINE HCL 10 MG
10 TABLET ORAL DAILY
Status: DISCONTINUED | OUTPATIENT
Start: 2019-01-27 | End: 2019-02-01 | Stop reason: HOSPADM

## 2019-01-26 RX ORDER — LOSARTAN POTASSIUM 100 MG/1
100 TABLET ORAL DAILY
Status: DISCONTINUED | OUTPATIENT
Start: 2019-01-27 | End: 2019-02-01 | Stop reason: HOSPADM

## 2019-01-26 RX ORDER — ENOXAPARIN SODIUM 100 MG/ML
80 INJECTION SUBCUTANEOUS EVERY 12 HOURS
Status: DISCONTINUED | OUTPATIENT
Start: 2019-01-26 | End: 2019-02-01 | Stop reason: HOSPADM

## 2019-01-26 RX ORDER — PANTOPRAZOLE SODIUM 40 MG/1
40 TABLET, DELAYED RELEASE ORAL
Status: DISCONTINUED | OUTPATIENT
Start: 2019-01-27 | End: 2019-02-01 | Stop reason: HOSPADM

## 2019-01-26 RX ORDER — HYDRALAZINE HYDROCHLORIDE 20 MG/ML
10 INJECTION INTRAMUSCULAR; INTRAVENOUS
Status: DISCONTINUED | OUTPATIENT
Start: 2019-01-26 | End: 2019-02-01 | Stop reason: HOSPADM

## 2019-01-26 RX ORDER — HEPARIN SODIUM 5000 [USP'U]/ML
80 INJECTION, SOLUTION INTRAVENOUS; SUBCUTANEOUS ONCE
Status: DISCONTINUED | OUTPATIENT
Start: 2019-01-26 | End: 2019-01-26

## 2019-01-26 RX ORDER — OXYCODONE HYDROCHLORIDE 5 MG/1
15 TABLET ORAL EVERY 4 HOURS
Status: DISCONTINUED | OUTPATIENT
Start: 2019-01-27 | End: 2019-02-01 | Stop reason: HOSPADM

## 2019-01-26 RX ORDER — HEPARIN SODIUM 5000 [USP'U]/ML
40 INJECTION, SOLUTION INTRAVENOUS; SUBCUTANEOUS AS NEEDED
Status: DISCONTINUED | OUTPATIENT
Start: 2019-01-27 | End: 2019-01-26

## 2019-01-26 RX ORDER — DIVALPROEX SODIUM 250 MG/1
250 TABLET, DELAYED RELEASE ORAL DAILY
Status: DISCONTINUED | OUTPATIENT
Start: 2019-01-27 | End: 2019-02-01 | Stop reason: HOSPADM

## 2019-01-26 RX ORDER — HEPARIN SODIUM 5000 [USP'U]/ML
80 INJECTION, SOLUTION INTRAVENOUS; SUBCUTANEOUS AS NEEDED
Status: DISCONTINUED | OUTPATIENT
Start: 2019-01-27 | End: 2019-01-26

## 2019-01-26 RX ORDER — MELATONIN
1000 DAILY
Status: DISCONTINUED | OUTPATIENT
Start: 2019-01-27 | End: 2019-02-01 | Stop reason: HOSPADM

## 2019-01-26 RX ORDER — SODIUM CHLORIDE 0.9 % (FLUSH) 0.9 %
5-40 SYRINGE (ML) INJECTION EVERY 8 HOURS
Status: DISCONTINUED | OUTPATIENT
Start: 2019-01-26 | End: 2019-02-01 | Stop reason: HOSPADM

## 2019-01-26 RX ORDER — LEVOTHYROXINE SODIUM 100 UG/1
100 TABLET ORAL
Status: DISCONTINUED | OUTPATIENT
Start: 2019-01-27 | End: 2019-02-01 | Stop reason: HOSPADM

## 2019-01-26 RX ORDER — DIVALPROEX SODIUM 500 MG/1
500 TABLET, DELAYED RELEASE ORAL
Status: DISCONTINUED | OUTPATIENT
Start: 2019-01-26 | End: 2019-02-01 | Stop reason: HOSPADM

## 2019-01-26 RX ORDER — NALOXONE HYDROCHLORIDE 0.4 MG/ML
0.4 INJECTION, SOLUTION INTRAMUSCULAR; INTRAVENOUS; SUBCUTANEOUS AS NEEDED
Status: DISCONTINUED | OUTPATIENT
Start: 2019-01-26 | End: 2019-02-01 | Stop reason: HOSPADM

## 2019-01-26 RX ORDER — HEPARIN SODIUM 10000 [USP'U]/100ML
18-36 INJECTION, SOLUTION INTRAVENOUS
Status: DISCONTINUED | OUTPATIENT
Start: 2019-01-26 | End: 2019-01-26

## 2019-01-26 RX ORDER — ONDANSETRON 2 MG/ML
4 INJECTION INTRAMUSCULAR; INTRAVENOUS
Status: DISCONTINUED | OUTPATIENT
Start: 2019-01-26 | End: 2019-02-01 | Stop reason: HOSPADM

## 2019-01-26 RX ORDER — TRAZODONE HYDROCHLORIDE 100 MG/1
100 TABLET ORAL
Status: DISCONTINUED | OUTPATIENT
Start: 2019-01-26 | End: 2019-02-01 | Stop reason: HOSPADM

## 2019-01-26 RX ADMIN — ENOXAPARIN SODIUM 80 MG: 80 INJECTION, SOLUTION INTRAVENOUS; SUBCUTANEOUS at 22:14

## 2019-01-26 RX ADMIN — Medication 10 ML: at 23:35

## 2019-01-26 RX ADMIN — TRAZODONE HYDROCHLORIDE 100 MG: 100 TABLET ORAL at 23:34

## 2019-01-26 RX ADMIN — DIVALPROEX SODIUM 500 MG: 500 TABLET, DELAYED RELEASE ORAL at 23:34

## 2019-01-26 RX ADMIN — OXYCODONE HYDROCHLORIDE 15 MG: 5 TABLET ORAL at 23:34

## 2019-01-26 NOTE — ED NOTES
Patient presents with complaint of right arm swelling. Patient has PICC line on that side. Patient stated she has had the PICC since Nov for tx for MRSA after a knee replacement surgery. Patient stated she noticed the swelling this morning. She stated that arm feels numb and heavy. Patient stated that she does not have blood return in the PICC, and that her [facility] nurse noticed that on this past Thursday when she attempted to draw blood. Patient stated that she gave herself heparin flushes at home. Patient denied pain. Patient is alert and responding appropriately,  at bedside.

## 2019-01-26 NOTE — ED PROVIDER NOTES
EMERGENCY DEPARTMENT HISTORY AND PHYSICAL EXAM 
 
 
Date: 2019 Patient Name: Shannon Valladares History of Presenting Illness Chief Complaint Patient presents with  
 Skin Problem  
  pt has picc line in rt upper arm, now swollen and tight. not red, warm or draining. History Provided By: Patient HPI: Shannon Valladares, 50 y.o. female with PMHx significant for HTN, MRSA carrier, seizures, chronic pain, GERD, hypothyroidism, presents via walker to the ED with cc of RUE swelling onset today. Pt reports she had her PICC line placed into her R upper arm in 2018. She reports that she had a L TKR that had MRSA complications. Her infectious disease doctor, Dr. Bharat Da Silva, placed pt on Vancomycin BID. She reports still being on abx, and has an appt with Dr. Bharat Da Silva next week for PICC removal. She reports that for the past 2 days, she has been unable to draw from her PICC line; she reports that PICC line still flushes. She reports being on Heparin flushes, but no other blood thinners. She endorses RUE heaviness and numbness secondary to swelling. She denies n/v or SOB. There are no other complaints, changes, or physical findings at this time. PCP: Oneil Sarabia MD 
 
Social Hx:  
Social History Tobacco Use Smoking Status Former Smoker  Types: Cigarettes  Last attempt to quit: 1994  Years since quittin.3 Smokeless Tobacco Never Used  
,  
Social History Substance and Sexual Activity Alcohol Use No  
,  
Social History Substance and Sexual Activity Drug Use No  
 
 
Past History Past Surgical History: 
Past Surgical History:  
Procedure Laterality Date  DELIVERY     
 HX ABDOMINOPLASTY   [address] [facility] [address] [place] [address]  HX  SECTION  2006  HX GASTRIC BYPASS    
  [address] [facility] CHOLEYCYSTECTOMY  HX ORTHOPAEDIC Right CARPEL TUNNEL  
 HX ORTHOPAEDIC Left CARPEL TUNNEL  HX ORTHOPAEDIC Left ULNA SHORTENING  
 HX ORTHOPAEDIC Right 2011 [address] [facility] TONSILLECTOMY  14[address] Family History: 
Family History Problem Relation Age of Onset  Heart Disease Mother  Heart Attack Mother  Hypertension Mother [facility] Arthritis-osteo Mother  Rashes/Skin Problems Mother PLAQUE PSORIASIS  
 Heart Disease Father  Thyroid Disease Father  Diabetes Father  Hypertension Father  Diabetes Maternal Grandmother  Heart Attack Maternal Grandmother  Hypertension Maternal Grandmother  Arthritis-osteo Maternal Grandmother  Hypertension Maternal Grandfather  Stroke Maternal Grandfather  High Cholesterol Sister  Anesth Problems Neg Hx Allergies: Allergies Allergen Reactions  Sulfa (Sulfonamide Antibiotics) Hives  Pcn [Penicillins] Unproven on Challenge Review of Systems Review of Systems Constitutional: Negative for fatigue and fever. HENT: Negative. Eyes: Negative. Respiratory: Negative for shortness of breath and wheezing. Cardiovascular: Negative for chest pain and leg swelling. Gastrointestinal: Negative for blood in stool, constipation, diarrhea, nausea and vomiting. Endocrine: Negative. Genitourinary: Negative for difficulty urinating and dysuria. Musculoskeletal:  
     +RUE swelling Skin: Negative for rash. Allergic/Immunologic: Negative. Neurological: Positive for numbness (RUE). Negative for weakness. Hematological: Negative. Psychiatric/Behavioral: Negative. Physical Exam  
Physical Exam  
Constitutional: She is oriented to person, place, and time. She appears well-developed and well-nourished. HENT:  
Head: Normocephalic and atraumatic. Mouth/Throat: Mucous membranes are normal.  
Eyes: EOM are normal. Pupils are equal, round, and reactive to light. Neck: Normal range of motion. No JVD present. No tracheal deviation present. Cardiovascular: Normal rate, regular rhythm, normal heart sounds and intact distal pulses. Exam reveals no gallop and no friction rub. No murmur heard. Pulses: 
     Radial pulses are 2+ on the right side, and 2+ on the left side. Pulmonary/Chest: Effort normal and breath sounds normal. No stridor. She has no wheezes. She has no rales. Abdominal: Soft. Bowel sounds are normal. She exhibits no distension and no mass. There is no tenderness. There is no guarding. Musculoskeletal: Normal range of motion. She exhibits no edema or tenderness. Diffuse swelling of distal R shoulder with no erythema, induration, or tenderness. Components soft. PICC line in place in R upper arm. Neurological: She is alert and oriented to person, place, and time. Sensations intact. Skin: Skin is warm and dry. No rash noted. Psychiatric: She has a normal mood and affect. Her behavior is normal. Judgment and thought content normal.  
 
 
Diagnostic Study Results Labs - No results found for this or any previous visit (from the past 12 hour(s)). Radiologic Studies -  
DUPLEX UPPER EXT VENOUS RIGHT    (Results Pending) CT Results  (Last 48 hours) None CXR Results  (Last 48 hours) None Medical Decision Making I am the first provider for this patient. I reviewed the vital signs, available nursing notes, past medical history, past surgical history, family history and social history. Vital Signs-Reviewed the patient's vital signs. Patient Vitals for the past 12 hrs: 
 Temp Pulse Resp BP SpO2  
01/26/19 1618 98.3 °F (36.8 °C) (!) 118 18 (!) 178/111 98 % Pulse Oximetry Analysis - 98% on RA Records Reviewed: Nursing Notes, Old Medical Records, Previous Radiology Studies and Previous Laboratory Studies Provider Notes (Medical Decision Making): DDx: DVT, cellulitis, lymphedema. ED Course:  
Initial assessment performed.  The patients presenting problems have been discussed, and they are in agreement with the care plan formulated and outlined with them. I have encouraged them to ask questions as they arise throughout their visit. Progress Notes: 
6:55 PM 
Waiting for US results; [address] is at another hospital right now, so there is a delay. 8:32 PM 
Updated pt on imaging results and plan of care. CONSULT NOTE:  
8:32 PM 
Celia Montana DO spoke with Dr. Jayesh Gould, Specialty: Hospitalist 
Discussed pt's hx, disposition, and available diagnostic and imaging results. Reviewed care plans. Consultant will evaluate pt for admission. Written by GINI Rea, as dictated by Celia Montana DO. Critical Care Time: CRITICAL CARE NOTE : 
 
8:32 PM 
 
IMPENDING DETERIORATION -Cardiovascular ASSOCIATED RISK FACTORS - Vascular Compromise MANAGEMENT- Bedside Assessment and Supervision of Care INTERPRETATION -  Screening Ultrasound INTERVENTIONS - hemodynamic mngmt and vascular control CASE REVIEW - Hospitalist and Family TREATMENT RESPONSE -Unchanged PERFORMED BY - Self NOTES   : 
 
I have spent 30 minutes of critical care time involved in lab review, consultations with specialist, family decision- making, bedside attention and documentation. During this entire length of time I was immediately available to the patient . Celia Montana DO Disposition: PLAN FOR ADMISSION: 
8:32 PM 
The patient is being admitted to the hospital. The results of their tests and reasons for their admission have been discussed with the patient and/or available family. The patient/family has conveyed agreement and understanding for the need to be admitted and for their admission diagnosis. Consultation has been made with the inpatient physician specialist for hospitalization. Written by GINI Rea, as dictated by Celia Montana DO Diagnosis Clinical Impression: 1. Acute deep vein thrombosis (DVT) of right upper extremity, unspecified vein (HCC) Attestations: This note is prepared by Sher Pineda, acting as scribe for DO Melanie Valadez DO: The scribe's documentation has been prepared under my direction and personally reviewed by me in its entirety. I confirm that the note above accurately reflects all work, treatment, procedures, and medical decision making performed by me.

## 2019-01-27 ENCOUNTER — APPOINTMENT (OUTPATIENT)
Dept: ULTRASOUND IMAGING | Age: 49
DRG: 300 | End: 2019-01-27
Attending: INTERNAL MEDICINE
Payer: COMMERCIAL

## 2019-01-27 LAB
ANION GAP SERPL CALC-SCNC: 9 MMOL/L (ref 5–15)
BASOPHILS # BLD: 0.1 K/UL (ref 0–0.1)
BASOPHILS NFR BLD: 1 % (ref 0–1)
BUN SERPL-MCNC: 26 MG/DL (ref 6–20)
BUN/CREAT SERPL: 27 (ref 12–20)
CALCIUM SERPL-MCNC: 8.2 MG/DL (ref 8.5–10.1)
CHLORIDE SERPL-SCNC: 102 MMOL/L (ref 97–108)
CO2 SERPL-SCNC: 25 MMOL/L (ref 21–32)
CREAT SERPL-MCNC: 0.98 MG/DL (ref 0.55–1.02)
DATE LAST DOSE: ABNORMAL
DIFFERENTIAL METHOD BLD: ABNORMAL
EOSINOPHIL # BLD: 0.9 K/UL (ref 0–0.4)
EOSINOPHIL NFR BLD: 10 % (ref 0–7)
ERYTHROCYTE [DISTWIDTH] IN BLOOD BY AUTOMATED COUNT: 15.2 % (ref 11.5–14.5)
GLUCOSE SERPL-MCNC: 176 MG/DL (ref 65–100)
HCT VFR BLD AUTO: 33 % (ref 35–47)
HGB BLD-MCNC: 10.3 G/DL (ref 11.5–16)
IMM GRANULOCYTES # BLD AUTO: 0 K/UL (ref 0–0.04)
IMM GRANULOCYTES NFR BLD AUTO: 0 % (ref 0–0.5)
LYMPHOCYTES # BLD: 2.8 K/UL (ref 0.8–3.5)
LYMPHOCYTES NFR BLD: 33 % (ref 12–49)
MCH RBC QN AUTO: 26.1 PG (ref 26–34)
MCHC RBC AUTO-ENTMCNC: 31.2 G/DL (ref 30–36.5)
MCV RBC AUTO: 83.5 FL (ref 80–99)
MONOCYTES # BLD: 0.7 K/UL (ref 0–1)
MONOCYTES NFR BLD: 8 % (ref 5–13)
NEUTS SEG # BLD: 4 K/UL (ref 1.8–8)
NEUTS SEG NFR BLD: 47 % (ref 32–75)
NRBC # BLD: 0 K/UL (ref 0–0.01)
NRBC BLD-RTO: 0 PER 100 WBC
PLATELET # BLD AUTO: 256 K/UL (ref 150–400)
PMV BLD AUTO: 10.2 FL (ref 8.9–12.9)
POTASSIUM SERPL-SCNC: 4.1 MMOL/L (ref 3.5–5.1)
RBC # BLD AUTO: 3.95 M/UL (ref 3.8–5.2)
REPORTED DOSE,DOSE: ABNORMAL UNITS
REPORTED DOSE/TIME,TMG: ABNORMAL
SODIUM SERPL-SCNC: 136 MMOL/L (ref 136–145)
VANCOMYCIN TROUGH SERPL-MCNC: 19.6 UG/ML (ref 5–10)
WBC # BLD AUTO: 8.4 K/UL (ref 3.6–11)

## 2019-01-27 PROCEDURE — 74011250637 HC RX REV CODE- 250/637: Performed by: HOSPITALIST

## 2019-01-27 PROCEDURE — 74011250636 HC RX REV CODE- 250/636: Performed by: HOSPITALIST

## 2019-01-27 PROCEDURE — 93970 EXTREMITY STUDY: CPT

## 2019-01-27 PROCEDURE — 65660000000 HC RM CCU STEPDOWN

## 2019-01-27 PROCEDURE — 36415 COLL VENOUS BLD VENIPUNCTURE: CPT

## 2019-01-27 PROCEDURE — 80048 BASIC METABOLIC PNL TOTAL CA: CPT

## 2019-01-27 PROCEDURE — 74011250637 HC RX REV CODE- 250/637: Performed by: INTERNAL MEDICINE

## 2019-01-27 PROCEDURE — 85025 COMPLETE CBC W/AUTO DIFF WBC: CPT

## 2019-01-27 PROCEDURE — 80202 ASSAY OF VANCOMYCIN: CPT

## 2019-01-27 PROCEDURE — 74011250636 HC RX REV CODE- 250/636: Performed by: INTERNAL MEDICINE

## 2019-01-27 RX ORDER — CALCIUM CARBONATE 200(500)MG
200 TABLET,CHEWABLE ORAL
Status: DISCONTINUED | OUTPATIENT
Start: 2019-01-28 | End: 2019-01-27

## 2019-01-27 RX ORDER — CALCIUM CARBONATE 200(500)MG
200 TABLET,CHEWABLE ORAL
Status: DISCONTINUED | OUTPATIENT
Start: 2019-01-27 | End: 2019-02-01 | Stop reason: HOSPADM

## 2019-01-27 RX ADMIN — CALCIUM CARBONATE (ANTACID) CHEW TAB 500 MG 200 MG: 500 CHEW TAB at 23:17

## 2019-01-27 RX ADMIN — OXYCODONE HYDROCHLORIDE 15 MG: 5 TABLET ORAL at 23:17

## 2019-01-27 RX ADMIN — DIVALPROEX SODIUM 250 MG: 250 TABLET, DELAYED RELEASE ORAL at 08:40

## 2019-01-27 RX ADMIN — PANTOPRAZOLE SODIUM 40 MG: 40 TABLET, DELAYED RELEASE ORAL at 08:40

## 2019-01-27 RX ADMIN — Medication 10 ML: at 06:17

## 2019-01-27 RX ADMIN — OXYCODONE HYDROCHLORIDE 15 MG: 5 TABLET ORAL at 08:40

## 2019-01-27 RX ADMIN — OXYCODONE HYDROCHLORIDE 15 MG: 5 TABLET ORAL at 16:28

## 2019-01-27 RX ADMIN — VANCOMYCIN HYDROCHLORIDE 1000 MG: 1 INJECTION, POWDER, LYOPHILIZED, FOR SOLUTION INTRAVENOUS at 22:10

## 2019-01-27 RX ADMIN — VITAMIN D, TAB 1000IU (100/BT) 1000 UNITS: 25 TAB at 08:40

## 2019-01-27 RX ADMIN — CETIRIZINE HYDROCHLORIDE 10 MG: 10 TABLET, FILM COATED ORAL at 08:40

## 2019-01-27 RX ADMIN — ENOXAPARIN SODIUM 80 MG: 80 INJECTION, SOLUTION INTRAVENOUS; SUBCUTANEOUS at 22:10

## 2019-01-27 RX ADMIN — ONDANSETRON 4 MG: 2 INJECTION INTRAMUSCULAR; INTRAVENOUS at 14:35

## 2019-01-27 RX ADMIN — ENOXAPARIN SODIUM 80 MG: 80 INJECTION, SOLUTION INTRAVENOUS; SUBCUTANEOUS at 09:48

## 2019-01-27 RX ADMIN — TRAZODONE HYDROCHLORIDE 100 MG: 100 TABLET ORAL at 20:05

## 2019-01-27 RX ADMIN — DIVALPROEX SODIUM 500 MG: 500 TABLET, DELAYED RELEASE ORAL at 20:04

## 2019-01-27 RX ADMIN — OXYCODONE HYDROCHLORIDE 15 MG: 5 TABLET ORAL at 20:04

## 2019-01-27 RX ADMIN — ACETAMINOPHEN 500 MG: 500 TABLET ORAL at 14:35

## 2019-01-27 RX ADMIN — LEVOTHYROXINE SODIUM 100 MCG: 100 TABLET ORAL at 08:40

## 2019-01-27 RX ADMIN — Medication 10 ML: at 13:11

## 2019-01-27 RX ADMIN — OXYCODONE HYDROCHLORIDE 15 MG: 5 TABLET ORAL at 04:21

## 2019-01-27 RX ADMIN — ACETAMINOPHEN 500 MG: 500 TABLET ORAL at 20:05

## 2019-01-27 RX ADMIN — ONDANSETRON 4 MG: 2 INJECTION INTRAMUSCULAR; INTRAVENOUS at 09:51

## 2019-01-27 RX ADMIN — VANCOMYCIN HYDROCHLORIDE 1000 MG: 1 INJECTION, POWDER, LYOPHILIZED, FOR SOLUTION INTRAVENOUS at 09:48

## 2019-01-27 RX ADMIN — LOSARTAN POTASSIUM 100 MG: 100 TABLET ORAL at 08:39

## 2019-01-27 RX ADMIN — VORTIOXETINE 30 MG: 10 TABLET, FILM COATED ORAL at 08:40

## 2019-01-27 RX ADMIN — BACLOFEN 10 MG: 10 TABLET ORAL at 14:35

## 2019-01-27 RX ADMIN — OXYCODONE HYDROCHLORIDE 15 MG: 5 TABLET ORAL at 11:44

## 2019-01-27 NOTE — ROUTINE PROCESS
General Surgery End of Shift Nursing Note Bedside shift change report given to Brad Mixon RN (oncoming nurse) by Hayley Aguilar RN (offgoing nurse). Report included the following information SBAR, Kardex, Intake/Output and MAR. Shift worked:   C Summary of shift:    Pt OOB and moving well. Pt had Doppler to r/o DVT's to LE. Negative. Pt with pain to back and L knee. Issues for physician to address:     
 
Number times ambulated in hallway past shift: 3 Number of times OOB to chair past shift: 3 Pain Management: 
Current medication:  
Patient states pain is manageable on current pain medication: YES 
 
GI: 
 
Current diet:  DIET REGULAR Tolerating current diet: YES Passing flatus: YES Last Bowel Movement: yesterday Appearance:  
 
Respiratory: 
 
Incentive Spirometer at bedside: YES Patient instructed on use: NO 
 
Patient Safety: 
 
 
Bed Alarm On? No 
Sitter?  No 
 
Brett Mcmahan RN

## 2019-01-27 NOTE — PROGRESS NOTES
Pharmacy Automatic Renal Dosing Protocol - Antimicrobials Indication for Antimicrobials: MRSA knee prosthesis infection Current Regimen of Each Antimicrobial: 
Vancomycin pharmacy to dose  (Start Date  Previous Antimicrobial Therapy: 
Vancomyin 1250 mg IV q12hr (PTA 
 
Goal Level: VANCOMYCIN TROUGH GOAL RANGE Vancomycin Trough: 15 - 20 mcg/mL Date Dose & Interval Measured (mcg/mL) Extrapolated (mcg/mL)  
 @ 0026 1250 q 12h (PTA med) 19.6 21.1 mcg/mL Date & time of next level:  or  Significant Cultures:  
 wound: moderate MRSA, FINAL 
 body fluid: NGx14 days, FINAL 
: MRSA - pending Radiology / Imaging results: (X-ray, CT scan or MRI): none Paralysis, amputations, malnutrition: not noted Labs: 
Recent Labs  
  19 CREA 0.98 1.00 BUN 26* 28* WBC 8.4 8.5 Temp (24hrs), Av.3 °F (36.8 °C), Min:98 °F (36.7 °C), Max:98.6 °F (37 °C) Creatinine Clearance (mL/min) or Dialysis: 55.5 ml/min Impression/Plan:  
Note: PTA patient was taking vancomycin 1250 mg IV q12hr for prosthetic joint infection. She last received 1250 mg IV on  at 11:00. Placed order for vancomycin level now as calculated trough with patient's current regimen is ~23 mcg/ml. Will re-dose vancomycin once the level results. · Vancomycin trough resulted at 19.6mcg/mL, extrapolated to 21.1mcg/mL · Change Vancomycin to 1000mg IV every 12hr to yield a tough of ~17mcg/mL. Dose of 1000mg IV x 1 to be given @ 1000 to make sure doses were not missed. Therefore will start regimen at 2200 on 19. · BMP daily · Antimicrobial stop date to be determined Pharmacy will follow daily and adjust medications as appropriate for renal function and/or serum levels. Thank you, BRENNA Haro 
 
Recommended duration of therapy 
http://[facility]/[place]/[facility]/Pharmacy/Clinical%20Compa nion/Duration%20of%20ABX%20therapy. docx Renal Dosing 
http://[facility]/[place]/[facility]/Pharmacy/Clinical%20Companion/Renal%[id number]. pdf

## 2019-01-27 NOTE — PROGRESS NOTES
Pharmacy Automatic Renal Dosing Protocol - Antimicrobials Indication for Antimicrobials: MRSA knee prosthesis infection Current Regimen of Each Antimicrobial: 
Vancomycin pharmacy to dose  (Start Date  Previous Antimicrobial Therapy: 
Vancomyin 1250 mg IV q12hr (PTA 
 
Goal Level: VANCOMYCIN TROUGH GOAL RANGE Vancomycin Trough: 15 - 20 mcg/mL Date Dose & Interval Measured (mcg/mL) Extrapolated (mcg/mL) Date & time of next level:  Significant Cultures:  
 wound: moderate MRSA, FINAL 
 body fluid: NGx14 days, FINAL Radiology / Imaging results: (X-ray, CT scan or MRI):  
 
Paralysis, amputations, malnutrition: not noted Labs: 
Recent Labs  
  19 CREA 1.00 BUN 28* WBC 8.5 Temp (24hrs), Av.2 °F (36.8 °C), Min:98 °F (36.7 °C), Max:98.3 °F (36.8 °C) Creatinine Clearance (mL/min) or Dialysis: 54.4 ml/min Impression/Plan:  
· PTA patient was taking vancomycin 1250 mg IV q12hr for prosthetic joint infection. She last received 1250 mg IV on  at 11:00. Placed order for vancomycin level now as calculated trough with patient's current regimen is ~23 mcg/ml. Will re-dose vancomycin once the level results. · BMP daily · Antimicrobial stop date to be determined Pharmacy will follow daily and adjust medications as appropriate for renal function and/or serum levels. Thank you, Lee Haywood, PHARMD

## 2019-01-27 NOTE — PROCEDURES
[facility]  *** FINAL REPORT ***    Name: Tasha Gunter  MRN: [id number]  : 1970  HIS Order #: [id number]  [id number] [facility] Visit #: [id number]  Date: 2019    TYPE OF TEST: Peripheral Venous Testing    REASON FOR TEST  Pain in limb, Limb swelling    Right Arm:-  Deep venous thrombosis:           Yes  Proximal extent of thrombus:      Subclavian  Superficial venous thrombosis:    Yes      INTERPRETATION/FINDINGS  Right arm :  1. Deep vein(s) visualized include the internal jugular, subclavian,  axillary, brachial, radial and ulnar veins. 2. Deep venous thrombosis identified in the subclavian, axillary and  brachial veins. 3. No evidence of deep vein thrombosis in the contralateral subclavian   vein. 4. Superficial vein(s) visualized include the basilic (upper arm),  basilic (forearm), cephalic (upper arm) and cephalic (forearm) veins. 5. Superficial venous thrombosis identified in the cephalic (upper  arm) vein. ADDITIONAL COMMENTS  PICC line visualized cephalic vein mid arm. I have personally reviewed the data relevant to the interpretation of  this  study.     TECHNOLOGIST: Mandy Barnes  Signed: 2019 08:14 PM    PHYSICIAN: Michael Stallings MD  Signed: 2019 12:14 PM

## 2019-01-27 NOTE — PROGRESS NOTES
[facility] Vascular  Preliminary Report:  Venous Duplex Arm Right arm venous duplex was performed. Subclavian Vein, Axillary Vein and Brachial Vein segment is Non-Compressible with Absent Doppler signals, suggesting Occlusive. venous obstruction of the aforementioned vessel(s). PICC line visualized right cephalic mid arm. Final report to follow.

## 2019-01-27 NOTE — PROGRESS NOTES
Hospitalist Progress Note NAME: Siobhan Palma :  1970 MRN:  [id number] Assessment / Plan: 
Large Ida DVT of Right Brachial, Axillary and Subclavian Veins; Patient also with Superficial DVT of Right Cephalic Vein (presumably the DVT's are acute but patient has a history significant for Right Collar Bone Injury and has limited mobility of her right arm since this injury in ). Patient also has a RUE PICC line for which she was receiving IV Vancomycin. on admission,  Patient was  fearful about pulling her RUE PICC and this causing a Pulmonary Embolism. Decisions made on admission of starting anticoagulation and allowing the clot to stabilize before pulling her PICC. Need  to determine (by discussing with Dr. Nelson Horner (ID at [facility]), presumably on Monday patient antibiotic stop date (see below) -Lovenox, treatment dose 
-stop PTA ASA (was on for ortho DVT prophylaxis) -lower extremity duplex neg for DVT Coagulation work up not sent before starting lovenox. Do not think it is beneficial at this time. Very strong family history of blood clots including PE and sudden death, pt very anxious in reference to that. Pt should follow up with heme onc on discharge , for further work up. 
 
  
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 Sandra Leigh progress notes Per the patient, when she called the office, was told to keep on antibiotics until she follow up with ID doctor, which is on this Tuesday. 
-continue IV Vancomycin 
-Clarify further antibiotic plan with Dr. Nelson Horner on Monday as it would be ideal if replacement PICC was not needed  
  
Chronic Pain: from old collar bone injury () -continue home regimen 
  
OCD/Depression and Seizure Disorder 
-continue patient's multiple home psychotropic medications and Depakote 
  
Hypertension: 
-continue home cozaar 
-prn IV hydralazine ordered 
  
Code Status: Full Surrogate Decision Maker:  
  
DVT Prophylaxis: treatment dose Lovenox as above GI Prophylaxis: not indicted but will continue home PPI Body mass index is 32.3 kg/m². obese Subjective: Chief Complaint / Reason for Physician Visit Follow up on DVT and PICC I am not sure of ending date of antibiotics Discussed with RN events overnight. Review of Systems: 
Symptom Y/N Comments  Symptom Y/N Comments Fever/Chills n   Chest Pain n   
Poor Appetite    Edema Cough n   Abdominal Pain n   
Sputum    Joint Pain SOB/CHANG n   Pruritis/Rash Nausea/vomit    Tolerating PT/OT Diarrhea    Tolerating Diet Constipation    Other Could NOT obtain due to:   
 
Objective: VITALS:  
Last 24hrs VS reviewed since prior progress note. Most recent are: 
Patient Vitals for the past 24 hrs: 
 Temp Pulse Resp BP SpO2  
01/27/19 0838 98.4 °F (36.9 °C) 75 18 126/76 98 % 01/27/19 0426 98.5 °F (36.9 °C) 69 16 105/61 98 % 01/26/19 2311 98.6 °F (37 °C) 73 16 132/75 100 % 01/26/19 2215  76 11 (!) 140/100 99 % 01/26/19 2200 98 °F (36.7 °C) 82 18 147/87 96 % 01/26/19 2109  76 13 132/88   
01/26/19 1618 98.3 °F (36.8 °C) (!) 118 18 (!) 178/111 98 % Intake/Output Summary (Last 24 hours) at 1/27/2019 1102 Last data filed at 1/27/2019 1102 Gross per 24 hour Intake 860 ml Output 600 ml Net 260 ml PHYSICAL EXAM: 
General: WD, WN. Alert, cooperative, no acute distress   
EENT:  EOMI. Anicteric sclerae. MMM Resp:  CTA bilaterally, no wheezing or rales. No accessory muscle use CV:  Regular  rhythm,  No edema GI:  Soft, Non distended, Non tender.  +Bowel sounds Neurologic:  Alert and oriented X 3, normal speech,  
 Psych:   Good insight. Not anxious nor agitated Skin:  No rashes. No jaundice, left knee with sutures, no drainage noted Reviewed most current lab test results and cultures  YES Reviewed most current radiology test results   YES Review and summation of old records today    NO Reviewed patient's current orders and MAR    YES 
PMH/SH reviewed - no change compared to H&P 
________________________________________________________________________ Care Plan discussed with: 
  Comments Patient y Family RN y   
Care Manager Consultant Multidiciplinary team rounds were held today with , nursing, pharmacist and clinical coordinator. Patient's plan of care was discussed; medications were reviewed and discharge planning was addressed. ________________________________________________________________________ Total NON critical care TIME: 30   Minutes Total CRITICAL CARE TIME Spent:   Minutes non procedure based Comments >50% of visit spent in counseling and coordination of care    
________________________________________________________________________ Michelle Sauceda MD  
 
Procedures: see electronic medical records for all procedures/Xrays and details which were not copied into this note but were reviewed prior to creation of Plan. LABS: 
I reviewed today's most current labs and imaging studies. Pertinent labs include: 
Recent Labs  
  01/27/19 0026 01/26/19 2051 WBC 8.4 8.5 HGB 10.3* 11.3* HCT 33.0* 36.7  315 Recent Labs  
  01/27/19 0026 01/26/19 2051  134* K 4.1 3.8  100 CO2 25 27 * 76 BUN 26* 28* CREA 0.98 1.00  
CA 8.2* 8.8 ALB  --  3.8 TBILI  --  0.3 SGOT  --  32 ALT  --  23 INR  --  1.1 Signed: Michelle Sauceda MD

## 2019-01-27 NOTE — PROGRESS NOTES
Pharmacy:  
 
Pharmacy does not stock Evekeo - Amphetamine Sulfate Per discussion w/ patient: she will resume it  when she is discharged because she does not take it often . I removed Evekeo from the patient's MAR. Thanks.  
Chastity Brun[facility]

## 2019-01-27 NOTE — H&P
Hospitalist Admission NoteNAME: Aditi Lane :  1970 MRN:  [id number] Date/Time:  2019 9:58 PM 
 
Patient PCP: Adelina Jang MD 
______________________________________________________________________ Given the patient's current clinical presentation, I have a high level of concern for decompensation if discharged from the emergency department. Complex decision making was performed, which includes reviewing the patient's available past medical records, laboratory results, and x-ray films. My assessment of this patient's clinical condition and my plan of care is as follows. Assessment / Plan: 
Large Independence DVT of Right Brachial, Axillary and Subclavian Veins; Patient also with Superficial DVT of Right Cephalic Vein (presumably the DVT's are acute but patient has a history significant for Right Collar Bone Injury and has limited mobility of her right arm since this injury in ). Patient also has a RUE PICC line for which she was receiving IV Vancomycin. Patient is fearful about pulling her RUE PICC and this causing a Pulmonary Embolism. Given this we have discussed starting anticoagulation and allowing the clot to stabilize before pulling her PICC. Also, we need to determine (by discussing with Dr. Trish Regan (ID at [facility]), presumably on Monday patient antibiotic stop date (see below) 
-admit to inpatient telemetry 
-Lovenox, treatment dose 
-pull PICC on Monday am 
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 Steven Briggs progress notes but patient has not been able to follow-up and IV antibiotics have been extended through 1/31/19 
-continue IV Vancomycin 
-Clarify further antibiotic plan with Dr. Muna Horan on Monday as it would be ideal if replacement PICC was not needed Chronic Pain: from old collar bone injury (2005) -continue patient on her home meds (dose confirmed, patient requests that her Oxycodone be scheduled as she does not want to get behind the pain and states that she will decline the medication if not needed; parameter to hold for sedation placed) 
-prn Narcan ordered OCD/Depression and Seizure Disorder 
-continue patient's multiple home psychotropic medications and Depakote Hypertension: 
-continue home cozaar 
-prn IV hydralazine ordered Code Status: Full Surrogate Decision Maker:  DVT Prophylaxis: treatment dose Lovenox as above GI Prophylaxis: not indicted but will continue home PPI Subjective: CHIEF COMPLAINT: RUE swelling and neck pain HISTORY OF PRESENT ILLNESS:    
Wendi Taylor is a 50 y.o.  female who has been on long term IV antibiotics for prosthetic knee joint infection that presents with acute onset RUE swelling and pain taht started this am. Patient also reports some neck pain as well. Patient has had a PICC since 12/17/18. She denies any CP or SOB. She did have some right neck pain this am and though she just slept wrong. In the ED patient's RUE doppler showed: \"Right arm : 
1. Deep vein(s) visualized include the internal jugular, subclavian, axillary, brachial, radial and ulnar veins. 2. Deep venous thrombosis identified in the subclavian, axillary and brachial veins. 3. No evidence of deep vein thrombosis in the contralateral subclavian  vein. 4. Superficial vein(s) visualized include the basilic (upper arm), basilic (forearm), cephalic (upper arm) and cephalic (forearm) veins. 5. Superficial venous thrombosis identified in the cephalic (upper arm) vein. \" 
 
 We were asked to admit for work up and evaluation of the above problems. Past Medical History:  
Diagnosis Date  ADHD (attention deficit hyperactivity disorder)  Arthritis OSTEO  Chronic pain  GERD (gastroesophageal reflux disease)  Graves disease NO LONGER AN ISSUE  
 Hematuria 10/9/2018  
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   
 depression  Seizure ([address]  Seizures ([address]  &  REACTIVE TO HYPOGLYCEMIA / ALSO FROM FLASHING LIGHTS Past Surgical History:  
Procedure Laterality Date  DELIVERY     
 HX ABDOMINOPLASTY   [address]  HX  SECTION  2006  HX GASTRIC BYPASS    
  [address] CHOLEYCYSTECTOMY  HX ORTHOPAEDIC Right CARPEL TUNNEL  
 HX ORTHOPAEDIC Left CARPEL TUNNEL  
 HX ORTHOPAEDIC Left ULNA SHORTENING  
 HX ORTHOPAEDIC Right  [address] TONSILLECTOMY  [address] Social History Tobacco Use  Smoking status: Former Smoker Types: Cigarettes Last attempt to quit: 1994 Years since quittin.3  Smokeless tobacco: Never Used Substance Use Topics  Alcohol use: No  
  
 
Family History Problem Relation Age of Onset  Heart Disease Mother  Heart Attack Mother  Hypertension Mother [facility] Arthritis-osteo Mother  Rashes/Skin Problems Mother PLAQUE PSORIASIS  
 Heart Disease Father  Thyroid Disease Father  Diabetes Father  Hypertension Father  Diabetes Maternal Grandmother  Heart Attack Maternal Grandmother  Hypertension Maternal Grandmother  Arthritis-osteo Maternal Grandmother  Hypertension Maternal Grandfather  Stroke Maternal Grandfather  High Cholesterol Sister  Anesth Problems Neg Hx Allergies Allergen Reactions  Sulfa (Sulfonamide Antibiotics) Hives  Pcn [Penicillins] Unproven on Challenge Prior to Admission medications Medication Sig Start Date End Date Taking? Authorizing Provider  
vancomycin/0.9 % sod chloride (VANCOMYCIN IN 0.9% SODIUM CL) 1.25 gram/150 mL soln 1.25 g by IntraVENous route two (2) times a day. Provider, Historical  
diclofenac EC (VOLTAREN) 75 mg EC tablet Take 1 Tab by mouth two (2) times a day. 12/18/18   Eduar Moss MD  
oxyCODONE IR (OXY-IR) 15 mg immediate release tablet Take 1 Tab by mouth every four (4) hours as needed for Pain. Max Daily Amount: 90 mg. 12/18/18   Traci Castañeda MD  
heparin, porcine, pf (HEPARIN LOCKFLUSH,PORCINE,,PF,) 10 unit/mL injection 5 mL by IntraVENous route two (2) times a day. Flush PICC Line using SASH method. Provider, Historical  
sodium chloride (NORMAL SALINE FLUSH) 10-20 mL by IntraVENous route two (2) times a day. Flush PICC using SASH method. Provider, Historical  
acetaminophen (TYLENOL) 500 mg tablet Take 500 mg by mouth every six (6) hours as needed for Pain. Provider, Historical  
diclofenac 10% cyclobenzaprine 2% lidocaine 5% gabapentin 6% cream compound Apply 2 g to affected area four (4) times daily as needed for Pain. Provider, Historical  
tapentadol (NUCYNTA ER) 250 mg Tb12 Take 1 Tab by mouth two (2) times a day. Provider, Historical  
mupirocin calcium (BACTROBAN) 2 % topical cream Apply 1 Each to affected area two (2) times a day. Provider, Historical  
alpha-d-galactosidase (BEANO) 150 unit tab tablet Take 2 Tabs by mouth three (3) times daily as needed for Flatulence. Rachelle Hong MD  
cholecalciferol, VITAMIN D3, (VITAMIN D3) 5,000 unit tab tablet Take 1 Cap by mouth daily. Rachelle Hong MD  
aspirin delayed-release 81 mg tablet Take 1 Tab by mouth two (2) times a day.  10/26/18   Traci Castañeda MD  
 amphetamine sulfate (EVEKEO) 10 mg tab Take 10 mg by mouth. PT TAKES 2 TABS (20 MG) BID AT 0200 AND 1200    Provider, Historical  
levothyroxine (SYNTHROID) 100 mcg tablet Take 100 mcg by mouth Daily (before breakfast). Provider, Historical  
vortioxetine (TRINTELLIX) 10 mg tablet Take  by mouth daily. PT TAKES A TOTAL OF 30 mg DAILY    Provider, Historical  
traZODone (DESYREL) 100 mg tablet Take 100 mg by mouth nightly. May take 1/2 tab to 1 tab every evening    Provider, Historical  
potassium chloride SR (KLOR-CON 10) 10 mEq tablet Take 10 mEq by mouth daily. Provider, Historical  
furosemide (LASIX) 20 mg tablet Take 20 mg by mouth daily. MAY TAKE ONLY IF NEEDED FOR SEVERE ANKLE SWELLING    Provider, Historical  
baclofen (LIORESAL) 10 mg tablet Take 10 mg by mouth two (2) times daily as needed for Pain. Provider, Historical  
divalproex DR (DEPAKOTE) 250 mg tablet Take 1 tablet by mouth in  the morning and 2 tablets  by mouth in the evening 9/11/15   Heri Reese MD  
Lisdexamfetamine (VYVANSE) 50 mg capsule Take 50 mg by mouth two (2) times a day. Takes in the morning and at noon daily    Provider, Historical  
hydrochlorothiazide (HYDRODIURIL) 25 mg tablet Take 25 mg by mouth daily. MAY TAKE 1 OR 2 TABS AS NEEDED FOR ANKLE SWELLING    Provider, Historical  
albuterol (PROAIR HFA) 90 mcg/actuation inhaler Take 1 Puff by inhalation every four (4) hours as needed for Wheezing. Provider, Historical  
benzoyl peroxide (DUAL ACTION) 3.5 % Clsr 1 Each by Apply Externally route daily. Provider, Historical  
acyclovir (ZOVIRAX) 400 mg tablet Take 400 mg by mouth every four (4) hours as needed for Other (fever blisters). Other, MD Pawel  
losartan (COZAAR) 50 mg tablet Take 100 mg by mouth daily. Provider, Historical  
simethicone (GAS-X) 80 mg chewable tablet Take 80 mg by mouth every six (6) hours as needed for Flatulence.     Provider, Historical  
 lansoprazole (PREVACID) 15 mg disintegrating tablet Take 1 Tab by mouth Daily (before breakfast). Provider, Historical  
MULTIVIT WITH CALCIUM,IRON,MIN (ONE-A-DAY WOMENS FORMULA PO) Take 1 Tab by mouth daily. Provider, Historical  
cetirizine (ZYRTEC) 10 mg tablet Take 1 Tab by mouth daily. Provider, Historical  
 
 
REVIEW OF SYSTEMS:    
Total of 12 systems reviewed as follows:   
   POSITIVE= underlined text  Negative = text not underlined General:  fever, chills, sweats, generalized weakness, weight loss/gain,  
   loss of appetite Eyes:    blurred vision, eye pain, loss of vision, double vision ENT:    rhinorrhea, pharyngitis Respiratory:   cough, sputum production, SOB, CHANG, wheezing, pleuritic pain  
Cardiology:   chest pain, palpitations, orthopnea, PND, edema, syncope Gastrointestinal:  abdominal pain , N/V, diarrhea, dysphagia, constipation, bleeding Genitourinary:  frequency, urgency, dysuria, hematuria, incontinence Muskuloskeletal :  arthralgia, myalgia, back pain Hematology:  easy bruising, nose or gum bleeding, lymphadenopathy Dermatological: rash, ulceration, pruritis, color change / jaundice Endocrine:   hot flashes or polydipsia Neurological:  headache, dizziness, confusion, focal weakness, paresthesia, Speech difficulties, memory loss, gait difficulty Psychological: Feelings of anxiety, depression, agitation Objective: VITALS:   
Visit Vitals BP (!) 140/100 Pulse 76 Temp 98 °F (36.7 °C) Resp 11 Ht 5' 2\" (1.575 m) Wt 80.1 kg (176 lb 9.4 oz) SpO2 99% BMI 32.30 kg/m² PHYSICAL EXAM: 
 
General:    Alert, cooperative, no distress, appears stated age. HEENT: Atraumatic, anicteric sclerae, pink conjunctivae No oral ulcers, mucosa dry, throat clear, dentition fair Neck:  Supple, symmetrical,  thyroid: non tender Lungs:   Clear to auscultation bilaterally. No Wheezing or Rhonchi. No rales. Chest wall:  No tenderness  No Accessory muscle use. Heart:   Regular  rhythm,  No  murmur   No edema; +RUE edema Abdomen:   Soft, non-tender. Not distended. Bowel sounds normal 
Extremities: No cyanosis. No clubbing,   
  Skin turgor normal, Capillary refill normal, Radial dial pulse 2+ Skin:     Not pale. Not Jaundiced  No rashes Psych:  Good insight. Not depressed. mildly anxious, notagitated. Neurologic: EOMs intact. No facial asymmetry. No aphasia or slurred speech. Symmetrical strength, No focal neurologic deficits. Alert and oriented X 4.  
 
_______________________________________________________________________ Care Plan discussed with: 
  Comments Patient x Family RN x Care Manager Consultant:     
_______________________________________________________________________ Expected  Disposition:  
Home with Family x HH/PT/OT/RN HH  
SNF/LTC   
JANET   
________________________________________________________________________ TOTAL TIME:  60 Minutes Critical Care Provided     Minutes non procedure based Comments  
 x Reviewed previous records  
>50% of visit spent in counseling and coordination of care x Discussion with patient and/or family and questions answered 
  
 
________________________________________________________________________ Signed: Maria Elena Murphy MD 
 
Procedures: see electronic medical records for all procedures/Xrays and details which were not copied into this note but were reviewed prior to creation of Plan. LAB DATA REVIEWED:   
Recent Results (from the past 24 hour(s)) METABOLIC PANEL, COMPREHENSIVE Collection Time: 01/26/19  8:51 PM  
Result Value Ref Range Sodium 134 (L) 136 - 145 mmol/L Potassium 3.8 3.5 - 5.1 mmol/L Chloride 100 97 - 108 mmol/L  
 CO2 27 21 - 32 mmol/L Anion gap 7 5 - 15 mmol/L Glucose 76 65 - 100 mg/dL BUN 28 (H) 6 - 20 MG/DL  Creatinine 1.00 0.55 - 1.02 MG/DL  
 BUN/Creatinine ratio 28 (H) 12 - 20 GFR est AA >60 >60 ml/min/1.73m2 GFR est non-AA 59 (L) >60 ml/min/1.73m2 Calcium 8.8 8.5 - 10.1 MG/DL Bilirubin, total 0.3 0.2 - 1.0 MG/DL  
 ALT (SGPT) 23 12 - 78 U/L  
 AST (SGOT) 32 15 - 37 U/L Alk. phosphatase 125 (H) 45 - 117 U/L Protein, total 8.2 6.4 - 8.2 g/dL Albumin 3.8 3.5 - 5.0 g/dL Globulin 4.4 (H) 2.0 - 4.0 g/dL A-G Ratio 0.9 (L) 1.1 - 2.2    
CBC WITH AUTOMATED DIFF Collection Time: 01/26/19  8:51 PM  
Result Value Ref Range WBC 8.5 3.6 - 11.0 K/uL  
 RBC 4.36 3.80 - 5.20 M/uL  
 HGB 11.3 (L) 11.5 - 16.0 g/dL HCT 36.7 35.0 - 47.0 % MCV 84.2 80.0 - 99.0 FL  
 MCH 25.9 (L) 26.0 - 34.0 PG  
 MCHC 30.8 30.0 - 36.5 g/dL  
 RDW 15.5 (H) 11.5 - 14.5 % PLATELET 821 602 - 965 K/uL MPV 10.3 8.9 - 12.9 FL  
 NRBC 0.0 0  WBC ABSOLUTE NRBC 0.00 0.00 - 0.01 K/uL NEUTROPHILS 48 32 - 75 % LYMPHOCYTES 34 12 - 49 % MONOCYTES 9 5 - 13 % EOSINOPHILS 8 (H) 0 - 7 % BASOPHILS 1 0 - 1 % IMMATURE GRANULOCYTES 0 0.0 - 0.5 % ABS. NEUTROPHILS 4.1 1.8 - 8.0 K/UL  
 ABS. LYMPHOCYTES 2.9 0.8 - 3.5 K/UL  
 ABS. MONOCYTES 0.8 0.0 - 1.0 K/UL  
 ABS. EOSINOPHILS 0.7 (H) 0.0 - 0.4 K/UL  
 ABS. BASOPHILS 0.1 0.0 - 0.1 K/UL  
 ABS. IMM. GRANS. 0.0 0.00 - 0.04 K/UL  
 DF AUTOMATED    
PTT Collection Time: 01/26/19  8:51 PM  
Result Value Ref Range aPTT 31.3 22.1 - 32.0 sec  
 aPTT, therapeutic range     58.0 - 77.0 SECS  
PROTHROMBIN TIME + INR Collection Time: 01/26/19  8:51 PM  
Result Value Ref Range INR 1.1 0.9 - 1.1 Prothrombin time 10.9 9.0 - 11.1 sec

## 2019-01-27 NOTE — ROUTINE PROCESS
TRANSFER - OUT REPORT: 
 
Verbal report given to Gen Good RN(name) on Lysle Push  being transferred to [facility]) for routine progression of care Report consisted of patients Situation, Background, Assessment and  
Recommendations(SBAR). Information from the following report(s) SBAR was reviewed with the receiving nurse. Lines: PICC Double Lumen 58/31/05 Cephalic;Right (Active) Peripheral IV 01/26/19 Left Hand (Active) Site Assessment Clean, dry, & intact 1/26/2019  8:57 PM  
Phlebitis Assessment 0 1/26/2019  8:57 PM  
Infiltration Assessment 0 1/26/2019  8:57 PM  
Dressing Status Clean, dry, & intact 1/26/2019  8:57 PM  
Dressing Type Transparent 1/26/2019  8:57 PM  
Hub Color/Line Status Flushed 1/26/2019  8:57 PM  
Action Taken Blood drawn 1/26/2019  8:57 PM  
   
Peripheral IV 01/26/19 Left Hand (Active) Site Assessment Clean, dry, & intact 1/26/2019 10:26 PM  
Phlebitis Assessment 0 1/26/2019 10:26 PM  
Infiltration Assessment 0 1/26/2019 10:26 PM  
Dressing Status Clean, dry, & intact 1/26/2019 10:26 PM  
Dressing Type Transparent 1/26/2019 10:26 PM  
Hub Color/Line Status Pink 1/26/2019 10:26 PM  
  
 
Opportunity for questions and clarification was provided. Patient transported with: 
 Gusto

## 2019-01-27 NOTE — PROGRESS NOTES
Reason for Admission:  DVT 
               
RRAT Score:   19 Do you (patient/family) have any concerns for transition/discharge? No concerns expressed at this time. Pt hoping that PICC line will still be pulled tomorrow and continue healing from her knee replacement so she can start PT again once her Orthopedic Surgeon clears her. Plan for utilizing home health: Active with [facility] for IV Infusion and Wound Care (IV ABX through HCP). If IV ABX are no longer needed and PICC line pulled, may still need resumption orders for any SN and/or wound care. Likelihood of readmission? Low Transition of Care Plan: Follow-up Care appointments, likely resumption of [facility] services Pt is a 49 yo  female admitted on 1/26/19 for DVT. Pt has had a PICC line since November 2018 for MRSA treatment after her knee replacement. Pt lives in a two-story house ([address], bilateral handrails) with spouse and her two sons (25, 15 yo) who are diagnosed with Autism Spectrum Disorder. Pt reports she has been in a \"caregiver role\" most of her life, especially for her two children who have had significant medical issues. As a result, she's had significant issues with both of her knees and will need a knee replacement at a later time for her other knee. Since her surgery, pt has been sleeping in the living room on the 1st floor of their home. Full-bath and bedroom are upstairs, but pt has not been able to use stairs since her surgery. Reports she uses shower wipes and washes her hair in the sink for grooming. Pt's spouse has to assist her with going up the entrance steps to home. Pt utilizes a rollator for mobility. Pt discharged to [facility] 11/24/18 after her knee replacement and is currently actively with [facility] for  [address] (provided by HCP). Pt's spouse to provide transportation at discharge.   
 
CM verified pt's demographic info and completed initial assessment, eleonora planning. Pt is alert and oriented x 4. Pt sees Dr. Angella Gardner (PCP, Pain Management specialist), Dr. Akhil Vanessa (Ortho), Dr. Keny Castillo (ID), and Dr. Montana Blanca (Psychiatry @ [place] [address]) OP. No PT/OT consults placed at this time. Per pt, she had tried PT after her knee replacement but was encouraged to let the wound heal more before she engages in PT again. CM will continue to follow-up to ensure additional CM needs are met. Care Management Interventions PCP Verified by CM: Yes 
Palliative Care Criteria Met (RRAT>21 & CHF Dx)?: No 
Mode of Transport at Discharge: Other (see comment)(by private vehicle with spouse) Transition of Care Consult (CM Consult): Discharge Planning Discharge Durable Medical Equipment: No(Rollator at home) Health Maintenance Reviewed: Yes Physical Therapy Consult: No 
Occupational Therapy Consult: No 
Speech Therapy Consult: No 
Current Support Network: Lives with Spouse, Other, Own Home(Two-story house ([address]), bed/full bath on 2nd level, using 1st floor only, lives with spouse and 2 sons) Confirm Follow Up Transport: Family Plan discussed with Pt/Family/Caregiver: Yes Discharge Location Discharge Placement: (TBD) CASSI Morales Supervisee in Social Work, [facility] [phone]

## 2019-01-27 NOTE — PROGRESS NOTES
Bedside and Verbal shift change report given to Jerrell Russell (oncoming nurse) by Trisha Butterfield (offgoing nurse). Report included the following information SBAR, Kardex, Intake/Output and MAR.

## 2019-01-28 ENCOUNTER — HOME CARE VISIT (OUTPATIENT)
Dept: HOME HEALTH SERVICES | Facility: HOME HEALTH | Age: 49
End: 2019-01-28
Payer: COMMERCIAL

## 2019-01-28 VITALS
RESPIRATION RATE: 17 BRPM | OXYGEN SATURATION: 98 % | TEMPERATURE: 98 F | DIASTOLIC BLOOD PRESSURE: 70 MMHG | HEART RATE: 74 BPM | SYSTOLIC BLOOD PRESSURE: 130 MMHG

## 2019-01-28 LAB
ANION GAP SERPL CALC-SCNC: 7 MMOL/L (ref 5–15)
BACTERIA SPEC CULT: NORMAL
BACTERIA SPEC CULT: NORMAL
BASOPHILS # BLD: 0.1 K/UL (ref 0–0.1)
BASOPHILS NFR BLD: 1 % (ref 0–1)
BUN SERPL-MCNC: 25 MG/DL (ref 6–20)
BUN/CREAT SERPL: 29 (ref 12–20)
CALCIUM SERPL-MCNC: 8 MG/DL (ref 8.5–10.1)
CHLORIDE SERPL-SCNC: 105 MMOL/L (ref 97–108)
CO2 SERPL-SCNC: 27 MMOL/L (ref 21–32)
CREAT SERPL-MCNC: 0.87 MG/DL (ref 0.55–1.02)
DIFFERENTIAL METHOD BLD: ABNORMAL
EOSINOPHIL # BLD: 0.6 K/UL (ref 0–0.4)
EOSINOPHIL NFR BLD: 10 % (ref 0–7)
ERYTHROCYTE [DISTWIDTH] IN BLOOD BY AUTOMATED COUNT: 15.6 % (ref 11.5–14.5)
GLUCOSE BLD STRIP.AUTO-MCNC: 104 MG/DL (ref 65–100)
GLUCOSE BLD STRIP.AUTO-MCNC: 90 MG/DL (ref 65–100)
GLUCOSE BLD STRIP.AUTO-MCNC: 97 MG/DL (ref 65–100)
GLUCOSE SERPL-MCNC: 90 MG/DL (ref 65–100)
HCT VFR BLD AUTO: 29 % (ref 35–47)
HGB BLD-MCNC: 9.2 G/DL (ref 11.5–16)
IMM GRANULOCYTES # BLD AUTO: 0 K/UL (ref 0–0.04)
IMM GRANULOCYTES NFR BLD AUTO: 0 % (ref 0–0.5)
LYMPHOCYTES # BLD: 2.5 K/UL (ref 0.8–3.5)
LYMPHOCYTES NFR BLD: 45 % (ref 12–49)
MCH RBC QN AUTO: 26.4 PG (ref 26–34)
MCHC RBC AUTO-ENTMCNC: 31.7 G/DL (ref 30–36.5)
MCV RBC AUTO: 83.1 FL (ref 80–99)
MONOCYTES # BLD: 0.5 K/UL (ref 0–1)
MONOCYTES NFR BLD: 9 % (ref 5–13)
NEUTS SEG # BLD: 1.9 K/UL (ref 1.8–8)
NEUTS SEG NFR BLD: 35 % (ref 32–75)
NRBC # BLD: 0 K/UL (ref 0–0.01)
NRBC BLD-RTO: 0 PER 100 WBC
PLATELET # BLD AUTO: 233 K/UL (ref 150–400)
PMV BLD AUTO: 9.6 FL (ref 8.9–12.9)
POTASSIUM SERPL-SCNC: 4.4 MMOL/L (ref 3.5–5.1)
RBC # BLD AUTO: 3.49 M/UL (ref 3.8–5.2)
SERVICE CMNT-IMP: ABNORMAL
SERVICE CMNT-IMP: NORMAL
SODIUM SERPL-SCNC: 139 MMOL/L (ref 136–145)
WBC # BLD AUTO: 5.5 K/UL (ref 3.6–11)

## 2019-01-28 PROCEDURE — 74011250636 HC RX REV CODE- 250/636: Performed by: INTERNAL MEDICINE

## 2019-01-28 PROCEDURE — 80048 BASIC METABOLIC PNL TOTAL CA: CPT

## 2019-01-28 PROCEDURE — 74011250637 HC RX REV CODE- 250/637: Performed by: HOSPITALIST

## 2019-01-28 PROCEDURE — 74011250637 HC RX REV CODE- 250/637: Performed by: INTERNAL MEDICINE

## 2019-01-28 PROCEDURE — 36415 COLL VENOUS BLD VENIPUNCTURE: CPT

## 2019-01-28 PROCEDURE — 85025 COMPLETE CBC W/AUTO DIFF WBC: CPT

## 2019-01-28 PROCEDURE — 82962 GLUCOSE BLOOD TEST: CPT

## 2019-01-28 PROCEDURE — 65660000000 HC RM CCU STEPDOWN

## 2019-01-28 RX ORDER — WARFARIN SODIUM 5 MG/1
10 TABLET ORAL ONCE
Status: COMPLETED | OUTPATIENT
Start: 2019-01-28 | End: 2019-01-28

## 2019-01-28 RX ORDER — WARFARIN SODIUM 5 MG/1
10 TABLET ORAL EVERY EVENING
Status: DISCONTINUED | OUTPATIENT
Start: 2019-01-28 | End: 2019-01-28

## 2019-01-28 RX ADMIN — VANCOMYCIN HYDROCHLORIDE 1000 MG: 1 INJECTION, POWDER, LYOPHILIZED, FOR SOLUTION INTRAVENOUS at 10:12

## 2019-01-28 RX ADMIN — OXYCODONE HYDROCHLORIDE 15 MG: 5 TABLET ORAL at 10:11

## 2019-01-28 RX ADMIN — HYDRALAZINE HYDROCHLORIDE 10 MG: 20 INJECTION INTRAMUSCULAR; INTRAVENOUS at 20:02

## 2019-01-28 RX ADMIN — VORTIOXETINE 30 MG: 10 TABLET, FILM COATED ORAL at 10:41

## 2019-01-28 RX ADMIN — WARFARIN SODIUM 10 MG: 5 TABLET ORAL at 20:02

## 2019-01-28 RX ADMIN — LEVOTHYROXINE SODIUM 100 MCG: 100 TABLET ORAL at 10:12

## 2019-01-28 RX ADMIN — OXYCODONE HYDROCHLORIDE 15 MG: 5 TABLET ORAL at 22:08

## 2019-01-28 RX ADMIN — VITAMIN D, TAB 1000IU (100/BT) 1000 UNITS: 25 TAB at 10:13

## 2019-01-28 RX ADMIN — OXYCODONE HYDROCHLORIDE 15 MG: 5 TABLET ORAL at 03:09

## 2019-01-28 RX ADMIN — ACETAMINOPHEN 500 MG: 500 TABLET ORAL at 22:08

## 2019-01-28 RX ADMIN — Medication 10 ML: at 15:42

## 2019-01-28 RX ADMIN — CETIRIZINE HYDROCHLORIDE 10 MG: 10 TABLET, FILM COATED ORAL at 10:13

## 2019-01-28 RX ADMIN — PANTOPRAZOLE SODIUM 40 MG: 40 TABLET, DELAYED RELEASE ORAL at 10:13

## 2019-01-28 RX ADMIN — DIVALPROEX SODIUM 500 MG: 500 TABLET, DELAYED RELEASE ORAL at 20:01

## 2019-01-28 RX ADMIN — BACLOFEN 10 MG: 10 TABLET ORAL at 22:08

## 2019-01-28 RX ADMIN — Medication 10 ML: at 22:08

## 2019-01-28 RX ADMIN — ACETAMINOPHEN 500 MG: 500 TABLET ORAL at 18:05

## 2019-01-28 RX ADMIN — TRAZODONE HYDROCHLORIDE 100 MG: 100 TABLET ORAL at 22:08

## 2019-01-28 RX ADMIN — LOSARTAN POTASSIUM 100 MG: 100 TABLET ORAL at 10:11

## 2019-01-28 RX ADMIN — ENOXAPARIN SODIUM 80 MG: 80 INJECTION, SOLUTION INTRAVENOUS; SUBCUTANEOUS at 22:09

## 2019-01-28 RX ADMIN — CALCIUM CARBONATE (ANTACID) CHEW TAB 500 MG 200 MG: 500 CHEW TAB at 12:57

## 2019-01-28 RX ADMIN — ENOXAPARIN SODIUM 80 MG: 80 INJECTION, SOLUTION INTRAVENOUS; SUBCUTANEOUS at 10:11

## 2019-01-28 RX ADMIN — BACLOFEN 10 MG: 10 TABLET ORAL at 03:09

## 2019-01-28 RX ADMIN — DIVALPROEX SODIUM 250 MG: 250 TABLET, DELAYED RELEASE ORAL at 10:12

## 2019-01-28 RX ADMIN — Medication 10 ML: at 06:00

## 2019-01-28 RX ADMIN — OXYCODONE HYDROCHLORIDE 15 MG: 5 TABLET ORAL at 15:41

## 2019-01-28 NOTE — PROGRESS NOTES
Problem: Falls - Risk of 
Goal: *Absence of Falls Document Zenaida Branch Fall Risk and appropriate interventions in the flowsheet. Outcome: Progressing Towards Goal 
Fall Risk Interventions: 
  
 
  
 
Medication Interventions: Bed/chair exit alarm

## 2019-01-28 NOTE — CONSULTS
Hematology Oncology Consultation    Reason for consult: possible hypercoagulable state    Admitting Diagnosis: DVT (deep venous thrombosis) ([facility] [address]    Impression: She developed a RUE DVT in the setting of a provoked event, namely a PICC line, and as such one would consider this a provoked event and it would normally be treated with anticoagulation for 3 months. Given her clot burden, I do believe she needs to be treated. Of note, she has had a Kavita en Y gastric bypass and as such, absorption of oral anticoagulants is going to be difficult to predict and the novel agents are not measurable via any easily obtainable lab test. As such, coumadin is probably the optimal choice at present since we can measure its effect via the PT and INR. I have asked the pharmacy to help with coumadin dosing. These pts sometimes require higher than normal doses, again because of the bypass. With regard to the question of hypercoagulability given DVTs on both sides of the family, I will check for the more common ones. I met her in the past for iron deficiency and gave her IV iron in my office. She is anemic once again so I will check for deficiency states, such as iron, B12, folate, copper and zinc.     The following is of interest from Марина Bolton 45. (Am J of Med, 2017):  Changes in pharmacokinetics and bioavailability of an ingested drug after bariatric surgery are not predictable without dedicated study of individual drugs. Limited literature exists on therapeutic warfarin after bariatric surgery and even less on direct oral anticoagulants after bariatric surgery. In the absence of dedicated studies, we suggest a vitamin K antagonist as the oral anticoagulant of choice after bariatric surgery given its ability to be monitored and dose-adjusted. Apixaban is absorbed primarily in the proximal small intestine, with some gastric absorption and limited colonic absorption.  Rivaroxaban appears to be absorbed primarily in the stomach, as there is reduced absorption (29% decrease in AUC and 56% decrease in Cmax) when the drug is released into  the proximal small intestine, with further reduction as the drug is released more distally into the small intestine and colon. Dabigatran is thought to be absorbed in the lower stomach and duodenum because of the rapid time to peak levels (personal communication with Wade CHARLES, 2015). Moreover, a case report showed reduced absorption in short bowel syndrome contributing to insufficient anticoagulation and drug levels below published values of therapeutic doses of dabigatran. Edoxaban is predominately absorbed in the proximal small intestine. Discussion: Richelle Sterling is a  50y.o. year old seen in consultation at the request of Dr. Charly Jaime for possible hypercoagulable state and question of duration of anticoagulation. She has been on long term IV antibiotics for prosthetic knee joint infection. She  presented with acute onset RUE swelling and pain that started the AM of admission. She had a PICC since 12/17/18. She denies any CP or SOB. She did have some right neck pain this am and thought she just slept in the wrong position. In the ED patient's RUE doppler showed: \"Right arm :  1. Deep vein(s) visualized include the internal jugular, subclavian, axillary, brachial, radial and ulnar veins. 2. Deep venous thrombosis identified in the subclavian, axillary and brachial veins. 3. No evidence of deep vein thrombosis in the contralateral subclavian  vein. 4. Superficial vein(s) visualized include the basilic (upper arm), basilic (forearm), cephalic (upper arm) and cephalic (forearm) veins. 5. Superficial venous thrombosis identified in the cephalic (upper arm) vein. \"  She was admitted and is currently on lovenox.      Recent Results (from the past 24 hour(s))   METABOLIC PANEL, BASIC    Collection Time: 01/28/19  3:16 AM   Result Value Ref Range    Sodium 139 136 - 145 mmol/L    Potassium 4.4 3.5 - 5.1 mmol/L    Chloride 105 97 - 108 mmol/L    CO2 27 21 - 32 mmol/L    Anion gap 7 5 - 15 mmol/L    Glucose 90 65 - 100 mg/dL    BUN 25 (H) 6 - 20 MG/DL    Creatinine 0.87 0.55 - 1.02 MG/DL    BUN/Creatinine ratio 29 (H) 12 - 20      GFR est AA >60 >60 ml/min/1.73m2    GFR est non-AA >60 >60 ml/min/1.73m2    Calcium 8.0 (L) 8.5 - 10.1 MG/DL   CBC WITH AUTOMATED DIFF    Collection Time: 01/28/19  3:16 AM   Result Value Ref Range    WBC 5.5 3.6 - 11.0 K/uL    RBC 3.49 (L) 3.80 - 5.20 M/uL    HGB 9.2 (L) 11.5 - 16.0 g/dL    HCT 29.0 (L) 35.0 - 47.0 %    MCV 83.1 80.0 - 99.0 FL    MCH 26.4 26.0 - 34.0 PG    MCHC 31.7 30.0 - 36.5 g/dL    RDW 15.6 (H) 11.5 - 14.5 %    PLATELET 556 020 - 188 K/uL    MPV 9.6 8.9 - 12.9 FL    NRBC 0.0 0  WBC    ABSOLUTE NRBC 0.00 0.00 - 0.01 K/uL    NEUTROPHILS 35 32 - 75 %    LYMPHOCYTES 45 12 - 49 %    MONOCYTES 9 5 - 13 %    EOSINOPHILS 10 (H) 0 - 7 %    BASOPHILS 1 0 - 1 %    IMMATURE GRANULOCYTES 0 0.0 - 0.5 %    ABS. NEUTROPHILS 1.9 1.8 - 8.0 K/UL    ABS. LYMPHOCYTES 2.5 0.8 - 3.5 K/UL    ABS. MONOCYTES 0.5 0.0 - 1.0 K/UL    ABS. EOSINOPHILS 0.6 (H) 0.0 - 0.4 K/UL    ABS. BASOPHILS 0.1 0.0 - 0.1 K/UL    ABS. IMM.  GRANS. 0.0 0.00 - 0.04 K/UL    DF AUTOMATED     GLUCOSE, POC    Collection Time: 01/28/19  7:39 AM   Result Value Ref Range    Glucose (POC) 90 65 - 100 mg/dL    Performed by Aisha Snowden (PCT)    GLUCOSE, POC    Collection Time: 01/28/19 11:23 AM   Result Value Ref Range    Glucose (POC) 104 (H) 65 - 100 mg/dL    Performed by Aisha Snowden (PCT)    GLUCOSE, POC    Collection Time: 01/28/19  4:47 PM   Result Value Ref Range    Glucose (POC) 97 65 - 100 mg/dL    Performed by Aisha Snowden (PCT)        History:  Past Medical History:   Diagnosis Date    ADHD (attention deficit hyperactivity disorder)     Arthritis     OSTEO    Chronic pain     GERD (gastroesophageal reflux disease)     Graves disease     NO LONGER AN ISSUE    Hematuria 10/9/2018    HTN (hypertension)     Hypothyroidism 2018    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    OCD (obsessive compulsive disorder)     Other ill-defined conditions(799.89)     graves    Psychiatric disorder     depression    Seizure ([address]     Seizures ([address]  &     REACTIVE TO HYPOGLYCEMIA / ALSO FROM FLASHING LIGHTS      Past Surgical History:   Procedure Laterality Date    DELIVERY       HX ABDOMINOPLASTY      HX ADENOIDECTOMY  1982    HX  SECTION      HX  SECTION      HX GASTRIC BYPASS          HX GI  1993    CHOLEYCYSTECTOMY    HX ORTHOPAEDIC Right     CARPEL TUNNEL    HX ORTHOPAEDIC Left     CARPEL TUNNEL    HX ORTHOPAEDIC Left     ULNA SHORTENING    HX ORTHOPAEDIC Right 2011    COLLAR BONE SURGERY     HX TONSILLECTOMY        Prior to Admission medications    Medication Sig Start Date End Date Taking? Authorizing Provider   acetaminophen (TYLENOL) 500 mg tablet Take 500 mg by mouth every six (6) hours as needed for Pain. Yes Provider, Historical   alpha-d-galactosidase (BEANO) 150 unit tab tablet Take 2 Tabs by mouth three (3) times daily as needed for Flatulence. Yes Janneth Singer MD   aspirin delayed-release 81 mg tablet Take 1 Tab by mouth two (2) times a day. 10/26/18  Yes Hany Gongora MD   amphetamine sulfate (EVEKEO) 10 mg tab Take 10 mg by mouth. PT TAKES 2 TABS (20 MG) BID AT 0200 AND 1200   Yes Provider, Historical   baclofen (LIORESAL) 10 mg tablet Take 10 mg by mouth two (2) times daily as needed for Pain. Yes Provider, Historical   albuterol (PROAIR HFA) 90 mcg/actuation inhaler Take 1 Puff by inhalation every four (4) hours as needed for Wheezing. Yes Provider, Historical   acyclovir (ZOVIRAX) 400 mg tablet Take 400 mg by mouth every four (4) hours as needed for Other (fever blisters). Yes Ada, MD Pawel   vancomycin/0.9 % sod chloride (VANCOMYCIN IN 0.9% SODIUM CL) 1.25 gram/150 mL soln 1.25 g by IntraVENous route two (2) times a day. Provider, Historical   diclofenac EC (VOLTAREN) 75 mg EC tablet Take 1 Tab by mouth two (2) times a day. 12/18/18   Pacheco Moss MD   oxyCODONE IR (OXY-IR) 15 mg immediate release tablet Take 1 Tab by mouth every four (4) hours as needed for Pain. Max Daily Amount: 90 mg. 12/18/18   Jesus Manuel Meléndez MD   heparin, porcine, pf (HEPARIN LOCKFLUSH,PORCINE,,PF,) 10 unit/mL injection 5 mL by IntraVENous route two (2) times a day. Flush PICC Line using SASH method. Provider, Historical   sodium chloride (NORMAL SALINE FLUSH) 10-20 mL by IntraVENous route two (2) times a day. Flush PICC using SASH method. Provider, Historical   diclofenac 10% cyclobenzaprine 2% lidocaine 5% gabapentin 6% cream compound Apply 2 g to affected area four (4) times daily as needed for Pain. Provider, Historical   tapentadol (NUCYNTA ER) 250 mg Tb12 Take 1 Tab by mouth two (2) times a day. Provider, Historical   mupirocin calcium (BACTROBAN) 2 % topical cream Apply 1 Each to affected area two (2) times a day. Provider, Historical   cholecalciferol, VITAMIN D3, (VITAMIN D3) 5,000 unit tab tablet Take 1 Cap by mouth daily. Nissa Marquez MD   levothyroxine (SYNTHROID) 100 mcg tablet Take 100 mcg by mouth Daily (before breakfast). Provider, Historical   vortioxetine (TRINTELLIX) 10 mg tablet Take  by mouth daily. PT TAKES A TOTAL OF 30 mg DAILY    Provider, Historical   traZODone (DESYREL) 100 mg tablet Take 100 mg by mouth nightly. May take 1/2 tab to 1 tab every evening    Provider, Historical   potassium chloride SR (KLOR-CON 10) 10 mEq tablet Take 10 mEq by mouth daily. Provider, Historical   furosemide (LASIX) 20 mg tablet Take 20 mg by mouth daily.  MAY TAKE ONLY IF NEEDED FOR SEVERE ANKLE SWELLING    Provider, Historical   divalproex DR (DEPAKOTE) 250 mg tablet Take 1 tablet by mouth in  the morning and 2 tablets  by mouth in the evening 9/11/15   Julee Stover MD   Lisdexamfetamine (VYVANSE) 50 mg capsule Take 50 mg by mouth two (2) times a day. Takes in the morning and at noon daily    Provider, Historical   hydrochlorothiazide (HYDRODIURIL) 25 mg tablet Take 25 mg by mouth daily. MAY TAKE 1 OR 2 TABS AS NEEDED FOR ANKLE SWELLING    Provider, Historical   benzoyl peroxide (DUAL ACTION) 3.5 % Clsr 1 Each by Apply Externally route daily. Provider, Historical   losartan (COZAAR) 50 mg tablet Take 100 mg by mouth daily. Provider, Historical   simethicone (GAS-X) 80 mg chewable tablet Take 80 mg by mouth every six (6) hours as needed for Flatulence. Provider, Historical   lansoprazole (PREVACID) 15 mg disintegrating tablet Take 1 Tab by mouth Daily (before breakfast). Provider, Historical   MULTIVIT WITH CALCIUM,IRON,MIN (ONE-A-DAY WOMENS FORMULA PO) Take 1 Tab by mouth daily. Provider, Historical   cetirizine (ZYRTEC) 10 mg tablet Take 1 Tab by mouth daily.     Provider, Historical     Allergies   Allergen Reactions    Sulfa (Sulfonamide Antibiotics) Hives    Pcn [Penicillins] Unproven on Challenge      Social History     Tobacco Use    Smoking status: Former Smoker     Types: Cigarettes     Last attempt to quit: 1994     Years since quittin.3    Smokeless tobacco: Never Used   Substance Use Topics    Alcohol use: No      Family History   Problem Relation Age of Onset    Heart Disease Mother     Heart Attack Mother     Hypertension Mother     Arthritis-osteo Mother     Rashes/Skin Problems Mother         PLAQUE PSORIASIS    Heart Disease Father     Thyroid Disease Father     Diabetes Father     Hypertension Father     Diabetes Maternal Grandmother     Heart Attack Maternal Grandmother     Hypertension Maternal Grandmother     Arthritis-osteo Maternal Grandmother     Hypertension Maternal Grandfather     Stroke Maternal Grandfather     High Cholesterol Sister     Anesth Problems Neg Hx         Current Medications:  Current Facility-Administered Medications   Medication Dose Route Frequency    vancomycin trough prior to 2200 dose- reminder   Other ONCE    calcium carbonate (TUMS) chewable tablet 200 mg [elemental]  200 mg Oral TID PRN    enoxaparin (LOVENOX) injection 80 mg  80 mg SubCUTAneous Q12H    acetaminophen (TYLENOL) tablet 500 mg  500 mg Oral Q6H PRN    baclofen (LIORESAL) tablet 10 mg  10 mg Oral BID PRN    cetirizine (ZYRTEC) tablet 10 mg  10 mg Oral DAILY    cholecalciferol (VITAMIN D3) tablet 1,000 Units  1,000 Units Oral DAILY    divalproex DR (DEPAKOTE) tablet 250 mg  250 mg Oral DAILY    divalproex DR (DEPAKOTE) tablet 500 mg  500 mg Oral QHS    pantoprazole (PROTONIX) tablet 40 mg  40 mg Oral ACB    levothyroxine (SYNTHROID) tablet 100 mcg  100 mcg Oral ACB    losartan (COZAAR) tablet 100 mg  100 mg Oral DAILY    oxyCODONE IR (ROXICODONE) tablet 15 mg  15 mg Oral Q4H    simethicone (MYLICON) tablet 80 mg  80 mg Oral Q6H PRN    . PHARMACY TO SUBSTITUTE PER PROTOCOL (Reordered from: tapentadol (NUCYNTA ER) 250 mg Tb12)    Per Protocol    traZODone (DESYREL) tablet 100 mg  100 mg Oral QHS    vortioxetine (TRINTELLIX) tablet 30 mg  30 mg Oral DAILY    sodium chloride (NS) flush 5-40 mL  5-40 mL IntraVENous Q8H    sodium chloride (NS) flush 5-40 mL  5-40 mL IntraVENous PRN    naloxone (NARCAN) injection 0.4 mg  0.4 mg IntraVENous PRN    ondansetron (ZOFRAN) injection 4 mg  4 mg IntraVENous Q4H PRN    hydrALAZINE (APRESOLINE) 20 mg/mL injection 10 mg  10 mg IntraVENous Q6H PRN       ROS negative for 11 organ systems except as noted above. Physical Exam:  Constitutional Alert, cooperative, oriented. Mood and affect appropriate. Appears close to chronological age. Well nourished. Well developed. Head Normocephalic; no scars   Eyes Conjunctivae and sclerae are clear and without icterus. Pupils are round   ENMT Sinuses are nontender. No oral exudates, ulcers, masses, thrush or mucositis. Oropharynx clear.   Tongue normal.   Neck Supple without masses or thyromegaly. No jugular venous distension. Hematologic/Lymphatic No petechiae or purpura. No tender or palpable lymph nodes noted   Respiratory Lungs are clear to auscultation without rhonchi or wheezing. Cardiovascular Regular rate and rhythm of heart without murmurs, gallops or rubs. Chest / Line Site Chest is symmetric with no chest wall deformities. Abdomen Non-tender, non-distended, no masses, ascites or hepatosplenomegaly. Good bowel sounds. No guarding or rebound tenderness. No pulsatile masses. Musculoskeletal No tenderness or swelling, normal range of motion without obvious weakness. Extremities No visible deformities, no cyanosis, clubbing. Mild bilateral lower extremity puffiness with healing incision and stitches over left knee incision. Puffy RUE as well. Skin No rashes, scars, or lesions suggestive of malignancy. No petechiae, purpura, or ecchymoses. No excoriations. Neurologic No sensory or motor deficits noted but not specifcally tested. Psychiatric Alert and oriented. Coherent speech. Verbalizes understanding of our discussions today.

## 2019-01-28 NOTE — PROGRESS NOTES
Problem: Falls - Risk of 
Goal: *Absence of Falls Document Angelia Puentes Fall Risk and appropriate interventions in the flowsheet. Outcome: Progressing Towards Goal 
Fall Risk Interventions: 
  
 
  
 
Medication Interventions: Bed/chair exit alarm

## 2019-01-28 NOTE — HOME CARE
Please note that this patient was open to [facility] - INPATIENT SN/PT services at the time of hospital admission. If services will be needed at discharge, please order to resume them. Cordell Chris RN [id number] [facility] [address] Nurse Liaison [id number] or [phone]

## 2019-01-29 LAB
ANION GAP SERPL CALC-SCNC: 8 MMOL/L (ref 5–15)
BASOPHILS # BLD: 0.1 K/UL (ref 0–0.1)
BASOPHILS NFR BLD: 1 % (ref 0–1)
BUN SERPL-MCNC: 19 MG/DL (ref 6–20)
BUN/CREAT SERPL: 27 (ref 12–20)
CALCIUM SERPL-MCNC: 8.3 MG/DL (ref 8.5–10.1)
CHLORIDE SERPL-SCNC: 106 MMOL/L (ref 97–108)
CO2 SERPL-SCNC: 27 MMOL/L (ref 21–32)
CREAT SERPL-MCNC: 0.71 MG/DL (ref 0.55–1.02)
DIFFERENTIAL METHOD BLD: ABNORMAL
EOSINOPHIL # BLD: 0.5 K/UL (ref 0–0.4)
EOSINOPHIL NFR BLD: 9 % (ref 0–7)
ERYTHROCYTE [DISTWIDTH] IN BLOOD BY AUTOMATED COUNT: 15.6 % (ref 11.5–14.5)
FERRITIN SERPL-MCNC: 31 NG/ML (ref 8–252)
FOLATE SERPL-MCNC: 27.8 NG/ML (ref 5–21)
GLUCOSE BLD STRIP.AUTO-MCNC: 103 MG/DL (ref 65–100)
GLUCOSE BLD STRIP.AUTO-MCNC: 85 MG/DL (ref 65–100)
GLUCOSE BLD STRIP.AUTO-MCNC: 91 MG/DL (ref 65–100)
GLUCOSE SERPL-MCNC: 83 MG/DL (ref 65–100)
HCT VFR BLD AUTO: 30.7 % (ref 35–47)
HGB BLD-MCNC: 9.6 G/DL (ref 11.5–16)
IMM GRANULOCYTES # BLD AUTO: 0 K/UL (ref 0–0.04)
IMM GRANULOCYTES NFR BLD AUTO: 1 % (ref 0–0.5)
INR PPP: 1.1 (ref 0.9–1.1)
LYMPHOCYTES # BLD: 2.6 K/UL (ref 0.8–3.5)
LYMPHOCYTES NFR BLD: 45 % (ref 12–49)
MCH RBC QN AUTO: 26 PG (ref 26–34)
MCHC RBC AUTO-ENTMCNC: 31.3 G/DL (ref 30–36.5)
MCV RBC AUTO: 83.2 FL (ref 80–99)
MONOCYTES # BLD: 0.5 K/UL (ref 0–1)
MONOCYTES NFR BLD: 9 % (ref 5–13)
NEUTS SEG # BLD: 2.1 K/UL (ref 1.8–8)
NEUTS SEG NFR BLD: 36 % (ref 32–75)
NRBC # BLD: 0 K/UL (ref 0–0.01)
NRBC BLD-RTO: 0 PER 100 WBC
PLATELET # BLD AUTO: 268 K/UL (ref 150–400)
PMV BLD AUTO: 9.9 FL (ref 8.9–12.9)
POTASSIUM SERPL-SCNC: 4.1 MMOL/L (ref 3.5–5.1)
PROTHROMBIN TIME: 11.1 SEC (ref 9–11.1)
RBC # BLD AUTO: 3.69 M/UL (ref 3.8–5.2)
SERVICE CMNT-IMP: ABNORMAL
SERVICE CMNT-IMP: NORMAL
SERVICE CMNT-IMP: NORMAL
SODIUM SERPL-SCNC: 141 MMOL/L (ref 136–145)
VIT B12 SERPL-MCNC: 535 PG/ML (ref 193–986)
WBC # BLD AUTO: 5.8 K/UL (ref 3.6–11)

## 2019-01-29 PROCEDURE — 80048 BASIC METABOLIC PNL TOTAL CA: CPT

## 2019-01-29 PROCEDURE — 74011250636 HC RX REV CODE- 250/636: Performed by: INTERNAL MEDICINE

## 2019-01-29 PROCEDURE — 65660000000 HC RM CCU STEPDOWN

## 2019-01-29 PROCEDURE — 82607 VITAMIN B-12: CPT

## 2019-01-29 PROCEDURE — 82728 ASSAY OF FERRITIN: CPT

## 2019-01-29 PROCEDURE — 85025 COMPLETE CBC W/AUTO DIFF WBC: CPT

## 2019-01-29 PROCEDURE — 94760 N-INVAS EAR/PLS OXIMETRY 1: CPT

## 2019-01-29 PROCEDURE — 84630 ASSAY OF ZINC: CPT

## 2019-01-29 PROCEDURE — 82962 GLUCOSE BLOOD TEST: CPT

## 2019-01-29 PROCEDURE — 36415 COLL VENOUS BLD VENIPUNCTURE: CPT

## 2019-01-29 PROCEDURE — 82525 ASSAY OF COPPER: CPT

## 2019-01-29 PROCEDURE — 81240 F2 GENE: CPT

## 2019-01-29 PROCEDURE — 82746 ASSAY OF FOLIC ACID SERUM: CPT

## 2019-01-29 PROCEDURE — 77010033678 HC OXYGEN DAILY

## 2019-01-29 PROCEDURE — 81241 F5 GENE: CPT

## 2019-01-29 PROCEDURE — 74011250637 HC RX REV CODE- 250/637: Performed by: INTERNAL MEDICINE

## 2019-01-29 PROCEDURE — 85610 PROTHROMBIN TIME: CPT

## 2019-01-29 RX ADMIN — Medication 10 ML: at 21:24

## 2019-01-29 RX ADMIN — OXYCODONE HYDROCHLORIDE 15 MG: 5 TABLET ORAL at 06:08

## 2019-01-29 RX ADMIN — VITAMIN D, TAB 1000IU (100/BT) 1000 UNITS: 25 TAB at 08:35

## 2019-01-29 RX ADMIN — BACLOFEN 10 MG: 10 TABLET ORAL at 21:24

## 2019-01-29 RX ADMIN — LEVOTHYROXINE SODIUM 100 MCG: 100 TABLET ORAL at 08:35

## 2019-01-29 RX ADMIN — OXYCODONE HYDROCHLORIDE 15 MG: 5 TABLET ORAL at 21:24

## 2019-01-29 RX ADMIN — Medication 10 ML: at 06:09

## 2019-01-29 RX ADMIN — DIVALPROEX SODIUM 500 MG: 500 TABLET, DELAYED RELEASE ORAL at 21:24

## 2019-01-29 RX ADMIN — DIVALPROEX SODIUM 250 MG: 250 TABLET, DELAYED RELEASE ORAL at 08:34

## 2019-01-29 RX ADMIN — VORTIOXETINE 30 MG: 10 TABLET, FILM COATED ORAL at 09:55

## 2019-01-29 RX ADMIN — OXYCODONE HYDROCHLORIDE 15 MG: 5 TABLET ORAL at 13:38

## 2019-01-29 RX ADMIN — Medication 10 ML: at 18:12

## 2019-01-29 RX ADMIN — TRAZODONE HYDROCHLORIDE 100 MG: 100 TABLET ORAL at 21:24

## 2019-01-29 RX ADMIN — Medication 10 ML: at 18:02

## 2019-01-29 RX ADMIN — ENOXAPARIN SODIUM 80 MG: 80 INJECTION, SOLUTION INTRAVENOUS; SUBCUTANEOUS at 09:55

## 2019-01-29 RX ADMIN — CETIRIZINE HYDROCHLORIDE 10 MG: 10 TABLET, FILM COATED ORAL at 08:35

## 2019-01-29 RX ADMIN — SIMETHICONE 80 MG: 80 TABLET, CHEWABLE ORAL at 23:21

## 2019-01-29 RX ADMIN — LOSARTAN POTASSIUM 100 MG: 100 TABLET ORAL at 08:35

## 2019-01-29 RX ADMIN — WARFARIN SODIUM 7.5 MG: 2.5 TABLET ORAL at 18:08

## 2019-01-29 RX ADMIN — OXYCODONE HYDROCHLORIDE 15 MG: 5 TABLET ORAL at 02:34

## 2019-01-29 RX ADMIN — ACETAMINOPHEN 500 MG: 500 TABLET ORAL at 19:44

## 2019-01-29 RX ADMIN — PANTOPRAZOLE SODIUM 40 MG: 40 TABLET, DELAYED RELEASE ORAL at 08:35

## 2019-01-29 RX ADMIN — OXYCODONE HYDROCHLORIDE 15 MG: 5 TABLET ORAL at 09:55

## 2019-01-29 RX ADMIN — ENOXAPARIN SODIUM 80 MG: 80 INJECTION, SOLUTION INTRAVENOUS; SUBCUTANEOUS at 21:24

## 2019-01-29 RX ADMIN — OXYCODONE HYDROCHLORIDE 15 MG: 5 TABLET ORAL at 18:08

## 2019-01-29 RX ADMIN — IRON SUCROSE 300 MG: 20 INJECTION, SOLUTION INTRAVENOUS at 13:38

## 2019-01-29 NOTE — PROGRESS NOTES
Hospitalist Progress NoteNAME: Siobhan Palma :  1970 MRN:  [id number] Interim Hospital Summary: 50 y.o. female whom presented on 2019 with Assessment / Plan: 
 
RUE DVT in the setting of PICC line 
-Duplex demonstrates deep venous thrombosis identified in the subclavian, axillary and 
brachial veins. 
-continue lovenox bridge -appreciated Dr Sourav Benites input regarding DOAC and patient's gastric bypass. Agree with warfarin as DOC and aim for 3 months therapy 
-will plan on pulling PICC out in AM 
  
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  
- S/P Resection arthroplasty of the left knee with placement of dynamic antibiotic spacer on 2018  
- saw Ortho last week and her knee was felt to be okay 
- original stop date for Vanco 19 but she has not been able to follow up with Dr Nelson Horner. I paged him and after some discussion, it was felt appropriate to stop her vancomycin at this point ().   
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 See admission documentation 
-prn Narcan ordered 
  
OCD/Depression and Seizure Disorder 
-continue patient's multiple home psychotropic medications and Depakote 
  
Hypertension: 
-continue home cozaar 
-prn IV hydralazine ordered 
  
Code Status: Full Surrogate Decision Maker:  
  
DVT Prophylaxis: treatment dose Lovenox as above GI Prophylaxis: not indicted but will continue home PPI 
 
30.0 - 39.9 Obese / Body mass index is 32.3 kg/m². Subjective: Chief Complaint / Reason for Physician Visit Follow up of RUE DVT, HTN, left TKR infection Chart reviewed in detail. Discussed with RN events overnight. Review of Systems: 
Symptom Y/N Comments  Symptom Y/N Comments Fever/Chills    Chest Pain Poor Appetite    Edema Cough    Abdominal Pain Sputum    Joint Pain SOB/CHANG    Pruritis/Rash Nausea/vomit    Tolerating PT/OT Diarrhea    Tolerating Diet Constipation    Other Could NOT obtain due to:   
 
PO intake:  
Patient Vitals for the past 72 hrs: 
 % Diet Eaten 01/28/19 1256 75 % 01/28/19 0957 100 % 01/27/19 1823 75 % 01/27/19 1300 50 % 01/27/19 0919 100 % 01/27/19 0523 50 % Objective: VITALS:  
Last 24hrs VS reviewed since prior progress note. Most recent are: 
Patient Vitals for the past 24 hrs: 
 Temp Pulse Resp BP SpO2  
01/28/19 1947 99.1 °F (37.3 °C) 82 17 170/90 96 % 01/28/19 1500 98.8 °F (37.1 °C) 72 16 127/82 100 % 01/28/19 1210 98.1 °F (36.7 °C) 75 16 112/62 95 % 01/28/19 0957 98.9 °F (37.2 °C) 79 16 118/67 95 % 01/28/19 0435 98.2 °F (36.8 °C) 62 16 94/53 95 % 01/27/19 2336 97.8 °F (36.6 °C) 65 16 95/53 96 % Intake/Output Summary (Last 24 hours) at 1/28/2019 2059 Last data filed at 1/28/2019 1256 Gross per 24 hour Intake 620 ml Output 2250 ml Net -1630 ml PHYSICAL EXAM: 
General: WD, WN. Alert, cooperative, no acute distress   
EENT:  EOMI. Anicteric sclerae. MMM Resp:  CTA bilaterally, no wheezing or rales. No accessory muscle use CV:  Regular  rhythm,  No edema GI:  Soft, Non distended, Non tender.  +Bowel sounds Neurologic:  Alert and oriented X 3, normal speech, Psych:   Good insight. Not anxious nor agitated Skin:  No rashes. No jaundice (+) RUE swelling / PICC line in place Left knee surgical site looks clean and not infected Reviewed most current lab test results and cultures  YES Reviewed most current radiology test results   YES Review and summation of old records today    NO Reviewed patient's current orders and MAR    YES 
PMH/SH reviewed - no change compared to H&P 
________________________________________________________________________ Care Plan discussed with: 
  Comments Patient x Family RN x Care Manager Consultant Multidiciplinary team rounds were held today with , nursing, pharmacist and clinical coordinator. Patient's plan of care was discussed; medications were reviewed and discharge planning was addressed. ________________________________________________________________________ Total NON critical care TIME:   35  Minutes Total CRITICAL CARE TIME Spent:   Minutes non procedure based Comments >50% of visit spent in counseling and coordination of care x Chart review. Discussion with ID and coordination of care. This includes time during multidisciplinary rounds if indicated above  
________________________________________________________________________ Bob Prieto MD  
 
Procedures: see electronic medical records for all procedures/Xrays and details which were not copied into this note but were reviewed prior to creation of Plan. LABS: 
I reviewed today's most current labs and imaging studies. Pertinent labs include: 
Recent Labs  
  01/28/19 0316 01/27/19 0026 01/26/19 2051 WBC 5.5 8.4 8.5 HGB 9.2* 10.3* 11.3* HCT 29.0* 33.0* 36.7  256 315 Recent Labs  
  01/28/19 0316 01/27/19 0026 01/26/19 2051  136 134* K 4.4 4.1 3.8  102 100 CO2 27 25 27 GLU 90 176* 76 BUN 25* 26* 28* CREA 0.87 0.98 1.00  
CA 8.0* 8.2* 8.8 ALB  --   --  3.8 TBILI  --   --  0.3 SGOT  --   --  32 ALT  --   --  23 INR  --   --  1.1

## 2019-01-29 NOTE — PROGRESS NOTES
Problem: Falls - Risk of 
Goal: *Absence of Falls Document Nydia Kim Fall Risk and appropriate interventions in the flowsheet. Outcome: Progressing Towards Goal 
Fall Risk Interventions: 
  
 
  
 
Medication Interventions: Patient to call before getting OOB, Teach patient to arise slowly

## 2019-01-29 NOTE — PROGRESS NOTES
General Surgery End of Shift Nursing Note Bedside shift change report given to Bin Carlton (oncoming nurse) by Fabrice Leigh (offgoing nurse). Report included the following information SBAR, Kardex and Intake/Output. Shift worked:   7a-7p Summary of shift:    Patient ambulated to bathroom and walked around in room some. Uneventful shift for day shift. Issues for physician to address:   none Number times ambulated in hallway past shift: 0 Number of times OOB to chair past shift: 0 Pain Management: 
Current medication: see mar Patient states pain is manageable on current pain medication: YES 
 
GI: 
 
Current diet:  DIET REGULAR Tolerating current diet: YES Passing flatus: YES Last Bowel Movement: yesterday Appearance: Jesus Fox Respiratory: 
 
Incentive Spirometer at bedside: YES Patient instructed on use: YES Patient Safety: 
 
Falls Score: 1 Bed Alarm On? No 
Sitter? No 
 
Rachelle Beck

## 2019-01-29 NOTE — PROGRESS NOTES
Hematology Oncology Progress Note Follow up for: RUE DVT Chart notes reviewed since last visit. Case discussed with following: nursing staff. Patient complains of the following: no new issues overnight. Additional concerns noted by the staff:  
 
Patient Vitals for the past 24 hrs: 
 BP Temp Pulse Resp SpO2  
01/29/19 0323 (!) 129/93 98.8 °F (37.1 °C) 69 16 97 % 01/29/19 0030 129/87 98.4 °F (36.9 °C) 84 18 98 % 01/28/19 2105 142/89      
01/28/19 1947 170/90 99.1 °F (37.3 °C) 82 17 96 % 01/28/19 1500 127/82 98.8 °F (37.1 °C) 72 16 100 % 01/28/19 1210 112/62 98.1 °F (36.7 °C) 75 16 95 % 01/28/19 0957 118/67 98.9 °F (37.2 °C) 79 16 95 % ROS: negative for 11 organ systems except as noted. Physical Examination: 
Constitutional Alert, cooperative, oriented. Mood and affect appropriate. Appears close to chronological age. Well nourished. Well developed. Head Normocephalic; no scars Eyes Conjunctivae and sclerae are clear and without icterus. Pupils are round ENMT Sinuses are nontender. No oral exudates, ulcers, masses, thrush or mucositis. Oropharynx clear. Tongue normal.  
Neck Supple without masses or thyromegaly. No jugular venous distension. Hematologic/Lymphatic No petechiae or purpura. No tender or palpable lymph nodes noted Respiratory Lungs are clear to auscultation without rhonchi or wheezing. Cardiovascular Regular rate and rhythm of heart without murmurs, gallops or rubs. Chest / Line Site Chest is symmetric with no chest wall deformities. Abdomen Non-tender, non-distended, no masses, ascites or hepatosplenomegaly. Good bowel sounds. Musculoskeletal No tenderness Extremities No visible deformities, no cyanosis, clubbing. Both legs and RUE puffy. Left leg with sutures over knee from recent surgery Skin No rashes, scars, or lesions suggestive of malignancy. No petechiae, purpura, or ecchymoses. No excoriations. Neurologic No sensory or motor deficits noted but not tested Psychiatric Alert and oriented. Coherent speech. Verbalizes understanding of our discussions today. Labs: 
Recent Results (from the past 24 hour(s)) GLUCOSE, POC Collection Time: 01/28/19  7:39 AM  
Result Value Ref Range Glucose (POC) 90 65 - 100 mg/dL Performed by Nestor Villalobos (PCT) GLUCOSE, POC Collection Time: 01/28/19 11:23 AM  
Result Value Ref Range Glucose (POC) 104 (H) 65 - 100 mg/dL Performed by Nestor Villalobos (PCT) GLUCOSE, POC Collection Time: 01/28/19  4:47 PM  
Result Value Ref Range Glucose (POC) 97 65 - 100 mg/dL Performed by Nestor Villalobos (PCT) METABOLIC PANEL, BASIC Collection Time: 01/29/19  3:06 AM  
Result Value Ref Range Sodium 141 136 - 145 mmol/L Potassium 4.1 3.5 - 5.1 mmol/L Chloride 106 97 - 108 mmol/L  
 CO2 27 21 - 32 mmol/L Anion gap 8 5 - 15 mmol/L Glucose 83 65 - 100 mg/dL BUN 19 6 - 20 MG/DL Creatinine 0.71 0.55 - 1.02 MG/DL  
 BUN/Creatinine ratio 27 (H) 12 - 20 GFR est AA >60 >60 ml/min/1.73m2 GFR est non-AA >60 >60 ml/min/1.73m2 Calcium 8.3 (L) 8.5 - 10.1 MG/DL  
CBC WITH AUTOMATED DIFF Collection Time: 01/29/19  3:06 AM  
Result Value Ref Range WBC 5.8 3.6 - 11.0 K/uL  
 RBC 3.69 (L) 3.80 - 5.20 M/uL HGB 9.6 (L) 11.5 - 16.0 g/dL HCT 30.7 (L) 35.0 - 47.0 % MCV 83.2 80.0 - 99.0 FL  
 MCH 26.0 26.0 - 34.0 PG  
 MCHC 31.3 30.0 - 36.5 g/dL  
 RDW 15.6 (H) 11.5 - 14.5 % PLATELET 265 301 - 378 K/uL MPV 9.9 8.9 - 12.9 FL  
 NRBC 0.0 0  WBC ABSOLUTE NRBC 0.00 0.00 - 0.01 K/uL NEUTROPHILS 36 32 - 75 % LYMPHOCYTES 45 12 - 49 % MONOCYTES 9 5 - 13 % EOSINOPHILS 9 (H) 0 - 7 % BASOPHILS 1 0 - 1 % IMMATURE GRANULOCYTES 1 (H) 0.0 - 0.5 % ABS. NEUTROPHILS 2.1 1.8 - 8.0 K/UL  
 ABS. LYMPHOCYTES 2.6 0.8 - 3.5 K/UL  
 ABS. MONOCYTES 0.5 0.0 - 1.0 K/UL  
 ABS. EOSINOPHILS 0.5 (H) 0.0 - 0.4 K/UL ABS. BASOPHILS 0.1 0.0 - 0.1 K/UL  
 ABS. IMM. GRANS. 0.0 0.00 - 0.04 K/UL  
 DF AUTOMATED PROTHROMBIN TIME + INR Collection Time: 01/29/19  6:26 AM  
Result Value Ref Range INR 1.1 0.9 - 1.1 Prothrombin time 11.1 9.0 - 11.1 sec Assessment and Plan:  
RUE DVT in patient with large clot burden and gastric bypass - has started on coumadin. Received 10 mg last PM. This agent has the advantage of being able to measure its effect and thus determine its absorption unlike the DOACS given her bypass. Hypercoagulable workup underway - checking prothrombin mutation and FV Ledien mutation as these are most common and may impact family members.

## 2019-01-29 NOTE — PROGRESS NOTES
Hospitalist Progress NoteNAME: Giana Morejon :  1970 MRN:  [id number] Interim Hospital Summary: 50 y.o. female whom presented on 2019 with Assessment / Plan: 
 
RUE DVT in the setting of PICC line 
-Duplex demonstrates deep venous thrombosis identified in the subclavian, axillary and 
brachial veins. -appreciated Dr Vizcaino [facility] input regarding DOAC and it's absorption issues given the patient's gastric bypass. Agree with warfarin as it's effect can be measured and the dose adjusted accordingly. Will aim for 3 months therapy 
-pulling PICC line today 
-hypercoagulable work up - Prothrombin and FV leiden mutations pending 
  
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  
- S/P Resection arthroplasty of the left knee with placement of dynamic antibiotic spacer on 2018  
- saw Ortho last week and her knee was felt to be okay 
- original stop date for Vanco 19 but she has not been able to follow up with Dr Corinne Sera. I spoke with Dr Corinne Sera  and after some discussion, it was felt appropriate to stop her vancomycin at this point (). Continue to monitor 
  
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 See admission documentation 
-prn Narcan ordered 
  
OCD/Depression and Seizure Disorder 
-continue patient's multiple home psychotropic medications and Depakote 
  
Hypertension: 
-continue home cozaar 
-prn IV hydralazine ordered 
  
Code Status: Full Surrogate Decision Maker:  
  
DVT Prophylaxis: treatment dose Lovenox as above GI Prophylaxis: not indicted but will continue home PPI 
 
30.0 - 39.9 Obese / Body mass index is 32.3 kg/m². Subjective: Chief Complaint / Reason for Physician Visit Follow up of RUE DVT, HTN, left TKR infection Chart reviewed in detail. Discussed with RN events overnight. No new complaints Review of Systems: 
Symptom Y/N Comments  Symptom Y/N Comments Fever/Chills    Chest Pain Poor Appetite    Edema Cough    Abdominal Pain Sputum    Joint Pain SOB/CHANG    Pruritis/Rash Nausea/vomit    Tolerating PT/OT Diarrhea    Tolerating Diet Constipation    Other Could NOT obtain due to:   
 
PO intake:  
Patient Vitals for the past 72 hrs: 
 % Diet Eaten 01/28/19 1256 75 % 01/28/19 0957 100 % 01/27/19 1823 75 % 01/27/19 1300 50 % 01/27/19 0919 100 % 01/27/19 0523 50 % Objective: VITALS:  
Last 24hrs VS reviewed since prior progress note. Most recent are: 
Patient Vitals for the past 24 hrs: 
 Temp Pulse Resp BP SpO2  
01/29/19 0752 98.8 °F (37.1 °C) 67 18 119/74 98 % 01/29/19 0323 98.8 °F (37.1 °C) 69 16 (!) 129/93 97 % 01/29/19 0030 98.4 °F (36.9 °C) 84 18 129/87 98 % 01/28/19 2105    142/89   
01/28/19 1947 99.1 °F (37.3 °C) 82 17 170/90 96 % 01/28/19 1500 98.8 °F (37.1 °C) 72 16 127/82 100 % 01/28/19 1210 98.1 °F (36.7 °C) 75 16 112/62 95 % 01/28/19 0957 98.9 °F (37.2 °C) 79 16 118/67 95 % Intake/Output Summary (Last 24 hours) at 1/29/2019 8264 Last data filed at 1/29/2019 3681 Gross per 24 hour Intake 380 ml Output 1525 ml Net -1145 ml PHYSICAL EXAM: 
General: WD, WN. Alert, cooperative, no acute distress   
EENT:  EOMI. Anicteric sclerae. MMM Resp:  CTA bilaterally, no wheezing or rales. No accessory muscle use CV:  Regular  rhythm,  No edema GI:  Soft, Non distended, Non tender.  +Bowel sounds Neurologic:  Alert and oriented X 3, normal speech, Psych:   Good insight. Not anxious nor agitated Skin:  No rashes. No jaundice (+) RUE swelling / PICC line in place Left knee surgical site looks clean and not infected Reviewed most current lab test results and cultures  YES 
 Reviewed most current radiology test results   YES Review and summation of old records today    NO Reviewed patient's current orders and MAR    YES 
PMH/SH reviewed - no change compared to H&P 
________________________________________________________________________ Care Plan discussed with: 
  Comments Patient x Family RN x Care Manager Consultant Multidiciplinary team rounds were held today with , nursing, pharmacist and clinical coordinator. Patient's plan of care was discussed; medications were reviewed and discharge planning was addressed. ________________________________________________________________________ Total NON critical care TIME:   25  Minutes Total CRITICAL CARE TIME Spent:   Minutes non procedure based Comments >50% of visit spent in counseling and coordination of care x Chart review. Discussion with ID and coordination of care. This includes time during multidisciplinary rounds if indicated above  
________________________________________________________________________ Lydia Davis MD  
 
Procedures: see electronic medical records for all procedures/Xrays and details which were not copied into this note but were reviewed prior to creation of Plan. LABS: 
I reviewed today's most current labs and imaging studies. Pertinent labs include: 
Recent Labs  
  01/29/19 
0306 01/28/19 
0316 01/27/19 
0026 WBC 5.8 5.5 8.4 HGB 9.6* 9.2* 10.3* HCT 30.7* 29.0* 33.0*  
 233 256 Recent Labs  
  01/29/19 
0170 01/29/19 
0306 01/28/19 
0316 01/27/19 
0026 01/26/19 2051 NA  --  141 139 136 134* K  --  4.1 4.4 4.1 3.8 CL  --  106 105 102 100 CO2  --  27 27 25 27 GLU  --  83 90 176* 76 BUN  --  19 25* 26* 28* CREA  --  0.71 0.87 0.98 1.00  
CA  --  8.3* 8.0* 8.2* 8.8 ALB  --   --   --   --  3.8 TBILI  --   --   --   --  0.3 SGOT  --   --   --   --  32 ALT  --   --   --   --  23  
 INR 1.1  --   --   --  1.1

## 2019-01-29 NOTE — PROGRESS NOTES
PICC line removed;tip intact; some visible clot residue on center section of line. Occlusive dressing applied. Patient tolerated procedure well.

## 2019-01-30 LAB
INR PPP: 1.4 (ref 0.9–1.1)
PROTHROMBIN TIME: 14.1 SEC (ref 9–11.1)

## 2019-01-30 PROCEDURE — 94760 N-INVAS EAR/PLS OXIMETRY 1: CPT

## 2019-01-30 PROCEDURE — 65660000000 HC RM CCU STEPDOWN

## 2019-01-30 PROCEDURE — 74011250636 HC RX REV CODE- 250/636: Performed by: INTERNAL MEDICINE

## 2019-01-30 PROCEDURE — 77010033678 HC OXYGEN DAILY

## 2019-01-30 PROCEDURE — 74011250637 HC RX REV CODE- 250/637: Performed by: INTERNAL MEDICINE

## 2019-01-30 PROCEDURE — 85610 PROTHROMBIN TIME: CPT

## 2019-01-30 PROCEDURE — 36415 COLL VENOUS BLD VENIPUNCTURE: CPT

## 2019-01-30 RX ORDER — ACETAMINOPHEN 500 MG
500 TABLET ORAL 4 TIMES DAILY
Status: DISCONTINUED | OUTPATIENT
Start: 2019-01-30 | End: 2019-02-01 | Stop reason: HOSPADM

## 2019-01-30 RX ORDER — WARFARIN SODIUM 5 MG/1
5 TABLET ORAL ONCE
Status: COMPLETED | OUTPATIENT
Start: 2019-01-30 | End: 2019-01-30

## 2019-01-30 RX ORDER — TRAMADOL HYDROCHLORIDE 50 MG/1
50 TABLET ORAL
Status: DISCONTINUED | OUTPATIENT
Start: 2019-01-30 | End: 2019-01-30

## 2019-01-30 RX ADMIN — OXYCODONE HYDROCHLORIDE 15 MG: 5 TABLET ORAL at 01:07

## 2019-01-30 RX ADMIN — DIVALPROEX SODIUM 250 MG: 250 TABLET, DELAYED RELEASE ORAL at 08:52

## 2019-01-30 RX ADMIN — OXYCODONE HYDROCHLORIDE 15 MG: 5 TABLET ORAL at 17:24

## 2019-01-30 RX ADMIN — OXYCODONE HYDROCHLORIDE 15 MG: 5 TABLET ORAL at 21:14

## 2019-01-30 RX ADMIN — VORTIOXETINE 30 MG: 10 TABLET, FILM COATED ORAL at 09:07

## 2019-01-30 RX ADMIN — TRAZODONE HYDROCHLORIDE 100 MG: 100 TABLET ORAL at 21:14

## 2019-01-30 RX ADMIN — OXYCODONE HYDROCHLORIDE 15 MG: 5 TABLET ORAL at 09:27

## 2019-01-30 RX ADMIN — ENOXAPARIN SODIUM 80 MG: 80 INJECTION, SOLUTION INTRAVENOUS; SUBCUTANEOUS at 21:14

## 2019-01-30 RX ADMIN — LEVOTHYROXINE SODIUM 100 MCG: 100 TABLET ORAL at 08:52

## 2019-01-30 RX ADMIN — ACETAMINOPHEN 500 MG: 500 TABLET ORAL at 17:24

## 2019-01-30 RX ADMIN — DIVALPROEX SODIUM 500 MG: 500 TABLET, DELAYED RELEASE ORAL at 21:14

## 2019-01-30 RX ADMIN — LOSARTAN POTASSIUM 100 MG: 100 TABLET ORAL at 08:52

## 2019-01-30 RX ADMIN — ENOXAPARIN SODIUM 80 MG: 80 INJECTION, SOLUTION INTRAVENOUS; SUBCUTANEOUS at 09:27

## 2019-01-30 RX ADMIN — Medication 10 ML: at 21:14

## 2019-01-30 RX ADMIN — Medication 10 ML: at 09:08

## 2019-01-30 RX ADMIN — CETIRIZINE HYDROCHLORIDE 10 MG: 10 TABLET, FILM COATED ORAL at 08:52

## 2019-01-30 RX ADMIN — ACETAMINOPHEN 500 MG: 500 TABLET ORAL at 21:14

## 2019-01-30 RX ADMIN — ACETAMINOPHEN 500 MG: 500 TABLET ORAL at 13:10

## 2019-01-30 RX ADMIN — Medication 10 ML: at 05:08

## 2019-01-30 RX ADMIN — BACLOFEN 10 MG: 10 TABLET ORAL at 21:14

## 2019-01-30 RX ADMIN — IRON SUCROSE 300 MG: 20 INJECTION, SOLUTION INTRAVENOUS at 09:07

## 2019-01-30 RX ADMIN — ACETAMINOPHEN 500 MG: 500 TABLET ORAL at 09:27

## 2019-01-30 RX ADMIN — VITAMIN D, TAB 1000IU (100/BT) 1000 UNITS: 25 TAB at 08:52

## 2019-01-30 RX ADMIN — OXYCODONE HYDROCHLORIDE 15 MG: 5 TABLET ORAL at 13:10

## 2019-01-30 RX ADMIN — PANTOPRAZOLE SODIUM 40 MG: 40 TABLET, DELAYED RELEASE ORAL at 08:52

## 2019-01-30 RX ADMIN — WARFARIN SODIUM 5 MG: 5 TABLET ORAL at 17:24

## 2019-01-30 RX ADMIN — OXYCODONE HYDROCHLORIDE 15 MG: 5 TABLET ORAL at 05:08

## 2019-01-30 NOTE — PROGRESS NOTES
Hospitalist Progress NoteNAME: Siobhan Palma :  1970 MRN:  [id number] Interim Hospital Summary: 50 y.o. female whom presented on 2019 with Assessment / Plan: 
 
RUE DVT  
-Duplex demonstrates deep venous thrombosis identified in the subclavian, axillary and 
brachial veins. -appreciated Dr Sourav Benites input regarding DOAC and it's absorption issues given the patient's gastric bypass. Agree with warfarin as it's effect can be measured and the dose adjusted accordingly. Will aim for 3 months therapy -PICC line removed. No issues with SOB or CP. -hypercoagulable work up - Prothrombin and FV leiden mutations pending 
  
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  
- S/P Resection arthroplasty of the left knee with placement of dynamic antibiotic spacer on 2018  
- saw Ortho last week and her knee was felt to be okay 
- original stop date for Vanco 19 but she has not been able to follow up with Dr Nelson Horner. I spoke with Dr Nelson Horner  and after some discussion, it was felt appropriate to stop her vancomycin at this point (). Continue to monitor 
  
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 See admission documentation 
-had to stop her diclofenac due to bleeding risk. She is allergic to sulfa so can't use celebrex. Avoiding tramadol due to hx seizures. For now add scheduled tylenol. 
  
OCD/Depression and Seizure Disorder 
-continue patient's multiple home psychotropic medications and Depakote 
  
Hypertension: 
-continue home cozaar 
-prn IV hydralazine ordered 
  
Code Status: Full Surrogate Decision Maker:  
  
DVT Prophylaxis: treatment dose Lovenox as above GI Prophylaxis: not indicted but will continue home PPI 
 
 30.0 - 39.9 Obese / Body mass index is 32.3 kg/m². Subjective: Chief Complaint / Reason for Physician Visit Follow up of RUE DVT, HTN, left TKR infection Chart reviewed in detail. Discussed with RN events overnight. No new complaints Review of Systems: 
Symptom Y/N Comments  Symptom Y/N Comments Fever/Chills    Chest Pain Poor Appetite    Edema Cough    Abdominal Pain Sputum    Joint Pain SOB/CHANG    Pruritis/Rash Nausea/vomit    Tolerating PT/OT Diarrhea    Tolerating Diet Constipation    Other Could NOT obtain due to:   
 
PO intake:  
Patient Vitals for the past 72 hrs: 
 % Diet Eaten 01/28/19 1256 75 % 01/28/19 0957 100 % 01/27/19 1823 75 % 01/27/19 1300 50 % 01/27/19 0919 100 % Objective: VITALS:  
Last 24hrs VS reviewed since prior progress note. Most recent are: 
Patient Vitals for the past 24 hrs: 
 Temp Pulse Resp BP SpO2  
01/30/19 0700 98.6 °F (37 °C) 66 16 113/71 96 % 01/30/19 0511 98.4 °F (36.9 °C) 66 16 115/65 96 % 01/29/19 2318 98.5 °F (36.9 °C) 68 16 135/82 96 % 01/29/19 1844 99 °F (37.2 °C) 70 18 125/65 95 % 01/29/19 1513 98.8 °F (37.1 °C) 65 18 103/64 97 % 01/29/19 1118 97.7 °F (36.5 °C) 69 18 113/65 96 % Intake/Output Summary (Last 24 hours) at 1/30/2019 2428 Last data filed at 1/30/2019 5139 Gross per 24 hour Intake 200 ml Output 1500 ml Net -1300 ml PHYSICAL EXAM: 
General: WD, WN. Alert, cooperative, no acute distress   
EENT:  EOMI. Anicteric sclerae. MMM Resp:  CTA bilaterally, no wheezing or rales. No accessory muscle use CV:  Regular  rhythm,  No edema GI:  Soft, Non distended, Non tender.  +Bowel sounds Neurologic:  Alert and oriented X 3, normal speech, Psych:   Good insight. Not anxious nor agitated Skin:  No rashes. No jaundice (+) RUE swelling / PICC line in place Left knee surgical site looks clean and not infected Reviewed most current lab test results and cultures  YES Reviewed most current radiology test results   YES Review and summation of old records today    NO Reviewed patient's current orders and MAR    YES 
PMH/SH reviewed - no change compared to H&P 
________________________________________________________________________ Care Plan discussed with: 
  Comments Patient x Family RN x Care Manager Consultant Multidiciplinary team rounds were held today with , nursing, pharmacist and clinical coordinator. Patient's plan of care was discussed; medications were reviewed and discharge planning was addressed. ________________________________________________________________________ Total NON critical care TIME:   25  Minutes Total CRITICAL CARE TIME Spent:   Minutes non procedure based Comments >50% of visit spent in counseling and coordination of care This includes time during multidisciplinary rounds if indicated above  
________________________________________________________________________ Reggie Braswell MD  
 
Procedures: see electronic medical records for all procedures/Xrays and details which were not copied into this note but were reviewed prior to creation of Plan. LABS: 
I reviewed today's most current labs and imaging studies. Pertinent labs include: 
Recent Labs  
  01/29/19 
0306 01/28/19 
0316 WBC 5.8 5.5 HGB 9.6* 9.2* HCT 30.7* 29.0*  
 233 Recent Labs  
  01/30/19 
0515 01/29/19 
2067 01/29/19 
0306 01/28/19 
3583 NA  --   --  141 139 K  --   --  4.1 4.4  
CL  --   --  106 105 CO2  --   --  27 27 GLU  --   --  83 90 BUN  --   --  19 25* CREA  --   --  0.71 0.87 CA  --   --  8.3* 8.0* INR 1.4* 1.1  --   --

## 2019-01-30 NOTE — PROGRESS NOTES
General Surgery End of Shift Nursing Note Bedside shift change report given to Maryanne (oncoming nurse) by Lucius Haque (offgoing nurse). Report included the following information Shift worked: 7p-7a Summary of shift: Started using walker to get OOB due to increased weakness, pain still \"not controlled\" Issues for physician to address: Pt states that current medication regimen is not relieving her severe knee pain Number times ambulated in hallway past shift: 0 Number of times OOB to chair past shift: 0 Pain Management: 
Current medication: Oxycodone + Acetaminophen Patient states pain is manageable on current pain medication: No 
 
GI: 
 
Current diet:  DIET REGULAR Tolerating current diet: Yes 
Passing flatus: Yes Last Bowel Movement:1/28/19 Respiratory: 
 
Incentive Spirometer at bedside: Yes Patient instructed on use: Yes Patient Safety: 
 
Falls Score: 1 Bed Alarm On? No 
Sitter? Yes Kuldeep Ellis

## 2019-01-30 NOTE — PROGRESS NOTES
Problem: Falls - Risk of 
Goal: *Absence of Falls Document Peggy Richardson Fall Risk and appropriate interventions in the flowsheet. Outcome: Progressing Towards Goal 
Fall Risk Interventions: 
  
 
  
 
Medication Interventions: Teach patient to arise slowly

## 2019-01-30 NOTE — PROGRESS NOTES
General Surgery End of Shift Nursing Note Bedside shift change report given to Jovany Monique RN (oncoming nurse) by Flora Dyson RN (offgoing nurse). Report included the following information SBAR, Kardex, Intake/Output and Recent Results. Shift worked:   7a-7p Summary of shift:    Patient had IV resited this shift, from left hand to left forearm. Patient also received IV Iron sucrose this shift;   
Patient complaining about pain in her joints- says that her diclofenac usually manages her arthritis pain;  Reviewed chart, and discussed with pharmacist, regarding patient's diclofenac, and safety regarding it. Encouraged patient to discuss with Dr. Arenas Or when he rounded on patient. Patient also could have her Nucynta that she takes at home, but declines to request that her  bring it. Patient continues to get 15mg oxycodone scheduled q4hrs. PICC line removed this shift, from right arm, without complications. Issues for physician to address:   Plan Of Care. Number times ambulated in hallway past shift: 0 Number of times OOB to chair past shift: 1 Pain Management: 
Current medication: Oxycodone scheduled Patient states pain is manageable on current pain medication: YES 
 
GI: 
 
Current diet:  DIET REGULAR Tolerating current diet: YES Passing flatus: YES Last Bowel Movement: several days ago Appearance: n/a Respiratory: 
 
Incentive Spirometer at bedside: YES Patient instructed on use: YES Patient Safety: 
 
Falls Score: 2 Bed Alarm On? No 
Sitter? No 
 
Maryanne Ngo

## 2019-01-30 NOTE — PROGRESS NOTES
Hematology Oncology Progress Note Follow up for: RUE DVT Chart notes reviewed since last visit. Case discussed with following: nursing staff. Patient complains of the following: no new issues overnight. Tolerated the IV iron without incident. Additional concerns noted by the staff:  
 
Patient Vitals for the past 24 hrs: 
 BP Temp Pulse Resp SpO2  
01/30/19 0511 115/65 98.4 °F (36.9 °C) 66 16 96 % 01/29/19 2318 135/82 98.5 °F (36.9 °C) 68 16 96 % 01/29/19 1844 125/65 99 °F (37.2 °C) 70 18 95 % 01/29/19 1513 103/64 98.8 °F (37.1 °C) 65 18 97 % 01/29/19 1118 113/65 97.7 °F (36.5 °C) 69 18 96 % 01/29/19 0752 119/74 98.8 °F (37.1 °C) 67 18 98 % ROS: negative for 11 organ systems except as noted. Physical Examination: 
Constitutional Alert, cooperative, oriented. Mood and affect appropriate. Appears close to chronological age. Well nourished. Well developed. Head Normocephalic; no scars Eyes Conjunctivae and sclerae are clear and without icterus. Pupils are round ENMT Sinuses are nontender. No oral exudates, ulcers, masses, thrush or mucositis. Oropharynx clear. Tongue normal.  
Neck Supple without masses or thyromegaly. No jugular venous distension. Hematologic/Lymphatic No petechiae or purpura. No tender or palpable lymph nodes noted Respiratory Lungs are clear to auscultation without rhonchi or wheezing. Cardiovascular Regular rate and rhythm of heart without murmurs, gallops or rubs. Chest / Line Site Chest is symmetric with no chest wall deformities. Abdomen Non-tender, non-distended, no masses, ascites or hepatosplenomegaly. Good bowel sounds. Musculoskeletal No tenderness Extremities No visible deformities, no cyanosis, clubbing. Both legs and RUE puffy. Left leg with sutures over knee from recent surgery Skin No rashes, scars, or lesions suggestive of malignancy. No petechiae, purpura, or ecchymoses. No excoriations. Neurologic No sensory or motor deficits noted but not tested Psychiatric Alert and oriented. Coherent speech. Verbalizes understanding of our discussions today. Labs: 
Recent Results (from the past 24 hour(s)) GLUCOSE, POC Collection Time: 01/29/19  7:50 AM  
Result Value Ref Range Glucose (POC) 85 65 - 100 mg/dL Performed by Lianne Guerrero) GLUCOSE, POC Collection Time: 01/29/19 11:21 AM  
Result Value Ref Range Glucose (POC) 91 65 - 100 mg/dL Performed by Lianne Guerrero) GLUCOSE, POC Collection Time: 01/29/19  4:30 PM  
Result Value Ref Range Glucose (POC) 103 (H) 65 - 100 mg/dL Performed by Lianne Guerrero) PROTHROMBIN TIME + INR Collection Time: 01/30/19  5:15 AM  
Result Value Ref Range INR 1.4 (H) 0.9 - 1.1 Prothrombin time 14.1 (H) 9.0 - 11.1 sec Assessment and Plan:  
RUE DVT in patient with large clot burden and gastric bypass - has started on coumadin. Pharmacist is dosing. This agent has the advantage of being able to measure its effect and thus determine its absorption unlike the DOACS given her bypass. Decision re inpatient vs outpatient bridging entirely dependent on ability to get PT/INR back in timely manner to adjust the coumadin dosing. Her mobility is limited by her knees. Hypercoagulable workup underway - checking prothrombin mutation and FV Ledien mutation as these are most common and may impact family members. Still pending Iron deficiency - got IV iron yesterday and will get another dose today.

## 2019-01-31 LAB
COPPER SERPL-MCNC: 120 UG/DL (ref 72–166)
INR PPP: 1.9 (ref 0.9–1.1)
PROTHROMBIN TIME: 18.6 SEC (ref 9–11.1)
ZINC SERPL-MCNC: 57 UG/DL (ref 56–134)

## 2019-01-31 PROCEDURE — 65660000000 HC RM CCU STEPDOWN

## 2019-01-31 PROCEDURE — 74011250636 HC RX REV CODE- 250/636: Performed by: INTERNAL MEDICINE

## 2019-01-31 PROCEDURE — 74011250637 HC RX REV CODE- 250/637: Performed by: HOSPITALIST

## 2019-01-31 PROCEDURE — 36415 COLL VENOUS BLD VENIPUNCTURE: CPT

## 2019-01-31 PROCEDURE — 74011250637 HC RX REV CODE- 250/637: Performed by: INTERNAL MEDICINE

## 2019-01-31 PROCEDURE — 85610 PROTHROMBIN TIME: CPT

## 2019-01-31 RX ORDER — WARFARIN SODIUM 5 MG/1
5 TABLET ORAL ONCE
Status: COMPLETED | OUTPATIENT
Start: 2019-01-31 | End: 2019-01-31

## 2019-01-31 RX ADMIN — Medication 10 ML: at 10:46

## 2019-01-31 RX ADMIN — OXYCODONE HYDROCHLORIDE 15 MG: 5 TABLET ORAL at 02:01

## 2019-01-31 RX ADMIN — WARFARIN SODIUM 5 MG: 5 TABLET ORAL at 18:40

## 2019-01-31 RX ADMIN — TRAZODONE HYDROCHLORIDE 100 MG: 100 TABLET ORAL at 21:27

## 2019-01-31 RX ADMIN — OXYCODONE HYDROCHLORIDE 15 MG: 5 TABLET ORAL at 21:27

## 2019-01-31 RX ADMIN — Medication 10 ML: at 16:34

## 2019-01-31 RX ADMIN — Medication 10 ML: at 05:05

## 2019-01-31 RX ADMIN — LOSARTAN POTASSIUM 100 MG: 100 TABLET ORAL at 08:52

## 2019-01-31 RX ADMIN — ACETAMINOPHEN 500 MG: 500 TABLET ORAL at 21:26

## 2019-01-31 RX ADMIN — OXYCODONE HYDROCHLORIDE 15 MG: 5 TABLET ORAL at 11:24

## 2019-01-31 RX ADMIN — OXYCODONE HYDROCHLORIDE 15 MG: 5 TABLET ORAL at 05:04

## 2019-01-31 RX ADMIN — VORTIOXETINE 30 MG: 10 TABLET, FILM COATED ORAL at 10:38

## 2019-01-31 RX ADMIN — ENOXAPARIN SODIUM 80 MG: 80 INJECTION, SOLUTION INTRAVENOUS; SUBCUTANEOUS at 21:27

## 2019-01-31 RX ADMIN — ACETAMINOPHEN 500 MG: 500 TABLET ORAL at 14:38

## 2019-01-31 RX ADMIN — OXYCODONE HYDROCHLORIDE 15 MG: 5 TABLET ORAL at 14:38

## 2019-01-31 RX ADMIN — OXYCODONE HYDROCHLORIDE 15 MG: 5 TABLET ORAL at 18:40

## 2019-01-31 RX ADMIN — VITAMIN D, TAB 1000IU (100/BT) 1000 UNITS: 25 TAB at 08:52

## 2019-01-31 RX ADMIN — ACETAMINOPHEN 500 MG: 500 TABLET ORAL at 18:40

## 2019-01-31 RX ADMIN — DIVALPROEX SODIUM 250 MG: 250 TABLET, DELAYED RELEASE ORAL at 08:53

## 2019-01-31 RX ADMIN — DIVALPROEX SODIUM 500 MG: 500 TABLET, DELAYED RELEASE ORAL at 21:27

## 2019-01-31 RX ADMIN — CETIRIZINE HYDROCHLORIDE 10 MG: 10 TABLET, FILM COATED ORAL at 08:54

## 2019-01-31 RX ADMIN — LEVOTHYROXINE SODIUM 100 MCG: 100 TABLET ORAL at 08:52

## 2019-01-31 RX ADMIN — PANTOPRAZOLE SODIUM 40 MG: 40 TABLET, DELAYED RELEASE ORAL at 08:52

## 2019-01-31 RX ADMIN — ENOXAPARIN SODIUM 80 MG: 80 INJECTION, SOLUTION INTRAVENOUS; SUBCUTANEOUS at 10:39

## 2019-01-31 RX ADMIN — CALCIUM CARBONATE (ANTACID) CHEW TAB 500 MG 200 MG: 500 CHEW TAB at 12:30

## 2019-01-31 RX ADMIN — ACETAMINOPHEN 500 MG: 500 TABLET ORAL at 08:52

## 2019-01-31 RX ADMIN — IRON SUCROSE 300 MG: 20 INJECTION, SOLUTION INTRAVENOUS at 06:39

## 2019-01-31 NOTE — PROGRESS NOTES
3:51PM 
In to speak with pt about d/c planning. Pt stating that she does not have a PCP and would like to be established with [facility] Dr. Danna Goss. Request sent to [facility] specialist. Pt states that she has all necessary DME at home. Her  will be available to assist her at home. She had been receiving [address] at home for IV abx. Not d/c home with IV abx after this stay. PICC pulled. Will need SANDRA order. Per attending, pt will need INR checks twice a week. CM left message for nurse with Dr. Kota Singer to discuss d/c plan and appropriate f/u. CM will continue to follow and assist with d/c planning. CASSI Swift Care Manager

## 2019-01-31 NOTE — PROGRESS NOTES
Hospitalist Progress NoteNAME: Loyda Raymond :  1970 MRN:  [id number] Interim Hospital Summary: 50 y.o. female whom presented on 2019 with Assessment / Plan: 
 
RUE DVT  
-Duplex demonstrates deep venous thrombosis identified in the subclavian, axillary and 
brachial veins. -appreciated Dr Aditi Card input regarding DOAC and it's absorption issues given the patient's gastric bypass. Agree with warfarin as it's effect can be measured and the dose adjusted accordingly. Will aim for 3 months therapy -PICC line removed. No issues with SOB or CP. -hypercoagulable work up - Prothrombin and FV leiden mutations pending 
-continue lovenox bridge until INR >2.  DC planning for tomorrow  
  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  
- S/P Resection arthroplasty of the left knee with placement of dynamic antibiotic spacer on 2018  
- saw Ortho last week and her knee was felt to be okay 
- original stop date for Vanco 19 but she has not been able to follow up with Dr Chase Tony. I spoke with Dr Chase Tony  and after some discussion, it was felt appropriate to stop her vancomycin at this point (). Continue to monitor and patient will follow up with Dr Chase Tony as an outpatient.  
  
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 See admission documentation 
-had to stop her diclofenac due to bleeding risk. She is allergic to sulfa so can't use celebrex. Avoiding tramadol due to hx seizures. For now added scheduled tylenol. 
  
OCD/Depression and Seizure Disorder 
-continue patient's multiple home psychotropic medications and Depakote 
  
Hypertension: 
-continue home cozaar 
-prn IV hydralazine ordered 
  
Code Status: Full Surrogate Decision Maker:  
  
 DVT Prophylaxis: treatment dose Lovenox as above GI Prophylaxis: not indicted but will continue home PPI 
 
30.0 - 39.9 Obese / Body mass index is 32.3 kg/m². Subjective: Chief Complaint / Reason for Physician Visit Follow up of RUE DVT, HTN, left TKR infection Chart reviewed in detail. Discussed with RN events overnight. No new complaints. INR 1.9 Review of Systems: 
Symptom Y/N Comments  Symptom Y/N Comments Fever/Chills    Chest Pain n   
Poor Appetite    Edema Cough    Abdominal Pain Sputum    Joint Pain SOB/CHANG n   Pruritis/Rash Nausea/vomit    Tolerating PT/OT Diarrhea    Tolerating Diet Constipation    Other Could NOT obtain due to:   
 
PO intake:  
Patient Vitals for the past 72 hrs: 
 % Diet Eaten 01/31/19 0851 80 % 01/30/19 0907 75 % 01/28/19 1256 75 % 01/28/19 0957 100 % Objective: VITALS:  
Last 24hrs VS reviewed since prior progress note. Most recent are: 
Patient Vitals for the past 24 hrs: 
 Temp Pulse Resp BP SpO2  
01/31/19 0700 98.4 °F (36.9 °C) 64 16 134/87 99 % 01/31/19 0450 98.3 °F (36.8 °C) 67 16 131/77 96 % 01/31/19 0136 99.1 °F (37.3 °C) 72 17 118/68 97 % 01/30/19 1900 99.3 °F (37.4 °C) 73 16 112/69 96 % 01/30/19 1500 98.1 °F (36.7 °C) 64 16 110/68 96 % 01/30/19 1100 98.5 °F (36.9 °C) 62 16 108/68 97 % Intake/Output Summary (Last 24 hours) at 1/31/2019 6235 Last data filed at 1/31/2019 0105 Gross per 24 hour Intake 805 ml Output 1500 ml Net -695 ml PHYSICAL EXAM: 
General: WD, WN. Alert, cooperative, no acute distress   
EENT:  EOMI. Anicteric sclerae. MMM Resp:  CTA bilaterally, no wheezing or rales. No accessory muscle use CV:  Regular  rhythm,  No edema GI:  Soft, Non distended, Non tender.  +Bowel sounds Neurologic:  Alert and oriented X 3, normal speech, Psych:   Good insight. Not anxious nor agitated Skin:  No rashes. No jaundice (+) RUE swelling / PICC line in place Left knee surgical site looks clean and not infected Reviewed most current lab test results and cultures  YES Reviewed most current radiology test results   YES Review and summation of old records today    NO Reviewed patient's current orders and MAR    YES 
PMH/SH reviewed - no change compared to H&P 
________________________________________________________________________ Care Plan discussed with: 
  Comments Patient x Family RN x Care Manager Consultant Multidiciplinary team rounds were held today with , nursing, pharmacist and clinical coordinator. Patient's plan of care was discussed; medications were reviewed and discharge planning was addressed. ________________________________________________________________________ Total NON critical care TIME:   25  Minutes Total CRITICAL CARE TIME Spent:   Minutes non procedure based Comments >50% of visit spent in counseling and coordination of care This includes time during multidisciplinary rounds if indicated above  
________________________________________________________________________ Lydia Davis MD  
 
Procedures: see electronic medical records for all procedures/Xrays and details which were not copied into this note but were reviewed prior to creation of Plan. LABS: 
I reviewed today's most current labs and imaging studies. Pertinent labs include: 
Recent Labs  
  01/29/19 
0306 WBC 5.8 HGB 9.6* HCT 30.7*  Recent Labs  
  01/31/19 
0503 01/30/19 
0515 01/29/19 
3150 01/29/19 
9088 NA  --   --   --  141 K  --   --   --  4.1 CL  --   --   --  106 CO2  --   --   --  27  
GLU  --   --   --  83 BUN  --   --   --  19  
CREA  --   --   --  0.71 CA  --   --   --  8.3* INR 1.9* 1.4* 1.1  --

## 2019-01-31 NOTE — PROGRESS NOTES
Spiritual Care Assessment/Progress Note [place] [address] 
 
 
NAME: Edward Conn      MRN: [id number] AGE: 50 y.o. SEX: female Hoahaoism Affiliation: Rupert Vogt  
Language: [place] 1/31/2019     Total Time (in minutes): 10 Spiritual Assessment begun in [facility] GENERAL SURGERY through conversation with: 
  
    [x]Patient        [] Family    [] Friend(s) Reason for Consult: Initial/Spiritual assessment, patient floor, Initial visit Spiritual beliefs: (Please include comment if needed) [x] Identifies with a genaro tradition:     
   [] Supported by a genaro community:        
   [] Claims no spiritual orientation:       
   [] Seeking spiritual identity:            
   [] Adheres to an individual form of spirituality:       
   [] Not able to assess:                   
 
    
Identified resources for coping:  
   [] Prayer                           
   [] Music                  [] Guided Imagery [x] Family/friends                 [] Pet visits [] Devotional reading                         [] Unknown 
   [] Other:                                        
 
 
Interventions offered during this visit: (See comments for more details) Patient Interventions: Initial visit Plan of Care: 
 
 [x] Support spiritual and/or cultural needs  
 [] Support AMD and/or advance care planning process    
 [] Support grieving process 
 [] Coordinate Rites and/or Rituals  
 [] Coordination with community clergy [] No spiritual needs identified at this time 
 [] Detailed Plan of Care below (See Comments)  [] Make referral to Music Therapy 
[] Make referral to Pet Therapy    
[] Make referral to Addiction services 
[] Make referral to [facility] 
[] Make referral to Spiritual Care Partner 
[] No future visits requested       
[] Follow up visits as needed Comments: 
Initial spiritual assessment in General Surgery.  Patient/family shared about health issues she has been experiencing. She visited with  about her family being very supportive and helpful during this time. She does have a [place] home as well. Provided pastoral support and comfort to patient. Advised of  Availability. Melisa Escobar Pager: [phone]

## 2019-01-31 NOTE — PROGRESS NOTES
General Surgery End of Shift Nursing Note Bedside shift change report given to Jose J Grace RN (oncoming nurse) by Deb Martin RN (offgoing nurse). Report included the following information SBAR, Kardex and Recent Results. Shift worked:   7a-7p Summary of shift:    Patient expressed that her current pain regimen is ineffective; states her arthritis is causing her to have stiff joints and increased difficulty with mobility. Dr. Geo Dietz discussed with patient at bedside; Scheduled Tylenol was added to patient's medications. Patient has still not had  bring her Nucynta; this may contribute to her lack of pain relief. INR was up to 1.4 today; hopefully patient will reach therapeutic level soon. Issues for physician to address:   Plan Of Care; Pain Management Number times ambulated in hallway past shift: 0 Number of times OOB to chair past shift: 1 Pain Management: 
Current medication: Oxycodone and Tylenol, scheduled; Patient states pain is manageable on current pain medication: NO 
 
GI: 
 
Current diet:  DIET REGULAR Tolerating current diet: YES Passing flatus: YES Last Bowel Movement: several days ago Appearance: n/a Respiratory: 
 
Incentive Spirometer at bedside: YES Patient instructed on use: YES Patient Safety: 
 
Falls Score: 1 Bed Alarm On? No 
Sitter? No 
 
Maryanne Brower

## 2019-01-31 NOTE — PROGRESS NOTES
Pharmacy Daily Dosing of Warfarin Indication: DVT (duplex demonstrates deep venous thrombosis identified in the subclavian, axillary and 
brachial veins) Goal INR: 2-3 PTA Warfarin Dose: NA Concurrent anticoagulants: Lovenox 80mg q12h Concurrent antiplatelet: NA Major Interacting Medications ? Drugs that may increase INR: NA 
? Drugs that may decrease INR: NA Conditions that may increase/decrease INR (CHF, C. diff, cirrhosis, thyroid disorder, hypoalbuminemia): gastric bypass - decreased absorption of warfarin Labs: 
Recent Labs  
  01/31/19 
0503 01/30/19 
0515 01/29/19 
5195 01/29/19 
3484 INR 1.9* 1.4* 1.1  --   
HGB  --   --   --  9.6* PLT  --   --   --  268 Impression/Plan: · Will order warfarin 5 mg PO x 1 dose (conservative side of ADD considering INR trending up) · INR trending up significantly and predictably, should be \"therapeutic\" 2/1/19 · Daily INR · CBC w/o diff QOD Pharmacy will follow daily and adjust the dose as appropriate. [email or web address]. [email or web address]

## 2019-01-31 NOTE — PROGRESS NOTES
Hematology Oncology Progress Note Follow up for: RUDIXIE DVT Chart notes reviewed since last visit. Case discussed with following: no family at bedside Patient complains of the following: no new issues overnight. Tolerated both doses of IV iron without incident. No new issues but has poor appetite Additional concerns noted by the staff:  
 
Patient Vitals for the past 24 hrs: 
 BP Temp Pulse Resp SpO2  
01/31/19 0450 131/77 98.3 °F (36.8 °C) 67 16 96 % 01/31/19 0136 118/68 99.1 °F (37.3 °C) 72 17 97 % 01/30/19 1900 112/69 99.3 °F (37.4 °C) 73 16 96 % 01/30/19 1500 110/68 98.1 °F (36.7 °C) 64 16 96 % 01/30/19 1100 108/68 98.5 °F (36.9 °C) 62 16 97 % 01/30/19 0700 113/71 98.6 °F (37 °C) 66 16 96 % ROS: negative for 11 organ systems except as noted. Physical Examination: 
Constitutional Alert, cooperative, oriented. Mood and affect appropriate. Appears close to chronological age. Well nourished. Well developed. Head Normocephalic; no scars Eyes Conjunctivae and sclerae are clear and without icterus. Pupils are round ENMT Sinuses are nontender. No oral exudates, ulcers, masses, thrush or mucositis. Oropharynx clear. Tongue normal.  
Neck Supple without masses or thyromegaly. No jugular venous distension. Hematologic/Lymphatic No petechiae or purpura. No tender or palpable lymph nodes noted Respiratory Lungs are clear to auscultation without rhonchi or wheezing. Cardiovascular Regular rate and rhythm of heart without murmurs, gallops or rubs. Chest / Line Site Chest is symmetric with no chest wall deformities. Abdomen Non-tender, non-distended, no masses, ascites or hepatosplenomegaly. Good bowel sounds. Musculoskeletal No tenderness Extremities No visible deformities, no cyanosis, clubbing. Both legs and RUE puffy. Left leg with sutures over knee from recent surgery Skin No rashes, scars, or lesions suggestive of malignancy.  No petechiae, purpura, or ecchymoses. No excoriations. Neurologic No sensory or motor deficits noted but not tested Psychiatric Alert and oriented. Coherent speech. Verbalizes understanding of our discussions today. Labs: 
Recent Results (from the past 24 hour(s)) PROTHROMBIN TIME + INR Collection Time: 01/31/19  5:03 AM  
Result Value Ref Range INR 1.9 (H) 0.9 - 1.1 Prothrombin time 18.6 (H) 9.0 - 11.1 sec Assessment and Plan:  
RUE DVT in patient with large clot burden and gastric bypass - has started on coumadin. Pharmacist is dosing. This agent has the advantage of being able to measure its effect and thus determine its absorption unlike the DOACS given her bypass. Decision re inpatient vs outpatient bridging entirely dependent on ability to get PT/INR back in timely manner to adjust the coumadin dosing. Her mobility is limited by her knees. Hypercoagulable workup underway - checking prothrombin mutation and FV Ledien mutation as these are most common and may impact family members. Still pending as of this AM 
 
Iron deficiency - got IV iron x 2 and will get another dose today.

## 2019-01-31 NOTE — PROGRESS NOTES
General Surgery End of Shift Nursing Note Bedside shift change report given to Toma E Maguire [place],Third Floor (oncoming nurse) by Brisa García RN (offgoing nurse). Report included the following information SBAR, Kardex, Intake/Output and MAR. Shift worked:   7a to 7p Summary of shift:    Pt rested comfortably, no procedures or abnormal test results Issues for physician to address:   none Number times ambulated in hallway past shift: 0 Number of times OOB to chair past shift: 5 Pain Management: 
Current medication: oxycodone Patient states pain is manageable on current pain medication: YES 
 
GI: 
 
Current diet:  DIET REGULAR Tolerating current diet: YES Passing flatus: YES Last Bowel Movement: today Appearance:  
 
Respiratory: 
 
Incentive Spirometer at bedside: YES Patient instructed on use: YES Patient Safety: 
 
Falls Score: 2 Bed Alarm On? No 
Sitter?  No 
 
Candido Weinstein RN

## 2019-02-01 VITALS
HEIGHT: 62 IN | DIASTOLIC BLOOD PRESSURE: 69 MMHG | SYSTOLIC BLOOD PRESSURE: 121 MMHG | TEMPERATURE: 98.9 F | RESPIRATION RATE: 18 BRPM | HEART RATE: 73 BPM | WEIGHT: 176.59 LBS | BODY MASS INDEX: 32.5 KG/M2 | OXYGEN SATURATION: 97 %

## 2019-02-01 LAB
INR PPP: 1.7 (ref 0.9–1.1)
PROTHROMBIN TIME: 17 SEC (ref 9–11.1)

## 2019-02-01 PROCEDURE — 74011250637 HC RX REV CODE- 250/637: Performed by: INTERNAL MEDICINE

## 2019-02-01 PROCEDURE — 36415 COLL VENOUS BLD VENIPUNCTURE: CPT

## 2019-02-01 PROCEDURE — 85610 PROTHROMBIN TIME: CPT

## 2019-02-01 PROCEDURE — 74011250636 HC RX REV CODE- 250/636: Performed by: INTERNAL MEDICINE

## 2019-02-01 RX ORDER — WARFARIN SODIUM 5 MG/1
5 TABLET ORAL DAILY
Qty: 30 TAB | Refills: 1 | Status: SHIPPED | OUTPATIENT
Start: 2019-02-01 | End: 2019-04-02

## 2019-02-01 RX ORDER — ENOXAPARIN SODIUM 100 MG/ML
80 INJECTION SUBCUTANEOUS EVERY 12 HOURS
Qty: 10 SYRINGE | Refills: 0 | Status: SHIPPED | OUTPATIENT
Start: 2019-02-01 | End: 2019-02-06

## 2019-02-01 RX ADMIN — VORTIOXETINE 30 MG: 10 TABLET, FILM COATED ORAL at 09:43

## 2019-02-01 RX ADMIN — CETIRIZINE HYDROCHLORIDE 10 MG: 10 TABLET, FILM COATED ORAL at 08:39

## 2019-02-01 RX ADMIN — LOSARTAN POTASSIUM 100 MG: 100 TABLET ORAL at 09:44

## 2019-02-01 RX ADMIN — VITAMIN D, TAB 1000IU (100/BT) 1000 UNITS: 25 TAB at 08:39

## 2019-02-01 RX ADMIN — ENOXAPARIN SODIUM 80 MG: 80 INJECTION, SOLUTION INTRAVENOUS; SUBCUTANEOUS at 09:45

## 2019-02-01 RX ADMIN — PANTOPRAZOLE SODIUM 40 MG: 40 TABLET, DELAYED RELEASE ORAL at 06:32

## 2019-02-01 RX ADMIN — OXYCODONE HYDROCHLORIDE 15 MG: 5 TABLET ORAL at 02:19

## 2019-02-01 RX ADMIN — IRON SUCROSE 300 MG: 20 INJECTION, SOLUTION INTRAVENOUS at 06:58

## 2019-02-01 RX ADMIN — DIVALPROEX SODIUM 250 MG: 250 TABLET, DELAYED RELEASE ORAL at 08:39

## 2019-02-01 RX ADMIN — LEVOTHYROXINE SODIUM 100 MCG: 100 TABLET ORAL at 06:32

## 2019-02-01 RX ADMIN — OXYCODONE HYDROCHLORIDE 15 MG: 5 TABLET ORAL at 06:32

## 2019-02-01 RX ADMIN — ACETAMINOPHEN 500 MG: 500 TABLET ORAL at 08:39

## 2019-02-01 RX ADMIN — OXYCODONE HYDROCHLORIDE 15 MG: 5 TABLET ORAL at 09:43

## 2019-02-01 NOTE — DISCHARGE SUMMARY
Hospitalist Discharge Summary     Patient ID:  Bárbara Carnes  [id number]  50 y.o.  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  female who has been on long term IV antibiotics for prosthetic knee joint infection that presents with acute onset RUE swelling and pain taht started this am. Patient also reports some neck pain as well. Patient has had a PICC since 12/17/18. She denies any CP or SOB. She did have some right neck pain this am and though she just slept wrong.      In the ED patient's RUE doppler showed: \"Right arm :  1. Deep vein(s) visualized include the internal jugular, subclavian, axillary, brachial, radial and ulnar veins. 2. Deep venous thrombosis identified in the subclavian, axillary and brachial veins. 3. No evidence of deep vein thrombosis in the contralateral subclavian  vein. 4. Superficial vein(s) visualized include the basilic (upper arm), basilic (forearm), cephalic (upper arm) and cephalic (forearm) veins. 5. Superficial venous thrombosis identified in the cephalic (upper arm) vein. \"     We were asked to admit for work up and evaluation of the above problems. ______________________________________________________________________  DISCHARGE SUMMARY/HOSPITAL COURSE:  for full details see H&P, daily progress notes, labs, consult notes.      RUE DVT  -Duplex demonstrates deep venous thrombosis identified in the subclavian, axillary and brachial veins.  -appreciated Dr Ashley Slater input regarding DOAC and it's absorption issues given the patient's gastric bypass. Agree with warfarin as it's effect can be measured and the dose adjusted accordingly. Will aim for 3 months therapy  -PICC line removed. No issues with SOB or CP. -hypercoagulable work up - Prothrombin and FV leiden mutations pending  -continue lovenox bridge until INR >2. INR 1.7 so will discharge patient on lovenox and coumadin. Asking for [facility] to draw her INR on MWF and have results sent to Dr Ashley Slater office. 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 I spoke with Dr Celestino Urena 1/28 and after some discussion, it was felt appropriate to stop her vancomycin at this point (1/28). Continue to monitor and patient will follow up with Dr Celestino Urena as an outpatient.      Chronic Pain: from old collar bone injury (2005)  -continue patient on her home meds. See admission documentation  -had to stop her diclofenac due to bleeding risk. She is allergic to sulfa so can't use celebrex. Avoiding tramadol due to hx seizures. For now added scheduled tylenol.     OCD/Depression and Seizure Disorder  -continue patient's multiple home psychotropic medications and Depakote     Hypertension:  -continue home cozaar       _______________________________________________________________________  Patient seen and examined by me on discharge day. Pertinent Findings:  Gen:    Not in distress  Chest: Clear lungs  CVS:   Regular rhythm.   RUE edema improving   Abd:  Soft, not distended, not tender  Neuro:  Alert, Oriented x 4, grossly non focal exam  _______________________________________________________________________  DISCHARGE MEDICATIONS:   Current Discharge Medication List      START taking these medications    Details   enoxaparin (LOVENOX) 80 mg/0.8 mL injection 80 mg by SubCUTAneous route every twelve (12) hours every twelve (12) hours for 5 days. Qty: 10 Syringe, Refills: 0      warfarin (COUMADIN) 5 mg tablet Take 1 Tab by mouth daily for 60 days. Or as directed by your doctor  Qty: 30 Tab, Refills: 1         CONTINUE these medications which have NOT CHANGED    Details   acetaminophen (TYLENOL) 500 mg tablet Take 500 mg by mouth every six (6) hours as needed for Pain. alpha-d-galactosidase (BEANO) 150 unit tab tablet Take 2 Tabs by mouth three (3) times daily as needed for Flatulence. amphetamine sulfate (EVEKEO) 10 mg tab Take 10 mg by mouth. PT TAKES 2 TABS (20 MG) BID AT 0200 AND 1200      baclofen (LIORESAL) 10 mg tablet Take 10 mg by mouth two (2) times daily as needed for Pain. albuterol (PROAIR HFA) 90 mcg/actuation inhaler Take 1 Puff by inhalation every four (4) hours as needed for Wheezing. acyclovir (ZOVIRAX) 400 mg tablet Take 400 mg by mouth every four (4) hours as needed for Other (fever blisters). oxyCODONE IR (OXY-IR) 15 mg immediate release tablet Take 1 Tab by mouth every four (4) hours as needed for Pain. Max Daily Amount: 90 mg.  Qty: 100 Tab, Refills: 0    Associated Diagnoses: Primary osteoarthritis of both knees      diclofenac 10% cyclobenzaprine 2% lidocaine 5% gabapentin 6% cream compound Apply 2 g to affected area four (4) times daily as needed for Pain. tapentadol (NUCYNTA ER) 250 mg Tb12 Take 1 Tab by mouth two (2) times a day. cholecalciferol, VITAMIN D3, (VITAMIN D3) 5,000 unit tab tablet Take 1 Cap by mouth daily. levothyroxine (SYNTHROID) 100 mcg tablet Take 100 mcg by mouth Daily (before breakfast).       vortioxetine (TRINTELLIX) 10 mg tablet Take  by mouth daily. PT TAKES A TOTAL OF 30 mg DAILY      traZODone (DESYREL) 100 mg tablet Take 100 mg by mouth nightly. May take 1/2 tab to 1 tab every evening      potassium chloride SR (KLOR-CON 10) 10 mEq tablet Take 10 mEq by mouth daily. furosemide (LASIX) 20 mg tablet Take 20 mg by mouth daily. MAY TAKE ONLY IF NEEDED FOR SEVERE ANKLE SWELLING      divalproex DR (DEPAKOTE) 250 mg tablet Take 1 tablet by mouth in  the morning and 2 tablets  by mouth in the evening  Qty: 180 Tab, Refills: 10      Lisdexamfetamine (VYVANSE) 50 mg capsule Take 50 mg by mouth two (2) times a day. Takes in the morning and at noon daily      losartan (COZAAR) 50 mg tablet Take 100 mg by mouth daily. simethicone (GAS-X) 80 mg chewable tablet Take 80 mg by mouth every six (6) hours as needed for Flatulence. lansoprazole (PREVACID) 15 mg disintegrating tablet Take 1 Tab by mouth Daily (before breakfast). cetirizine (ZYRTEC) 10 mg tablet Take 1 Tab by mouth daily.          STOP taking these medications       aspirin delayed-release 81 mg tablet Comments:   Reason for Stopping:         vancomycin/0.9 % sod chloride (VANCOMYCIN IN 0.9% SODIUM CL) 1.25 gram/150 mL soln Comments:   Reason for Stopping:         diclofenac EC (VOLTAREN) 75 mg EC tablet Comments:   Reason for Stopping:         heparin, porcine, pf (HEPARIN LOCKFLUSH,PORCINE,,PF,) 10 unit/mL injection Comments:   Reason for Stopping:         sodium chloride (NORMAL SALINE FLUSH) Comments:   Reason for Stopping:         mupirocin calcium (BACTROBAN) 2 % topical cream Comments:   Reason for Stopping:         hydrochlorothiazide (HYDRODIURIL) 25 mg tablet Comments:   Reason for Stopping:         benzoyl peroxide (DUAL ACTION) 3.5 % Clsr Comments:   Reason for Stopping:         MULTIVIT WITH CALCIUM,IRON,MIN (ONE-A-DAY WOMENS FORMULA PO) Comments:   Reason for Stopping:               My Recommended Diet, Activity, Wound Care, and follow-up labs are listed in the patient's Discharge Insturctions which I have personally completed and reviewed. Risk of deterioration: Low    Condition at Discharge:  Stable  _____________________________________________________________________    Disposition  Home with family, no needs  ____________________________________________________________________    Care Plan discussed with:   Patient, Family, RN, Care Manager, Consultant    ____________________________________________________________________    Code Status: Full Code  ____________________________________________________________________      Condition at Discharge:  Stable  _____________________________________________________________________  Follow up with:   PCP : Alberto Monroe MD  Follow-up Information     Follow up With Specialties Details Why Contact Eloy Sheffield MD Internal Medicine Go on 5/9/2019 Hospital follow-up scheduled at 10:00am Please bring Insurance Card, Discharge paperwork, Photo ID, list of medications and any co-pay you may have ( If you have questions or need to reschedule please call [phone] [address] [facility]  [address] [facility], MD Hematology and Oncology, Hematology, Oncology In 2 weeks  [address]  [address]  [address] [address] [address] [facility]  This is the provider of your home health services.   [facility] [phone] Manuel Isbell MD Infectious Diseases In 2 weeks  [address] [facility], [address] [facility],[address] [address] [phone]                Total time in minutes spent coordinating this discharge (includes going over instructions, follow-up, prescriptions, and preparing report for sign off to her PCP) :  35 minutes    Signed:  Tarik Velasquez MD

## 2019-02-01 NOTE — PROGRESS NOTES
Problem: Falls - Risk of 
Goal: *Absence of Falls Document Crystal Resendiz Fall Risk and appropriate interventions in the flowsheet. Outcome: Progressing Towards Goal 
Fall Risk Interventions: 
Mobility Interventions: Utilize walker, cane, or other assistive device Medication Interventions: Patient to call before getting OOB

## 2019-02-01 NOTE — DISCHARGE INSTRUCTIONS
HOSPITALIST DISCHARGE INSTRUCTIONS    NAME: Louise Squires   :  1970   MRN:  [id number]     Date/Time:  2019 10:00 AM    ADMIT DATE: 2019   DISCHARGE DATE: 2019         · It is important that you take the medication exactly as they are prescribed. · Keep your medication in the bottles provided by the pharmacist and keep a list of the medication names, dosages, and times to be taken in your wallet. · Do not take other medications without consulting your doctor. What to do at [address]:  Cardiac Diet    Recommended activity: Activity as tolerated      If you have questions regarding the hospital related prescriptions or hospital related issues please call [facility] at [phone]. You can always direct your questions to your primary care doctor if you are unable to reach your hospital physician; your PCP works as an extension of your hospital doctor just like your hospital doctor is an extension of your PCP for your time at the hospital [facility], [facility])    If you experience any of the following symptoms then please call your primary care physician or return to the emergency room if you cannot get hold of your doctor:    Fever, chills, nausea, vomiting, or persistent diarrhea  Worsening weakness or new problems with your speech or balance  Dark stools or visible blood in your stools  New Leg swelling or shortness of breath as these could be signs of a clot    Additional Instructions: Take coumadin   7.5mg on ,   5  mg on    7.5mg on     Will request for PT INR checks on Monday, Wednesday and Friday and have results faxed to [phone] c/o Alena Roberts NP at Dr Thuy Benjamin office            Information obtained by :  I understand that if any problems occur once I am at home I am to contact my physician. I understand and acknowledge receipt of the instructions indicated above. Physician's or R.N.'s Signature                                                                  Date/Time                                                                                                                                              Patient or Representative Signature

## 2019-02-01 NOTE — PROGRESS NOTES
Attempted to make follow up appointment with Dr Maranda Sanchez but office said they will have to put a message into the nurse for an appointment. Nurse will call patient with appointment

## 2019-02-01 NOTE — PROGRESS NOTES
Pharmacy Daily Dosing of Warfarin Indication: DVT (duplex demonstrates deep venous thrombosis identified in the subclavian, axillary and 
brachial veins) Goal INR: 2-3 PTA Warfarin Dose: NA Concurrent anticoagulants: Lovenox 80mg q12h Concurrent antiplatelet: NA Major Interacting Medications ? Drugs that may increase INR: NA 
? Drugs that may decrease INR: NA Conditions that may increase/decrease INR (CHF, C. diff, cirrhosis, thyroid disorder, hypoalbuminemia): gastric bypass - decreased absorption of warfarin Labs: 
Recent Labs 02/01/19 
7840 01/31/19 
0503 01/30/19 
9743 INR 1.7* 1.9* 1.4* Impression/Plan: · INR went down this am.  Will order warfarin 6 mg PO today · Daily INR · CBC w/o diff QOD Pharmacy will follow daily and adjust the dose as appropriate.

## 2019-02-01 NOTE — PROGRESS NOTES
New Patient First Initial PCP ERAN appt scheduled with Dr. Edie Diallo on 5/9/2019 at 10:00am. Appt added to AVS. Liu Canela CM Specialist

## 2019-02-01 NOTE — PROGRESS NOTES
10: 24AM 
CM confirmed pt's pharmacy as [facility] in [place]. CM faxed Lovenox Rx to check price. 11:11AM 
Lovenox priced at $7. Pt updated. CM attempting to clarify with attending and hematology regarding necessary INR f/u. Waiting for return call. 12:07PM 
Discussion with Dr. Isaias Ng. Plan for pt to d/c home with MWF INR draws by [facility]. Information to be faxed to Dr. Delores Montano NP. Fax number in order. CM to update [facility]. New pt PCP appt scheduled with Dr. Josue Olsen at pt's request. First available appt in May 2019. Pt ready for d/c from CM perspective. CM available for any additional needs. Limmie Lesch, MSW Care Manager

## 2019-02-02 ENCOUNTER — HOME CARE VISIT (OUTPATIENT)
Dept: SCHEDULING | Facility: HOME HEALTH | Age: 49
End: 2019-02-02
Payer: COMMERCIAL

## 2019-02-02 ENCOUNTER — HOME HEALTH ADMISSION (OUTPATIENT)
Dept: HOME HEALTH SERVICES | Facility: HOME HEALTH | Age: 49
End: 2019-02-02
Payer: COMMERCIAL

## 2019-02-02 VITALS
TEMPERATURE: 98.2 F | RESPIRATION RATE: 18 BRPM | DIASTOLIC BLOOD PRESSURE: 78 MMHG | SYSTOLIC BLOOD PRESSURE: 122 MMHG | OXYGEN SATURATION: 96 % | HEART RATE: 82 BPM

## 2019-02-02 PROCEDURE — G0299 HHS/HOSPICE OF RN EA 15 MIN: HCPCS

## 2019-02-02 PROCEDURE — 400013 HH SOC

## 2019-02-04 ENCOUNTER — HOME CARE VISIT (OUTPATIENT)
Dept: SCHEDULING | Facility: HOME HEALTH | Age: 49
End: 2019-02-04
Payer: COMMERCIAL

## 2019-02-04 VITALS
RESPIRATION RATE: 18 BRPM | DIASTOLIC BLOOD PRESSURE: 80 MMHG | HEART RATE: 73 BPM | SYSTOLIC BLOOD PRESSURE: 140 MMHG | TEMPERATURE: 99.1 F | OXYGEN SATURATION: 97 %

## 2019-02-04 LAB
F2 GENE MUT ANL BLD/T: NORMAL
F5 GENE MUT ANL BLD/T: NORMAL
INR BLD: 2 (ref 0.9–1.1)
PT POC: 24.1 SECONDS (ref 11.8–14.9)

## 2019-02-04 PROCEDURE — G0299 HHS/HOSPICE OF RN EA 15 MIN: HCPCS

## 2019-02-06 ENCOUNTER — HOME CARE VISIT (OUTPATIENT)
Dept: HOME HEALTH SERVICES | Facility: HOME HEALTH | Age: 49
End: 2019-02-06
Payer: COMMERCIAL

## 2019-02-08 ENCOUNTER — HOME CARE VISIT (OUTPATIENT)
Dept: SCHEDULING | Facility: HOME HEALTH | Age: 49
End: 2019-02-08
Payer: COMMERCIAL

## 2019-02-08 PROCEDURE — G0299 HHS/HOSPICE OF RN EA 15 MIN: HCPCS

## 2019-02-09 VITALS
SYSTOLIC BLOOD PRESSURE: 132 MMHG | OXYGEN SATURATION: 97 % | RESPIRATION RATE: 18 BRPM | HEART RATE: 72 BPM | DIASTOLIC BLOOD PRESSURE: 75 MMHG | TEMPERATURE: 97.9 F

## 2019-02-11 ENCOUNTER — HOME CARE VISIT (OUTPATIENT)
Dept: SCHEDULING | Facility: HOME HEALTH | Age: 49
End: 2019-02-11
Payer: COMMERCIAL

## 2019-02-11 VITALS
SYSTOLIC BLOOD PRESSURE: 128 MMHG | DIASTOLIC BLOOD PRESSURE: 70 MMHG | OXYGEN SATURATION: 97 % | HEART RATE: 64 BPM | TEMPERATURE: 98 F | RESPIRATION RATE: 17 BRPM

## 2019-02-11 LAB
INR BLD: 2.8 (ref 0.9–1.1)
PT POC: 34.1 SECONDS (ref 11.8–14.9)

## 2019-02-11 PROCEDURE — G0300 HHS/HOSPICE OF LPN EA 15 MIN: HCPCS

## 2019-02-15 ENCOUNTER — HOME CARE VISIT (OUTPATIENT)
Dept: SCHEDULING | Facility: HOME HEALTH | Age: 49
End: 2019-02-15
Payer: COMMERCIAL

## 2019-02-15 LAB
INR BLD: 3.3 (ref 0.9–1.1)
PT POC: 39.8 SECONDS (ref 11.8–14.9)

## 2019-02-15 PROCEDURE — G0300 HHS/HOSPICE OF LPN EA 15 MIN: HCPCS

## 2019-02-17 VITALS
SYSTOLIC BLOOD PRESSURE: 110 MMHG | HEART RATE: 70 BPM | TEMPERATURE: 98 F | RESPIRATION RATE: 18 BRPM | DIASTOLIC BLOOD PRESSURE: 80 MMHG | OXYGEN SATURATION: 97 %

## 2019-02-18 ENCOUNTER — HOME CARE VISIT (OUTPATIENT)
Dept: HOME HEALTH SERVICES | Facility: HOME HEALTH | Age: 49
End: 2019-02-18
Payer: COMMERCIAL

## 2019-02-22 ENCOUNTER — HOME CARE VISIT (OUTPATIENT)
Dept: SCHEDULING | Facility: HOME HEALTH | Age: 49
End: 2019-02-22
Payer: COMMERCIAL

## 2019-02-22 ENCOUNTER — HOME CARE VISIT (OUTPATIENT)
Dept: HOME HEALTH SERVICES | Facility: HOME HEALTH | Age: 49
End: 2019-02-22
Payer: COMMERCIAL

## 2019-02-22 VITALS
RESPIRATION RATE: 20 BRPM | OXYGEN SATURATION: 98 % | DIASTOLIC BLOOD PRESSURE: 78 MMHG | TEMPERATURE: 98.3 F | HEART RATE: 63 BPM | SYSTOLIC BLOOD PRESSURE: 120 MMHG

## 2019-02-22 PROCEDURE — G0299 HHS/HOSPICE OF RN EA 15 MIN: HCPCS

## 2019-02-26 ENCOUNTER — HOME CARE VISIT (OUTPATIENT)
Dept: HOME HEALTH SERVICES | Facility: HOME HEALTH | Age: 49
End: 2019-02-26
Payer: COMMERCIAL

## 2019-03-05 ENCOUNTER — HOME CARE VISIT (OUTPATIENT)
Dept: HOME HEALTH SERVICES | Facility: HOME HEALTH | Age: 49
End: 2019-03-05
Payer: COMMERCIAL

## 2019-03-07 ENCOUNTER — HOME CARE VISIT (OUTPATIENT)
Dept: SCHEDULING | Facility: HOME HEALTH | Age: 49
End: 2019-03-07
Payer: COMMERCIAL

## 2019-03-07 VITALS
HEART RATE: 55 BPM | OXYGEN SATURATION: 98 % | DIASTOLIC BLOOD PRESSURE: 73 MMHG | RESPIRATION RATE: 18 BRPM | TEMPERATURE: 100.1 F | SYSTOLIC BLOOD PRESSURE: 110 MMHG

## 2019-03-07 LAB
INR BLD: 2.5 (ref 0.9–1.1)
PT POC: 30.1 SECONDS (ref 11.8–14.9)

## 2019-03-07 PROCEDURE — G0299 HHS/HOSPICE OF RN EA 15 MIN: HCPCS

## 2019-04-10 ENCOUNTER — HOSPITAL ENCOUNTER (EMERGENCY)
Age: 49
Discharge: HOME | End: 2019-04-10
Attending: EMERGENCY MEDICINE | Admitting: EMERGENCY MEDICINE
Payer: COMMERCIAL

## 2019-04-10 VITALS
BODY MASS INDEX: 32.5 KG/M2 | OXYGEN SATURATION: 97 % | HEART RATE: 82 BPM | HEIGHT: 62 IN | SYSTOLIC BLOOD PRESSURE: 147 MMHG | RESPIRATION RATE: 18 BRPM | TEMPERATURE: 98.8 F | WEIGHT: 176.59 LBS | DIASTOLIC BLOOD PRESSURE: 97 MMHG

## 2019-04-10 DIAGNOSIS — Z79.899 CHEMOPROPHYLAXIS: Primary | ICD-10-CM

## 2019-04-10 DIAGNOSIS — Z20.89 MENINGITIS EXPOSURE: ICD-10-CM

## 2019-04-10 PROCEDURE — 96372 THER/PROPH/DIAG INJ SC/IM: CPT

## 2019-04-10 PROCEDURE — 99281 EMR DPT VST MAYX REQ PHY/QHP: CPT

## 2019-04-10 PROCEDURE — 74011250636 HC RX REV CODE- 250/636: Performed by: EMERGENCY MEDICINE

## 2019-04-10 RX ORDER — CEFTRIAXONE 250 MG/8ML
250 INJECTION, POWDER, FOR SOLUTION INTRAMUSCULAR; INTRAVENOUS
Status: DISCONTINUED | OUTPATIENT
Start: 2019-04-10 | End: 2019-04-10 | Stop reason: RX

## 2019-04-10 RX ADMIN — LIDOCAINE HYDROCHLORIDE 250 MG: 10 INJECTION, SOLUTION EPIDURAL; INFILTRATION; INTRACAUDAL; PERINEURAL at 17:17

## 2019-04-10 NOTE — ED NOTES
Discharge instructions reviewed with pt, copy given by Amadou Stern NP. Pt is accompanied by family, denies use of wheelchair.

## 2019-04-10 NOTE — ED PROVIDER NOTES
EMERGENCY DEPARTMENT HISTORY AND PHYSICAL EXAM 
 
 
Date: 4/10/2019 Patient Name: Kathryn Whittaker History of Presenting Illness Chief Complaint Patient presents with  
[facility] Other  
  pt reports her son passed away Monday from bacterial menengitis and wants to be tested, denies fever History Provided By: Patient HPI: Kathryn Whittaker, 52 y.o. female with PMHx significant for OCD, hypertension, seizures, hypothyroidism, GERD, arthritis, chronic pain, MRSA, ADHD, presents ambulatory to the ED with cc of need for meningitis treatment. Unfortunately the patient lost her son on Monday. Over the weekend the patient's son had a febrile illness and was diagnosed with the flu at an urgent care. He was found dead the following morning. Autopsy revealed meningitis as the cause of expiration. Patient and her  and son present to the emergency room for meningitis prophylaxis. Pt denies fevers, chills, night sweats, chest pain, pressure, SOB, CHANG, PND, orthopnea, abdominal pain, n/v/d, melena, hematuria, dysuria, constipation, HA, dizziness, and syncope There are no other complaints, changes, or physical findings at this time. PCP: Ruddy Zafar MD 
 
No current facility-administered medications on file prior to encounter. Current Outpatient Medications on File Prior to Encounter Medication Sig Dispense Refill  celecoxib (CELEBREX) 200 mg capsule Take 200 mg by mouth daily.  hydrOXYzine HCl (ATARAX) 25 mg tablet Take 25 mg by mouth every twelve (12) hours as needed for Itching.  oxyCODONE IR (OXY-IR) 15 mg immediate release tablet Take 1 Tab by mouth every four (4) hours as needed for Pain. Max Daily Amount: 90 mg. 100 Tab 0  
 acetaminophen (TYLENOL) 500 mg tablet Take 500 mg by mouth every six (6) hours as needed for Pain.     
 diclofenac 10% cyclobenzaprine 2% lidocaine 5% gabapentin 6% cream compound Apply 2 g to affected area four (4) times daily as needed for Pain.    
 tapentadol (NUCYNTA ER) 250 mg Tb12 Take 1 Tab by mouth two (2) times a day.  alpha-d-galactosidase (BEANO) 150 unit tab tablet Take 2 Tabs by mouth three (3) times daily as needed for Flatulence.  cholecalciferol, VITAMIN D3, (VITAMIN D3) 5,000 unit tab tablet Take 1 Cap by mouth daily.  amphetamine sulfate (EVEKEO) 10 mg tab Take 10 mg by mouth. PT TAKES 2 TABS (20 MG) BID AT 0200 AND 1200  levothyroxine (SYNTHROID) 100 mcg tablet Take 100 mcg by mouth Daily (before breakfast).  vortioxetine (TRINTELLIX) 10 mg tablet Take  by mouth daily. PT TAKES A TOTAL OF 30 mg DAILY  traZODone (DESYREL) 100 mg tablet Take 100 mg by mouth nightly. May take 1/2 tab to 1 tab every evening  potassium chloride SR (KLOR-CON 10) 10 mEq tablet Take 10 mEq by mouth daily.  furosemide (LASIX) 20 mg tablet Take 20 mg by mouth daily. MAY TAKE ONLY IF NEEDED FOR SEVERE ANKLE SWELLING    
 baclofen (LIORESAL) 10 mg tablet Take 10 mg by mouth two (2) times daily as needed for Pain.  divalproex DR (DEPAKOTE) 250 mg tablet Take 1 tablet by mouth in  the morning and 2 tablets  by mouth in the evening 180 Tab 10  Lisdexamfetamine (VYVANSE) 50 mg capsule Take 50 mg by mouth two (2) times a day. Takes in the morning and at noon daily  albuterol (PROAIR HFA) 90 mcg/actuation inhaler Take 1 Puff by inhalation every four (4) hours as needed for Wheezing.  acyclovir (ZOVIRAX) 400 mg tablet Take 400 mg by mouth every four (4) hours as needed for Other (fever blisters).  losartan (COZAAR) 50 mg tablet Take 100 mg by mouth daily.  simethicone (GAS-X) 80 mg chewable tablet Take 80 mg by mouth every six (6) hours as needed for Flatulence.  lansoprazole (PREVACID) 15 mg disintegrating tablet Take 1 Tab by mouth Daily (before breakfast).  cetirizine (ZYRTEC) 10 mg tablet Take 1 Tab by mouth daily. Past History Past Medical History: 
Past Medical History: Diagnosis Date  ADHD (attention deficit hyperactivity disorder)  Arthritis OSTEO  Chronic pain  GERD (gastroesophageal reflux disease)  Graves disease NO LONGER AN ISSUE  
 Hematuria 10/9/2018  
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   
 depression  Seizure ([address]  Seizures ([address]  &  REACTIVE TO HYPOGLYCEMIA / ALSO FROM FLASHING LIGHTS Past Surgical History: 
Past Surgical History:  
Procedure Laterality Date  DELIVERY     
 [facility]LAST[facility]   [facility] [address]  HX  SECTION    HX GASTRIC BYPASS    
  [address] CHOLEYCYSTECTOMY  HX ORTHOPAEDIC Right CARPEL TUNNEL  
 HX ORTHOPAEDIC Left CARPEL TUNNEL  
 HX ORTHOPAEDIC Left ULNA SHORTENING  
 HX ORTHOPAEDIC Right  [facility] TONSILLECTOMY  [address] Family History: 
Family History Problem Relation Age of Onset  Heart Disease Mother  Heart Attack Mother  Hypertension Mother [facility] Arthritis-osteo Mother  Rashes/Skin Problems Mother PLAQUE PSORIASIS  
 Heart Disease Father  Thyroid Disease Father  Diabetes Father  Hypertension Father  Diabetes Maternal Grandmother  Heart Attack Maternal Grandmother  Hypertension Maternal Grandmother  Arthritis-osteo Maternal Grandmother  Hypertension Maternal Grandfather  Stroke Maternal Grandfather  High Cholesterol Sister  Anesth Problems Neg Hx Social History: 
Social History Tobacco Use  Smoking status: Former Smoker Types: Cigarettes Last attempt to quit: 1994 Years since quittin.5  Smokeless tobacco: Never Used Substance Use Topics  Alcohol use: No  
 Drug use: No  
 
 
Allergies: Allergies Allergen Reactions  Sulfa (Sulfonamide Antibiotics) Hives  Pcn [Penicillins] Unproven on Challenge Review of Systems Review of Systems Constitutional: Negative for activity change, appetite change, chills, diaphoresis, fatigue, fever and unexpected weight change. HENT: Negative for congestion, ear pain, rhinorrhea, sinus pressure, sore throat and tinnitus. Eyes: Negative for photophobia, pain, discharge and visual disturbance. Respiratory: Negative for apnea, cough, choking, chest tightness, shortness of breath, wheezing and stridor. Cardiovascular: Negative for chest pain, palpitations and leg swelling. Gastrointestinal: Negative for abdominal pain, constipation, diarrhea, nausea and vomiting. Endocrine: Negative for polydipsia, polyphagia and polyuria. Genitourinary: Negative for decreased urine volume, dyspareunia, dysuria, enuresis, flank pain, frequency, hematuria and urgency. Musculoskeletal: Negative for arthralgias, back pain, gait problem, myalgias and neck pain. Skin: Negative for color change, pallor, rash and wound. Allergic/Immunologic: Negative for immunocompromised state. Neurological: Negative for dizziness, seizures, syncope, weakness, light-headedness and headaches. Hematological: Does not bruise/bleed easily. Psychiatric/Behavioral: Negative for agitation and confusion. The patient is not nervous/anxious. Physical Exam  
Physical Exam  
Constitutional: She is oriented to person, place, and time. She appears well-developed and well-nourished. No distress. HENT:  
Head: Normocephalic. Right Ear: External ear normal.  
Left Ear: External ear normal.  
Mouth/Throat: Oropharynx is clear and moist. No oropharyngeal exudate. Eyes: Pupils are equal, round, and reactive to light. Conjunctivae and EOM are normal. Right eye exhibits no discharge. Left eye exhibits no discharge. No scleral icterus. Neck: Normal range of motion. Neck supple. No JVD present. No tracheal deviation present. No thyromegaly present. Cardiovascular: Normal rate, regular rhythm, normal heart sounds and intact distal pulses. Exam reveals no gallop and no friction rub. No murmur heard. Pulmonary/Chest: Effort normal and breath sounds normal. No stridor. No respiratory distress. She has no wheezes. She has no rales. She exhibits no tenderness. Abdominal: Soft. Bowel sounds are normal. She exhibits no distension and no mass. There is no tenderness. There is no rebound and no guarding. Musculoskeletal: Normal range of motion. She exhibits no edema or tenderness. Lymphadenopathy:  
  She has no cervical adenopathy. Neurological: She is alert and oriented to person, place, and time. She displays normal reflexes. No cranial nerve deficit. Coordination normal.  
Skin: Skin is warm and dry. No rash noted. She is not diaphoretic. No erythema. No pallor. Psychiatric: She has a normal mood and affect. Her behavior is normal. Judgment and thought content normal.  
Nursing note and vitals reviewed. Diagnostic Study Results Labs - No results found for this or any previous visit (from the past 12 hour(s)). Radiologic Studies - No orders to display CT Results  (Last 48 hours) None CXR Results  (Last 48 hours) None Medical Decision Making I am the first provider for this patient. I reviewed the vital signs, available nursing notes, past medical history, past surgical history, family history and social history. Vital Signs-Reviewed the patient's vital signs. Patient Vitals for the past 12 hrs: 
 Temp Pulse Resp BP SpO2  
04/10/19 1739  82  (!) 147/97 97 % 04/10/19 1545 98.8 °F (37.1 °C) 96 18 (!) 200/130 97 % Pulse Oximetry Analysis - 97% on RA Cardiac Monitor:  
Rate: 82 bpm 
 
Records Reviewed: Nursing Notes, Old Medical Records, Previous electrocardiograms, Previous Radiology Studies and Previous Laboratory Studies Provider Notes (Medical Decision Making):  
Meningitis exposure uncertain if this was bacterial or viral however will treat for prophylaxis Rocephin IM x1 now ED Course:  
Initial assessment performed. The patients presenting problems have been discussed, and they are in agreement with the care plan formulated and outlined with them. I have encouraged them to ask questions as they arise throughout their visit. Stable ambulatory patient in no acute distress Critical Care Time:  
0 Disposition: 
Discharge to home with primary care physician follow-up PLAN: 
1. Current Discharge Medication List  
  
 
2. Follow-up Information Follow up With Specialties Details Why Contact Info Delores Marino MD Internal Medicine In 2 days  [address] [facility] [address] 
[phone] [facility] [place] EMERGENCY DEPT Emergency Medicine  As needed, If symptoms worsen [address] [place] [address] [address] [facility] 
[phone] Return to ED if worse Diagnosis Clinical Impression: 1. Chemoprophylaxis 2. Meningitis exposure Attestations: 
 
Tyra Rose NP 
6:11 PM

## 2019-04-10 NOTE — DISCHARGE INSTRUCTIONS
We hope that we have addressed all of your medical concerns. The examination and treatment you received in the Emergency Department were for an emergent problem and were not intended as complete care. It is important that you follow up with your healthcare provider(s) for ongoing care. If your symptoms worsen or do not improve as expected, and you are unable to reach your usual health care provider(s), you should return to the Emergency Department. [facility] participate in nationally recognized quality of care measures. If your blood pressure is greater than 120/80, as reported below, we urge that you seek medical care to address the potential of high blood pressure, commonly known as hypertension. Hypertension can be hereditary or can be caused by certain medical conditions, pain, stress, or \"white coat syndrome. \"       Please make an appointment with your health care provider(s) for follow up of your Emergency Department visit. VITALS:   Patient Vitals for the past 8 hrs:   Temp Pulse Resp BP SpO2   04/10/19 1545 98.8 °F (37.1 °C) 96 18 (!) 200/130 97 %          Thank you for allowing us to provide you with medical care today. We realize that you have many choices for your emergency care needs. Please choose us in the future for any continued health care needs. Umesh Newmna NP              No results found for this or any previous visit (from the past 24 hour(s)). No results found.

## 2019-05-08 ENCOUNTER — TELEPHONE (OUTPATIENT)
Dept: INTERNAL MEDICINE CLINIC | Age: 49
End: 2019-05-08

## 2019-05-08 NOTE — TELEPHONE ENCOUNTER
Called, spoke to pt. Two identifiers confirmed. Appointment scheduled for 5/22. Pt verbalized understanding of information discussed w/ no further questions at this time.

## 2019-05-08 NOTE — TELEPHONE ENCOUNTER
Patient had to cancel new patient appt tomorrow. She wants to see Dr. Anupam Hirsch could see her soon. She recently lost her oldest son and has a son that is Autistic. Her  is a current patient with Dr. Anupam Hirsch. Can you check with Dr. Anupam Hirsch and give her a call back? Thanks.

## 2019-08-26 ENCOUNTER — HOSPITAL ENCOUNTER (OUTPATIENT)
Dept: PREADMISSION TESTING | Age: 49
Discharge: HOME | End: 2019-08-26
Payer: COMMERCIAL

## 2019-08-26 VITALS
TEMPERATURE: 98.1 F | OXYGEN SATURATION: 97 % | SYSTOLIC BLOOD PRESSURE: 130 MMHG | DIASTOLIC BLOOD PRESSURE: 80 MMHG | BODY MASS INDEX: 33.86 KG/M2 | WEIGHT: 184 LBS | HEART RATE: 78 BPM | HEIGHT: 62 IN

## 2019-08-26 LAB
ABO + RH BLD: NORMAL
ANION GAP SERPL CALC-SCNC: 5 MMOL/L (ref 5–15)
APPEARANCE UR: ABNORMAL
BACTERIA URNS QL MICRO: NEGATIVE /HPF
BILIRUB UR QL: NEGATIVE
BLOOD GROUP ANTIBODIES SERPL: NORMAL
BUN SERPL-MCNC: 20 MG/DL (ref 6–20)
BUN/CREAT SERPL: 22 (ref 12–20)
CALCIUM SERPL-MCNC: 8.8 MG/DL (ref 8.5–10.1)
CHLORIDE SERPL-SCNC: 108 MMOL/L (ref 97–108)
CO2 SERPL-SCNC: 29 MMOL/L (ref 21–32)
COLOR UR: ABNORMAL
CREAT SERPL-MCNC: 0.91 MG/DL (ref 0.55–1.02)
CRP SERPL-MCNC: 0.94 MG/DL (ref 0–0.6)
EPITH CASTS URNS QL MICRO: ABNORMAL /LPF
ERYTHROCYTE [DISTWIDTH] IN BLOOD BY AUTOMATED COUNT: 14.6 % (ref 11.5–14.5)
ERYTHROCYTE [SEDIMENTATION RATE] IN BLOOD: 8 MM/HR (ref 0–20)
EST. AVERAGE GLUCOSE BLD GHB EST-MCNC: 103 MG/DL
GLUCOSE SERPL-MCNC: 84 MG/DL (ref 65–100)
GLUCOSE UR STRIP.AUTO-MCNC: NEGATIVE MG/DL
HBA1C MFR BLD: 5.2 % (ref 4.2–6.3)
HCT VFR BLD AUTO: 38 % (ref 35–47)
HGB BLD-MCNC: 11.6 G/DL (ref 11.5–16)
HGB UR QL STRIP: ABNORMAL
HYALINE CASTS URNS QL MICRO: ABNORMAL /LPF (ref 0–5)
INR PPP: 1.1 (ref 0.9–1.1)
KETONES UR QL STRIP.AUTO: NEGATIVE MG/DL
LEUKOCYTE ESTERASE UR QL STRIP.AUTO: ABNORMAL
MCH RBC QN AUTO: 28 PG (ref 26–34)
MCHC RBC AUTO-ENTMCNC: 30.5 G/DL (ref 30–36.5)
MCV RBC AUTO: 91.6 FL (ref 80–99)
NITRITE UR QL STRIP.AUTO: NEGATIVE
NRBC # BLD: 0 K/UL (ref 0–0.01)
NRBC BLD-RTO: 0 PER 100 WBC
PH UR STRIP: 6 [PH] (ref 5–8)
PLATELET # BLD AUTO: 268 K/UL (ref 150–400)
PMV BLD AUTO: 10.1 FL (ref 8.9–12.9)
POTASSIUM SERPL-SCNC: 4.3 MMOL/L (ref 3.5–5.1)
PROT UR STRIP-MCNC: 100 MG/DL
PROTHROMBIN TIME: 11.1 SEC (ref 9–11.1)
RBC # BLD AUTO: 4.15 M/UL (ref 3.8–5.2)
RBC #/AREA URNS HPF: >100 /HPF (ref 0–5)
SODIUM SERPL-SCNC: 142 MMOL/L (ref 136–145)
SP GR UR REFRACTOMETRY: 1.02 (ref 1–1.03)
SPECIMEN EXP DATE BLD: NORMAL
UA: UC IF INDICATED,UAUC: ABNORMAL
UROBILINOGEN UR QL STRIP.AUTO: 1 EU/DL (ref 0.2–1)
WBC # BLD AUTO: 6.5 K/UL (ref 3.6–11)
WBC URNS QL MICRO: ABNORMAL /HPF (ref 0–4)

## 2019-08-26 PROCEDURE — 85652 RBC SED RATE AUTOMATED: CPT

## 2019-08-26 PROCEDURE — 36415 COLL VENOUS BLD VENIPUNCTURE: CPT

## 2019-08-26 PROCEDURE — 86140 C-REACTIVE PROTEIN: CPT

## 2019-08-26 PROCEDURE — 85027 COMPLETE CBC AUTOMATED: CPT

## 2019-08-26 PROCEDURE — 87086 URINE CULTURE/COLONY COUNT: CPT

## 2019-08-26 PROCEDURE — 81001 URINALYSIS AUTO W/SCOPE: CPT

## 2019-08-26 PROCEDURE — 85610 PROTHROMBIN TIME: CPT

## 2019-08-26 PROCEDURE — 83036 HEMOGLOBIN GLYCOSYLATED A1C: CPT

## 2019-08-26 PROCEDURE — 80048 BASIC METABOLIC PNL TOTAL CA: CPT

## 2019-08-26 PROCEDURE — 86900 BLOOD TYPING SEROLOGIC ABO: CPT

## 2019-08-26 PROCEDURE — 93005 ELECTROCARDIOGRAM TRACING: CPT

## 2019-08-26 RX ORDER — PREGABALIN 75 MG/1
75 CAPSULE ORAL ONCE
Status: CANCELLED | OUTPATIENT
Start: 2019-08-28 | End: 2019-08-28

## 2019-08-26 RX ORDER — BUSPIRONE HYDROCHLORIDE 15 MG/1
15 TABLET ORAL
COMMUNITY

## 2019-08-26 RX ORDER — HYDROMORPHONE HYDROCHLORIDE 4 MG/1
TABLET ORAL
COMMUNITY
End: 2020-01-02

## 2019-08-26 RX ORDER — MORPHINE SULFATE 30 MG/1
TABLET, FILM COATED, EXTENDED RELEASE ORAL EVERY 12 HOURS
COMMUNITY

## 2019-08-26 RX ORDER — DICLOFENAC SODIUM 75 MG/1
75 TABLET, DELAYED RELEASE ORAL 2 TIMES DAILY
COMMUNITY
End: 2019-10-21

## 2019-08-26 RX ORDER — DEXAMETHASONE SODIUM PHOSPHATE 10 MG/ML
4 INJECTION INTRAMUSCULAR; INTRAVENOUS ONCE
Status: CANCELLED | OUTPATIENT
Start: 2019-08-28 | End: 2019-08-28

## 2019-08-26 RX ORDER — CELECOXIB 200 MG/1
200 CAPSULE ORAL ONCE
Status: CANCELLED | OUTPATIENT
Start: 2019-08-28 | End: 2019-08-28

## 2019-08-26 RX ORDER — HYDROCHLOROTHIAZIDE 50 MG/1
50 TABLET ORAL AS NEEDED
COMMUNITY

## 2019-08-26 RX ORDER — ACETAMINOPHEN 500 MG
1000 TABLET ORAL ONCE
Status: CANCELLED | OUTPATIENT
Start: 2019-08-28 | End: 2019-08-28

## 2019-08-26 RX ORDER — CEFAZOLIN SODIUM/WATER 2 G/20 ML
2 SYRINGE (ML) INTRAVENOUS ONCE
Status: CANCELLED | OUTPATIENT
Start: 2019-08-28 | End: 2019-08-28

## 2019-08-27 LAB
ATRIAL RATE: 66 BPM
BACTERIA SPEC CULT: ABNORMAL
BACTERIA SPEC CULT: ABNORMAL
CALCULATED P AXIS, ECG09: 38 DEGREES
CALCULATED R AXIS, ECG10: 2 DEGREES
CALCULATED T AXIS, ECG11: 36 DEGREES
DIAGNOSIS, 93000: NORMAL
P-R INTERVAL, ECG05: 140 MS
Q-T INTERVAL, ECG07: 400 MS
QRS DURATION, ECG06: 78 MS
QTC CALCULATION (BEZET), ECG08: 419 MS
SERVICE CMNT-IMP: ABNORMAL
VENTRICULAR RATE, ECG03: 66 BPM

## 2019-08-27 NOTE — PERIOP NOTES
REQUESTED NOTES, & LAST OFFICE VISIT NOTES FROM  [facility] HEMATOLOGIST. SPOKE WITH JANESSA. FAX NUMBER PROVIDED.

## 2019-08-27 NOTE — ADVANCED PRACTICE NURSE
Patient was called (identity confirmed with 2 patient identifiers) and informed of positive MRSA, instructed to use mupirocin (already has at home) use Chlorhexidine liquid soap provided by PAT per protocol. Written instructions given at time of Preadmission Testing were reinforced with patient. Patient was given an opportunity to have questions answered and contact information if needed. Also reinforced need for follow up with PCP or urology regarding hematuria, addressed in my previous notes from 10/2018. Patient cannot recall discussion, has had very difficult past year dealing with losing her son and multiple hospitalizations. States she will follow up.

## 2019-08-27 NOTE — PERIOP NOTES
MESSAGE LEFT FOR EFRAIN ADVISING HER THAT PATIENT'S MRSA SWAB HAS COME BACK (+) FOR MRSA. Ja Moreland NP HAS INITIATED TREATMENT FOR PATIENT.  SURGERY IS 8/28/2019

## 2019-08-28 ENCOUNTER — ANESTHESIA (OUTPATIENT)
Dept: SURGERY | Age: 49
DRG: 468 | End: 2019-08-28
Payer: COMMERCIAL

## 2019-08-28 ENCOUNTER — ANESTHESIA EVENT (OUTPATIENT)
Dept: SURGERY | Age: 49
DRG: 468 | End: 2019-08-28
Payer: COMMERCIAL

## 2019-08-28 ENCOUNTER — HOSPITAL ENCOUNTER (INPATIENT)
Age: 49
LOS: 2 days | Discharge: HOME HEALTH | DRG: 468 | End: 2019-08-30
Attending: ORTHOPAEDIC SURGERY | Admitting: ORTHOPAEDIC SURGERY
Payer: COMMERCIAL

## 2019-08-28 ENCOUNTER — APPOINTMENT (OUTPATIENT)
Dept: GENERAL RADIOLOGY | Age: 49
DRG: 468 | End: 2019-08-28
Attending: ORTHOPAEDIC SURGERY
Payer: COMMERCIAL

## 2019-08-28 DIAGNOSIS — Z96.652 STATUS POST LEFT KNEE REPLACEMENT: Primary | ICD-10-CM

## 2019-08-28 LAB
BACTERIA SPEC CULT: NORMAL
CC UR VC: NORMAL
GLUCOSE BLD STRIP.AUTO-MCNC: 93 MG/DL (ref 65–100)
SERVICE CMNT-IMP: NORMAL
SERVICE CMNT-IMP: NORMAL

## 2019-08-28 PROCEDURE — 74011000258 HC RX REV CODE- 258: Performed by: NURSE ANESTHETIST, CERTIFIED REGISTERED

## 2019-08-28 PROCEDURE — 77030039266 HC ADH SKN EXOFIN S2SG -A: Performed by: ORTHOPAEDIC SURGERY

## 2019-08-28 PROCEDURE — 77030018673: Performed by: ORTHOPAEDIC SURGERY

## 2019-08-28 PROCEDURE — 87205 SMEAR GRAM STAIN: CPT

## 2019-08-28 PROCEDURE — 0SRD0J9 REPLACEMENT OF LEFT KNEE JOINT WITH SYNTHETIC SUBSTITUTE, CEMENTED, OPEN APPROACH: ICD-10-PCS | Performed by: ORTHOPAEDIC SURGERY

## 2019-08-28 PROCEDURE — 77030035236 HC SUT PDS STRATFX BARB J&J -B: Performed by: ORTHOPAEDIC SURGERY

## 2019-08-28 PROCEDURE — 77030002916 HC SUT ETHLN J&J -A: Performed by: ORTHOPAEDIC SURGERY

## 2019-08-28 PROCEDURE — 77030000032 HC CUF TRNQT ZIMM -B: Performed by: ORTHOPAEDIC SURGERY

## 2019-08-28 PROCEDURE — 74011000250 HC RX REV CODE- 250: Performed by: ORTHOPAEDIC SURGERY

## 2019-08-28 PROCEDURE — 73560 X-RAY EXAM OF KNEE 1 OR 2: CPT

## 2019-08-28 PROCEDURE — 76010000174 HC OR TIME 3.5 TO 4 HR INTENSV-TIER 1: Performed by: ORTHOPAEDIC SURGERY

## 2019-08-28 PROCEDURE — 77030027138 HC INCENT SPIROMETER -A

## 2019-08-28 PROCEDURE — 74011000250 HC RX REV CODE- 250: Performed by: NURSE ANESTHETIST, CERTIFIED REGISTERED

## 2019-08-28 PROCEDURE — 74011250636 HC RX REV CODE- 250/636: Performed by: NURSE ANESTHETIST, CERTIFIED REGISTERED

## 2019-08-28 PROCEDURE — 74011000250 HC RX REV CODE- 250

## 2019-08-28 PROCEDURE — 77030003601 HC NDL NRV BLK BBMI -A

## 2019-08-28 PROCEDURE — 77030017655 HC PLG BN IM1 ZIMM -C: Performed by: ORTHOPAEDIC SURGERY

## 2019-08-28 PROCEDURE — 94760 N-INVAS EAR/PLS OXIMETRY 1: CPT

## 2019-08-28 PROCEDURE — 76210000017 HC OR PH I REC 1.5 TO 2 HR: Performed by: ORTHOPAEDIC SURGERY

## 2019-08-28 PROCEDURE — 76060000038 HC ANESTHESIA 3.5 TO 4 HR: Performed by: ORTHOPAEDIC SURGERY

## 2019-08-28 PROCEDURE — 82962 GLUCOSE BLOOD TEST: CPT

## 2019-08-28 PROCEDURE — C1776 JOINT DEVICE (IMPLANTABLE): HCPCS | Performed by: ORTHOPAEDIC SURGERY

## 2019-08-28 PROCEDURE — 77030028907 HC WRP KNEE WO BGS SOLM -B

## 2019-08-28 PROCEDURE — 77030040361 HC SLV COMPR DVT MDII -B

## 2019-08-28 PROCEDURE — 74011000258 HC RX REV CODE- 258: Performed by: ORTHOPAEDIC SURGERY

## 2019-08-28 PROCEDURE — 0SPD08Z REMOVAL OF SPACER FROM LEFT KNEE JOINT, OPEN APPROACH: ICD-10-PCS | Performed by: ORTHOPAEDIC SURGERY

## 2019-08-28 PROCEDURE — 77030006802 HC BLD SAW RECIP BRSM -B: Performed by: ORTHOPAEDIC SURGERY

## 2019-08-28 PROCEDURE — 77030011640 HC PAD GRND REM COVD -A: Performed by: ORTHOPAEDIC SURGERY

## 2019-08-28 PROCEDURE — 65270000029 HC RM PRIVATE

## 2019-08-28 PROCEDURE — 77030026438 HC STYL ET INTUB CARD -A: Performed by: ANESTHESIOLOGY

## 2019-08-28 PROCEDURE — 74011250637 HC RX REV CODE- 250/637: Performed by: ORTHOPAEDIC SURGERY

## 2019-08-28 PROCEDURE — C1713 ANCHOR/SCREW BN/BN,TIS/BN: HCPCS | Performed by: ORTHOPAEDIC SURGERY

## 2019-08-28 PROCEDURE — 77030006788 HC BLD SAW OSC STRY -B: Performed by: ORTHOPAEDIC SURGERY

## 2019-08-28 PROCEDURE — 74011250636 HC RX REV CODE- 250/636: Performed by: ANESTHESIOLOGY

## 2019-08-28 PROCEDURE — 77030008684 HC TU ET CUF COVD -B: Performed by: ANESTHESIOLOGY

## 2019-08-28 PROCEDURE — 77030016675 HC BUR RND4 STRY -B: Performed by: ORTHOPAEDIC SURGERY

## 2019-08-28 PROCEDURE — 74011250636 HC RX REV CODE- 250/636: Performed by: ORTHOPAEDIC SURGERY

## 2019-08-28 PROCEDURE — 77030021303 HC BUR FLUT MIDAS BRSM -B: Performed by: ORTHOPAEDIC SURGERY

## 2019-08-28 PROCEDURE — 77030020782 HC GWN BAIR PAWS FLX 3M -B

## 2019-08-28 PROCEDURE — 77030010783 HC BOWL MX BN CEM J&J -B: Performed by: ORTHOPAEDIC SURGERY

## 2019-08-28 PROCEDURE — 74011250636 HC RX REV CODE- 250/636

## 2019-08-28 PROCEDURE — 77030006822 HC BLD SAW SAG BRSM -B: Performed by: ORTHOPAEDIC SURGERY

## 2019-08-28 PROCEDURE — C9290 INJ, BUPIVACAINE LIPOSOME: HCPCS | Performed by: ORTHOPAEDIC SURGERY

## 2019-08-28 PROCEDURE — 87075 CULTR BACTERIA EXCEPT BLOOD: CPT

## 2019-08-28 PROCEDURE — 77030012935 HC DRSG AQUACEL BMS -B: Performed by: ORTHOPAEDIC SURGERY

## 2019-08-28 PROCEDURE — 77030011651 PLG BN IM BIOM -C: Performed by: ORTHOPAEDIC SURGERY

## 2019-08-28 PROCEDURE — 77030031139 HC SUT VCRL2 J&J -A: Performed by: ORTHOPAEDIC SURGERY

## 2019-08-28 PROCEDURE — 77030018836 HC SOL IRR NACL ICUM -A: Performed by: ORTHOPAEDIC SURGERY

## 2019-08-28 DEVICE — IMPLANTABLE DEVICE
Type: IMPLANTABLE DEVICE | Site: KNEE | Status: FUNCTIONAL
Brand: INTRAMEDULLARY BONE PLUG

## 2019-08-28 DEVICE — TRITANIUM TIBIAL SYMMETRIC CONE AUGMENT
Type: IMPLANTABLE DEVICE | Site: KNEE | Status: FUNCTIONAL
Brand: TRIATHLON

## 2019-08-28 DEVICE — SMARTSET GHV GENTAMICIN HIGH VISCOSITY BONE CEMENT 40G
Type: IMPLANTABLE DEVICE | Site: KNEE | Status: FUNCTIONAL
Brand: SMARTSET

## 2019-08-28 RX ORDER — VANCOMYCIN/0.9 % SOD CHLORIDE 1.5G/250ML
1500 PLASTIC BAG, INJECTION (ML) INTRAVENOUS ONCE
Status: COMPLETED | OUTPATIENT
Start: 2019-08-28 | End: 2019-08-28

## 2019-08-28 RX ORDER — HYDROCHLOROTHIAZIDE 25 MG/1
50 TABLET ORAL
Status: DISCONTINUED | OUTPATIENT
Start: 2019-08-28 | End: 2019-08-30 | Stop reason: HOSPADM

## 2019-08-28 RX ORDER — FUROSEMIDE 20 MG/1
20 TABLET ORAL
Status: DISCONTINUED | OUTPATIENT
Start: 2019-08-28 | End: 2019-08-30 | Stop reason: HOSPADM

## 2019-08-28 RX ORDER — POLYETHYLENE GLYCOL 3350 17 G/17G
17 POWDER, FOR SOLUTION ORAL DAILY
Status: DISCONTINUED | OUTPATIENT
Start: 2019-08-29 | End: 2019-08-30 | Stop reason: HOSPADM

## 2019-08-28 RX ORDER — SODIUM CHLORIDE 9 MG/ML
50 INJECTION, SOLUTION INTRAVENOUS CONTINUOUS
Status: DISCONTINUED | OUTPATIENT
Start: 2019-08-28 | End: 2019-08-28 | Stop reason: HOSPADM

## 2019-08-28 RX ORDER — OXYCODONE AND ACETAMINOPHEN 5; 325 MG/1; MG/1
1 TABLET ORAL AS NEEDED
Status: DISCONTINUED | OUTPATIENT
Start: 2019-08-28 | End: 2019-08-28 | Stop reason: HOSPADM

## 2019-08-28 RX ORDER — FENTANYL CITRATE 50 UG/ML
50 INJECTION, SOLUTION INTRAMUSCULAR; INTRAVENOUS AS NEEDED
Status: DISCONTINUED | OUTPATIENT
Start: 2019-08-28 | End: 2019-08-28 | Stop reason: HOSPADM

## 2019-08-28 RX ORDER — HYDROMORPHONE HYDROCHLORIDE 1 MG/ML
0.2 INJECTION, SOLUTION INTRAMUSCULAR; INTRAVENOUS; SUBCUTANEOUS
Status: DISCONTINUED | OUTPATIENT
Start: 2019-08-28 | End: 2019-08-28 | Stop reason: HOSPADM

## 2019-08-28 RX ORDER — PREGABALIN 75 MG/1
75 CAPSULE ORAL ONCE
Status: COMPLETED | OUTPATIENT
Start: 2019-08-28 | End: 2019-08-28

## 2019-08-28 RX ORDER — SODIUM CHLORIDE 0.9 % (FLUSH) 0.9 %
5-40 SYRINGE (ML) INJECTION AS NEEDED
Status: DISCONTINUED | OUTPATIENT
Start: 2019-08-28 | End: 2019-08-28 | Stop reason: HOSPADM

## 2019-08-28 RX ORDER — PROPOFOL 10 MG/ML
INJECTION, EMULSION INTRAVENOUS AS NEEDED
Status: DISCONTINUED | OUTPATIENT
Start: 2019-08-28 | End: 2019-08-28 | Stop reason: HOSPADM

## 2019-08-28 RX ORDER — DIVALPROEX SODIUM 500 MG/1
500 TABLET, DELAYED RELEASE ORAL
Status: DISCONTINUED | OUTPATIENT
Start: 2019-08-28 | End: 2019-08-30 | Stop reason: HOSPADM

## 2019-08-28 RX ORDER — FACIAL-BODY WIPES
10 EACH TOPICAL DAILY PRN
Status: DISCONTINUED | OUTPATIENT
Start: 2019-08-30 | End: 2019-08-30 | Stop reason: HOSPADM

## 2019-08-28 RX ORDER — AMOXICILLIN 250 MG
1 CAPSULE ORAL 2 TIMES DAILY
Status: DISCONTINUED | OUTPATIENT
Start: 2019-08-28 | End: 2019-08-30 | Stop reason: HOSPADM

## 2019-08-28 RX ORDER — TRAZODONE HYDROCHLORIDE 100 MG/1
200 TABLET ORAL
Status: DISCONTINUED | OUTPATIENT
Start: 2019-08-28 | End: 2019-08-30 | Stop reason: HOSPADM

## 2019-08-28 RX ORDER — MIDAZOLAM HYDROCHLORIDE 1 MG/ML
1 INJECTION, SOLUTION INTRAMUSCULAR; INTRAVENOUS AS NEEDED
Status: DISCONTINUED | OUTPATIENT
Start: 2019-08-28 | End: 2019-08-28 | Stop reason: HOSPADM

## 2019-08-28 RX ORDER — SODIUM CHLORIDE 0.9 % (FLUSH) 0.9 %
5-40 SYRINGE (ML) INJECTION EVERY 8 HOURS
Status: DISCONTINUED | OUTPATIENT
Start: 2019-08-28 | End: 2019-08-28 | Stop reason: HOSPADM

## 2019-08-28 RX ORDER — METHYLPREDNISOLONE ACETATE 80 MG/ML
INJECTION, SUSPENSION INTRA-ARTICULAR; INTRALESIONAL; INTRAMUSCULAR; SOFT TISSUE AS NEEDED
Status: DISCONTINUED | OUTPATIENT
Start: 2019-08-28 | End: 2019-08-28 | Stop reason: HOSPADM

## 2019-08-28 RX ORDER — DEXAMETHASONE SODIUM PHOSPHATE 10 MG/ML
4 INJECTION INTRAMUSCULAR; INTRAVENOUS ONCE
Status: DISCONTINUED | OUTPATIENT
Start: 2019-08-28 | End: 2019-08-28 | Stop reason: HOSPADM

## 2019-08-28 RX ORDER — DIPHENHYDRAMINE HYDROCHLORIDE 50 MG/ML
12.5 INJECTION, SOLUTION INTRAMUSCULAR; INTRAVENOUS AS NEEDED
Status: DISCONTINUED | OUTPATIENT
Start: 2019-08-28 | End: 2019-08-28 | Stop reason: HOSPADM

## 2019-08-28 RX ORDER — KETOROLAC TROMETHAMINE 30 MG/ML
INJECTION, SOLUTION INTRAMUSCULAR; INTRAVENOUS AS NEEDED
Status: DISCONTINUED | OUTPATIENT
Start: 2019-08-28 | End: 2019-08-28 | Stop reason: HOSPADM

## 2019-08-28 RX ORDER — CETIRIZINE HCL 10 MG
10 TABLET ORAL DAILY
Status: DISCONTINUED | OUTPATIENT
Start: 2019-08-29 | End: 2019-08-30 | Stop reason: HOSPADM

## 2019-08-28 RX ORDER — BUSPIRONE HYDROCHLORIDE 5 MG/1
7.5-15 TABLET ORAL
Status: DISCONTINUED | OUTPATIENT
Start: 2019-08-28 | End: 2019-08-30 | Stop reason: HOSPADM

## 2019-08-28 RX ORDER — LANSOPRAZOLE 15 MG/1
15 TABLET, ORALLY DISINTEGRATING, DELAYED RELEASE ORAL
Status: DISCONTINUED | OUTPATIENT
Start: 2019-08-29 | End: 2019-08-28 | Stop reason: CLARIF

## 2019-08-28 RX ORDER — DEXAMETHASONE SODIUM PHOSPHATE 4 MG/ML
INJECTION, SOLUTION INTRA-ARTICULAR; INTRALESIONAL; INTRAMUSCULAR; INTRAVENOUS; SOFT TISSUE AS NEEDED
Status: DISCONTINUED | OUTPATIENT
Start: 2019-08-28 | End: 2019-08-28 | Stop reason: HOSPADM

## 2019-08-28 RX ORDER — ACETAMINOPHEN 500 MG
1000 TABLET ORAL ONCE
Status: COMPLETED | OUTPATIENT
Start: 2019-08-28 | End: 2019-08-28

## 2019-08-28 RX ORDER — ONDANSETRON 2 MG/ML
4 INJECTION INTRAMUSCULAR; INTRAVENOUS
Status: ACTIVE | OUTPATIENT
Start: 2019-08-28 | End: 2019-08-29

## 2019-08-28 RX ORDER — CEFAZOLIN SODIUM 1 G/3ML
INJECTION, POWDER, FOR SOLUTION INTRAMUSCULAR; INTRAVENOUS AS NEEDED
Status: DISCONTINUED | OUTPATIENT
Start: 2019-08-28 | End: 2019-08-28 | Stop reason: HOSPADM

## 2019-08-28 RX ORDER — ALBUTEROL SULFATE 90 UG/1
1 AEROSOL, METERED RESPIRATORY (INHALATION)
Status: DISCONTINUED | OUTPATIENT
Start: 2019-08-28 | End: 2019-08-28 | Stop reason: CLARIF

## 2019-08-28 RX ORDER — HYDROMORPHONE HYDROCHLORIDE 2 MG/ML
INJECTION, SOLUTION INTRAMUSCULAR; INTRAVENOUS; SUBCUTANEOUS AS NEEDED
Status: DISCONTINUED | OUTPATIENT
Start: 2019-08-28 | End: 2019-08-28 | Stop reason: HOSPADM

## 2019-08-28 RX ORDER — ALBUTEROL SULFATE 0.83 MG/ML
2.5 SOLUTION RESPIRATORY (INHALATION)
Status: DISCONTINUED | OUTPATIENT
Start: 2019-08-28 | End: 2019-08-30 | Stop reason: HOSPADM

## 2019-08-28 RX ORDER — LIDOCAINE HYDROCHLORIDE 20 MG/ML
INJECTION, SOLUTION EPIDURAL; INFILTRATION; INTRACAUDAL; PERINEURAL AS NEEDED
Status: DISCONTINUED | OUTPATIENT
Start: 2019-08-28 | End: 2019-08-28 | Stop reason: HOSPADM

## 2019-08-28 RX ORDER — FENTANYL CITRATE 50 UG/ML
INJECTION, SOLUTION INTRAMUSCULAR; INTRAVENOUS AS NEEDED
Status: DISCONTINUED | OUTPATIENT
Start: 2019-08-28 | End: 2019-08-28 | Stop reason: HOSPADM

## 2019-08-28 RX ORDER — ACYCLOVIR 800 MG/1
400 TABLET ORAL
Status: DISCONTINUED | OUTPATIENT
Start: 2019-08-28 | End: 2019-08-28

## 2019-08-28 RX ORDER — SUCCINYLCHOLINE CHLORIDE 20 MG/ML
INJECTION INTRAMUSCULAR; INTRAVENOUS AS NEEDED
Status: DISCONTINUED | OUTPATIENT
Start: 2019-08-28 | End: 2019-08-28 | Stop reason: HOSPADM

## 2019-08-28 RX ORDER — SODIUM CHLORIDE 9 MG/ML
125 INJECTION, SOLUTION INTRAVENOUS CONTINUOUS
Status: DISPENSED | OUTPATIENT
Start: 2019-08-28 | End: 2019-08-29

## 2019-08-28 RX ORDER — LOSARTAN POTASSIUM 50 MG/1
100 TABLET ORAL DAILY
Status: DISCONTINUED | OUTPATIENT
Start: 2019-08-29 | End: 2019-08-30 | Stop reason: HOSPADM

## 2019-08-28 RX ORDER — MORPHINE SULFATE 10 MG/ML
2 INJECTION, SOLUTION INTRAMUSCULAR; INTRAVENOUS
Status: DISCONTINUED | OUTPATIENT
Start: 2019-08-28 | End: 2019-08-28 | Stop reason: HOSPADM

## 2019-08-28 RX ORDER — ROCURONIUM BROMIDE 10 MG/ML
INJECTION, SOLUTION INTRAVENOUS AS NEEDED
Status: DISCONTINUED | OUTPATIENT
Start: 2019-08-28 | End: 2019-08-28 | Stop reason: HOSPADM

## 2019-08-28 RX ORDER — NALOXONE HYDROCHLORIDE 0.4 MG/ML
0.4 INJECTION, SOLUTION INTRAMUSCULAR; INTRAVENOUS; SUBCUTANEOUS AS NEEDED
Status: DISCONTINUED | OUTPATIENT
Start: 2019-08-28 | End: 2019-08-30 | Stop reason: HOSPADM

## 2019-08-28 RX ORDER — DIVALPROEX SODIUM 250 MG/1
250 TABLET, DELAYED RELEASE ORAL 2 TIMES DAILY
Status: DISCONTINUED | OUTPATIENT
Start: 2019-08-28 | End: 2019-08-28 | Stop reason: SDUPTHER

## 2019-08-28 RX ORDER — KETOROLAC TROMETHAMINE 30 MG/ML
15 INJECTION, SOLUTION INTRAMUSCULAR; INTRAVENOUS EVERY 6 HOURS
Status: COMPLETED | OUTPATIENT
Start: 2019-08-28 | End: 2019-08-29

## 2019-08-28 RX ORDER — LIDOCAINE HYDROCHLORIDE 10 MG/ML
0.1 INJECTION, SOLUTION EPIDURAL; INFILTRATION; INTRACAUDAL; PERINEURAL AS NEEDED
Status: DISCONTINUED | OUTPATIENT
Start: 2019-08-28 | End: 2019-08-28 | Stop reason: HOSPADM

## 2019-08-28 RX ORDER — BACLOFEN 10 MG/1
10 TABLET ORAL
Status: DISCONTINUED | OUTPATIENT
Start: 2019-08-28 | End: 2019-08-30 | Stop reason: HOSPADM

## 2019-08-28 RX ORDER — ONDANSETRON 2 MG/ML
INJECTION INTRAMUSCULAR; INTRAVENOUS AS NEEDED
Status: DISCONTINUED | OUTPATIENT
Start: 2019-08-28 | End: 2019-08-28 | Stop reason: HOSPADM

## 2019-08-28 RX ORDER — HYDROMORPHONE HYDROCHLORIDE 1 MG/ML
0.5 INJECTION, SOLUTION INTRAMUSCULAR; INTRAVENOUS; SUBCUTANEOUS
Status: ACTIVE | OUTPATIENT
Start: 2019-08-28 | End: 2019-08-29

## 2019-08-28 RX ORDER — HYDROMORPHONE HYDROCHLORIDE 4 MG/1
4 TABLET ORAL
Status: DISCONTINUED | OUTPATIENT
Start: 2019-08-28 | End: 2019-08-30 | Stop reason: HOSPADM

## 2019-08-28 RX ORDER — FENTANYL CITRATE 50 UG/ML
25 INJECTION, SOLUTION INTRAMUSCULAR; INTRAVENOUS
Status: COMPLETED | OUTPATIENT
Start: 2019-08-28 | End: 2019-08-28

## 2019-08-28 RX ORDER — SODIUM CHLORIDE, SODIUM LACTATE, POTASSIUM CHLORIDE, CALCIUM CHLORIDE 600; 310; 30; 20 MG/100ML; MG/100ML; MG/100ML; MG/100ML
75 INJECTION, SOLUTION INTRAVENOUS CONTINUOUS
Status: DISCONTINUED | OUTPATIENT
Start: 2019-08-28 | End: 2019-08-28 | Stop reason: HOSPADM

## 2019-08-28 RX ORDER — CEFAZOLIN SODIUM/WATER 2 G/20 ML
2 SYRINGE (ML) INTRAVENOUS EVERY 8 HOURS
Status: COMPLETED | OUTPATIENT
Start: 2019-08-28 | End: 2019-08-29

## 2019-08-28 RX ORDER — MIDAZOLAM HYDROCHLORIDE 1 MG/ML
INJECTION, SOLUTION INTRAMUSCULAR; INTRAVENOUS AS NEEDED
Status: DISCONTINUED | OUTPATIENT
Start: 2019-08-28 | End: 2019-08-28 | Stop reason: HOSPADM

## 2019-08-28 RX ORDER — LEVOTHYROXINE SODIUM 100 UG/1
100 TABLET ORAL
Status: DISCONTINUED | OUTPATIENT
Start: 2019-08-29 | End: 2019-08-30 | Stop reason: HOSPADM

## 2019-08-28 RX ORDER — LABETALOL HYDROCHLORIDE 5 MG/ML
5 INJECTION, SOLUTION INTRAVENOUS
Status: DISCONTINUED | OUTPATIENT
Start: 2019-08-28 | End: 2019-08-28 | Stop reason: HOSPADM

## 2019-08-28 RX ORDER — MORPHINE SULFATE 15 MG/1
30 TABLET, FILM COATED, EXTENDED RELEASE ORAL EVERY 12 HOURS
Status: DISCONTINUED | OUTPATIENT
Start: 2019-08-28 | End: 2019-08-30 | Stop reason: HOSPADM

## 2019-08-28 RX ORDER — HYDROXYZINE HYDROCHLORIDE 10 MG/1
10 TABLET, FILM COATED ORAL
Status: DISCONTINUED | OUTPATIENT
Start: 2019-08-28 | End: 2019-08-30 | Stop reason: HOSPADM

## 2019-08-28 RX ORDER — OXYCODONE HYDROCHLORIDE 5 MG/1
5 TABLET ORAL
Status: DISCONTINUED | OUTPATIENT
Start: 2019-08-28 | End: 2019-08-30 | Stop reason: HOSPADM

## 2019-08-28 RX ORDER — MIDAZOLAM HYDROCHLORIDE 1 MG/ML
0.5 INJECTION, SOLUTION INTRAMUSCULAR; INTRAVENOUS
Status: DISCONTINUED | OUTPATIENT
Start: 2019-08-28 | End: 2019-08-28 | Stop reason: HOSPADM

## 2019-08-28 RX ORDER — ASPIRIN 81 MG/1
81 TABLET ORAL EVERY 12 HOURS
Status: DISCONTINUED | OUTPATIENT
Start: 2019-08-28 | End: 2019-08-30 | Stop reason: HOSPADM

## 2019-08-28 RX ORDER — DIVALPROEX SODIUM 250 MG/1
250 TABLET, DELAYED RELEASE ORAL DAILY
Status: DISCONTINUED | OUTPATIENT
Start: 2019-08-29 | End: 2019-08-30 | Stop reason: HOSPADM

## 2019-08-28 RX ORDER — PANTOPRAZOLE SODIUM 40 MG/1
40 TABLET, DELAYED RELEASE ORAL
Status: DISCONTINUED | OUTPATIENT
Start: 2019-08-29 | End: 2019-08-30 | Stop reason: HOSPADM

## 2019-08-28 RX ORDER — ONDANSETRON 2 MG/ML
4 INJECTION INTRAMUSCULAR; INTRAVENOUS AS NEEDED
Status: DISCONTINUED | OUTPATIENT
Start: 2019-08-28 | End: 2019-08-28 | Stop reason: HOSPADM

## 2019-08-28 RX ORDER — SODIUM CHLORIDE 0.9 % (FLUSH) 0.9 %
5-40 SYRINGE (ML) INJECTION AS NEEDED
Status: DISCONTINUED | OUTPATIENT
Start: 2019-08-28 | End: 2019-08-30 | Stop reason: HOSPADM

## 2019-08-28 RX ORDER — CEFAZOLIN SODIUM/WATER 2 G/20 ML
2 SYRINGE (ML) INTRAVENOUS ONCE
Status: DISCONTINUED | OUTPATIENT
Start: 2019-08-28 | End: 2019-08-28

## 2019-08-28 RX ORDER — LABETALOL HYDROCHLORIDE 5 MG/ML
INJECTION, SOLUTION INTRAVENOUS
Status: COMPLETED
Start: 2019-08-28 | End: 2019-08-28

## 2019-08-28 RX ORDER — POTASSIUM CHLORIDE 750 MG/1
10 TABLET, FILM COATED, EXTENDED RELEASE ORAL
Status: DISCONTINUED | OUTPATIENT
Start: 2019-08-28 | End: 2019-08-30 | Stop reason: HOSPADM

## 2019-08-28 RX ORDER — VANCOMYCIN/0.9 % SOD CHLORIDE 1.5G/250ML
1500 PLASTIC BAG, INJECTION (ML) INTRAVENOUS EVERY 12 HOURS
Status: COMPLETED | OUTPATIENT
Start: 2019-08-28 | End: 2019-08-28

## 2019-08-28 RX ORDER — ROPIVACAINE HYDROCHLORIDE 5 MG/ML
INJECTION, SOLUTION EPIDURAL; INFILTRATION; PERINEURAL
Status: COMPLETED | OUTPATIENT
Start: 2019-08-28 | End: 2019-08-28

## 2019-08-28 RX ORDER — KETAMINE HYDROCHLORIDE 10 MG/ML
INJECTION, SOLUTION INTRAMUSCULAR; INTRAVENOUS AS NEEDED
Status: DISCONTINUED | OUTPATIENT
Start: 2019-08-28 | End: 2019-08-28 | Stop reason: HOSPADM

## 2019-08-28 RX ORDER — SODIUM CHLORIDE 0.9 % (FLUSH) 0.9 %
5-40 SYRINGE (ML) INJECTION EVERY 8 HOURS
Status: DISCONTINUED | OUTPATIENT
Start: 2019-08-28 | End: 2019-08-30 | Stop reason: HOSPADM

## 2019-08-28 RX ORDER — ACETAMINOPHEN 325 MG/1
650 TABLET ORAL EVERY 6 HOURS
Status: DISCONTINUED | OUTPATIENT
Start: 2019-08-28 | End: 2019-08-30 | Stop reason: HOSPADM

## 2019-08-28 RX ADMIN — MORPHINE SULFATE 2 MG: 10 INJECTION, SOLUTION INTRAMUSCULAR; INTRAVENOUS at 14:20

## 2019-08-28 RX ADMIN — SUCCINYLCHOLINE CHLORIDE 100 MG: 20 INJECTION, SOLUTION INTRAMUSCULAR; INTRAVENOUS at 10:18

## 2019-08-28 RX ADMIN — ACETAMINOPHEN 650 MG: 325 TABLET, FILM COATED ORAL at 17:36

## 2019-08-28 RX ADMIN — KETOROLAC TROMETHAMINE 15 MG: 30 INJECTION, SOLUTION INTRAMUSCULAR at 17:37

## 2019-08-28 RX ADMIN — MORPHINE SULFATE 2 MG: 10 INJECTION, SOLUTION INTRAMUSCULAR; INTRAVENOUS at 14:40

## 2019-08-28 RX ADMIN — CEFAZOLIN 2 G: 330 INJECTION, POWDER, FOR SOLUTION INTRAMUSCULAR; INTRAVENOUS at 10:41

## 2019-08-28 RX ADMIN — FENTANYL CITRATE 25 MCG: 50 INJECTION, SOLUTION INTRAMUSCULAR; INTRAVENOUS at 13:36

## 2019-08-28 RX ADMIN — ACETAMINOPHEN 1000 MG: 500 TABLET ORAL at 08:40

## 2019-08-28 RX ADMIN — HYDROMORPHONE HYDROCHLORIDE 0.2 MG: 2 INJECTION, SOLUTION INTRAMUSCULAR; INTRAVENOUS; SUBCUTANEOUS at 12:11

## 2019-08-28 RX ADMIN — SODIUM CHLORIDE, POTASSIUM CHLORIDE, SODIUM LACTATE AND CALCIUM CHLORIDE: 600; 310; 30; 20 INJECTION, SOLUTION INTRAVENOUS at 10:10

## 2019-08-28 RX ADMIN — HYDROMORPHONE HYDROCHLORIDE 4 MG: 4 TABLET ORAL at 23:35

## 2019-08-28 RX ADMIN — HYDROMORPHONE HYDROCHLORIDE 0.4 MG: 2 INJECTION, SOLUTION INTRAMUSCULAR; INTRAVENOUS; SUBCUTANEOUS at 11:35

## 2019-08-28 RX ADMIN — MORPHINE SULFATE 2 MG: 10 INJECTION, SOLUTION INTRAMUSCULAR; INTRAVENOUS at 14:30

## 2019-08-28 RX ADMIN — DIVALPROEX SODIUM 500 MG: 500 TABLET, DELAYED RELEASE ORAL at 21:08

## 2019-08-28 RX ADMIN — HYDROMORPHONE HYDROCHLORIDE 0.2 MG: 2 INJECTION, SOLUTION INTRAMUSCULAR; INTRAVENOUS; SUBCUTANEOUS at 12:03

## 2019-08-28 RX ADMIN — ASPIRIN 81 MG: 81 TABLET, COATED ORAL at 19:14

## 2019-08-28 RX ADMIN — VANCOMYCIN HYDROCHLORIDE 1500 MG: 10 INJECTION, POWDER, LYOPHILIZED, FOR SOLUTION INTRAVENOUS at 21:00

## 2019-08-28 RX ADMIN — HYDROMORPHONE HYDROCHLORIDE 0.2 MG: 2 INJECTION, SOLUTION INTRAMUSCULAR; INTRAVENOUS; SUBCUTANEOUS at 11:45

## 2019-08-28 RX ADMIN — MORPHINE SULFATE 2 MG: 10 INJECTION, SOLUTION INTRAMUSCULAR; INTRAVENOUS at 14:35

## 2019-08-28 RX ADMIN — ROCURONIUM BROMIDE 10 MG: 10 SOLUTION INTRAVENOUS at 10:56

## 2019-08-28 RX ADMIN — LIDOCAINE HYDROCHLORIDE 80 MG: 20 INJECTION, SOLUTION EPIDURAL; INFILTRATION; INTRACAUDAL; PERINEURAL at 10:18

## 2019-08-28 RX ADMIN — TRANEXAMIC ACID 1 G: 100 INJECTION, SOLUTION INTRAVENOUS at 10:39

## 2019-08-28 RX ADMIN — FENTANYL CITRATE 25 MCG: 50 INJECTION, SOLUTION INTRAMUSCULAR; INTRAVENOUS at 12:17

## 2019-08-28 RX ADMIN — MIDAZOLAM HYDROCHLORIDE 2 MG: 1 INJECTION, SOLUTION INTRAMUSCULAR; INTRAVENOUS at 09:14

## 2019-08-28 RX ADMIN — HYDROMORPHONE HYDROCHLORIDE 0.4 MG: 2 INJECTION, SOLUTION INTRAMUSCULAR; INTRAVENOUS; SUBCUTANEOUS at 10:59

## 2019-08-28 RX ADMIN — SODIUM CHLORIDE 4 MCG: 900 INJECTION, SOLUTION INTRAVENOUS at 11:16

## 2019-08-28 RX ADMIN — PROPOFOL 100 MG: 10 INJECTION, EMULSION INTRAVENOUS at 10:18

## 2019-08-28 RX ADMIN — FENTANYL CITRATE 25 MCG: 50 INJECTION, SOLUTION INTRAMUSCULAR; INTRAVENOUS at 12:22

## 2019-08-28 RX ADMIN — LABETALOL HYDROCHLORIDE 5 MG: 5 INJECTION INTRAVENOUS at 14:53

## 2019-08-28 RX ADMIN — FENTANYL CITRATE 50 MCG: 50 INJECTION, SOLUTION INTRAMUSCULAR; INTRAVENOUS at 09:15

## 2019-08-28 RX ADMIN — SODIUM CHLORIDE 125 ML/HR: 900 INJECTION, SOLUTION INTRAVENOUS at 15:00

## 2019-08-28 RX ADMIN — KETAMINE HYDROCHLORIDE 30 MG: 10 INJECTION, SOLUTION INTRAMUSCULAR; INTRAVENOUS at 10:38

## 2019-08-28 RX ADMIN — FENTANYL CITRATE 25 MCG: 50 INJECTION, SOLUTION INTRAMUSCULAR; INTRAVENOUS at 10:21

## 2019-08-28 RX ADMIN — FENTANYL CITRATE 50 MCG: 50 INJECTION, SOLUTION INTRAMUSCULAR; INTRAVENOUS at 10:18

## 2019-08-28 RX ADMIN — ROCURONIUM BROMIDE 5 MG: 10 SOLUTION INTRAVENOUS at 10:18

## 2019-08-28 RX ADMIN — MORPHINE SULFATE 2 MG: 10 INJECTION, SOLUTION INTRAMUSCULAR; INTRAVENOUS at 14:25

## 2019-08-28 RX ADMIN — MORPHINE SULFATE 30 MG: 15 TABLET, FILM COATED, EXTENDED RELEASE ORAL at 21:00

## 2019-08-28 RX ADMIN — HYDROMORPHONE HYDROCHLORIDE 0.2 MG: 1 INJECTION, SOLUTION INTRAMUSCULAR; INTRAVENOUS; SUBCUTANEOUS at 15:00

## 2019-08-28 RX ADMIN — LABETALOL HYDROCHLORIDE 5 MG: 5 INJECTION, SOLUTION INTRAVENOUS at 14:53

## 2019-08-28 RX ADMIN — KETOROLAC TROMETHAMINE 15 MG: 30 INJECTION, SOLUTION INTRAMUSCULAR at 15:00

## 2019-08-28 RX ADMIN — SODIUM CHLORIDE 8 MCG: 900 INJECTION, SOLUTION INTRAVENOUS at 10:59

## 2019-08-28 RX ADMIN — KETOROLAC TROMETHAMINE 30 MG: 30 INJECTION, SOLUTION INTRAMUSCULAR; INTRAVENOUS at 13:40

## 2019-08-28 RX ADMIN — FENTANYL CITRATE 25 MCG: 50 INJECTION INTRAMUSCULAR; INTRAVENOUS at 14:05

## 2019-08-28 RX ADMIN — FENTANYL CITRATE 25 MCG: 50 INJECTION INTRAMUSCULAR; INTRAVENOUS at 14:15

## 2019-08-28 RX ADMIN — Medication 2 G: at 19:14

## 2019-08-28 RX ADMIN — FENTANYL CITRATE 25 MCG: 50 INJECTION, SOLUTION INTRAMUSCULAR; INTRAVENOUS at 12:51

## 2019-08-28 RX ADMIN — HYDROMORPHONE HYDROCHLORIDE 0.2 MG: 2 INJECTION, SOLUTION INTRAMUSCULAR; INTRAVENOUS; SUBCUTANEOUS at 12:10

## 2019-08-28 RX ADMIN — VANCOMYCIN HYDROCHLORIDE 1500 MG: 10 INJECTION, POWDER, LYOPHILIZED, FOR SOLUTION INTRAVENOUS at 08:36

## 2019-08-28 RX ADMIN — FENTANYL CITRATE 25 MCG: 50 INJECTION INTRAMUSCULAR; INTRAVENOUS at 14:20

## 2019-08-28 RX ADMIN — ROPIVACAINE HYDROCHLORIDE 30 ML: 5 INJECTION, SOLUTION EPIDURAL; INFILTRATION; PERINEURAL at 09:19

## 2019-08-28 RX ADMIN — HYDROMORPHONE HYDROCHLORIDE 0.2 MG: 1 INJECTION, SOLUTION INTRAMUSCULAR; INTRAVENOUS; SUBCUTANEOUS at 15:15

## 2019-08-28 RX ADMIN — ONDANSETRON 4 MG: 2 INJECTION INTRAMUSCULAR; INTRAVENOUS at 14:24

## 2019-08-28 RX ADMIN — FENTANYL CITRATE 25 MCG: 50 INJECTION, SOLUTION INTRAMUSCULAR; INTRAVENOUS at 13:00

## 2019-08-28 RX ADMIN — MIDAZOLAM 2 MG: 1 INJECTION INTRAMUSCULAR; INTRAVENOUS at 10:13

## 2019-08-28 RX ADMIN — DEXAMETHASONE SODIUM PHOSPHATE 8 MG: 4 INJECTION, SOLUTION INTRAMUSCULAR; INTRAVENOUS at 10:30

## 2019-08-28 RX ADMIN — ONDANSETRON HYDROCHLORIDE 4 MG: 2 INJECTION, SOLUTION INTRAMUSCULAR; INTRAVENOUS at 13:30

## 2019-08-28 RX ADMIN — SODIUM CHLORIDE 8 MCG: 900 INJECTION, SOLUTION INTRAVENOUS at 11:04

## 2019-08-28 RX ADMIN — LABETALOL HYDROCHLORIDE 5 MG: 5 INJECTION, SOLUTION INTRAVENOUS at 15:15

## 2019-08-28 RX ADMIN — HYDROMORPHONE HYDROCHLORIDE 0.4 MG: 2 INJECTION, SOLUTION INTRAMUSCULAR; INTRAVENOUS; SUBCUTANEOUS at 12:00

## 2019-08-28 RX ADMIN — LABETALOL HYDROCHLORIDE 5 MG: 5 INJECTION, SOLUTION INTRAVENOUS at 15:03

## 2019-08-28 RX ADMIN — FENTANYL CITRATE 25 MCG: 50 INJECTION INTRAMUSCULAR; INTRAVENOUS at 14:10

## 2019-08-28 RX ADMIN — PREGABALIN 75 MG: 75 CAPSULE ORAL at 08:41

## 2019-08-28 RX ADMIN — TRAZODONE HYDROCHLORIDE 200 MG: 100 TABLET ORAL at 21:08

## 2019-08-28 RX ADMIN — ACETAMINOPHEN 650 MG: 325 TABLET, FILM COATED ORAL at 23:35

## 2019-08-28 RX ADMIN — ROCURONIUM BROMIDE 35 MG: 10 SOLUTION INTRAVENOUS at 10:33

## 2019-08-28 NOTE — PROGRESS NOTES
Primary Nurse Shelton Maradiaga and Anjana An., RN performed a dual skin assessment on this patient No impairment noted  Tyrese score is 20

## 2019-08-28 NOTE — H&P
Date of Surgery Update:  Gerard Murcia was seen and examined. History and physical has been reviewed. The patient has been examined. There have been no significant clinical changes since the completion of the originally dated History and Physical.  Patient identified by surgeon; surgical site was confirmed by patient and surgeon.     Signed By: Connor Narayan MD     August 28, 2019 9:39 AM

## 2019-08-28 NOTE — ANESTHESIA POSTPROCEDURE EVALUATION
Post-Anesthesia Evaluation and Assessment    Patient: Rajni Ards MRN: [id number]  SSN: [id number]    YOB: 1970  Age: 52 y.o. Sex: female      I have evaluated the patient and they are stable and ready for discharge from the PACU. Cardiovascular Function/Vital Signs  Visit Vitals  BP (!) 175/101   Pulse 91   Temp 36.4 °C (97.5 °F)   Resp 10   Ht 5' 2\" (1.575 m)   Wt 83.5 kg (184 lb)   SpO2 100%   BMI 33.65 kg/m²       Patient is status post Spinal anesthesia for Procedure(s):  LEFT TOTAL KNEE REVISION ARTHROPLASTY, RIGHT KNEE INJECTION. Nausea/Vomiting: None    Postoperative hydration reviewed and adequate. Pain:  Pain Scale 1: Numeric (0 - 10) (08/28/19 7866)  Pain Intensity 1: 0 (08/28/19 9458)   Managed    Neurological Status: At baseline    Mental Status, Level of Consciousness: Alert and  oriented to person, place, and time    Pulmonary Status:   O2 Device: Nasal cannula;Oral airway (08/28/19 1354)   Adequate oxygenation and airway patent    Complications related to anesthesia: None    Post-anesthesia assessment completed. No concerns    Signed By: Mindy Aldridge MD     August 28, 2019              Procedure(s):  LEFT TOTAL KNEE REVISION ARTHROPLASTY, RIGHT KNEE INJECTION. general    <BSHSIANPOST>    Vitals Value Taken Time   /101 8/28/2019  2:05 PM   Temp 36.4 °C (97.5 °F) 8/28/2019  1:54 PM   Pulse 70 8/28/2019  2:09 PM   Resp 9 8/28/2019  2:09 PM   SpO2 100 % 8/28/2019  2:09 PM   Vitals shown include unvalidated device data.

## 2019-08-28 NOTE — ANESTHESIA PROCEDURE NOTES
Peripheral Block    Start time: 8/28/2019 9:10 AM  End time: 8/28/2019 9:19 AM  Performed by: Deepa Monahan MD  Authorized by: Deepa Monahan MD       Pre-procedure: Indications: at surgeon's request and post-op pain management    Preanesthetic Checklist: patient identified, risks and benefits discussed, site marked, timeout performed, anesthesia consent given and patient being monitored    Timeout Time: 09:10          Block Type:   Block Type:   Adductor canal  Laterality:  Left  Monitoring:  Standard ASA monitoring, continuous pulse ox, frequent vital sign checks, heart rate, responsive to questions and oxygen  Injection Technique:  Single shot  Procedures: ultrasound guided    Patient Position: supine  Prep: chlorhexidine    Location:  Mid thigh  Needle Type:  Stimuplex  Needle Gauge:  22 G  Needle Localization:  Ultrasound guidance    Assessment:  Number of attempts:  1  Injection Assessment:  Incremental injection every 5 mL, local visualized surrounding nerve on ultrasound, negative aspiration for blood, no paresthesia, no intravascular symptoms and negative aspiration for CSF  Patient tolerance:  Patient tolerated the procedure well with no immediate complications

## 2019-08-28 NOTE — PROGRESS NOTES
PHYSICAL THERAPY  Attempted to see patient for mobilization at 5:10pm. Patient refused and said \"tomorrow\". Wanted to eat her food and rest. Patient's nurse, Jai Lua was present for the exchange. Nursing to attempt to mobilize to bedside commode this evening. Will perform PT Evaluation tomorrow.   Jessica Sargent

## 2019-08-28 NOTE — ANESTHESIA PREPROCEDURE EVALUATION
Relevant Problems   No relevant active problems       Anesthetic History   No history of anesthetic complications            Review of Systems / Medical History  Patient summary reviewed, nursing notes reviewed and pertinent labs reviewed    Pulmonary  Within defined limits                 Neuro/Psych     seizures    Psychiatric history     Cardiovascular    Hypertension                   GI/Hepatic/Renal     GERD           Endo/Other      Hypothyroidism  Obesity and arthritis     Other Findings            Physical Exam    Airway  Mallampati: II  TM Distance: > 6 cm  Neck ROM: normal range of motion   Mouth opening: Normal     Cardiovascular  Regular rate and rhythm,  S1 and S2 normal,  no murmur, click, rub, or gallop             Dental  No notable dental hx       Pulmonary  Breath sounds clear to auscultation               Abdominal  GI exam deferred       Other Findings            Anesthetic Plan    ASA: 3  Anesthesia type: general      Post-op pain plan if not by surgeon: peripheral nerve block single    Induction: Intravenous  Anesthetic plan and risks discussed with: Patient

## 2019-08-29 LAB
ANION GAP SERPL CALC-SCNC: 7 MMOL/L (ref 5–15)
BUN SERPL-MCNC: 10 MG/DL (ref 6–20)
BUN/CREAT SERPL: 15 (ref 12–20)
CALCIUM SERPL-MCNC: 7.9 MG/DL (ref 8.5–10.1)
CHLORIDE SERPL-SCNC: 111 MMOL/L (ref 97–108)
CO2 SERPL-SCNC: 25 MMOL/L (ref 21–32)
CREAT SERPL-MCNC: 0.66 MG/DL (ref 0.55–1.02)
GLUCOSE SERPL-MCNC: 103 MG/DL (ref 65–100)
HGB BLD-MCNC: 9.5 G/DL (ref 11.5–16)
POTASSIUM SERPL-SCNC: 4.1 MMOL/L (ref 3.5–5.1)
SODIUM SERPL-SCNC: 143 MMOL/L (ref 136–145)

## 2019-08-29 PROCEDURE — 97161 PT EVAL LOW COMPLEX 20 MIN: CPT | Performed by: PHYSICAL THERAPIST

## 2019-08-29 PROCEDURE — 36415 COLL VENOUS BLD VENIPUNCTURE: CPT

## 2019-08-29 PROCEDURE — 80048 BASIC METABOLIC PNL TOTAL CA: CPT

## 2019-08-29 PROCEDURE — 97165 OT EVAL LOW COMPLEX 30 MIN: CPT

## 2019-08-29 PROCEDURE — 97116 GAIT TRAINING THERAPY: CPT | Performed by: PHYSICAL THERAPIST

## 2019-08-29 PROCEDURE — 74011250636 HC RX REV CODE- 250/636: Performed by: ORTHOPAEDIC SURGERY

## 2019-08-29 PROCEDURE — 85018 HEMOGLOBIN: CPT

## 2019-08-29 PROCEDURE — 65270000029 HC RM PRIVATE

## 2019-08-29 PROCEDURE — 74011250637 HC RX REV CODE- 250/637: Performed by: ORTHOPAEDIC SURGERY

## 2019-08-29 RX ORDER — PANTOPRAZOLE SODIUM 40 MG/1
40 TABLET, DELAYED RELEASE ORAL
Qty: 60 TAB | Refills: 0 | Status: SHIPPED | OUTPATIENT
Start: 2019-08-30 | End: 2019-12-04

## 2019-08-29 RX ORDER — MELOXICAM 15 MG/1
15 TABLET ORAL DAILY
Qty: 60 TAB | Refills: 0 | Status: SHIPPED | OUTPATIENT
Start: 2019-08-29 | End: 2019-12-04

## 2019-08-29 RX ORDER — DOXYCYCLINE 100 MG/1
100 CAPSULE ORAL 2 TIMES DAILY
Qty: 60 CAP | Refills: 2 | Status: SHIPPED | OUTPATIENT
Start: 2019-08-29 | End: 2019-11-27

## 2019-08-29 RX ORDER — ASPIRIN 81 MG/1
81 TABLET ORAL EVERY 12 HOURS
Qty: 60 TAB | Refills: 0 | Status: SHIPPED | OUTPATIENT
Start: 2019-08-29 | End: 2019-12-04

## 2019-08-29 RX ORDER — HYDROMORPHONE HYDROCHLORIDE 4 MG/1
4 TABLET ORAL
Qty: 60 TAB | Refills: 0 | Status: SHIPPED | OUTPATIENT
Start: 2019-08-29 | End: 2019-09-28

## 2019-08-29 RX ADMIN — Medication 10 ML: at 14:00

## 2019-08-29 RX ADMIN — OXYCODONE HYDROCHLORIDE 5 MG: 5 TABLET ORAL at 16:55

## 2019-08-29 RX ADMIN — HYDROMORPHONE HYDROCHLORIDE 4 MG: 4 TABLET ORAL at 19:04

## 2019-08-29 RX ADMIN — MORPHINE SULFATE 30 MG: 15 TABLET, FILM COATED, EXTENDED RELEASE ORAL at 10:17

## 2019-08-29 RX ADMIN — HYDROMORPHONE HYDROCHLORIDE 4 MG: 4 TABLET ORAL at 12:06

## 2019-08-29 RX ADMIN — KETOROLAC TROMETHAMINE 15 MG: 30 INJECTION, SOLUTION INTRAMUSCULAR at 11:08

## 2019-08-29 RX ADMIN — DIVALPROEX SODIUM 250 MG: 250 TABLET, DELAYED RELEASE ORAL at 10:17

## 2019-08-29 RX ADMIN — LEVOTHYROXINE SODIUM 100 MCG: 100 TABLET ORAL at 07:28

## 2019-08-29 RX ADMIN — CETIRIZINE HYDROCHLORIDE 10 MG: 10 TABLET, FILM COATED ORAL at 10:17

## 2019-08-29 RX ADMIN — ACETAMINOPHEN 650 MG: 325 TABLET, FILM COATED ORAL at 23:19

## 2019-08-29 RX ADMIN — ACETAMINOPHEN 650 MG: 325 TABLET, FILM COATED ORAL at 07:28

## 2019-08-29 RX ADMIN — ACETAMINOPHEN 650 MG: 325 TABLET, FILM COATED ORAL at 11:07

## 2019-08-29 RX ADMIN — HYDROMORPHONE HYDROCHLORIDE 4 MG: 4 TABLET ORAL at 05:44

## 2019-08-29 RX ADMIN — Medication 2 G: at 02:50

## 2019-08-29 RX ADMIN — DIVALPROEX SODIUM 500 MG: 500 TABLET, DELAYED RELEASE ORAL at 21:48

## 2019-08-29 RX ADMIN — PANTOPRAZOLE SODIUM 40 MG: 40 TABLET, DELAYED RELEASE ORAL at 07:28

## 2019-08-29 RX ADMIN — OXYCODONE HYDROCHLORIDE 5 MG: 5 TABLET ORAL at 23:19

## 2019-08-29 RX ADMIN — ASPIRIN 81 MG: 81 TABLET, COATED ORAL at 21:48

## 2019-08-29 RX ADMIN — TRAZODONE HYDROCHLORIDE 200 MG: 100 TABLET ORAL at 21:48

## 2019-08-29 RX ADMIN — ACETAMINOPHEN 650 MG: 325 TABLET, FILM COATED ORAL at 16:55

## 2019-08-29 RX ADMIN — KETOROLAC TROMETHAMINE 15 MG: 30 INJECTION, SOLUTION INTRAMUSCULAR at 07:28

## 2019-08-29 RX ADMIN — ASPIRIN 81 MG: 81 TABLET, COATED ORAL at 10:17

## 2019-08-29 RX ADMIN — LOSARTAN POTASSIUM 100 MG: 50 TABLET ORAL at 10:17

## 2019-08-29 RX ADMIN — MORPHINE SULFATE 30 MG: 15 TABLET, FILM COATED, EXTENDED RELEASE ORAL at 21:48

## 2019-08-29 NOTE — PROGRESS NOTES
ERAN CM met with patient. Referral sent to [address] [place] [address]. Patient accepted by [facility]. Care Management Interventions  PCP Verified by CM: Yes  Palliative Care Criteria Met (RRAT>21 & CHF Dx)?: No  Mode of Transport at Discharge: Self  Transition of Care Consult (CM Consult): [address]: Yes  MyChart Signup: No  Discharge Durable Medical Equipment: No  Physical Therapy Consult: Yes  Occupational Therapy Consult: Yes  Speech Therapy Consult: No  Current Support Network: Lives with Spouse  Confirm Follow Up Transport: Family  Plan discussed with Pt/Family/Caregiver: Yes  Freedom of Choice Offered: Yes  Discharge Location  Discharge Placement: Home with home health  Reason for Admission: LEFT TOTAL KNEE REVISION ARTHROPLASTY, RIGHT KNEE INJECTION                    RRAT Score:    19              Do you (patient/family) have any concerns for transition/discharge? None                Plan for utilizing home health:   [place] of choice offered. Referral sent to [facility]. Current Advanced Directive/Advance Care Plan:  Advanced directive not on file. Transition of Care Plan:    Home with home health.       Seth Morrow RN/CRM

## 2019-08-29 NOTE — PROGRESS NOTES
Bedside and Verbal shift change report given to Diana Montes (oncoming nurse) by Mark Aggarwal (offgoing nurse). Report included the following information SBAR, Kardex, Procedure Summary, MAR and Recent Results.

## 2019-08-29 NOTE — PROGRESS NOTES
Bedside and Verbal shift change report given to ROBER Orozco (oncoming nurse) by James Rico RN (offgoing nurse). Report included the following information SBAR, Kardex, Intake/Output, MAR and Recent Results.

## 2019-08-29 NOTE — OP NOTES
[address]   OPERATIVE REPORT    Name:  Liz Chavez  MR#:  [id number]  :  1970  ACCOUNT #:  [de-identified]  DATE OF SERVICE:  2019      PREOPERATIVE DIAGNOSIS:  Painful left knee prosthesis, antibiotic spacer. POSTOPERATIVE DIAGNOSIS:  Painful left knee prosthesis, antibiotic spacer. PROCEDURE PERFORMED:  Complete revision left total knee arthroplasty. SURGEON:  Kendra Madrigal MD    ASSISTANT:  Miranda Bai.    ANESTHESIA:  General.    COMPLICATIONS:  None. SPECIMENS REMOVED:  Included fluid samples sent for culture. IMPLANTS:  Included a [place] size A cone with a [employer] size 2 tibial baseplate and an 692 mm tibial stem with a size E femur with 100 mm x 20 mm cementless stem and augments include 5 mm medial and lateral tibial augment and 5 mm medial and lateral distal femur augments as well as 5 mm posterolateral augment. ESTIMATED BLOOD LOSS:  200 mL. 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 She underwent resection arthroplasty with placement of antibiotic spacer. She finished 6 weeks of antibiotics. She then has cleared her infection. She was seen in clinic and we discussed revision. She elected to proceed. PROCEDURE:  The patient was identified in the preoperative holding area and the correct site was marked and confirmed. The patient was taken to the operating room and placed supine. Anesthesia was induced. She was prepped and draped in standard sterile fashion. She received 2 g of cefazolin as well as 1500 mg of vancomycin prior to her incision. A time-out was held and the correct site was confirmed. The leg was exsanguinated and Esmarch used. Tourniquet inflated to 300. Total tourniquet time 120 minutes. After time-out, I ellipsed out her previous midline incision. I then carried out medial and lateral flaps.   I carried out the medial parapatellar arthrotomy with a quad snip secondary to its significant limitation in range of motion. I carried out a thick medial release for exposure. I then used a TPS micro-sagittal saw to disrupt the interface between the All-Poly tibia. This was removed as well as all leftover cement. I then used the micro-sagittal saw to disrupt the interface in the femur and removed this with relatively a little bone loss as well. I then began reaming. I reamed the tibia to a 12 for a size 10 stem. I reamed the femur to 100 mm x 20. I then re-cut the tibia and prepared the metaphysis for a size A cone, MicroTransponder. I then performed a distal femoral cut. I sized the femur to a B. I carried out anterior and posterior cuts. I then cut the box. I placed the size B femur with associated 5 mm augment to medially and laterally as well as posterolaterally. I placed the tibial trial as well. I trailed up to size 20. I was very happy with our stability. I then removed all implants. I placed a cement restrictor in the tibial canal.  I thoroughly irrigated with 3 L of normal saline with bacitracin. I then used the dilute Betadine soak followed by 3 more liters of saline. I placed the cone as well as tibial cement restrictor. Antibiotic cement was mixed in the back table. I cemented the tibia followed by the femur into place. Of note, the femur was cementless on the stem. I allowed the cement to harden with a 20 poly trial.  I ranged the knee and was happy with this. The patella measured 11 mm and I could not resurface it due to risk of fracture. I then placed the real poly, and tourniquet was deflated. I injected 0.5% Marcaine with Exparel throughout. I then closed the arthrotomy with #1 Vicryl followed by Stratafix. Subcutaneous tissue was closed with 2-0 Vicryl followed by 2-0 nylon. Soft dressing was placed. The patient was taken to the PACU in stable condition.         Alireza Pena, MD [email or web address]  D:  08/28/2019 14:07  T:  08/28/2019 23:16  JOB #:  [id number]

## 2019-08-29 NOTE — PROGRESS NOTES
Problem: Mobility Impaired (Adult and Pediatric)  Goal: *Acute Goals and Plan of Care (Insert Text)  Description  FUNCTIONAL STATUS PRIOR TO ADMISSION: Patient was modified independent using a SB quad cane for functional mobility. HOME SUPPORT PRIOR TO ADMISSION: The patient lived with  but did not require assist.    Physical Therapy Goals  Initiated 8/29/2019    1. Patient will move from supine to sit and sit to supine  in bed with modified independence within 4 days. 2. Patient will perform sit to stand with modified independence within 4 days. 3. Patient will ambulate with modified independence for 150 feet with the least restrictive device within 4 days. 4. Patient will ascend/descend 5 stairs with 1 handrail(s) with modified independence within 4 days. 5. Patient will perform home exercise program per protocol with modified independence within 4 days. 6. Patient will demonstrate AROM 0-90 degrees in operative joint within 4 days. 8/29/2019 1406 by Florinda Marquez, PT, DPT  Outcome: Progressing Towards Goal  8/29/2019 1048 by Florinda Marquez PT, DPT  Outcome: Progressing Towards Goal   PHYSICAL THERAPY TREATMENT  Patient: Gracie Sy (7 y.o. female)  Date: 8/29/2019  Diagnosis: Status post left knee replacement [Z96.652] <principal problem not specified>  Procedure(s) (LRB):  LEFT TOTAL KNEE REVISION ARTHROPLASTY, RIGHT KNEE INJECTION (Left) 1 Day Post-Op  Precautions:    Chart, physical therapy assessment, plan of care and goals were reviewed. ASSESSMENT  Patient making steady progress toward goals. Improved mobility this session secondary to decreased pain overall. Able to increase gait distance and needing only CGA for mobility. Current Level of Function Impacting Discharge (mobility/balance): Supine to sit with supervision. Sit to stand with CGA.   Amb approx 80 feet with RW and CGA with no overt LOB    Other factors to consider for discharge: none         PLAN :  Patient continues to benefit from skilled intervention to address the above impairments. Continue treatment per established plan of care. to address goals. Recommendation for discharge: (in order for the patient to meet his/her long term goals)  Physical therapy at least 2 days/week in the home         Equipment recommendations for successful discharge (if) home: patient owns DME required for discharge       SUBJECTIVE:   Patient stated I feel better when the medicine is working.     OBJECTIVE DATA SUMMARY:   Critical Behavior:  Neurologic State: Alert  Orientation Level: Oriented X4  Cognition: Appropriate for age attention/concentration, Appropriate decision making, Appropriate safety awareness, Follows commands     Functional Mobility Training:  Bed Mobility:  Rolling: Supervision  Supine to Sit: Supervision  Sit to Supine: Supervision  Scooting: Supervision        Transfers:  Sit to Stand: Contact guard assistance; Additional time;Assist x1  Stand to Sit: Contact guard assistance; Additional time;Assist x1                             Balance:  Sitting: Intact  Standing: Impaired  Standing - Static: Constant support;Good  Standing - Dynamic : Constant support; Fair  Ambulation/Gait Training:  Distance (ft): 80 Feet (ft)  Assistive Device: Gait belt;Walker, rolling  Ambulation - Level of Assistance: Contact guard assistance; Additional time;Assist x1     Gait Description (WDL): Exceptions to WDL  Gait Abnormalities: Antalgic;Decreased step clearance; Step to gait     Left Side Weight Bearing: As tolerated  Base of Support: Center of gravity altered; Widened  Stance: Left decreased  Speed/Radha: Pace decreased (<100 feet/min); Shuffled; Slow  Step Length: Left shortened;Right shortened         Therapeutic Exercises:   Reviewed per POC  Pain Rating:  3/10 in knee    Activity Tolerance:   Good  Please refer to the flowsheet for vital signs taken during this treatment.     After treatment patient left in no apparent distress:   Sitting in chair and Call bell within reach    COMMUNICATION/COLLABORATION:   The patients plan of care was discussed with: Physical Therapist, Occupational Therapist and Registered Nurse    Maxwell Kc, PT, DPT   Time Calculation: 20 mins

## 2019-08-29 NOTE — PROGRESS NOTES
Problem: Mobility Impaired (Adult and Pediatric)  Goal: *Acute Goals and Plan of Care (Insert Text)  Description  FUNCTIONAL STATUS PRIOR TO ADMISSION: Patient was modified independent using a SB quad cane for functional mobility. HOME SUPPORT PRIOR TO ADMISSION: The patient lived with  but did not require assist.    Physical Therapy Goals  Initiated 8/29/2019    1. Patient will move from supine to sit and sit to supine  in bed with modified independence within 4 days. 2. Patient will perform sit to stand with modified independence within 4 days. 3. Patient will ambulate with modified independence for 150 feet with the least restrictive device within 4 days. 4. Patient will ascend/descend 5 stairs with 1 handrail(s) with modified independence within 4 days. 5. Patient will perform home exercise program per protocol with modified independence within 4 days. 6. Patient will demonstrate AROM 0-90 degrees in operative joint within 4 days. Outcome: Progressing Towards Goal   PHYSICAL THERAPY EVALUATION  Patient: Claritza Mason (38 y.o. female)  Date: 8/29/2019  Primary Diagnosis: Status post left knee replacement [Z96.652]  Procedure(s) (LRB):  LEFT TOTAL KNEE REVISION ARTHROPLASTY, RIGHT KNEE INJECTION (Left) 1 Day Post-Op   Precautions: fall, WBAT         ASSESSMENT  Based on the objective data described below, the patient presents with decreased functional mobility from baseline level of function. Patient with numerous admissions for knee surgery and is familiar with mobility progression post surgery. Limited today by pain and generalized weakness following surgery. Anticipate patient will not be ready to DC today. Will continue to follow. Current Level of Function Impacting Discharge (mobility/balance): supervision for bed mobility, CGA for transfers, Amb approx 25 feet with RW and CGA with slow ector and decreased step clearance    Functional Outcome Measure:   The patient scored 70/100 on the Barthel outcome measure which is indicative of 30% impairment with functional mobility and ADL. Other factors to consider for discharge: high pain levels, multiple knee surgeries in the past     Patient will benefit from skilled therapy intervention to address the above noted impairments. PLAN :  Recommendations and Planned Interventions: bed mobility training, transfer training, gait training, therapeutic exercises, neuromuscular re-education, patient and family training/education and therapeutic activities      Frequency/Duration: Patient will be followed by physical therapy:  twice daily to address goals. Recommendation for discharge: (in order for the patient to meet his/her long term goals)  Physical therapy at least 2 days/week in the home       Equipment recommendations for successful discharge (if) home: patient owns DME required for discharge         SUBJECTIVE:   Patient stated Shirley Wheatley will try but I can't do that much.     OBJECTIVE DATA SUMMARY:   HISTORY:    Past Medical History:   Diagnosis Date    ADHD (attention deficit hyperactivity disorder)     Arthritis     OSTEO    Chronic pain     YAHIR KNEES, LOWER BACK, RT SHOULDER & NECK    GERD (gastroesophageal reflux disease)     Graves disease     NO LONGER AN ISSUE    Hematuria 10/9/2018    HTN (hypertension)     CONTROLLED WITH MEDS    Hypothyroidism     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    OCD (obsessive compulsive disorder)     Other ill-defined conditions(799.89)     graves    Psychiatric disorder     depression. RECENTLY LOST HER SON ON 19 ON HER BIRTHDAY.     Seizure ([address]     Seizures ([address]  &     REACTIVE TO HYPOGLYCEMIA / ALSO FROM FLASHING LIGHTS     Past Surgical History:   Procedure Laterality Date    DELIVERY       HX ABDOMINOPLASTY      HX ADENOIDECTOMY      HX  SECTION      HX  SECTION  2006    HX CHOLECYSTECTOMY      HX GASTRIC BYPASS          HX GI  1993    CHOLEYCYSTECTOMY HX ORTHOPAEDIC Right     CARPEL TUNNEL    HX ORTHOPAEDIC Left     CARPEL TUNNEL    HX ORTHOPAEDIC Left     ULNA SHORTENING    HX ORTHOPAEDIC Right 2011    COLLAR BONE SURGERY     HX ORTHOPAEDIC Left     LEFT KNEE REPLACEMENT    HX ORTHOPAEDIC Left     LEFT KNEE INCISION FOR INFECTION & REPAIR    HX ORTHOPAEDIC Left     LEFT KNEE REMOVAL OF PROSTHESIS & INSERTION OF SPACER, REPAIR OF INCISION    HX TONSILLECTOMY  1975       Personal factors and/or comorbidities impacting plan of care:     Home Situation  Home Environment: Private residence  # Steps to Enter: 5  Rails to Enter: Yes  One/Two Story Residence: Two story  Living Alone: No  Support Systems: Family member(s)  Patient Expects to be Discharged to[de-identified] Private residence  Current DME Used/Available at Home: Lillian Rater    EXAMINATION/PRESENTATION/DECISION MAKING:   Critical Behavior:  Neurologic State: Alert  Orientation Level: Oriented X4  Cognition: Appropriate for age attention/concentration, Appropriate decision making, Appropriate safety awareness, Follows commands     Hearing: Auditory  Auditory Impairment: None    Range Of Motion:  AROM: Generally decreased, functional                       Strength:    Strength: Generally decreased, functional                    Tone & Sensation:                                  Coordination:     Vision:      Functional Mobility:  Bed Mobility:  Rolling: Supervision  Supine to Sit: Supervision  Sit to Supine: Supervision  Scooting: Supervision  Transfers:  Sit to Stand: Contact guard assistance; Additional time;Assist x1  Stand to Sit: Contact guard assistance; Additional time;Assist x1                       Balance:   Sitting: Intact  Standing: Impaired  Standing - Static: Constant support;Good  Standing - Dynamic : Constant support; Fair  Ambulation/Gait Training:  Distance (ft): 25 Feet (ft)  Assistive Device: Gait belt;Walker, rolling  Ambulation - Level of Assistance: Contact guard assistance; Additional time;Assist x1 Gait Description (WDL): Exceptions to WDL  Gait Abnormalities: Antalgic;Decreased step clearance; Step to gait     Left Side Weight Bearing: As tolerated  Base of Support: Center of gravity altered; Widened  Stance: Left decreased  Speed/Radha: Pace decreased (<100 feet/min); Shuffled; Slow  Step Length: Left shortened;Right shortened                Therapeutic Exercises:   Reviewed verbally. Patient did not feel she can complete exercises right now bc of pain    Functional Measure:  Barthel Index:    Bathin  Bladder: 10  Bowels: 10  Groomin  Dressin  Feeding: 10  Mobility: 10  Stairs: 5  Toilet Use: 5  Transfer (Bed to Chair and Back): 10  Total: 70/100       The Barthel ADL Index: Guidelines  1. The index should be used as a record of what a patient does, not as a record of what a patient could do. 2. The main aim is to establish degree of independence from any help, physical or verbal, however minor and for whatever reason. 3. The need for supervision renders the patient not independent. 4. A patient's performance should be established using the best available evidence. Asking the patient, friends/relatives and nurses are the usual sources, but direct observation and common sense are also important. However direct testing is not needed. 5. Usually the patient's performance over the preceding 24-48 hours is important, but occasionally longer periods will be relevant. 6. Middle categories imply that the patient supplies over 50 per cent of the effort. 7. Use of aids to be independent is allowed. Eliseo Gurrola., Barthel, D.W. (2031). Functional evaluation: the Barthel Index. [address] [facility] (14)2. SOLANGE Gallagher, Kim Valenzuela., [place]., Jazmyn Lynn, [address] [facility] (). Measuring the change indisability after inpatient rehabilitation; comparison of the responsiveness of the Barthel Index and Functional [place] Measure.  Journal of Neurology, Neurosurgery, and Psychiatry, 66(4), [phone]. ALANA Hills, LEONARDO Duff, & Wiley Galloway M.A. (2004.) Assessment of post-stroke quality of life in cost-effectiveness studies: The usefulness of the Barthel Index and the EuroQoL-5D. Quality of Life Research, 13, 42743       Pain Ratin10, RN notified    Activity Tolerance:   Good  Please refer to the flowsheet for vital signs taken during this treatment. After treatment patient left in no apparent distress:   Supine in bed, Call bell within reach and Side rails x 3    COMMUNICATION/EDUCATION:   The patients plan of care was discussed with: Physical Therapist, Occupational Therapist and Registered Nurse. Fall prevention education was provided and the patient/caregiver indicated understanding., Patient/family have participated as able in goal setting and plan of care. and Patient/family agree to work toward stated goals and plan of care.     Thank you for this referral.  Gonzales Bonds, PT, DPT   Time Calculation: 12 mins

## 2019-08-29 NOTE — DISCHARGE INSTRUCTIONS
After Hospital Care Plan:  Discharge Instructions Revision Knee Replacement  Dr. Chau Veras    Patient Name: Rajni Aguirre  Date of procedure: 8/28/2019   Procedure: Procedure(s):  LEFT TOTAL KNEE REVISION ARTHROPLASTY, RIGHT KNEE INJECTION  Surgeon: Surgeon(s) and Role:     * Gomez Dover MD - Primary  PCP: Christy Briscoe MD  Date of discharge: 8/      Follow up appointments  -follow up with Dr. Chau Veras in 3 weeks. Call [phone] to make an appointment. [facility]: ________________________ phone: _____________________  The agency will contact you to arrange dates/times for visits. Please call them if you do not hear from them within 24 hours after you are discharged    When to call your Orthopaedic Surgeon:  -unrelieved pain  -Signs of infection-if your incision is red; continues to have drainage; drainage has a foul odor or if you have a persistent fever over 101 degrees  -Signs of a blood clot in your leg-calf pain, tenderness, redness, swelling of lower leg    When to call your Primary Care Physician:  -Concerns about medical conditions such as diabetes, high blood pressure, asthma, congestive heart failure  -Call if blood sugars are elevated, persistent headache or dizziness, coughing or congestion, constipation or diarrhea, burning with urination, abnormal heart rate    When to call 551and go to the nearest emergency room  -acute onset of chest pain, shortness of breath, difficulty breathing    Activity  -walk with your walker or crutches putting as much weight on your leg as you can tolerate.    -do 20 repetitions of each exercise 3 times each day as instructed by the physical therapist.   -get up every one hour and walk (except at night when sleeping)  -do not drive or operate heavy machinery    Incision Care  -the Aquacel (brown, waterproof) surgical dressing is to remain on your knee for 7 days.  On the 7th day have someone gently peel the dressing off by carefully lifting the edge and stretching it slightly to break the adhesive seal and leave incision open to air. You may take a shower with the Aquacel dressing in place. Preventing blood clots   -Take Aspirin 81 mg twice daily as prescribed  by Dr. Mack Saha for one month following surgery      Pain management  - Please take scheduled 650 mg tylenol every 6 hours for 4 weeks  - Take scheduled meloxicam 15 mg daily for 4 weeks  -Take dilaudid for breakthrough pain    Antibiotics  Take one tablet twice daily of doxycycline to prevent infection recurrence      - Avoid alcoholic beverages while taking pain medication  - Do not take any over-the-counter medication for pain except Tylenol (acetaminophen)  - Please be aware that many medications contain Tylenol. We do not want you to over medicate so please read the information below as a guide. Do not take more than 4 Grams of Tylenol in a 24 hour  - You may place an ice wrap on your knee after exercising and as needed throughout the day and night      Diet  -resume usual diet; drink plenty of fluids; eat foods high in fiber  -you may want to take a stool softener (such as Senokot-S or Colace) to prevent constipation while you are taking pain medication.   If constipation occurs, take a laxative (such as Dulcolax tablets, Milk of Magnesia, or a suppository)    [facility] Protocol (to be followed by [address])    Nursing-per physicians order  -remove Aquacel dressing at 7 days post-op   -complete head to toe assessment, vital signs  -medication reconciliation  -review pain management  -manage chronic medical conditions    Physical Therapy-per physicians order    Weight bearing status:             Mobility Status:                Gait:                ADL status overall composite:                   Physical Therapy  -assessment and evaluation-bed mobility; functional transfers (bed, chair, bathroom, stairs); ambulation with equipment, car transfers, safety and ability to get out of house in the event of an emergency  -review and maintain weight bearing as tolerated  -discuss pain management  -review how to do ADLs    -Home Exercise Program-refer to pages 32-40 of the patient handbook for exercises  -supine exercises-ankle pumps, hamstring sets, quad sets, heel slides, short arc quad sets, long arc quads-prop heel on pillow for stretch, straight leg raise  -sitting exercises-active knee flexion, passive knee flexion, active knee extension, ankle pumps, bicep curls (use weights as appropriate), triceps pushups, shoulder flexion (use weights as appropriate)  -standing exercises holding onto supportive counter-toe raises, heel raises, mini-squats, heel/toe touches with knee bend, marching in place, hamstring curls    Physical therapy goals by discharge from [facility] [address] ambulation with walker, crutches or cane if needed (level surfaces and   stairs)  -Flexion > 90 degrees, extension 0 degrees  -Daily performance of home exercise program  -Independent with stair climbing and car transfers  -Progress to community ambulator (least restrictive assistive device)

## 2019-08-29 NOTE — PROGRESS NOTES
Patient assessed for readiness to ambulate. Vital Signs  Level of Consciousness: Alert  Temp: 98.5 °F (36.9 °C)  Temp Source: Oral  Pulse (Heart Rate): 83  Heart Rate Source: Monitor  Cardiac Rhythm: Normal sinus rhythm  Resp Rate: 16  BP: (P) 136/89  MAP (Calculated): (P) 105  BP 1 Location: Left arm  BP 1 Method: Automatic  BP Patient Position: Standing  MEWS Score: 1. Patient ambulated with assistance of 2 nurses. Patient ambulated with gait belt and walker. Patient walked to sidestepped to Head of Bed. Patient returned safely to bed.

## 2019-08-29 NOTE — PROGRESS NOTES
Resting comfortably      GEN:  NAD. AOx3   ABD:  S/NT/ND   LLE:  Dressing C/D/I , 5/5 motor, Calf nttp (Bilat), Sensation rossly intact to light touch throughout, 1+ dp/pt pulses, foot perfused    Patient Vitals for the past 24 hrs:   Temp Pulse Resp BP SpO2   08/29/19 0247 97.9 °F (36.6 °C) 67 16 116/75 94 %   08/28/19 2328 98 °F (36.7 °C) 65 16 141/87 96 %   08/28/19 2114    136/89    08/28/19 2110  83  130/86 97 %   08/28/19 2109  73  131/89 98 %   08/28/19 1934 98.5 °F (36.9 °C) 79 16 132/90 96 %   08/28/19 1828 98.6 °F (37 °C) 69 16 158/79 95 %   08/28/19 1708 98.6 °F (37 °C) 77 16 164/88 96 %   08/28/19 1604 98 °F (36.7 °C) 70 16 (!) 178/94 98 %   08/28/19 1530 97.8 °F (36.6 °C) 65 10 (!) 159/93 93 %   08/28/19 1515  70 10 (!) 171/101 95 %   08/28/19 1500  66 10 (!) 170/100 95 %   08/28/19 1445  77 10 (!) 173/100 96 %   08/28/19 1440  70 10 (!) 173/100 96 %   08/28/19 1435  81 14 (!) 173/114 95 %   08/28/19 1430  71 12 (!) 173/96 94 %   08/28/19 1425  79 14 (!) 173/96 93 %   08/28/19 1415  75 14 (!) 165/103 99 %   08/28/19 1405  91 10 (!) 175/101 100 %   08/28/19 1400  84 10 (!) 173/106 100 %   08/28/19 1355  69 10 (!) 180/103 100 %   08/28/19 1354 97.5 °F (36.4 °C) 78 9 (!) 178/100 100 %   08/28/19 0956  76 16 (!) 131/99 100 %   08/28/19 0934  72 16 129/78 100 %   08/28/19 0925  67 16 134/83 99 %       Current Facility-Administered Medications   Medication Dose Route Frequency    . PHARMACY TO SUBSTITUTE PER PROTOCOL (Reordered from: amphetamine sulfate (EVEKEO) 10 mg tab)    Per Protocol    baclofen (LIORESAL) tablet 10 mg  10 mg Oral BID PRN    busPIRone (BUSPAR) tablet 7.5-15 mg  7.5-15 mg Oral TID PRN    cetirizine (ZYRTEC) tablet 10 mg  10 mg Oral DAILY    furosemide (LASIX) tablet 20 mg  20 mg Oral DAILY PRN    hydroCHLOROthiazide (HYDRODIURIL) tablet 50 mg  50 mg Oral DAILY PRN    HYDROmorphone (DILAUDID) tablet 4 mg  4 mg Oral Q6H PRN    levothyroxine (SYNTHROID) tablet 100 mcg  100 mcg Oral ACB    losartan (COZAAR) tablet 100 mg  100 mg Oral DAILY    morphine CR (MS CONTIN) tablet 30 mg  30 mg Oral Q12H    potassium chloride SR (KLOR-CON 10) tablet 10 mEq  10 mEq Oral DAILY PRN    traZODone (DESYREL) tablet 200 mg  200 mg Oral QHS    . PHARMACY TO SUBSTITUTE PER PROTOCOL (Reordered from: vortioxetine (TRINTELLIX) 10 mg tablet)    Per Protocol    0.9% sodium chloride infusion  125 mL/hr IntraVENous CONTINUOUS    sodium chloride 0.9 % bolus infusion 500 mL  500 mL IntraVENous ONCE PRN    sodium chloride (NS) flush 5-40 mL  5-40 mL IntraVENous Q8H    sodium chloride (NS) flush 5-40 mL  5-40 mL IntraVENous PRN    acetaminophen (TYLENOL) tablet 650 mg  650 mg Oral Q6H    oxyCODONE IR (ROXICODONE) tablet 5 mg  5 mg Oral Q3H PRN    HYDROmorphone (PF) (DILAUDID) injection 0.5 mg  0.5 mg IntraVENous Q4H PRN    naloxone (NARCAN) injection 0.4 mg  0.4 mg IntraVENous PRN    hydrOXYzine HCl (ATARAX) tablet 10 mg  10 mg Oral Q8H PRN    senna-docusate (PERICOLACE) 8.6-50 mg per tablet 1 Tab  1 Tab Oral BID    polyethylene glycol (MIRALAX) packet 17 g  17 g Oral DAILY    [START ON 8/30/2019] bisacodyl (DULCOLAX) suppository 10 mg  10 mg Rectal DAILY PRN    aspirin delayed-release tablet 81 mg  81 mg Oral Q12H    ketorolac (TORADOL) injection 15 mg  15 mg IntraVENous Q6H    ondansetron (ZOFRAN) injection 4 mg  4 mg IntraVENous Q4H PRN    albuterol (PROVENTIL VENTOLIN) nebulizer solution 2.5 mg  2.5 mg Nebulization Q4H PRN    pantoprazole (PROTONIX) tablet 40 mg  40 mg Oral ACB    divalproex DR (DEPAKOTE) tablet 250 mg  250 mg Oral DAILY    divalproex DR (DEPAKOTE) tablet 500 mg  500 mg Oral QHS       Lab Results   Component Value Date/Time    HGB 9.5 (L) 08/29/2019 03:01 AM    INR 1.1 08/26/2019 03:54 PM       Lab Results   Component Value Date/Time    Sodium 143 08/29/2019 03:01 AM    Potassium 4.1 08/29/2019 03:01 AM    Chloride 111 (H) 08/29/2019 03:01 AM    CO2 25 08/29/2019 03:01 AM    BUN 10 08/29/2019 03:01 AM    Creatinine 0.66 08/29/2019 03:01 AM    Calcium 7.9 (L) 08/29/2019 03:01 AM            52 y.o. female s/p revision left total knee arthroplasty on 8/28/2019  . Doing well.        ABX: Complete 24 hours perioperative abx, then oral for 3 months  PATHWAY: Straight cath per protocol if needed  DVT Prophylaxis: Aspirin 81 mg enteric coated BID  Weight Bearing: WBAT LLE   Pain Control: Toradol, Tylenol, 15 mg meloxicam to begin evening POD 1 if patient still in hospital, tramadol every 6 hours, oxy 5 mg for breakthrough pain  Disposition: Home, [facility]

## 2019-08-29 NOTE — PROGRESS NOTES
Order received and chart reviewed. Pt declined to work with OT 2/2 waiting for pain meds. Will f/u this afternoon.   Thanks

## 2019-08-30 ENCOUNTER — HOME HEALTH ADMISSION (OUTPATIENT)
Dept: HOME HEALTH SERVICES | Facility: HOME HEALTH | Age: 49
End: 2019-08-30
Payer: COMMERCIAL

## 2019-08-30 VITALS
TEMPERATURE: 98.3 F | HEIGHT: 62 IN | DIASTOLIC BLOOD PRESSURE: 65 MMHG | WEIGHT: 184 LBS | RESPIRATION RATE: 16 BRPM | SYSTOLIC BLOOD PRESSURE: 104 MMHG | HEART RATE: 84 BPM | OXYGEN SATURATION: 96 % | BODY MASS INDEX: 33.86 KG/M2

## 2019-08-30 PROCEDURE — 77030038269 HC DRN EXT URIN PURWCK BARD -A

## 2019-08-30 PROCEDURE — 97116 GAIT TRAINING THERAPY: CPT

## 2019-08-30 PROCEDURE — 74011250637 HC RX REV CODE- 250/637: Performed by: ORTHOPAEDIC SURGERY

## 2019-08-30 RX ADMIN — OXYCODONE HYDROCHLORIDE 5 MG: 5 TABLET ORAL at 15:48

## 2019-08-30 RX ADMIN — HYDROMORPHONE HYDROCHLORIDE 4 MG: 4 TABLET ORAL at 07:14

## 2019-08-30 RX ADMIN — PANTOPRAZOLE SODIUM 40 MG: 40 TABLET, DELAYED RELEASE ORAL at 07:14

## 2019-08-30 RX ADMIN — LEVOTHYROXINE SODIUM 100 MCG: 100 TABLET ORAL at 08:15

## 2019-08-30 RX ADMIN — OXYCODONE HYDROCHLORIDE 5 MG: 5 TABLET ORAL at 02:34

## 2019-08-30 RX ADMIN — HYDROMORPHONE HYDROCHLORIDE 4 MG: 4 TABLET ORAL at 13:40

## 2019-08-30 RX ADMIN — MORPHINE SULFATE 30 MG: 15 TABLET, FILM COATED, EXTENDED RELEASE ORAL at 08:43

## 2019-08-30 RX ADMIN — HYDROMORPHONE HYDROCHLORIDE 4 MG: 4 TABLET ORAL at 01:16

## 2019-08-30 RX ADMIN — DIVALPROEX SODIUM 250 MG: 250 TABLET, DELAYED RELEASE ORAL at 08:43

## 2019-08-30 RX ADMIN — ASPIRIN 81 MG: 81 TABLET, COATED ORAL at 08:42

## 2019-08-30 RX ADMIN — OXYCODONE HYDROCHLORIDE 5 MG: 5 TABLET ORAL at 05:30

## 2019-08-30 RX ADMIN — ACETAMINOPHEN 650 MG: 325 TABLET, FILM COATED ORAL at 11:32

## 2019-08-30 RX ADMIN — OXYCODONE HYDROCHLORIDE 5 MG: 5 TABLET ORAL at 12:36

## 2019-08-30 RX ADMIN — ACETAMINOPHEN 650 MG: 325 TABLET, FILM COATED ORAL at 07:13

## 2019-08-30 RX ADMIN — CETIRIZINE HYDROCHLORIDE 10 MG: 10 TABLET, FILM COATED ORAL at 08:42

## 2019-08-30 NOTE — PROGRESS NOTES
Physical Therapy    PT intervention deferred at this time due to pt requesting additional rest, feeling drowsy from pain meds. Will follow up later this morning.     Cordell De La Torre, PT, MPT

## 2019-08-30 NOTE — PROGRESS NOTES
Problem: Mobility Impaired (Adult and Pediatric)  Goal: *Acute Goals and Plan of Care (Insert Text)  Description  FUNCTIONAL STATUS PRIOR TO ADMISSION: Patient was modified independent using a SB quad cane for functional mobility. HOME SUPPORT PRIOR TO ADMISSION: The patient lived with  but did not require assist.    Physical Therapy Goals  Initiated 8/29/2019    1. Patient will move from supine to sit and sit to supine  in bed with modified independence within 4 days. 2. Patient will perform sit to stand with modified independence within 4 days. 3. Patient will ambulate with modified independence for 150 feet with the least restrictive device within 4 days. 4. Patient will ascend/descend 5 stairs with 1 handrail(s) with modified independence within 4 days. 5. Patient will perform home exercise program per protocol with modified independence within 4 days. 6. Patient will demonstrate AROM 0-90 degrees in operative joint within 4 days. Outcome: Progressing Towards Goal   PHYSICAL THERAPY TREATMENT  Patient: Emeka Truong (39 y.o. female)  Date: 8/30/2019  Diagnosis: Status post left knee replacement [Z96.652] <principal problem not specified>  Procedure(s) (LRB):  LEFT TOTAL KNEE REVISION ARTHROPLASTY, RIGHT KNEE INJECTION (Left) 2 Days Post-Op  Precautions:    Chart, physical therapy assessment, plan of care and goals were reviewed. ASSESSMENT  Based on the objective data described below, patient presents with decreased ROM, decreased strength, and decreased independence with functional mobility post-op day 2 L total knee revision. Patient demonstrates the ability to ambulate increased distances and ascend and descend 5 stairs with 1 railing and a cane. She demonstrates good standing balance and stability on stairs.      Current Level of Function Impacting Discharge (mobility/balance): SBA bed mobility and gait with rolling walker, CGA on stairs    Other factors to consider for discharge: medical          PLAN :  Patient continues to benefit from skilled intervention to address the above impairments. Continue treatment per established plan of care. to address goals. Recommendation for discharge: (in order for the patient to meet his/her long term goals)  Physical therapy at least 2 days/week in the home     This discharge recommendation:  Has been made in collaboration with the attending provider and/or case management    Equipment recommendations for successful discharge (if) home: patient owns DME required for discharge       SUBJECTIVE:   Patient stated I just need to through this and then my other one.     OBJECTIVE DATA SUMMARY:   Critical Behavior:  Neurologic State: Alert, Appropriate for age  Orientation Level: Appropriate for age, Oriented X4  Cognition: Appropriate decision making, Appropriate for age attention/concentration, Appropriate safety awareness, Follows commands  Safety/Judgement: Awareness of environment  Functional Mobility Training:  Bed Mobility:     Supine to Sit: Stand-by assistance  Sit to Supine: Stand-by assistance           Transfers:  Sit to Stand: Stand-by assistance  Stand to Sit: Stand-by assistance                             Balance:  Sitting: Intact  Standing: Impaired  Standing - Static: Good;Constant support  Standing - Dynamic : Fair  Ambulation/Gait Training:  Distance (ft): 120 Feet (ft)  Assistive Device: Gait belt;Walker, rolling  Ambulation - Level of Assistance: Stand-by assistance        Gait Abnormalities: Antalgic;Decreased step clearance; Step to gait        Base of Support: Widened  Stance: Left decreased  Speed/Radha: Slow;Shuffled  Step Length: Right shortened;Left shortened                    Stairs: Therapeutic Exercises:     Pain Rating:      Activity Tolerance:   Fair  Please refer to the flowsheet for vital signs taken during this treatment.     After treatment patient left in no apparent distress:   Supine in bed and Call bell within reach    COMMUNICATION/COLLABORATION:   The patients plan of care was discussed with: Physical Therapist and Registered Nurse    Tomy Aldridge PT   Time Calculation: 20 mins

## 2019-08-30 NOTE — PROGRESS NOTES
Bedside and Verbal shift change report given to Pam RICHTER (oncoming nurse) by Yaw Lind (offgoing nurse). Report included the following information SBAR, Kardex, Intake/Output, MAR and Recent Results.

## 2019-08-30 NOTE — PROGRESS NOTES
Problem: Mobility Impaired (Adult and Pediatric)  Goal: *Acute Goals and Plan of Care (Insert Text)  Description  FUNCTIONAL STATUS PRIOR TO ADMISSION: Patient was modified independent using a SB quad cane for functional mobility. HOME SUPPORT PRIOR TO ADMISSION: The patient lived with  but did not require assist.    Physical Therapy Goals  Initiated 8/29/2019    1. Patient will move from supine to sit and sit to supine  in bed with modified independence within 4 days. 2. Patient will perform sit to stand with modified independence within 4 days. 3. Patient will ambulate with modified independence for 150 feet with the least restrictive device within 4 days. 4. Patient will ascend/descend 5 stairs with 1 handrail(s) with modified independence within 4 days. 5. Patient will perform home exercise program per protocol with modified independence within 4 days. 6. Patient will demonstrate AROM 0-90 degrees in operative joint within 4 days. Outcome: Not Met   PHYSICAL THERAPY TREATMENT  Patient: Tammy Hebert (49 y.o. female)  Date: 8/30/2019  Diagnosis: Status post left knee replacement [Z96.652] <principal problem not specified>  Procedure(s) (LRB):  LEFT TOTAL KNEE REVISION ARTHROPLASTY, RIGHT KNEE INJECTION (Left) 2 Days Post-Op  Precautions:  WBAT, fall  Chart, physical therapy assessment, plan of care and goals were reviewed. ASSESSMENT  Based on the objective data described below, pt presents with increased L LE pain this date limiting progression with mobility. Pt amb on unit with RW with CGA, unable to tolerate stair training due to pain. Requested pain meds at end of session and will follow up later today. Of note, pt with chronic pain, followed by pain mgmt. Pt with family support at home, one level environment, but will benefit from stair training prior to d/c.     Current Level of Function Impacting Discharge (mobility/balance): bed mob SBA, transfer SBA, household distance amb with RW CGA    Other factors to consider for discharge: none noted         PLAN :  Patient continues to benefit from skilled intervention to address the above impairments. Continue treatment per established plan of care. to address goals. Recommendation for discharge: (in order for the patient to meet his/her long term goals)  Physical therapy at least 2 days/week in the home     This discharge recommendation:  Has been made in collaboration with the attending provider and/or case management    Equipment recommendations for successful discharge (if) home: none       SUBJECTIVE:   Patient stated it hurts a lot more today.     OBJECTIVE DATA SUMMARY:   Critical Behavior:  Neurologic State: Alert, Appropriate for age  Orientation Level: Appropriate for age, Oriented X4  Cognition: Appropriate decision making, Appropriate for age attention/concentration, Appropriate safety awareness, Follows commands  Safety/Judgement: Awareness of environment  Functional Mobility Training:  Bed Mobility:     Supine to Sit: Stand-by assistance(pt using UEs to assist with L LE mgmt)  Sit to Supine: Stand-by assistance(pt using UEs to assist with L LE mgmt)           Transfers:  Sit to Stand: Stand-by assistance  Stand to Sit: Supervision                             Balance:  Sitting: Intact  Standing: Impaired  Standing - Static: Good;Constant support(UE support on RW)  Standing - Dynamic : Fair  Ambulation/Gait Training:  Distance (ft): 80 Feet (ft)  Assistive Device: Gait belt;Walker, rolling  Ambulation - Level of Assistance: Contact guard assistance        Gait Abnormalities: Antalgic;Decreased step clearance; Step to gait(flexed posture)        Base of Support: Widened  Stance: Left decreased  Speed/Radha: Slow;Shuffled  Step Length: Left shortened;Right shortened        Therapeutic Exercises:   Pt performed quad sets in supine, unable to tolerate additional ex this session  Pain Rating:  Pt notes increased pain this session, RN aware    Activity Tolerance:   Fair  Please refer to the flowsheet for vital signs taken during this treatment.     After treatment patient left in no apparent distress:   Supine in bed, Call bell within reach and Side rails x 3    COMMUNICATION/COLLABORATION:   The patients plan of care was discussed with: Registered Nurse    Prakash Liang, PT   Time Calculation: 32 mins

## 2019-08-30 NOTE — PROGRESS NOTES
Resting comfortably      GEN:  NAD. AOx3   ABD:  S/NT/ND   LLE:  Dressing C/D/I , 5/5 motor, Calf nttp (Bilat), Sensation rossly intact to light touch throughout, 1+ dp/pt pulses, foot perfused    Patient Vitals for the past 24 hrs:   Temp Pulse Resp BP SpO2   08/30/19 0526 97.9 °F (36.6 °C) 65 16 101/59 96 %   08/29/19 2145 98.4 °F (36.9 °C) 69 17 138/83 97 %   08/29/19 1522 98 °F (36.7 °C) 77 18 107/58 96 %   08/29/19 1015 98.2 °F (36.8 °C) 96 16 155/83 96 %       Current Facility-Administered Medications   Medication Dose Route Frequency    . PHARMACY TO SUBSTITUTE PER PROTOCOL (Reordered from: amphetamine sulfate (EVEKEO) 10 mg tab)    Per Protocol    baclofen (LIORESAL) tablet 10 mg  10 mg Oral BID PRN    busPIRone (BUSPAR) tablet 7.5-15 mg  7.5-15 mg Oral TID PRN    cetirizine (ZYRTEC) tablet 10 mg  10 mg Oral DAILY    furosemide (LASIX) tablet 20 mg  20 mg Oral DAILY PRN    hydroCHLOROthiazide (HYDRODIURIL) tablet 50 mg  50 mg Oral DAILY PRN    HYDROmorphone (DILAUDID) tablet 4 mg  4 mg Oral Q6H PRN    levothyroxine (SYNTHROID) tablet 100 mcg  100 mcg Oral ACB    losartan (COZAAR) tablet 100 mg  100 mg Oral DAILY    morphine CR (MS CONTIN) tablet 30 mg  30 mg Oral Q12H    potassium chloride SR (KLOR-CON 10) tablet 10 mEq  10 mEq Oral DAILY PRN    traZODone (DESYREL) tablet 200 mg  200 mg Oral QHS    . PHARMACY TO SUBSTITUTE PER PROTOCOL (Reordered from: vortioxetine (TRINTELLIX) 10 mg tablet)    Per Protocol    sodium chloride (NS) flush 5-40 mL  5-40 mL IntraVENous Q8H    sodium chloride (NS) flush 5-40 mL  5-40 mL IntraVENous PRN    acetaminophen (TYLENOL) tablet 650 mg  650 mg Oral Q6H    oxyCODONE IR (ROXICODONE) tablet 5 mg  5 mg Oral Q3H PRN    naloxone (NARCAN) injection 0.4 mg  0.4 mg IntraVENous PRN    hydrOXYzine HCl (ATARAX) tablet 10 mg  10 mg Oral Q8H PRN    senna-docusate (PERICOLACE) 8.6-50 mg per tablet 1 Tab  1 Tab Oral BID    polyethylene glycol (MIRALAX) packet 17 g  17 g Oral DAILY    bisacodyl (DULCOLAX) suppository 10 mg  10 mg Rectal DAILY PRN    aspirin delayed-release tablet 81 mg  81 mg Oral Q12H    albuterol (PROVENTIL VENTOLIN) nebulizer solution 2.5 mg  2.5 mg Nebulization Q4H PRN    pantoprazole (PROTONIX) tablet 40 mg  40 mg Oral ACB    divalproex DR (DEPAKOTE) tablet 250 mg  250 mg Oral DAILY    divalproex DR (DEPAKOTE) tablet 500 mg  500 mg Oral QHS       Lab Results   Component Value Date/Time    HGB 9.5 (L) 08/29/2019 03:01 AM    INR 1.1 08/26/2019 03:54 PM       Lab Results   Component Value Date/Time    Sodium 143 08/29/2019 03:01 AM    Potassium 4.1 08/29/2019 03:01 AM    Chloride 111 (H) 08/29/2019 03:01 AM    CO2 25 08/29/2019 03:01 AM    BUN 10 08/29/2019 03:01 AM    Creatinine 0.66 08/29/2019 03:01 AM    Calcium 7.9 (L) 08/29/2019 03:01 AM            52 y.o. female s/p revision left total knee arthroplasty on 8/28/2019  . Doing well.        ABX: Complete 24 hours perioperative abx, then oral for 3 months  PATHWAY: Straight cath per protocol if needed  DVT Prophylaxis: Aspirin 81 mg enteric coated BID  Weight Bearing: WBAT LLE   Pain Control: Toradol, Tylenol, 15 mg meloxicam to begin evening POD 1 if patient still in hospital, tramadol every 6 hours, oxy 5 mg for breakthrough pain  Disposition: Home, [facility]

## 2019-08-31 ENCOUNTER — HOME CARE VISIT (OUTPATIENT)
Dept: SCHEDULING | Facility: HOME HEALTH | Age: 49
End: 2019-08-31
Payer: COMMERCIAL

## 2019-08-31 PROCEDURE — 400013 HH SOC

## 2019-08-31 PROCEDURE — G0151 HHCP-SERV OF PT,EA 15 MIN: HCPCS

## 2019-09-02 ENCOUNTER — HOME CARE VISIT (OUTPATIENT)
Dept: SCHEDULING | Facility: HOME HEALTH | Age: 49
End: 2019-09-02
Payer: COMMERCIAL

## 2019-09-02 VITALS
TEMPERATURE: 97.8 F | DIASTOLIC BLOOD PRESSURE: 78 MMHG | RESPIRATION RATE: 18 BRPM | HEART RATE: 72 BPM | SYSTOLIC BLOOD PRESSURE: 130 MMHG | OXYGEN SATURATION: 97 %

## 2019-09-02 VITALS
HEART RATE: 78 BPM | OXYGEN SATURATION: 97 % | RESPIRATION RATE: 18 BRPM | DIASTOLIC BLOOD PRESSURE: 72 MMHG | SYSTOLIC BLOOD PRESSURE: 124 MMHG | TEMPERATURE: 97.6 F

## 2019-09-02 PROCEDURE — G0151 HHCP-SERV OF PT,EA 15 MIN: HCPCS

## 2019-09-03 ENCOUNTER — HOME CARE VISIT (OUTPATIENT)
Dept: HOME HEALTH SERVICES | Facility: HOME HEALTH | Age: 49
End: 2019-09-03
Payer: COMMERCIAL

## 2019-09-03 PROCEDURE — A4452 WATERPROOF TAPE: HCPCS

## 2019-09-03 PROCEDURE — A6253 ABSORPT DRG > 48 SQ IN W/O B: HCPCS

## 2019-09-03 PROCEDURE — A6216 NON-STERILE GAUZE<=16 SQ IN: HCPCS

## 2019-09-04 ENCOUNTER — HOME CARE VISIT (OUTPATIENT)
Dept: SCHEDULING | Facility: HOME HEALTH | Age: 49
End: 2019-09-04
Payer: COMMERCIAL

## 2019-09-04 VITALS
RESPIRATION RATE: 18 BRPM | DIASTOLIC BLOOD PRESSURE: 78 MMHG | SYSTOLIC BLOOD PRESSURE: 124 MMHG | HEART RATE: 78 BPM | TEMPERATURE: 97.8 F | OXYGEN SATURATION: 99 %

## 2019-09-04 PROCEDURE — G0151 HHCP-SERV OF PT,EA 15 MIN: HCPCS

## 2019-09-06 ENCOUNTER — HOME CARE VISIT (OUTPATIENT)
Dept: SCHEDULING | Facility: HOME HEALTH | Age: 49
End: 2019-09-06
Payer: COMMERCIAL

## 2019-09-06 PROCEDURE — G0151 HHCP-SERV OF PT,EA 15 MIN: HCPCS

## 2019-09-09 ENCOUNTER — HOME CARE VISIT (OUTPATIENT)
Dept: HOME HEALTH SERVICES | Facility: HOME HEALTH | Age: 49
End: 2019-09-09
Payer: COMMERCIAL

## 2019-09-10 NOTE — DISCHARGE SUMMARY
Patient ID:  Carlos Manuel Villalobos  [id number]  52 y.o.  1970    Admit Date: 8/28/2019    Discharge Date: 9/10/2019    Admission Diagnoses: Status post left knee replacement [Z96.652]    Discharge Diagnoses: Active Problems:    Status post left knee replacement (12/12/2018)         Admission Condition: Fair    Discharge Condition: Fair    Last Procedure: Procedure(s):  LEFT TOTAL KNEE REVISION ARTHROPLASTY, RIGHT KNEE INJECTION      Medications:   No current facility-administered medications for this encounter. Current Outpatient Medications   Medication Sig    aspirin delayed-release 81 mg tablet Take 1 Tab by mouth every twelve (12) hours.  HYDROmorphone (DILAUDID) 4 mg tablet Take 1 Tab by mouth every six (6) hours as needed for Pain for up to 30 days. Max Daily Amount: 16 mg.    meloxicam (MOBIC) 15 mg tablet Take 1 Tab by mouth daily.  pantoprazole (PROTONIX) 40 mg tablet Take 1 Tab by mouth Daily (before breakfast).  doxycycline (VIBRAMYCIN) 100 mg capsule Take 1 Cap by mouth two (2) times a day for 90 days.  busPIRone (BUSPAR) 15 mg tablet Take 15 mg by mouth as needed. UP TO 3 TIMES DAILY PRN    morphine CR (MS CONTIN) 30 mg CR tablet Take  by mouth every twelve (12) hours.  HYDROmorphone (DILAUDID) 4 mg tablet Take  by mouth every six (6) hours as needed for Pain.  acetaminophen (TYLENOL) 500 mg tablet Take 500 mg by mouth every six (6) hours as needed for Pain.  amphetamine sulfate (EVEKEO) 10 mg tab Take 10 mg by mouth as needed. PT TAKES 2 TABS (20 MG) BID AT 0200 AND 1200     levothyroxine (SYNTHROID) 100 mcg tablet Take 100 mcg by mouth Daily (before breakfast).  vortioxetine (TRINTELLIX) 10 mg tablet Take  by mouth daily. PT TAKES A TOTAL OF 30 mg DAILY    potassium chloride SR (KLOR-CON 10) 10 mEq tablet Take 10 mEq by mouth daily.     divalproex DR (DEPAKOTE) 250 mg tablet Take 1 tablet by mouth in  the morning and 2 tablets  by mouth in the evening    losartan (COZAAR) 50 mg tablet Take 100 mg by mouth daily.  lansoprazole (PREVACID) 15 mg disintegrating tablet Take 1 Tab by mouth Daily (before breakfast).  cetirizine (ZYRTEC) 10 mg tablet Take 1 Tab by mouth daily.  prazosin (MINIPRESS) 2 mg capsule Take 2 mg by mouth nightly.  Lisdexamfetamine (VYVANSE) 70 mg cap Take 70 mg by mouth two (2) times a day. 1 AM & 1 AT NOON    hydroCHLOROthiazide (HYDRODIURIL) 50 mg tablet Take 50 mg by mouth as needed.  diclofenac EC (VOLTAREN) 75 mg EC tablet Take 75 mg by mouth two (2) times a day.  diclofenac 10% cyclobenzaprine 2% lidocaine 5% gabapentin 6% cream compound Apply 2 g to affected area four (4) times daily as needed for Pain.  alpha-d-galactosidase (BEANO) 150 unit tab tablet Take 2 Tabs by mouth three (3) times daily as needed for Flatulence.  traZODone (DESYREL) 100 mg tablet Take 100 mg by mouth nightly. May take 1/2 tab to 1 tab every evening    furosemide (LASIX) 20 mg tablet Take 20 mg by mouth daily. MAY TAKE ONLY IF NEEDED FOR SEVERE ANKLE SWELLING    baclofen (LIORESAL) 10 mg tablet Take 10 mg by mouth two (2) times daily as needed for Pain.  albuterol (PROAIR HFA) 90 mcg/actuation inhaler Take 1 Puff by inhalation every four (4) hours as needed for Wheezing.  acyclovir (ZOVIRAX) 400 mg tablet Take 400 mg by mouth every four (4) hours as needed for Other (fever blisters).  simethicone (GAS-X) 80 mg chewable tablet Take 80 mg by mouth every six (6) hours as needed for Flatulence. Hospital Course: The patient was admitted to the hospital on the day of surgery and underwent revision knee arthroplasty. They tolerated the procedure well. Pain was controlled post-operatively with a multimodal pain regiment including tylenol,tramadol, anti-inflammatory medication, and oxycodone for breakthrough. Physical therapy began to work with the patient on POD#0 and continued daily. 24 hours of perioperative antibiotics were completed. Aspirin was given for DVT prophylaxis beginning on POD#0. The patient progressed well and was discharged home in stable condition once they cleared therapy. She was discharged on oral abx due to previous infection. Consults: None    Significant Diagnostic Studies: post op xrays showed a stable Procedure(s):  LEFT TOTAL KNEE REVISION ARTHROPLASTY, RIGHT KNEE INJECTION    Disposition: home    The patient was discharged with the following instructions:   1.) She will take aspirin for DVT prophylaxis, tylenol, diclofenac, and oxycodone for breakthrough pain. 2.) Shower and wound instructions were given. 3.) Activity: Activity as tolerated  4.) Diet: Regular      Follow-up with Ebony Owen MD in 3 weeks after her discharge. Sooner if there is a problem. Cannot display discharge medications since this patient is not currently admitted.       Signed:  Ebony Owen MD  9/10/2019, 9:50 AM

## 2019-09-11 ENCOUNTER — HOME CARE VISIT (OUTPATIENT)
Dept: SCHEDULING | Facility: HOME HEALTH | Age: 49
End: 2019-09-11
Payer: COMMERCIAL

## 2019-09-11 VITALS
DIASTOLIC BLOOD PRESSURE: 78 MMHG | TEMPERATURE: 98.3 F | HEART RATE: 78 BPM | RESPIRATION RATE: 18 BRPM | OXYGEN SATURATION: 98 % | SYSTOLIC BLOOD PRESSURE: 124 MMHG

## 2019-09-11 LAB
BACTERIA SPEC CULT: NORMAL
GRAM STN SPEC: NORMAL
SERVICE CMNT-IMP: NORMAL
SERVICE CMNT-IMP: NORMAL

## 2019-09-11 PROCEDURE — G0151 HHCP-SERV OF PT,EA 15 MIN: HCPCS

## 2019-09-13 ENCOUNTER — HOME CARE VISIT (OUTPATIENT)
Dept: SCHEDULING | Facility: HOME HEALTH | Age: 49
End: 2019-09-13
Payer: COMMERCIAL

## 2019-09-13 PROCEDURE — G0151 HHCP-SERV OF PT,EA 15 MIN: HCPCS

## 2019-09-14 VITALS
SYSTOLIC BLOOD PRESSURE: 128 MMHG | RESPIRATION RATE: 18 BRPM | OXYGEN SATURATION: 98 % | HEART RATE: 72 BPM | DIASTOLIC BLOOD PRESSURE: 74 MMHG | TEMPERATURE: 97.6 F

## 2019-09-16 ENCOUNTER — HOME CARE VISIT (OUTPATIENT)
Dept: HOME HEALTH SERVICES | Facility: HOME HEALTH | Age: 49
End: 2019-09-16
Payer: COMMERCIAL

## 2019-09-18 ENCOUNTER — HOME CARE VISIT (OUTPATIENT)
Dept: SCHEDULING | Facility: HOME HEALTH | Age: 49
End: 2019-09-18
Payer: COMMERCIAL

## 2019-09-18 VITALS
RESPIRATION RATE: 18 BRPM | DIASTOLIC BLOOD PRESSURE: 82 MMHG | OXYGEN SATURATION: 97 % | SYSTOLIC BLOOD PRESSURE: 132 MMHG | TEMPERATURE: 97.5 F | HEART RATE: 78 BPM

## 2019-09-18 PROCEDURE — G0151 HHCP-SERV OF PT,EA 15 MIN: HCPCS

## 2019-09-20 ENCOUNTER — HOME CARE VISIT (OUTPATIENT)
Dept: SCHEDULING | Facility: HOME HEALTH | Age: 49
End: 2019-09-20
Payer: COMMERCIAL

## 2019-09-20 PROCEDURE — G0151 HHCP-SERV OF PT,EA 15 MIN: HCPCS

## 2019-09-21 VITALS
OXYGEN SATURATION: 97 % | HEART RATE: 78 BPM | SYSTOLIC BLOOD PRESSURE: 128 MMHG | RESPIRATION RATE: 18 BRPM | TEMPERATURE: 97.8 F | DIASTOLIC BLOOD PRESSURE: 78 MMHG

## 2019-09-23 ENCOUNTER — HOME CARE VISIT (OUTPATIENT)
Dept: SCHEDULING | Facility: HOME HEALTH | Age: 49
End: 2019-09-23
Payer: COMMERCIAL

## 2019-09-23 VITALS
OXYGEN SATURATION: 98 % | HEART RATE: 76 BPM | TEMPERATURE: 97.6 F | DIASTOLIC BLOOD PRESSURE: 76 MMHG | SYSTOLIC BLOOD PRESSURE: 132 MMHG | RESPIRATION RATE: 18 BRPM

## 2019-09-23 PROCEDURE — A4452 WATERPROOF TAPE: HCPCS

## 2019-09-23 PROCEDURE — A6253 ABSORPT DRG > 48 SQ IN W/O B: HCPCS

## 2019-09-23 PROCEDURE — G0151 HHCP-SERV OF PT,EA 15 MIN: HCPCS

## 2019-09-25 ENCOUNTER — HOME CARE VISIT (OUTPATIENT)
Dept: SCHEDULING | Facility: HOME HEALTH | Age: 49
End: 2019-09-25
Payer: COMMERCIAL

## 2019-09-25 PROCEDURE — G0151 HHCP-SERV OF PT,EA 15 MIN: HCPCS

## 2019-09-26 VITALS
SYSTOLIC BLOOD PRESSURE: 124 MMHG | HEART RATE: 72 BPM | OXYGEN SATURATION: 98 % | DIASTOLIC BLOOD PRESSURE: 72 MMHG | RESPIRATION RATE: 18 BRPM | TEMPERATURE: 97.6 F

## 2019-09-27 ENCOUNTER — HOME CARE VISIT (OUTPATIENT)
Dept: SCHEDULING | Facility: HOME HEALTH | Age: 49
End: 2019-09-27
Payer: COMMERCIAL

## 2019-09-27 PROCEDURE — G0151 HHCP-SERV OF PT,EA 15 MIN: HCPCS

## 2019-09-28 VITALS
RESPIRATION RATE: 18 BRPM | SYSTOLIC BLOOD PRESSURE: 132 MMHG | HEART RATE: 72 BPM | DIASTOLIC BLOOD PRESSURE: 78 MMHG | OXYGEN SATURATION: 98 % | TEMPERATURE: 97.6 F

## 2019-09-30 ENCOUNTER — HOME CARE VISIT (OUTPATIENT)
Dept: SCHEDULING | Facility: HOME HEALTH | Age: 49
End: 2019-09-30
Payer: COMMERCIAL

## 2019-09-30 PROCEDURE — G0151 HHCP-SERV OF PT,EA 15 MIN: HCPCS

## 2019-10-01 ENCOUNTER — HOME CARE VISIT (OUTPATIENT)
Dept: HOME HEALTH SERVICES | Facility: HOME HEALTH | Age: 49
End: 2019-10-01
Payer: COMMERCIAL

## 2019-10-01 VITALS
TEMPERATURE: 97.6 F | SYSTOLIC BLOOD PRESSURE: 130 MMHG | OXYGEN SATURATION: 98 % | RESPIRATION RATE: 18 BRPM | HEART RATE: 70 BPM | DIASTOLIC BLOOD PRESSURE: 72 MMHG

## 2019-10-02 ENCOUNTER — HOME CARE VISIT (OUTPATIENT)
Dept: SCHEDULING | Facility: HOME HEALTH | Age: 49
End: 2019-10-02
Payer: COMMERCIAL

## 2019-10-02 PROCEDURE — G0151 HHCP-SERV OF PT,EA 15 MIN: HCPCS

## 2019-10-03 VITALS
OXYGEN SATURATION: 98 % | HEART RATE: 78 BPM | SYSTOLIC BLOOD PRESSURE: 132 MMHG | RESPIRATION RATE: 18 BRPM | DIASTOLIC BLOOD PRESSURE: 78 MMHG | TEMPERATURE: 97.8 F

## 2019-10-03 PROCEDURE — A6253 ABSORPT DRG > 48 SQ IN W/O B: HCPCS

## 2019-10-04 ENCOUNTER — HOME CARE VISIT (OUTPATIENT)
Dept: HOME HEALTH SERVICES | Facility: HOME HEALTH | Age: 49
End: 2019-10-04
Payer: COMMERCIAL

## 2019-10-07 ENCOUNTER — HOME CARE VISIT (OUTPATIENT)
Dept: SCHEDULING | Facility: HOME HEALTH | Age: 49
End: 2019-10-07
Payer: COMMERCIAL

## 2019-10-07 VITALS
HEART RATE: 74 BPM | OXYGEN SATURATION: 98 % | SYSTOLIC BLOOD PRESSURE: 132 MMHG | RESPIRATION RATE: 18 BRPM | DIASTOLIC BLOOD PRESSURE: 78 MMHG | TEMPERATURE: 97.6 F

## 2019-10-07 PROCEDURE — G0151 HHCP-SERV OF PT,EA 15 MIN: HCPCS

## 2019-10-10 ENCOUNTER — HOME CARE VISIT (OUTPATIENT)
Dept: HOME HEALTH SERVICES | Facility: HOME HEALTH | Age: 49
End: 2019-10-10
Payer: COMMERCIAL

## 2019-10-14 ENCOUNTER — HOME CARE VISIT (OUTPATIENT)
Dept: SCHEDULING | Facility: HOME HEALTH | Age: 49
End: 2019-10-14
Payer: COMMERCIAL

## 2019-10-14 VITALS
HEART RATE: 82 BPM | RESPIRATION RATE: 18 BRPM | DIASTOLIC BLOOD PRESSURE: 68 MMHG | OXYGEN SATURATION: 98 % | TEMPERATURE: 97.6 F | SYSTOLIC BLOOD PRESSURE: 132 MMHG

## 2019-10-14 PROCEDURE — G0151 HHCP-SERV OF PT,EA 15 MIN: HCPCS

## 2019-10-18 ENCOUNTER — HOME CARE VISIT (OUTPATIENT)
Dept: HOME HEALTH SERVICES | Facility: HOME HEALTH | Age: 49
End: 2019-10-18
Payer: COMMERCIAL

## 2019-10-21 ENCOUNTER — CLINICAL SUPPORT (OUTPATIENT)
Dept: CARDIOLOGY CLINIC | Age: 49
End: 2019-10-21

## 2019-10-21 ENCOUNTER — OFFICE VISIT (OUTPATIENT)
Dept: CARDIOLOGY CLINIC | Age: 49
End: 2019-10-21

## 2019-10-21 VITALS
OXYGEN SATURATION: 96 % | HEIGHT: 62 IN | HEART RATE: 134 BPM | SYSTOLIC BLOOD PRESSURE: 132 MMHG | RESPIRATION RATE: 16 BRPM | WEIGHT: 194.4 LBS | BODY MASS INDEX: 35.77 KG/M2 | DIASTOLIC BLOOD PRESSURE: 70 MMHG

## 2019-10-21 DIAGNOSIS — I10 ESSENTIAL HYPERTENSION: ICD-10-CM

## 2019-10-21 DIAGNOSIS — R06.02 SOB (SHORTNESS OF BREATH): ICD-10-CM

## 2019-10-21 DIAGNOSIS — R00.2 PALPITATION: ICD-10-CM

## 2019-10-21 DIAGNOSIS — R07.9 CHEST PAIN, UNSPECIFIED TYPE: Primary | ICD-10-CM

## 2019-10-21 DIAGNOSIS — R07.9 CHEST PAIN, UNSPECIFIED TYPE: ICD-10-CM

## 2019-10-21 NOTE — PROGRESS NOTES
[address] [place] [address]  [phone]     Subjective:      Papo Torres is a 52 y.o. female with pmhx HTN Graves ds GERD DVT  right upper arm (now off coumadin)  Also has history of depression, OCD, ADHD. Her son passed away April of this year, on patient's actual birthday. She started crying  talking about this. She is taking her regular depression meds, followed by Dr Roshan Fagan with [facility] counseling. She is here today for further evaluation and treatment of:  1. Sharp shooting pain from back radiating to left sided anterior chest with occasional sob, total of 5 episodes in the last 1.5 mos, last episode last week. Lasting 4-5 minutes, improves when she bends forward and takes deep breath . 2. Bilateral leg swelling, chronic. Hx left knee replacement in . She ambulates using a walker as she is using an immobilizer on her left knee. 3. Palpitation. Feels her heart beating fast, daily. Previously on Vyvanse, has not taken in months. She is on Evekeo    No PCP. Follows Dr Lucinda Ayala with pain management. Cardiac risk factors: HTN unknown lipid post menopausal female elevated BMI sedentary lifestyle family hx CAD (mother  heart attack age 48, father has hx cardiac stent unknown age), former smoker (quit in )    The patient denies orthopnea, PND, palpitations, syncope, or presyncope.        Patient Active Problem List    Diagnosis Date Noted    DVT (deep venous thrombosis) ([facility] [address] 2019    Status post incision and drainage 2019    Status post left knee replacement 2018    H/O total knee replacement, left 2018    Dehiscence of incision 2018    Primary osteoarthritis of both knees 10/24/2018    Hematuria 10/09/2018    MRSA carrier 10/09/2018    Hyperthyroidism 10/17/2012      Jackie Hagen MD  Past Medical History:   Diagnosis Date    ADHD (attention deficit hyperactivity disorder)     Arthritis     OSTEO  Chronic pain     YAHIR KNEES, LOWER BACK, RT SHOULDER & NECK    GERD (gastroesophageal reflux disease)     Graves disease     NO LONGER AN ISSUE    Hematuria 10/9/2018    HTN (hypertension)     CONTROLLED WITH MEDS    Hypothyroidism 2018    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    OCD (obsessive compulsive disorder)     Other ill-defined conditions(799.89)     graves    Psychiatric disorder     depression. RECENTLY LOST HER SON ON 19 ON HER BIRTHDAY.  Seizure ([address]     Seizures ([address]  &     REACTIVE TO HYPOGLYCEMIA / ALSO FROM FLASHING LIGHTS      Past Surgical History:   Procedure Laterality Date    DELIVERY       HX ABDOMINOPLASTY      HX ADENOIDECTOMY      HX  SECTION      HX  SECTION      HX CHOLECYSTECTOMY      HX GASTRIC BYPASS          HX GI  1993    CHOLEYCYSTECTOMY    HX ORTHOPAEDIC Right     CARPEL TUNNEL    HX ORTHOPAEDIC Left     CARPEL TUNNEL    HX ORTHOPAEDIC Left     ULNA SHORTENING    HX ORTHOPAEDIC Right 2011    COLLAR BONE SURGERY     HX ORTHOPAEDIC Left     LEFT KNEE REPLACEMENT    HX ORTHOPAEDIC Left     LEFT KNEE INCISION FOR INFECTION & REPAIR    HX ORTHOPAEDIC Left     LEFT KNEE REMOVAL OF PROSTHESIS & INSERTION OF SPACER, REPAIR OF INCISION    HX TONSILLECTOMY       Allergies   Allergen Reactions    Sulfa (Sulfonamide Antibiotics) Hives    Other Medication Rash     BANDAIDS MAY CAUSE RASH.     Pcn [Penicillins] Unproven on Challenge      Family History   Problem Relation Age of Onset    Heart Disease Mother     Heart Attack Mother     Hypertension Mother     Arthritis-osteo Mother     Rashes/Skin Problems Mother         PLAQUE PSORIASIS    Heart Disease Father     Thyroid Disease Father     Diabetes Father     Hypertension Father     Diabetes Maternal Grandmother     Heart Attack Maternal Grandmother     Hypertension Maternal Grandmother     Arthritis-osteo Maternal Grandmother     Hypertension Maternal Grandfather     Stroke Maternal Grandfather     High Cholesterol Sister     Anesth Problems Neg Hx       Social History     Socioeconomic History    Marital status:      Spouse name: Not on file    Number of children: Not on file    Years of education: Not on file    Highest education level: Not on file   Occupational History    Not on file   Social Needs    Financial resource strain: Not on file    Food insecurity:     Worry: Not on file     Inability: Not on file    Transportation needs:     Medical: Not on file     Non-medical: Not on file   Tobacco Use    Smoking status: Former Smoker     Types: Cigarettes     Last attempt to quit: 1994     Years since quittin.0    Smokeless tobacco: Never Used   Substance and Sexual Activity    Alcohol use: No    Drug use: No    Sexual activity: Yes     Birth control/protection: IUD   Lifestyle    Physical activity:     Days per week: Not on file     Minutes per session: Not on file    Stress: Not on file   Relationships    Social connections:     Talks on phone: Not on file     Gets together: Not on file     Attends [employer] service: Not on file     Active member of club or organization: Not on file     Attends meetings of clubs or organizations: Not on file     Relationship status: Not on file    Intimate partner violence:     Fear of current or ex partner: Not on file     Emotionally abused: Not on file     Physically abused: Not on file     Forced sexual activity: Not on file   Other Topics Concern    Not on file   Social History Narrative    Not on file      Current Outpatient Medications   Medication Sig    prazosin (MINIPRESS) 2 mg capsule Take 2 mg by mouth nightly.  meloxicam (MOBIC) 15 mg tablet Take 1 Tab by mouth daily.  pantoprazole (PROTONIX) 40 mg tablet Take 1 Tab by mouth Daily (before breakfast).  doxycycline (VIBRAMYCIN) 100 mg capsule Take 1 Cap by mouth two (2) times a day for 90 days.     busPIRone (BUSPAR) 15 mg tablet Take 15 mg by mouth as needed. UP TO 3 TIMES DAILY PRN    morphine CR (MS CONTIN) 30 mg CR tablet Take  by mouth every twelve (12) hours.  HYDROmorphone (DILAUDID) 4 mg tablet Take  by mouth every six (6) hours as needed for Pain.  acetaminophen (TYLENOL) 500 mg tablet Take 500 mg by mouth every six (6) hours as needed for Pain.  diclofenac 10% cyclobenzaprine 2% lidocaine 5% gabapentin 6% cream compound Apply 2 g to affected area four (4) times daily as needed for Pain.  alpha-d-galactosidase (BEANO) 150 unit tab tablet Take 2 Tabs by mouth three (3) times daily as needed for Flatulence.  amphetamine sulfate (EVEKEO) 10 mg tab Take 10 mg by mouth as needed. PT TAKES 2 TABS (20 MG) BID AT 0200 AND 1200     levothyroxine (SYNTHROID) 100 mcg tablet Take 100 mcg by mouth Daily (before breakfast).  vortioxetine (TRINTELLIX) 10 mg tablet Take  by mouth daily. PT TAKES A TOTAL OF 30 mg DAILY    traZODone (DESYREL) 100 mg tablet Take 100 mg by mouth nightly. May take 1/2 tab to 1 tab every evening    potassium chloride SR (KLOR-CON 10) 10 mEq tablet Take 10 mEq by mouth daily.  furosemide (LASIX) 20 mg tablet Take 20 mg by mouth as needed. MAY TAKE ONLY IF NEEDED FOR SEVERE ANKLE SWELLING     baclofen (LIORESAL) 10 mg tablet Take 10 mg by mouth two (2) times daily as needed for Pain.  divalproex DR (DEPAKOTE) 250 mg tablet Take 1 tablet by mouth in  the morning and 2 tablets  by mouth in the evening    albuterol (PROAIR HFA) 90 mcg/actuation inhaler Take 1 Puff by inhalation every four (4) hours as needed for Wheezing.  acyclovir (ZOVIRAX) 400 mg tablet Take 400 mg by mouth every four (4) hours as needed for Other (fever blisters).  losartan (COZAAR) 50 mg tablet Take 50 mg by mouth daily.  simethicone (GAS-X) 80 mg chewable tablet Take 80 mg by mouth every six (6) hours as needed for Flatulence.     lansoprazole (PREVACID) 15 mg disintegrating tablet Take 1 Tab by mouth Daily (before breakfast).  cetirizine (ZYRTEC) 10 mg tablet Take 1 Tab by mouth daily.  aspirin delayed-release 81 mg tablet Take 1 Tab by mouth every twelve (12) hours.  Lisdexamfetamine (VYVANSE) 70 mg cap Take 70 mg by mouth two (2) times a day. 1 AM & 1 AT NOON    hydroCHLOROthiazide (HYDRODIURIL) 50 mg tablet Take 50 mg by mouth as needed for Other (for high blood pressure). take one tablet by mouth daily as needed for high blood pressure.  diclofenac EC (VOLTAREN) 75 mg EC tablet Take 75 mg by mouth two (2) times a day. No current facility-administered medications for this visit. Review of Symptoms:  11 systems reviewed, negative other than as stated in the HPI    Physical ExamPhysical Exam:    Vitals:    10/21/19 1034 10/21/19 1049 10/21/19 1100   BP: 132/70 140/70 132/70   Pulse: (!) 134     Resp: 16     SpO2: 96%     Weight: 194 lb 6.4 oz (88.2 kg)     Height: 5' 2\" (1.575 m)       Body mass index is 35.56 kg/m². General PE  Gen:  NAD  Mental Status - Alert. General Appearance - Not in acute distress. HEENT:  PERRL, no carotid bruits or JVD  Chest and Lung Exam   Inspection: Accessory muscles - No use of accessory muscles in breathing. Auscultation:   Breath sounds: - Normal.   Cardiovascular   Inspection: Jugular vein - Bilateral - Inspection Normal.   Palpation/Percussion:   Apical Impulse: - Normal.   Auscultation: Rhythm - Regular. Heart Sounds - S1 WNL and S2 WNL. No S3 or S4. Murmurs & Other Heart Sounds: Auscultation of the heart reveals - No Murmurs. Peripheral Vascular   Upper Extremity: Inspection - Bilateral - No Cyanotic nailbeds or Digital clubbing. Lower Extremity:   Palpation: Edema - Bilateral - No edema. Abdomen:   Soft, non-tender, bowel sounds are active.   Neuro: A&O times 3, CN and motor grossly WNL    Labs:   No results found for: CHOL, CHOLX, CHLST, CHOLV, 418498, HDL, HDLP, LDL, LDLC, DLDLP, TGLX, TRIGL, TRIGP, CHHD, CHHDX  No results found for: CPK, CPKX, CPX  Lab Results   Component Value Date/Time    Sodium 143 08/29/2019 03:01 AM    Potassium 4.1 08/29/2019 03:01 AM    Chloride 111 (H) 08/29/2019 03:01 AM    CO2 25 08/29/2019 03:01 AM    Anion gap 7 08/29/2019 03:01 AM    Glucose 103 (H) 08/29/2019 03:01 AM    BUN 10 08/29/2019 03:01 AM    Creatinine 0.66 08/29/2019 03:01 AM    BUN/Creatinine ratio 15 08/29/2019 03:01 AM    GFR est AA >60 08/29/2019 03:01 AM    GFR est non-AA >60 08/29/2019 03:01 AM    Calcium 7.9 (L) 08/29/2019 03:01 AM    Bilirubin, total 0.3 01/26/2019 08:51 PM    AST (SGOT) 32 01/26/2019 08:51 PM    Alk. phosphatase 125 (H) 01/26/2019 08:51 PM    Protein, total 8.2 01/26/2019 08:51 PM    Albumin 3.8 01/26/2019 08:51 PM    Globulin 4.4 (H) 01/26/2019 08:51 PM    A-G Ratio 0.9 (L) 01/26/2019 08:51 PM    ALT (SGPT) 23 01/26/2019 08:51 PM       EKG:  ST     Assessment:     Assessment:      1. Chest pain, unspecified type    2. SOB (shortness of breath)    3. Palpitation    4. Essential hypertension        Orders Placed This Encounter    AMB POC EKG ROUTINE W/ 12 LEADS, INTER & REP     Order Specific Question:   Reason for Exam:     Answer:   ROUTINE        Plan: This  is a 52 y.o. female with pmhx HTN Graves ds GERD DVT 1/19 right upper arm (now off coumadin)  Also has history of depression, OCD, ADHD. Her son passed away April of this year, on patient's actual birthday. She started crying  talking about this. She is taking her regular depression meds, followed by Dr Roshan Fagan with [facility] counseling. She is here today for further evaluation and treatment of:  1. Sharp shooting pain from back radiating to left sided anterior chest with occasional sob, total of 5 episodes in the last 1.5 mos, last episode last week. Lasting 4-5 minutes, improves when she bends forward and takes deep breath . 2. Bilateral leg swelling, chronic. Hx left knee replacement in 8/19.   She ambulates using a walker as she is using an immobilizer on her left knee. 3. Palpitation. Feels her heart beating fast, daily. Previously on Vyvanse, has not taken in months. She is on Evekeo    No PCP. Follows Dr Kristyn Devries with pain management. Cardiac risk factors: HTN unknown lipid post menopausal female elevated BMI sedentary lifestyle family hx CAD (mother  heart attack age 48, father has hx cardiac stent unknown age), former smoker (quit in )      Atypical cp, sob  Check echo, lexiscan  Will check labs    Palpitation  She is on Evekeo for ADHD  Check 24 hr holter  Check thyroid      Leg swelling, chronic  Continue lasix as needed  Continue leg elevation      HTN  Controlled with current therapy    GERD  On PPI      Continue current care and f/u when testing complete      Mague Penaloza NP       [facility]    10/21/2019         Patient seen, examined by me personally. Plan discussed as detailed. Agree with note as outlined by  NP. I confirm findings in history and physical exam. No additional findings noted. Agree with plan as outlined above.      Michelle Burns MD

## 2019-10-21 NOTE — PROGRESS NOTES
1. Have you been to the ER, urgent care clinic since your last visit? Hospitalized since your last visit? NO    2. Have you seen or consulted any other health care providers outside of the [address] [place] [address] since your last visit? Include any pap smears or colon screening. YES, PAIN MANAGEMENT, PSYCHIATRIST. NEW PATIENT. C/O SHARP STABBING PAIN IN CENTER OF BACK RADIATING THROUGH CHEST, SOB, SWELLING IN BLE.

## 2019-10-23 LAB
ALBUMIN SERPL-MCNC: 3.7 G/DL (ref 3.5–5.5)
ALBUMIN/GLOB SERPL: 1.5 {RATIO} (ref 1.2–2.2)
ALP SERPL-CCNC: 140 IU/L (ref 39–117)
ALT SERPL-CCNC: 27 IU/L (ref 0–32)
AST SERPL-CCNC: 32 IU/L (ref 0–40)
BILIRUB SERPL-MCNC: 0.2 MG/DL (ref 0–1.2)
BUN SERPL-MCNC: 20 MG/DL (ref 6–24)
BUN/CREAT SERPL: 27 (ref 9–23)
CALCIUM SERPL-MCNC: 8.9 MG/DL (ref 8.7–10.2)
CHLORIDE SERPL-SCNC: 105 MMOL/L (ref 96–106)
CHOLEST SERPL-MCNC: 184 MG/DL (ref 100–199)
CO2 SERPL-SCNC: 23 MMOL/L (ref 20–29)
CREAT SERPL-MCNC: 0.75 MG/DL (ref 0.57–1)
ERYTHROCYTE [DISTWIDTH] IN BLOOD BY AUTOMATED COUNT: 13.1 % (ref 12.3–15.4)
GLOBULIN SER CALC-MCNC: 2.5 G/DL (ref 1.5–4.5)
GLUCOSE SERPL-MCNC: 80 MG/DL (ref 65–99)
HCT VFR BLD AUTO: 36.9 % (ref 34–46.6)
HDLC SERPL-MCNC: 78 MG/DL
HGB BLD-MCNC: 11.4 G/DL (ref 11.1–15.9)
INTERPRETATION, 910389: NORMAL
LDLC SERPL CALC-MCNC: 92 MG/DL (ref 0–99)
MCH RBC QN AUTO: 27.8 PG (ref 26.6–33)
MCHC RBC AUTO-ENTMCNC: 30.9 G/DL (ref 31.5–35.7)
MCV RBC AUTO: 90 FL (ref 79–97)
PLATELET # BLD AUTO: 249 X10E3/UL (ref 150–450)
POTASSIUM SERPL-SCNC: 4.1 MMOL/L (ref 3.5–5.2)
PROT SERPL-MCNC: 6.2 G/DL (ref 6–8.5)
RBC # BLD AUTO: 4.1 X10E6/UL (ref 3.77–5.28)
SODIUM SERPL-SCNC: 140 MMOL/L (ref 134–144)
T4 SERPL-MCNC: 7.8 UG/DL (ref 4.5–12)
TRIGL SERPL-MCNC: 68 MG/DL (ref 0–149)
TSH SERPL DL<=0.005 MIU/L-ACNC: 7.69 UIU/ML (ref 0.45–4.5)
VLDLC SERPL CALC-MCNC: 14 MG/DL (ref 5–40)
WBC # BLD AUTO: 7 X10E3/UL (ref 3.4–10.8)

## 2019-10-23 NOTE — PROGRESS NOTES
Verified patient with two identifiers. Spoke with patient regarding LABS results.   Advised pt to go to PCP or ENDO for adjustment of thyroid meds

## 2019-10-23 NOTE — PROGRESS NOTES
Korin Sit,    Please call patient. Labs are all essentially normal EXCEPT thyroid function. TSH is 7.6 -- thyroid is overactive. She is on synthroid, will likely need to increase this dose. Please have her f/u with PCP to discuss adjustment, or if she sees endo for her Graves, will need to see them. Underactive thyroid could be causing palpitation symptoms, but not very common -- normally this is the case with OVERactive thyroid. Will touch base once stress test, echo and monitor are completed.     Thanks,  [employer]

## 2019-10-25 ENCOUNTER — HOME CARE VISIT (OUTPATIENT)
Dept: SCHEDULING | Facility: HOME HEALTH | Age: 49
End: 2019-10-25
Payer: COMMERCIAL

## 2019-10-25 PROCEDURE — G0151 HHCP-SERV OF PT,EA 15 MIN: HCPCS

## 2019-10-26 VITALS
DIASTOLIC BLOOD PRESSURE: 72 MMHG | OXYGEN SATURATION: 98 % | HEART RATE: 58 BPM | TEMPERATURE: 97.5 F | SYSTOLIC BLOOD PRESSURE: 120 MMHG | RESPIRATION RATE: 18 BRPM

## 2019-10-30 NOTE — PROGRESS NOTES
Verified patient with two identifiers. Spoke with patient regarding CARDIAC HOLTER test results. Advised pt palpitations likely coming from ADHD meds, and thyroid is overactive. Pt stated she did not take any ADHD med when the monitor was on her and does not take regularly. She also is confused about thyroid levels.

## 2019-10-30 NOTE — PROGRESS NOTES
Stella oRsas,    Please call the patient and inform that holter monitor showed normal sinus rhythm throughout. She had rare PACs and PVCs. These are benign, and not frequent enough to warrant therapy. Likely her palpitations are coming from ADHD medication, also remember her thyroid is overactive. Would treat these underlying issues before we start additional medications. Will call back once stress & echo completed.     Thanks,  [employer]

## 2019-10-30 NOTE — PROGRESS NOTES
Verified patient with two identifiers. Spoke with pt advising her to discuss any thyroid with her PCP.   She verbalized understanding

## 2019-11-04 ENCOUNTER — TELEPHONE (OUTPATIENT)
Dept: CARDIOLOGY CLINIC | Age: 49
End: 2019-11-04

## 2019-11-04 NOTE — TELEPHONE ENCOUNTER
Called patient to confirm Nuclear stress test for 11/06. Message left confirming appointment time and reminder to hold all caffeine containing food, beverages and medicines for 24 hours.

## 2019-12-04 ENCOUNTER — HOSPITAL ENCOUNTER (OUTPATIENT)
Dept: PREADMISSION TESTING | Age: 49
Discharge: HOME | End: 2019-12-04
Payer: COMMERCIAL

## 2019-12-04 VITALS
RESPIRATION RATE: 18 BRPM | BODY MASS INDEX: 37.22 KG/M2 | WEIGHT: 202.25 LBS | TEMPERATURE: 98.1 F | SYSTOLIC BLOOD PRESSURE: 141 MMHG | HEIGHT: 62 IN | DIASTOLIC BLOOD PRESSURE: 98 MMHG | HEART RATE: 89 BPM

## 2019-12-04 LAB
ABO + RH BLD: NORMAL
APPEARANCE UR: ABNORMAL
ATRIAL RATE: 80 BPM
BACTERIA URNS QL MICRO: NEGATIVE /HPF
BILIRUB UR QL: NEGATIVE
BLOOD GROUP ANTIBODIES SERPL: NORMAL
CALCULATED P AXIS, ECG09: 53 DEGREES
CALCULATED R AXIS, ECG10: 8 DEGREES
CALCULATED T AXIS, ECG11: 23 DEGREES
COLOR UR: ABNORMAL
DIAGNOSIS, 93000: NORMAL
EPITH CASTS URNS QL MICRO: ABNORMAL /LPF
EST. AVERAGE GLUCOSE BLD GHB EST-MCNC: 111 MG/DL
GLUCOSE UR STRIP.AUTO-MCNC: NEGATIVE MG/DL
HBA1C MFR BLD: 5.5 % (ref 4–5.6)
HCG SERPL QL: NEGATIVE
HGB UR QL STRIP: ABNORMAL
HYALINE CASTS URNS QL MICRO: ABNORMAL /LPF (ref 0–5)
INR PPP: 1.1 (ref 0.9–1.1)
KETONES UR QL STRIP.AUTO: NEGATIVE MG/DL
LEUKOCYTE ESTERASE UR QL STRIP.AUTO: ABNORMAL
NITRITE UR QL STRIP.AUTO: NEGATIVE
P-R INTERVAL, ECG05: 140 MS
PH UR STRIP: 6 [PH] (ref 5–8)
PROT UR STRIP-MCNC: 30 MG/DL
PROTHROMBIN TIME: 10.8 SEC (ref 9–11.1)
Q-T INTERVAL, ECG07: 378 MS
QRS DURATION, ECG06: 74 MS
QTC CALCULATION (BEZET), ECG08: 435 MS
RBC #/AREA URNS HPF: >100 /HPF (ref 0–5)
SP GR UR REFRACTOMETRY: 1.02 (ref 1–1.03)
SPECIMEN EXP DATE BLD: NORMAL
UA: UC IF INDICATED,UAUC: ABNORMAL
UROBILINOGEN UR QL STRIP.AUTO: 1 EU/DL (ref 0.2–1)
VENTRICULAR RATE, ECG03: 80 BPM
WBC URNS QL MICRO: ABNORMAL /HPF (ref 0–4)

## 2019-12-04 PROCEDURE — 84703 CHORIONIC GONADOTROPIN ASSAY: CPT

## 2019-12-04 PROCEDURE — 87086 URINE CULTURE/COLONY COUNT: CPT

## 2019-12-04 PROCEDURE — 85610 PROTHROMBIN TIME: CPT

## 2019-12-04 PROCEDURE — 36415 COLL VENOUS BLD VENIPUNCTURE: CPT

## 2019-12-04 PROCEDURE — 83036 HEMOGLOBIN GLYCOSYLATED A1C: CPT

## 2019-12-04 PROCEDURE — 93005 ELECTROCARDIOGRAM TRACING: CPT

## 2019-12-04 PROCEDURE — 86900 BLOOD TYPING SEROLOGIC ABO: CPT

## 2019-12-04 PROCEDURE — 81001 URINALYSIS AUTO W/SCOPE: CPT

## 2019-12-04 RX ORDER — MUPIROCIN 20 MG/G
OINTMENT TOPICAL
COMMUNITY
End: 2020-01-02

## 2019-12-04 RX ORDER — DOXYCYCLINE 100 MG/1
100 TABLET ORAL 2 TIMES DAILY
COMMUNITY

## 2019-12-04 RX ORDER — TRAZODONE HYDROCHLORIDE 100 MG/1
200 TABLET ORAL
COMMUNITY

## 2019-12-04 RX ORDER — BISMUTH SUBSALICYLATE 262 MG
1 TABLET,CHEWABLE ORAL
COMMUNITY

## 2019-12-04 RX ORDER — ONDANSETRON HYDROCHLORIDE 8 MG/1
8 TABLET, FILM COATED ORAL
COMMUNITY

## 2019-12-05 LAB
BACTERIA SPEC CULT: NORMAL
BACTERIA SPEC CULT: NORMAL
SERVICE CMNT-IMP: NORMAL

## 2019-12-05 NOTE — ADVANCED PRACTICE NURSE
Per communication with Emeli Boston NP with Dr Hazel Putnam (cardiologist) team, patient needs further cardiac testing prior to knee replacement 12/11/19. Cardiology team will reach out to patient to schedule. Message left for [facility] with Dr Etta Herrera office to inform. 12/9- As of today, per chart review, patient has not been scheduled for cardiac eval as needed. Message left for [facility] of Dr Etta Herrera team to inform.

## 2019-12-06 ENCOUNTER — TELEPHONE (OUTPATIENT)
Dept: CARDIOLOGY CLINIC | Age: 49
End: 2019-12-06

## 2019-12-06 LAB
BACTERIA SPEC CULT: NORMAL
CC UR VC: NORMAL
SERVICE CMNT-IMP: NORMAL

## 2019-12-06 NOTE — TELEPHONE ENCOUNTER
----- Message from Chandrakant Fields NP sent at 12/5/2019 12:41 PM EST -----  Regarding: FW: Knee replacement  Aditya May,    Please call patient. She did not get stress test and echo done as ordered for sx of chest pain. She is pending knee replacement surgery. Please advise we cannot clear her from cardiac standpoint until she has these tests done. Please get her rescheduled for these tests soon. Thanks,  Irvin Tafoya  ----- Message -----  From: Winsome Jeong NP  Sent: 12/5/2019  11:46 AM EST  To: Chandrakant Fields NP  Subject: Knee replacement                                 Hi Irvinavila Tafoya,  Just wanted to make sure this patient is OK to proceed with knee replacement 12/11. Looks like your team had ordered some tests that were not complete, but maybe not necessary before surgery. Hope you are doing well!   Sherry Longoria

## 2019-12-10 ENCOUNTER — TELEPHONE (OUTPATIENT)
Dept: CARDIOLOGY CLINIC | Age: 49
End: 2019-12-10

## 2019-12-10 NOTE — TELEPHONE ENCOUNTER
Patient is scheduled for a R TKR tomorrow, 12/11/19. Please call to advise if she is cleared. I did advise Laney Arboleda that patient has not rescheduled testing here that Dr. Flowers Mediate ordered. Thanks!

## 2019-12-10 NOTE — TELEPHONE ENCOUNTER
----- Message from Nafisa Milan NP sent at 12/5/2019 12:41 PM EST -----  Regarding: FW: Knee replacement  Angelique Munguia,    Please call patient. She did not get stress test and echo done as ordered for sx of chest pain. She is pending knee replacement surgery. Please advise we cannot clear her from cardiac standpoint until she has these tests done. Please get her rescheduled for these tests soon. Thanks,  Serafin Correa  ----- Message -----  From: Sonido Delong NP  Sent: 12/5/2019  11:46 AM EST  To: Nafisa Milan NP  Subject: Knee replacement                                 Hi Serafin Correa,  Just wanted to make sure this patient is OK to proceed with knee replacement 12/11. Looks like your team had ordered some tests that were not complete, but maybe not necessary before surgery. Hope you are doing well!   Corey Bradshaw

## 2019-12-10 NOTE — PERIOP NOTES
CALLED [facility]--DR Agnes Cain INQUIRING RE: STATUS OF CARDIAC CLEARANCE. LEFT URGENT MESSAGE REQUESTING CALLBACK. MESSAGE ALSO SENT TO Cee Heart NP--DR BURGESS INQUIRING RE: STATUS.

## 2019-12-10 NOTE — TELEPHONE ENCOUNTER
Spoke with [facility] @ Dr. Romina Uriarte office with Naz Moore and advised her pt cannot be cleared for surgery from a c/v standpoint until she does the testing and depending on the results. She verbalized understanding. JOSEPHINE Fung LPN   Caller: Unspecified (Today, 11:21 AM)             I do not see any surgeries completed since her last visit with us, at least not in the [facility] system. Regardless, as she has not been fully evaluated from CV standpoint given her symptoms of chest pain, we cannot safely clear her from CV standpoint for orthopedic surgery.    If she wants to get the surgery, she will need to get stress test and echo done first.     Thanks,   Tami Live

## 2019-12-10 NOTE — TELEPHONE ENCOUNTER
Verified patient with two identifiers. Spoke with pt advising she will need to have Stress test and Echo before she can be cleared for surgery. Pt stated she had a surgery since seeing Dr. Alfonso Esparza and now needs the clearance for the knee surgery. She will get the tests after her surgery.   Please advise

## 2019-12-10 NOTE — PERIOP NOTES
RECEIVED PHONE MESSAGE FROM [facility]--DR OZUNA; SURGERY WILL BE RESCHEDULED FOLLOWING CARDIAC CLEARANCE.

## 2019-12-27 RX ORDER — ACETAMINOPHEN 500 MG
1000 TABLET ORAL ONCE
Status: CANCELLED | OUTPATIENT
Start: 2019-12-30 | End: 2019-12-30

## 2019-12-27 RX ORDER — CELECOXIB 200 MG/1
200 CAPSULE ORAL ONCE
Status: CANCELLED | OUTPATIENT
Start: 2019-12-30 | End: 2019-12-30

## 2019-12-27 RX ORDER — CEFAZOLIN SODIUM/WATER 2 G/20 ML
2 SYRINGE (ML) INTRAVENOUS ONCE
Status: CANCELLED | OUTPATIENT
Start: 2019-12-30 | End: 2019-12-30

## 2019-12-27 RX ORDER — DEXAMETHASONE SODIUM PHOSPHATE 10 MG/ML
4 INJECTION INTRAMUSCULAR; INTRAVENOUS ONCE
Status: CANCELLED | OUTPATIENT
Start: 2019-12-30 | End: 2019-12-30

## 2019-12-27 RX ORDER — PREGABALIN 75 MG/1
75 CAPSULE ORAL ONCE
Status: CANCELLED | OUTPATIENT
Start: 2019-12-30 | End: 2019-12-30

## 2019-12-29 ENCOUNTER — ANESTHESIA EVENT (OUTPATIENT)
Dept: SURGERY | Age: 49
DRG: 470 | End: 2019-12-29
Payer: COMMERCIAL

## 2019-12-30 ENCOUNTER — HOSPITAL ENCOUNTER (INPATIENT)
Age: 49
LOS: 3 days | Discharge: HOME HEALTH | DRG: 470 | End: 2020-01-02
Attending: ORTHOPAEDIC SURGERY | Admitting: ORTHOPAEDIC SURGERY
Payer: COMMERCIAL

## 2019-12-30 ENCOUNTER — APPOINTMENT (OUTPATIENT)
Dept: GENERAL RADIOLOGY | Age: 49
DRG: 470 | End: 2019-12-30
Attending: ORTHOPAEDIC SURGERY
Payer: COMMERCIAL

## 2019-12-30 ENCOUNTER — ANESTHESIA (OUTPATIENT)
Dept: SURGERY | Age: 49
DRG: 470 | End: 2019-12-30
Payer: COMMERCIAL

## 2019-12-30 DIAGNOSIS — M17.11 PRIMARY OSTEOARTHRITIS OF RIGHT KNEE: Primary | ICD-10-CM

## 2019-12-30 DIAGNOSIS — Z96.651 STATUS POST TOTAL RIGHT KNEE REPLACEMENT: ICD-10-CM

## 2019-12-30 PROBLEM — M17.9 KNEE OSTEOARTHRITIS: Status: ACTIVE | Noted: 2019-12-30

## 2019-12-30 LAB
ABO + RH BLD: NORMAL
BLOOD GROUP ANTIBODIES SERPL: NORMAL
GLUCOSE BLD STRIP.AUTO-MCNC: 89 MG/DL (ref 65–100)
SERVICE CMNT-IMP: NORMAL
SPECIMEN EXP DATE BLD: NORMAL

## 2019-12-30 PROCEDURE — 74011250636 HC RX REV CODE- 250/636: Performed by: ORTHOPAEDIC SURGERY

## 2019-12-30 PROCEDURE — 76210000006 HC OR PH I REC 0.5 TO 1 HR: Performed by: ORTHOPAEDIC SURGERY

## 2019-12-30 PROCEDURE — 74011000250 HC RX REV CODE- 250: Performed by: ORTHOPAEDIC SURGERY

## 2019-12-30 PROCEDURE — 74011000250 HC RX REV CODE- 250: Performed by: NURSE ANESTHETIST, CERTIFIED REGISTERED

## 2019-12-30 PROCEDURE — 74011250637 HC RX REV CODE- 250/637: Performed by: ORTHOPAEDIC SURGERY

## 2019-12-30 PROCEDURE — C1713 ANCHOR/SCREW BN/BN,TIS/BN: HCPCS | Performed by: ORTHOPAEDIC SURGERY

## 2019-12-30 PROCEDURE — 74011000258 HC RX REV CODE- 258: Performed by: NURSE ANESTHETIST, CERTIFIED REGISTERED

## 2019-12-30 PROCEDURE — 74011000250 HC RX REV CODE- 250: Performed by: ANESTHESIOLOGY

## 2019-12-30 PROCEDURE — 74011250636 HC RX REV CODE- 250/636: Performed by: NURSE ANESTHETIST, CERTIFIED REGISTERED

## 2019-12-30 PROCEDURE — 77030011640 HC PAD GRND REM COVD -A: Performed by: ORTHOPAEDIC SURGERY

## 2019-12-30 PROCEDURE — 77030002916 HC SUT ETHLN J&J -A: Performed by: ORTHOPAEDIC SURGERY

## 2019-12-30 PROCEDURE — 77030003601 HC NDL NRV BLK BBMI -A

## 2019-12-30 PROCEDURE — 73560 X-RAY EXAM OF KNEE 1 OR 2: CPT

## 2019-12-30 PROCEDURE — 77030035236 HC SUT PDS STRATFX BARB J&J -B: Performed by: ORTHOPAEDIC SURGERY

## 2019-12-30 PROCEDURE — 74011000258 HC RX REV CODE- 258: Performed by: ORTHOPAEDIC SURGERY

## 2019-12-30 PROCEDURE — C9290 INJ, BUPIVACAINE LIPOSOME: HCPCS | Performed by: ORTHOPAEDIC SURGERY

## 2019-12-30 PROCEDURE — 77030031139 HC SUT VCRL2 J&J -A: Performed by: ORTHOPAEDIC SURGERY

## 2019-12-30 PROCEDURE — 77030002933 HC SUT MCRYL J&J -A: Performed by: ORTHOPAEDIC SURGERY

## 2019-12-30 PROCEDURE — 77030018846 HC SOL IRR STRL H20 ICUM -A: Performed by: ORTHOPAEDIC SURGERY

## 2019-12-30 PROCEDURE — 77030019905 HC CATH URETH INTMIT MDII -A: Performed by: ORTHOPAEDIC SURGERY

## 2019-12-30 PROCEDURE — 65270000029 HC RM PRIVATE

## 2019-12-30 PROCEDURE — 77030018836 HC SOL IRR NACL ICUM -A: Performed by: ORTHOPAEDIC SURGERY

## 2019-12-30 PROCEDURE — 36415 COLL VENOUS BLD VENIPUNCTURE: CPT

## 2019-12-30 PROCEDURE — 82962 GLUCOSE BLOOD TEST: CPT

## 2019-12-30 PROCEDURE — 77030012935 HC DRSG AQUACEL BMS -B: Performed by: ORTHOPAEDIC SURGERY

## 2019-12-30 PROCEDURE — 77030040922 HC BLNKT HYPOTHRM STRY -A

## 2019-12-30 PROCEDURE — 86900 BLOOD TYPING SEROLOGIC ABO: CPT

## 2019-12-30 PROCEDURE — 77030010783 HC BOWL MX BN CEM J&J -B: Performed by: ORTHOPAEDIC SURGERY

## 2019-12-30 PROCEDURE — 77030007866 HC KT SPN ANES BBMI -B: Performed by: NURSE ANESTHETIST, CERTIFIED REGISTERED

## 2019-12-30 PROCEDURE — 76010000171 HC OR TIME 2 TO 2.5 HR INTENSV-TIER 1: Performed by: ORTHOPAEDIC SURGERY

## 2019-12-30 PROCEDURE — 74011250636 HC RX REV CODE- 250/636: Performed by: ANESTHESIOLOGY

## 2019-12-30 PROCEDURE — 0SRC0J9 REPLACEMENT OF RIGHT KNEE JOINT WITH SYNTHETIC SUBSTITUTE, CEMENTED, OPEN APPROACH: ICD-10-PCS | Performed by: ORTHOPAEDIC SURGERY

## 2019-12-30 PROCEDURE — 77030018673: Performed by: ORTHOPAEDIC SURGERY

## 2019-12-30 PROCEDURE — 77030040361 HC SLV COMPR DVT MDII -B

## 2019-12-30 PROCEDURE — 77030018842 HC SOL IRR SOD CL 9% BAXT -A: Performed by: ORTHOPAEDIC SURGERY

## 2019-12-30 PROCEDURE — 77030000032 HC CUF TRNQT ZIMM -B: Performed by: ORTHOPAEDIC SURGERY

## 2019-12-30 PROCEDURE — C1776 JOINT DEVICE (IMPLANTABLE): HCPCS | Performed by: ORTHOPAEDIC SURGERY

## 2019-12-30 PROCEDURE — 76060000036 HC ANESTHESIA 2.5 TO 3 HR: Performed by: ORTHOPAEDIC SURGERY

## 2019-12-30 PROCEDURE — 77030006822 HC BLD SAW SAG BRSM -B: Performed by: ORTHOPAEDIC SURGERY

## 2019-12-30 DEVICE — ATTUNE KNEE SYSTEM REVISION CEMENTED STEM CEMENTED 14X30MM
Type: IMPLANTABLE DEVICE | Site: KNEE | Status: FUNCTIONAL
Brand: ATTUNE

## 2019-12-30 DEVICE — ATTUNE KNEE SYSTEM FEMORAL CRUCIATE RETAINING SIZE 4 RIGHT CEMENTED
Type: IMPLANTABLE DEVICE | Site: KNEE | Status: FUNCTIONAL
Brand: ATTUNE

## 2019-12-30 DEVICE — ATTUNE KNEE SYSTEM TIBIAL INSERT FIXED BEARING CRUCIATE RETAINING 4 7MM AOX
Type: IMPLANTABLE DEVICE | Site: KNEE | Status: FUNCTIONAL
Brand: ATTUNE

## 2019-12-30 DEVICE — INSERT TIB RP FEM KNEE CEM: Type: IMPLANTABLE DEVICE | Site: KNEE | Status: FUNCTIONAL

## 2019-12-30 DEVICE — SMARTSET GHV GENTAMICIN HIGH VISCOSITY BONE CEMENT 40G
Type: IMPLANTABLE DEVICE | Site: KNEE | Status: FUNCTIONAL
Brand: SMARTSET

## 2019-12-30 DEVICE — ATTUNE KNEE SYSTEM REVISION FIXED BEARING TIBIAL BASE CEMENTED SIZE 4
Type: IMPLANTABLE DEVICE | Site: KNEE | Status: FUNCTIONAL
Brand: ATTUNE

## 2019-12-30 RX ORDER — ASPIRIN 81 MG/1
81 TABLET ORAL 2 TIMES DAILY
Status: DISCONTINUED | OUTPATIENT
Start: 2019-12-30 | End: 2020-01-02 | Stop reason: HOSPADM

## 2019-12-30 RX ORDER — FENTANYL CITRATE 50 UG/ML
25 INJECTION, SOLUTION INTRAMUSCULAR; INTRAVENOUS
Status: DISCONTINUED | OUTPATIENT
Start: 2019-12-30 | End: 2019-12-30 | Stop reason: HOSPADM

## 2019-12-30 RX ORDER — DIVALPROEX SODIUM 500 MG/1
500 TABLET, DELAYED RELEASE ORAL EVERY EVENING
Status: DISCONTINUED | OUTPATIENT
Start: 2019-12-30 | End: 2020-01-02 | Stop reason: HOSPADM

## 2019-12-30 RX ORDER — TRAZODONE HYDROCHLORIDE 100 MG/1
200 TABLET ORAL
Status: DISCONTINUED | OUTPATIENT
Start: 2019-12-30 | End: 2020-01-02 | Stop reason: HOSPADM

## 2019-12-30 RX ORDER — FACIAL-BODY WIPES
10 EACH TOPICAL DAILY PRN
Status: DISCONTINUED | OUTPATIENT
Start: 2020-01-01 | End: 2020-01-02 | Stop reason: HOSPADM

## 2019-12-30 RX ORDER — HYDROXYZINE HYDROCHLORIDE 10 MG/1
10 TABLET, FILM COATED ORAL
Status: DISCONTINUED | OUTPATIENT
Start: 2019-12-30 | End: 2020-01-02 | Stop reason: HOSPADM

## 2019-12-30 RX ORDER — VANCOMYCIN HYDROCHLORIDE
1250 ONCE
Status: DISCONTINUED | OUTPATIENT
Start: 2019-12-30 | End: 2019-12-30 | Stop reason: ALTCHOICE

## 2019-12-30 RX ORDER — HYDROMORPHONE HYDROCHLORIDE 4 MG/1
4 TABLET ORAL
Status: DISCONTINUED | OUTPATIENT
Start: 2019-12-30 | End: 2020-01-02 | Stop reason: HOSPADM

## 2019-12-30 RX ORDER — BUPIVACAINE HYDROCHLORIDE 5 MG/ML
INJECTION, SOLUTION EPIDURAL; INTRACAUDAL
Status: COMPLETED | OUTPATIENT
Start: 2019-12-30 | End: 2019-12-30

## 2019-12-30 RX ORDER — SODIUM CHLORIDE 0.9 % (FLUSH) 0.9 %
5-40 SYRINGE (ML) INJECTION AS NEEDED
Status: DISCONTINUED | OUTPATIENT
Start: 2019-12-30 | End: 2019-12-30 | Stop reason: HOSPADM

## 2019-12-30 RX ORDER — FENTANYL CITRATE 50 UG/ML
50 INJECTION, SOLUTION INTRAMUSCULAR; INTRAVENOUS AS NEEDED
Status: DISCONTINUED | OUTPATIENT
Start: 2019-12-30 | End: 2019-12-30 | Stop reason: HOSPADM

## 2019-12-30 RX ORDER — CEFAZOLIN SODIUM/WATER 2 G/20 ML
2 SYRINGE (ML) INTRAVENOUS ONCE
Status: COMPLETED | OUTPATIENT
Start: 2019-12-30 | End: 2019-12-30

## 2019-12-30 RX ORDER — SODIUM CHLORIDE 0.9 % (FLUSH) 0.9 %
5-40 SYRINGE (ML) INJECTION AS NEEDED
Status: DISCONTINUED | OUTPATIENT
Start: 2019-12-30 | End: 2020-01-02 | Stop reason: HOSPADM

## 2019-12-30 RX ORDER — MORPHINE SULFATE 15 MG/1
30 TABLET, FILM COATED, EXTENDED RELEASE ORAL EVERY 12 HOURS
Status: DISCONTINUED | OUTPATIENT
Start: 2019-12-30 | End: 2020-01-02 | Stop reason: HOSPADM

## 2019-12-30 RX ORDER — PANTOPRAZOLE SODIUM 40 MG/1
40 TABLET, DELAYED RELEASE ORAL
Status: DISCONTINUED | OUTPATIENT
Start: 2019-12-31 | End: 2020-01-02 | Stop reason: HOSPADM

## 2019-12-30 RX ORDER — MIDAZOLAM HYDROCHLORIDE 1 MG/ML
INJECTION, SOLUTION INTRAMUSCULAR; INTRAVENOUS AS NEEDED
Status: DISCONTINUED | OUTPATIENT
Start: 2019-12-30 | End: 2019-12-30 | Stop reason: HOSPADM

## 2019-12-30 RX ORDER — ALBUTEROL SULFATE 0.83 MG/ML
2.5 SOLUTION RESPIRATORY (INHALATION)
Status: DISCONTINUED | OUTPATIENT
Start: 2019-12-30 | End: 2020-01-02 | Stop reason: HOSPADM

## 2019-12-30 RX ORDER — PRAZOSIN HYDROCHLORIDE 1 MG/1
4 CAPSULE ORAL
Status: DISCONTINUED | OUTPATIENT
Start: 2019-12-30 | End: 2020-01-02 | Stop reason: HOSPADM

## 2019-12-30 RX ORDER — DIVALPROEX SODIUM 250 MG/1
250 TABLET, DELAYED RELEASE ORAL DAILY
Status: DISCONTINUED | OUTPATIENT
Start: 2019-12-31 | End: 2020-01-02 | Stop reason: HOSPADM

## 2019-12-30 RX ORDER — VANCOMYCIN/0.9 % SOD CHLORIDE 1.5G/250ML
1500 PLASTIC BAG, INJECTION (ML) INTRAVENOUS ONCE
Status: COMPLETED | OUTPATIENT
Start: 2019-12-30 | End: 2019-12-30

## 2019-12-30 RX ORDER — ACETAMINOPHEN 325 MG/1
650 TABLET ORAL ONCE
Status: DISCONTINUED | OUTPATIENT
Start: 2019-12-30 | End: 2019-12-30 | Stop reason: SDUPTHER

## 2019-12-30 RX ORDER — MIDAZOLAM HYDROCHLORIDE 1 MG/ML
1 INJECTION, SOLUTION INTRAMUSCULAR; INTRAVENOUS AS NEEDED
Status: DISCONTINUED | OUTPATIENT
Start: 2019-12-30 | End: 2019-12-30 | Stop reason: HOSPADM

## 2019-12-30 RX ORDER — PREGABALIN 75 MG/1
75 CAPSULE ORAL ONCE
Status: COMPLETED | OUTPATIENT
Start: 2019-12-30 | End: 2019-12-30

## 2019-12-30 RX ORDER — DEXAMETHASONE SODIUM PHOSPHATE 10 MG/ML
4 INJECTION INTRAMUSCULAR; INTRAVENOUS ONCE
Status: DISCONTINUED | OUTPATIENT
Start: 2019-12-30 | End: 2019-12-30 | Stop reason: HOSPADM

## 2019-12-30 RX ORDER — SODIUM CHLORIDE 0.9 % (FLUSH) 0.9 %
5-40 SYRINGE (ML) INJECTION EVERY 8 HOURS
Status: DISCONTINUED | OUTPATIENT
Start: 2019-12-30 | End: 2019-12-30 | Stop reason: HOSPADM

## 2019-12-30 RX ORDER — TRANEXAMIC ACID 100 MG/ML
INJECTION, SOLUTION INTRAVENOUS AS NEEDED
Status: DISCONTINUED | OUTPATIENT
Start: 2019-12-30 | End: 2019-12-30 | Stop reason: HOSPADM

## 2019-12-30 RX ORDER — PROPOFOL 10 MG/ML
INJECTION, EMULSION INTRAVENOUS AS NEEDED
Status: DISCONTINUED | OUTPATIENT
Start: 2019-12-30 | End: 2019-12-30 | Stop reason: HOSPADM

## 2019-12-30 RX ORDER — ALBUTEROL SULFATE 90 UG/1
1 AEROSOL, METERED RESPIRATORY (INHALATION)
Status: DISCONTINUED | OUTPATIENT
Start: 2019-12-30 | End: 2019-12-30 | Stop reason: ALTCHOICE

## 2019-12-30 RX ORDER — ACETAMINOPHEN 325 MG/1
650 TABLET ORAL EVERY 6 HOURS
Status: DISCONTINUED | OUTPATIENT
Start: 2019-12-30 | End: 2020-01-02 | Stop reason: HOSPADM

## 2019-12-30 RX ORDER — VANCOMYCIN/0.9 % SOD CHLORIDE 1.5G/250ML
1500 PLASTIC BAG, INJECTION (ML) INTRAVENOUS EVERY 12 HOURS
Status: DISCONTINUED | OUTPATIENT
Start: 2019-12-30 | End: 2019-12-30 | Stop reason: ALTCHOICE

## 2019-12-30 RX ORDER — ONDANSETRON 2 MG/ML
4 INJECTION INTRAMUSCULAR; INTRAVENOUS AS NEEDED
Status: DISCONTINUED | OUTPATIENT
Start: 2019-12-30 | End: 2019-12-30 | Stop reason: HOSPADM

## 2019-12-30 RX ORDER — ONDANSETRON 2 MG/ML
4 INJECTION INTRAMUSCULAR; INTRAVENOUS
Status: ACTIVE | OUTPATIENT
Start: 2019-12-30 | End: 2019-12-31

## 2019-12-30 RX ORDER — POTASSIUM CHLORIDE 750 MG/1
10 TABLET, FILM COATED, EXTENDED RELEASE ORAL DAILY
Status: DISCONTINUED | OUTPATIENT
Start: 2019-12-31 | End: 2020-01-02 | Stop reason: HOSPADM

## 2019-12-30 RX ORDER — KETAMINE HYDROCHLORIDE 50 MG/ML
INJECTION, SOLUTION INTRAMUSCULAR; INTRAVENOUS AS NEEDED
Status: DISCONTINUED | OUTPATIENT
Start: 2019-12-30 | End: 2019-12-30 | Stop reason: HOSPADM

## 2019-12-30 RX ORDER — LIDOCAINE HYDROCHLORIDE 10 MG/ML
0.1 INJECTION, SOLUTION EPIDURAL; INFILTRATION; INTRACAUDAL; PERINEURAL AS NEEDED
Status: DISCONTINUED | OUTPATIENT
Start: 2019-12-30 | End: 2019-12-30 | Stop reason: HOSPADM

## 2019-12-30 RX ORDER — NALOXONE HYDROCHLORIDE 0.4 MG/ML
0.4 INJECTION, SOLUTION INTRAMUSCULAR; INTRAVENOUS; SUBCUTANEOUS AS NEEDED
Status: DISCONTINUED | OUTPATIENT
Start: 2019-12-30 | End: 2020-01-02 | Stop reason: HOSPADM

## 2019-12-30 RX ORDER — FUROSEMIDE 20 MG/1
20 TABLET ORAL DAILY
Status: DISCONTINUED | OUTPATIENT
Start: 2019-12-31 | End: 2019-12-31

## 2019-12-30 RX ORDER — HYDROCHLOROTHIAZIDE 25 MG/1
50 TABLET ORAL AS NEEDED
Status: DISCONTINUED | OUTPATIENT
Start: 2019-12-30 | End: 2020-01-02 | Stop reason: HOSPADM

## 2019-12-30 RX ORDER — HYDROMORPHONE HYDROCHLORIDE 1 MG/ML
0.5 INJECTION, SOLUTION INTRAMUSCULAR; INTRAVENOUS; SUBCUTANEOUS
Status: DISPENSED | OUTPATIENT
Start: 2019-12-30 | End: 2019-12-31

## 2019-12-30 RX ORDER — ACETAMINOPHEN 500 MG
1000 TABLET ORAL ONCE
Status: COMPLETED | OUTPATIENT
Start: 2019-12-30 | End: 2019-12-30

## 2019-12-30 RX ORDER — EPINEPHRINE 1 MG/ML
INJECTION, SOLUTION, CONCENTRATE INTRAVENOUS AS NEEDED
Status: DISCONTINUED | OUTPATIENT
Start: 2019-12-30 | End: 2019-12-30 | Stop reason: HOSPADM

## 2019-12-30 RX ORDER — LOSARTAN POTASSIUM 50 MG/1
100 TABLET ORAL
Status: DISCONTINUED | OUTPATIENT
Start: 2019-12-31 | End: 2020-01-02 | Stop reason: HOSPADM

## 2019-12-30 RX ORDER — CEFAZOLIN SODIUM/WATER 2 G/20 ML
2 SYRINGE (ML) INTRAVENOUS EVERY 8 HOURS
Status: COMPLETED | OUTPATIENT
Start: 2019-12-30 | End: 2019-12-31

## 2019-12-30 RX ORDER — SODIUM CHLORIDE, SODIUM LACTATE, POTASSIUM CHLORIDE, CALCIUM CHLORIDE 600; 310; 30; 20 MG/100ML; MG/100ML; MG/100ML; MG/100ML
50 INJECTION, SOLUTION INTRAVENOUS CONTINUOUS
Status: DISCONTINUED | OUTPATIENT
Start: 2019-12-30 | End: 2019-12-30 | Stop reason: HOSPADM

## 2019-12-30 RX ORDER — SODIUM CHLORIDE, SODIUM LACTATE, POTASSIUM CHLORIDE, CALCIUM CHLORIDE 600; 310; 30; 20 MG/100ML; MG/100ML; MG/100ML; MG/100ML
INJECTION, SOLUTION INTRAVENOUS
Status: DISCONTINUED | OUTPATIENT
Start: 2019-12-30 | End: 2019-12-30 | Stop reason: HOSPADM

## 2019-12-30 RX ORDER — LEVOTHYROXINE SODIUM 100 UG/1
100 TABLET ORAL
Status: DISCONTINUED | OUTPATIENT
Start: 2019-12-31 | End: 2020-01-02 | Stop reason: HOSPADM

## 2019-12-30 RX ORDER — BACLOFEN 10 MG/1
10 TABLET ORAL
Status: DISCONTINUED | OUTPATIENT
Start: 2019-12-30 | End: 2020-01-02 | Stop reason: HOSPADM

## 2019-12-30 RX ORDER — ACYCLOVIR 800 MG/1
400 TABLET ORAL
Status: DISCONTINUED | OUTPATIENT
Start: 2019-12-30 | End: 2020-01-02 | Stop reason: HOSPADM

## 2019-12-30 RX ORDER — SODIUM CHLORIDE 0.9 % (FLUSH) 0.9 %
5-40 SYRINGE (ML) INJECTION EVERY 8 HOURS
Status: DISCONTINUED | OUTPATIENT
Start: 2019-12-30 | End: 2020-01-02 | Stop reason: HOSPADM

## 2019-12-30 RX ORDER — KETOROLAC TROMETHAMINE 30 MG/ML
30 INJECTION, SOLUTION INTRAMUSCULAR; INTRAVENOUS EVERY 6 HOURS
Status: COMPLETED | OUTPATIENT
Start: 2019-12-30 | End: 2019-12-31

## 2019-12-30 RX ORDER — PROPOFOL 10 MG/ML
INJECTION, EMULSION INTRAVENOUS
Status: DISCONTINUED | OUTPATIENT
Start: 2019-12-30 | End: 2019-12-30 | Stop reason: HOSPADM

## 2019-12-30 RX ORDER — AMOXICILLIN 250 MG
1 CAPSULE ORAL 2 TIMES DAILY
Status: DISCONTINUED | OUTPATIENT
Start: 2019-12-30 | End: 2020-01-02 | Stop reason: HOSPADM

## 2019-12-30 RX ORDER — HYDROMORPHONE HYDROCHLORIDE 1 MG/ML
0.2 INJECTION, SOLUTION INTRAMUSCULAR; INTRAVENOUS; SUBCUTANEOUS
Status: DISCONTINUED | OUTPATIENT
Start: 2019-12-30 | End: 2019-12-30 | Stop reason: HOSPADM

## 2019-12-30 RX ORDER — BUSPIRONE HYDROCHLORIDE 5 MG/1
5 TABLET ORAL
Status: DISCONTINUED | OUTPATIENT
Start: 2019-12-30 | End: 2020-01-02 | Stop reason: HOSPADM

## 2019-12-30 RX ORDER — SODIUM CHLORIDE 9 MG/ML
125 INJECTION, SOLUTION INTRAVENOUS CONTINUOUS
Status: DISPENSED | OUTPATIENT
Start: 2019-12-30 | End: 2019-12-31

## 2019-12-30 RX ORDER — POLYETHYLENE GLYCOL 3350 17 G/17G
17 POWDER, FOR SOLUTION ORAL DAILY
Status: DISCONTINUED | OUTPATIENT
Start: 2019-12-31 | End: 2020-01-02 | Stop reason: HOSPADM

## 2019-12-30 RX ADMIN — FENTANYL CITRATE 100 MCG: 50 INJECTION, SOLUTION INTRAMUSCULAR; INTRAVENOUS at 11:32

## 2019-12-30 RX ADMIN — ROPIVACAINE HYDROCHLORIDE 30 ML: 5 INJECTION, SOLUTION EPIDURAL; INFILTRATION; PERINEURAL at 12:33

## 2019-12-30 RX ADMIN — MIDAZOLAM 1 MG: 1 INJECTION INTRAMUSCULAR; INTRAVENOUS at 13:30

## 2019-12-30 RX ADMIN — PROPOFOL 20 MG: 10 INJECTION, EMULSION INTRAVENOUS at 13:13

## 2019-12-30 RX ADMIN — PREGABALIN 75 MG: 75 CAPSULE ORAL at 11:06

## 2019-12-30 RX ADMIN — TRAZODONE HYDROCHLORIDE 200 MG: 100 TABLET ORAL at 21:46

## 2019-12-30 RX ADMIN — SODIUM CHLORIDE 125 ML/HR: 900 INJECTION, SOLUTION INTRAVENOUS at 15:35

## 2019-12-30 RX ADMIN — ACETAMINOPHEN 1000 MG: 500 TABLET ORAL at 11:07

## 2019-12-30 RX ADMIN — KETAMINE HYDROCHLORIDE 20 MG: 50 INJECTION, SOLUTION INTRAMUSCULAR; INTRAVENOUS at 13:13

## 2019-12-30 RX ADMIN — ASPIRIN 81 MG: 81 TABLET, COATED ORAL at 18:44

## 2019-12-30 RX ADMIN — DEXMEDETOMIDINE HYDROCHLORIDE 10 MCG: 100 INJECTION, SOLUTION, CONCENTRATE INTRAVENOUS at 13:18

## 2019-12-30 RX ADMIN — Medication 2 G: at 20:37

## 2019-12-30 RX ADMIN — HYDROMORPHONE HYDROCHLORIDE 0.5 MG: 1 INJECTION, SOLUTION INTRAMUSCULAR; INTRAVENOUS; SUBCUTANEOUS at 18:53

## 2019-12-30 RX ADMIN — PRAZOSIN HYDROCHLORIDE 4 MG: 1 CAPSULE ORAL at 21:46

## 2019-12-30 RX ADMIN — HYDROMORPHONE HYDROCHLORIDE 4 MG: 4 TABLET ORAL at 21:46

## 2019-12-30 RX ADMIN — BUPIVACAINE HYDROCHLORIDE 9 MG: 5 INJECTION, SOLUTION EPIDURAL; INTRACAUDAL; PERINEURAL at 13:09

## 2019-12-30 RX ADMIN — PHENYLEPHRINE HYDROCHLORIDE 50 MCG/MIN: 10 INJECTION INTRAVENOUS at 13:16

## 2019-12-30 RX ADMIN — KETAMINE HYDROCHLORIDE 10 MG: 50 INJECTION, SOLUTION INTRAMUSCULAR; INTRAVENOUS at 13:42

## 2019-12-30 RX ADMIN — DIVALPROEX SODIUM 500 MG: 500 TABLET, DELAYED RELEASE ORAL at 20:38

## 2019-12-30 RX ADMIN — KETOROLAC TROMETHAMINE 30 MG: 30 INJECTION, SOLUTION INTRAMUSCULAR at 18:44

## 2019-12-30 RX ADMIN — PROPOFOL 20 MG: 10 INJECTION, EMULSION INTRAVENOUS at 13:17

## 2019-12-30 RX ADMIN — MORPHINE SULFATE 30 MG: 15 TABLET, FILM COATED, EXTENDED RELEASE ORAL at 20:38

## 2019-12-30 RX ADMIN — Medication 2 G: at 13:16

## 2019-12-30 RX ADMIN — MIDAZOLAM 2 MG: 1 INJECTION INTRAMUSCULAR; INTRAVENOUS at 11:31

## 2019-12-30 RX ADMIN — ACETAMINOPHEN 650 MG: 325 TABLET ORAL at 18:44

## 2019-12-30 RX ADMIN — MIDAZOLAM 1 MG: 1 INJECTION INTRAMUSCULAR; INTRAVENOUS at 14:36

## 2019-12-30 RX ADMIN — VANCOMYCIN HYDROCHLORIDE 1500 MG: 10 INJECTION, POWDER, LYOPHILIZED, FOR SOLUTION INTRAVENOUS at 11:45

## 2019-12-30 RX ADMIN — SODIUM CHLORIDE, POTASSIUM CHLORIDE, SODIUM LACTATE AND CALCIUM CHLORIDE: 600; 310; 30; 20 INJECTION, SOLUTION INTRAVENOUS at 12:34

## 2019-12-30 RX ADMIN — DEXMEDETOMIDINE HYDROCHLORIDE 5 MCG: 100 INJECTION, SOLUTION, CONCENTRATE INTRAVENOUS at 14:15

## 2019-12-30 RX ADMIN — KETAMINE HYDROCHLORIDE 10 MG: 50 INJECTION, SOLUTION INTRAMUSCULAR; INTRAVENOUS at 14:47

## 2019-12-30 RX ADMIN — PROPOFOL 45 MCG/KG/MIN: 10 INJECTION, EMULSION INTRAVENOUS at 13:12

## 2019-12-30 RX ADMIN — KETAMINE HYDROCHLORIDE 10 MG: 50 INJECTION, SOLUTION INTRAMUSCULAR; INTRAVENOUS at 14:20

## 2019-12-30 NOTE — ANESTHESIA PREPROCEDURE EVALUATION
Anesthetic History   No history of anesthetic complications            Review of Systems / Medical History  Patient summary reviewed, nursing notes reviewed and pertinent labs reviewed    Pulmonary  Within defined limits                 Neuro/Psych     seizures    Psychiatric history (ADHD, depression)     Cardiovascular    Hypertension              Exercise tolerance: >4 METS  Comments: Holter NSR, rare PVC    No chest pain or SOB   GI/Hepatic/Renal     GERD           Endo/Other      Hypothyroidism  Arthritis     Other Findings            Physical Exam    Airway  Mallampati: III  TM Distance: 4 - 6 cm  Neck ROM: normal range of motion   Mouth opening: Normal     Cardiovascular    Rhythm: regular  Rate: normal         Dental  No notable dental hx       Pulmonary  Breath sounds clear to auscultation               Abdominal  Abdominal exam normal       Other Findings            Anesthetic Plan    ASA: 3  Anesthesia type: spinal          Induction: Intravenous  Anesthetic plan and risks discussed with: Patient

## 2019-12-30 NOTE — OP NOTES
Name: Lio Ludwig  MRN:  [id number]  : 1970  Age:  52 y.o. Surgery Date: 2019      OPERATIVE REPORT - RIGHT TOTAL KNEE REPLACEMENT    PREOPERATIVE DIAGNOSIS: Osteoarthritis, right knee. POSTOPERATIVE DIAGNOSIS: Osteoarthritis, right knee. PROCEDURE PERFORMED: Right total knee arthroplasty. SURGEON: Tao Null MD    FIRST ASSISTANT:  John    ANESTHESIA: Spinal    PRE-OP ANTIBIOTIC: Ancef 2g    COMPLICATIONS: None. ESTIMATED BLOOD LOSS: 50 mL. SPECIMENS REMOVED: None. COMPONENTS IMPLANTED:   Implant Name Type Inv. Item Serial No.  Lot No. LRB No. Used Action   CEMENT BNE GENTAMC GHV 40GM -- SMARTSET - SN/A  CEMENT BNE GENTAMC GHV 40GM -- SMARTSET N/A [facility] [employer] [id number] Right 2 Implanted   TIB CRS FB BASE SZ 4 CHAD -- ATTUNE - SN/A  TIB CRS FB BASE SZ 4 CHAD -- ATTUNE N/A [facility] [employer] [id number] Right 1 Implanted   STEM CHAD [id number] -- ATTUNE - SN/A  STEM CHAD [id number] -- ATTUNE N/A [facility] [employer] [id number] Right 1 Implanted   FEM CR RT SZ 4 CHAD -- ATTUNE - SN/A  FEM CR RT SZ 4 CHAD -- ATTUNE N/A [facility] [employer] [id number] Right 1 Implanted   INSERT FB SZ 4 7MM -- ATTUNE - SN/A  INSERT FB SZ 4 7MM -- ATTUNE N/A [facility] [employer] [id number] Right 1 Implanted       INDICATIONS:  The patient is an 52 yrs female with progressive debilitating right knee pain due to severe osteoarthritis. Symptoms have progressed despite comprehensive conservative treatment and they presents for right total knee replacement. Risks, benefits, alternatives of the procedure were reviewed in detail and the patient desired to proceed. Risks including bleeding, infection, damage to surrounding structures, blood clots, pulmonary embolism, and death were discussed. DESCRIPTION OF PROCEDURE:  Epidural/spinal anesthesia was initiated. Two grams of cefazolin were administered within 30 minutes of incision.   The right lower extremity was prepped and draped in the usual sterile fashion. The skin was covered with Ioban occlusive dressing. A tourniquet was only employed for cementation- total time- 15 minutes. A midline skin incision was made with a 10 blade and small flaps were elevated. A medial parapatellar incision was made to the knee. The knee was exposed and the distal femur was cut at 5 degrees distal femoral valgus. The tibia was subluxed and a neutral varus/valgus proximal tibial cut was made with 3 degrees posterior slope. The meniscal remnants were removed. The posterior osteophytes were removed from the femur. The extension gap was determined using spacer blocks and appropriate releases were made. Femur was sized per above. An AP cutting block was placed, rotated using a gap balancing techniqe. Anterior, posterior, and chamfer cuts were carried out. The tibial was then sized and correct rotation was identified using the medial 1/3 of the tibial tubercle as a landmark. The tibia was prepared using a drill followed by a keel punch. A reciprocating saw was used to begin the cuts for the keel punch to avoid tibial fracture. Trials were placed. The patella was sized using a caliper and an appropriate resection  was performed. Lug holes were drilled and a trial was fitted. The knee tracked well with all trial implants. The trials were then removed. Bony surfaces were drilled, lavaged, and dried. All components were cemented. Excess cement was removed. Polyethylene component was placed. After the cement had fully cured, the knee was ranged and  irrigated copiously with pulsatile lavage. All surrounding soft tissues were infiltrated with local anesthetic. The arthrotomy  was closed with running quill suture and 0 vicryl suture. Skin and subcutaneous tissues were irrigated and closed in standard fashion with 2-0 vicryl and 3-0 monocryl. An aquacel dressing was placed. The patient underwent straight catheterization at the end of the case. There were no complications. The procedure was a RIGHT TOTAL KNEE REPLACEMENT. A Depuy total knee construct was utilized. No specimens were obtained/sent. The patient was transferred to the recovery room in stable condition.       Georgina Moss MD

## 2019-12-30 NOTE — ANESTHESIA POSTPROCEDURE EVALUATION
Post-Anesthesia Evaluation and Assessment    Patient: Luis Enrique Browne MRN: [id number]  SSN: [id number]    YOB: 1970  Age: 52 y.o. Sex: female      I have evaluated the patient and they are stable and ready for discharge from the PACU. Cardiovascular Function/Vital Signs  Visit Vitals  BP 96/78   Pulse 68   Temp 36.6 °C (97.8 °F)   Resp 10   Ht 5' 1.5\" (1.562 m)   Wt 91.7 kg (202 lb 4 oz)   SpO2 98%   BMI 37.60 kg/m²       Patient is status post Spinal anesthesia for Procedure(s):  RIGHT TOTAL KNEE ARTHROPLASTY. Nausea/Vomiting: None    Postoperative hydration reviewed and adequate. Pain:  Pain Scale 1: Visual (12/30/19 1532)  Pain Intensity 1: 0 (12/30/19 1532)   Managed    Neurological Status:   Neuro (WDL): Exceptions to WDL (12/30/19 1532)  Neuro  Neurologic State: Drowsy; Eyes open to stimulus (12/30/19 1532)  LUE Motor Response: Spontaneous  (12/30/19 1532)  LLE Motor Response: Spontaneous  (12/30/19 1532)  RUE Motor Response: Spontaneous  (12/30/19 1532)  RLE Motor Response: Numbness (12/30/19 1532)   Block resolving    Mental Status, Level of Consciousness: Alert and  oriented to person, place, and time    Pulmonary Status:   O2 Device: Room air (12/30/19 1533)   Adequate oxygenation and airway patent    Complications related to anesthesia: None    Post-anesthesia assessment completed. No concerns    Signed By: Joaquin Lopez MD     December 30, 2019              Procedure(s):  RIGHT TOTAL KNEE ARTHROPLASTY. spinal    <BSHSIANPOST>    Vitals Value Taken Time   BP 96/78 12/30/2019  3:45 PM   Temp 36.6 °C (97.8 °F) 12/30/2019  3:33 PM   Pulse 73 12/30/2019  3:58 PM   Resp 11 12/30/2019  3:58 PM   SpO2 99 % 12/30/2019  3:58 PM   Vitals shown include unvalidated device data.

## 2019-12-30 NOTE — ANESTHESIA PROCEDURE NOTES
Peripheral Block    Start time: 12/30/2019 12:00 PM  End time: 12/30/2019 12:00 PM  Performed by: Argelia Marr MD  Authorized by: Argelia Marr MD       Pre-procedure: Indications: at surgeon's request and post-op pain management    Preanesthetic Checklist: patient identified, risks and benefits discussed, site marked, timeout performed and patient being monitored    Timeout Time: 12:00          Block Type:   Block Type:   Adductor canal  Laterality:  Right  Monitoring:  Standard ASA monitoring, continuous pulse ox, frequent vital sign checks, heart rate, responsive to questions and oxygen  Injection Technique:  Single shot  Procedures: ultrasound guided    Patient Position: supine  Prep: chlorhexidine    Location:  Mid thigh  Needle Type:  Stimuplex  Needle Gauge:  21 G  Needle Localization:  Ultrasound guidance and anatomical landmarks    Assessment:  Number of attempts:  1  Injection Assessment:  Incremental injection every 5 mL, local visualized surrounding nerve on ultrasound, negative aspiration for blood, no paresthesia, no intravascular symptoms and ultrasound image on chart  Patient tolerance:  Patient tolerated the procedure well with no immediate complications

## 2019-12-30 NOTE — PROGRESS NOTES
TRANSFER - IN REPORT:    Verbal report received from Aniyah(name) on Sobia Perry  being received from PACU(unit) for routine post - op      Report consisted of patients Situation, Background, Assessment and   Recommendations(SBAR). Information from the following report(s) SBAR, Kardex, Intake/Output and MAR was reviewed with the receiving nurse. Opportunity for questions and clarification was provided. Assessment completed upon patients arrival to unit and care assumed.

## 2019-12-30 NOTE — H&P
Date of Surgery Update:  Harriet Arcos was seen and examined. History and physical has been reviewed. The patient has been examined. There have been no significant clinical changes since the completion of the originally dated History and Physical.  Patient identified by surgeon; surgical site was confirmed by patient and surgeon.     Signed By: Alexis Nickerson MD     December 30, 2019 12:17 PM

## 2019-12-30 NOTE — ANESTHESIA PROCEDURE NOTES
Spinal Block    Performed by: Leo Shipman MD  Authorized by:  Leo Shipman MD     Pre-procedure:  Preanesthetic Checklist: patient identified, risks and benefits discussed, site marked, patient being monitored and timeout performed      Spinal Block:   Patient Position:  Seated  Prep Region:  Lumbar  Prep: DuraPrep      Location:  L2-3  Technique:  Single shot        Needle:   Needle Type:  Pencan  Needle Gauge:  24 G  Attempts:  1      Events: CSF confirmed, no blood with aspiration and no paresthesia        Assessment:  Insertion:  Uncomplicated  Patient tolerance:  Patient tolerated the procedure well with no immediate complications

## 2019-12-30 NOTE — ROUTINE PROCESS
Patient: Tedd Cheadle MRN: [id number]  SSN: [id number]   YOB: 1970  Age: 52 y.o. Sex: female     Patient is status post Procedure(s):  RIGHT TOTAL KNEE ARTHROPLASTY.     Surgeon(s) and Role:     * Chidi Haider MD - Primary    Local/Dose/Irrigation: Right knee injection: 0.5% Marcaine w/ epinephrine 1:200,000 30 ml + Exparel 266 mg in 15 ml injectable saline                                       Dressing/Packing:  Wound Knee Right-Dressing Type: 4 x 4;ABD pad;Cast padding;Elastic bandage;Non adherent (12/30/19 1446)    Splint/Cast:  ]    Other: TXA 2 gm + Epinephrine 0.4 mg  Given topically to right knee

## 2019-12-30 NOTE — PERIOP NOTES
TRANSFER - OUT REPORT:    Verbal report given to Maryjane Colón RN(name) on Maximino Ours  being transferred to [facility](unit) for routine post - op       Report consisted of patients Situation, Background, Assessment and   Recommendations(SBAR). Information from the following report(s) SBAR, Kardex, OR Summary, Procedure Summary, Intake/Output, MAR and Cardiac Rhythm NSR was reviewed with the receiving nurse. Lines:   Peripheral IV 12/30/19 Left;Posterior Hand (Active)   Site Assessment Clean, dry, & intact 12/30/2019  3:32 PM   Phlebitis Assessment 0 12/30/2019  3:32 PM   Infiltration Assessment 0 12/30/2019  3:32 PM   Dressing Status Clean, dry, & intact 12/30/2019  3:32 PM   Dressing Type Transparent;Tape 12/30/2019  3:32 PM   Hub Color/Line Status Pink; Infusing 12/30/2019  3:32 PM   Action Taken Open ports on tubing capped 12/30/2019  3:32 PM   Alcohol Cap Used Yes 12/30/2019  3:32 PM        Opportunity for questions and clarification was provided.       Patient transported with:   [employer]

## 2019-12-31 ENCOUNTER — HOME HEALTH ADMISSION (OUTPATIENT)
Dept: HOME HEALTH SERVICES | Facility: HOME HEALTH | Age: 49
End: 2019-12-31
Payer: COMMERCIAL

## 2019-12-31 LAB
ANION GAP SERPL CALC-SCNC: 5 MMOL/L (ref 5–15)
BUN SERPL-MCNC: 9 MG/DL (ref 6–20)
BUN/CREAT SERPL: 12 (ref 12–20)
CALCIUM SERPL-MCNC: 7.8 MG/DL (ref 8.5–10.1)
CHLORIDE SERPL-SCNC: 112 MMOL/L (ref 97–108)
CO2 SERPL-SCNC: 25 MMOL/L (ref 21–32)
CREAT SERPL-MCNC: 0.74 MG/DL (ref 0.55–1.02)
GLUCOSE SERPL-MCNC: 87 MG/DL (ref 65–100)
HGB BLD-MCNC: 9.6 G/DL (ref 11.5–16)
POTASSIUM SERPL-SCNC: 3.8 MMOL/L (ref 3.5–5.1)
SODIUM SERPL-SCNC: 142 MMOL/L (ref 136–145)

## 2019-12-31 PROCEDURE — 74011250637 HC RX REV CODE- 250/637: Performed by: ORTHOPAEDIC SURGERY

## 2019-12-31 PROCEDURE — 97530 THERAPEUTIC ACTIVITIES: CPT

## 2019-12-31 PROCEDURE — 97162 PT EVAL MOD COMPLEX 30 MIN: CPT

## 2019-12-31 PROCEDURE — 77030028907 HC WRP KNEE WO BGS SOLM -B

## 2019-12-31 PROCEDURE — 51798 US URINE CAPACITY MEASURE: CPT

## 2019-12-31 PROCEDURE — 80048 BASIC METABOLIC PNL TOTAL CA: CPT

## 2019-12-31 PROCEDURE — 74011250636 HC RX REV CODE- 250/636: Performed by: ORTHOPAEDIC SURGERY

## 2019-12-31 PROCEDURE — 36415 COLL VENOUS BLD VENIPUNCTURE: CPT

## 2019-12-31 PROCEDURE — 65270000029 HC RM PRIVATE

## 2019-12-31 PROCEDURE — 85018 HEMOGLOBIN: CPT

## 2019-12-31 PROCEDURE — 97116 GAIT TRAINING THERAPY: CPT

## 2019-12-31 PROCEDURE — 97165 OT EVAL LOW COMPLEX 30 MIN: CPT

## 2019-12-31 RX ADMIN — ASPIRIN 81 MG: 81 TABLET, COATED ORAL at 08:19

## 2019-12-31 RX ADMIN — TRAZODONE HYDROCHLORIDE 200 MG: 100 TABLET ORAL at 21:45

## 2019-12-31 RX ADMIN — HYDROMORPHONE HYDROCHLORIDE 4 MG: 4 TABLET ORAL at 13:09

## 2019-12-31 RX ADMIN — ACETAMINOPHEN 650 MG: 325 TABLET ORAL at 00:36

## 2019-12-31 RX ADMIN — KETOROLAC TROMETHAMINE 30 MG: 30 INJECTION, SOLUTION INTRAMUSCULAR at 11:49

## 2019-12-31 RX ADMIN — MORPHINE SULFATE 30 MG: 15 TABLET, FILM COATED, EXTENDED RELEASE ORAL at 09:39

## 2019-12-31 RX ADMIN — PANTOPRAZOLE SODIUM 40 MG: 40 TABLET, DELAYED RELEASE ORAL at 07:17

## 2019-12-31 RX ADMIN — ASPIRIN 81 MG: 81 TABLET, COATED ORAL at 18:06

## 2019-12-31 RX ADMIN — DIVALPROEX SODIUM 500 MG: 500 TABLET, DELAYED RELEASE ORAL at 18:06

## 2019-12-31 RX ADMIN — PRAZOSIN HYDROCHLORIDE 4 MG: 1 CAPSULE ORAL at 21:57

## 2019-12-31 RX ADMIN — HYDROMORPHONE HYDROCHLORIDE 0.5 MG: 1 INJECTION, SOLUTION INTRAMUSCULAR; INTRAVENOUS; SUBCUTANEOUS at 00:36

## 2019-12-31 RX ADMIN — HYDROMORPHONE HYDROCHLORIDE 4 MG: 4 TABLET ORAL at 19:10

## 2019-12-31 RX ADMIN — ACETAMINOPHEN 650 MG: 325 TABLET ORAL at 11:49

## 2019-12-31 RX ADMIN — HYDROMORPHONE HYDROCHLORIDE 0.5 MG: 1 INJECTION, SOLUTION INTRAMUSCULAR; INTRAVENOUS; SUBCUTANEOUS at 08:19

## 2019-12-31 RX ADMIN — SODIUM CHLORIDE 125 ML/HR: 900 INJECTION, SOLUTION INTRAVENOUS at 00:28

## 2019-12-31 RX ADMIN — HYDROMORPHONE HYDROCHLORIDE 4 MG: 4 TABLET ORAL at 04:58

## 2019-12-31 RX ADMIN — Medication 10 ML: at 13:05

## 2019-12-31 RX ADMIN — Medication 2 G: at 04:59

## 2019-12-31 RX ADMIN — KETOROLAC TROMETHAMINE 30 MG: 30 INJECTION, SOLUTION INTRAMUSCULAR at 00:36

## 2019-12-31 RX ADMIN — LOSARTAN POTASSIUM 100 MG: 50 TABLET, FILM COATED ORAL at 07:17

## 2019-12-31 RX ADMIN — LEVOTHYROXINE SODIUM 100 MCG: 100 TABLET ORAL at 07:17

## 2019-12-31 RX ADMIN — KETOROLAC TROMETHAMINE 30 MG: 30 INJECTION, SOLUTION INTRAMUSCULAR at 07:17

## 2019-12-31 RX ADMIN — DIVALPROEX SODIUM 250 MG: 250 TABLET, DELAYED RELEASE ORAL at 08:20

## 2019-12-31 RX ADMIN — ACETAMINOPHEN 650 MG: 325 TABLET ORAL at 18:06

## 2019-12-31 RX ADMIN — Medication 10 ML: at 22:05

## 2019-12-31 RX ADMIN — ACETAMINOPHEN 650 MG: 325 TABLET ORAL at 07:17

## 2019-12-31 RX ADMIN — MORPHINE SULFATE 30 MG: 15 TABLET, FILM COATED, EXTENDED RELEASE ORAL at 21:45

## 2019-12-31 NOTE — PROGRESS NOTES
Bedside shift change report given to Ramesh Camejo (oncoming nurse) by Ria Kidd (offgoing nurse). Report included the following information SBAR, Kardex, Intake/Output and MAR.

## 2019-12-31 NOTE — PROGRESS NOTES
Problem: Self Care Deficits Care Plan (Adult)  Goal: *Acute Goals and Plan of Care (Insert Text)  Description  FUNCTIONAL STATUS PRIOR TO ADMISSION: Patient was modified independent for mobility at household level using a rollator for functional mobility.  assisting with LB care and difficulty with tub transfers. HOME SUPPORT: The patient lived with . Occupational Therapy Goals  Initiated 12/31/2019  1. Patient will perform lower body ADLs with supervision/set-up within 4 day(s). 2.  Patient will perform upper body ADLs standing 5 mins without fatigue or LOB with supervision/set-up within 4 day(s). 3.  Patient will perform all aspects of toileting with supervision/set-up within 4 day(s). 4.  Patient will participate in upper extremity therapeutic exercise/activities with supervision/set-up for 10 minutes within 4 day(s). 5.  Patient will utilize energy conservation techniques during functional activities without cues within 4 day(s). Outcome: Progressing Towards Goal   OCCUPATIONAL THERAPY EVALUATION  Patient: Manuel Lopez (66 y.o. female)  Date: 12/31/2019  Primary Diagnosis: Knee osteoarthritis [M17.10]  Primary osteoarthritis of right knee [M17.11]  Right knee DJD [M17.11]  Procedure(s) (LRB):  RIGHT TOTAL KNEE ARTHROPLASTY (Right) 1 Day Post-Op   Precautions:   Fall, WBAT    ASSESSMENT  Based on the objective data described below, the patient presents with impaired functional mobility, activity tolerance, balance and pain necessary in ADL tasks s/p RTKA POD 1. Patient with hx of multiple L knee surgeries. [place] [facility] [place] with min A x approx 6 ft with heavy reliance on BUE support, unable to progress to bathroom for toileting training. Slow movement with all activity. She is limited secondary to reports of 7-8/10 pain in R TKA and limited ROM in L knee at baseline from multiple surgeries. Patient requesting to return to bed instead of up in chair.      Anticipate good progression with additional skilled OT and pain mgmt. With plans for dc home with support from  and [facility]. Current Level of Function Impacting Discharge (ADLs/self-care): UB care indep; LB care total A; toileting max A    Functional Outcome Measure: The patient scored 40/100 on the Barthel Index outcome measure which is indicative of moderate impairment with ADL tasks and functional mobility. Other factors to consider for discharge: Patient with R knee pain and hx of multiple L knee surgeries. Patient will benefit from skilled therapy intervention to address the above noted impairments. PLAN :  Recommendations and Planned Interventions: self care training, functional mobility training, therapeutic exercise, balance training, therapeutic activities, endurance activities, patient education, home safety training, and family training/education    Frequency/Duration: Patient will be followed by occupational therapy 5 times a week to address goals. Recommendation for discharge: (in order for the patient to meet his/her long term goals)  Occupational therapy at least 2 days/week in the home AND ensure assist and/or supervision for safety with ADL tasks     This discharge recommendation:  Has not yet been discussed the attending provider and/or case management    IF patient discharges home will need the following DME: to be determined (TBD)--dressing aides? SUBJECTIVE:   Patient stated I dont have a strong leg.     OBJECTIVE DATA SUMMARY:   HISTORY:   Past Medical History:   Diagnosis Date    ADHD (attention deficit hyperactivity disorder)     Arthritis     OSTEO    Chronic pain     YAHIR KNEES, LOWER BACK, RT SHOULDER & NECK    GERD (gastroesophageal reflux disease)     Graves disease     NO LONGER AN ISSUE    Hematuria 10/9/2018    HTN (hypertension)     CONTROLLED WITH MEDS    Hypothyroidism 2018    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    OCD (obsessive compulsive disorder)     Other ill-defined conditions(799.89)     graves    Psychiatric disorder     depression. RECENTLY LOST HER SON ON 19 ON HER BIRTHDAY. Seizure ([facility] [address]     Seizures ([address]  &     REACTIVE TO HYPOGLYCEMIA / ALSO FROM FLASHING LIGHTS    Thromboembolus ([facility] [address] 2019    right arm     Past Surgical History:   Procedure Laterality Date    DELIVERY       HX ABDOMINOPLASTY      HX ADENOIDECTOMY      HX  SECTION      HX  SECTION      HX CHOLECYSTECTOMY      HX GASTRIC BYPASS          HX GI  1993    CHOLEYCYSTECTOMY    HX ORTHOPAEDIC Right     CARPEL TUNNEL    HX ORTHOPAEDIC Left     CARPEL TUNNEL    HX ORTHOPAEDIC Left     ULNA SHORTENING    HX ORTHOPAEDIC Right     COLLAR BONE SURGERY     HX ORTHOPAEDIC Left 2019    LEFT KNEE REPLACEMENT    HX ORTHOPAEDIC Left     LEFT KNEE INCISION FOR INFECTION & REPAIR    HX ORTHOPAEDIC Left     LEFT KNEE REMOVAL OF PROSTHESIS & INSERTION OF SPACER, REPAIR OF INCISION    HX ORTHOPAEDIC  10/31/2019    left knee revision    HX TONSILLECTOMY  1975       Expanded or extensive additional review of patient history:     Home Situation  Home Environment: Private residence  # Steps to Enter: 5  Rails to Enter: Yes  Hand Rails : Bilateral(wide)  One/Two Story Residence: Two story, live on 1st floor  Living Alone: No  Support Systems: Spouse/Significant Other/Partner  Patient Expects to be Discharged to[de-identified] Private residence  Current DME Used/Available at Home: Rafa Madai, rollator, Raised toilet seat, Shower chair  Tub or Shower Type: Tub/Shower combination      EXAMINATION OF PERFORMANCE DEFICITS:  Cognitive/Behavioral Status:  Neurologic State: Alert; Appropriate for age  Orientation Level: Appropriate for age;Oriented X4  Cognition: Appropriate decision making; Appropriate for age attention/concentration; Appropriate safety awareness; Follows commands       Hearing:   Auditory  Auditory Impairment: None         Range of Motion:  AROM: Generally decreased, functional                         Strength:  Strength: Generally decreased, functional                Coordination:     Fine Motor Skills-Upper: Left Intact; Right Intact    Gross Motor Skills-Upper: Left Intact; Right Intact    Tone & Sensation:     Sensation: Intact                      Balance:  Sitting: Intact  Standing: Impaired; With support  Standing - Static: Good  Standing - Dynamic : Fair    Functional Mobility and Transfers for ADLs:  Bed Mobility:  Supine to Sit: Contact guard assistance  Sit to Supine: Contact guard assistance    Transfers:  Sit to Stand: Minimum assistance  Stand to Sit: Contact guard assistance  Bed to Chair: (requesting to return to bed)    ADL Assessment:     Self feeding: indep  UB dressing: indep  LB dressing: total A  Toileting: max A  Bathing: max A      ADL Intervention and task modifications:     Lower Body Dressing Assistance  Socks: Total assistance (dependent)    Bathing: Patient instructed and indicated understanding when bathing to not submerge wound in water, stand to shower or sponge bathe, cover wound with plastic and tape to ensure no water reaches bandage/wound without cues. Dressing joint: Patient instructed understanding to don/doff Right LE first/last.  Patient instructed and demonstrated to don all clothing while sitting prior to standing, doff all clothing to knees while standing, then sit to doff clothing off from knees to feet in order to facilitate fall prevention, pain management, and energy conservation. Home safety: Patient instructed and indicated understanding on home modifications and safety (raise height of ADL objects, appropriate height of chair surfaces, recliner safety, change of floor surfaces, clear pathways) to increase independence and fall prevention.   Tub/shower transfer: Patient instructed and indicated understanding regarding when it is safe to begin transfer into tub/shower (complete stairs with PT, advance exercises with PT high enough to clear tub/shower height). Patient instructed to use the same technique as used with stairs when entering and exiting tub/shower (\"up with the non-surgical, down with the surgical leg\"). Reports difficulty with tub transfer prior to admission. Functional Measure:  Barthel Index:    Bathin  Bladder: 5  Bowels: 5  Groomin  Dressin  Feeding: 10  Mobility: 0  Stairs: 0  Toilet Use: 5  Transfer (Bed to Chair and Back): 10  Total: 40/100        The Barthel ADL Index: Guidelines  1. The index should be used as a record of what a patient does, not as a record of what a patient could do. 2. The main aim is to establish degree of independence from any help, physical or verbal, however minor and for whatever reason. 3. The need for supervision renders the patient not independent. 4. A patient's performance should be established using the best available evidence. Asking the patient, friends/relatives and nurses are the usual sources, but direct observation and common sense are also important. However direct testing is not needed. 5. Usually the patient's performance over the preceding 24-48 hours is important, but occasionally longer periods will be relevant. 6. Middle categories imply that the patient supplies over 50 per cent of the effort. 7. Use of aids to be independent is allowed. Dany Beatty., Barthel, D.W. (7118). Functional evaluation: the Barthel Index. [address] [facility] (14)2. Sarah Savage david SOLANGE Fajardo, Ese Jade., Mark Howard., Angie, [address] [place] [address] (). Measuring the change indisability after inpatient rehabilitation; comparison of the responsiveness of the Barthel Index and Functional [place] Measure. Journal of Neurology, Neurosurgery, and Psychiatry, 66(4), 516-041. Cady Wetzel, N.J.A, María Rios,  W.J.M, & Bob Devries, MMargieA. (2004.) Assessment of post-stroke quality of life in cost-effectiveness studies: The usefulness of the Barthel Index and the EuroQoL-5D.  Quality of Life Research, 13, 551-95         Occupational Therapy Evaluation Charge Determination   History Examination Decision-Making   LOW Complexity : Brief history review  LOW Complexity : 1-3 performance deficits relating to physical, cognitive , or psychosocial skils that result in activity limitations and / or participation restrictions  LOW Complexity : No comorbidities that affect functional and no verbal or physical assistance needed to complete eval tasks       Based on the above components, the patient evaluation is determined to be of the following complexity level: LOW   Pain Ratin-8/10 at rest and with activity. Had received pain meds prior to session, asking for additional pain meds at end of session, discussed with nursing    Activity Tolerance:   Fair-  limited by pain. BP stable, no dizziness      After treatment patient left in no apparent distress:    Supine in bed and Call bell within reach    COMMUNICATION/EDUCATION:   The patients plan of care was discussed with: Physical Therapist and Registered Nurse. Home safety education was provided and the patient/caregiver indicated understanding. and Patient/family agree to work toward stated goals and plan of care. This patients plan of care is appropriate for delegation to [facility] [place].     Thank you for this referral.  Chavez Duvall, OT  Time Calculation: 20 mins

## 2019-12-31 NOTE — PROGRESS NOTES
Bedside shift change report given to Cheryle Thakur (oncoming nurse) by Francisco Orlando (offgoing nurse). Report included the following information SBAR, Kardex, Intake/Output, MAR and Recent Results.

## 2019-12-31 NOTE — PROGRESS NOTES
Occupational Therapy    Orders received, chart reviewed and patient evaluated by occupational therapy. Pending progression with skilled acute occupational therapy, recommend:  Occupational therapy at least 2 days/week in the home AND ensure assist and/or supervision for safety with ADL tasks     Recommend with nursing patient to complete as able in order to maintain strength, endurance and independence: OOB to chair 3x/day with min-mod A at RW level and toileting to [address] with min-mod A and RW. Thank you for your assistance. Full evaluation to follow.            Sandra Vides OT

## 2019-12-31 NOTE — PROGRESS NOTES
Primary Nurse Charan Rushing RN and Allison Serrano RN performed a dual skin assessment on this patient No impairment noted  Tyrese score is 20

## 2019-12-31 NOTE — PROGRESS NOTES
Resting comfortably. GEN:  NAD. AOx3   ABD:  S/NT/ND   RLE:  Dressing C/D/I , 5/5 motor, Calf nttp (Bilat), Sensation rossly intact to light touch throughout, 1+ dp/pt pulses, foot perfused    Patient Vitals for the past 24 hrs:   Temp Pulse Resp BP SpO2   12/31/19 0816 98.3 °F (36.8 °C) 72 16 127/87 97 %   12/31/19 0717  75  141/88    12/31/19 0355 98 °F (36.7 °C) 70 14 139/89 99 %   12/30/19 2303 98 °F (36.7 °C) 85 16 93/64 95 %   12/30/19 2035 98.1 °F (36.7 °C) 97 15 141/89 98 %   12/30/19 1845 98.3 °F (36.8 °C) (!) 104 14 (!) 169/96 98 %   12/30/19 1725 97.6 °F (36.4 °C) 70 13 125/89 100 %   12/30/19 1634 97.3 °F (36.3 °C) 71 12 (!) 122/92 100 %   12/30/19 1610  (!) 57 12  100 %   12/30/19 1605  (!) 56 11  99 %   12/30/19 1600  (!) 58 10 116/78 99 %   12/30/19 1555  68 10  98 %   12/30/19 1550  68 11  99 %   12/30/19 1545  67 11 96/78 97 %   12/30/19 1540  68 12  98 %   12/30/19 1535  71 13 120/56 99 %   12/30/19 1533 97.8 °F (36.6 °C) 71 12 (!) 80/45 99 %   12/30/19 1532 97.8 °F (36.6 °C) 69 11 103/65 100 %   12/30/19 1530  73 12  100 %   12/30/19 1525  74 12  100 %   12/30/19 1052 98.3 °F (36.8 °C) 64 18 143/90 100 %       Current Facility-Administered Medications   Medication Dose Route Frequency    acyclovir (ZOVIRAX) tablet 400 mg  400 mg Oral Q4H PRN    . PHARMACY TO SUBSTITUTE PER PROTOCOL (Reordered from: amphetamine sulfate (EVEKEO) 10 mg tab)    Per Protocol    baclofen (LIORESAL) tablet 10 mg  10 mg Oral BID PRN    busPIRone (BUSPAR) tablet 5 mg  5 mg Oral DAILY PRN    divalproex DR (DEPAKOTE) tablet 250 mg  250 mg Oral DAILY    . PHARMACY TO SUBSTITUTE PER PROTOCOL (Reordered from: doxycycline (ADOXA) 100 mg tablet)    Per Protocol    furosemide (LASIX) tablet 20 mg  20 mg Oral DAILY    hydroCHLOROthiazide (HYDRODIURIL) tablet 50 mg  50 mg Oral PRN    HYDROmorphone (DILAUDID) tablet 4 mg  4 mg Oral Q6H PRN    pantoprazole (PROTONIX) tablet 40 mg  40 mg Oral ACB    levothyroxine (SYNTHROID) tablet 100 mcg  100 mcg Oral ACB    . PHARMACY TO SUBSTITUTE PER PROTOCOL (Reordered from: Lisdexamfetamine (VYVANSE) 70 mg cap)    Per Protocol    losartan (COZAAR) tablet 100 mg  100 mg Oral 7am    morphine CR (MS CONTIN) tablet 30 mg  30 mg Oral Q12H    potassium chloride SR (KLOR-CON 10) tablet 10 mEq  10 mEq Oral DAILY    prazosin (MINIPRESS) capsule 4 mg  4 mg Oral QHS    traZODone (DESYREL) tablet 200 mg  200 mg Oral QHS    . PHARMACY TO SUBSTITUTE PER PROTOCOL (Reordered from: vortioxetine (TRINTELLIX) 10 mg tablet)    Per Protocol    0.9% sodium chloride infusion  125 mL/hr IntraVENous CONTINUOUS    sodium chloride 0.9 % bolus infusion 500 mL  500 mL IntraVENous ONCE PRN    sodium chloride (NS) flush 5-40 mL  5-40 mL IntraVENous Q8H    sodium chloride (NS) flush 5-40 mL  5-40 mL IntraVENous PRN    acetaminophen (TYLENOL) tablet 650 mg  650 mg Oral Q6H    HYDROmorphone (PF) (DILAUDID) injection 0.5 mg  0.5 mg IntraVENous Q4H PRN    naloxone (NARCAN) injection 0.4 mg  0.4 mg IntraVENous PRN    hydrOXYzine HCl (ATARAX) tablet 10 mg  10 mg Oral Q8H PRN    senna-docusate (PERICOLACE) 8.6-50 mg per tablet 1 Tab  1 Tab Oral BID    polyethylene glycol (MIRALAX) packet 17 g  17 g Oral DAILY    [START ON 1/1/2020] bisacodyl (DULCOLAX) suppository 10 mg  10 mg Rectal DAILY PRN    aspirin delayed-release tablet 81 mg  81 mg Oral BID    ketorolac (TORADOL) injection 30 mg  30 mg IntraVENous Q6H    ondansetron (ZOFRAN) injection 4 mg  4 mg IntraVENous Q4H PRN    influenza vaccine 2019-20 (6 mos+)(PF) (FLUARIX/FLULAVAL/FLUZONE QUAD) injection 0.5 mL  0.5 mL IntraMUSCular PRIOR TO DISCHARGE    albuterol (PROVENTIL VENTOLIN) nebulizer solution 2.5 mg  2.5 mg Nebulization Q4H PRN    divalproex DR (DEPAKOTE) tablet 500 mg  500 mg Oral QPM       Lab Results   Component Value Date/Time    HGB 9.6 (L) 12/31/2019 05:30 AM    INR 1.1 12/04/2019 03:50 PM       Lab Results   Component Value Date/Time    Sodium 142 12/31/2019 05:30 AM    Potassium 3.8 12/31/2019 05:30 AM    Chloride 112 (H) 12/31/2019 05:30 AM    CO2 25 12/31/2019 05:30 AM    BUN 9 12/31/2019 05:30 AM    Creatinine 0.74 12/31/2019 05:30 AM    Calcium 7.8 (L) 12/31/2019 05:30 AM            52 y.o. female s/p right total knee arthroplasty on 12/30/2019  . Doing well.        ABX: Complete 24 hours perioperative abx  PATHWAY: Straight cath per protocol if needed  DVT Prophylaxis: Aspirin 81 mg enteric coated BID  Weight Bearing: WBAT RLE   Pain Control: Toradol, Tylenol, 15 mg meloxicam to begin evening POD 1 if patient still in hospital, tramadol every 6 hours, oxy 5 mg for breakthrough pain, home narcotic  Disposition: Home, [facility]

## 2019-12-31 NOTE — PROGRESS NOTES
Problem: Mobility Impaired (Adult and Pediatric)  Goal: *Acute Goals and Plan of Care (Insert Text)  Description  FUNCTIONAL STATUS PRIOR TO ADMISSION: Patient was modified independent using a rollator for functional mobility. Pt with multiple Left knee revisions and is familiar with post-op expectations    HOME SUPPORT PRIOR TO ADMISSION: The patient lived with spouse and required assist for some mobility tasks. Physical Therapy Goals  Initiated 12/31/2019    1. Patient will move from supine to sit and sit to supine  in bed with modified independence within 4 days. 2. Patient will perform sit to stand with modified independence within 4 days. 3. Patient will ambulate with modified independence for 150 feet with the least restrictive device within 4 days. 4. Patient will ascend/descend 5 stairs with single handrail(s) with supervision/set-up within 4 days. 5. Patient will perform home exercise program per protocol with independence within 4 days. 6. Patient will demonstrate AROM 0-90 degrees in operative joint within 4 days. 12/31/2019 1433 by Carolyne St PT  Outcome: Progressing Towards Goal   PHYSICAL THERAPY TREATMENT  Patient: Shady Vides (24 y.o. female)  Date: 12/31/2019  Diagnosis: Knee osteoarthritis [M17.10]  Primary osteoarthritis of right knee [M17.11]  Right knee DJD [M17.11]   <principal problem not specified>  Procedure(s) (LRB):  RIGHT TOTAL KNEE ARTHROPLASTY (Right) 1 Day Post-Op  Precautions: Fall, WBAT  Chart, physical therapy assessment, plan of care and goals were reviewed. ASSESSMENT  Patient continues with skilled PT services and is progressing towards goals. Pt received supine in bed and agreeable to therapy. Pt continues to be limited by pain, decreased ROM of bilateral knees, decreased functional mobility, impaired balance and gait. Pt able to progress gait distance with RW x 60 ft, but limited mostly by increased pain.  Pt responded better to distraction techniques to progress mobility. Pt will continue to benefit from PT to progress mobility as tolerated and reach highest level of independence. Pt will need further mobility and stair training prior to clearance to d/c home with HHPT. Current Level of Function Impacting Discharge (mobility/balance): CGA bed mobility, CGA-min A transfers with RW, gait training x 60 feet with RW    Other factors to consider for discharge: pain         PLAN :  Patient continues to benefit from skilled intervention to address the above impairments. Continue treatment per established plan of care. to address goals. Recommendation for discharge: (in order for the patient to meet his/her long term goals)  Physical therapy at least 2 days/week in the home       IF patient discharges home will need the following DME: none       SUBJECTIVE:   Patient stated I'm doing better than I was this morning.     OBJECTIVE DATA SUMMARY:   Critical Behavior:  Neurologic State: Alert, Appropriate for age  Orientation Level: Appropriate for age, Oriented X4  Cognition: Appropriate decision making, Appropriate for age attention/concentration, Appropriate safety awareness, Follows commands       Range of Motion:  AROM: Generally decreased, functional                         Functional Mobility Training:  Bed Mobility:     Supine to Sit: Contact guard assistance  Sit to Supine: Contact guard assistance           Transfers:  Sit to Stand: Minimum assistance  Stand to Sit: Contact guard assistance        Bed to Chair: (requesting to return to bed)                    Balance:  Sitting: Intact  Standing: Impaired; With support  Standing - Static: Good  Standing - Dynamic : Fair  Ambulation/Gait Training:  Distance (ft): 60 Feet (ft)  Assistive Device: Gait belt;Walker, rolling  Ambulation - Level of Assistance: Contact guard assistance        Gait Abnormalities: Decreased step clearance; Step to gait  Right Side Weight Bearing: As tolerated  Left Side Weight Bearing: Full  Base of Support: Widened  Stance: Right decreased; Left decreased  Speed/Radha: Pace decreased (<100 feet/min)  Step Length: Right shortened;Left shortened       Therapeutic Exercises:     EXERCISE   Sets   Reps   Active Active Assist   Passive Self ROM   Comments   Ankle Pumps   [x]                                        []                                        []                                        []                                           Quad Sets   [x]                                        []                                        []                                        []                                           Hamstring Sets   []                                        []                                        []                                        []                                           Short Arc Quads   []                                        []                                        []                                        []                                           Knee Extension Stretch     []                                          []                                          []                                          []                                           Heel Slides   [x]                                        []                                        []                                        []                                           Long Arc Quads   []                                        []                                        []                                        []                                           Knee Flexion Stretch   []                                        []                                        []                                        []                                           Straight Leg Raises   []                                        []                                        [] []                                               Activity Tolerance:   Good  Please refer to the flowsheet for vital signs taken during this treatment.     After treatment patient left in no apparent distress:   Supine in bed, Call bell within reach, and Side rails x 3    COMMUNICATION/COLLABORATION:   The patients plan of care was discussed with: Occupational Therapist and Registered Nurse    Terri Bahena, PT, DPT   Time Calculation: 15 mins

## 2019-12-31 NOTE — PROGRESS NOTES
Problem: Mobility Impaired (Adult and Pediatric)  Goal: *Acute Goals and Plan of Care (Insert Text)  Description  FUNCTIONAL STATUS PRIOR TO ADMISSION: Patient was modified independent using a rollator for functional mobility. Pt with multiple Left knee revisions and is familiar with post-op expectations    HOME SUPPORT PRIOR TO ADMISSION: The patient lived with spouse and required assist for some mobility tasks. Physical Therapy Goals  Initiated 12/31/2019    1. Patient will move from supine to sit and sit to supine  in bed with modified independence within 4 days. 2. Patient will perform sit to stand with modified independence within 4 days. 3. Patient will ambulate with modified independence for 150 feet with the least restrictive device within 4 days. 4. Patient will ascend/descend 5 stairs with single handrail(s) with supervision/set-up within 4 days. 5. Patient will perform home exercise program per protocol with independence within 4 days. 6. Patient will demonstrate AROM 0-90 degrees in operative joint within 4 days. Outcome: Progressing Towards Goal   PHYSICAL THERAPY EVALUATION  Patient: Malini Hubbard (49 y.o. female)  Date: 12/31/2019  Primary Diagnosis: Knee osteoarthritis [M17.10]  Primary osteoarthritis of right knee [M17.11]  Right knee DJD [M17.11]  Procedure(s) (LRB):  RIGHT TOTAL KNEE ARTHROPLASTY (Right) 1 Day Post-Op   Precautions:   Fall, WBAT      ASSESSMENT  Based on the objective data described below, the patient presents with decreased R knee ROM and strength, decreased functional mobility, impaired balance and gait, and increased R knee pain limiting tolerance to activity. Pt with multiple L knee surgeries/revisions. Pt tolerated evaluation fairly. Pt able to tolerate gait training with RW x 6 ft and limited by pain. Pt returned to supine position at end of session and placed ice packs on for pain relief and notified RN of pt requesting pain medications.  Pt will continue to benefit from PT to progress mobility as tolerated. Recommend home with HHPT once mobility as improved and pain is better controlled. .    Current Level of Function Impacting Discharge (mobility/balance): min A bed mobility, min A-CGA transfers with RW, gait training x 6 ft with RW    Functional Outcome Measure: The patient scored Total Score: 14/28 on the Tinetti outcome measure which is indicative of high fall risk. Other factors to consider for discharge: pain     Patient will benefit from skilled therapy intervention to address the above noted impairments. PLAN :  Recommendations and Planned Interventions: bed mobility training, transfer training, gait training, therapeutic exercises, neuromuscular re-education, patient and family training/education, and therapeutic activities      Frequency/Duration: Patient will be followed by physical therapy:  twice daily to address goals. Recommendation for discharge: (in order for the patient to meet his/her long term goals)  Physical therapy at least 2 days/week in the home     IF patient discharges home will need the following DME: to be determined (TBD)         SUBJECTIVE:   Patient stated My other knee doesn't move well either.     OBJECTIVE DATA SUMMARY:   HISTORY:    Past Medical History:   Diagnosis Date    ADHD (attention deficit hyperactivity disorder)     Arthritis     OSTEO    Chronic pain     YAHIR KNEES, LOWER BACK, RT SHOULDER & NECK    GERD (gastroesophageal reflux disease)     Graves disease     NO LONGER AN ISSUE    Hematuria 10/9/2018    HTN (hypertension)     CONTROLLED WITH MEDS    Hypothyroidism 2018    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    OCD (obsessive compulsive disorder)     Other ill-defined conditions(799.89)     graves    Psychiatric disorder     depression. RECENTLY LOST HER SON ON 4/8/19 ON HER BIRTHDAY.     Seizure ([address]     Seizures ([address] 2003 & 2004    REACTIVE TO HYPOGLYCEMIA / ALSO FROM FLASHING LIGHTS    Thromboembolus ([address] 2019    right arm     Past Surgical History:   Procedure Laterality Date    DELIVERY       HX ABDOMINOPLASTY      HX ADENOIDECTOMY      HX  SECTION      HX  SECTION  2006    HX CHOLECYSTECTOMY      HX GASTRIC BYPASS          HX GI  1993    CHOLEYCYSTECTOMY    HX ORTHOPAEDIC Right     CARPEL TUNNEL    HX ORTHOPAEDIC Left     CARPEL TUNNEL    HX ORTHOPAEDIC Left     ULNA SHORTENING    HX ORTHOPAEDIC Right     COLLAR BONE SURGERY     HX ORTHOPAEDIC Left 2019    LEFT KNEE REPLACEMENT    HX ORTHOPAEDIC Left     LEFT KNEE INCISION FOR INFECTION & REPAIR    HX ORTHOPAEDIC Left     LEFT KNEE REMOVAL OF PROSTHESIS & INSERTION OF SPACER, REPAIR OF INCISION    HX ORTHOPAEDIC  10/31/2019    left knee revision    HX TONSILLECTOMY  1975       Personal factors and/or comorbidities impacting plan of care:     Home Situation  Home Environment: Private residence  # Steps to Enter: 5  Rails to Enter: Yes  Hand Rails : Bilateral(wide)  One/Two Story Residence: Two story, live on 1st floor  Living Alone: No  Support Systems: Spouse/Significant Other/Partner  Patient Expects to be Discharged to[de-identified] Private residence  Current DME Used/Available at Home: Jaida Florence, rollator, Raised toilet seat, Shower chair  Tub or Shower Type: Tub/Shower combination    EXAMINATION/PRESENTATION/DECISION MAKING:   Critical Behavior:  Neurologic State: Alert, Appropriate for age  Orientation Level: Appropriate for age, Oriented X4  Cognition: Appropriate decision making, Appropriate for age attention/concentration, Appropriate safety awareness, Follows commands     Hearing:   Auditory  Auditory Impairment: None  Skin:    Edema:   Range Of Motion:  AROM: Generally decreased, functional                       Strength:    Strength: Generally decreased, functional                    Tone & Sensation:                  Sensation: Intact               Coordination:     Vision:      Functional Mobility:  Bed Mobility:     Supine to Sit: Contact guard assistance  Sit to Supine: Contact guard assistance     Transfers:  Sit to Stand: Minimum assistance  Stand to Sit: Contact guard assistance                       Balance:   Sitting: Intact  Standing: Impaired; With support  Standing - Static: Good  Standing - Dynamic : Fair  Ambulation/Gait Training:  Distance (ft): 6 Feet (ft)  Assistive Device: Gait belt;Walker, rolling  Ambulation - Level of Assistance: Contact guard assistance;Minimal assistance        Gait Abnormalities: Decreased step clearance; Step to gait  Right Side Weight Bearing: As tolerated  Left Side Weight Bearing: Full  Base of Support: Widened  Stance: Right decreased; Left decreased  Speed/Radha: Pace decreased (<100 feet/min)  Step Length: Right shortened;Left shortened                     Stairs: Therapeutic Exercises:       Functional Measure:  Tinetti test:    Sitting Balance: 1  Arises: 1  Attempts to Rise: 2  Immediate Standing Balance: 1  Standing Balance: 1  Nudged: 1  Eyes Closed: 1  Turn 360 Degrees - Continuous/Discontinuous: 1  Turn 360 Degrees - Steady/Unsteady: 1  Sitting Down: 1  Balance Score: 11 Balance total score  Indication of Gait: 1  R Step Length/Height: 0  L Step Length/Height: 0  R Foot Clearance: 1  L Foot Clearance: 1  Step Symmetry: 0  Step Continuity: 0  Path: 0  Trunk: 0  Walking Time: 0  Gait Score: 3 Gait total score  Total Score: 14/28 Overall total score         Tinetti Tool Score Risk of Falls  <19 = High Fall Risk  19-24 = Moderate Fall Risk  25-28 = Low Fall Risk  Tinetti ME. Performance-Oriented Assessment of Mobility Problems in Elderly Patients. Perez 66; [id number].  (Scoring Description: PT Bulletin Feb. 10, 1993)    Older adults: Chin Shaw et al, 2009; n = 1000 [facility] elderly evaluated with ABC, BIENVENIDO, ADL, and IADL)  · Mean BIENVENIDO score for males aged 69-68 years = 26.21(3.40)  · Mean BIENVENIDO score for females age 69-68 years = 25.16(4.30)  · Mean BIENVENIDO score for males over 80 years = 23.29(6.02)  · Mean BIENVENIDO score for females over 80 years = 17.20(8.32)        Based on the above components, the patient evaluation is determined to be of the following complexity level: MEDIUM    Pain Rating:  C/o 7-8/10 pain    Activity Tolerance:   Fair  Please refer to the flowsheet for vital signs taken during this treatment. After treatment patient left in no apparent distress:   Supine in bed, Call bell within reach, and Side rails x 3    COMMUNICATION/EDUCATION:   The patients plan of care was discussed with: Occupational Therapist and Registered Nurse. Fall prevention education was provided and the patient/caregiver indicated understanding., Patient/family have participated as able in goal setting and plan of care. , and Patient/family agree to work toward stated goals and plan of care.     Thank you for this referral.  Susan Cabrera, PT, DPT   Time Calculation: 22 mins

## 2019-12-31 NOTE — PROGRESS NOTES
ERAN: Home with home health. [facility] has accepted referral.    Care Management Interventions  PCP Verified by CM: Yes  Mode of Transport at Discharge: Other (see comment)(family/car)  Transition of Care Consult (CM Consult): [address]: Yes  Physical Therapy Consult: Yes  Occupational Therapy Consult: Yes  Speech Therapy Consult: No  Current Support Network: Lives with Spouse, Own Home  Confirm Follow Up Transport: Family  The Plan for Transition of Care is Related to the Following Treatment Goals : home with home health  The Patient and/or Patient Representative was Provided with a Choice of Provider and Agrees with the Discharge Plan?: Yes  Freedom of Choice List was Provided with Basic Dialogue that Supports the Patient's Individualized Plan of Care/Goals, Treatment Preferences and Shares the Quality Data Associated with the Providers?: Yes  Discharge Location  Discharge Placement: Home with home health     Reason for Admission:   RIGHT TOTAL KNEE ARTHROPLASTY                    RRAT Score:  12                   Plan for utilizing home health: offered freedom of choice, patient prefers [facility]. Referral sent. Current Advanced Directive/Advance Care Plan: not on file                         Transition of Care Plan:   Home with home health.     Joaquin Metz, BSW/CRM

## 2020-01-01 PROCEDURE — 77030038269 HC DRN EXT URIN PURWCK BARD -A

## 2020-01-01 PROCEDURE — 74011250637 HC RX REV CODE- 250/637: Performed by: ORTHOPAEDIC SURGERY

## 2020-01-01 PROCEDURE — 97110 THERAPEUTIC EXERCISES: CPT

## 2020-01-01 PROCEDURE — 97535 SELF CARE MNGMENT TRAINING: CPT

## 2020-01-01 PROCEDURE — 97530 THERAPEUTIC ACTIVITIES: CPT

## 2020-01-01 PROCEDURE — 97116 GAIT TRAINING THERAPY: CPT

## 2020-01-01 PROCEDURE — 65270000029 HC RM PRIVATE

## 2020-01-01 RX ORDER — MELOXICAM 7.5 MG/1
15 TABLET ORAL DAILY
Status: DISCONTINUED | OUTPATIENT
Start: 2020-01-01 | End: 2020-01-01

## 2020-01-01 RX ORDER — MELOXICAM 7.5 MG/1
15 TABLET ORAL DAILY
Status: DISCONTINUED | OUTPATIENT
Start: 2020-01-01 | End: 2020-01-02 | Stop reason: HOSPADM

## 2020-01-01 RX ADMIN — Medication 10 ML: at 09:40

## 2020-01-01 RX ADMIN — HYDROMORPHONE HYDROCHLORIDE 4 MG: 4 TABLET ORAL at 19:05

## 2020-01-01 RX ADMIN — TRAZODONE HYDROCHLORIDE 200 MG: 100 TABLET ORAL at 22:13

## 2020-01-01 RX ADMIN — POTASSIUM CHLORIDE 10 MEQ: 750 TABLET, FILM COATED, EXTENDED RELEASE ORAL at 09:39

## 2020-01-01 RX ADMIN — DIVALPROEX SODIUM 250 MG: 250 TABLET, DELAYED RELEASE ORAL at 09:39

## 2020-01-01 RX ADMIN — ASPIRIN 81 MG: 81 TABLET, COATED ORAL at 19:05

## 2020-01-01 RX ADMIN — ACETAMINOPHEN 650 MG: 325 TABLET ORAL at 11:57

## 2020-01-01 RX ADMIN — PANTOPRAZOLE SODIUM 40 MG: 40 TABLET, DELAYED RELEASE ORAL at 07:12

## 2020-01-01 RX ADMIN — ACETAMINOPHEN 650 MG: 325 TABLET ORAL at 07:12

## 2020-01-01 RX ADMIN — ACETAMINOPHEN 650 MG: 325 TABLET ORAL at 19:05

## 2020-01-01 RX ADMIN — MORPHINE SULFATE 30 MG: 15 TABLET, FILM COATED, EXTENDED RELEASE ORAL at 09:39

## 2020-01-01 RX ADMIN — DIVALPROEX SODIUM 500 MG: 500 TABLET, DELAYED RELEASE ORAL at 19:05

## 2020-01-01 RX ADMIN — LEVOTHYROXINE SODIUM 100 MCG: 100 TABLET ORAL at 07:10

## 2020-01-01 RX ADMIN — PRAZOSIN HYDROCHLORIDE 4 MG: 1 CAPSULE ORAL at 22:13

## 2020-01-01 RX ADMIN — ASPIRIN 81 MG: 81 TABLET, COATED ORAL at 09:39

## 2020-01-01 RX ADMIN — MELOXICAM 15 MG: 7.5 TABLET ORAL at 11:57

## 2020-01-01 RX ADMIN — ACETAMINOPHEN 650 MG: 325 TABLET ORAL at 00:54

## 2020-01-01 RX ADMIN — MORPHINE SULFATE 30 MG: 15 TABLET, FILM COATED, EXTENDED RELEASE ORAL at 22:13

## 2020-01-01 RX ADMIN — HYDROMORPHONE HYDROCHLORIDE 4 MG: 4 TABLET ORAL at 00:54

## 2020-01-01 RX ADMIN — LOSARTAN POTASSIUM 100 MG: 50 TABLET, FILM COATED ORAL at 07:10

## 2020-01-01 RX ADMIN — HYDROMORPHONE HYDROCHLORIDE 4 MG: 4 TABLET ORAL at 13:08

## 2020-01-01 RX ADMIN — Medication 10 ML: at 13:09

## 2020-01-01 RX ADMIN — HYDROMORPHONE HYDROCHLORIDE 4 MG: 4 TABLET ORAL at 07:12

## 2020-01-01 NOTE — PROGRESS NOTES
Bedside and Verbal shift change report given to Cheryle Thakur RN (oncoming nurse) by Esteban Drake RN (offgoing nurse). Report included the following information SBAR, Kardex and MAR.

## 2020-01-01 NOTE — PROGRESS NOTES
Problem: Falls - Risk of  Goal: *Absence of Falls  Description  Document Marta Jansen Fall Risk and appropriate interventions in the flowsheet.   Outcome: Progressing Towards Goal  Note: Fall Risk Interventions:  Mobility Interventions: PT Consult for assist device competence, PT Consult for mobility concerns, Patient to call before getting OOB, OT consult for ADLs, Utilize walker, cane, or other assistive device, Communicate number of staff needed for ambulation/transfer         Medication Interventions: Patient to call before getting OOB, Teach patient to arise slowly    Elimination Interventions: Call light in reach, Patient to call for help with toileting needs, Stay With Me (per policy)

## 2020-01-01 NOTE — PROGRESS NOTES
Bedside and Verbal shift change report given to [facility] (oncoming nurse) by Mary Kay Sharif (offgoing nurse). Report included the following information SBAR.

## 2020-01-01 NOTE — PROGRESS NOTES
Bedside and Verbal shift change report given to Patricia banks (oncoming nurse) by Stella Freitas (offgoing nurse). Report included the following information SBAR.

## 2020-01-01 NOTE — PROGRESS NOTES
Problem: Mobility Impaired (Adult and Pediatric)  Goal: *Acute Goals and Plan of Care (Insert Text)  Description  FUNCTIONAL STATUS PRIOR TO ADMISSION: Patient was modified independent using a rollator for functional mobility. Pt with multiple Left knee revisions and is familiar with post-op expectations    HOME SUPPORT PRIOR TO ADMISSION: The patient lived with spouse and required assist for some mobility tasks. Physical Therapy Goals  Initiated 12/31/2019    1. Patient will move from supine to sit and sit to supine  in bed with modified independence within 4 days. 2. Patient will perform sit to stand with modified independence within 4 days. 3. Patient will ambulate with modified independence for 150 feet with the least restrictive device within 4 days. 4. Patient will ascend/descend 5 stairs with single handrail(s) with supervision/set-up within 4 days. 5. Patient will perform home exercise program per protocol with independence within 4 days. 6. Patient will demonstrate AROM 0-90 degrees in operative joint within 4 days. Outcome: Not Progressing Towards Goal   PHYSICAL THERAPY TREATMENT  Patient: Lexy Lane (57 y.o. female)  Date: 1/1/2020  Diagnosis: Knee osteoarthritis [M17.10]  Primary osteoarthritis of right knee [M17.11]  Right knee DJD [M17.11]   <principal problem not specified>  Procedure(s) (LRB):  RIGHT TOTAL KNEE ARTHROPLASTY (Right) 2 Days Post-Op  Precautions: Fall, WBAT  Chart, physical therapy assessment, plan of care and goals were reviewed. ASSESSMENT  Patient continues with skilled PT services and is not progressing towards goals. Came to see pt initially however she declined therapy at that time after just taking her pain medication. Returned later and pt agreeable to participate. Pt reports she had severe pain last night. She tolerated isometric exercises and minimal knee flexion with heel slides.   Pt does not want to do too much as she had complications and reports 6 previous surgeries on her left knee. Pt completed bed mobility with minimal assist but reporting severe pain upon sitting EOB. Pt stood slowly and only able to stand briefly with walker with flexed posture due to severe right knee pain. Pt declined sitting in chair as she reports she will not attempt sitting until she has less pain. Pt assisted back to bed at session end. Current Level of Function Impacting Discharge (mobility/balance): supine <> sit with minimal assist x 1, sit to stand with minimal assist x 1, unable to tolerate more than 4 small steps with rolling walker support due to severe pain    Other factors to consider for discharge: currently well below her baseline level, pain tolerance limiting activity,  pt requested to continue using the purewick despite encouragement to use a bedside commode            PLAN :  Patient continues to benefit from skilled intervention to address the above impairments. Continue treatment per established plan of care. to address goals. Recommendation for discharge: (in order for the patient to meet his/her long term goals)  Physical therapy at least 2 days/week in the home AND ensure assist and/or supervision for safety with mobility     This discharge recommendation:  Has not yet been discussed the attending provider and/or case management    IF patient discharges home will need the following DME: patient owns DME required for discharge       SUBJECTIVE:   Patient stated [place] [facility] than 10(re pain level).      OBJECTIVE DATA SUMMARY:   Critical Behavior:  Neurologic State: Alert, Appropriate for age  Orientation Level: Appropriate for age, Oriented X4  Cognition: Appropriate decision making, Appropriate for age attention/concentration, Appropriate safety awareness, Follows commands       Range of Motion:   assessed in supine with heel slide, active knee flexion 25 degrees                         Functional Mobility Training:  Bed Mobility:     Supine to Sit: Minimum assistance;Assist x1;Additional time; Adaptive equipment  Sit to Supine: Minimum assistance;Assist x1;Additional time; Adaptive equipment           Transfers:  Sit to Stand: Minimum assistance;Assist x1;Additional time  Stand to Sit: Additional time;Assist x1;Contact guard assistance                             Balance:  Sitting: Intact  Standing: Impaired; With support  Standing - Static: Constant support;Good  Standing - Dynamic : Constant support; Fair  Ambulation/Gait Training:  Distance (ft): 1 Feet (ft), ~ 4 small steps(forward and backward)  Assistive Device: Gait belt;Walker, rolling  Ambulation - Level of Assistance: Minimal assistance;Assist x1;Additional time           Right Side Weight Bearing: As tolerated     Base of Support: Widened                   Therapeutic Exercises:     EXERCISE   Sets   Reps   Active Active Assist   Passive Self ROM   Comments   Ankle Pumps  1 []          10                              [x]                                        []                                        []                                           Quad Sets  1 [x]                       10                 [x]                                        []                                        []                                           Hamstring Sets   []                                        []                                        []                                        []                                           Short Arc Quads   []                                        []                                        []                                        []                                           Knee Extension Stretch     []                                          []                                          []                                          []                                           Heel Slides  1 []          10                              [x] []                                        []                                        Limited knee flexion    Long Arc Quads   []                                        []                                        []                                        []                                           Knee Flexion Stretch   []                                        []                                        []                                        []                                           Straight Leg Raises   []                                        []                                        []                                        []                                               Pain Rating:  Verbal: \"more than 10\"  Location: right knee  Activity Tolerance:   Poor  Please refer to the flowsheet for vital signs taken during this treatment.     After treatment patient left in no apparent distress:   Supine in bed and Call bell within reach    COMMUNICATION/COLLABORATION:   The patients plan of care was discussed with: Registered Nurse    Julianne Morrison   Time Calculation: 32 mins

## 2020-01-01 NOTE — PROGRESS NOTES
Resting comfortably. GEN:  NAD. AOx3   ABD:  S/NT/ND   RLE:  Dressing C/D/I , 5/5 motor, Calf nttp (Bilat), Sensation rossly intact to light touch throughout, 1+ dp/pt pulses, foot perfused    Patient Vitals for the past 24 hrs:   Temp Pulse Resp BP SpO2   01/01/20 0839 99.1 °F (37.3 °C) 70 16 127/79 98 %   01/01/20 0710  96  166/87    01/01/20 0707  (!) 102  (!) 172/91    01/01/20 0252 99 °F (37.2 °C) 96 16 113/71 98 %   12/31/19 2157  70  115/71    12/31/19 2019 98.5 °F (36.9 °C) 67 16 106/66 95 %   12/31/19 1843  93  112/77    12/31/19 1440 98.9 °F (37.2 °C) 83 16 92/54 96 %       Current Facility-Administered Medications   Medication Dose Route Frequency    meloxicam (MOBIC) tablet 15 mg  15 mg Oral DAILY    acyclovir (ZOVIRAX) tablet 400 mg  400 mg Oral Q4H PRN    . PHARMACY TO SUBSTITUTE PER PROTOCOL (Reordered from: amphetamine sulfate (EVEKEO) 10 mg tab)    Per Protocol    baclofen (LIORESAL) tablet 10 mg  10 mg Oral BID PRN    busPIRone (BUSPAR) tablet 5 mg  5 mg Oral DAILY PRN    divalproex DR (DEPAKOTE) tablet 250 mg  250 mg Oral DAILY    . PHARMACY TO SUBSTITUTE PER PROTOCOL (Reordered from: doxycycline (ADOXA) 100 mg tablet)    Per Protocol    hydroCHLOROthiazide (HYDRODIURIL) tablet 50 mg  50 mg Oral PRN    HYDROmorphone (DILAUDID) tablet 4 mg  4 mg Oral Q6H PRN    pantoprazole (PROTONIX) tablet 40 mg  40 mg Oral ACB    levothyroxine (SYNTHROID) tablet 100 mcg  100 mcg Oral ACB    . PHARMACY TO SUBSTITUTE PER PROTOCOL (Reordered from: Lisdexamfetamine (VYVANSE) 70 mg cap)    Per Protocol    losartan (COZAAR) tablet 100 mg  100 mg Oral 7am    morphine CR (MS CONTIN) tablet 30 mg  30 mg Oral Q12H    potassium chloride SR (KLOR-CON 10) tablet 10 mEq  10 mEq Oral DAILY    prazosin (MINIPRESS) capsule 4 mg  4 mg Oral QHS    traZODone (DESYREL) tablet 200 mg  200 mg Oral QHS    . PHARMACY TO SUBSTITUTE PER PROTOCOL (Reordered from: vortioxetine (TRINTELLIX) 10 mg tablet) Per Protocol    sodium chloride (NS) flush 5-40 mL  5-40 mL IntraVENous Q8H    sodium chloride (NS) flush 5-40 mL  5-40 mL IntraVENous PRN    acetaminophen (TYLENOL) tablet 650 mg  650 mg Oral Q6H    naloxone (NARCAN) injection 0.4 mg  0.4 mg IntraVENous PRN    hydrOXYzine HCl (ATARAX) tablet 10 mg  10 mg Oral Q8H PRN    senna-docusate (PERICOLACE) 8.6-50 mg per tablet 1 Tab  1 Tab Oral BID    polyethylene glycol (MIRALAX) packet 17 g  17 g Oral DAILY    bisacodyl (DULCOLAX) suppository 10 mg  10 mg Rectal DAILY PRN    aspirin delayed-release tablet 81 mg  81 mg Oral BID    influenza vaccine 2019-20 (6 mos+)(PF) (FLUARIX/FLULAVAL/FLUZONE QUAD) injection 0.5 mL  0.5 mL IntraMUSCular PRIOR TO DISCHARGE    albuterol (PROVENTIL VENTOLIN) nebulizer solution 2.5 mg  2.5 mg Nebulization Q4H PRN    divalproex DR (DEPAKOTE) tablet 500 mg  500 mg Oral QPM       Lab Results   Component Value Date/Time    HGB 9.6 (L) 12/31/2019 05:30 AM    INR 1.1 12/04/2019 03:50 PM       Lab Results   Component Value Date/Time    Sodium 142 12/31/2019 05:30 AM    Potassium 3.8 12/31/2019 05:30 AM    Chloride 112 (H) 12/31/2019 05:30 AM    CO2 25 12/31/2019 05:30 AM    BUN 9 12/31/2019 05:30 AM    Creatinine 0.74 12/31/2019 05:30 AM    Calcium 7.8 (L) 12/31/2019 05:30 AM            52 y.o. female s/p right total knee arthroplasty on 12/30/2019  . Doing well.        ABX: Complete 24 hours perioperative abx  PATHWAY: Straight cath per protocol if needed  DVT Prophylaxis: Aspirin 81 mg enteric coated BID  Weight Bearing: WBAT RLE   Pain Control: PO  Disposition: Home, HHPT

## 2020-01-01 NOTE — PROGRESS NOTES
Problem: Mobility Impaired (Adult and Pediatric)  Goal: *Acute Goals and Plan of Care (Insert Text)  Description  FUNCTIONAL STATUS PRIOR TO ADMISSION: Patient was modified independent using a rollator for functional mobility. Pt with multiple Left knee revisions and is familiar with post-op expectations    HOME SUPPORT PRIOR TO ADMISSION: The patient lived with spouse and required assist for some mobility tasks. Physical Therapy Goals  Initiated 12/31/2019    1. Patient will move from supine to sit and sit to supine  in bed with modified independence within 4 days. 2. Patient will perform sit to stand with modified independence within 4 days. 3. Patient will ambulate with modified independence for 150 feet with the least restrictive device within 4 days. 4. Patient will ascend/descend 5 stairs with single handrail(s) with supervision/set-up within 4 days. 5. Patient will perform home exercise program per protocol with independence within 4 days. 6. Patient will demonstrate AROM 0-90 degrees in operative joint within 4 days. 1/1/2020 1346 by Kobe Villarreal  Outcome: Progressing Towards Goal   PHYSICAL THERAPY TREATMENT  Patient: Enzo Bush (89 y.o. female)  Date: 1/1/2020  Diagnosis: Knee osteoarthritis [M17.10]  Primary osteoarthritis of right knee [M17.11]  Right knee DJD [M17.11]   <principal problem not specified>  Procedure(s) (LRB):  RIGHT TOTAL KNEE ARTHROPLASTY (Right) 2 Days Post-Op  Precautions: Fall, WBAT  Chart, physical therapy assessment, plan of care and goals were reviewed. ASSESSMENT  Patient continues with skilled PT services and is slowly progressing towards goals. Pt presents with decreased ROM and strength right knee, decreased generalized strength, and impaired functional mobility below her baseline level S/P right TKA POD 2. Pt reports some improvement in her pain tolerance and was able to ambulate a short distance in the hallway.   She completed ROM in sitting and supine and RLE strengthening exercises x 10 reps each. Current Level of Function Impacting Discharge (mobility/balance): bed mobility with minimal assist x 1 only to assist with lifting RLE into bed, sit to stand with minimal assist x 1, ambulated with rolling walker x 65 feet with CGA    Other factors to consider for discharge: currently will require assistance for some mobility and ADLs, reports her  works from home         PLAN :  Patient continues to benefit from skilled intervention to address the above impairments. Continue treatment per established plan of care. to address goals. Recommendation for discharge: (in order for the patient to meet his/her long term goals)  Physical therapy at least 2 days/week in the home AND ensure assist and/or supervision for safety with mobility      This discharge recommendation:  Has not yet been discussed the attending provider and/or case management    IF patient discharges home will need the following DME: patient owns DME required for discharge       SUBJECTIVE:   Patient stated I can't(sit in chair) with the pain in the back(of my leg).     OBJECTIVE DATA SUMMARY:   Critical Behavior:  Neurologic State: Alert  Orientation Level: Oriented X4  Cognition: Appropriate decision making, Appropriate for age attention/concentration, Appropriate safety awareness  Safety/Judgement: Awareness of environment    Range of Motion:           RLE AROM  R Knee Flexion: 60  R Knee Extension: 5                Functional Mobility Training:  Bed Mobility:     Supine to Sit: Supervision; Additional time;Assist x1  Sit to Supine: Minimum assistance;Assist x1;Additional time           Transfers:  Sit to Stand: Minimum assistance;Assist x1;Additional time; Adaptive equipment  Stand to Sit: Assist x1;Contact guard assistance; Additional time                             Balance:  Sitting: Intact  Standing: Impaired  Standing - Static: Constant support;Good  Standing - Dynamic : Constant support;Good  Ambulation/Gait Training:  Distance (ft): 65 Feet (ft)  Assistive Device: Gait belt;Walker, rolling  Ambulation - Level of Assistance: Assist x1;Contact guard assistance        Gait Abnormalities: Antalgic; Step to gait; Decreased step clearance, flexed posture, provided cues for upright posture  Right Side Weight Bearing: As tolerated     Base of Support: Widened  Stance: Right decreased  Speed/Radha: Slow  Step Length: Right shortened;Left shortened             Therapeutic Exercises:     EXERCISE   Sets   Reps   Active Active Assist   Passive Self ROM   Comments   Ankle Pumps   []                                        []                                        []                                        []                                           Quad Sets 1 10 [x]                                        []                                        []                                        []                                           Hamstring Sets 1 10 []                                        []                                        []                                        []                                           Short Arc Quads   []                                        []                                        []                                        []                                           Knee Extension Stretch     []                                          []                                          []                                          []                                           Heel Slides 1 10 [x]                                        []                                        []                                        []                                           Long Arc Quads   []                                        []                                        []                                        []                                           Knee Flexion Stretch 1 5 [x]                                        []                                        []                                        []                                           Straight Leg Raises   []                                        []                                        []                                        []                                               Pain Rating:  Verbal: 7  Location: right knee    Activity Tolerance:   Fair    After treatment patient left in no apparent distress:   Supine in bed    COMMUNICATION/COLLABORATION:   The patients plan of care was discussed with: Registered Nurse    Julianne Rodriguez   Time Calculation: 36 mins

## 2020-01-01 NOTE — PROGRESS NOTES
Problem: Self Care Deficits Care Plan (Adult)  Goal: *Acute Goals and Plan of Care (Insert Text)  Description  FUNCTIONAL STATUS PRIOR TO ADMISSION: Patient was modified independent for mobility at household level using a rollator for functional mobility.  assisting with LB care and difficulty with tub transfers. HOME SUPPORT: The patient lived with . Occupational Therapy Goals  Initiated 12/31/2019  1. Patient will perform lower body ADLs with supervision/set-up within 4 day(s). 2.  Patient will perform upper body ADLs standing 5 mins without fatigue or LOB with supervision/set-up within 4 day(s). 3.  Patient will perform all aspects of toileting with supervision/set-up within 4 day(s). 4.  Patient will participate in upper extremity therapeutic exercise/activities with supervision/set-up for 10 minutes within 4 day(s). 5.  Patient will utilize energy conservation techniques during functional activities without cues within 4 day(s). Outcome: Progressing Towards Goal     Occupational Therapy Note:     Pt received sitting up in bed. Pt introduced to the role of OT in acute care setting. Provided education for goals today to include any activities such as toileting, bathing, or progression with dressing activities. Pt then using numerous reasons as to why she does not need to complete these activities. Pt just returning to bed following PT intervention. Pt completes dressing long sitting in bed or recliner at home but does report this is difficult for her. Declining toileting due to urinary urgency and incontinence with urgency. Provided education and encouraged a toileting routine to be proactive regarding urgency but pt not willing to engage with education. Pt stating to therapist \"I guess you have it all figured out don't you\". Pt dismissing therapist and stating she would not progress OOB with OT at this time.       A&P:  Recommend follow up to ensure she can progress with self care activities. If she continues to decline OT intervention, will sign off tomorrow.      [facility], OT  Time Calculation: 9 mins

## 2020-01-02 VITALS
DIASTOLIC BLOOD PRESSURE: 78 MMHG | SYSTOLIC BLOOD PRESSURE: 115 MMHG | TEMPERATURE: 98.4 F | WEIGHT: 202.25 LBS | BODY MASS INDEX: 37.22 KG/M2 | RESPIRATION RATE: 16 BRPM | HEIGHT: 62 IN | HEART RATE: 71 BPM | OXYGEN SATURATION: 96 %

## 2020-01-02 PROCEDURE — 74011250637 HC RX REV CODE- 250/637: Performed by: ORTHOPAEDIC SURGERY

## 2020-01-02 PROCEDURE — 77030027138 HC INCENT SPIROMETER -A

## 2020-01-02 PROCEDURE — 97116 GAIT TRAINING THERAPY: CPT

## 2020-01-02 PROCEDURE — 97110 THERAPEUTIC EXERCISES: CPT

## 2020-01-02 PROCEDURE — 90471 IMMUNIZATION ADMIN: CPT

## 2020-01-02 PROCEDURE — 74011250636 HC RX REV CODE- 250/636: Performed by: ORTHOPAEDIC SURGERY

## 2020-01-02 PROCEDURE — 90686 IIV4 VACC NO PRSV 0.5 ML IM: CPT | Performed by: ORTHOPAEDIC SURGERY

## 2020-01-02 RX ORDER — HYDROMORPHONE HYDROCHLORIDE 4 MG/1
4 TABLET ORAL
Qty: 60 TAB | Refills: 0 | Status: SHIPPED | OUTPATIENT
Start: 2020-01-02 | End: 2020-01-12

## 2020-01-02 RX ORDER — AMOXICILLIN 250 MG
1 CAPSULE ORAL 2 TIMES DAILY
Qty: 30 TAB | Refills: 0 | Status: SHIPPED | OUTPATIENT
Start: 2020-01-02

## 2020-01-02 RX ORDER — ASPIRIN 81 MG/1
81 TABLET ORAL 2 TIMES DAILY
Qty: 60 TAB | Refills: 0 | Status: SHIPPED | OUTPATIENT
Start: 2020-01-02

## 2020-01-02 RX ORDER — MELOXICAM 7.5 MG/1
7.5 TABLET ORAL DAILY
Qty: 30 TAB | Refills: 0 | Status: SHIPPED | OUTPATIENT
Start: 2020-01-02

## 2020-01-02 RX ADMIN — ACETAMINOPHEN 650 MG: 325 TABLET ORAL at 01:05

## 2020-01-02 RX ADMIN — INFLUENZA VIRUS VACCINE 0.5 ML: 15; 15; 15; 15 SUSPENSION INTRAMUSCULAR at 18:37

## 2020-01-02 RX ADMIN — MELOXICAM 15 MG: 7.5 TABLET ORAL at 08:40

## 2020-01-02 RX ADMIN — POTASSIUM CHLORIDE 10 MEQ: 750 TABLET, FILM COATED, EXTENDED RELEASE ORAL at 08:40

## 2020-01-02 RX ADMIN — SENNOSIDES AND DOCUSATE SODIUM 1 TABLET: 8.6; 5 TABLET ORAL at 18:09

## 2020-01-02 RX ADMIN — HYDROMORPHONE HYDROCHLORIDE 4 MG: 4 TABLET ORAL at 01:05

## 2020-01-02 RX ADMIN — ASPIRIN 81 MG: 81 TABLET, COATED ORAL at 08:40

## 2020-01-02 RX ADMIN — ACETAMINOPHEN 650 MG: 325 TABLET ORAL at 07:11

## 2020-01-02 RX ADMIN — DIVALPROEX SODIUM 250 MG: 250 TABLET, DELAYED RELEASE ORAL at 08:41

## 2020-01-02 RX ADMIN — ACETAMINOPHEN 650 MG: 325 TABLET ORAL at 13:47

## 2020-01-02 RX ADMIN — HYDROMORPHONE HYDROCHLORIDE 4 MG: 4 TABLET ORAL at 07:11

## 2020-01-02 RX ADMIN — ASPIRIN 81 MG: 81 TABLET, COATED ORAL at 18:09

## 2020-01-02 RX ADMIN — HYDROMORPHONE HYDROCHLORIDE 4 MG: 4 TABLET ORAL at 13:47

## 2020-01-02 RX ADMIN — DIVALPROEX SODIUM 500 MG: 500 TABLET, DELAYED RELEASE ORAL at 18:09

## 2020-01-02 RX ADMIN — ACETAMINOPHEN 650 MG: 325 TABLET ORAL at 18:09

## 2020-01-02 RX ADMIN — LEVOTHYROXINE SODIUM 100 MCG: 100 TABLET ORAL at 07:11

## 2020-01-02 RX ADMIN — MORPHINE SULFATE 30 MG: 15 TABLET, FILM COATED, EXTENDED RELEASE ORAL at 08:40

## 2020-01-02 RX ADMIN — HYDROMORPHONE HYDROCHLORIDE 4 MG: 4 TABLET ORAL at 18:09

## 2020-01-02 RX ADMIN — PANTOPRAZOLE SODIUM 40 MG: 40 TABLET, DELAYED RELEASE ORAL at 07:11

## 2020-01-02 RX ADMIN — LOSARTAN POTASSIUM 100 MG: 50 TABLET, FILM COATED ORAL at 07:11

## 2020-01-02 NOTE — PROGRESS NOTES
Ortho Daily Progress Note    1/2/2020  8:40 AM    POD:  3 Days Post-Op  S/P:  Procedure(s):  RIGHT TOTAL KNEE ARTHROPLASTY    Afebrile/VSS, NAD, A&Ox 3  Obese, deconditioned. Pain and stiffness a factor in mobilization  Doing well without complaints of nausea  Pain well controlled  Calves soft/NTTP Bilaterally  Incision OK, no drainage or dehiscence. leg soft. Dressing clean and dry  Moving lower extremities well. Neurocirculatory exam intact and within normal range. Lab Results   Component Value Date/Time    HGB 9.6 (L) 12/31/2019 05:30 AM    INR 1.1 12/04/2019 03:50 PM     Recent Labs     12/31/19  0530 10/21/19  1249 08/29/19  0301 08/26/19  1554   CREA 0.74 0.75 0.66 0.91   BUN 9 20 10 20     Estimated Creatinine Clearance: 96 mL/min (by C-G formula based on SCr of 0.74 mg/dL).     PLAN:  DVT prophylaxis: ASA 81 mg bid  WBAT with PT-mobilization  Pain Control: tylenol, mobic, dilaudid  Plan to D/C home this afternoon after PT      LETICIA Boo

## 2020-01-02 NOTE — DISCHARGE INSTRUCTIONS
Discharge Instructions Knee Replacement  Dr. Pam Burroughs    Patient Name: Steven Kenney  Date of procedure:12/30/2019                                   Procedure:Procedure(s):  RIGHT TOTAL KNEE ARTHROPLASTY  Surgeon:Surgeon(s) and Role:     * Grey Guerrero MD - Primary               PCP: Racquel Deluna MD  Date of discharge: 1/2/20                        Follow up appointments   Follow up with Dr. Pam Burroughs in 4 weeks. Call [phone] extension [phone]) to make an appointment.  If home health has been arranged for you the agency will contact you to arrange dates/times for visits. Please call them if you do not hear from them within 24 hours after you are discharged. When to call your Orthopaedic Surgeon: Call [phone]. If you need to reach us after 5pm or on a weekend call [phone] and the on call physician will be contacted.  Signs of infection-if your incision is red; continues to have drainage; drainage has a foul odor or if you have a persistent fever over 101 degrees   Signs of a blood clot in your leg-calf pain, tenderness, redness, swelling of lower leg    When to call your Primary Care Physician:   Concerns about medical conditions such as diabetes, high blood pressure, asthma, congestive heart failure   Call if blood sugars are elevated, persistent headache or dizziness, coughing or congestion, constipation or diarrhea, burning with urination, abnormal heart rate     When to call 542 and go to the nearest emergency room   Sudden onset of chest pain, shortness of breath, difficulty breathing     Activity   Weight bearing as tolerated with walker  putting as much weight on your leg as you can tolerate.  Refer to your handbook for instructions and pictures   Complete your Home Exercise Program daily as instructed by the physical therapist.  Refer to your handbook for instructions and pictures   Get up every one hour and walk (except at night when sleeping)   Do not drive or operate heavy machinery    Incision Care   Change the surgical dressing every other day. On the 7th day leave incision open to air. You may take a shower, simply pat dry the incision and replace dressing   you may get your incision wet but do not submerge your incision under water in a bath tub, hot tub or swimming pool for 8 weeks after surgery. Preventing blood clots    Take aspirin 81 mg twice daily as prescribed for one month following surgery        Pain management   Please take scheduled 650 mg tylenol every 6 hours for 6 weeks   Please take scheduled meloxicam 7.5 mg daily for 4 weeks to decrease swelling, pain, and inflammation   Please take tramadol every 6 hours for pain as needed for pain.  You will be given a prescription for Dilaudid for severe pain     Dilaudid can cause nausea and constipation- so please use sparingly and you may stop use as soon as your pain in reasonably controlled     While taking aspirin and meloxicam, please take Prevacid  daily as prescribed to prevent stomach ulcers/irritation.  If you have a history of stomach ulcers, do not take meloxicam.      Avoid alcoholic beverages while taking pain medication   Do not take any over-the-counter medication for pain except Tylenol (acetaminophen)   Keep ice wrap in place except when walking. Change gel packs every 4 hours   Lie down and elevate your leg on pillows for about 30 minutes after walking to decrease swelling and pain       Diet   Resume usual diet; drink plenty of fluids; eat foods high in fiber   Please take a stool softener (such as Senokot-S or Colace) to prevent constipation while you are taking oxycodone. If constipation occurs, take a laxative (such as Dulcolax tablets, Milk of Magnesia, or a suppository).

## 2020-01-02 NOTE — PROGRESS NOTES
Bedside and Verbal shift change report given to Nitin Mojica RN (oncoming nurse) by Eleno Lynn RN (offgoing nurse). Report included the following information SBAR, Kardex, Intake/Output, MAR and Recent Results.

## 2020-01-02 NOTE — PROGRESS NOTES
Bedside and Verbal shift change report given to Jana Gregorio (oncoming nurse) by Calderon (offgoing nurse). Report included the following information SBAR, Kardex, Intake/Output, MAR and Recent Results.

## 2020-01-02 NOTE — PROGRESS NOTES
Problem: Mobility Impaired (Adult and Pediatric)  Goal: *Acute Goals and Plan of Care (Insert Text)  Description  FUNCTIONAL STATUS PRIOR TO ADMISSION: Patient was modified independent using a rollator for functional mobility. Pt with multiple Left knee revisions and is familiar with post-op expectations    HOME SUPPORT PRIOR TO ADMISSION: The patient lived with spouse and required assist for some mobility tasks. Physical Therapy Goals  Initiated 12/31/2019    1. Patient will move from supine to sit and sit to supine  in bed with modified independence within 4 days. 2. Patient will perform sit to stand with modified independence within 4 days. 3. Patient will ambulate with modified independence for 150 feet with the least restrictive device within 4 days. 4. Patient will ascend/descend 5 stairs with single handrail(s) with supervision/set-up within 4 days. 5. Patient will perform home exercise program per protocol with independence within 4 days. 6. Patient will demonstrate AROM 0-90 degrees in operative joint within 4 days. Outcome: Progressing Towards Goal   PHYSICAL THERAPY TREATMENT  Patient: Marjorie Purvis (85 y.o. female)  Date: 1/2/2020  Diagnosis: Knee osteoarthritis [M17.10]  Primary osteoarthritis of right knee [M17.11]  Right knee DJD [M17.11]   <principal problem not specified>  Procedure(s) (LRB):  RIGHT TOTAL KNEE ARTHROPLASTY (Right) 3 Days Post-Op  Precautions: Fall, WBAT  Chart, physical therapy assessment, plan of care and goals were reviewed. ASSESSMENT  Patient continues with skilled PT services and is progressing towards goals. Patient continues to c/o cramping behind the knee and in the calf. Upon further questioning, patient states that R knee would not straighten prior to surgery, so hamstrings are probably tight. In sitting, taught contract-relax to right hamstrings to reduce cramps and increase hamstring lengths.  Pushed patient to ambulate further, able to tolerate 100 ft with a few standing rest breaks. After resting, negotiated stairs, ascending used lateral stepping, pulling on right rail, bending down with left hand on step (used this technique prior to surgery). Descended 4 steps using rail on left, cane on right. Patient was safe on stairs with minimal assistance of 2 (for safety) and she feels that she and her  will manage at home. Has 5 steps to enter the home, then will remain on first floor. Patient is cleared for discharge from PT standpoint. She will benefit from [address] [place] [id number] [facility] in order to achieve maximum level of safe, functional mobility, balance and return to independent PLOF. Current Level of Function Impacting Discharge (mobility/balance): Stand by-supervision for bed mobility, transfers, gait. PLOF: Minimal assistance of 2 for stairs. Other factors to consider for discharge: Motivated/A & O x 4/Supportive Family/Stand by assistance for mobility and assist of  for some ADLs. PLAN :  Patient continues to benefit from skilled intervention to address the above impairments. Continue treatment per established plan of care. to address goals. Recommendation for discharge: (in order for the patient to meet his/her long term goals)  Physical therapy at least 2 days/week in the home     This discharge recommendation:  Has been made in collaboration with the attending provider and/or case management    IF patient discharges home will need the following DME: patient owns DME required for discharge       SUBJECTIVE:   Patient stated I wish these cramps behind my leg would go away. Tristan Parham    OBJECTIVE DATA SUMMARY:   Critical Behavior:  Neurologic State: Alert  Orientation Level: Oriented X4  Cognition: Appropriate for age attention/concentration  Safety/Judgement: Awareness of environment  Functional Mobility Training:  Bed Mobility:     Supine to Sit: Supervision  Sit to Supine: Supervision           Transfers:  Sit to Stand: Stand-by assistance  Stand to Sit: Stand-by assistance                             Balance:  Sitting: Intact  Standing: Intact; With support  Standing - Static: Good;Constant support  Standing - Dynamic : Good;Constant support  Ambulation/Gait Training:  Distance (ft): 100 Feet (ft)  Assistive Device: Walker, rolling;Gait belt  Ambulation - Level of Assistance: Stand-by assistance        Gait Abnormalities: Antalgic; Step to gait; Decreased step clearance  Right Side Weight Bearing: As tolerated     Base of Support: Widened  Stance: Right decreased  Speed/Radha: Slow  Step Length: Right shortened;Left shortened          Stairs:  Number of Stairs Trained: 4  Stairs - Level of Assistance: Minimum assistance;Assist X2   Rail Use: Right (lateral stepping, both hands 1 rail up, cane & rail down)    Therapeutic Exercises:   Contract-relax to stretch right hamstrings, address cramping behind knee. Pain Ratin/10    Activity Tolerance:   Good      After treatment patient left in no apparent distress:   Supine in bed, Call bell within reach, Side rails x 3, and nurse notified.      COMMUNICATION/COLLABORATION:   The patients plan of care was discussed with: Registered Nurse and     Reyna Esparza   Time Calculation: 40 mins

## 2020-01-02 NOTE — PROGRESS NOTES
Problem: Falls - Risk of  Goal: *Absence of Falls  Description  Document Manju Valencia Fall Risk and appropriate interventions in the flowsheet.   Outcome: Progressing Towards Goal  Note: Fall Risk Interventions:  Mobility Interventions: Patient to call before getting OOB, PT Consult for mobility concerns, PT Consult for assist device competence, OT consult for ADLs    Medication Interventions: Patient to call before getting OOB, Teach patient to arise slowly, Evaluate medications/consider consulting pharmacy    Elimination Interventions: Call light in reach, Patient to call for help with toileting needs

## 2020-01-02 NOTE — PROGRESS NOTES
Bedside and Verbal shift change report given to [facility] (oncoming nurse) by Olya Michel (offgoing nurse). Report included the following information SBAR, Kardex and MAR.

## 2020-01-02 NOTE — PROGRESS NOTES
Occupational Therapy    Completed chart review and discussed OT services with patient. Patient pleasantly denies need for OT and verbalized LB dressing techniques at recliner level/long sitting, tub transfers with tub transfer bench, and toileting tasks. Has a reacher, tub transfer bench, RW, rollator. She and her  have \"good routine\" from her previous knee surgeries on her L knee. Will complete OT orders at this time.       Gerri Ocasio, OT

## 2020-01-03 ENCOUNTER — HOME CARE VISIT (OUTPATIENT)
Dept: SCHEDULING | Facility: HOME HEALTH | Age: 50
End: 2020-01-03
Payer: COMMERCIAL

## 2020-01-03 PROCEDURE — 400013 HH SOC

## 2020-01-03 PROCEDURE — G0151 HHCP-SERV OF PT,EA 15 MIN: HCPCS

## 2020-01-06 ENCOUNTER — HOME CARE VISIT (OUTPATIENT)
Dept: SCHEDULING | Facility: HOME HEALTH | Age: 50
End: 2020-01-06
Payer: COMMERCIAL

## 2020-01-06 VITALS
OXYGEN SATURATION: 96 % | DIASTOLIC BLOOD PRESSURE: 70 MMHG | SYSTOLIC BLOOD PRESSURE: 120 MMHG | RESPIRATION RATE: 18 BRPM | HEART RATE: 70 BPM | TEMPERATURE: 98.7 F

## 2020-01-06 PROCEDURE — G0151 HHCP-SERV OF PT,EA 15 MIN: HCPCS

## 2020-01-06 PROCEDURE — A4452 WATERPROOF TAPE: HCPCS

## 2020-01-06 PROCEDURE — A6253 ABSORPT DRG > 48 SQ IN W/O B: HCPCS

## 2020-01-07 VITALS
RESPIRATION RATE: 18 BRPM | DIASTOLIC BLOOD PRESSURE: 68 MMHG | SYSTOLIC BLOOD PRESSURE: 132 MMHG | TEMPERATURE: 98.5 F | OXYGEN SATURATION: 98 % | HEART RATE: 72 BPM

## 2020-01-08 ENCOUNTER — HOME CARE VISIT (OUTPATIENT)
Dept: SCHEDULING | Facility: HOME HEALTH | Age: 50
End: 2020-01-08
Payer: COMMERCIAL

## 2020-01-08 PROCEDURE — G0151 HHCP-SERV OF PT,EA 15 MIN: HCPCS

## 2020-01-09 ENCOUNTER — HOME CARE VISIT (OUTPATIENT)
Dept: SCHEDULING | Facility: HOME HEALTH | Age: 50
End: 2020-01-09
Payer: COMMERCIAL

## 2020-01-09 VITALS
OXYGEN SATURATION: 99 % | SYSTOLIC BLOOD PRESSURE: 132 MMHG | RESPIRATION RATE: 18 BRPM | DIASTOLIC BLOOD PRESSURE: 78 MMHG | TEMPERATURE: 98.2 F | HEART RATE: 78 BPM

## 2020-01-09 VITALS
TEMPERATURE: 98 F | HEART RATE: 78 BPM | RESPIRATION RATE: 18 BRPM | SYSTOLIC BLOOD PRESSURE: 126 MMHG | OXYGEN SATURATION: 99 % | DIASTOLIC BLOOD PRESSURE: 74 MMHG

## 2020-01-09 PROCEDURE — G0151 HHCP-SERV OF PT,EA 15 MIN: HCPCS

## 2020-01-13 ENCOUNTER — HOME CARE VISIT (OUTPATIENT)
Dept: SCHEDULING | Facility: HOME HEALTH | Age: 50
End: 2020-01-13
Payer: COMMERCIAL

## 2020-01-13 PROCEDURE — G0151 HHCP-SERV OF PT,EA 15 MIN: HCPCS

## 2020-01-14 VITALS
DIASTOLIC BLOOD PRESSURE: 72 MMHG | HEART RATE: 82 BPM | SYSTOLIC BLOOD PRESSURE: 132 MMHG | TEMPERATURE: 98.3 F | RESPIRATION RATE: 18 BRPM | OXYGEN SATURATION: 98 %

## 2020-01-15 ENCOUNTER — HOME CARE VISIT (OUTPATIENT)
Dept: SCHEDULING | Facility: HOME HEALTH | Age: 50
End: 2020-01-15
Payer: COMMERCIAL

## 2020-01-15 PROCEDURE — G0151 HHCP-SERV OF PT,EA 15 MIN: HCPCS

## 2020-01-16 VITALS
TEMPERATURE: 98 F | RESPIRATION RATE: 18 BRPM | DIASTOLIC BLOOD PRESSURE: 68 MMHG | HEART RATE: 72 BPM | SYSTOLIC BLOOD PRESSURE: 124 MMHG | OXYGEN SATURATION: 98 %

## 2020-01-17 ENCOUNTER — HOME CARE VISIT (OUTPATIENT)
Dept: SCHEDULING | Facility: HOME HEALTH | Age: 50
End: 2020-01-17
Payer: COMMERCIAL

## 2020-01-17 PROCEDURE — G0151 HHCP-SERV OF PT,EA 15 MIN: HCPCS

## 2020-01-18 VITALS
OXYGEN SATURATION: 98 % | RESPIRATION RATE: 18 BRPM | HEART RATE: 78 BPM | SYSTOLIC BLOOD PRESSURE: 122 MMHG | TEMPERATURE: 98 F | DIASTOLIC BLOOD PRESSURE: 72 MMHG

## 2020-01-20 ENCOUNTER — HOME CARE VISIT (OUTPATIENT)
Dept: SCHEDULING | Facility: HOME HEALTH | Age: 50
End: 2020-01-20
Payer: COMMERCIAL

## 2020-01-20 PROCEDURE — G0151 HHCP-SERV OF PT,EA 15 MIN: HCPCS

## 2020-01-20 NOTE — DISCHARGE SUMMARY
Orthopedic Service Discharge Summary    Patient ID:  Carie Paulino  [id number]  female  52 y.o.  1970    Admit date: 12/30/2019    Discharge date and time: 1/2/20    Admitting Physician: Jb Parmar MD     Discharge Physician: Jb Parmar MD    Consulting Physician(s): Treatment Team: Utilization Review: Douglas Cantor RN; Care Manager: Ritu Emmanuel Primary Nurse: Tatyana Ford    Date of Surgery: 12/30/2019     Preoperative Diagnosis:  PRIMARY OSTEOARTHRITIS OF RIGHT KNEE    Postoperative Diagnosis: PRIMARY OSTEOARTHRITIS OF RIGHT KNEE    Procedure(s): Procedure(s):  RIGHT TOTAL KNEE ARTHROPLASTY    Surgeon: Surgeon(s) and Role:     * Luis Wang MD - Primary      Anesthesia:  Spinal    Preoperative Medical Clearance:                           HPI:  Pt is a 52 y.o. female who has a history of Knee osteoarthritis [M17.10]  Primary osteoarthritis of right knee [M17.11]  Right knee DJD [M17.11]  with pain and limitations of activities of daily living who presents at this time for a right TKA following the failure of conservative management. PMH:   Past Medical History:   Diagnosis Date    ADHD (attention deficit hyperactivity disorder)     Arthritis     OSTEO    Chronic pain     YAHIR KNEES, LOWER BACK, RT SHOULDER & NECK    GERD (gastroesophageal reflux disease)     Graves disease     NO LONGER AN ISSUE    Hematuria 10/9/2018    HTN (hypertension)     CONTROLLED WITH MEDS    Hypothyroidism 2018    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    OCD (obsessive compulsive disorder)     Other ill-defined conditions(799.89)     graves    Psychiatric disorder     depression. RECENTLY LOST HER SON ON 4/8/19 ON HER BIRTHDAY.  Seizure ([address]     Seizures ([address] 2003 & 2004    REACTIVE TO HYPOGLYCEMIA / ALSO FROM FLASHING LIGHTS    Thromboembolus ([address] 01/2019    right arm       Medications upon admission :   Prior to Admission Medications   Prescriptions Last Dose Informant Patient Reported? Taking? HYDROmorphone (DILAUDID) 4 mg tablet 12/30/2019 at 7am  Yes No   Sig: Take  by mouth every six (6) hours as needed for Pain. Lisdexamfetamine (VYVANSE) 70 mg cap 12/28/2019  Yes No   Sig: Take 70 mg by mouth every morning. acetaminophen (TYLENOL) 500 mg tablet 12/27/2019  Yes No   Sig: Take 500 mg by mouth every six (6) hours as needed for Pain. acyclovir (ZOVIRAX) 400 mg tablet Unknown at Unknown time  Yes No   Sig: Take 400 mg by mouth every four (4) hours as needed for Other (fever blisters). albuterol (PROAIR HFA) 90 mcg/actuation inhaler Unknown at Unknown time  Yes No   Sig: Take 1 Puff by inhalation every four (4) hours as needed for Wheezing. alpha-d-galactosidase (BEANO) 150 unit tab tablet Not Taking at Unknown time  Yes No   Sig: Take 2 Tabs by mouth three (3) times daily as needed for Flatulence. amphetamine sulfate (EVEKEO) 10 mg tab 12/29/2019 at Unknown time  Yes Yes   Sig: Take 10 mg by mouth as needed. PT TAKES 2 TABS (20 MG) BID AT 0200 AND 1200    baclofen (LIORESAL) 10 mg tablet 12/29/2019 at Unknown time  Yes Yes   Sig: Take 10 mg by mouth two (2) times daily as needed for Pain. busPIRone (BUSPAR) 15 mg tablet 12/28/2019  Yes No   Sig: Take 15 mg by mouth every morning. UP TO 3 TIMES DAILY PRN    cetirizine (ZYRTEC) 10 mg tablet 12/30/2019 at Unknown time  Yes Yes   Sig: Take 1 Tab by mouth daily. diclofenac 10% cyclobenzaprine 2% lidocaine 5% gabapentin 6% cream compound   Yes No   Sig: Apply 2 g to affected area four (4) times daily as needed for Pain.   divalproex DR (DEPAKOTE) 250 mg tablet 12/30/2019 at Unknown time  No Yes   Sig: Take 1 tablet by mouth in  the morning and 2 tablets  by mouth in the evening   doxycycline (ADOXA) 100 mg tablet 12/28/2019  Yes No   Sig: Take 100 mg by mouth two (2) times a day. furosemide (LASIX) 20 mg tablet Not Taking at Unknown time  Yes No   Sig: Take 20 mg by mouth as needed.  MAY TAKE ONLY IF NEEDED FOR SEVERE ANKLE SWELLING hydroCHLOROthiazide (HYDRODIURIL) 50 mg tablet Not Taking at Unknown time  Yes No   Sig: Take 50 mg by mouth as needed for Other (for high blood pressure). take one tablet by mouth daily as needed for high blood pressure. lansoprazole (PREVACID) 15 mg disintegrating tablet 12/30/2019 at Unknown time  Yes Yes   Sig: Take 1 Tab by mouth Daily (before breakfast). levothyroxine (SYNTHROID) 100 mcg tablet 12/30/2019 at Unknown time  Yes Yes   Sig: Take 100 mcg by mouth Daily (before breakfast). losartan (COZAAR) 50 mg tablet 12/30/2019 at Unknown time Self Yes Yes   Sig: Take 100 mg by mouth every morning. morphine CR (MS CONTIN) 30 mg CR tablet 12/29/2019 at 10pm  Yes Yes   Sig: Take  by mouth every twelve (12) hours. multivitamin (ONE A DAY) tablet 12/23/2019  Yes No   Sig: Take 1 Tab by mouth nightly. mupirocin (BACTROBAN) 2 % ointment 12/29/2019 at Unknown time  Yes No   Sig: by Both Nostrils route three (3) times daily as needed. ondansetron hcl (ZOFRAN) 8 mg tablet 12/29/2019 at Unknown time  Yes Yes   Sig: Take 8 mg by mouth every eight (8) hours as needed for Nausea. potassium chloride SR (KLOR-CON 10) 10 mEq tablet Not Taking at Unknown time  Yes No   Sig: Take 10 mEq by mouth daily. prazosin (MINIPRESS) 2 mg capsule 12/20/2019 at Unknown time  Yes Yes   Sig: Take 2 mg by mouth nightly. simethicone (GAS-X) 80 mg chewable tablet Not Taking at Unknown time  Yes No   Sig: Take 80 mg by mouth every six (6) hours as needed for Flatulence. traZODone (DESYREL) 100 mg tablet 12/28/2019  Yes No   Sig: Take 200 mg by mouth nightly. vortioxetine (TRINTELLIX) 10 mg tablet 12/30/2019 at Unknown time  Yes Yes   Sig: Take  by mouth every morning. PT TAKES A TOTAL OF 30 mg DAILY       Facility-Administered Medications: None        Allergies: Allergies   Allergen Reactions    Sulfa (Sulfonamide Antibiotics) Hives    Other Medication Rash     BANDAIDS MAY CAUSE RASH.     Pcn [Penicillins] Unproven on Challenge        Hospital Course: The patient underwent surgery. Complications:  None; patient tolerated the procedure well. Was taken to the PACU in stable condition and then transferred to the Orthopedics floor. Condition on discharge:  stable    Perioperative Antibiotics:  Ancef     Postoperative Pain Management:  Tylenol, mobic, dilaudid,     DVT Prophylaxis: ASA 81 mg bid    Postoperative transfusions: None    Post Op complications: none    Hemoglobin at discharge:    Lab Results   Component Value Date/Time    HGB 9.6 (L) 12/31/2019 05:30 AM    INR 1.1 12/04/2019 03:50 PM       Incision - clean, dry and intact. No significant erythema or swelling. Neurovascular exam within normal limits. Wound appears to be healing without any evidence of infection. Physical Therapy started on the day following surgery and progressed to ambulation with the aid of a rolling walker for distances of 150 feet with modified independence. Range of motion  limited by pain. At the time of discharge, able to go up and down stairs and had understanding of precautions needed following surgery. Discharged to: Home. Discharge instructions:  -See Full Summary of discharge instructions attached  -Anticoagulate with ASA  -Resume pre hospital diet            -Resume home medications   Discharge Medication List as of 1/2/2020  6:40 PM      START taking these medications    Details   aspirin delayed-release 81 mg tablet Take 1 Tab by mouth two (2) times a day., Print, Disp-60 Tab, R-0      meloxicam (MOBIC) 7.5 mg tablet Take 1 Tab by mouth daily. Indications: joint damage causing pain and loss of function, Print, Disp-30 Tab, R-0      senna-docusate (PERICOLACE) 8.6-50 mg per tablet Take 1 Tab by mouth two (2) times a day.  Indications: constipation, Print, Disp-30 Tab, R-0         CONTINUE these medications which have CHANGED    Details   HYDROmorphone (DILAUDID) 4 mg tablet Take 1 Tab by mouth every four (4) hours as needed for Pain for up to 10 days. Max Daily Amount: 24 mg. Indications: excessive pain, Print, Disp-60 Tab, R-0         CONTINUE these medications which have NOT CHANGED    Details   ondansetron hcl (ZOFRAN) 8 mg tablet Take 8 mg by mouth every eight (8) hours as needed for Nausea., Historical Med      prazosin (MINIPRESS) 2 mg capsule Take 2 mg by mouth nightly., Historical Med      morphine CR (MS CONTIN) 30 mg CR tablet Take  by mouth every twelve (12) hours. , Historical Med      amphetamine sulfate (EVEKEO) 10 mg tab Take 10 mg by mouth as needed. PT TAKES 2 TABS (20 MG) BID AT 0200 AND 1200 , Historical Med      levothyroxine (SYNTHROID) 100 mcg tablet Take 100 mcg by mouth Daily (before breakfast). , Historical Med      vortioxetine (TRINTELLIX) 10 mg tablet Take  by mouth every morning. PT TAKES A TOTAL OF 30 mg DAILY , Historical Med      baclofen (LIORESAL) 10 mg tablet Take 10 mg by mouth two (2) times daily as needed for Pain., Historical Med      divalproex DR (DEPAKOTE) 250 mg tablet Take 1 tablet by mouth in  the morning and 2 tablets  by mouth in the evening, Normal, Disp-180 Tab, R-10      losartan (COZAAR) 50 mg tablet Take 100 mg by mouth every morning., Historical Med      lansoprazole (PREVACID) 15 mg disintegrating tablet Take 1 Tab by mouth Daily (before breakfast). , Historical Med      cetirizine (ZYRTEC) 10 mg tablet Take 1 Tab by mouth daily. , Historical Med      traZODone (DESYREL) 100 mg tablet Take 200 mg by mouth nightly., Historical Med      doxycycline (ADOXA) 100 mg tablet Take 100 mg by mouth two (2) times a day., Historical Med      multivitamin (ONE A DAY) tablet Take 1 Tab by mouth nightly., Historical Med      Lisdexamfetamine (VYVANSE) 70 mg cap Take 70 mg by mouth every morning., Historical Med      busPIRone (BUSPAR) 15 mg tablet Take 15 mg by mouth every morning.  UP TO 3 TIMES DAILY PRN , Historical Med      hydroCHLOROthiazide (HYDRODIURIL) 50 mg tablet Take 50 mg by mouth as needed for Other (for high blood pressure). take one tablet by mouth daily as needed for high blood pressure., Historical Med      acetaminophen (TYLENOL) 500 mg tablet Take 500 mg by mouth every six (6) hours as needed for Pain., Historical Med      diclofenac 10% cyclobenzaprine 2% lidocaine 5% gabapentin 6% cream compound Apply 2 g to affected area four (4) times daily as needed for Pain., Historical Med      alpha-d-galactosidase (BEANO) 150 unit tab tablet Take 2 Tabs by mouth three (3) times daily as needed for Flatulence., Historical Med      potassium chloride SR (KLOR-CON 10) 10 mEq tablet Take 10 mEq by mouth daily. , Historical Med      furosemide (LASIX) 20 mg tablet Take 20 mg by mouth as needed. MAY TAKE ONLY IF NEEDED FOR SEVERE ANKLE SWELLING , Historical Med      albuterol (PROAIR HFA) 90 mcg/actuation inhaler Take 1 Puff by inhalation every four (4) hours as needed for Wheezing., Historical Med      acyclovir (ZOVIRAX) 400 mg tablet Take 400 mg by mouth every four (4) hours as needed for Other (fever blisters). , Historical Med      simethicone (GAS-X) 80 mg chewable tablet Take 80 mg by mouth every six (6) hours as needed for Flatulence., Historical Med         STOP taking these medications       mupirocin (BACTROBAN) 2 % ointment Comments:   Reason for Stopping:            per medical continuation form  -Discharge activity: Activity as tolerated and No heavy lifting, pushing, pulling with the implant side for 2 months  -Ambulate with Walkers, Type: Platform Walker, appropriate total joint protocol  -Wound Care Keep wound clean and dry, Reinforce dressing PRN, Ice to area for comfort and As directed  -Follow up in office in 2-3 weeks      Signed:  LETICIA Pham  1/20/2020  4:43 PM        No att. providers found

## 2020-01-21 VITALS
DIASTOLIC BLOOD PRESSURE: 78 MMHG | SYSTOLIC BLOOD PRESSURE: 132 MMHG | RESPIRATION RATE: 18 BRPM | HEART RATE: 78 BPM | OXYGEN SATURATION: 98 % | TEMPERATURE: 97.6 F

## 2020-01-22 ENCOUNTER — HOME CARE VISIT (OUTPATIENT)
Dept: SCHEDULING | Facility: HOME HEALTH | Age: 50
End: 2020-01-22
Payer: COMMERCIAL

## 2020-01-22 ENCOUNTER — HOME CARE VISIT (OUTPATIENT)
Dept: HOME HEALTH SERVICES | Facility: HOME HEALTH | Age: 50
End: 2020-01-22
Payer: COMMERCIAL

## 2020-01-22 PROCEDURE — G0151 HHCP-SERV OF PT,EA 15 MIN: HCPCS

## 2020-01-23 VITALS
TEMPERATURE: 98 F | DIASTOLIC BLOOD PRESSURE: 68 MMHG | RESPIRATION RATE: 18 BRPM | HEART RATE: 73 BPM | OXYGEN SATURATION: 98 % | SYSTOLIC BLOOD PRESSURE: 132 MMHG

## 2020-01-24 ENCOUNTER — HOME CARE VISIT (OUTPATIENT)
Dept: SCHEDULING | Facility: HOME HEALTH | Age: 50
End: 2020-01-24
Payer: COMMERCIAL

## 2020-01-24 PROCEDURE — G0151 HHCP-SERV OF PT,EA 15 MIN: HCPCS

## 2020-01-25 VITALS
DIASTOLIC BLOOD PRESSURE: 68 MMHG | RESPIRATION RATE: 18 BRPM | OXYGEN SATURATION: 98 % | HEART RATE: 76 BPM | SYSTOLIC BLOOD PRESSURE: 132 MMHG | TEMPERATURE: 98 F

## 2020-01-29 ENCOUNTER — HOME CARE VISIT (OUTPATIENT)
Dept: SCHEDULING | Facility: HOME HEALTH | Age: 50
End: 2020-01-29
Payer: COMMERCIAL

## 2020-01-29 VITALS
DIASTOLIC BLOOD PRESSURE: 66 MMHG | TEMPERATURE: 98 F | SYSTOLIC BLOOD PRESSURE: 124 MMHG | OXYGEN SATURATION: 98 % | RESPIRATION RATE: 18 BRPM | HEART RATE: 72 BPM

## 2020-01-29 PROCEDURE — G0151 HHCP-SERV OF PT,EA 15 MIN: HCPCS

## 2020-01-31 ENCOUNTER — HOME CARE VISIT (OUTPATIENT)
Dept: HOME HEALTH SERVICES | Facility: HOME HEALTH | Age: 50
End: 2020-01-31
Payer: COMMERCIAL

## 2020-02-03 ENCOUNTER — HOME CARE VISIT (OUTPATIENT)
Dept: SCHEDULING | Facility: HOME HEALTH | Age: 50
End: 2020-02-03
Payer: COMMERCIAL

## 2020-02-03 PROCEDURE — 400013 HH SOC

## 2020-02-03 PROCEDURE — G0151 HHCP-SERV OF PT,EA 15 MIN: HCPCS

## 2020-02-04 VITALS
DIASTOLIC BLOOD PRESSURE: 78 MMHG | TEMPERATURE: 97.6 F | HEART RATE: 82 BPM | RESPIRATION RATE: 18 BRPM | OXYGEN SATURATION: 98 % | SYSTOLIC BLOOD PRESSURE: 132 MMHG

## 2020-02-05 ENCOUNTER — HOME CARE VISIT (OUTPATIENT)
Dept: SCHEDULING | Facility: HOME HEALTH | Age: 50
End: 2020-02-05
Payer: COMMERCIAL

## 2020-02-05 PROCEDURE — G0151 HHCP-SERV OF PT,EA 15 MIN: HCPCS

## 2020-02-06 VITALS
TEMPERATURE: 97.9 F | SYSTOLIC BLOOD PRESSURE: 132 MMHG | RESPIRATION RATE: 18 BRPM | OXYGEN SATURATION: 98 % | HEART RATE: 82 BPM | DIASTOLIC BLOOD PRESSURE: 78 MMHG

## 2020-02-07 ENCOUNTER — HOME CARE VISIT (OUTPATIENT)
Dept: SCHEDULING | Facility: HOME HEALTH | Age: 50
End: 2020-02-07
Payer: COMMERCIAL

## 2020-02-07 PROCEDURE — G0151 HHCP-SERV OF PT,EA 15 MIN: HCPCS

## 2020-02-08 VITALS
TEMPERATURE: 97.6 F | DIASTOLIC BLOOD PRESSURE: 72 MMHG | RESPIRATION RATE: 18 BRPM | OXYGEN SATURATION: 98 % | HEART RATE: 78 BPM | SYSTOLIC BLOOD PRESSURE: 132 MMHG

## 2022-03-18 PROBLEM — M17.9 KNEE OSTEOARTHRITIS: Status: ACTIVE | Noted: 2019-12-30

## 2022-03-18 PROBLEM — I82.409 DVT (DEEP VENOUS THROMBOSIS) (HCC): Status: ACTIVE | Noted: 2019-01-26

## 2022-03-19 PROBLEM — Z96.652 STATUS POST LEFT KNEE REPLACEMENT: Status: ACTIVE | Noted: 2018-12-12

## 2022-03-19 PROBLEM — Z22.322 MRSA CARRIER: Status: ACTIVE | Noted: 2018-10-09

## 2022-03-19 PROBLEM — M17.0 PRIMARY OSTEOARTHRITIS OF BOTH KNEES: Status: ACTIVE | Noted: 2018-10-24

## 2022-03-19 PROBLEM — R31.9 HEMATURIA: Status: ACTIVE | Noted: 2018-10-09

## 2022-03-19 PROBLEM — T81.31XA DEHISCENCE OF INCISION: Status: ACTIVE | Noted: 2018-11-16

## 2022-03-19 PROBLEM — M17.11 RIGHT KNEE DJD: Status: ACTIVE | Noted: 2019-12-30

## 2022-03-20 PROBLEM — Z98.890 STATUS POST INCISION AND DRAINAGE: Status: ACTIVE | Noted: 2019-01-08

## 2022-03-20 PROBLEM — M17.11 PRIMARY OSTEOARTHRITIS OF RIGHT KNEE: Status: ACTIVE | Noted: 2019-12-30

## 2022-03-20 PROBLEM — Z96.652 H/O TOTAL KNEE REPLACEMENT, LEFT: Status: ACTIVE | Noted: 2018-11-24

## 2022-08-16 NOTE — PROCEDURES
[address]  *** FINAL REPORT ***    Name: Stuart Murillo  MRN: [id number]    Inpatient  : 1970  HIS Order #: [id number]  [address] [facility] Visit #: [id number]  Date: 17 Dec 2018    TYPE OF TEST: Peripheral Venous Testing    REASON FOR TEST  Pain in limb, Limb swelling    Right Arm:-  Deep venous thrombosis:           No  Superficial venous thrombosis:    No      INTERPRETATION/FINDINGS  PROCEDURE:  Color duplex ultrasound imaging of upper extremity veins. FINDINGS:       Right:  The internal jugular, subclavian, axillary, brachial,  radial, ulnar, basilic, and cephalic veins are patent and without  evidence of thrombus;  each is fully compressible and there is no  narrowing of the flow channel on color Doppler imaging. Phasic/pulsatile flow is observed in the subclavian vein. Left:    The subclavian vein is patent and without evidence of  thrombus. Phasic/pulsatile flow is observed. This side was not  otherwise evaluated. IMPRESSION:  No evidence of right upper extremity vein thrombosis. ADDITIONAL COMMENTS    I have personally reviewed the data relevant to the interpretation of  this  study.     TECHNOLOGIST: Rajiv Davey  Signed: 2018 12:45 PM    PHYSICIAN: Lola Anne MD  Signed: 2018 10:38 AM
No

## 2023-01-17 ENCOUNTER — APPOINTMENT (OUTPATIENT)
Dept: CT IMAGING | Age: 53
DRG: 481 | End: 2023-01-17
Attending: ORTHOPAEDIC SURGERY
Payer: COMMERCIAL

## 2023-01-17 ENCOUNTER — APPOINTMENT (OUTPATIENT)
Dept: GENERAL RADIOLOGY | Age: 53
DRG: 481 | End: 2023-01-17
Attending: STUDENT IN AN ORGANIZED HEALTH CARE EDUCATION/TRAINING PROGRAM
Payer: COMMERCIAL

## 2023-01-17 ENCOUNTER — APPOINTMENT (OUTPATIENT)
Dept: ULTRASOUND IMAGING | Age: 53
DRG: 481 | End: 2023-01-17
Attending: STUDENT IN AN ORGANIZED HEALTH CARE EDUCATION/TRAINING PROGRAM
Payer: COMMERCIAL

## 2023-01-17 ENCOUNTER — HOSPITAL ENCOUNTER (INPATIENT)
Age: 53
LOS: 6 days | Discharge: HOME HEALTH | DRG: 481 | End: 2023-01-23
Attending: STUDENT IN AN ORGANIZED HEALTH CARE EDUCATION/TRAINING PROGRAM | Admitting: STUDENT IN AN ORGANIZED HEALTH CARE EDUCATION/TRAINING PROGRAM
Payer: COMMERCIAL

## 2023-01-17 ENCOUNTER — APPOINTMENT (OUTPATIENT)
Dept: GENERAL RADIOLOGY | Age: 53
DRG: 481 | End: 2023-01-17
Attending: ORTHOPAEDIC SURGERY
Payer: COMMERCIAL

## 2023-01-17 DIAGNOSIS — M79.651 ACUTE PAIN OF RIGHT THIGH: ICD-10-CM

## 2023-01-17 DIAGNOSIS — G89.4 CHRONIC PAIN SYNDROME: ICD-10-CM

## 2023-01-17 DIAGNOSIS — S72.8X1A OTHER FRACTURE OF RIGHT FEMUR, INITIAL ENCOUNTER FOR CLOSED FRACTURE (HCC): Primary | ICD-10-CM

## 2023-01-17 DIAGNOSIS — R52 ENCOUNTER FOR PAIN MANAGEMENT: ICD-10-CM

## 2023-01-17 PROBLEM — S72.91XA FEMUR FRACTURE, RIGHT (HCC): Status: ACTIVE | Noted: 2023-01-17

## 2023-01-17 LAB
ABO + RH BLD: NORMAL
ALBUMIN SERPL-MCNC: 2.8 G/DL (ref 3.5–5)
ALBUMIN/GLOB SERPL: 0.8 (ref 1.1–2.2)
ALP SERPL-CCNC: 97 U/L (ref 45–117)
ALT SERPL-CCNC: 33 U/L (ref 12–78)
AMORPH CRY URNS QL MICRO: ABNORMAL
ANION GAP SERPL CALC-SCNC: 5 MMOL/L (ref 5–15)
APPEARANCE UR: ABNORMAL
AST SERPL-CCNC: 17 U/L (ref 15–37)
BACTERIA URNS QL MICRO: ABNORMAL /HPF
BASOPHILS # BLD: 0 K/UL (ref 0–0.1)
BASOPHILS NFR BLD: 0 % (ref 0–1)
BILIRUB SERPL-MCNC: 0.3 MG/DL (ref 0.2–1)
BILIRUB UR QL: NEGATIVE
BLOOD GROUP ANTIBODIES SERPL: NORMAL
BUN SERPL-MCNC: 23 MG/DL (ref 6–20)
BUN/CREAT SERPL: 22 (ref 12–20)
CALCIUM SERPL-MCNC: 8.6 MG/DL (ref 8.5–10.1)
CAOX CRY URNS QL MICRO: ABNORMAL
CHLORIDE SERPL-SCNC: 114 MMOL/L (ref 97–108)
CO2 SERPL-SCNC: 24 MMOL/L (ref 21–32)
COLOR UR: ABNORMAL
CREAT SERPL-MCNC: 1.04 MG/DL (ref 0.55–1.02)
DIFFERENTIAL METHOD BLD: ABNORMAL
EOSINOPHIL # BLD: 0 K/UL (ref 0–0.4)
EOSINOPHIL NFR BLD: 0 % (ref 0–7)
EPITH CASTS URNS QL MICRO: ABNORMAL /LPF
ERYTHROCYTE [DISTWIDTH] IN BLOOD BY AUTOMATED COUNT: 16.6 % (ref 11.5–14.5)
GLOBULIN SER CALC-MCNC: 3.4 G/DL (ref 2–4)
GLUCOSE SERPL-MCNC: 115 MG/DL (ref 65–100)
GLUCOSE UR STRIP.AUTO-MCNC: NEGATIVE MG/DL
HCT VFR BLD AUTO: 33.6 % (ref 35–47)
HGB BLD-MCNC: 10.1 G/DL (ref 11.5–16)
HGB UR QL STRIP: ABNORMAL
IMM GRANULOCYTES # BLD AUTO: 0.3 K/UL (ref 0–0.04)
IMM GRANULOCYTES NFR BLD AUTO: 2 % (ref 0–0.5)
INR PPP: 1 (ref 0.9–1.1)
KETONES UR QL STRIP.AUTO: NEGATIVE MG/DL
LEUKOCYTE ESTERASE UR QL STRIP.AUTO: ABNORMAL
LYMPHOCYTES # BLD: 1.1 K/UL (ref 0.8–3.5)
LYMPHOCYTES NFR BLD: 8 % (ref 12–49)
MCH RBC QN AUTO: 24.9 PG (ref 26–34)
MCHC RBC AUTO-ENTMCNC: 30.1 G/DL (ref 30–36.5)
MCV RBC AUTO: 83 FL (ref 80–99)
MONOCYTES # BLD: 0.3 K/UL (ref 0–1)
MONOCYTES NFR BLD: 3 % (ref 5–13)
MUCOUS THREADS URNS QL MICRO: ABNORMAL /LPF
NEUTS SEG # BLD: 11.3 K/UL (ref 1.8–8)
NEUTS SEG NFR BLD: 87 % (ref 32–75)
NITRITE UR QL STRIP.AUTO: NEGATIVE
NRBC # BLD: 0.02 K/UL (ref 0–0.01)
NRBC BLD-RTO: 0.2 PER 100 WBC
PH UR STRIP: 5.5 (ref 5–8)
PLATELET # BLD AUTO: 366 K/UL (ref 150–400)
PMV BLD AUTO: 9.3 FL (ref 8.9–12.9)
POTASSIUM SERPL-SCNC: 4.4 MMOL/L (ref 3.5–5.1)
PROT SERPL-MCNC: 6.2 G/DL (ref 6.4–8.2)
PROT UR STRIP-MCNC: ABNORMAL MG/DL
PROTHROMBIN TIME: 10.8 SEC (ref 9–11.1)
RBC # BLD AUTO: 4.05 M/UL (ref 3.8–5.2)
RBC #/AREA URNS HPF: >100 /HPF (ref 0–5)
SODIUM SERPL-SCNC: 143 MMOL/L (ref 136–145)
SP GR UR REFRACTOMETRY: 1.02
SPECIMEN EXP DATE BLD: NORMAL
UA: UC IF INDICATED,UAUC: ABNORMAL
UROBILINOGEN UR QL STRIP.AUTO: 0.2 EU/DL (ref 0.2–1)
WBC # BLD AUTO: 13 K/UL (ref 3.6–11)
WBC URNS QL MICRO: ABNORMAL /HPF (ref 0–4)
YEAST URNS QL MICRO: PRESENT

## 2023-01-17 PROCEDURE — 74011250636 HC RX REV CODE- 250/636: Performed by: STUDENT IN AN ORGANIZED HEALTH CARE EDUCATION/TRAINING PROGRAM

## 2023-01-17 PROCEDURE — 65270000029 HC RM PRIVATE

## 2023-01-17 PROCEDURE — 86900 BLOOD TYPING SEROLOGIC ABO: CPT

## 2023-01-17 PROCEDURE — 96374 THER/PROPH/DIAG INJ IV PUSH: CPT

## 2023-01-17 PROCEDURE — 85610 PROTHROMBIN TIME: CPT

## 2023-01-17 PROCEDURE — 80053 COMPREHEN METABOLIC PANEL: CPT

## 2023-01-17 PROCEDURE — 96376 TX/PRO/DX INJ SAME DRUG ADON: CPT

## 2023-01-17 PROCEDURE — 73590 X-RAY EXAM OF LOWER LEG: CPT

## 2023-01-17 PROCEDURE — 71045 X-RAY EXAM CHEST 1 VIEW: CPT

## 2023-01-17 PROCEDURE — 99285 EMERGENCY DEPT VISIT HI MDM: CPT

## 2023-01-17 PROCEDURE — 73502 X-RAY EXAM HIP UNI 2-3 VIEWS: CPT

## 2023-01-17 PROCEDURE — 94762 N-INVAS EAR/PLS OXIMTRY CONT: CPT

## 2023-01-17 PROCEDURE — 74011250637 HC RX REV CODE- 250/637: Performed by: ORTHOPAEDIC SURGERY

## 2023-01-17 PROCEDURE — 81001 URINALYSIS AUTO W/SCOPE: CPT

## 2023-01-17 PROCEDURE — 73552 X-RAY EXAM OF FEMUR 2/>: CPT

## 2023-01-17 PROCEDURE — 87086 URINE CULTURE/COLONY COUNT: CPT

## 2023-01-17 PROCEDURE — 74011000250 HC RX REV CODE- 250: Performed by: STUDENT IN AN ORGANIZED HEALTH CARE EDUCATION/TRAINING PROGRAM

## 2023-01-17 PROCEDURE — 36415 COLL VENOUS BLD VENIPUNCTURE: CPT

## 2023-01-17 PROCEDURE — 73560 X-RAY EXAM OF KNEE 1 OR 2: CPT

## 2023-01-17 PROCEDURE — 85025 COMPLETE CBC W/AUTO DIFF WBC: CPT

## 2023-01-17 PROCEDURE — 74011250637 HC RX REV CODE- 250/637: Performed by: STUDENT IN AN ORGANIZED HEALTH CARE EDUCATION/TRAINING PROGRAM

## 2023-01-17 RX ORDER — SODIUM CHLORIDE 0.9 % (FLUSH) 0.9 %
5-40 SYRINGE (ML) INJECTION AS NEEDED
Status: DISCONTINUED | OUTPATIENT
Start: 2023-01-17 | End: 2023-01-23 | Stop reason: HOSPADM

## 2023-01-17 RX ORDER — ACETAMINOPHEN 500 MG
1000 TABLET ORAL
Status: ACTIVE | OUTPATIENT
Start: 2023-01-17 | End: 2023-01-18

## 2023-01-17 RX ORDER — HYDROCHLOROTHIAZIDE 25 MG/1
50 TABLET ORAL DAILY
Status: DISCONTINUED | OUTPATIENT
Start: 2023-01-18 | End: 2023-01-23 | Stop reason: HOSPADM

## 2023-01-17 RX ORDER — PRAZOSIN HYDROCHLORIDE 5 MG/1
5 CAPSULE ORAL
Status: DISCONTINUED | OUTPATIENT
Start: 2023-01-17 | End: 2023-01-23 | Stop reason: HOSPADM

## 2023-01-17 RX ORDER — ACYCLOVIR 800 MG/1
400 TABLET ORAL
Status: DISCONTINUED | OUTPATIENT
Start: 2023-01-17 | End: 2023-01-23 | Stop reason: HOSPADM

## 2023-01-17 RX ORDER — DOXYCYCLINE HYCLATE 100 MG
100 TABLET ORAL EVERY 12 HOURS
Status: DISCONTINUED | OUTPATIENT
Start: 2023-01-17 | End: 2023-01-23 | Stop reason: HOSPADM

## 2023-01-17 RX ORDER — ACETAMINOPHEN 650 MG/1
650 SUPPOSITORY RECTAL
Status: DISCONTINUED | OUTPATIENT
Start: 2023-01-17 | End: 2023-01-23 | Stop reason: HOSPADM

## 2023-01-17 RX ORDER — HYDROMORPHONE HYDROCHLORIDE 1 MG/ML
1 INJECTION, SOLUTION INTRAMUSCULAR; INTRAVENOUS; SUBCUTANEOUS ONCE
Status: COMPLETED | OUTPATIENT
Start: 2023-01-17 | End: 2023-01-17

## 2023-01-17 RX ORDER — OXYCODONE HYDROCHLORIDE 5 MG/1
5 TABLET ORAL
Status: DISCONTINUED | OUTPATIENT
Start: 2023-01-17 | End: 2023-01-17

## 2023-01-17 RX ORDER — SODIUM CHLORIDE 0.9 % (FLUSH) 0.9 %
5-40 SYRINGE (ML) INJECTION EVERY 8 HOURS
Status: DISCONTINUED | OUTPATIENT
Start: 2023-01-17 | End: 2023-01-22 | Stop reason: ALTCHOICE

## 2023-01-17 RX ORDER — HYDROMORPHONE HYDROCHLORIDE 2 MG/1
4 TABLET ORAL
Status: DISCONTINUED | OUTPATIENT
Start: 2023-01-17 | End: 2023-01-18

## 2023-01-17 RX ORDER — LABETALOL HYDROCHLORIDE 5 MG/ML
5 INJECTION, SOLUTION INTRAVENOUS
Status: DISCONTINUED | OUTPATIENT
Start: 2023-01-17 | End: 2023-01-23 | Stop reason: HOSPADM

## 2023-01-17 RX ORDER — ONDANSETRON 4 MG/1
4 TABLET, ORALLY DISINTEGRATING ORAL
Status: DISCONTINUED | OUTPATIENT
Start: 2023-01-17 | End: 2023-01-23 | Stop reason: HOSPADM

## 2023-01-17 RX ORDER — LOSARTAN POTASSIUM 50 MG/1
100 TABLET ORAL DAILY
Status: DISCONTINUED | OUTPATIENT
Start: 2023-01-18 | End: 2023-01-23 | Stop reason: HOSPADM

## 2023-01-17 RX ORDER — HYDROMORPHONE HYDROCHLORIDE 1 MG/ML
2 INJECTION, SOLUTION INTRAMUSCULAR; INTRAVENOUS; SUBCUTANEOUS ONCE
Status: COMPLETED | OUTPATIENT
Start: 2023-01-17 | End: 2023-01-17

## 2023-01-17 RX ORDER — OLANZAPINE 5 MG/1
5 TABLET ORAL EVERY EVENING
Status: DISCONTINUED | OUTPATIENT
Start: 2023-01-18 | End: 2023-01-23 | Stop reason: HOSPADM

## 2023-01-17 RX ORDER — ONDANSETRON 2 MG/ML
4 INJECTION INTRAMUSCULAR; INTRAVENOUS
Status: DISCONTINUED | OUTPATIENT
Start: 2023-01-17 | End: 2023-01-23 | Stop reason: HOSPADM

## 2023-01-17 RX ORDER — SODIUM CHLORIDE 9 MG/ML
75 INJECTION, SOLUTION INTRAVENOUS CONTINUOUS
Status: DISPENSED | OUTPATIENT
Start: 2023-01-17 | End: 2023-01-18

## 2023-01-17 RX ORDER — NALOXONE HYDROCHLORIDE 0.4 MG/ML
0.4 INJECTION, SOLUTION INTRAMUSCULAR; INTRAVENOUS; SUBCUTANEOUS AS NEEDED
Status: DISCONTINUED | OUTPATIENT
Start: 2023-01-17 | End: 2023-01-23 | Stop reason: HOSPADM

## 2023-01-17 RX ORDER — OXYCODONE HYDROCHLORIDE 5 MG/1
2.5 TABLET ORAL
Status: DISCONTINUED | OUTPATIENT
Start: 2023-01-17 | End: 2023-01-17

## 2023-01-17 RX ORDER — ACETAMINOPHEN 325 MG/1
650 TABLET ORAL
Status: DISCONTINUED | OUTPATIENT
Start: 2023-01-17 | End: 2023-01-23 | Stop reason: HOSPADM

## 2023-01-17 RX ORDER — MORPHINE SULFATE 15 MG/1
30 TABLET, FILM COATED, EXTENDED RELEASE ORAL EVERY 12 HOURS
Status: DISCONTINUED | OUTPATIENT
Start: 2023-01-17 | End: 2023-01-23 | Stop reason: HOSPADM

## 2023-01-17 RX ORDER — POLYETHYLENE GLYCOL 3350 17 G/17G
17 POWDER, FOR SOLUTION ORAL DAILY PRN
Status: DISCONTINUED | OUTPATIENT
Start: 2023-01-17 | End: 2023-01-23 | Stop reason: HOSPADM

## 2023-01-17 RX ORDER — AMOXICILLIN 250 MG
2 CAPSULE ORAL 2 TIMES DAILY
Status: DISCONTINUED | OUTPATIENT
Start: 2023-01-17 | End: 2023-01-23 | Stop reason: HOSPADM

## 2023-01-17 RX ORDER — ACETAMINOPHEN 325 MG/1
650 TABLET ORAL EVERY 6 HOURS
Status: DISCONTINUED | OUTPATIENT
Start: 2023-01-17 | End: 2023-01-23 | Stop reason: HOSPADM

## 2023-01-17 RX ORDER — PANTOPRAZOLE SODIUM 40 MG/1
40 TABLET, DELAYED RELEASE ORAL
Status: DISCONTINUED | OUTPATIENT
Start: 2023-01-18 | End: 2023-01-23 | Stop reason: HOSPADM

## 2023-01-17 RX ORDER — KETOROLAC TROMETHAMINE 30 MG/ML
15 INJECTION, SOLUTION INTRAMUSCULAR; INTRAVENOUS
Status: COMPLETED | OUTPATIENT
Start: 2023-01-17 | End: 2023-01-17

## 2023-01-17 RX ADMIN — OXYCODONE HYDROCHLORIDE 5 MG: 5 TABLET ORAL at 21:08

## 2023-01-17 RX ADMIN — ACETAMINOPHEN 650 MG: 325 TABLET ORAL at 21:08

## 2023-01-17 RX ADMIN — MORPHINE SULFATE 30 MG: 15 TABLET, FILM COATED, EXTENDED RELEASE ORAL at 23:24

## 2023-01-17 RX ADMIN — DOXYCYCLINE HYCLATE 100 MG: 100 TABLET, COATED ORAL at 23:23

## 2023-01-17 RX ADMIN — HYDROMORPHONE HYDROCHLORIDE 2 MG: 1 INJECTION, SOLUTION INTRAMUSCULAR; INTRAVENOUS; SUBCUTANEOUS at 23:24

## 2023-01-17 RX ADMIN — PRAZOSIN HYDROCHLORIDE 5 MG: 5 CAPSULE ORAL at 23:26

## 2023-01-17 RX ADMIN — HYDROMORPHONE HYDROCHLORIDE 1 MG: 1 INJECTION, SOLUTION INTRAMUSCULAR; INTRAVENOUS; SUBCUTANEOUS at 18:28

## 2023-01-17 RX ADMIN — KETOROLAC TROMETHAMINE 15 MG: 30 INJECTION, SOLUTION INTRAMUSCULAR at 23:24

## 2023-01-17 RX ADMIN — HYDROMORPHONE HYDROCHLORIDE 1 MG: 1 INJECTION, SOLUTION INTRAMUSCULAR; INTRAVENOUS; SUBCUTANEOUS at 20:11

## 2023-01-17 RX ADMIN — SODIUM CHLORIDE, PRESERVATIVE FREE 10 ML: 5 INJECTION INTRAVENOUS at 23:22

## 2023-01-17 RX ADMIN — SODIUM CHLORIDE 75 ML/HR: 9 INJECTION, SOLUTION INTRAVENOUS at 23:25

## 2023-01-17 NOTE — ED TRIAGE NOTES
Pt comes from home with knee pain, knee replacement in approximately 2019. Pt was moving on Friday and on Saturday she was unable to move her knee. Had an appointment with MD today but wasn't able to make it. Pt is on a pain management schedule reports taking dilaudid 5x/day. Today reports having 3 dilaudid. Hx of HTN and taking HCTZ and has not taken it in 4 days.

## 2023-01-18 ENCOUNTER — APPOINTMENT (OUTPATIENT)
Dept: CT IMAGING | Age: 53
DRG: 481 | End: 2023-01-18
Attending: STUDENT IN AN ORGANIZED HEALTH CARE EDUCATION/TRAINING PROGRAM
Payer: COMMERCIAL

## 2023-01-18 LAB
25(OH)D3 SERPL-MCNC: 23.2 NG/ML (ref 30–100)
ANION GAP SERPL CALC-SCNC: 5 MMOL/L (ref 5–15)
BACTERIA SPEC CULT: NORMAL
BASOPHILS # BLD: 0 K/UL (ref 0–0.1)
BASOPHILS NFR BLD: 0 % (ref 0–1)
BUN SERPL-MCNC: 22 MG/DL (ref 6–20)
BUN/CREAT SERPL: 21 (ref 12–20)
CALCIUM SERPL-MCNC: 8.4 MG/DL (ref 8.5–10.1)
CALCIUM SERPL-MCNC: 8.7 MG/DL (ref 8.5–10.1)
CHLORIDE SERPL-SCNC: 111 MMOL/L (ref 97–108)
CO2 SERPL-SCNC: 26 MMOL/L (ref 21–32)
CREAT SERPL-MCNC: 1.03 MG/DL (ref 0.55–1.02)
DIFFERENTIAL METHOD BLD: ABNORMAL
EOSINOPHIL # BLD: 0 K/UL (ref 0–0.4)
EOSINOPHIL NFR BLD: 0 % (ref 0–7)
ERYTHROCYTE [DISTWIDTH] IN BLOOD BY AUTOMATED COUNT: 16.6 % (ref 11.5–14.5)
GLUCOSE SERPL-MCNC: 89 MG/DL (ref 65–100)
HCT VFR BLD AUTO: 30.8 % (ref 35–47)
HGB BLD-MCNC: 9.1 G/DL (ref 11.5–16)
IMM GRANULOCYTES # BLD AUTO: 0.1 K/UL (ref 0–0.04)
IMM GRANULOCYTES NFR BLD AUTO: 1 % (ref 0–0.5)
LYMPHOCYTES # BLD: 3.1 K/UL (ref 0.8–3.5)
LYMPHOCYTES NFR BLD: 23 % (ref 12–49)
MCH RBC QN AUTO: 24.9 PG (ref 26–34)
MCHC RBC AUTO-ENTMCNC: 29.5 G/DL (ref 30–36.5)
MCV RBC AUTO: 84.2 FL (ref 80–99)
MONOCYTES # BLD: 1 K/UL (ref 0–1)
MONOCYTES NFR BLD: 7 % (ref 5–13)
NEUTS SEG # BLD: 9.1 K/UL (ref 1.8–8)
NEUTS SEG NFR BLD: 69 % (ref 32–75)
NRBC # BLD: 0.02 K/UL (ref 0–0.01)
NRBC BLD-RTO: 0.1 PER 100 WBC
PLATELET # BLD AUTO: 314 K/UL (ref 150–400)
PMV BLD AUTO: 9.3 FL (ref 8.9–12.9)
POTASSIUM SERPL-SCNC: 4.2 MMOL/L (ref 3.5–5.1)
PTH-INTACT SERPL-MCNC: 35.5 PG/ML (ref 18.4–88)
RBC # BLD AUTO: 3.66 M/UL (ref 3.8–5.2)
SERVICE CMNT-IMP: NORMAL
SODIUM SERPL-SCNC: 142 MMOL/L (ref 136–145)
T4 FREE SERPL-MCNC: 0.9 NG/DL (ref 0.8–1.5)
TSH SERPL DL<=0.05 MIU/L-ACNC: 3.85 UIU/ML (ref 0.36–3.74)
WBC # BLD AUTO: 13.4 K/UL (ref 3.6–11)

## 2023-01-18 PROCEDURE — 94760 N-INVAS EAR/PLS OXIMETRY 1: CPT

## 2023-01-18 PROCEDURE — 83970 ASSAY OF PARATHORMONE: CPT

## 2023-01-18 PROCEDURE — 36415 COLL VENOUS BLD VENIPUNCTURE: CPT

## 2023-01-18 PROCEDURE — 74011250636 HC RX REV CODE- 250/636: Performed by: NURSE PRACTITIONER

## 2023-01-18 PROCEDURE — 82306 VITAMIN D 25 HYDROXY: CPT

## 2023-01-18 PROCEDURE — 74011250637 HC RX REV CODE- 250/637: Performed by: NURSE PRACTITIONER

## 2023-01-18 PROCEDURE — 85025 COMPLETE CBC W/AUTO DIFF WBC: CPT

## 2023-01-18 PROCEDURE — 74011000250 HC RX REV CODE- 250: Performed by: STUDENT IN AN ORGANIZED HEALTH CARE EDUCATION/TRAINING PROGRAM

## 2023-01-18 PROCEDURE — 74011250637 HC RX REV CODE- 250/637: Performed by: STUDENT IN AN ORGANIZED HEALTH CARE EDUCATION/TRAINING PROGRAM

## 2023-01-18 PROCEDURE — 84439 ASSAY OF FREE THYROXINE: CPT

## 2023-01-18 PROCEDURE — 74011250637 HC RX REV CODE- 250/637: Performed by: INTERNAL MEDICINE

## 2023-01-18 PROCEDURE — 74011250636 HC RX REV CODE- 250/636: Performed by: INTERNAL MEDICINE

## 2023-01-18 PROCEDURE — 80048 BASIC METABOLIC PNL TOTAL CA: CPT

## 2023-01-18 PROCEDURE — 84443 ASSAY THYROID STIM HORMONE: CPT

## 2023-01-18 PROCEDURE — 99223 1ST HOSP IP/OBS HIGH 75: CPT | Performed by: NURSE PRACTITIONER

## 2023-01-18 PROCEDURE — 74011000258 HC RX REV CODE- 258: Performed by: INTERNAL MEDICINE

## 2023-01-18 PROCEDURE — 65270000029 HC RM PRIVATE

## 2023-01-18 RX ORDER — FLUCONAZOLE 150 MG/1
150 TABLET ORAL
COMMUNITY
End: 2023-01-23

## 2023-01-18 RX ORDER — MELATONIN
1000 DAILY
Status: DISCONTINUED | OUTPATIENT
Start: 2023-01-18 | End: 2023-01-23 | Stop reason: HOSPADM

## 2023-01-18 RX ORDER — HYDROMORPHONE HYDROCHLORIDE 2 MG/1
4 TABLET ORAL EVERY 4 HOURS
Status: DISCONTINUED | OUTPATIENT
Start: 2023-01-18 | End: 2023-01-20

## 2023-01-18 RX ORDER — MORPHINE SULFATE 15 MG/1
30 TABLET, FILM COATED, EXTENDED RELEASE ORAL
Status: DISCONTINUED | OUTPATIENT
Start: 2023-01-18 | End: 2023-01-18

## 2023-01-18 RX ORDER — HYDROXYZINE PAMOATE 50 MG/1
100 CAPSULE ORAL
COMMUNITY

## 2023-01-18 RX ORDER — PREDNISONE 50 MG/1
50 TABLET ORAL
COMMUNITY

## 2023-01-18 RX ORDER — OLANZAPINE 5 MG/1
5 TABLET ORAL
COMMUNITY

## 2023-01-18 RX ORDER — DICLOFENAC SODIUM 75 MG/1
75 TABLET, DELAYED RELEASE ORAL 2 TIMES DAILY
COMMUNITY
End: 2023-01-23

## 2023-01-18 RX ORDER — PRAZOSIN HYDROCHLORIDE 5 MG/1
5 CAPSULE ORAL
COMMUNITY

## 2023-01-18 RX ORDER — LORAZEPAM 2 MG/ML
2 INJECTION INTRAMUSCULAR ONCE
Status: ACTIVE | OUTPATIENT
Start: 2023-01-18 | End: 2023-01-18

## 2023-01-18 RX ORDER — LOSARTAN POTASSIUM 100 MG/1
100 TABLET ORAL DAILY
COMMUNITY

## 2023-01-18 RX ADMIN — DOXYCYCLINE HYCLATE 100 MG: 100 TABLET, COATED ORAL at 22:39

## 2023-01-18 RX ADMIN — ACETAMINOPHEN 650 MG: 325 TABLET ORAL at 08:30

## 2023-01-18 RX ADMIN — BUSPIRONE HYDROCHLORIDE 15 MG: 5 TABLET ORAL at 08:32

## 2023-01-18 RX ADMIN — BUSPIRONE HYDROCHLORIDE 15 MG: 5 TABLET ORAL at 17:14

## 2023-01-18 RX ADMIN — ACETAMINOPHEN 650 MG: 325 TABLET ORAL at 22:37

## 2023-01-18 RX ADMIN — SODIUM CHLORIDE 1 G: 900 INJECTION INTRAVENOUS at 11:06

## 2023-01-18 RX ADMIN — Medication: at 19:02

## 2023-01-18 RX ADMIN — HYDROMORPHONE HYDROCHLORIDE 4 MG: 2 TABLET ORAL at 08:33

## 2023-01-18 RX ADMIN — MORPHINE SULFATE 30 MG: 15 TABLET, FILM COATED, EXTENDED RELEASE ORAL at 08:31

## 2023-01-18 RX ADMIN — LOSARTAN POTASSIUM 100 MG: 50 TABLET, FILM COATED ORAL at 08:31

## 2023-01-18 RX ADMIN — HYDROMORPHONE HYDROCHLORIDE 4 MG: 2 TABLET ORAL at 14:02

## 2023-01-18 RX ADMIN — ACETAMINOPHEN 650 MG: 325 TABLET ORAL at 14:02

## 2023-01-18 RX ADMIN — DOXYCYCLINE HYCLATE 100 MG: 100 TABLET, COATED ORAL at 08:32

## 2023-01-18 RX ADMIN — HYDROMORPHONE HYDROCHLORIDE 4 MG: 2 TABLET ORAL at 22:39

## 2023-01-18 RX ADMIN — PANTOPRAZOLE SODIUM 40 MG: 40 TABLET, DELAYED RELEASE ORAL at 07:07

## 2023-01-18 RX ADMIN — ACETAMINOPHEN 650 MG: 325 TABLET ORAL at 02:53

## 2023-01-18 RX ADMIN — SODIUM CHLORIDE, PRESERVATIVE FREE 10 ML: 5 INJECTION INTRAVENOUS at 05:38

## 2023-01-18 RX ADMIN — CHOLECALCIFEROL TAB 25 MCG (1000 UNIT) 1000 UNITS: 25 TAB at 11:06

## 2023-01-18 RX ADMIN — HYDROCHLOROTHIAZIDE 50 MG: 25 TABLET ORAL at 08:32

## 2023-01-18 RX ADMIN — MORPHINE SULFATE 30 MG: 15 TABLET, FILM COATED, EXTENDED RELEASE ORAL at 22:38

## 2023-01-18 RX ADMIN — SODIUM CHLORIDE, PRESERVATIVE FREE 10 ML: 5 INJECTION INTRAVENOUS at 22:00

## 2023-01-18 RX ADMIN — PRAZOSIN HYDROCHLORIDE 5 MG: 5 CAPSULE ORAL at 22:39

## 2023-01-18 RX ADMIN — HYDROMORPHONE HYDROCHLORIDE 4 MG: 2 TABLET ORAL at 17:14

## 2023-01-18 NOTE — PROGRESS NOTES
Hospitalist Progress Note    NAME: Elmira Dong   :  1970   MRN:  [id number]       Assessment / Plan:  Acute transverse fracture of distal diaphysis of right femur with moderate apex anterior angulation-nontraumatic  Status post TKA BLE  Patient not currently able to tolerate placement of knee immobilizer-counseled on not moving leg to prevent further injury or displacement  Pain is not well controlled. Pt refusing duplex US and CT leg to evlauate for pathologic fracture due to anxiety of pain. Offered ativan prior to CT but pt wants to be completely knocked out. Will discuss with ortho in regards to conscious sedation  Plan for OR tomorrow pending imaging study above  Will consult palliative to help with pain control. Ortho is following, appreciate recs  I I reached out to xr department regarding conscious sedation for CT. No availability today. 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 Dilaudid every 4 hours as needed  Her pain is not well controlled on dilaudid alone. Will resume her home regimen of MS contin. (Discussed that these meds will be order prn and pt will need to ask for it as needed)  Tylenol q6h  Pt with high opoid demand due to chronic chronic narcotic use. Palliative consulted as above     Essential hypertension  Continue PTA hydrochlorothiazide, losartan  cont' as needed IV labetalol    Comedonal acne  Continue PTA doxycycline    Herpes labialis  Resolving lesion on upper lip, continue PTA acyclovir     UTI  History of hematuria  Will follow culture. Will start IV rocephin given urinary symptoms.   Documented history of hematuria needs to be followed with nephrology/urology     Chronic normocytic anemia  Trend hemoglobin     Code Status: Full     DVT Prophylaxis: Defer to orthopedics  GI Prophylaxis: Protonix  Diet: Diabetic/cardiac     Subjective:     Chief Complaint / Reason for Physician Visit  Pt is crying due to pain not well controlled. Discussed with RN events overnight. Review of Systems:  Symptom Y/N Comments  Symptom Y/N Comments   Fever/Chills    Chest Pain     Poor Appetite    Edema     Cough    Abdominal Pain     Sputum    Joint Pain     SOB/CHANG    Pruritis/Rash     Nausea/vomit    Tolerating PT/OT     Diarrhea    Tolerating Diet     Constipation    Other       Could NOT obtain due to:      Objective:     VITALS:   Last 24hrs VS reviewed since prior progress note. Most recent are:  Patient Vitals for the past 24 hrs:   Temp Pulse Resp BP SpO2   01/18/23 1135 -- 81 -- (!) 150/87 99 %   01/18/23 0744 97.9 °F (36.6 °C) 67 18 (!) 158/89 100 %   01/18/23 0335 -- (!) 57 14 (!) 168/101 100 %   01/18/23 0005 -- -- -- (!) 148/74 --   01/17/23 2258 98.1 °F (36.7 °C) 66 14 (!) 193/125 99 %   01/17/23 2107 -- -- -- (!) 185/160 95 %   01/17/23 2030 -- -- -- (!) 160/131 93 %   01/17/23 2007 -- -- -- 115/76 --   01/17/23 1930 -- -- -- -- 95 %   01/17/23 1900 -- -- -- (!) 187/110 93 %   01/17/23 1721 -- -- -- -- 96 %   01/17/23 1716 98.2 °F (36.8 °C) (!) 115 16 (!) 224/136 97 %     No intake or output data in the 24 hours ending 01/18/23 1215     I had a face to face encounter and independently examined this patient on 1/18/2023, as outlined below:  PHYSICAL EXAM:  General: WD, WN. Alert, cooperative, no acute distress    EENT:  EOMI. Anicteric sclerae. MMM  Resp:  CTA bilaterally, no wheezing or rales. No accessory muscle use  CV:  Regular  rhythm,  No edema  GI:  Soft, Non distended, Non tender. +BS  Neurologic:  Alert and oriented X 3, normal speech  Psych:   +anxious  Skin:  No rashes.   No jaundice    Reviewed most current lab test results and cultures YES  Reviewed most current radiology test results   YES  Review and summation of old records today    NO  Reviewed patient's current orders and MAR    YES  PMH/SH reviewed - no change compared to H&P  ________________________________________________________________________  Care Plan discussed with:    Comments   Patient x    Family  x    RN x    Care Manager     Consultant                        Multidiciplinary team rounds were held today with , nursing, pharmacist and clinical coordinator. Patient's plan of care was discussed; medications were reviewed and discharge planning was addressed. ________________________________________________________________________  Total NON critical care TIME:  35   Minutes    Total CRITICAL CARE TIME Spent:   Minutes non procedure based      Comments   >50% of visit spent in counseling and coordination of care     ________________________________________________________________________  Puma Orellana MD     Procedures: see electronic medical records for all procedures/Xrays and details which were not copied into this note but were reviewed prior to creation of Plan. LABS:  I reviewed today's most current labs and imaging studies.   Pertinent labs include:  Recent Labs     01/18/23 0419 01/17/23  1829   WBC 13.4* 13.0*   HGB 9.1* 10.1*   HCT 30.8* 33.6*    366     Recent Labs     01/18/23 0419 01/17/23  2115 01/17/23  1829     --  143   K 4.2  --  4.4   *  --  114*   CO2 26  --  24   GLU 89  --  115*   BUN 22*  --  23*   CREA 1.03*  --  1.04*   CA 8.7  8.4*  --  8.6   ALB  --   --  2.8*   TBILI  --   --  0.3   ALT  --   --  33   INR  --  1.0  --        Signed: Puma Oerllana MD

## 2023-01-18 NOTE — ED NOTES
Verbal shift change report given to Dejon Rutherford and Ronda RN (oncoming nurse) by Katiuska Ordoñez RN (offgoing nurse). Report included the following information SBAR, Kardex and ED Summary.

## 2023-01-18 NOTE — ED NOTES
TRANSITION OF CARE - SBAR OUT    Patient is being transferred to [facility] , Room# 108. Report GIVEN TO Jovany Alvarez RN on [facility] Staton for routine progression of care. Report is consisted of the following information SBAR, ED Summary, MAR, and Recent Results. Patient transferred to receiving unit by: Pt transport (RN or Tech Name)     Called outstanding consults: Yes   Collected routine labs: Yes     All current orders reviewed with accepting nurse: Yes    The following personal items will be sent with the patient during transfer to the floor: All valuables:                          CARDIAC MONITORING ORDERED: No      The following CURRENT information were reported to the receiving RN:    CODE STATUS: Full Code    NIH SCORE:    SHAN SCREENING:      NEURO ASSESSMENT:        RESTRAINTS IN USE: No      IS DOCUMENTATION COMPLETE: YEs      Vital Signs  Level of Consciousness: Alert (0) (01/17/23 1716)  Temp: 98.2 °F (36.8 °C) (01/17/23 1716)  Temp Source: Oral (01/17/23 1716)  Pulse (Heart Rate): (!) 115 (01/17/23 1716)  Resp Rate: 16 (01/17/23 1716)  BP: (!) 185/160 (01/17/23 2107)  MAP (Monitor): 166 (01/17/23 2107)  MAP (Calculated): 168 (01/17/23 2107)  MEWS Score: 5 (01/17/23 1716)  Pain 1  Pain Scale 1: Numeric (0 - 10) (01/17/23 1716)  Pain Intensity 1: 10 (01/17/23 1716)      OXYGEN: Oxygen Therapy  O2 Device: None (Room air) (01/17/23 1721)    KINDER FALL ASSESSMENT:  Presents to emergency department  because of falls (Syncope, seizure, or loss of consciousness): No, Age > 79: No, Altered Mental Status, Intoxication with alcohol or substance confusion (Disorientation, impaired judgment, poor safety awaremess, or inability to follow instructions): No, Impaired Mobility: Ambulates or transfers with assistive devices or assistance;  Unable to ambulate or transer.: Yes, Nursing Judgement : Yes    WOUNDS: No      URINARY CATHETER: shore    LINE ACCESS:   Peripheral IV 01/17/23 Right Antecubital (Active) Opportunity for questions and clarification were provided.   Natalya Chew RN

## 2023-01-18 NOTE — PROGRESS NOTES
End of Shift Note    Bedside shift change report given to Jackie mast RN (oncoming nurse) by Faith Minaya RN (offgoing nurse). Report included the following information SBAR, Kardex, and MAR    Shift worked:  night     Shift summary and any significant changes:     Alert and oriented; on bedrest; on foleys; plan for right femur IM nailing on Thursday; for consent; medicine for pre-op clearance     Concerns for physician to address:       Zone phone for oncoming shift:          Activity:  Activity Level: Bed Rest  Number times ambulated in hallways past shift: 0  Number of times OOB to chair past shift: 0    Cardiac:   Cardiac Monitoring: No           Access:  Current line(s): PIV     Genitourinary:   Urinary status: hsore    Respiratory:   O2 Device: None (Room air)  Chronic home O2 use?: NO  Incentive spirometer at bedside: YES       GI:     Current diet:  ADULT DIET Regular; 4 carb choices (60 gm/meal); Low Fat/Low Chol/High Fiber/2 gm Na  Passing flatus: YES  Tolerating current diet: YES       Pain Management:   Patient states pain is manageable on current regimen: YES    Skin:  Tyrese Score: 15  Interventions: float heels, increase time out of bed, foam dressing, and PT/OT consult    Patient Safety:  Fall Score:  Total Score: 3  Interventions: bed/chair alarm, assistive device (walker, cane, etc), gripper socks, and pt to call before getting OOB  High Fall Risk: Yes    Length of Stay:  Expected LOS: - - -  Actual LOS: 1      Faith Minaya RN

## 2023-01-18 NOTE — ED PROVIDER NOTES
EMERGENCY DEPARTMENT HISTORY AND PHYSICAL EXAM      Date: 1/17/2023  Patient Name: Akua Salomon    History of Presenting Illness     Chief Complaint   Patient presents with    Knee Pain     Pt comes from home with pain in her right knee. Pain started on Saturday. Had an appointment with her MD today. History Provided By: Patient    HPI: Akua Salomon, 46 y.o. female with a past medical history significant for medical problems as stated below presents to the ED with cc of right knee and hip pain. She is a patient of Dr. Matias Spence and had a knee replacement a few years ago. On Saturday she reports she began having significant right knee pain with no inciting incident. Reports that she has been unable to walk. Today she had an appointment with Dr. Casa Gonzalez but she was unable to move from her home and EMS had to pick her up and she was brought to the ED. She feels that when EMS was picking her up and moving her to the stretcher her knee may have popped and gotten worse. Her pain is severe. PCP: Kim Toney MD    No current facility-administered medications on file prior to encounter. Current Outpatient Medications on File Prior to Encounter   Medication Sig Dispense Refill    amphetamine sulfate 10 mg tab Take 10 mg by mouth as needed. PT TAKES 2 TABS (20 MG) BID AT 0200 AND 1200       albuterol (PROVENTIL HFA, VENTOLIN HFA, PROAIR HFA) 90 mcg/actuation inhaler Take 1 Puff by inhalation every four (4) hours as needed for Wheezing. acyclovir (ZOVIRAX) 400 mg tablet Take 400 mg by mouth every four (4) hours as needed for Other (fever blisters). aspirin delayed-release 81 mg tablet Take 1 Tab by mouth two (2) times a day. (Patient not taking: Reported on 1/17/2023) 60 Tab 0    meloxicam (MOBIC) 7.5 mg tablet Take 1 Tab by mouth daily.  Indications: joint damage causing pain and loss of function 30 Tab 0    senna-docusate (PERICOLACE) 8.6-50 mg per tablet Take 1 Tab by mouth two (2) times a day. Indications: constipation 30 Tab 0    traZODone (DESYREL) 100 mg tablet Take 200 mg by mouth nightly. doxycycline (ADOXA) 100 mg tablet Take 100 mg by mouth two (2) times a day. multivitamin (ONE A DAY) tablet Take 1 Tab by mouth nightly. Lisdexamfetamine (VYVANSE) 70 mg cap Take 70 mg by mouth every morning. ondansetron hcl (ZOFRAN) 8 mg tablet Take 8 mg by mouth every eight (8) hours as needed for Nausea. prazosin (MINIPRESS) 2 mg capsule Take 2 mg by mouth nightly. busPIRone (BUSPAR) 15 mg tablet Take 15 mg by mouth every morning. UP TO 3 TIMES DAILY PRN       hydroCHLOROthiazide (HYDRODIURIL) 50 mg tablet Take 50 mg by mouth as needed for Other (for high blood pressure). take one tablet by mouth daily as needed for high blood pressure. morphine CR (MS CONTIN) 30 mg CR tablet Take  by mouth every twelve (12) hours. acetaminophen (TYLENOL) 500 mg tablet Take 500 mg by mouth every six (6) hours as needed for Pain. diclofenac 10% cyclobenzaprine 2% lidocaine 5% gabapentin 6% cream compound Apply 2 g to affected area four (4) times daily as needed for Pain. alpha-d-galactosidase (BEANO) 150 unit tab tablet Take 2 Tabs by mouth three (3) times daily as needed for Flatulence. levothyroxine (SYNTHROID) 100 mcg tablet Take 100 mcg by mouth Daily (before breakfast). vortioxetine (TRINTELLIX) 10 mg tablet Take  by mouth every morning. PT TAKES A TOTAL OF 30 mg DAILY       potassium chloride SR (KLOR-CON 10) 10 mEq tablet Take 10 mEq by mouth daily. furosemide (LASIX) 20 mg tablet Take 20 mg by mouth as needed.  MAY TAKE ONLY IF NEEDED FOR SEVERE ANKLE SWELLING       baclofen (LIORESAL) 10 mg tablet Take 10 mg by mouth two (2) times daily as needed for Pain.      divalproex DR (DEPAKOTE) 250 mg tablet Take 1 tablet by mouth in  the morning and 2 tablets  by mouth in the evening 180 Tab 10    losartan (COZAAR) 50 mg tablet Take 100 mg by mouth every morning. simethicone (GAS-X) 80 mg chewable tablet Take 80 mg by mouth every six (6) hours as needed for Flatulence. lansoprazole (PREVACID) 15 mg disintegrating tablet Take 1 Tab by mouth Daily (before breakfast). cetirizine (ZYRTEC) 10 mg tablet Take 1 Tab by mouth daily. Past History     Past Medical History:  Past Medical History:   Diagnosis Date    ADHD (attention deficit hyperactivity disorder)     Arthritis     OSTEO    Chronic pain     YAHIR KNEES, LOWER BACK, RT SHOULDER & NECK    GERD (gastroesophageal reflux disease)     Graves disease     NO LONGER AN ISSUE    Hematuria 10/9/2018    HTN (hypertension)     CONTROLLED WITH MEDS    Hypothyroidism 2018    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    OCD (obsessive compulsive disorder)     Other ill-defined conditions(799.89)     graves    Psychiatric disorder     depression. RECENTLY LOST HER SON ON 19 ON HER BIRTHDAY.     Seizure ([address]     Seizures ([address]  &     REACTIVE TO HYPOGLYCEMIA / ALSO FROM FLASHING LIGHTS    Thromboembolus ([address] 2019    right arm       Past Surgical History:  Past Surgical History:   Procedure Laterality Date    DELIVERY       HX ABDOMINOPLASTY      HX ADENOIDECTOMY      HX  SECTION      HX  SECTION  2006    HX CHOLECYSTECTOMY      HX GASTRIC BYPASS          HX GI      CHOLEYCYSTECTOMY    HX ORTHOPAEDIC Right     CARPEL TUNNEL    HX ORTHOPAEDIC Left     CARPEL TUNNEL    HX ORTHOPAEDIC Left     ULNA SHORTENING    HX ORTHOPAEDIC Right 2011    COLLAR BONE SURGERY     HX ORTHOPAEDIC Left 2019    LEFT KNEE REPLACEMENT    HX ORTHOPAEDIC Left     LEFT KNEE INCISION FOR INFECTION & REPAIR    HX ORTHOPAEDIC Left     LEFT KNEE REMOVAL OF PROSTHESIS & INSERTION OF SPACER, REPAIR OF INCISION    HX ORTHOPAEDIC  10/31/2019    left knee revision    HX TONSILLECTOMY  1975       Family History:  Family History   Problem Relation Age of Onset    Heart Disease Mother     Heart Attack Mother     Hypertension Mother     OSTEOARTHRITIS Mother     Rashes/Skin Problems Mother         PLAQUE PSORIASIS    Heart Disease Father     Thyroid Disease Father     Diabetes Father     Hypertension Father     Diabetes Maternal Grandmother     Heart Attack Maternal Grandmother     Hypertension Maternal Grandmother     OSTEOARTHRITIS Maternal Grandmother     Hypertension Maternal Grandfather     Stroke Maternal Grandfather     High Cholesterol Sister     Other Son         meningitis    No Known Problems Son     Anesth Problems Neg Hx        Social History:  Social History     Tobacco Use    Smoking status: Former     Types: Cigarettes     Quit date: 1994     Years since quittin.3    Smokeless tobacco: Never   Substance Use Topics    Alcohol use: No    Drug use: No       Allergies: Allergies   Allergen Reactions    Sulfa (Sulfonamide Antibiotics) Hives    Other Medication Rash     BANDAIDS MAY CAUSE RASH. Pcn [Penicillins] Unproven on Challenge         Review of Systems   Review of Systems  Per HPI    Physical Exam   Physical Exam  Constitutional:       General: She is in acute distress. Appearance: Normal appearance. She is not toxic-appearing. HENT:      Head: Normocephalic and atraumatic. Mouth/Throat:      Mouth: Mucous membranes are dry. Eyes:      Pupils: Pupils are equal, round, and reactive to light. Cardiovascular:      Rate and Rhythm: Normal rate and regular rhythm. Pulmonary:      Effort: Pulmonary effort is normal.      Breath sounds: Normal breath sounds. Abdominal:      General: Abdomen is flat. Tenderness: There is no abdominal tenderness. There is no guarding or rebound. Musculoskeletal:      Cervical back: Normal range of motion and neck supple.       Comments: Right knee deformity, unable to perform ROM of right knee due to severe pain  Soft compartments of right femur  2+ right DP pulse  FROM of right ankle and toes  Sensation intact throughout   Skin: General: Skin is warm and dry. Capillary Refill: Capillary refill takes less than 2 seconds. Comments: Rash   Neurological:      General: No focal deficit present. Mental Status: She is alert and oriented to person, place, and time. Diagnostic Study Results     Labs -     Recent Results (from the past 24 hour(s))   CBC WITH AUTOMATED DIFF    Collection Time: 01/17/23  6:29 PM   Result Value Ref Range    WBC 13.0 (H) 3.6 - 11.0 K/uL    RBC 4.05 3.80 - 5.20 M/uL    HGB 10.1 (L) 11.5 - 16.0 g/dL    HCT 33.6 (L) 35.0 - 47.0 %    MCV 83.0 80.0 - 99.0 FL    MCH 24.9 (L) 26.0 - 34.0 PG    MCHC 30.1 30.0 - 36.5 g/dL    RDW 16.6 (H) 11.5 - 14.5 %    PLATELET 029 701 - 164 K/uL    MPV 9.3 8.9 - 12.9 FL    NRBC 0.2 (H) 0  WBC    ABSOLUTE NRBC 0.02 (H) 0.00 - 0.01 K/uL    NEUTROPHILS 87 (H) 32 - 75 %    LYMPHOCYTES 8 (L) 12 - 49 %    MONOCYTES 3 (L) 5 - 13 %    EOSINOPHILS 0 0 - 7 %    BASOPHILS 0 0 - 1 %    IMMATURE GRANULOCYTES 2 (H) 0.0 - 0.5 %    ABS. NEUTROPHILS 11.3 (H) 1.8 - 8.0 K/UL    ABS. LYMPHOCYTES 1.1 0.8 - 3.5 K/UL    ABS. MONOCYTES 0.3 0.0 - 1.0 K/UL    ABS. EOSINOPHILS 0.0 0.0 - 0.4 K/UL    ABS. BASOPHILS 0.0 0.0 - 0.1 K/UL    ABS. IMM. GRANS. 0.3 (H) 0.00 - 0.04 K/UL    DF AUTOMATED     METABOLIC PANEL, COMPREHENSIVE    Collection Time: 01/17/23  6:29 PM   Result Value Ref Range    Sodium 143 136 - 145 mmol/L    Potassium 4.4 3.5 - 5.1 mmol/L    Chloride 114 (H) 97 - 108 mmol/L    CO2 24 21 - 32 mmol/L    Anion gap 5 5 - 15 mmol/L    Glucose 115 (H) 65 - 100 mg/dL    BUN 23 (H) 6 - 20 MG/DL    Creatinine 1.04 (H) 0.55 - 1.02 MG/DL    BUN/Creatinine ratio 22 (H) 12 - 20      eGFR >60 >60 ml/min/1.73m2    Calcium 8.6 8.5 - 10.1 MG/DL    Bilirubin, total 0.3 0.2 - 1.0 MG/DL    ALT (SGPT) 33 12 - 78 U/L    AST (SGOT) 17 15 - 37 U/L    Alk.  phosphatase 97 45 - 117 U/L    Protein, total 6.2 (L) 6.4 - 8.2 g/dL    Albumin 2.8 (L) 3.5 - 5.0 g/dL    Globulin 3.4 2.0 - 4.0 g/dL    A-G Ratio 0.8 (L) 1.1 - 2.2     URINALYSIS W/ REFLEX CULTURE    Collection Time: 01/17/23  8:27 PM    Specimen: Urine   Result Value Ref Range    Color YELLOW/STRAW      Appearance TURBID (A) CLEAR      Specific gravity 1.016      pH (UA) 5.5 5.0 - 8.0      Protein TRACE (A) NEG mg/dL    Glucose Negative NEG mg/dL    Ketone Negative NEG mg/dL    Bilirubin Negative NEG      Blood LARGE (A) NEG      Urobilinogen 0.2 0.2 - 1.0 EU/dL    Nitrites Negative NEG      Leukocyte Esterase MODERATE (A) NEG      WBC 10-20 0 - 4 /hpf    RBC >100 (H) 0 - 5 /hpf    Epithelial cells FEW FEW /lpf    Bacteria 1+ (A) NEG /hpf    UA:UC IF INDICATED URINE CULTURE ORDERED (A) CNI      Mucus TRACE (A) NEG /lpf    Amorphous Crystals 1+ (A) NEG    CA Oxalate crystals 2+ (A) NEG    Yeast PRESENT (A) NEG     PROTHROMBIN TIME + INR    Collection Time: 01/17/23  9:15 PM   Result Value Ref Range    INR 1.0 0.9 - 1.1      Prothrombin time 10.8 9.0 - 11.1 sec   TYPE & SCREEN    Collection Time: 01/17/23  9:15 PM   Result Value Ref Range    Crossmatch Expiration 01/20/2023,2359     ABO/Rh(D) A POSITIVE     Antibody screen NEG        Radiologic Studies -   XR CHEST PORT   Final Result   No acute findings. XR TIB/FIB RT   Final Result   No tibia or fibula fracture. XR KNEE RT MAX 2 VWS   Final Result   Acute, transverse fracture of the distal diaphysis the right femur   with moderate apex anterior angulation. XR FEMUR RT 2 VS   Final Result   Acute, transverse fracture of the distal diaphysis the right femur   with moderate apex anterior angulation. XR HIP RT W OR WO PELV 2-3 VWS   Final Result   Acute, transverse fracture of the distal diaphysis the right femur   with moderate apex anterior angulation.              DUPLEX LOWER EXT VENOUS BILAT    (Results Pending)   CT LOW EXT RT WO CONT    (Results Pending)     CT Results  (Last 48 hours)      None          CXR Results  (Last 48 hours)                 01/17/23 2054  XR CHEST PORT Final result    Impression:  No acute findings. Narrative:  EXAM:  XR CHEST PORT       INDICATION: Preop. COMPARISON: Chest x-ray 1/9/2019. TECHNIQUE: Supine portable chest AP view       FINDINGS: There has been interval removal of previously seen right-sided PICC   line. The cardiac silhouette is within normal limits. The pulmonary vasculature   is within normal limits. The lungs and pleural spaces are clear. The visualized bones and upper abdomen   redemonstrate surgical clips in left upper abdomen and otherwise are   age-appropriate. Medical Decision Making   I am the first provider for this patient. I reviewed the vital signs, available nursing notes, past medical history, past surgical history, family history and social history. Vital Signs-Reviewed the patient's vital signs. Patient Vitals for the past 12 hrs:   Temp Pulse Resp BP SpO2   01/18/23 0005 -- -- -- (!) 148/74 --   01/17/23 2258 98.1 °F (36.7 °C) 66 14 (!) 193/125 99 %   01/17/23 2107 -- -- -- (!) 185/160 95 %   01/17/23 2030 -- -- -- (!) 160/131 93 %   01/17/23 2007 -- -- -- 115/76 --   01/17/23 1930 -- -- -- -- 95 %   01/17/23 1900 -- -- -- (!) 187/110 93 %   01/17/23 1721 -- -- -- -- 96 %   01/17/23 1716 98.2 °F (36.8 °C) (!) 115 16 (!) 224/136 97 %       Records Reviewed: Nursing records and medical records reviewed    MDM:    Medical Decision Making  Patient presents with atrauamtic right knee and hip pain. Found to have severe distal femur fracture. Significant pain which was controlled with IV dilaudid. Her significant HTN improved and I feel this was from pain. Did have a leukocytosis which may represent a stress reaction, but UA and duplex were ordered for additional evaluation. She refused duplex due to pain and UA returned after she was admitted to hospital medicine service. Patient does have a hx of thromboembolus.   Discussed care with Dr. Darline Campos and Olga Watkins - patient will need surgery. Knee immobilizer ordered, but doubt patient will tolerate this due to pain. Do not think benefits outweigh risks to performing something like conscious sedation just to place a knee immobilizer and she is not moving her knee while in bed anyway. Petty has been placed. Amount and/or Complexity of Data Reviewed  External Data Reviewed: notes. Details: previous procedure with Dr. Elisabeth Cool: ordered. Decision-making details documented in ED Course. Radiology: ordered. Decision-making details documented in ED Course. Risk  Prescription drug management. Decision regarding hospitalization. ED Course:   Initial assessment performed. The patients presenting problems have been discussed, and they are in agreement with the care plan formulated and outlined with them. I have encouraged them to ask questions as they arise throughout their visit. Medications Administered       HYDROmorphone (DILAUDID) injection 1 mg       Admin Date  01/17/2023 Action  Given Dose  1 mg Route  IntraVENous Administered By  Setnhil Ceron RN                  Critical Care:  None    Disposition:  Admission    Diagnosis     Clinical Impression:   1. Other fracture of right femur, initial encounter for closed fracture [facility])      Attestations:    Gaurav Ferrera MD    Please note that this dictation was completed with [employer], the computer voice recognition software. Quite often unanticipated grammatical, syntax, homophones, and other interpretive errors are inadvertently transcribed by the computer software. Please disregard these errors. Please excuse any errors that have escaped final proofreading. Thank you.

## 2023-01-18 NOTE — PROGRESS NOTES
Transition of Care Plan:    RUR: 12%  Disposition: likely SNF rehab- will need authorization  If SNF or IPR: Date FOC offered:  Date FOC received:  Date authorization started with reference number:  Date authorization received and expires:  Accepting facility:  Follow up appointments:  DME needed:TBD by therapy or rehab facility  Transportation at [address] or means to access home:      patient/spouse  IM Medicare Letter:will need prior to discharge - has Medicare as secondary  Is patient a [place] and connected with the [place]? If yes, was [place] transfer form completed and [facility] notified? Caregiver Contact: Naty Finnegan spouse [phone]  Discharge Caregiver contacted prior to discharge?  Per patient  Care Conference needed?:    no                 Reason for Admission:  Rt femur fx - surgery on 1/19/2022                     RUR Score:        12%             Plan for utilizing home health:     will likely need rehab     PCP: First and Last name:  Srini Weiss MD is a pain management specialist- would be interested in a PCP     Name of Practice:    Are you a current patient: Yes/No:    Approximate date of last visit: seen virtually monthly    Can you participate in a virtual visit with your PCP: yes                    Current Advanced Directive/Advance Care Plan: Full Code      Healthcare Decision Maker:   Click here to complete [place] including selection of the Healthcare Decision Maker Relationship (ie \"Primary\")           Naty Finnegan spouse [phone]                  Transition of Care Plan:         CM met with patient - lives with her  who works and she has a 11 yo autistic son who is receiving homebound schooling at this time - patient has none of her own DME as she has been using her sons w/c and walker which are too small for her  - states until about 1 week ago she was driving and providing her own ADL's- now she is nonambulatory and been using her sons w/c and walker which are too small for her -  patient states her 22 yo son  4 years ago on her birthday of meninginitis - she has a psychiatrist she sees virtually to assist with her [address] needs - patient has had [facility] in the past but not able to recall agency name - has been at [facility] in the past (2018?) and at this time does not wish to go to that facility - CM discussed IPR vs SNF - undertain patient will be able to tolerate 3 hours of IPR and patient agreed - list of SNF rehab left with patient and request made for her to choose top 3 choices- patient wants to talk to her  about SNF/rehab before making choices- CM will follow up post op with patient. Patients insurance will require authorization.   CASSI Mcnulty

## 2023-01-18 NOTE — PROGRESS NOTES
Brief Orthopedic progress note:    46 yr old female with right distal femoral shaft fracture    Plan:  - Admit to medicine for preop clearance and medical management   - Plan for OR likely Thursday for retrograde nail of right femur   - Knee immobilizer for now  - Bedrest   - Will obtain informed consent   - Full consult note to follow in AM.    Dr Mj Russo aware and agrees with above. LETICIA Hinojosa     Addendum:  - Will order CT of the right femur to evaluate for possible underlying lesion given atraumatic nature of patients fracture.

## 2023-01-18 NOTE — CONSULTS
ORTHOPAEDIC CONSULT NOTE    Subjective:     Date of Consultation:  January 18, 2023      Marc Briggs is a 46 y.o. female who is being seen for right distal femur fracture. Patient presented to the emergency department last night on 1/17/2023 for evaluation of right leg pain with no known injury. The patient had attempted to see Dr. Cruz Ferrari in office yesterday as he previously placed her right knee, but she was unable to bear the pain and presented to the emergency department. Upon work-up in the emergency department patient was found to have a femoral shaft fracture. Our services were consulted for management of this injury. Patient complains of pain in the right lower extremity at the fracture site. She states \"the thoughts of trying to move at all makes me sick \". The patient admits to being in pain management outpatient long-term. The patient denies any other symptoms beyond pain in her right thigh.     Patient Active Problem List    Diagnosis Date Noted    Femur fracture, right ([facility] [address] 01/17/2023    Knee osteoarthritis 12/30/2019    Primary osteoarthritis of right knee 12/30/2019    Right knee DJD 12/30/2019    DVT (deep venous thrombosis) ([facility] [address] 01/26/2019    Status post incision and drainage 01/08/2019    Status post left knee replacement 12/12/2018    H/O total knee replacement, left 11/24/2018    Dehiscence of incision 11/16/2018    Primary osteoarthritis of both knees 10/24/2018    Hematuria 10/09/2018    MRSA carrier 10/09/2018    Hyperthyroidism 10/17/2012     Family History   Problem Relation Age of Onset    Heart Disease Mother     Heart Attack Mother     Hypertension Mother     OSTEOARTHRITIS Mother     Rashes/Skin Problems Mother         PLAQUE PSORIASIS    Heart Disease Father     Thyroid Disease Father     Diabetes Father     Hypertension Father     Diabetes Maternal Grandmother     Heart Attack Maternal Grandmother     Hypertension Maternal Grandmother     OSTEOARTHRITIS Maternal Grandmother     Hypertension Maternal Grandfather     Stroke Maternal Grandfather     High Cholesterol Sister     Other Son         meningitis    No Known Problems Son     Anesth Problems Neg Hx       Social History     Tobacco Use    Smoking status: Former     Types: Cigarettes     Quit date: 1994     Years since quittin.3    Smokeless tobacco: Never   Substance Use Topics    Alcohol use: No     Past Medical History:   Diagnosis Date    ADHD (attention deficit hyperactivity disorder)     Arthritis     OSTEO    Chronic pain     YAHIR KNEES, LOWER BACK, RT SHOULDER & NECK    GERD (gastroesophageal reflux disease)     Graves disease     NO LONGER AN ISSUE    Hematuria 10/9/2018    HTN (hypertension)     CONTROLLED WITH MEDS    Hypothyroidism 2018    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    OCD (obsessive compulsive disorder)     Other ill-defined conditions(799.89)     graves    Psychiatric disorder     depression. RECENTLY LOST HER SON ON 19 ON HER BIRTHDAY.     Seizure ([address]     Seizures ([address]  &     REACTIVE TO HYPOGLYCEMIA / ALSO FROM FLASHING LIGHTS    Thromboembolus ([address] 2019    right arm      Past Surgical History:   Procedure Laterality Date    DELIVERY       HX ABDOMINOPLASTY      HX ADENOIDECTOMY      HX  SECTION      HX  SECTION  2006    HX CHOLECYSTECTOMY      HX GASTRIC BYPASS          HX GI      CHOLEYCYSTECTOMY    HX ORTHOPAEDIC Right     CARPEL TUNNEL    HX ORTHOPAEDIC Left     CARPEL TUNNEL    HX ORTHOPAEDIC Left     ULNA SHORTENING    HX ORTHOPAEDIC Right     COLLAR BONE SURGERY     HX ORTHOPAEDIC Left 2019    LEFT KNEE REPLACEMENT    HX ORTHOPAEDIC Left     LEFT KNEE INCISION FOR INFECTION & REPAIR    HX ORTHOPAEDIC Left     LEFT KNEE REMOVAL OF PROSTHESIS & INSERTION OF SPACER, REPAIR OF INCISION    HX ORTHOPAEDIC  10/31/2019    left knee revision    HX TONSILLECTOMY  1975      Prior to Admission medications    Medication Sig Start Date End Date Taking? Authorizing Provider   diclofenac EC (VOLTAREN) 75 mg EC tablet Take 75 mg by mouth two (2) times a day. Yes Provider, Historical   fluconazole (DIFLUCAN) 150 mg tablet Take 150 mg by mouth every seven (7) days. FDA advises cautious prescribing of oral fluconazole in pregnancy. Yes Provider, Historical   hydrOXYzine pamoate (VISTARIL) 50 mg capsule Take 100 mg by mouth daily as needed. Yes Provider, Historical   losartan (COZAAR) 100 mg tablet Take 100 mg by mouth daily. Yes Provider, Historical   OLANZapine (ZyPREXA) 5 mg tablet Take 5 mg by mouth nightly. Yes Provider, Historical   prazosin (MINIPRESS) 5 mg capsule Take 5 mg by mouth nightly. Yes Provider, Historical   predniSONE (DELTASONE) 50 mg tablet Take 50 mg by mouth daily. Yes Provider, Historical   senna-docusate (PERICOLACE) 8.6-50 mg per tablet Take 1 Tab by mouth two (2) times a day. Indications: constipation 1/2/20  Yes LETICIA Painting   doxycycline (ADOXA) 100 mg tablet Take 100 mg by mouth two (2) times a day. Yes Provider, Historical   multivitamin (ONE A DAY) tablet Take 1 Tab by mouth nightly. Yes Provider, Historical   Lisdexamfetamine (VYVANSE) 70 mg cap Take 70 mg by mouth every morning. Yes Provider, Historical   morphine CR (MS CONTIN) 30 mg CR tablet Take  by mouth every twelve (12) hours. Yes Provider, Historical   acetaminophen (TYLENOL) 500 mg tablet Take 500 mg by mouth every six (6) hours as needed for Pain. Yes Provider, Historical   alpha-d-galactosidase (BEANO) 150 unit tab tablet Take 2 Tabs by mouth three (3) times daily as needed for Flatulence. Yes Griffith Holter, MD   amphetamine sulfate 10 mg tab Take 10 mg by mouth as needed. PT TAKES 2 TABS (20 MG) BID AT 0200 AND 1200    Yes Provider, Historical   vortioxetine (TRINTELLIX) 10 mg tablet Take 30 mg by mouth every morning.  PT TAKES A TOTAL OF 30 mg DAILY   Yes Provider, Historical   albuterol (PROVENTIL HFA, VENTOLIN HFA, PROAIR HFA) 90 mcg/actuation inhaler Take 1 Puff by inhalation every four (4) hours as needed for Wheezing. Yes Provider, Historical   simethicone (MYLICON) 80 mg chewable tablet Take 80 mg by mouth every six (6) hours as needed for Flatulence. Yes Provider, Historical   cetirizine (ZYRTEC) 10 mg tablet Take 1 Tab by mouth daily.    Yes Provider, Historical     Current Facility-Administered Medications   Medication Dose Route Frequency    cefTRIAXone (ROCEPHIN) 1 g in 0.9% sodium chloride (MBP/ADV) 50 mL MBP  1 g IntraVENous Q24H    cholecalciferol (VITAMIN D3) (1000 Units /25 mcg) tablet 1,000 Units  1,000 Units Oral DAILY    LORazepam (ATIVAN) injection 2 mg  2 mg IntraVENous ONCE    HYDROmorphone (DILAUDID) tablet 4 mg  4 mg Oral Q4H    HYDROmorphone 30 mg/30 mL (DILAUDID) PCA   IntraVENous CONTINUOUS    sodium chloride (NS) flush 5-40 mL  5-40 mL IntraVENous Q8H    sodium chloride (NS) flush 5-40 mL  5-40 mL IntraVENous PRN    acetaminophen (TYLENOL) tablet 650 mg  650 mg Oral Q6H    naloxone (NARCAN) injection 0.4 mg  0.4 mg IntraVENous PRN    senna-docusate (PERICOLACE) 8.6-50 mg per tablet 2 Tablet  2 Tablet Oral BID    acyclovir (ZOVIRAX) tablet 400 mg  400 mg Oral Q4H PRN    losartan (COZAAR) tablet 100 mg  100 mg Oral DAILY    hydroCHLOROthiazide (HYDRODIURIL) tablet 50 mg  50 mg Oral DAILY    prazosin (MINIPRESS) capsule 5 mg  5 mg Oral QHS    busPIRone (BUSPAR) tablet 15 mg  15 mg Oral BID    OLANZapine (ZyPREXA) tablet 5 mg  5 mg Oral QPM    doxycycline (VIBRA-TABS) tablet 100 mg  100 mg Oral Q12H    pantoprazole (PROTONIX) tablet 40 mg  40 mg Oral ACB    sodium chloride (NS) flush 5-40 mL  5-40 mL IntraVENous Q8H    sodium chloride (NS) flush 5-40 mL  5-40 mL IntraVENous PRN    acetaminophen (TYLENOL) tablet 650 mg  650 mg Oral Q6H PRN    Or    acetaminophen (TYLENOL) suppository 650 mg  650 mg Rectal Q6H PRN    polyethylene glycol (MIRALAX) packet 17 g  17 g Oral DAILY PRN    ondansetron (ZOFRAN ODT) tablet 4 mg  4 mg Oral Q8H PRN    Or    ondansetron (ZOFRAN) injection 4 mg  4 mg IntraVENous Q6H PRN    labetaloL (NORMODYNE;TRANDATE) injection 5 mg  5 mg IntraVENous Q2H PRN    morphine ER (MS CONTIN) tablet 30 mg  30 mg Oral Q12H      Allergies   Allergen Reactions    Sulfa (Sulfonamide Antibiotics) Hives    Other Medication Rash     BANDAIDS MAY CAUSE RASH. Pcn [Penicillins] Unproven on Challenge        Review of Systems:  A comprehensive review of systems was negative except for that written in the HPI. Mental Status: Alert and oriented. Objective:     Patient Vitals for the past 8 hrs:   BP Temp Pulse Resp SpO2   23 1520 (!) 148/86 98.1 °F (36.7 °C) 85 17 99 %   23 1135 (!) 150/87 97.9 °F (36.6 °C) 81 17 99 %     Temp (24hrs), Av °F (36.7 °C), Min:97.9 °F (36.6 °C), Max:98.1 °F (36.7 °C)      Gen: Obese female; resting comfortably, but will occasionally become very tearful. HEENT: Pink conjunctivae, hearing intact to voice, moist mucous membranes   Neck: Supple  Resp: No respiratory distress   Card: palpable distal pulse-equal bilaterally, brisk cap refill all distal digits   Abd: non-distended  Musc: Inspection of the right lower extremity does not reveal any obvious abnormality. Active range of motion intact of all digits of the right foot/ankle. Patient is unwilling to perform any other range of motion of her right lower extremity due to fear of pain. All compartments of the right lower extremity soft to palpation. Skin: No skin breakdown noted. Skin warm, pink, dry  Neuro: Cranial nerves are grossly intact, no focal motor weakness, follows commands appropriately. Sensation light touch intact right lower extremity. Psych: Good insight, oriented to person, place and time, alert    Imaging Review: INDICATION:  right hip and knee pain      Exam: AP, lateral views of the right knee.      FINDINGS: There is an acute transverse fracture of the distal diaphysis of the  right femur with moderate apex anterior angulation. There is a right total knee  arthroplasty without evidence of orthopedic hardware complication. IMPRESSION  Acute, transverse fracture of the distal diaphysis the right femur  with moderate apex anterior angulation. Labs:   Recent Results (from the past 24 hour(s))   CBC WITH AUTOMATED DIFF    Collection Time: 01/17/23  6:29 PM   Result Value Ref Range    WBC 13.0 (H) 3.6 - 11.0 K/uL    RBC 4.05 3.80 - 5.20 M/uL    HGB 10.1 (L) 11.5 - 16.0 g/dL    HCT 33.6 (L) 35.0 - 47.0 %    MCV 83.0 80.0 - 99.0 FL    MCH 24.9 (L) 26.0 - 34.0 PG    MCHC 30.1 30.0 - 36.5 g/dL    RDW 16.6 (H) 11.5 - 14.5 %    PLATELET 975 296 - 529 K/uL    MPV 9.3 8.9 - 12.9 FL    NRBC 0.2 (H) 0  WBC    ABSOLUTE NRBC 0.02 (H) 0.00 - 0.01 K/uL    NEUTROPHILS 87 (H) 32 - 75 %    LYMPHOCYTES 8 (L) 12 - 49 %    MONOCYTES 3 (L) 5 - 13 %    EOSINOPHILS 0 0 - 7 %    BASOPHILS 0 0 - 1 %    IMMATURE GRANULOCYTES 2 (H) 0.0 - 0.5 %    ABS. NEUTROPHILS 11.3 (H) 1.8 - 8.0 K/UL    ABS. LYMPHOCYTES 1.1 0.8 - 3.5 K/UL    ABS. MONOCYTES 0.3 0.0 - 1.0 K/UL    ABS. EOSINOPHILS 0.0 0.0 - 0.4 K/UL    ABS. BASOPHILS 0.0 0.0 - 0.1 K/UL    ABS. IMM. GRANS. 0.3 (H) 0.00 - 0.04 K/UL    DF AUTOMATED     METABOLIC PANEL, COMPREHENSIVE    Collection Time: 01/17/23  6:29 PM   Result Value Ref Range    Sodium 143 136 - 145 mmol/L    Potassium 4.4 3.5 - 5.1 mmol/L    Chloride 114 (H) 97 - 108 mmol/L    CO2 24 21 - 32 mmol/L    Anion gap 5 5 - 15 mmol/L    Glucose 115 (H) 65 - 100 mg/dL    BUN 23 (H) 6 - 20 MG/DL    Creatinine 1.04 (H) 0.55 - 1.02 MG/DL    BUN/Creatinine ratio 22 (H) 12 - 20      eGFR >60 >60 ml/min/1.73m2    Calcium 8.6 8.5 - 10.1 MG/DL    Bilirubin, total 0.3 0.2 - 1.0 MG/DL    ALT (SGPT) 33 12 - 78 U/L    AST (SGOT) 17 15 - 37 U/L    Alk.  phosphatase 97 45 - 117 U/L    Protein, total 6.2 (L) 6.4 - 8.2 g/dL    Albumin 2.8 (L) 3.5 - 5.0 g/dL    Globulin 3.4 2.0 - 4.0 g/dL    A-G Ratio 0.8 (L) 1.1 - 2.2 URINALYSIS W/ REFLEX CULTURE    Collection Time: 01/17/23  8:27 PM    Specimen: Urine   Result Value Ref Range    Color YELLOW/STRAW      Appearance TURBID (A) CLEAR      Specific gravity 1.016      pH (UA) 5.5 5.0 - 8.0      Protein TRACE (A) NEG mg/dL    Glucose Negative NEG mg/dL    Ketone Negative NEG mg/dL    Bilirubin Negative NEG      Blood LARGE (A) NEG      Urobilinogen 0.2 0.2 - 1.0 EU/dL    Nitrites Negative NEG      Leukocyte Esterase MODERATE (A) NEG      WBC 10-20 0 - 4 /hpf    RBC >100 (H) 0 - 5 /hpf    Epithelial cells FEW FEW /lpf    Bacteria 1+ (A) NEG /hpf    UA:UC IF INDICATED URINE CULTURE ORDERED (A) CNI      Mucus TRACE (A) NEG /lpf    Amorphous Crystals 1+ (A) NEG    CA Oxalate crystals 2+ (A) NEG    Yeast PRESENT (A) NEG     PROTHROMBIN TIME + INR    Collection Time: 01/17/23  9:15 PM   Result Value Ref Range    INR 1.0 0.9 - 1.1      Prothrombin time 10.8 9.0 - 11.1 sec   TYPE & SCREEN    Collection Time: 01/17/23  9:15 PM   Result Value Ref Range    Crossmatch Expiration 01/20/2023,2359     ABO/Rh(D) A POSITIVE     Antibody screen NEG    TSH 3RD GENERATION    Collection Time: 01/18/23  4:19 AM   Result Value Ref Range    TSH 3.85 (H) 0.36 - 3.74 uIU/mL   VITAMIN D, 25 HYDROXY    Collection Time: 01/18/23  4:19 AM   Result Value Ref Range    Vitamin D 25-Hydroxy 23.2 (L) 30 - 100 ng/mL   PTH INTACT    Collection Time: 01/18/23  4:19 AM   Result Value Ref Range    Calcium 8.7 8.5 - 10.1 MG/DL    PTH, Intact 35.5 18.4 - 20.2 pg/mL   METABOLIC PANEL, BASIC    Collection Time: 01/18/23  4:19 AM   Result Value Ref Range    Sodium 142 136 - 145 mmol/L    Potassium 4.2 3.5 - 5.1 mmol/L    Chloride 111 (H) 97 - 108 mmol/L    CO2 26 21 - 32 mmol/L    Anion gap 5 5 - 15 mmol/L    Glucose 89 65 - 100 mg/dL    BUN 22 (H) 6 - 20 MG/DL    Creatinine 1.03 (H) 0.55 - 1.02 MG/DL    BUN/Creatinine ratio 21 (H) 12 - 20      eGFR >60 >60 ml/min/1.73m2    Calcium 8.4 (L) 8.5 - 10.1 MG/DL   CBC WITH AUTOMATED DIFF    Collection Time: 01/18/23  4:19 AM   Result Value Ref Range    WBC 13.4 (H) 3.6 - 11.0 K/uL    RBC 3.66 (L) 3.80 - 5.20 M/uL    HGB 9.1 (L) 11.5 - 16.0 g/dL    HCT 30.8 (L) 35.0 - 47.0 %    MCV 84.2 80.0 - 99.0 FL    MCH 24.9 (L) 26.0 - 34.0 PG    MCHC 29.5 (L) 30.0 - 36.5 g/dL    RDW 16.6 (H) 11.5 - 14.5 %    PLATELET 103 960 - 575 K/uL    MPV 9.3 8.9 - 12.9 FL    NRBC 0.1 (H) 0  WBC    ABSOLUTE NRBC 0.02 (H) 0.00 - 0.01 K/uL    NEUTROPHILS 69 32 - 75 %    LYMPHOCYTES 23 12 - 49 %    MONOCYTES 7 5 - 13 %    EOSINOPHILS 0 0 - 7 %    BASOPHILS 0 0 - 1 %    IMMATURE GRANULOCYTES 1 (H) 0.0 - 0.5 %    ABS. NEUTROPHILS 9.1 (H) 1.8 - 8.0 K/UL    ABS. LYMPHOCYTES 3.1 0.8 - 3.5 K/UL    ABS. MONOCYTES 1.0 0.0 - 1.0 K/UL    ABS. EOSINOPHILS 0.0 0.0 - 0.4 K/UL    ABS. BASOPHILS 0.0 0.0 - 0.1 K/UL    ABS. IMM. GRANS. 0.1 (H) 0.00 - 0.04 K/UL    DF AUTOMATED     T4, FREE    Collection Time: 01/18/23 11:27 AM   Result Value Ref Range    T4, Free 0.9 0.8 - 1.5 NG/DL         Impression:     Patient Active Problem List    Diagnosis Date Noted    Femur fracture, right ([facility] [address] 01/17/2023    Knee osteoarthritis 12/30/2019    Primary osteoarthritis of right knee 12/30/2019    Right knee DJD 12/30/2019    DVT (deep venous thrombosis) ([facility] [address] 01/26/2019    Status post incision and drainage 01/08/2019    Status post left knee replacement 12/12/2018    H/O total knee replacement, left 11/24/2018    Dehiscence of incision 11/16/2018    Primary osteoarthritis of both knees 10/24/2018    Hematuria 10/09/2018    MRSA carrier 10/09/2018    Hyperthyroidism 10/17/2012     Active Problems:    Femur fracture, right ([address] (1/17/2023)        Plan:   Right distal femoral shaft fracture  History of right TKA    -Unfortunately, due to pain the patient has been unable to obtain CT scan, ultrasound, or be placed in a knee immobilizer. Pain management optimization has been attempted.   Appreciate hospitalist and palliative care's assistance with pain management. -Due to the patient's concern for potential pain with any movement, she has been unwilling to do undergo further testing.  -There are no obvious lesions or causes of the patient's fracture for her to have been atraumatic, but the patient has been unwilling to a attempt CT scan even with pain medication, light sedation, or conscious sedation.  -I did discuss significance of CT scan with the patient as well as knee immobilizer, but she is unwilling to try to have either of these performed. We will forego the CT scan at this time given these limitations and no obvious abnormality on radiographs beyond her fracture.  -We will plan for right femur retrograde nail tomorrow 1/19/2023 by Dr. Rajiv Burgos  -N.p.o. after midnight  -Bedrest  -Informed consent obtained. Dr. Rajiv Burgos aware and agrees with plan as above.         LETICIA [employer]

## 2023-01-18 NOTE — PROGRESS NOTES
Physical Therapy note    PT orders dated for 1/20/23, plan for possible IM nailing Thursday. Will follow and perform evaluation when medically appropriate.     Lucy Richey, PT, DPT

## 2023-01-18 NOTE — CONSULTS
Palliative Medicine Consult  [place]: [phone] ([phone])    Patient Name: Lindy Gallagher  YOB: 1970    Date of Initial Consult: 23  Reason for Consult: help with pain control, pt on high doses opioids prior to admission, now here with femur fracture and difficult to control pain  Requesting Provider: Shine Villarreal MD   Primary Care Physician: Deedee Sánchez MD     SUMMARY:   Lindy Gallagher is a 46 y.o. with a past history of ADHD, chronic pain, HTN, OCD, MRSA carrier, depression, seizures, who was admitted on 2023 from home with a diagnosis of cute, transverse fracture of the distal diaphysis the right femur with moderate apex anterior angulation. .     Per , pt fills prescriptions for:  1/15/23: Morphine ER 30mg tab #60 tab/30 day supply,   1/15/23: dilaudid 4mg tab  #150 tab/30 day supply, Dr. Sandhya Curran  1/3/23: Vyvanse 70mg tab #90 tab/90 day supply, Dr. Rojo Fraction  23-10/2022: various types of marijuana hybrids prescribed by Charan Urias    HPI:  : pt BIBA with c/o right knee and hip pain. She had a right knee replacement in . On  pt began to have significant right knee pain with no precipitating injury, now unable to bear weight, unable to move from home so she had EMS bring her to ER. Patient takes Morphine ER 60mg daily and dilaudid 20mg daily at baseline (140mg MME)     Admission course: : elevated wbc 13.0, h/h 10.1/33.6, UA suspicious of UTI, UC sent, right femur xray: shows acute, transverse fracture of the distal diaphysis the right femur with moderate apex anterior angulation. : refusing CT scan unless \"you knock me out. \"   Ortho planning OR intervention tomorrow. Current medical issues leading to Palliative Medicine involvement include: complicated pain management. Psychosocial: lives at home with  and 13 y/o son (autistic). Another son (also autistic)  2019 from meningitis.    PALLIATIVE DIAGNOSES:   Chronic pain syndrome (chronic knee, back, neck, clavicle)  Acute pain due to right femur fracture  Depression, complicated grief due to loss of son   PLAN:   Prior to visit, I completed an extensive review of patient's medical records, including medical documentation, vital signs, MARs, and results of various labs and other diagnostics. I also spoke with patient's attending Dr. Liu Coe.   Met with pt, obtained history, discussed pain management options. For chronic pain:  Continue scheduled Morphine ER 30mg bid and dilaudid 4mg PO q.4 hours scheduled. For acute pain due to fracture: dilaudid PCA: 0.3mg loading dose, 0.3mg bolus dose with 10 min lockout, no basal rate, 12mg four hour lockout. Ice pack. Narcan already on order. Continuous oximetry while adjusting opioid doses. RN can call me for pain medication dose adjustments [phone]. Depression: pt very tearful when sharing photos of family, in particular her son who has passed away. Initial consult note routed to primary continuity provider and/or primary health care team members  Communicated plan of care with: [facility] Team     GOALS OF CARE / TREATMENT PREFERENCES:     GOALS OF CARE:   optimal pain control      Patient/Health Care Proxy Stated Goals: Prolong life   HISTORY:     History obtained from: chart, patient     The patient is:   [x] Verbal and participatory  [] Non-participatory due to:          Clinical Pain Assessment (nonverbal scale for severity on nonverbal patients):   Clinical Pain Assessment  Severity: 4  Location: right thigh  Character: sharp, \"very painful\"  Duration: days  Effect: cannot move  Factors:  MOVEMENT, PAIN MEDICATION  Frequency: intermittent     Activity (Movement): Restless, excessive activity and/or withdrawal reflexes    Duration: for how long has pt been experiencing pain (e.g., 2 days, 1 month, years)  Frequency: how often pain is an issue (e.g., several times per day, once every few days, constant)     FUNCTIONAL ASSESSMENT:     Palliative Performance Scale (PPS):  PPS: 30       PSYCHOSOCIAL/SPIRITUAL SCREENING:     Palliative IDT has assessed this patient for cultural preferences / practices and a referral made as appropriate to needs (Cultural Services, Patient Advocacy, Ethics, etc.)    Any spiritual / Hindu concerns:  [] Yes /  [x] No   If \"Yes\" to discuss with pastoral care during IDT     Does caregiver feel burdened by caring for their loved one:   [] Yes /  [x] No /  [] No Caregiver Present    If \"Yes\" to discuss with social work during IDT    Anticipatory grief assessment:   [x] Normal  / [] Maladaptive     If \"Maladaptive\" to discuss with social work during IDT    ESAS Anxiety: Anxiety: 3    ESAS Depression: Depression: 4        REVIEW OF SYSTEMS:     Positive and pertinent negative findings in ROS are noted above in HPI. The following systems were [x] reviewed / [] unable to be reviewed as noted in HPI  Other findings are noted below. Systems: constitutional, ears/nose/mouth/throat, respiratory, gastrointestinal, genitourinary, musculoskeletal, integumentary, neurologic, psychiatric, endocrine. Positive findings noted below. Modified ESAS Completed by: provider   Fatigue: 8 Drowsiness: 2   Depression: 4 Pain: 4   Anxiety: 3 Nausea: 0   Anorexia: 0 Dyspnea: 0     Constipation: No              PHYSICAL EXAM:     From RN flowsheet:  Wt Readings from Last 3 Encounters:   01/17/23 262 lb 2 oz (118.9 kg)   12/30/19 202 lb 4 oz (91.7 kg)   12/04/19 202 lb 4 oz (91.7 kg)     Blood pressure (!) 148/86, pulse 85, temperature 98.1 °F (36.7 °C), resp. rate 17, height 5' 1\" (1.549 m), weight 262 lb 2 oz (118.9 kg), SpO2 99 %.     Pain Scale 1: Numeric (0 - 10)  Pain Intensity 1: 4  Pain Onset 1: acute  Pain Location 1: Leg  Pain Orientation 1: Right  Pain Description 1: Aching, Throbbing  Pain Intervention(s) 1: Declines  Last bowel movement, if known:     Constitutional: AAOx3, pleasant  Eyes: pupils equal, anicteric  ENMT: no nasal discharge, moist mucous membranes  Cardiovascular: regular rhythm, distal pulses intact  Respiratory: breathing not labored, symmetric  Gastrointestinal: obese, soft non-tender, +bowel sounds  Musculoskeletal: no deformity, no tenderness to palpation  Skin: warm, dry  Neurologic: following commands, moving all extremities  Psychiatric: full affect, no hallucinations          HISTORY:     Active Problems:    Femur fracture, right ([address] (2023)    Past Medical History:   Diagnosis Date    ADHD (attention deficit hyperactivity disorder)     Arthritis     OSTEO    Chronic pain     YAHIR KNEES, LOWER BACK, RT SHOULDER & NECK    GERD (gastroesophageal reflux disease)     Graves disease     NO LONGER AN ISSUE    Hematuria 10/9/2018    HTN (hypertension)     CONTROLLED WITH MEDS    Hypothyroidism 2018    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    OCD (obsessive compulsive disorder)     Other ill-defined conditions(799.89)     graves    Psychiatric disorder     depression. RECENTLY LOST HER SON ON 19 ON HER BIRTHDAY.     Seizure ([address]     Seizures ([address]  &     REACTIVE TO HYPOGLYCEMIA / ALSO FROM FLASHING LIGHTS    Thromboembolus ([address] 2019    right arm      Past Surgical History:   Procedure Laterality Date    DELIVERY       HX ABDOMINOPLASTY      HX ADENOIDECTOMY      HX  SECTION      HX  SECTION  2006    HX CHOLECYSTECTOMY      HX GASTRIC BYPASS          HX GI  1993    CHOLEYCYSTECTOMY    HX ORTHOPAEDIC Right     CARPEL TUNNEL    HX ORTHOPAEDIC Left     CARPEL TUNNEL    HX ORTHOPAEDIC Left     ULNA SHORTENING    HX ORTHOPAEDIC Right     COLLAR BONE SURGERY     HX ORTHOPAEDIC Left 2019    LEFT KNEE REPLACEMENT    HX ORTHOPAEDIC Left     LEFT KNEE INCISION FOR INFECTION & REPAIR    HX ORTHOPAEDIC Left     LEFT KNEE REMOVAL OF PROSTHESIS & INSERTION OF SPACER, REPAIR OF INCISION    HX ORTHOPAEDIC  10/31/2019    left knee revision    HX TONSILLECTOMY  1975      Family History   Problem Relation Age of Onset    Heart Disease Mother     Heart Attack Mother     Hypertension Mother     OSTEOARTHRITIS Mother     Rashes/Skin Problems Mother         PLAQUE PSORIASIS    Heart Disease Father     Thyroid Disease Father     Diabetes Father     Hypertension Father     Diabetes Maternal Grandmother     Heart Attack Maternal Grandmother     Hypertension Maternal Grandmother     OSTEOARTHRITIS Maternal Grandmother     Hypertension Maternal Grandfather     Stroke Maternal Grandfather     High Cholesterol Sister     Other Son         meningitis    No Known Problems Son     Anesth Problems Neg Hx       History reviewed, no pertinent family history. Social History     Tobacco Use    Smoking status: Former     Types: Cigarettes     Quit date: 1994     Years since quittin.3    Smokeless tobacco: Never   Substance Use Topics    Alcohol use: No     Allergies   Allergen Reactions    Sulfa (Sulfonamide Antibiotics) Hives    Other Medication Rash     BANDAIDS MAY CAUSE RASH.     Pcn [Penicillins] Unproven on Challenge      Current Facility-Administered Medications   Medication Dose Route Frequency    cefTRIAXone (ROCEPHIN) 1 g in 0.9% sodium chloride (MBP/ADV) 50 mL MBP  1 g IntraVENous Q24H    cholecalciferol (VITAMIN D3) (1000 Units /25 mcg) tablet 1,000 Units  1,000 Units Oral DAILY    LORazepam (ATIVAN) injection 2 mg  2 mg IntraVENous ONCE    HYDROmorphone (DILAUDID) tablet 4 mg  4 mg Oral Q4H    HYDROmorphone 30 mg/30 mL (DILAUDID) PCA   IntraVENous CONTINUOUS    sodium chloride (NS) flush 5-40 mL  5-40 mL IntraVENous Q8H    sodium chloride (NS) flush 5-40 mL  5-40 mL IntraVENous PRN    acetaminophen (TYLENOL) tablet 650 mg  650 mg Oral Q6H    naloxone (NARCAN) injection 0.4 mg  0.4 mg IntraVENous PRN    senna-docusate (PERICOLACE) 8.6-50 mg per tablet 2 Tablet  2 Tablet Oral BID    acyclovir (ZOVIRAX) tablet 400 mg  400 mg Oral Q4H PRN    losartan (COZAAR) tablet 100 mg  100 mg Oral DAILY    hydroCHLOROthiazide (HYDRODIURIL) tablet 50 mg  50 mg Oral DAILY    prazosin (MINIPRESS) capsule 5 mg  5 mg Oral QHS    busPIRone (BUSPAR) tablet 15 mg  15 mg Oral BID    OLANZapine (ZyPREXA) tablet 5 mg  5 mg Oral QPM    doxycycline (VIBRA-TABS) tablet 100 mg  100 mg Oral Q12H    pantoprazole (PROTONIX) tablet 40 mg  40 mg Oral ACB    sodium chloride (NS) flush 5-40 mL  5-40 mL IntraVENous Q8H    sodium chloride (NS) flush 5-40 mL  5-40 mL IntraVENous PRN    acetaminophen (TYLENOL) tablet 650 mg  650 mg Oral Q6H PRN    Or    acetaminophen (TYLENOL) suppository 650 mg  650 mg Rectal Q6H PRN    polyethylene glycol (MIRALAX) packet 17 g  17 g Oral DAILY PRN    ondansetron (ZOFRAN ODT) tablet 4 mg  4 mg Oral Q8H PRN    Or    ondansetron (ZOFRAN) injection 4 mg  4 mg IntraVENous Q6H PRN    labetaloL (NORMODYNE;TRANDATE) injection 5 mg  5 mg IntraVENous Q2H PRN    morphine ER (MS CONTIN) tablet 30 mg  30 mg Oral Q12H          LAB AND IMAGING FINDINGS:     Lab Results   Component Value Date/Time    WBC 13.4 (H) 01/18/2023 04:19 AM    HGB 9.1 (L) 01/18/2023 04:19 AM    PLATELET 791 63/28/6661 04:19 AM     Lab Results   Component Value Date/Time    Sodium 142 01/18/2023 04:19 AM    Potassium 4.2 01/18/2023 04:19 AM    Chloride 111 (H) 01/18/2023 04:19 AM    CO2 26 01/18/2023 04:19 AM    BUN 22 (H) 01/18/2023 04:19 AM    Creatinine 1.03 (H) 01/18/2023 04:19 AM    Calcium 8.7 01/18/2023 04:19 AM    Calcium 8.4 (L) 01/18/2023 04:19 AM      Lab Results   Component Value Date/Time    Alk.  phosphatase 97 01/17/2023 06:29 PM    Protein, total 6.2 (L) 01/17/2023 06:29 PM    Albumin 2.8 (L) 01/17/2023 06:29 PM    Globulin 3.4 01/17/2023 06:29 PM     Lab Results   Component Value Date/Time    INR 1.0 01/17/2023 09:15 PM    Prothrombin time 10.8 01/17/2023 09:15 PM    aPTT 31.3 01/26/2019 08:51 PM      Lab Results   Component Value Date/Time    Ferritin 31 01/29/2019 03:06 AM      No results found for: PH, PCO2, PO2  No components found for: GLPOC   No results found for: CPK, CKMB             Total time: 80  Counseling / coordination time, spent as noted above: 55  > 50% counseling / coordination?: yes    Prolonged service was provided for  []30 min   []75 min in face to face time in the presence of the patient, spent as noted above. Time Start:   Time End:   Note: this can only be billed with 00396 (initial) or 38147 (follow up). If multiple start / stop times, list each separately.

## 2023-01-18 NOTE — PROGRESS NOTES
TRANSFER - IN REPORT:    Verbal report received from [facility] (name) on Abran Marcos  being received from ortho (unit) for routine progression of care      Report consisted of patients Situation, Background, Assessment and   Recommendations(SBAR). Information from the following report(s) SBAR, Kardex, and MAR was reviewed with the receiving nurse. Opportunity for questions and clarification was provided. Assessment completed upon patients arrival to unit and care assumed.

## 2023-01-18 NOTE — PROGRESS NOTES
Occupational Therapy note:    Order acknowledged and chart reviewed. Noted patient's orders start 1/20/2023 at 0800 with possible surgical procedure on 1/19/2023. Will need updated order from ortho with WB status prior to initiating OT consult.     Alfredo Fuchs, OTR/L

## 2023-01-18 NOTE — H&P
This note is compiled to communicate with the medical care team. It may contain sensitive and protected information. It is not intended to serve as a source of communication with patients/families/friends; that communication occurs at the bedside or via alternative direct communications means (secure messaging, letters, video/telephone, etc.). Hospitalist Admission Note    NAME: Asad So   :  1970   MRN:  [id number]     Date/Time:  2023 12:01 AM    Patient PCP: Yumiko Vera MD  ______________________________________________________________________  Given the patient's current clinical presentation, I have a high level of concern for decompensation if discharged from the emergency department. Complex decision making was performed, which includes reviewing the patient's available past medical records, laboratory results, and x-ray films. My assessment of this patient's clinical condition and my plan of care is as follows. Subjective:   CHIEF COMPLAINT: Right femur fracture    HISTORY OF PRESENT ILLNESS:     Tariq Castillo is a 46 y.o.  female with pertinent past medical history as listed below who presents with complaints of severe right knee/thigh pain. Patient reports no preceding trauma and states that she has been experiencing pains in her right knee since waking Saturday. She reports at baseline she is nonambulatory moving around with a wheelchair and utilizing a pivot chair to transition. She does report sensation of a pop in her right knee today followed with exquisite pain during a transfer, but denies any falls or overt injury to the knee. ROS otherwise negative. Patient denies tobacco, alcohol, or illicit drug use. Of note, she has multiple comorbidities and is not followed by primary care physician reporting that she receives her medications by her psychiatrist and pain management physician.     In the ED, patient afebrile and hemodynamically stable (hypertensive 190s/120s) saturating upper 90s on room air. Right leg radiographs demonstrates acute, transverse fracture of distal diaphysis of right femur with moderate apex anterior angulation. Orthopedics consulted by ED with plans for operative intervention Thursday requesting medicine admit as primary given multiple comorbidities. Labs demonstrate: WBC 13.0, hemoglobin 10.1 (baseline), platelets 125, sodium 143, potassium 4.4, glucose 115, BUN 23, creatinine 1.04, UA reflex to culture (moderate leukocyte Estrace, large blood, 1+ bacteria, yeast). Petty catheter placed in the ED. We were asked to admit for work up and evaluation of the above problems. Past Medical History:   Diagnosis Date    ADHD (attention deficit hyperactivity disorder)     Arthritis     OSTEO    Chronic pain     YAHIR KNEES, LOWER BACK, RT SHOULDER & NECK    GERD (gastroesophageal reflux disease)     Graves disease     NO LONGER AN ISSUE    Hematuria 10/9/2018    HTN (hypertension)     CONTROLLED WITH MEDS    Hypothyroidism 2018    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    OCD (obsessive compulsive disorder)     Other ill-defined conditions(799.89)     graves    Psychiatric disorder     depression. RECENTLY LOST HER SON ON 19 ON HER BIRTHDAY.     Seizure ([address]     Seizures ([address] 2003 &     REACTIVE TO HYPOGLYCEMIA / ALSO FROM FLASHING LIGHTS    Thromboembolus ([address] 2019    right arm        Past Surgical History:   Procedure Laterality Date    DELIVERY       HX ABDOMINOPLASTY      HX ADENOIDECTOMY      HX  SECTION      HX  SECTION  2006    HX CHOLECYSTECTOMY      HX GASTRIC BYPASS          HX GI      CHOLEYCYSTECTOMY    HX ORTHOPAEDIC Right     CARPEL TUNNEL    HX ORTHOPAEDIC Left     CARPEL TUNNEL    HX ORTHOPAEDIC Left     ULNA SHORTENING    HX ORTHOPAEDIC Right     COLLAR BONE SURGERY     HX ORTHOPAEDIC Left 2019    LEFT KNEE REPLACEMENT    HX ORTHOPAEDIC Left     LEFT KNEE INCISION FOR INFECTION & REPAIR    HX ORTHOPAEDIC Left     LEFT KNEE REMOVAL OF PROSTHESIS & INSERTION OF SPACER, REPAIR OF INCISION    HX ORTHOPAEDIC  10/31/2019    left knee revision    HX TONSILLECTOMY  1975       Social History     Tobacco Use    Smoking status: Former     Types: Cigarettes     Quit date: 1994     Years since quittin.3    Smokeless tobacco: Never   Substance Use Topics    Alcohol use: No        Family History   Problem Relation Age of Onset    Heart Disease Mother     Heart Attack Mother     Hypertension Mother     OSTEOARTHRITIS Mother     Rashes/Skin Problems Mother         PLAQUE PSORIASIS    Heart Disease Father     Thyroid Disease Father     Diabetes Father     Hypertension Father     Diabetes Maternal Grandmother     Heart Attack Maternal Grandmother     Hypertension Maternal Grandmother     OSTEOARTHRITIS Maternal Grandmother     Hypertension Maternal Grandfather     Stroke Maternal Grandfather     High Cholesterol Sister     Other Son         meningitis    No Known Problems Son     Anesth Problems Neg Hx        Allergies   Allergen Reactions    Sulfa (Sulfonamide Antibiotics) Hives    Other Medication Rash     BANDAIDS MAY CAUSE RASH. Pcn [Penicillins] Unproven on Challenge        Prior to Admission medications    Medication Sig Start Date End Date Taking? Authorizing Provider   amphetamine sulfate 10 mg tab Take 10 mg by mouth as needed. PT TAKES 2 TABS (20 MG) BID AT 0200 AND 1200    Yes Provider, Historical   albuterol (PROVENTIL HFA, VENTOLIN HFA, PROAIR HFA) 90 mcg/actuation inhaler Take 1 Puff by inhalation every four (4) hours as needed for Wheezing. Yes Provider, Historical   acyclovir (ZOVIRAX) 400 mg tablet Take 400 mg by mouth every four (4) hours as needed for Other (fever blisters). Yes Other, MD Pawel   aspirin delayed-release 81 mg tablet Take 1 Tab by mouth two (2) times a day.   Patient not taking: Reported on 2023   LETICIA Dudley   melchrisam (MOBIC) 7.5 mg tablet Take 1 Tab by mouth daily. Indications: joint damage causing pain and loss of function 1/2/20   LETICIA Campa   senna-docusate (PERICOLACE) 8.6-50 mg per tablet Take 1 Tab by mouth two (2) times a day. Indications: constipation 1/2/20   LETICIA Campa   traZODone (DESYREL) 100 mg tablet Take 200 mg by mouth nightly. Provider, Historical   doxycycline (ADOXA) 100 mg tablet Take 100 mg by mouth two (2) times a day. Provider, Historical   multivitamin (ONE A DAY) tablet Take 1 Tab by mouth nightly. Provider, Historical   Lisdexamfetamine (VYVANSE) 70 mg cap Take 70 mg by mouth every morning. Provider, Historical   ondansetron hcl (ZOFRAN) 8 mg tablet Take 8 mg by mouth every eight (8) hours as needed for Nausea. Provider, Historical   prazosin (MINIPRESS) 2 mg capsule Take 2 mg by mouth nightly. 8/31/19   Alfonso Moss MD   busPIRone (BUSPAR) 15 mg tablet Take 15 mg by mouth every morning. UP TO 3 TIMES DAILY PRN     Provider, Historical   hydroCHLOROthiazide (HYDRODIURIL) 50 mg tablet Take 50 mg by mouth as needed for Other (for high blood pressure). take one tablet by mouth daily as needed for high blood pressure. Provider, Historical   morphine CR (MS CONTIN) 30 mg CR tablet Take  by mouth every twelve (12) hours. Provider, Historical   acetaminophen (TYLENOL) 500 mg tablet Take 500 mg by mouth every six (6) hours as needed for Pain. Provider, Historical   diclofenac 10% cyclobenzaprine 2% lidocaine 5% gabapentin 6% cream compound Apply 2 g to affected area four (4) times daily as needed for Pain. Provider, Historical   alpha-d-galactosidase (BEANO) 150 unit tab tablet Take 2 Tabs by mouth three (3) times daily as needed for Flatulence. Fahad Dawn MD   levothyroxine (SYNTHROID) 100 mcg tablet Take 100 mcg by mouth Daily (before breakfast). Provider, Historical   vortioxetine (TRINTELLIX) 10 mg tablet Take  by mouth every morning.  PT TAKES A TOTAL OF 30 mg DAILY     Provider, Historical   potassium chloride SR (KLOR-CON 10) 10 mEq tablet Take 10 mEq by mouth daily. Provider, Historical   furosemide (LASIX) 20 mg tablet Take 20 mg by mouth as needed. MAY TAKE ONLY IF NEEDED FOR SEVERE ANKLE SWELLING     Provider, Historical   baclofen (LIORESAL) 10 mg tablet Take 10 mg by mouth two (2) times daily as needed for Pain. Provider, Historical   divalproex DR (DEPAKOTE) 250 mg tablet Take 1 tablet by mouth in  the morning and 2 tablets  by mouth in the evening 9/11/15   Subhash Murray MD   losartan (COZAAR) 50 mg tablet Take 100 mg by mouth every morning. Provider, Historical   simethicone (GAS-X) 80 mg chewable tablet Take 80 mg by mouth every six (6) hours as needed for Flatulence. Provider, Historical   lansoprazole (PREVACID) 15 mg disintegrating tablet Take 1 Tab by mouth Daily (before breakfast). Provider, Historical   cetirizine (ZYRTEC) 10 mg tablet Take 1 Tab by mouth daily. Provider, Historical       REVIEW OF SYSTEMS:       Total of 12 systems reviewed with pertinent positives and negatives noted in HPI      Objective:   VITALS:    Visit Vitals  BP (!) 193/125   Pulse 66   Temp 98.1 °F (36.7 °C)   Resp 14   Ht 5' 1\" (1.549 m)   Wt 118.9 kg (262 lb 2 oz)   SpO2 99%   BMI 49.53 kg/m²       PHYSICAL EXAM:    General:   Alert, cooperative, uncomfortable appearing morbidly obese middle-aged  female     HEENT:  Atraumatic, anicteric sclerae, pink conjunctivae, mucosa moist, dentition fair     Neck:  Supple, symmetrical, trachea midline, no abnormalities on palpation     Lungs:   CTA B, diminished in bases secondary to body habitus. Symmetric expansion. Good aeration. Normal respiratory effort. Chest wall:  No tenderness. No Accessory muscle use. Cardiovascular:   RRR, No murmur/rub/gallop. No JVD. Radial/AT/DP pulse 2+     GI/:   Obese/protuberant, soft, non-tender. Not distended.   Bowel sounds normal. No HSM Extremities No edema. No cyanosis. Capillary refill normal, RLE neurovascularly intact, RLE exquisitely painful to any movement/manipulation     Skin: No significant lesions noted. Not Jaundiced. No rashes, multiple comedones under jaw     Neurologic: PERRL. EOMI. No facial asymmetry. No aphasia or slurred speech. Moves all extremities. Sensation to light touch grossly intact BUE/BLE. No overt focal deficits appreciated     Psych:  Alert and oriented X 4. Anxious affect. Good insight, notable for fluctuating mood with near hysterical moments and rapid return to call discussion     Other:   N/A         LAB DATA REVIEWED:    Recent Results (from the past 24 hour(s))   CBC WITH AUTOMATED DIFF    Collection Time: 01/17/23  6:29 PM   Result Value Ref Range    WBC 13.0 (H) 3.6 - 11.0 K/uL    RBC 4.05 3.80 - 5.20 M/uL    HGB 10.1 (L) 11.5 - 16.0 g/dL    HCT 33.6 (L) 35.0 - 47.0 %    MCV 83.0 80.0 - 99.0 FL    MCH 24.9 (L) 26.0 - 34.0 PG    MCHC 30.1 30.0 - 36.5 g/dL    RDW 16.6 (H) 11.5 - 14.5 %    PLATELET 772 293 - 150 K/uL    MPV 9.3 8.9 - 12.9 FL    NRBC 0.2 (H) 0  WBC    ABSOLUTE NRBC 0.02 (H) 0.00 - 0.01 K/uL    NEUTROPHILS 87 (H) 32 - 75 %    LYMPHOCYTES 8 (L) 12 - 49 %    MONOCYTES 3 (L) 5 - 13 %    EOSINOPHILS 0 0 - 7 %    BASOPHILS 0 0 - 1 %    IMMATURE GRANULOCYTES 2 (H) 0.0 - 0.5 %    ABS. NEUTROPHILS 11.3 (H) 1.8 - 8.0 K/UL    ABS. LYMPHOCYTES 1.1 0.8 - 3.5 K/UL    ABS. MONOCYTES 0.3 0.0 - 1.0 K/UL    ABS. EOSINOPHILS 0.0 0.0 - 0.4 K/UL    ABS. BASOPHILS 0.0 0.0 - 0.1 K/UL    ABS. IMM.  GRANS. 0.3 (H) 0.00 - 0.04 K/UL    DF AUTOMATED     METABOLIC PANEL, COMPREHENSIVE    Collection Time: 01/17/23  6:29 PM   Result Value Ref Range    Sodium 143 136 - 145 mmol/L    Potassium 4.4 3.5 - 5.1 mmol/L    Chloride 114 (H) 97 - 108 mmol/L    CO2 24 21 - 32 mmol/L    Anion gap 5 5 - 15 mmol/L    Glucose 115 (H) 65 - 100 mg/dL    BUN 23 (H) 6 - 20 MG/DL    Creatinine 1.04 (H) 0.55 - 1.02 MG/DL    BUN/Creatinine ratio 22 (H) 12 - 20      eGFR >60 >60 ml/min/1.73m2    Calcium 8.6 8.5 - 10.1 MG/DL    Bilirubin, total 0.3 0.2 - 1.0 MG/DL    ALT (SGPT) 33 12 - 78 U/L    AST (SGOT) 17 15 - 37 U/L    Alk.  phosphatase 97 45 - 117 U/L    Protein, total 6.2 (L) 6.4 - 8.2 g/dL    Albumin 2.8 (L) 3.5 - 5.0 g/dL    Globulin 3.4 2.0 - 4.0 g/dL    A-G Ratio 0.8 (L) 1.1 - 2.2     URINALYSIS W/ REFLEX CULTURE    Collection Time: 01/17/23  8:27 PM    Specimen: Urine   Result Value Ref Range    Color YELLOW/STRAW      Appearance TURBID (A) CLEAR      Specific gravity 1.016      pH (UA) 5.5 5.0 - 8.0      Protein TRACE (A) NEG mg/dL    Glucose Negative NEG mg/dL    Ketone Negative NEG mg/dL    Bilirubin Negative NEG      Blood LARGE (A) NEG      Urobilinogen 0.2 0.2 - 1.0 EU/dL    Nitrites Negative NEG      Leukocyte Esterase MODERATE (A) NEG      WBC 10-20 0 - 4 /hpf    RBC >100 (H) 0 - 5 /hpf    Epithelial cells FEW FEW /lpf    Bacteria 1+ (A) NEG /hpf    UA:UC IF INDICATED URINE CULTURE ORDERED (A) CNI      Mucus TRACE (A) NEG /lpf    Amorphous Crystals 1+ (A) NEG    CA Oxalate crystals 2+ (A) NEG    Yeast PRESENT (A) NEG     PROTHROMBIN TIME + INR    Collection Time: 01/17/23  9:15 PM   Result Value Ref Range    INR 1.0 0.9 - 1.1      Prothrombin time 10.8 9.0 - 11.1 sec   TYPE & SCREEN    Collection Time: 01/17/23  9:15 PM   Result Value Ref Range    Crossmatch Expiration 01/20/2023,2359     ABO/Rh(D) A POSITIVE     Antibody screen NEG        IMAGING:  CXR-no acute findings    RLE radiographs-acute transverse fracture of distal diaphysis of the right femur with moderate apex anterior angulation    ______________________________________________________________________    Assessment / Plan:  Acute transverse fracture of distal diaphysis of right femur with moderate apex anterior angulation-nontraumatic  Status post TKA BLE  Obesity class III by BMI-49.53  History of hematuria  History hyperthyroidism  MDD/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 otherwise classified  Chronic pain  Essential hypertension  Comedonal acne  Herpes labialis  Possible UTI  Chronic normocytic anemia    PLAN:    Acute transverse fracture of distal diaphysis of right femur with moderate apex anterior angulation-nontraumatic  Status post TKA BLE  Admit to medicine  Orthopedics consulted, greatly appreciate their expertise  -Patient not currently able to tolerate placement of knee immobilizer-counseled on not moving leg to prevent further injury or displacement  -Given high opiate demand discussed with orthopedics who are requesting we manage pain regimen-Will attempt to do so at this time. If poorly controlled may need to consider PCA pump or palliative consultation  Given fairly young age total transverse femur fracture is quite unusual particular without any trauma. Agree with CT lower extremity to evaluate for pathologic fracture-patient currently declining CT due to concern for pain. Can obtain tomorrow after pain control optimized  Will add PTH and vitamin D as well as TSH    Obesity class III by NGJ-62.63  Recommend medically/surgically assisted weight loss referral in outpatient setting    History hyperthyroidism  Will check TSH as hypothyroidism can contribute to osteoporosis    MDD/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 otherwise classified  Continue PTA BuSpar, olanzapine, prazosin    Chronic pain  Continue PTA regimen:  -MS Contin 30 twice daily  -4 mg p.o.  Dilaudid every 4 hours as needed  We will give 2 mg IV Dilaudid once to catch up on pain  50 mg IV Toradol once  1000 mg Tylenol once followed by every 6 hours 650 mg as needed Tylenol  Continuous pulse oximetry while on opiates    Essential hypertension  Continue PTA hydrochlorothiazide, losartan  Add as needed IV labetalol    Comedonal acne  Continue PTA doxycycline    Herpes labialis  Resolving lesion on upper lip, continue PTA acyclovir    Possible UTI  History of hematuria  Will follow culture-currently on doxycycline so somewhat less likely  Patient does report urinary frequency and incontinence secondary to immobility, but denies dysuria  Documented history of hematuria needs to be followed with nephrology/urology    Chronic normocytic anemia  Trend hemoglobin    Code Status: Full    DVT Prophylaxis: Defer to orthopedics  GI Prophylaxis: Protonix  Diet: [address] discussed with:    Comments   Patient x    Family      RN     Care Manager                    Consultant:      _______________________________________________________________________  Baseline Level of Function: Debilitated    Expected  Disposition:   Home with Family    HH/PT/OT/RN    SNF/LTC x   JANET    ________________________________________________________________________  TOTAL TIME:  68 Minutes   MEDICAL COMPLEXITY: high  TOBACCO CESSATION COUNSELING: NO        Comments    x Reviewed previous records   >50% of visit spent in counseling and coordination of care x Discussion with patient and/or family and questions answered       ________________________________________________________________________  Signed: Sean Vasquez DO  1/18/2023 12:01 AM    Please note that this documentation was completed in part with Dragon dictation software. Occasionally unanticipated grammatical, syntax, homophones, and other interpretive errors are inadvertently transcribed by the computer software. Please excuse and disregard any errors that have escaped final proofreading. If in doubt, please do not hesitate to reach out to myself or the assigned hospitalist via [facility] paging system for clarification.

## 2023-01-18 NOTE — PROGRESS NOTES
Messaged Dr. Citlali Orozco via [employer] that \"Patient is on a regular 4 carb diet. She wants a regular diet. Is it okay if i change it? \" Order received to change diet to regular.

## 2023-01-18 NOTE — PROGRESS NOTES
End of Shift Note    Bedside shift change report given to Emilia Garcia RN (oncoming nurse) by Mindy Aquino RN (offgoing nurse). Report included the following information SBAR, Kardex, Intake/Output, MAR, and Recent Results    Shift worked:  Day     Shift summary and any significant changes:     Vitals stable. Concerns for physician to address:  Pain      Zone phone for oncoming shift:   [phone]       Activity:  Activity Level: Bed Rest  Number times ambulated in hallways past shift: 0  Number of times OOB to chair past shift: 0    Cardiac:   Cardiac Monitoring: No           Access:  Current line(s): PIV     Genitourinary:   Urinary status: shore    Respiratory:   O2 Device: None (Room air)  Chronic home O2 use?: NO  Incentive spirometer at bedside: N/A       GI:     Current diet:  ADULT DIET Regular  DIET NPO  DIET NPO  Passing flatus: YES  Tolerating current diet: YES       Pain Management:   Patient states pain is manageable on current regimen: YES    Skin:  Tyrese Score: 15  Interventions: float heels and internal/external urinary devices    Patient Safety:  Fall Score:  Total Score: 3  Interventions: pt to call before getting OOB  High Fall Risk: Yes    Length of Stay:  Expected LOS: 2d 16h  Actual LOS: [address], RN

## 2023-01-18 NOTE — PROGRESS NOTES
Problem: Falls - Risk of  Goal: *Absence of Falls  Description: Document Eben Hernandez Fall Risk and appropriate interventions in the flowsheet. Outcome: Progressing Towards Goal  Note: Fall Risk Interventions:  Mobility Interventions: Communicate number of staff needed for ambulation/transfer, Patient to call before getting OOB         Medication Interventions: Patient to call before getting OOB, Teach patient to arise slowly    Elimination Interventions: Call light in reach, Patient to call for help with toileting needs              Problem: Patient Education: Go to Patient Education Activity  Goal: Patient/Family Education  Outcome: Progressing Towards Goal     Problem: Pressure Injury - Risk of  Goal: *Prevention of pressure injury  Description: Document Tyrese Scale and appropriate interventions in the flowsheet.   Outcome: Progressing Towards Goal  Note: Pressure Injury Interventions:       Moisture Interventions: Absorbent underpads, Internal/External urinary devices, Minimize layers    Activity Interventions: Pressure redistribution bed/mattress(bed type), PT/OT evaluation    Mobility Interventions: Float heels, HOB 30 degrees or less, Pressure redistribution bed/mattress (bed type), PT/OT evaluation    Nutrition Interventions: Document food/fluid/supplement intake    Friction and Shear Interventions: HOB 30 degrees or less, Minimize layers                Problem: Patient Education: Go to Patient Education Activity  Goal: Patient/Family Education  Outcome: Progressing Towards Goal

## 2023-01-19 ENCOUNTER — APPOINTMENT (OUTPATIENT)
Dept: GENERAL RADIOLOGY | Age: 53
DRG: 481 | End: 2023-01-19
Attending: ORTHOPAEDIC SURGERY
Payer: COMMERCIAL

## 2023-01-19 ENCOUNTER — ANESTHESIA EVENT (OUTPATIENT)
Dept: SURGERY | Age: 53
DRG: 481 | End: 2023-01-19
Payer: COMMERCIAL

## 2023-01-19 ENCOUNTER — ANESTHESIA (OUTPATIENT)
Dept: SURGERY | Age: 53
DRG: 481 | End: 2023-01-19
Payer: COMMERCIAL

## 2023-01-19 LAB
ANION GAP SERPL CALC-SCNC: 7 MMOL/L (ref 5–15)
BUN SERPL-MCNC: 18 MG/DL (ref 6–20)
BUN/CREAT SERPL: 17 (ref 12–20)
CALCIUM SERPL-MCNC: 8.8 MG/DL (ref 8.5–10.1)
CHLORIDE SERPL-SCNC: 112 MMOL/L (ref 97–108)
CO2 SERPL-SCNC: 22 MMOL/L (ref 21–32)
CREAT SERPL-MCNC: 1.03 MG/DL (ref 0.55–1.02)
GLUCOSE SERPL-MCNC: 80 MG/DL (ref 65–100)
POTASSIUM SERPL-SCNC: 4.2 MMOL/L (ref 3.5–5.1)
SODIUM SERPL-SCNC: 141 MMOL/L (ref 136–145)

## 2023-01-19 PROCEDURE — 74011250636 HC RX REV CODE- 250/636: Performed by: NURSE ANESTHETIST, CERTIFIED REGISTERED

## 2023-01-19 PROCEDURE — 99232 SBSQ HOSP IP/OBS MODERATE 35: CPT | Performed by: NURSE PRACTITIONER

## 2023-01-19 PROCEDURE — 74011000250 HC RX REV CODE- 250: Performed by: ANESTHESIOLOGY

## 2023-01-19 PROCEDURE — 93005 ELECTROCARDIOGRAM TRACING: CPT

## 2023-01-19 PROCEDURE — 2709999900 HC NON-CHARGEABLE SUPPLY: Performed by: ORTHOPAEDIC SURGERY

## 2023-01-19 PROCEDURE — 74011250637 HC RX REV CODE- 250/637: Performed by: NURSE PRACTITIONER

## 2023-01-19 PROCEDURE — 74011000250 HC RX REV CODE- 250: Performed by: PHYSICIAN ASSISTANT

## 2023-01-19 PROCEDURE — 74011250637 HC RX REV CODE- 250/637: Performed by: ORTHOPAEDIC SURGERY

## 2023-01-19 PROCEDURE — 27506 TREATMENT OF THIGH FRACTURE: CPT | Performed by: ORTHOPAEDIC SURGERY

## 2023-01-19 PROCEDURE — 74011250636 HC RX REV CODE- 250/636: Performed by: INTERNAL MEDICINE

## 2023-01-19 PROCEDURE — 77030035236 HC SUT PDS STRATFX BARB J&J -B: Performed by: ORTHOPAEDIC SURGERY

## 2023-01-19 PROCEDURE — 36415 COLL VENOUS BLD VENIPUNCTURE: CPT

## 2023-01-19 PROCEDURE — 74011000250 HC RX REV CODE- 250: Performed by: NURSE ANESTHETIST, CERTIFIED REGISTERED

## 2023-01-19 PROCEDURE — 77030020274 HC MISC IMPL ORTHOPEDIC: Performed by: ORTHOPAEDIC SURGERY

## 2023-01-19 PROCEDURE — 74011250636 HC RX REV CODE- 250/636: Performed by: ORTHOPAEDIC SURGERY

## 2023-01-19 PROCEDURE — 74011000258 HC RX REV CODE- 258: Performed by: INTERNAL MEDICINE

## 2023-01-19 PROCEDURE — 76210000016 HC OR PH I REC 1 TO 1.5 HR: Performed by: ORTHOPAEDIC SURGERY

## 2023-01-19 PROCEDURE — 77030020788: Performed by: ORTHOPAEDIC SURGERY

## 2023-01-19 PROCEDURE — 76000 FLUOROSCOPY <1 HR PHYS/QHP: CPT

## 2023-01-19 PROCEDURE — 77030040934 HC CATH DIAG DXTERITY MEDT -A: Performed by: ORTHOPAEDIC SURGERY

## 2023-01-19 PROCEDURE — 65270000029 HC RM PRIVATE

## 2023-01-19 PROCEDURE — 76010000131 HC OR TIME 2 TO 2.5 HR: Performed by: ORTHOPAEDIC SURGERY

## 2023-01-19 PROCEDURE — 99223 1ST HOSP IP/OBS HIGH 75: CPT | Performed by: ORTHOPAEDIC SURGERY

## 2023-01-19 PROCEDURE — 74011250636 HC RX REV CODE- 250/636: Performed by: ANESTHESIOLOGY

## 2023-01-19 PROCEDURE — 74011000258 HC RX REV CODE- 258: Performed by: NURSE ANESTHETIST, CERTIFIED REGISTERED

## 2023-01-19 PROCEDURE — 74011250637 HC RX REV CODE- 250/637: Performed by: STUDENT IN AN ORGANIZED HEALTH CARE EDUCATION/TRAINING PROGRAM

## 2023-01-19 PROCEDURE — 74011000250 HC RX REV CODE- 250: Performed by: STUDENT IN AN ORGANIZED HEALTH CARE EDUCATION/TRAINING PROGRAM

## 2023-01-19 PROCEDURE — 77030027467 HC RMR IM BIXCT DISP STRY -F: Performed by: ORTHOPAEDIC SURGERY

## 2023-01-19 PROCEDURE — C1713 ANCHOR/SCREW BN/BN,TIS/BN: HCPCS | Performed by: ORTHOPAEDIC SURGERY

## 2023-01-19 PROCEDURE — 76060000035 HC ANESTHESIA 2 TO 2.5 HR: Performed by: ORTHOPAEDIC SURGERY

## 2023-01-19 PROCEDURE — 74011250636 HC RX REV CODE- 250/636: Performed by: PHYSICIAN ASSISTANT

## 2023-01-19 PROCEDURE — 94760 N-INVAS EAR/PLS OXIMETRY 1: CPT

## 2023-01-19 PROCEDURE — 27506 TREATMENT OF THIGH FRACTURE: CPT | Performed by: PHYSICIAN ASSISTANT

## 2023-01-19 PROCEDURE — 77030002933 HC SUT MCRYL J&J -A: Performed by: ORTHOPAEDIC SURGERY

## 2023-01-19 PROCEDURE — 77030010507 HC ADH SKN DERMBND J&J -B: Performed by: ORTHOPAEDIC SURGERY

## 2023-01-19 PROCEDURE — 73552 X-RAY EXAM OF FEMUR 2/>: CPT

## 2023-01-19 PROCEDURE — 77030042510 HC BIT DRL -D: Performed by: ORTHOPAEDIC SURGERY

## 2023-01-19 PROCEDURE — 74011250636 HC RX REV CODE- 250/636: Performed by: STUDENT IN AN ORGANIZED HEALTH CARE EDUCATION/TRAINING PROGRAM

## 2023-01-19 PROCEDURE — 80048 BASIC METABOLIC PNL TOTAL CA: CPT

## 2023-01-19 PROCEDURE — 77030036660

## 2023-01-19 PROCEDURE — 0QSB36Z REPOSITION RIGHT LOWER FEMUR WITH INTRAMEDULLARY INTERNAL FIXATION DEVICE, PERCUTANEOUS APPROACH: ICD-10-PCS | Performed by: ORTHOPAEDIC SURGERY

## 2023-01-19 PROCEDURE — 77030031139 HC SUT VCRL2 J&J -A: Performed by: ORTHOPAEDIC SURGERY

## 2023-01-19 PROCEDURE — C1769 GUIDE WIRE: HCPCS | Performed by: ORTHOPAEDIC SURGERY

## 2023-01-19 DEVICE — LOCKING SCREW
Type: IMPLANTABLE DEVICE | Site: FEMUR | Status: FUNCTIONAL
Brand: T2 ALPHA

## 2023-01-19 DEVICE — IMPLANTABLE DEVICE
Type: IMPLANTABLE DEVICE | Site: FEMUR | Status: FUNCTIONAL
Brand: T2

## 2023-01-19 RX ORDER — LIDOCAINE HYDROCHLORIDE 20 MG/ML
INJECTION, SOLUTION EPIDURAL; INFILTRATION; INTRACAUDAL; PERINEURAL AS NEEDED
Status: DISCONTINUED | OUTPATIENT
Start: 2023-01-19 | End: 2023-01-19 | Stop reason: HOSPADM

## 2023-01-19 RX ORDER — ONDANSETRON 2 MG/ML
INJECTION INTRAMUSCULAR; INTRAVENOUS AS NEEDED
Status: DISCONTINUED | OUTPATIENT
Start: 2023-01-19 | End: 2023-01-19 | Stop reason: HOSPADM

## 2023-01-19 RX ORDER — SODIUM CHLORIDE 0.9 % (FLUSH) 0.9 %
5-40 SYRINGE (ML) INJECTION AS NEEDED
Status: DISCONTINUED | OUTPATIENT
Start: 2023-01-19 | End: 2023-01-19 | Stop reason: HOSPADM

## 2023-01-19 RX ORDER — HYDROMORPHONE HYDROCHLORIDE 2 MG/ML
INJECTION, SOLUTION INTRAMUSCULAR; INTRAVENOUS; SUBCUTANEOUS
Status: DISPENSED
Start: 2023-01-19 | End: 2023-01-20

## 2023-01-19 RX ORDER — AMOXICILLIN 250 MG
1 CAPSULE ORAL 2 TIMES DAILY
Status: DISCONTINUED | OUTPATIENT
Start: 2023-01-20 | End: 2023-01-23 | Stop reason: HOSPADM

## 2023-01-19 RX ORDER — KETAMINE HYDROCHLORIDE 100 MG/ML
INJECTION INTRAMUSCULAR; INTRAVENOUS AS NEEDED
Status: DISCONTINUED | OUTPATIENT
Start: 2023-01-19 | End: 2023-01-19 | Stop reason: HOSPADM

## 2023-01-19 RX ORDER — HYDROMORPHONE HYDROCHLORIDE 1 MG/ML
INJECTION, SOLUTION INTRAMUSCULAR; INTRAVENOUS; SUBCUTANEOUS AS NEEDED
Status: DISCONTINUED | OUTPATIENT
Start: 2023-01-19 | End: 2023-01-19 | Stop reason: HOSPADM

## 2023-01-19 RX ORDER — DEXMEDETOMIDINE HYDROCHLORIDE 100 UG/ML
INJECTION, SOLUTION INTRAVENOUS AS NEEDED
Status: DISCONTINUED | OUTPATIENT
Start: 2023-01-19 | End: 2023-01-19 | Stop reason: HOSPADM

## 2023-01-19 RX ORDER — CEFAZOLIN SODIUM/WATER 2 G/20 ML
SYRINGE (ML) INTRAVENOUS AS NEEDED
Status: DISCONTINUED | OUTPATIENT
Start: 2023-01-19 | End: 2023-01-19 | Stop reason: HOSPADM

## 2023-01-19 RX ORDER — FERROUS SULFATE, DRIED 160(50) MG
1 TABLET, EXTENDED RELEASE ORAL
Status: DISCONTINUED | OUTPATIENT
Start: 2023-01-20 | End: 2023-01-23 | Stop reason: HOSPADM

## 2023-01-19 RX ORDER — LIDOCAINE HYDROCHLORIDE 10 MG/ML
0.1 INJECTION, SOLUTION EPIDURAL; INFILTRATION; INTRACAUDAL; PERINEURAL AS NEEDED
Status: DISCONTINUED | OUTPATIENT
Start: 2023-01-19 | End: 2023-01-23 | Stop reason: HOSPADM

## 2023-01-19 RX ORDER — DEXAMETHASONE SODIUM PHOSPHATE 4 MG/ML
INJECTION, SOLUTION INTRA-ARTICULAR; INTRALESIONAL; INTRAMUSCULAR; INTRAVENOUS; SOFT TISSUE AS NEEDED
Status: DISCONTINUED | OUTPATIENT
Start: 2023-01-19 | End: 2023-01-19 | Stop reason: HOSPADM

## 2023-01-19 RX ORDER — SODIUM CHLORIDE 0.9 % (FLUSH) 0.9 %
5-40 SYRINGE (ML) INJECTION EVERY 8 HOURS
Status: DISCONTINUED | OUTPATIENT
Start: 2023-01-19 | End: 2023-01-22 | Stop reason: ALTCHOICE

## 2023-01-19 RX ORDER — SODIUM CHLORIDE 0.9 % (FLUSH) 0.9 %
5-40 SYRINGE (ML) INJECTION AS NEEDED
Status: DISCONTINUED | OUTPATIENT
Start: 2023-01-19 | End: 2023-01-23 | Stop reason: HOSPADM

## 2023-01-19 RX ORDER — FACIAL-BODY WIPES
10 EACH TOPICAL DAILY PRN
Status: DISCONTINUED | OUTPATIENT
Start: 2023-01-21 | End: 2023-01-23 | Stop reason: HOSPADM

## 2023-01-19 RX ORDER — SUCCINYLCHOLINE CHLORIDE 20 MG/ML
INJECTION INTRAMUSCULAR; INTRAVENOUS AS NEEDED
Status: DISCONTINUED | OUTPATIENT
Start: 2023-01-19 | End: 2023-01-19 | Stop reason: HOSPADM

## 2023-01-19 RX ORDER — PROPOFOL 10 MG/ML
INJECTION, EMULSION INTRAVENOUS AS NEEDED
Status: DISCONTINUED | OUTPATIENT
Start: 2023-01-19 | End: 2023-01-19 | Stop reason: HOSPADM

## 2023-01-19 RX ORDER — FENTANYL CITRATE 50 UG/ML
25 INJECTION, SOLUTION INTRAMUSCULAR; INTRAVENOUS
Status: DISCONTINUED | OUTPATIENT
Start: 2023-01-19 | End: 2023-01-19 | Stop reason: HOSPADM

## 2023-01-19 RX ORDER — SODIUM CHLORIDE, SODIUM LACTATE, POTASSIUM CHLORIDE, CALCIUM CHLORIDE 600; 310; 30; 20 MG/100ML; MG/100ML; MG/100ML; MG/100ML
INJECTION, SOLUTION INTRAVENOUS
Status: DISCONTINUED | OUTPATIENT
Start: 2023-01-19 | End: 2023-01-19 | Stop reason: HOSPADM

## 2023-01-19 RX ORDER — SODIUM CHLORIDE 0.9 % (FLUSH) 0.9 %
5-40 SYRINGE (ML) INJECTION EVERY 8 HOURS
Status: DISCONTINUED | OUTPATIENT
Start: 2023-01-19 | End: 2023-01-19 | Stop reason: HOSPADM

## 2023-01-19 RX ORDER — MIDAZOLAM HYDROCHLORIDE 1 MG/ML
INJECTION, SOLUTION INTRAMUSCULAR; INTRAVENOUS AS NEEDED
Status: DISCONTINUED | OUTPATIENT
Start: 2023-01-19 | End: 2023-01-19 | Stop reason: HOSPADM

## 2023-01-19 RX ORDER — HYDRALAZINE HYDROCHLORIDE 20 MG/ML
INJECTION INTRAMUSCULAR; INTRAVENOUS AS NEEDED
Status: DISCONTINUED | OUTPATIENT
Start: 2023-01-19 | End: 2023-01-19 | Stop reason: HOSPADM

## 2023-01-19 RX ORDER — DIPHENHYDRAMINE HYDROCHLORIDE 50 MG/ML
12.5 INJECTION, SOLUTION INTRAMUSCULAR; INTRAVENOUS AS NEEDED
Status: DISCONTINUED | OUTPATIENT
Start: 2023-01-19 | End: 2023-01-19 | Stop reason: HOSPADM

## 2023-01-19 RX ORDER — ROCURONIUM BROMIDE 10 MG/ML
INJECTION, SOLUTION INTRAVENOUS AS NEEDED
Status: DISCONTINUED | OUTPATIENT
Start: 2023-01-19 | End: 2023-01-19 | Stop reason: HOSPADM

## 2023-01-19 RX ORDER — ONDANSETRON 2 MG/ML
4 INJECTION INTRAMUSCULAR; INTRAVENOUS AS NEEDED
Status: DISCONTINUED | OUTPATIENT
Start: 2023-01-19 | End: 2023-01-19 | Stop reason: HOSPADM

## 2023-01-19 RX ORDER — SODIUM CHLORIDE, SODIUM LACTATE, POTASSIUM CHLORIDE, CALCIUM CHLORIDE 600; 310; 30; 20 MG/100ML; MG/100ML; MG/100ML; MG/100ML
25 INJECTION, SOLUTION INTRAVENOUS CONTINUOUS
Status: DISCONTINUED | OUTPATIENT
Start: 2023-01-19 | End: 2023-01-19 | Stop reason: HOSPADM

## 2023-01-19 RX ORDER — MORPHINE SULFATE 2 MG/ML
2 INJECTION, SOLUTION INTRAMUSCULAR; INTRAVENOUS
Status: DISCONTINUED | OUTPATIENT
Start: 2023-01-19 | End: 2023-01-19 | Stop reason: HOSPADM

## 2023-01-19 RX ORDER — HYDROMORPHONE HYDROCHLORIDE 1 MG/ML
INJECTION, SOLUTION INTRAMUSCULAR; INTRAVENOUS; SUBCUTANEOUS
Status: COMPLETED
Start: 2023-01-19 | End: 2023-01-19

## 2023-01-19 RX ORDER — SODIUM CHLORIDE, SODIUM LACTATE, POTASSIUM CHLORIDE, CALCIUM CHLORIDE 600; 310; 30; 20 MG/100ML; MG/100ML; MG/100ML; MG/100ML
25 INJECTION, SOLUTION INTRAVENOUS CONTINUOUS
Status: DISCONTINUED | OUTPATIENT
Start: 2023-01-19 | End: 2023-01-20

## 2023-01-19 RX ORDER — POLYETHYLENE GLYCOL 3350 17 G/17G
17 POWDER, FOR SOLUTION ORAL DAILY
Status: DISCONTINUED | OUTPATIENT
Start: 2023-01-20 | End: 2023-01-23 | Stop reason: HOSPADM

## 2023-01-19 RX ORDER — HYDROMORPHONE HYDROCHLORIDE 1 MG/ML
.2-.5 INJECTION, SOLUTION INTRAMUSCULAR; INTRAVENOUS; SUBCUTANEOUS
Status: DISCONTINUED | OUTPATIENT
Start: 2023-01-19 | End: 2023-01-19 | Stop reason: HOSPADM

## 2023-01-19 RX ORDER — FENTANYL CITRATE 50 UG/ML
INJECTION, SOLUTION INTRAMUSCULAR; INTRAVENOUS AS NEEDED
Status: DISCONTINUED | OUTPATIENT
Start: 2023-01-19 | End: 2023-01-19 | Stop reason: HOSPADM

## 2023-01-19 RX ADMIN — SUCCINYLCHOLINE CHLORIDE 140 MG: 20 INJECTION, SOLUTION INTRAMUSCULAR; INTRAVENOUS at 17:51

## 2023-01-19 RX ADMIN — SODIUM CHLORIDE, PRESERVATIVE FREE 10 ML: 5 INJECTION INTRAVENOUS at 23:38

## 2023-01-19 RX ADMIN — MORPHINE SULFATE 30 MG: 15 TABLET, FILM COATED, EXTENDED RELEASE ORAL at 09:08

## 2023-01-19 RX ADMIN — Medication 3 AMPULE: at 16:08

## 2023-01-19 RX ADMIN — SUGAMMADEX 250 MG: 100 INJECTION, SOLUTION INTRAVENOUS at 19:40

## 2023-01-19 RX ADMIN — DEXMEDETOMIDINE HYDROCHLORIDE 10 MCG: 100 INJECTION, SOLUTION, CONCENTRATE INTRAVENOUS at 20:24

## 2023-01-19 RX ADMIN — DEXMEDETOMIDINE HYDROCHLORIDE 4 MCG: 100 INJECTION, SOLUTION, CONCENTRATE INTRAVENOUS at 19:34

## 2023-01-19 RX ADMIN — HYDROMORPHONE HYDROCHLORIDE 4 MG: 2 TABLET ORAL at 12:34

## 2023-01-19 RX ADMIN — HYDROMORPHONE HYDROCHLORIDE 0.5 MG: 1 INJECTION, SOLUTION INTRAMUSCULAR; INTRAVENOUS; SUBCUTANEOUS at 18:57

## 2023-01-19 RX ADMIN — ACETAMINOPHEN 650 MG: 325 TABLET ORAL at 05:30

## 2023-01-19 RX ADMIN — HYDROMORPHONE HYDROCHLORIDE 4 MG: 2 TABLET ORAL at 01:30

## 2023-01-19 RX ADMIN — HYDROMORPHONE HYDROCHLORIDE 2 MG: 1 INJECTION, SOLUTION INTRAMUSCULAR; INTRAVENOUS; SUBCUTANEOUS at 20:20

## 2023-01-19 RX ADMIN — DEXMEDETOMIDINE HYDROCHLORIDE 10 MCG: 100 INJECTION, SOLUTION, CONCENTRATE INTRAVENOUS at 19:46

## 2023-01-19 RX ADMIN — DOXYCYCLINE HYCLATE 100 MG: 100 TABLET, COATED ORAL at 21:59

## 2023-01-19 RX ADMIN — HYDROMORPHONE HYDROCHLORIDE 0.2 MG: 1 INJECTION, SOLUTION INTRAMUSCULAR; INTRAVENOUS; SUBCUTANEOUS at 17:44

## 2023-01-19 RX ADMIN — ONDANSETRON HYDROCHLORIDE 4 MG: 2 INJECTION, SOLUTION INTRAMUSCULAR; INTRAVENOUS at 19:42

## 2023-01-19 RX ADMIN — TRANEXAMIC ACID 1 G: 100 INJECTION, SOLUTION INTRAVENOUS at 18:22

## 2023-01-19 RX ADMIN — HYDROMORPHONE HYDROCHLORIDE 4 MG: 2 TABLET ORAL at 20:36

## 2023-01-19 RX ADMIN — HYDROMORPHONE HYDROCHLORIDE 0.5 MG: 1 INJECTION, SOLUTION INTRAMUSCULAR; INTRAVENOUS; SUBCUTANEOUS at 19:34

## 2023-01-19 RX ADMIN — ACETAMINOPHEN 650 MG: 325 TABLET ORAL at 21:58

## 2023-01-19 RX ADMIN — PRAZOSIN HYDROCHLORIDE 5 MG: 5 CAPSULE ORAL at 21:58

## 2023-01-19 RX ADMIN — DEXMEDETOMIDINE HYDROCHLORIDE 6 MCG: 100 INJECTION, SOLUTION, CONCENTRATE INTRAVENOUS at 20:08

## 2023-01-19 RX ADMIN — FENTANYL CITRATE 100 MCG: 50 INJECTION, SOLUTION INTRAMUSCULAR; INTRAVENOUS at 17:49

## 2023-01-19 RX ADMIN — ONDANSETRON 4 MG: 2 INJECTION INTRAMUSCULAR; INTRAVENOUS at 10:44

## 2023-01-19 RX ADMIN — DEXMEDETOMIDINE HYDROCHLORIDE 10 MCG: 100 INJECTION, SOLUTION, CONCENTRATE INTRAVENOUS at 20:16

## 2023-01-19 RX ADMIN — SODIUM CHLORIDE, PRESERVATIVE FREE 10 ML: 5 INJECTION INTRAVENOUS at 05:33

## 2023-01-19 RX ADMIN — SODIUM CHLORIDE, PRESERVATIVE FREE 10 ML: 5 INJECTION INTRAVENOUS at 22:00

## 2023-01-19 RX ADMIN — DEXMEDETOMIDINE HYDROCHLORIDE 6 MCG: 100 INJECTION, SOLUTION, CONCENTRATE INTRAVENOUS at 19:40

## 2023-01-19 RX ADMIN — ACETAMINOPHEN 650 MG: 325 TABLET ORAL at 09:07

## 2023-01-19 RX ADMIN — HYDROMORPHONE HYDROCHLORIDE 4 MG: 2 TABLET ORAL at 05:31

## 2023-01-19 RX ADMIN — Medication 3 G: at 18:00

## 2023-01-19 RX ADMIN — HYDROMORPHONE HYDROCHLORIDE 4 MG: 2 TABLET ORAL at 09:08

## 2023-01-19 RX ADMIN — ROCURONIUM BROMIDE 20 MG: 10 INJECTION INTRAVENOUS at 18:41

## 2023-01-19 RX ADMIN — CEFAZOLIN 2 G: 1 INJECTION, POWDER, FOR SOLUTION INTRAMUSCULAR; INTRAVENOUS at 23:31

## 2023-01-19 RX ADMIN — DOXYCYCLINE HYCLATE 100 MG: 100 TABLET, COATED ORAL at 09:07

## 2023-01-19 RX ADMIN — HYDROMORPHONE HYDROCHLORIDE 1 MG: 1 INJECTION, SOLUTION INTRAMUSCULAR; INTRAVENOUS; SUBCUTANEOUS at 20:09

## 2023-01-19 RX ADMIN — SODIUM CHLORIDE, PRESERVATIVE FREE 10 ML: 5 INJECTION INTRAVENOUS at 13:46

## 2023-01-19 RX ADMIN — SODIUM CHLORIDE, POTASSIUM CHLORIDE, SODIUM LACTATE AND CALCIUM CHLORIDE: 600; 310; 30; 20 INJECTION, SOLUTION INTRAVENOUS at 17:44

## 2023-01-19 RX ADMIN — ROCURONIUM BROMIDE 20 MG: 10 INJECTION INTRAVENOUS at 18:15

## 2023-01-19 RX ADMIN — SODIUM CHLORIDE, POTASSIUM CHLORIDE, SODIUM LACTATE AND CALCIUM CHLORIDE 25 ML/HR: 600; 310; 30; 20 INJECTION, SOLUTION INTRAVENOUS at 16:08

## 2023-01-19 RX ADMIN — DEXAMETHASONE SODIUM PHOSPHATE 4 MG: 4 INJECTION, SOLUTION INTRAMUSCULAR; INTRAVENOUS at 17:56

## 2023-01-19 RX ADMIN — FENTANYL CITRATE 25 MCG: 50 INJECTION, SOLUTION INTRAMUSCULAR; INTRAVENOUS at 21:20

## 2023-01-19 RX ADMIN — MORPHINE SULFATE 30 MG: 15 TABLET, FILM COATED, EXTENDED RELEASE ORAL at 21:58

## 2023-01-19 RX ADMIN — LIDOCAINE HYDROCHLORIDE 100 MG: 20 INJECTION, SOLUTION EPIDURAL; INFILTRATION; INTRACAUDAL; PERINEURAL at 17:49

## 2023-01-19 RX ADMIN — DEXMEDETOMIDINE HYDROCHLORIDE 4 MCG: 100 INJECTION, SOLUTION, CONCENTRATE INTRAVENOUS at 19:53

## 2023-01-19 RX ADMIN — HYDROMORPHONE HYDROCHLORIDE 0.5 MG: 1 INJECTION, SOLUTION INTRAMUSCULAR; INTRAVENOUS; SUBCUTANEOUS at 19:40

## 2023-01-19 RX ADMIN — FENTANYL CITRATE 25 MCG: 50 INJECTION, SOLUTION INTRAMUSCULAR; INTRAVENOUS at 21:16

## 2023-01-19 RX ADMIN — KETAMINE HYDROCHLORIDE 30 MG: 100 INJECTION, SOLUTION, CONCENTRATE INTRAMUSCULAR; INTRAVENOUS at 18:09

## 2023-01-19 RX ADMIN — BUSPIRONE HYDROCHLORIDE 15 MG: 5 TABLET ORAL at 09:08

## 2023-01-19 RX ADMIN — HYDROMORPHONE HYDROCHLORIDE 0.3 MG: 1 INJECTION, SOLUTION INTRAMUSCULAR; INTRAVENOUS; SUBCUTANEOUS at 17:49

## 2023-01-19 RX ADMIN — OLANZAPINE 5 MG: 5 TABLET, FILM COATED ORAL at 21:59

## 2023-01-19 RX ADMIN — MIDAZOLAM HYDROCHLORIDE 2 MG: 1 INJECTION, SOLUTION INTRAMUSCULAR; INTRAVENOUS at 17:44

## 2023-01-19 RX ADMIN — ROCURONIUM BROMIDE 10 MG: 10 INJECTION INTRAVENOUS at 17:49

## 2023-01-19 RX ADMIN — SODIUM CHLORIDE 1 G: 900 INJECTION INTRAVENOUS at 10:45

## 2023-01-19 RX ADMIN — PROPOFOL 200 MG: 10 INJECTION, EMULSION INTRAVENOUS at 17:49

## 2023-01-19 RX ADMIN — HYDRALAZINE HYDROCHLORIDE 5 MG: 20 INJECTION INTRAMUSCULAR; INTRAVENOUS at 18:30

## 2023-01-19 RX ADMIN — DEXMEDETOMIDINE HYDROCHLORIDE 10 MCG: 100 INJECTION, SOLUTION, CONCENTRATE INTRAVENOUS at 20:21

## 2023-01-19 RX ADMIN — PANTOPRAZOLE SODIUM 40 MG: 40 TABLET, DELAYED RELEASE ORAL at 09:07

## 2023-01-19 NOTE — PROGRESS NOTES
Hospitalist Progress Note    NAME: Marc Briggs   :  1970   MRN:  [id number]       Assessment / Plan:  Acute transverse fracture of distal diaphysis of right femur with moderate apex anterior angulation-nontraumatic  Status post TKA BLE  Chronic pain  Patient not currently able to tolerate placement of knee immobilizer-counseled on not moving leg to prevent further injury or displacement  Pain is not well controlled. Pt refusing duplex US and CT leg and ortho is aware. Plan for OR intervention today. Currently on dilaudid PCA along with her home dose morphine and dilaudid. Appreciate palliative following to help with pain management  Ortho is following, appreciate recs     Obesity class III by BMI-49.53  History hyperthyroidism  TSH low, check FT4 wnl. Repeat TSH outpt in 4 weeks     MDD/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 otherwise classified  Continue PTA BuSpar, olanzapine, prazosin     Essential hypertension  Continue PTA hydrochlorothiazide, losartan  cont' as needed IV labetalol    Comedonal acne  Continue PTA doxycycline    Herpes labialis  Resolving lesion on upper lip, continue PTA acyclovir     UTI  History of hematuria  Ucx without growth. Will stop IV rocephin given urinary symptoms. Chronic normocytic anemia  Trend hemoglobin     Code Status: Full   DVT Prophylaxis: Defer to orthopedics  GI Prophylaxis: Protonix  Diet: Diabetic/cardiac     Subjective:     Chief Complaint / Reason for Physician Visit  NAD. Appears comfortable at this time. Discussed with RN events overnight. Review of Systems:  Symptom Y/N Comments  Symptom Y/N Comments   Fever/Chills    Chest Pain     Poor Appetite    Edema     Cough    Abdominal Pain     Sputum    Joint Pain     SOB/CHANG    Pruritis/Rash     Nausea/vomit    Tolerating PT/OT     Diarrhea    Tolerating Diet     Constipation    Other       Could NOT obtain due to:      Objective:     VITALS:   Last 24hrs VS reviewed since prior progress note.  Most recent are:  Patient Vitals for the past 24 hrs:   Temp Pulse Resp BP SpO2   01/19/23 0756 97.7 °F (36.5 °C) 76 16 129/76 98 %   01/18/23 2327 98.6 °F (37 °C) 69 18 (!) 144/89 98 %   01/18/23 2023 98.1 °F (36.7 °C) 64 18 139/70 97 %   01/18/23 1520 98.1 °F (36.7 °C) 85 17 (!) 148/86 99 %         Intake/Output Summary (Last 24 hours) at 1/19/2023 1151  Last data filed at 1/19/2023 1048  Gross per 24 hour   Intake --   Output 1400 ml   Net -1400 ml          I had a face to face encounter and independently examined this patient on 1/19/2023, as outlined below:  PHYSICAL EXAM:  General: WD, WN. Alert, cooperative, no acute distress    EENT:  EOMI. Anicteric sclerae. MMM  Resp:  CTA bilaterally, no wheezing or rales. No accessory muscle use  CV:  Regular  rhythm,  No edema  GI:  Soft, Non distended, Non tender. +BS  Neurologic:  Alert and oriented X 3, normal speech  Psych:   +anxious  Skin:  No rashes. No jaundice    Reviewed most current lab test results and cultures  YES  Reviewed most current radiology test results   YES  Review and summation of old records today    NO  Reviewed patient's current orders and MAR    YES  PMH/ reviewed - no change compared to H&P  ________________________________________________________________________  Care Plan discussed with:    Comments   Patient x    Family      RN x    Care Manager     Consultant                        Multidiciplinary team rounds were held today with , nursing, pharmacist and clinical coordinator. Patient's plan of care was discussed; medications were reviewed and discharge planning was addressed.      ________________________________________________________________________  Total NON critical care TIME:  35   Minutes    Total CRITICAL CARE TIME Spent:   Minutes non procedure based      Comments   >50% of visit spent in counseling and coordination of care     ________________________________________________________________________  Vivek Oscar MD Procedures: see electronic medical records for all procedures/Xrays and details which were not copied into this note but were reviewed prior to creation of Plan. LABS:  I reviewed today's most current labs and imaging studies.   Pertinent labs include:  Recent Labs     01/18/23 0419 01/17/23  1829   WBC 13.4* 13.0*   HGB 9.1* 10.1*   HCT 30.8* 33.6*    366       Recent Labs     01/19/23 0451 01/18/23 0419 01/17/23 2115 01/17/23  1829    142  --  143   K 4.2 4.2  --  4.4   * 111*  --  114*   CO2 22 26  --  24   GLU 80 89  --  115*   BUN 18 22*  --  23*   CREA 1.03* 1.03*  --  1.04*   CA 8.8 8.7  8.4*  --  8.6   ALB  --   --   --  2.8*   TBILI  --   --   --  0.3   ALT  --   --   --  33   INR  --   --  1.0  --          Signed: Kaern Bonilla MD

## 2023-01-19 NOTE — PROGRESS NOTES
CM at bedside to discuss SNF choices with patient. She received list of providers on 1/18/2023 and states \"today is a wash because I am not having surgery until late today and did not discuss options with my \". She continues to state \"this is too much right now and I will need to tell you later - maybe tomorrow\". CM will continue to follow for SNF options vs patient going home with home heatlh. Patient will need a [facility] [employer] auth for SNF placement.     Leonard Tran RN, BSN, [address] [place] [address]  Manager of Case Management  [phone]

## 2023-01-19 NOTE — CONSULTS
FRACTURE CONSULT NOTE    Subjective:     Date of Consultation:  2023  Referring Physician:  Mich Smith is a 46 y.o. female  who is being seen for right distal femur fracture. Patient presented to the emergency department last night on 2023 for evaluation of right leg pain with no known injury. Upon work-up in the emergency department patient was found to have a femoral shaft fracture. The patient denies any other symptoms beyond pain in her right thigh.     Patient Active Problem List    Diagnosis Date Noted    Femur fracture, right ([address] 2023    Knee osteoarthritis 2019    Primary osteoarthritis of right knee 2019    Right knee DJD 2019    DVT (deep venous thrombosis) ([facility] [address] 2019    Status post incision and drainage 2019    Status post left knee replacement 2018    H/O total knee replacement, left 2018    Dehiscence of incision 2018    Primary osteoarthritis of both knees 10/24/2018    Hematuria 10/09/2018    MRSA carrier 10/09/2018    Hyperthyroidism 10/17/2012     Family History   Problem Relation Age of Onset    Heart Disease Mother     Heart Attack Mother     Hypertension Mother     OSTEOARTHRITIS Mother     Rashes/Skin Problems Mother         PLAQUE PSORIASIS    Heart Disease Father     Thyroid Disease Father     Diabetes Father     Hypertension Father     Diabetes Maternal Grandmother     Heart Attack Maternal Grandmother     Hypertension Maternal Grandmother     OSTEOARTHRITIS Maternal Grandmother     Hypertension Maternal Grandfather     Stroke Maternal Grandfather     High Cholesterol Sister     Other Son         meningitis    No Known Problems Son     Anesth Problems Neg Hx       Social History     Tobacco Use    Smoking status: Former     Types: Cigarettes     Quit date: 1994     Years since quittin.3    Smokeless tobacco: Never   Substance Use Topics    Alcohol use: No     Past Medical History: Diagnosis Date    ADHD (attention deficit hyperactivity disorder)     Arthritis     OSTEO    Chronic pain     YAHIR KNEES, LOWER BACK, RT SHOULDER & NECK    GERD (gastroesophageal reflux disease)     Graves disease     NO LONGER AN ISSUE    Hematuria 10/9/2018    HTN (hypertension)     CONTROLLED WITH MEDS    Hypothyroidism 2018    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    OCD (obsessive compulsive disorder)     Other ill-defined conditions(799.89)     graves    Psychiatric disorder     depression. RECENTLY LOST HER SON ON 19 ON HER BIRTHDAY. Seizure ([address]     Seizures ([address]  &     REACTIVE TO HYPOGLYCEMIA / ALSO FROM FLASHING LIGHTS    Thromboembolus ([address] 2019    right arm      Past Surgical History:   Procedure Laterality Date    DELIVERY       HX ABDOMINOPLASTY      HX ADENOIDECTOMY      HX  SECTION      HX  SECTION      HX CHOLECYSTECTOMY      HX GASTRIC BYPASS          HX GI  1993    CHOLEYCYSTECTOMY    HX ORTHOPAEDIC Right     CARPEL TUNNEL    HX ORTHOPAEDIC Left     CARPEL TUNNEL    HX ORTHOPAEDIC Left     ULNA SHORTENING    HX ORTHOPAEDIC Right 2011    COLLAR BONE SURGERY     HX ORTHOPAEDIC Left 2019    LEFT KNEE REPLACEMENT    HX ORTHOPAEDIC Left     LEFT KNEE INCISION FOR INFECTION & REPAIR    HX ORTHOPAEDIC Left     LEFT KNEE REMOVAL OF PROSTHESIS & INSERTION OF SPACER, REPAIR OF INCISION    HX ORTHOPAEDIC  10/31/2019    left knee revision    HX TONSILLECTOMY        Prior to Admission medications    Medication Sig Start Date End Date Taking? Authorizing Provider   diclofenac EC (VOLTAREN) 75 mg EC tablet Take 75 mg by mouth two (2) times a day. Yes Provider, Historical   fluconazole (DIFLUCAN) 150 mg tablet Take 150 mg by mouth every seven (7) days. FDA advises cautious prescribing of oral fluconazole in pregnancy. Yes Provider, Historical   hydrOXYzine pamoate (VISTARIL) 50 mg capsule Take 100 mg by mouth daily as needed.    Yes Provider, Historical   losartan (COZAAR) 100 mg tablet Take 100 mg by mouth daily. Yes Provider, Historical   OLANZapine (ZyPREXA) 5 mg tablet Take 5 mg by mouth nightly. Yes Provider, Historical   prazosin (MINIPRESS) 5 mg capsule Take 5 mg by mouth nightly. Yes Provider, Historical   predniSONE (DELTASONE) 50 mg tablet Take 50 mg by mouth daily. Yes Provider, Historical   senna-docusate (PERICOLACE) 8.6-50 mg per tablet Take 1 Tab by mouth two (2) times a day. Indications: constipation 1/2/20  Yes LETICIA Woods   doxycycline (ADOXA) 100 mg tablet Take 100 mg by mouth two (2) times a day. Yes Provider, Historical   multivitamin (ONE A DAY) tablet Take 1 Tab by mouth nightly. Yes Provider, Historical   Lisdexamfetamine (VYVANSE) 70 mg cap Take 70 mg by mouth every morning. Yes Provider, Historical   morphine CR (MS CONTIN) 30 mg CR tablet Take  by mouth every twelve (12) hours. Yes Provider, Historical   acetaminophen (TYLENOL) 500 mg tablet Take 500 mg by mouth every six (6) hours as needed for Pain. Yes Provider, Historical   alpha-d-galactosidase (BEANO) 150 unit tab tablet Take 2 Tabs by mouth three (3) times daily as needed for Flatulence. Yes Lc Foreman MD   amphetamine sulfate 10 mg tab Take 10 mg by mouth as needed. PT TAKES 2 TABS (20 MG) BID AT 0200 AND 1200    Yes Provider, Historical   vortioxetine (TRINTELLIX) 10 mg tablet Take 30 mg by mouth every morning. PT TAKES A TOTAL OF 30 mg DAILY   Yes Provider, Historical   albuterol (PROVENTIL HFA, VENTOLIN HFA, PROAIR HFA) 90 mcg/actuation inhaler Take 1 Puff by inhalation every four (4) hours as needed for Wheezing. Yes Provider, Historical   simethicone (MYLICON) 80 mg chewable tablet Take 80 mg by mouth every six (6) hours as needed for Flatulence. Yes Provider, Historical   cetirizine (ZYRTEC) 10 mg tablet Take 1 Tab by mouth daily.    Yes Provider, Historical     Current Facility-Administered Medications   Medication Dose Route Frequency    HYDROmorphone (PF) (DILAUDID) 1 mg/mL injection        lactated Ringers infusion  25 mL/hr IntraVENous CONTINUOUS    cholecalciferol (VITAMIN D3) (1000 Units /25 mcg) tablet 1,000 Units  1,000 Units Oral DAILY    HYDROmorphone (DILAUDID) tablet 4 mg  4 mg Oral Q4H    HYDROmorphone 30 mg/30 mL (DILAUDID) PCA   IntraVENous CONTINUOUS    sodium chloride (NS) flush 5-40 mL  5-40 mL IntraVENous Q8H    sodium chloride (NS) flush 5-40 mL  5-40 mL IntraVENous PRN    acetaminophen (TYLENOL) tablet 650 mg  650 mg Oral Q6H    naloxone (NARCAN) injection 0.4 mg  0.4 mg IntraVENous PRN    senna-docusate (PERICOLACE) 8.6-50 mg per tablet 2 Tablet  2 Tablet Oral BID    acyclovir (ZOVIRAX) tablet 400 mg  400 mg Oral Q4H PRN    losartan (COZAAR) tablet 100 mg  100 mg Oral DAILY    hydroCHLOROthiazide (HYDRODIURIL) tablet 50 mg  50 mg Oral DAILY    prazosin (MINIPRESS) capsule 5 mg  5 mg Oral QHS    busPIRone (BUSPAR) tablet 15 mg  15 mg Oral BID    OLANZapine (ZyPREXA) tablet 5 mg  5 mg Oral QPM    doxycycline (VIBRA-TABS) tablet 100 mg  100 mg Oral Q12H    pantoprazole (PROTONIX) tablet 40 mg  40 mg Oral ACB    sodium chloride (NS) flush 5-40 mL  5-40 mL IntraVENous Q8H    sodium chloride (NS) flush 5-40 mL  5-40 mL IntraVENous PRN    acetaminophen (TYLENOL) tablet 650 mg  650 mg Oral Q6H PRN    Or    acetaminophen (TYLENOL) suppository 650 mg  650 mg Rectal Q6H PRN    polyethylene glycol (MIRALAX) packet 17 g  17 g Oral DAILY PRN    ondansetron (ZOFRAN ODT) tablet 4 mg  4 mg Oral Q8H PRN    Or    ondansetron (ZOFRAN) injection 4 mg  4 mg IntraVENous Q6H PRN    labetaloL (NORMODYNE;TRANDATE) injection 5 mg  5 mg IntraVENous Q2H PRN    morphine ER (MS CONTIN) tablet 30 mg  30 mg Oral Q12H      Allergies   Allergen Reactions    Sulfa (Sulfonamide Antibiotics) Hives    Other Medication Rash     BANDAIDS MAY CAUSE RASH.     Pcn [Penicillins] Unproven on Challenge        Review of Systems:    Negative for fevers, chills, nausea, vomiting, chest pain, shortness of breath, headaches.       Objective:     Patient Vitals for the past 24 hrs:   Temp Pulse Resp BP SpO2   23 1549 98.4 °F (36.9 °C) 99 14 (!) 154/100 95 %   23 0756 97.7 °F (36.5 °C) 76 16 129/76 98 %   23 2327 98.6 °F (37 °C) 69 18 (!) 144/89 98 %   23 2023 98.1 °F (36.7 °C) 64 18 139/70 97 %       Temp (24hrs), Av.2 °F (36.8 °C), Min:97.7 °F (36.5 °C), Max:98.6 °F (37 °C)      Gen: NAD, A&Ox3  Resp: Non-labored breathing  CV: Extremities well perfused  Abd: soft, NT  RLE: shortened, rotated, did not range due to pain, skin intact, warm well perfused, SILT in al distributions L3-S1, foot warm, DP palpable, no calf tenderness  LLE: no pain with ROM, no swelling about knee or ankle, skin intact, warm well perfused, SILT in al distributions L3-S1, palpable pedal pulses, no calf tenderness    Imaging Review: Displaced right femoral shaft fracture=distal 1/3    Labs:   Recent Results (from the past 24 hour(s))   METABOLIC PANEL, BASIC    Collection Time: 23  4:51 AM   Result Value Ref Range    Sodium 141 136 - 145 mmol/L    Potassium 4.2 3.5 - 5.1 mmol/L    Chloride 112 (H) 97 - 108 mmol/L    CO2 22 21 - 32 mmol/L    Anion gap 7 5 - 15 mmol/L    Glucose 80 65 - 100 mg/dL    BUN 18 6 - 20 MG/DL    Creatinine 1.03 (H) 0.55 - 1.02 MG/DL    BUN/Creatinine ratio 17 12 - 20      eGFR >60 >60 ml/min/1.73m2    Calcium 8.8 8.5 - 10.1 MG/DL         Current Facility-Administered Medications   Medication Dose Route Frequency    HYDROmorphone (PF) (DILAUDID) 1 mg/mL injection        lactated Ringers infusion  25 mL/hr IntraVENous CONTINUOUS    cholecalciferol (VITAMIN D3) (1000 Units /25 mcg) tablet 1,000 Units  1,000 Units Oral DAILY    HYDROmorphone (DILAUDID) tablet 4 mg  4 mg Oral Q4H    HYDROmorphone 30 mg/30 mL (DILAUDID) PCA   IntraVENous CONTINUOUS    sodium chloride (NS) flush 5-40 mL  5-40 mL IntraVENous Q8H    sodium chloride (NS) flush 5-40 mL  5-40 mL IntraVENous PRN    acetaminophen (TYLENOL) tablet 650 mg  650 mg Oral Q6H    naloxone (NARCAN) injection 0.4 mg  0.4 mg IntraVENous PRN    senna-docusate (PERICOLACE) 8.6-50 mg per tablet 2 Tablet  2 Tablet Oral BID    acyclovir (ZOVIRAX) tablet 400 mg  400 mg Oral Q4H PRN    losartan (COZAAR) tablet 100 mg  100 mg Oral DAILY    hydroCHLOROthiazide (HYDRODIURIL) tablet 50 mg  50 mg Oral DAILY    prazosin (MINIPRESS) capsule 5 mg  5 mg Oral QHS    busPIRone (BUSPAR) tablet 15 mg  15 mg Oral BID    OLANZapine (ZyPREXA) tablet 5 mg  5 mg Oral QPM    doxycycline (VIBRA-TABS) tablet 100 mg  100 mg Oral Q12H    pantoprazole (PROTONIX) tablet 40 mg  40 mg Oral ACB    sodium chloride (NS) flush 5-40 mL  5-40 mL IntraVENous Q8H    sodium chloride (NS) flush 5-40 mL  5-40 mL IntraVENous PRN    acetaminophen (TYLENOL) tablet 650 mg  650 mg Oral Q6H PRN    Or    acetaminophen (TYLENOL) suppository 650 mg  650 mg Rectal Q6H PRN    polyethylene glycol (MIRALAX) packet 17 g  17 g Oral DAILY PRN    ondansetron (ZOFRAN ODT) tablet 4 mg  4 mg Oral Q8H PRN    Or    ondansetron (ZOFRAN) injection 4 mg  4 mg IntraVENous Q6H PRN    labetaloL (NORMODYNE;TRANDATE) injection 5 mg  5 mg IntraVENous Q2H PRN    morphine ER (MS CONTIN) tablet 30 mg  30 mg Oral Q12H         Impression:     Active Problems:    Femur fracture, right ([facility]) (1/17/2023)    47 yo famle with distal 1/3 femoral shaft fracture. She has extreme morbid obesity which significantly increases her risk of medical and surgical complication. She understands elevated risk and wishes to proceed. Plan:   Plan for IM nail right femur- retrograde    Risks and benefits of major fracture surgery  discussed at length including but not limited to bleeding, need for blood transfusion, infection, damage to surrounding structures, intra-operative fracture, blood clots, pulmonary embolism, death. The patient understands the risks of surgery. All questions answered.   They elected to move forward.          Kelley Morales MD

## 2023-01-19 NOTE — ANESTHESIA PREPROCEDURE EVALUATION
Relevant Problems   ENDOCRINE   (+) Hyperthyroidism       Anesthetic History   No history of anesthetic complications            Review of Systems / Medical History  Patient summary reviewed, nursing notes reviewed and pertinent labs reviewed    Pulmonary  Within defined limits        Undiagnosed apnea         Neuro/Psych     seizures    Psychiatric history     Cardiovascular    Hypertension              Exercise tolerance: <4 METS     GI/Hepatic/Renal     GERD           Endo/Other      Hypothyroidism (graves disease)  Morbid obesity and arthritis     Other Findings   Comments: OCD, ADHD    Chronic pain    Right arm thromboembolus 2019         Physical Exam    Airway  Mallampati: III  TM Distance: 4 - 6 cm  Neck ROM: normal range of motion, short neck   Mouth opening: Diminished (comment)     Cardiovascular  Regular rate and rhythm,  S1 and S2 normal,  no murmur, click, rub, or gallop             Dental  No notable dental hx       Pulmonary  Breath sounds clear to auscultation               Abdominal  GI exam deferred       Other Findings            Anesthetic Plan    ASA: 3, emergent  Anesthesia type: general          Induction: Intravenous  Anesthetic plan and risks discussed with: Patient      Have glidescope available.   She would like another PIV if possible once she's asleep

## 2023-01-19 NOTE — PROGRESS NOTES
Palliative Medicine SW Note    LCSW stopped by to see patient for psychosocial support after consulting with NP vA Velasquez. Patient was receiving EKG and then taken to surgery. Will see on 1/23/23 if patient is here. Thank you for including Palliative team in the care of Ms. Felix Lamar.     Nery Johnson [facility]  Palliative Medicine [phone] ([phone])

## 2023-01-19 NOTE — PROGRESS NOTES
Palliative Medicine Consult  [place]: [phone] ([phone])    Patient Name: Carrie Maria  YOB: 1970    Date of Initial Consult: 23  Reason for Consult: help with pain control, pt on high doses opioids prior to admission, now here with femur fracture and difficult to control pain  Requesting Provider: Johanna Pike MD   Primary Care Physician: Jevon Kingsley MD     SUMMARY:   Carrie Maria is a 46 y.o. with a past history of ADHD, chronic pain, HTN, OCD, MRSA carrier, depression, seizures, who was admitted on 2023 from home with a diagnosis of cute, transverse fracture of the distal diaphysis the right femur with moderate apex anterior angulation. .     Per , pt fills prescriptions for:  1/15/23: Morphine ER 30mg tab #60 tab/30 day supply,   1/15/23: dilaudid 4mg tab  #150 tab/30 day supply, Dr. Ginette Hernández  1/3/23: Vyvanse 70mg tab #90 tab/90 day supply, Dr. Kendra Gardiner  23-10/2022: various types of marijuana hybrids prescribed by [facility]    HPI:  : pt BIBA with c/o right knee and hip pain. She had a right knee replacement in . On  pt began to have significant right knee pain with no precipitating injury, now unable to bear weight, unable to move from home so she had EMS bring her to ER. Patient takes Morphine ER 60mg daily and dilaudid 20mg daily at baseline (140mg MME)     Admission course: : elevated wbc 13.0, h/h 10.1/33.6, UA suspicious of UTI, UC sent, right femur xray: shows acute, transverse fracture of the distal diaphysis the right femur with moderate apex anterior angulation. : refusing CT scan unless \"you knock me out. \"   Ortho planning OR intervention tomorrow. : surgery scheduled for later this afternoon. Current medical issues leading to Palliative Medicine involvement include: complicated pain management. Psychosocial: lives at home with  and 11 y/o son (autistic).   Another son (also autistic)  2019 from meningitis   PALLIATIVE DIAGNOSES:   Chronic pain syndrome (chronic knee, back, neck, clavicle)  Acute pain due to right femur fracture  Depression, complicated grief due to loss of son   PLAN:   Prior to visit, I completed a review of patient's medical records, including medical documentation, vital signs, MARs, and results of various labs and other diagnostics. PAIN:   Met with pt, pt reports pain is tolerable as long as she doesn't move. She voiced understanding that she will have pain, opioids will not make her pain 0/10, but hopefully will help make it tolerable. For chronic pain:  Continue scheduled Morphine ER 30mg bid and dilaudid 4mg PO q.4 hours scheduled. (These 2 drugs should NOT be factored in when calculating opiates for acute pain)  For acute pain due to fracture: increase dilaudid PCA: 0.4mg bolus dose with 10 min lockout, no basal rate, 16mg four hour lockout. I anticipate this will need to be adjusted up following surgery. RN can call me for pain medication dose adjustments [phone] if needed overnight. Ice pack. Narcan already on order. Continuous oximetry while adjusting opioid doses. Message left for  (pain management) to discuss her opioid therapy and plan for discharge pain management. Depression: her depression and anxiety are going to affect her coping with pain. Initial consult note routed to primary continuity provider and/or primary health care team members  Communicated plan of care with: Palliative [facility] Team    ADDENDUM: (4:10pm) spoke with :  discussed plan to wean pt off dilaudid PCA, will increase PO dilaudid dose and continue morphine at scheduled dose of 30mg bid.     GOALS OF CARE / TREATMENT PREFERENCES:     GOALS OF CARE:   optimal pain control      Patient/Health Care Proxy Stated Goals: Prolong life   HISTORY:     History obtained from: chart, patient     The patient is:   [x] Verbal and participatory  [] Non-participatory due to:          Clinical Pain Assessment (nonverbal scale for severity on nonverbal patients):   Clinical Pain Assessment  Severity: 4  Location: right thigh  Character: sharp, \"very painful\"  Duration: days  Effect: cannot move  Factors: MOVEMENT, PAIN MEDICATION  Frequency: intermittent     Activity (Movement): Restless, excessive activity and/or withdrawal reflexes    Duration: for how long has pt been experiencing pain (e.g., 2 days, 1 month, years)  Frequency: how often pain is an issue (e.g., several times per day, once every few days, constant)     FUNCTIONAL ASSESSMENT:     Palliative Performance Scale (PPS):  PPS: 30       PSYCHOSOCIAL/SPIRITUAL SCREENING:     Palliative IDT has assessed this patient for cultural preferences / practices and a referral made as appropriate to needs (Cultural Services, Patient Advocacy, Ethics, etc.)    Any spiritual / Tenriism concerns:  [] Yes /  [x] No   If \"Yes\" to discuss with pastoral care during IDT     Does caregiver feel burdened by caring for their loved one:   [] Yes /  [x] No /  [] No Caregiver Present    If \"Yes\" to discuss with social work during IDT    Anticipatory grief assessment:   [x] Normal  / [] Maladaptive     If \"Maladaptive\" to discuss with social work during IDT    ESAS Anxiety: Anxiety: 3    ESAS Depression: Depression: 4        REVIEW OF SYSTEMS:     Positive and pertinent negative findings in ROS are noted above in HPI. The following systems were [x] reviewed / [] unable to be reviewed as noted in HPI  Other findings are noted below. Systems: constitutional, ears/nose/mouth/throat, respiratory, gastrointestinal, genitourinary, musculoskeletal, integumentary, neurologic, psychiatric, endocrine. Positive findings noted below.   Modified ESAS Completed by: provider   Fatigue: 8 Drowsiness: 2   Depression: 4 Pain: 4   Anxiety: 3 Nausea: 0   Anorexia: 0 Dyspnea: 0     Constipation: No              PHYSICAL EXAM:     From RN flowsheet:  Wt Readings from Last 3 Encounters:   23 262 lb 2 oz (118.9 kg)   19 202 lb 4 oz (91.7 kg)   19 202 lb 4 oz (91.7 kg)     Blood pressure 129/76, pulse 76, temperature 97.7 °F (36.5 °C), resp. rate 16, height 5' 1\" (1.549 m), weight 262 lb 2 oz (118.9 kg), SpO2 98 %. Pain Scale 1: Numeric (0 - 10)  Pain Intensity 1: 5  Pain Onset 1: acute  Pain Location 1: Leg  Pain Orientation 1: Right  Pain Description 1: Aching, Sore  Pain Intervention(s) 1: Encouraged PCA  Last bowel movement, if known:     Constitutional: AAOx3, pleasant  Eyes: pupils equal, anicteric  ENMT: no nasal discharge, moist mucous membranes  Cardiovascular: regular rhythm, distal pulses intact  Respiratory: breathing not labored, symmetric  Gastrointestinal: obese, soft non-tender, +bowel sounds  Musculoskeletal: right leg very TTP  Skin: warm, dry  Neurologic: following commands, moving all extremities  Psychiatric: full affect, no hallucinations          HISTORY:     Active Problems:    Femur fracture, right ([address] (2023)    Past Medical History:   Diagnosis Date    ADHD (attention deficit hyperactivity disorder)     Arthritis     OSTEO    Chronic pain     YAHIR KNEES, LOWER BACK, RT SHOULDER & NECK    GERD (gastroesophageal reflux disease)     Graves disease     NO LONGER AN ISSUE    Hematuria 10/9/2018    HTN (hypertension)     CONTROLLED WITH MEDS    Hypothyroidism 2018    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    OCD (obsessive compulsive disorder)     Other ill-defined conditions(799.89)     graves    Psychiatric disorder     depression. RECENTLY LOST HER SON ON 19 ON HER BIRTHDAY.     Seizure ([address]     Seizures ([address]  &     REACTIVE TO HYPOGLYCEMIA / ALSO FROM FLASHING LIGHTS    Thromboembolus ([address] 2019    right arm      Past Surgical History:   Procedure Laterality Date    DELIVERY       HX ABDOMINOPLASTY      HX ADENOIDECTOMY      HX  SECTION      HX  SECTION  2006    HX CHOLECYSTECTOMY      HX GASTRIC BYPASS          HX GI  1993    CHOLEYCYSTECTOMY    HX ORTHOPAEDIC Right     CARPEL TUNNEL    HX ORTHOPAEDIC Left     CARPEL TUNNEL    HX ORTHOPAEDIC Left     ULNA SHORTENING    HX ORTHOPAEDIC Right 2011    COLLAR BONE SURGERY     HX ORTHOPAEDIC Left 2019    LEFT KNEE REPLACEMENT    HX ORTHOPAEDIC Left     LEFT KNEE INCISION FOR INFECTION & REPAIR    HX ORTHOPAEDIC Left     LEFT KNEE REMOVAL OF PROSTHESIS & INSERTION OF SPACER, REPAIR OF INCISION    HX ORTHOPAEDIC  10/31/2019    left knee revision    HX TONSILLECTOMY  1975      Family History   Problem Relation Age of Onset    Heart Disease Mother     Heart Attack Mother     Hypertension Mother     OSTEOARTHRITIS Mother     Rashes/Skin Problems Mother         PLAQUE PSORIASIS    Heart Disease Father     Thyroid Disease Father     Diabetes Father     Hypertension Father     Diabetes Maternal Grandmother     Heart Attack Maternal Grandmother     Hypertension Maternal Grandmother     OSTEOARTHRITIS Maternal Grandmother     Hypertension Maternal Grandfather     Stroke Maternal Grandfather     High Cholesterol Sister     Other Son         meningitis    No Known Problems Son     Anesth Problems Neg Hx       History reviewed, no pertinent family history. Social History     Tobacco Use    Smoking status: Former     Types: Cigarettes     Quit date: 1994     Years since quittin.3    Smokeless tobacco: Never   Substance Use Topics    Alcohol use: No     Allergies   Allergen Reactions    Sulfa (Sulfonamide Antibiotics) Hives    Other Medication Rash     BANDAIDS MAY CAUSE RASH.     Pcn [Penicillins] Unproven on Challenge      Current Facility-Administered Medications   Medication Dose Route Frequency    cefTRIAXone (ROCEPHIN) 1 g in 0.9% sodium chloride (MBP/ADV) 50 mL MBP  1 g IntraVENous Q24H    cholecalciferol (VITAMIN D3) (1000 Units /25 mcg) tablet 1,000 Units  1,000 Units Oral DAILY    HYDROmorphone (DILAUDID) tablet 4 mg 4 mg Oral Q4H    HYDROmorphone 30 mg/30 mL (DILAUDID) PCA   IntraVENous CONTINUOUS    sodium chloride (NS) flush 5-40 mL  5-40 mL IntraVENous Q8H    sodium chloride (NS) flush 5-40 mL  5-40 mL IntraVENous PRN    acetaminophen (TYLENOL) tablet 650 mg  650 mg Oral Q6H    naloxone (NARCAN) injection 0.4 mg  0.4 mg IntraVENous PRN    senna-docusate (PERICOLACE) 8.6-50 mg per tablet 2 Tablet  2 Tablet Oral BID    acyclovir (ZOVIRAX) tablet 400 mg  400 mg Oral Q4H PRN    losartan (COZAAR) tablet 100 mg  100 mg Oral DAILY    hydroCHLOROthiazide (HYDRODIURIL) tablet 50 mg  50 mg Oral DAILY    prazosin (MINIPRESS) capsule 5 mg  5 mg Oral QHS    busPIRone (BUSPAR) tablet 15 mg  15 mg Oral BID    OLANZapine (ZyPREXA) tablet 5 mg  5 mg Oral QPM    doxycycline (VIBRA-TABS) tablet 100 mg  100 mg Oral Q12H    pantoprazole (PROTONIX) tablet 40 mg  40 mg Oral ACB    sodium chloride (NS) flush 5-40 mL  5-40 mL IntraVENous Q8H    sodium chloride (NS) flush 5-40 mL  5-40 mL IntraVENous PRN    acetaminophen (TYLENOL) tablet 650 mg  650 mg Oral Q6H PRN    Or    acetaminophen (TYLENOL) suppository 650 mg  650 mg Rectal Q6H PRN    polyethylene glycol (MIRALAX) packet 17 g  17 g Oral DAILY PRN    ondansetron (ZOFRAN ODT) tablet 4 mg  4 mg Oral Q8H PRN    Or    ondansetron (ZOFRAN) injection 4 mg  4 mg IntraVENous Q6H PRN    labetaloL (NORMODYNE;TRANDATE) injection 5 mg  5 mg IntraVENous Q2H PRN    morphine ER (MS CONTIN) tablet 30 mg  30 mg Oral Q12H          LAB AND IMAGING FINDINGS:     Lab Results   Component Value Date/Time    WBC 13.4 (H) 01/18/2023 04:19 AM    HGB 9.1 (L) 01/18/2023 04:19 AM    PLATELET 406 14/86/6633 04:19 AM     Lab Results   Component Value Date/Time    Sodium 141 01/19/2023 04:51 AM    Potassium 4.2 01/19/2023 04:51 AM    Chloride 112 (H) 01/19/2023 04:51 AM    CO2 22 01/19/2023 04:51 AM    BUN 18 01/19/2023 04:51 AM    Creatinine 1.03 (H) 01/19/2023 04:51 AM    Calcium 8.8 01/19/2023 04:51 AM      Lab Results Component Value Date/Time    Alk. phosphatase 97 01/17/2023 06:29 PM    Protein, total 6.2 (L) 01/17/2023 06:29 PM    Albumin 2.8 (L) 01/17/2023 06:29 PM    Globulin 3.4 01/17/2023 06:29 PM     Lab Results   Component Value Date/Time    INR 1.0 01/17/2023 09:15 PM    Prothrombin time 10.8 01/17/2023 09:15 PM    aPTT 31.3 01/26/2019 08:51 PM      Lab Results   Component Value Date/Time    Ferritin 31 01/29/2019 03:06 AM      No results found for: PH, PCO2, PO2  No components found for: GLPOC   No results found for: CPK, CKMB             Total time: 30  Counseling / coordination time, spent as noted above: 25  > 50% counseling / coordination?: yes    Prolonged service was provided for  []30 min   []75 min in face to face time in the presence of the patient, spent as noted above. Time Start:   Time End:   Note: this can only be billed with 55807 (initial) or 39926 (follow up). If multiple start / stop times, list each separately.

## 2023-01-19 NOTE — PERIOP NOTES
Sbar in not   pt to holding area     identifies   self and procedure for today. Pt has been npo except   for meds.

## 2023-01-20 PROBLEM — F33.1 MODERATE EPISODE OF RECURRENT MAJOR DEPRESSIVE DISORDER (HCC): Status: ACTIVE | Noted: 2023-01-20

## 2023-01-20 PROBLEM — F43.21 COMPLICATED GRIEF: Status: ACTIVE | Noted: 2023-01-20

## 2023-01-20 PROBLEM — M79.604 ACUTE LEG PAIN, RIGHT: Status: ACTIVE | Noted: 2023-01-20

## 2023-01-20 PROBLEM — G89.4 CHRONIC PAIN SYNDROME: Status: ACTIVE | Noted: 2023-01-20

## 2023-01-20 LAB
ANION GAP SERPL CALC-SCNC: 12 MMOL/L (ref 5–15)
BASOPHILS # BLD: 0 K/UL (ref 0–0.1)
BASOPHILS NFR BLD: 0 % (ref 0–1)
BUN SERPL-MCNC: 16 MG/DL (ref 6–20)
BUN/CREAT SERPL: 15 (ref 12–20)
CALCIUM SERPL-MCNC: 8.9 MG/DL (ref 8.5–10.1)
CHLORIDE SERPL-SCNC: 102 MMOL/L (ref 97–108)
CO2 SERPL-SCNC: 24 MMOL/L (ref 21–32)
CREAT SERPL-MCNC: 1.04 MG/DL (ref 0.55–1.02)
DIFFERENTIAL METHOD BLD: ABNORMAL
EOSINOPHIL # BLD: 0 K/UL (ref 0–0.4)
EOSINOPHIL NFR BLD: 0 % (ref 0–7)
ERYTHROCYTE [DISTWIDTH] IN BLOOD BY AUTOMATED COUNT: 16.3 % (ref 11.5–14.5)
GLUCOSE BLD STRIP.AUTO-MCNC: 110 MG/DL (ref 65–117)
GLUCOSE SERPL-MCNC: 127 MG/DL (ref 65–100)
HCT VFR BLD AUTO: 35.5 % (ref 35–47)
HGB BLD-MCNC: 10.7 G/DL (ref 11.5–16)
IMM GRANULOCYTES # BLD AUTO: 0.1 K/UL (ref 0–0.04)
IMM GRANULOCYTES NFR BLD AUTO: 1 % (ref 0–0.5)
LYMPHOCYTES # BLD: 1.2 K/UL (ref 0.8–3.5)
LYMPHOCYTES NFR BLD: 10 % (ref 12–49)
MCH RBC QN AUTO: 25.2 PG (ref 26–34)
MCHC RBC AUTO-ENTMCNC: 30.1 G/DL (ref 30–36.5)
MCV RBC AUTO: 83.5 FL (ref 80–99)
MONOCYTES # BLD: 0.6 K/UL (ref 0–1)
MONOCYTES NFR BLD: 4 % (ref 5–13)
NEUTS SEG # BLD: 10.9 K/UL (ref 1.8–8)
NEUTS SEG NFR BLD: 85 % (ref 32–75)
NRBC # BLD: 0 K/UL (ref 0–0.01)
NRBC BLD-RTO: 0 PER 100 WBC
PLATELET # BLD AUTO: 342 K/UL (ref 150–400)
PMV BLD AUTO: 9.4 FL (ref 8.9–12.9)
POTASSIUM SERPL-SCNC: 4.5 MMOL/L (ref 3.5–5.1)
RBC # BLD AUTO: 4.25 M/UL (ref 3.8–5.2)
SERVICE CMNT-IMP: NORMAL
SODIUM SERPL-SCNC: 138 MMOL/L (ref 136–145)
WBC # BLD AUTO: 12.8 K/UL (ref 3.6–11)

## 2023-01-20 PROCEDURE — 74011250637 HC RX REV CODE- 250/637: Performed by: PHYSICIAN ASSISTANT

## 2023-01-20 PROCEDURE — 97530 THERAPEUTIC ACTIVITIES: CPT

## 2023-01-20 PROCEDURE — 74011000250 HC RX REV CODE- 250: Performed by: PHYSICIAN ASSISTANT

## 2023-01-20 PROCEDURE — 99233 SBSQ HOSP IP/OBS HIGH 50: CPT | Performed by: NURSE PRACTITIONER

## 2023-01-20 PROCEDURE — 74011000250 HC RX REV CODE- 250: Performed by: ANESTHESIOLOGY

## 2023-01-20 PROCEDURE — 74011250636 HC RX REV CODE- 250/636: Performed by: PHYSICIAN ASSISTANT

## 2023-01-20 PROCEDURE — 97535 SELF CARE MNGMENT TRAINING: CPT

## 2023-01-20 PROCEDURE — 65270000029 HC RM PRIVATE

## 2023-01-20 PROCEDURE — 36415 COLL VENOUS BLD VENIPUNCTURE: CPT

## 2023-01-20 PROCEDURE — 97162 PT EVAL MOD COMPLEX 30 MIN: CPT

## 2023-01-20 PROCEDURE — 97166 OT EVAL MOD COMPLEX 45 MIN: CPT

## 2023-01-20 PROCEDURE — 82962 GLUCOSE BLOOD TEST: CPT

## 2023-01-20 PROCEDURE — 85025 COMPLETE CBC W/AUTO DIFF WBC: CPT

## 2023-01-20 PROCEDURE — 74011250637 HC RX REV CODE- 250/637: Performed by: NURSE PRACTITIONER

## 2023-01-20 PROCEDURE — 80048 BASIC METABOLIC PNL TOTAL CA: CPT

## 2023-01-20 RX ORDER — HYDROMORPHONE HYDROCHLORIDE 2 MG/1
6 TABLET ORAL EVERY 4 HOURS
Status: DISCONTINUED | OUTPATIENT
Start: 2023-01-20 | End: 2023-01-23 | Stop reason: HOSPADM

## 2023-01-20 RX ADMIN — HYDROMORPHONE HYDROCHLORIDE 6 MG: 2 TABLET ORAL at 20:04

## 2023-01-20 RX ADMIN — CHOLECALCIFEROL TAB 25 MCG (1000 UNIT) 1000 UNITS: 25 TAB at 08:10

## 2023-01-20 RX ADMIN — SODIUM CHLORIDE, PRESERVATIVE FREE 10 ML: 5 INJECTION INTRAVENOUS at 06:23

## 2023-01-20 RX ADMIN — ACETAMINOPHEN 650 MG: 325 TABLET ORAL at 13:13

## 2023-01-20 RX ADMIN — BUSPIRONE HYDROCHLORIDE 15 MG: 5 TABLET ORAL at 08:10

## 2023-01-20 RX ADMIN — ACETAMINOPHEN 650 MG: 325 TABLET ORAL at 08:10

## 2023-01-20 RX ADMIN — ACETAMINOPHEN 650 MG: 325 TABLET ORAL at 20:04

## 2023-01-20 RX ADMIN — SODIUM CHLORIDE, PRESERVATIVE FREE 10 ML: 5 INJECTION INTRAVENOUS at 13:12

## 2023-01-20 RX ADMIN — ACYCLOVIR 400 MG: 800 TABLET ORAL at 08:09

## 2023-01-20 RX ADMIN — PANTOPRAZOLE SODIUM 40 MG: 40 TABLET, DELAYED RELEASE ORAL at 06:22

## 2023-01-20 RX ADMIN — ACETAMINOPHEN 650 MG: 325 TABLET ORAL at 03:57

## 2023-01-20 RX ADMIN — CEFAZOLIN 2 G: 1 INJECTION, POWDER, FOR SOLUTION INTRAMUSCULAR; INTRAVENOUS at 06:22

## 2023-01-20 RX ADMIN — HYDROMORPHONE HYDROCHLORIDE 4 MG: 2 TABLET ORAL at 03:57

## 2023-01-20 RX ADMIN — HYDROMORPHONE HYDROCHLORIDE 6 MG: 2 TABLET ORAL at 16:22

## 2023-01-20 RX ADMIN — DOXYCYCLINE HYCLATE 100 MG: 100 TABLET, COATED ORAL at 08:10

## 2023-01-20 RX ADMIN — MORPHINE SULFATE 30 MG: 15 TABLET, FILM COATED, EXTENDED RELEASE ORAL at 21:22

## 2023-01-20 RX ADMIN — APIXABAN 2.5 MG: 2.5 TABLET, FILM COATED ORAL at 17:33

## 2023-01-20 RX ADMIN — BUSPIRONE HYDROCHLORIDE 15 MG: 5 TABLET ORAL at 17:33

## 2023-01-20 RX ADMIN — HYDROMORPHONE HYDROCHLORIDE 4 MG: 2 TABLET ORAL at 08:09

## 2023-01-20 RX ADMIN — HYDROMORPHONE HYDROCHLORIDE 6 MG: 2 TABLET ORAL at 11:57

## 2023-01-20 RX ADMIN — Medication 1 TABLET: at 17:33

## 2023-01-20 RX ADMIN — Medication 1 TABLET: at 11:57

## 2023-01-20 RX ADMIN — PRAZOSIN HYDROCHLORIDE 5 MG: 5 CAPSULE ORAL at 21:22

## 2023-01-20 RX ADMIN — HYDROMORPHONE HYDROCHLORIDE 4 MG: 2 TABLET ORAL at 00:36

## 2023-01-20 RX ADMIN — Medication 1 TABLET: at 08:10

## 2023-01-20 RX ADMIN — LOSARTAN POTASSIUM 100 MG: 50 TABLET, FILM COATED ORAL at 08:10

## 2023-01-20 RX ADMIN — SODIUM CHLORIDE, PRESERVATIVE FREE 10 ML: 5 INJECTION INTRAVENOUS at 21:22

## 2023-01-20 RX ADMIN — MORPHINE SULFATE 30 MG: 15 TABLET, FILM COATED, EXTENDED RELEASE ORAL at 08:10

## 2023-01-20 RX ADMIN — HYDROCHLOROTHIAZIDE 50 MG: 25 TABLET ORAL at 08:10

## 2023-01-20 RX ADMIN — OLANZAPINE 5 MG: 5 TABLET, FILM COATED ORAL at 21:22

## 2023-01-20 RX ADMIN — DOXYCYCLINE HYCLATE 100 MG: 100 TABLET, COATED ORAL at 21:22

## 2023-01-20 NOTE — ANESTHESIA POSTPROCEDURE EVALUATION
Procedure(s):  INTRA MEDULLARY NAIL INSERTION, RETROGRADE RIGHT FEMUR.    general    Anesthesia Post Evaluation        Patient location during evaluation: PACU  Note status: Adequate. Level of consciousness: responsive to verbal stimuli and sleepy but conscious  Pain management: satisfactory to patient  Airway patency: patent  Anesthetic complications: no  Cardiovascular status: acceptable  Respiratory status: acceptable  Hydration status: acceptable  Comments: +Post-Anesthesia Evaluation and Assessment    Patient: Kim Rodriges MRN: [id number]  SSN: [id number]   YOB: 1970  Age: 46 y.o. Sex: female      Cardiovascular Function/Vital Signs    BP (!) 150/92   Pulse 92   Temp 36.8 °C (98.2 °F)   Resp 17   Ht 5' 1\" (1.549 m)   Wt 118.9 kg (262 lb 2 oz)   SpO2 100%   BMI 49.53 kg/m²     Patient is status post Procedure(s):  INTRA MEDULLARY NAIL INSERTION, RETROGRADE RIGHT FEMUR. Nausea/Vomiting: Controlled. Postoperative hydration reviewed and adequate. Pain:  Pain Scale 1: Numeric (0 - 10) (01/19/23 1549)  Pain Intensity 1: 7 (when   moves) (01/19/23 1549)   Managed. Neurological Status:   Neuro (WDL): Exceptions to WDL (seizure in the past     none  since   2010) (01/19/23 1550)   At baseline. Mental Status and Level of Consciousness: Arousable. Pulmonary Status:   O2 Device:  (Nasal CPAP.) (01/19/23 2021)   Adequate oxygenation and airway patent. Complications related to anesthesia: None    Post-anesthesia assessment completed. No concerns. Signed By: Anushka Bernardo MD    1/19/2023  Post anesthesia nausea and vomiting:  controlled      INITIAL Post-op Vital signs:   Vitals Value Taken Time   /110 01/19/23 2025   Temp 36.8 °C (98.2 °F) 01/19/23 2021   Pulse 103 01/19/23 2029   Resp 21 01/19/23 2029   SpO2 99 % 01/19/23 2029   Vitals shown include unvalidated device data.

## 2023-01-20 NOTE — OP NOTES
[address]  OPERATIVE REPORT    Name:  Mee Rogel  MR#:  [id number]  :  1970  ACCOUNT #:  [de-identified]  DATE OF SERVICE:  2023    PREOPERATIVE DIAGNOSIS:  Distal femur fracture, periprosthetic. POSTOPERATIVE DIAGNOSIS:  Distal femur fracture, periprosthetic. PROCEDURE PERFORMED:  Retrograde intramedullary nail on the right distal femur. SURGEON:  Michelle Redd MD    ASSISTANT:  Emy Stout PA-C    ANESTHESIA:  General.    COMPLICATIONS:  None. SPECIMENS REMOVED:  None. IMPLANTS:  [place] retrograde intramedullary nail measuring 13 x 360 with four distal screws and two proximal screws. ESTIMATED BLOOD LOSS:  300. INDICATIONS FOR PROCEDURE:  This is a 60-year-old female with morbid obesity who had previous knee replacement several years ago. She has been doing well until the past couple of weeks. She started having some thigh pain. She was being transported and when they put her into a chair, she felt a pop. She was taken to the ER and x-rays revealed a displaced distal third meta-diaphyseal femur fracture just proximal to her total knee replacement. The total knee appeared well fixed. We tried to order a CT scan to rule out pathologic lesion, however, she refused because of pain. There were no obvious signs of pathologic lesion on her x-rays. We discussed the risks and benefits of intramedullary nail using retrograde fashion. She understood the significant risk because of her BMI of 50 and other medical comorbidities. She understands that she had elevated risk of bleeding, infection, damage to surrounding structures, need for further surgery, blood clots, pulmonary embolism, and death. She signed a consent and wished to proceed. DESCRIPTION OF PROCEDURE:  The patient was identified in the preoperative holding area and the correct site was marked and confirmed. She was taken to the operating room and placed supine.   General anesthesia was induced. She was then transferred to the table and prepped and draped in standard sterile fashion. She received 3 g of cefazolin and 1 g of tranexamic acid prior to incision. Time-out was held and correct site was confirmed. I ellipsed out a portion of her previous total knee incision. I elevated a small flap. I then carried out a mini medial parapatellar arthrotomy and suctioned clear fluid. The knee replacement was intact. No evidence of damage or loosening. I then used a guidewire and gained access to the intramedullary canal.  I then passed a ball-tip guidewire across the fracture site up to the level of the lesser trochanter. I then reamed to a 14.5 and chose a 13 nail x 360. I passed the nail. I used a targeting jig and placed 4 distal screws that were distal to the fracture site. I then used the targeting jig to place 8 of these screws proximally. I took final fluoro shot. I was very happy with the reduction. I thoroughly irrigated with normal saline. I then used a dilute Betadine wash to the joint followed by more normal saline. I closed the arthrotomy with #1 Vicryl followed by Stratafix. I closed the subcutaneous tissue with 3-0 Vicryl followed by staples. A wound VAC device was placed and soft dressing was placed. The patient was taken to PACU in stable condition. All counts were correct x2Margie Forbes was critical for key portions of the case including wound closure, dressing application, and retractor management. No one else was to perform this specific duty at that time.       Fernando Berry MD [id number]  D:  01/19/2023 19:47  T:  01/19/2023 23:27  JOB #:  [id number]

## 2023-01-20 NOTE — PROGRESS NOTES
Po IMN for femur fracture  Pain well managed  sitting up in bed  Hgb stable 10.7    Patient Vitals for the past 24 hrs:   Temp Pulse Resp BP SpO2   01/20/23 0937 -- (!) 121 -- (!) 143/78 97 %   01/20/23 0936 -- (!) 119 -- (!) 143/78 --   01/20/23 0921 -- (!) 114 -- 126/80 --   01/20/23 0746 98.6 °F (37 °C) (!) 110 16 136/85 98 %   01/20/23 0725 -- (!) 114 -- 135/84 --   01/20/23 0353 98.6 °F (37 °C) 85 16 (!) 160/85 100 %   01/20/23 0229 -- 92 16 129/78 100 %   01/20/23 0144 -- 93 16 (!) 156/83 100 %   01/20/23 0037 97.6 °F (36.4 °C) 83 16 (!) 147/79 100 %   01/19/23 2325 97.7 °F (36.5 °C) 94 16 (!) 151/81 99 %   01/19/23 2153 98.4 °F (36.9 °C) 82 16 (!) 155/96 99 %   01/19/23 2130 98.6 °F (37 °C) 85 13 (!) 144/87 97 %   01/19/23 2115 -- 91 15 (!) 168/91 99 %   01/19/23 2100 -- 100 19 (!) 140/89 98 %   01/19/23 2045 98.7 °F (37.1 °C) 73 12 (!) 168/77 98 %   01/19/23 2030 -- 87 13 (!) 168/81 100 %   01/19/23 2025 -- 87 15 (!) 174/110 99 %   01/19/23 2021 98.2 °F (36.8 °C) 92 17 (!) 150/92 100 %   01/19/23 2015 -- 92 21 (!) 174/104 99 %   01/19/23 1549 98.4 °F (36.9 °C) 99 14 (!) 154/100 95 %     Dressing clean and dry  Musculoskeletal: Sri's sign negative in bilateral lower extremities. Calves soft, supple, non-tender upon palpation or with passive stretch.        Oob with pt  Ttwb  Dc plan - snf

## 2023-01-20 NOTE — PROGRESS NOTES
Problem: Mobility Impaired (Adult and Pediatric)  Goal: *Acute Goals and Plan of Care (Insert Text)  Description: FUNCTIONAL STATUS PRIOR TO ADMISSION: The patient ambulates household distances using rollator. She tries to sit in her son's wheelchair (which is too small, so she takes off the arm rest). She hasn't gone upstairs since her son's passing in 2019, rather she sleeps on the love seat downstairs. She recently ordered a pivot disc to improve her ability to transfer from surfaces. HOME SUPPORT PRIOR TO ADMISSION: The patient lives with her 13-year old autistic son and her  (who works from home). 2-story home but she stays on the 1st floor, 4 steps with B rails (too wide to reach both) to enter from the outside. Physical Therapy Goals  Initiated 1/20/2023  1. Patient will move from supine to sit and sit to supine  in bed with supervision/set-up within 7 day(s). 2.  Patient will transfer from bed to chair and chair to bed with supervision/set-up using the least restrictive device within 7 day(s). 3.  Patient will perform sit to stand with supervision/set-up within 7 day(s). 4.  Patient will ambulate with minimal assistance/contact guard assist for 25 feet with the least restrictive device within 7 day(s). 5.  Patient will ascend/descend 4 stairs with 1 handrail(s) with minimal assistance/contact guard assist within 7 day(s). Outcome: Not Progressing Towards Goal    PHYSICAL THERAPY EVALUATION  Patient: Javier Whittaker (89 y.o. female)  Date: 1/20/2023  Primary Diagnosis: Femur fracture, right (HCC) [S72.91XA]  Procedure(s) (LRB):  INTRA MEDULLARY NAIL INSERTION, RETROGRADE RIGHT FEMUR (Right) 1 Day Post-Op   Precautions:   Fall, Bed Alarm, TTWB (RLE)    ASSESSMENT  Based on the objective data described below, the patient presents with decreased activity tolerance, anxiety/pain with movement, impaired standing balance, and increased need for transfers s/p POD#1 IM nail RLE.  Patient's young son passed away in 2019 due to meningitis and she admits she has been depressed/let herself go downhill since then. She will not go upstairs since that is where her son passed away and so she sleeps in a loveseat downstairs. She admits to gaining a considerable amount of weight over the past 4 years. She states she walked sometimes with her rollator and sometimes used her son's recliner which was too small for her so she takes the armrest off. She recently purchased 2 pivot discs to help with her ability to transfer between surfaces. She has had 2 total knee surgeries on her LLE and reports that is her bad knee. She was very anxious to move and tearful during the evaluation today due to her personal history as well LLE being her bad leg. PT educated patient on her TTWB precautions, patient acknowledged understanding. She performed supine-sit transfer with minimum assistance for RLE management and HOB significantly elevated. She was able to scoot to the EOB and laterally to [facility] with contact guard assist. She required minimum assist x 1, moderate assist x 1 for sit<>stand transfer, using BUE to pull up to stand on walker. She was able to maintain her TTWB precautions on her own. No dizziness or nausea reported with transition in movements. She reported only slight pain in RLE, more anxiety and fear than pain. Patient left supine in bed, call bell within reach, no complaints. Recommend acute rehab on discharge due to patient's young age and prior level of function, she would benefit from 3 hours of intensive therapy. Current Level of Function Impacting Discharge (mobility/balance): Davy/modA x 2 for sit<>stand transfer    Functional Outcome Measure: The patient scored 3 on the Tinetti outcome measure which is indicative of high fall risk.       Other factors to consider for discharge: stairs to enter her home, chronic pain     Patient will benefit from skilled therapy intervention to address the above noted impairments. PLAN :  Recommendations and Planned Interventions: bed mobility training, transfer training, gait training, therapeutic exercises, patient and family training/education, and therapeutic activities      Frequency/Duration: Patient will be followed by physical therapy:  5 times a week to address goals. Recommendation for discharge: (in order for the patient to meet his/her long term goals)  Therapy 3 hours per day 5-7 days per week    This discharge recommendation:  Has been made in collaboration with the attending provider and/or case management    IF patient discharges home will need the following DME: rolling walker and wheelchair         SUBJECTIVE:   Patient stated I am very nervous.     OBJECTIVE DATA SUMMARY:   HISTORY:    Past Medical History:   Diagnosis Date    ADHD (attention deficit hyperactivity disorder)     Arthritis     OSTEO    Chronic pain     YAHIR KNEES, LOWER BACK, RT SHOULDER & NECK    GERD (gastroesophageal reflux disease)     Graves disease     NO LONGER AN ISSUE    Hematuria 10/9/2018    HTN (hypertension)     CONTROLLED WITH MEDS    Hypothyroidism     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    OCD (obsessive compulsive disorder)     Other ill-defined conditions(799.89)     graves    Psychiatric disorder     depression. RECENTLY LOST HER SON ON 19 ON HER BIRTHDAY.     Seizure ([address]     Seizures ([address]  &     REACTIVE TO HYPOGLYCEMIA / ALSO FROM FLASHING LIGHTS    Thromboembolus ([address] 2019    right arm     Past Surgical History:   Procedure Laterality Date    DELIVERY       HX ABDOMINOPLASTY      HX ADENOIDECTOMY      HX  SECTION      HX  SECTION  2006    HX CHOLECYSTECTOMY      HX GASTRIC BYPASS          HX GI  1993    CHOLEYCYSTECTOMY    HX ORTHOPAEDIC Right     CARPEL TUNNEL    HX ORTHOPAEDIC Left     CARPEL TUNNEL    HX ORTHOPAEDIC Left     ULNA SHORTENING    HX ORTHOPAEDIC Right     [facility] [address] BONE SURGERY     HX ORTHOPAEDIC Left 08/28/2019    LEFT KNEE REPLACEMENT    HX ORTHOPAEDIC Left     LEFT KNEE INCISION FOR INFECTION & REPAIR    HX ORTHOPAEDIC Left     LEFT KNEE REMOVAL OF PROSTHESIS & INSERTION OF SPACER, REPAIR OF INCISION    HX ORTHOPAEDIC  10/31/2019    left knee revision    HX TONSILLECTOMY  1975       Personal factors and/or comorbidities impacting plan of care: depression, L TKR and revision, chronic pain    Home Situation  Home Environment: Private residence  # Steps to Enter: 4  Rails to Enter: Yes  Hand Rails : Bilateral (wide)  One/Two Story Residence: Two story, live on 1st floor  Living Alone: No  Support Systems: Spouse/Significant Other, Child(jazzmine) (13 year old son w/autism)  Patient Expects to be Discharged to[de-identified] Skilled nursing facility  Current DME Used/Available at Home: Walker, rollator, Other (comment) (pivot disc)  Tub or Shower Type: Other (comment) (sponge bath)    EXAMINATION/PRESENTATION/DECISION MAKING:   Critical Behavior:  Neurologic State: Alert  Orientation Level: Oriented X4  Cognition: Follows commands  Safety/Judgement: Awareness of environment, Decreased insight into deficits  Hearing: Auditory  Auditory Impairment: None  Range Of Motion:  AROM: Generally decreased, functional  Strength:    Strength: Generally decreased, functional  Tone & Sensation:   Tone: Normal  Coordination:  Coordination: Generally decreased, functional  Vision:   Acuity: Within Defined Limits  Corrective Lenses: Glasses  Functional Mobility:  Bed Mobility:     Supine to Sit: Minimum assistance; Additional time;Bed Modified (elevated HOB)  Sit to Supine: Minimum assistance (to manage RLE)  Scooting: Contact guard assistance  Transfers:  Sit to Stand: Minimum assistance; Moderate assistance;Assist x2  Stand to Sit: Minimum assistance;Assist x2    Balance:   Sitting: Intact  Standing: Impaired; With support  Standing - Static: Fair;Constant support  Standing - Dynamic : Not tested      Therapeutic Exercises: none    Functional Measure:  Tinetti 3/28       Physical Therapy Evaluation Charge Determination   History Examination Presentation Decision-Making   MEDIUM  Complexity : 1-2 comorbidities / personal factors will impact the outcome/ POC  MEDIUM Complexity : 3 Standardized tests and measures addressing body structure, function, activity limitation and / or participation in recreation  MEDIUM Complexity : Evolving with changing characteristics  Other outcome measures Tinetti 3/28  MEDIUM      Based on the above components, the patient evaluation is determined to be of the following complexity level: MEDIUM    Pain Rating:  3/10    Activity Tolerance:   Fair    After treatment patient left in no apparent distress:   Supine in bed and Call bell within reach    COMMUNICATION/EDUCATION:   The patients plan of care was discussed with: Occupational therapist and Registered nurse. Fall prevention education was provided and the patient/caregiver indicated understanding., Patient/family have participated as able in goal setting and plan of care. , and Patient/family agree to work toward stated goals and plan of care.     Thank you for this referral.  Trevin Paz, PT   Time Calculation: 44 mins

## 2023-01-20 NOTE — PROGRESS NOTES
End of Shift Note    Bedside shift change report given to [address] (oncoming nurse) by Diego Olsen RN (offgoing nurse). Report included the following information SBAR, Kardex, Intake/Output, MAR, and Recent Results    Shift worked:  Day     Shift summary and any significant changes:     Vitals stable. Concerns for physician to address:  none     Zone phone for oncoming shift:          Activity:  Activity Level: Bed Rest  Number times ambulated in hallways past shift: 0  Number of times OOB to chair past shift: 0    Cardiac:   Cardiac Monitoring: No      Cardiac Rhythm: Sinus Rhythm    Access:  Current line(s): PIV     Genitourinary:   Urinary status: Voiding    Respiratory:   O2 Device: None (Room air)  Chronic home O2 use?: NO  Incentive spirometer at bedside: N/A       GI:  Last Bowel Movement Date: 01/17/23  Current diet:  ADULT DIET Regular  Passing flatus: YES  Tolerating current diet: YES       Pain Management:   Patient states pain is manageable on current regimen: YES    Skin:  Tyrese Score: 17  Interventions: float heels and internal/external urinary devices    Patient Safety:  Fall Score:  Total Score: 3  Interventions: pt to call before getting OOB  High Fall Risk: Yes    Length of Stay:  Expected LOS: 2d 16h  Actual LOS: 3      Diego Olsen RN

## 2023-01-20 NOTE — PROGRESS NOTES
Post op hip procedure with pain is in better control on floor. Tearful at times. Refusing to move.   Currently has Petty

## 2023-01-20 NOTE — PROGRESS NOTES
Hospitalist Progress Note    NAME: Carrington Staton   :  1970   MRN:  [id number]       Assessment / Plan:  Acute transverse fracture of distal diaphysis of right femur with moderate apex anterior angulation-nontraumatic  Status post TKA BLE  Chronic pain  Patient not currently able to tolerate placement of knee immobilizer-counseled on not moving leg to prevent further injury or displacement  Pain is not well controlled. Pt refusing duplex US and CT leg and ortho is aware. S/p surgical repaired with ortho  Appreciate palliative following for pain management. Plan to dc PCA today. SNF at discharge. CM's help is appreciated     Obesity class III by BMI-49.53  History hyperthyroidism  TSH low, check FT4 wnl. Repeat TSH outpt in 4 weeks     MDD/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 otherwise classified  Continue PTA BuSpar, olanzapine, prazosin     Essential hypertension  Continue PTA hydrochlorothiazide, losartan  cont' as needed IV labetalol    Comedonal acne  Continue PTA doxycycline    Herpes labialis  Resolving lesion on upper lip, continue PTA acyclovir     UTI  History of hematuria  Ucx without growth. Abx discontinued    Chronic normocytic anemia  Trend hemoglobin     Code Status: Full   DVT Prophylaxis: Defer to orthopedics  GI Prophylaxis: Protonix  Diet: Diabetic/cardiac     Subjective:     Chief Complaint / Reason for Physician Visit  NAD. Discussed with RN events overnight. Review of Systems:  Symptom Y/N Comments  Symptom Y/N Comments   Fever/Chills    Chest Pain     Poor Appetite    Edema     Cough    Abdominal Pain     Sputum    Joint Pain     SOB/CHANG    Pruritis/Rash     Nausea/vomit    Tolerating PT/OT     Diarrhea    Tolerating Diet     Constipation    Other       Could NOT obtain due to:      Objective:     VITALS:   Last 24hrs VS reviewed since prior progress note.  Most recent are:  Patient Vitals for the past 24 hrs:   Temp Pulse Resp BP SpO2   23 0937 -- (!) 121 -- (!) 143/78 97 %   01/20/23 0936 -- (!) 119 -- (!) 143/78 --   01/20/23 0921 -- (!) 114 -- 126/80 --   01/20/23 0746 98.6 °F (37 °C) (!) 110 16 136/85 98 %   01/20/23 0725 -- (!) 114 -- 135/84 --   01/20/23 0353 98.6 °F (37 °C) 85 16 (!) 160/85 100 %   01/20/23 0229 -- 92 16 129/78 100 %   01/20/23 0144 -- 93 16 (!) 156/83 100 %   01/20/23 0037 97.6 °F (36.4 °C) 83 16 (!) 147/79 100 %   01/19/23 2325 97.7 °F (36.5 °C) 94 16 (!) 151/81 99 %   01/19/23 2153 98.4 °F (36.9 °C) 82 16 (!) 155/96 99 %   01/19/23 2130 98.6 °F (37 °C) 85 13 (!) 144/87 97 %   01/19/23 2115 -- 91 15 (!) 168/91 99 %   01/19/23 2100 -- 100 19 (!) 140/89 98 %   01/19/23 2045 98.7 °F (37.1 °C) 73 12 (!) 168/77 98 %   01/19/23 2030 -- 87 13 (!) 168/81 100 %   01/19/23 2025 -- 87 15 (!) 174/110 99 %   01/19/23 2021 98.2 °F (36.8 °C) 92 17 (!) 150/92 100 %   01/19/23 2015 -- 92 21 (!) 174/104 99 %   01/19/23 1549 98.4 °F (36.9 °C) 99 14 (!) 154/100 95 %         Intake/Output Summary (Last 24 hours) at 1/20/2023 1418  Last data filed at 1/20/2023 0437  Gross per 24 hour   Intake 910 ml   Output 2950 ml   Net -2040 ml          I had a face to face encounter and independently examined this patient on 1/20/2023, as outlined below:  PHYSICAL EXAM:  General: WD, WN. Alert, cooperative, no acute distress    EENT:  EOMI. Anicteric sclerae. MMM  Resp:  CTA bilaterally, no wheezing or rales. No accessory muscle use  CV:  Regular  rhythm,  No edema  GI:  Soft, Non distended, Non tender. +BS  Neurologic:  Alert and oriented X 3, normal speech  Psych:   +anxious  Skin:  No rashes.   No jaundice    Reviewed most current lab test results and cultures  YES  Reviewed most current radiology test results   YES  Review and summation of old records today    NO  Reviewed patient's current orders and MAR    YES  PMH/SH reviewed - no change compared to H&P  ________________________________________________________________________  Care Plan discussed with:    Comments   Patient x    Family      RN x    Care Manager     Consultant  x                      Multidiciplinary team rounds were held today with , nursing, pharmacist and clinical coordinator. Patient's plan of care was discussed; medications were reviewed and discharge planning was addressed. ________________________________________________________________________  Total NON critical care TIME:  35   Minutes    Total CRITICAL CARE TIME Spent:   Minutes non procedure based      Comments   >50% of visit spent in counseling and coordination of care     ________________________________________________________________________  Nohemi Rome MD     Procedures: see electronic medical records for all procedures/Xrays and details which were not copied into this note but were reviewed prior to creation of Plan. LABS:  I reviewed today's most current labs and imaging studies.   Pertinent labs include:  Recent Labs     01/20/23  0440 01/18/23 0419 01/17/23  1829   WBC 12.8* 13.4* 13.0*   HGB 10.7* 9.1* 10.1*   HCT 35.5 30.8* 33.6*    314 366       Recent Labs     01/20/23  0440 01/19/23  0451 01/18/23 0419 01/17/23  2115 01/17/23  1829    141 142  --  143   K 4.5 4.2 4.2  --  4.4    112* 111*  --  114*   CO2 24 22 26  --  24   * 80 89  --  115*   BUN 16 18 22*  --  23*   CREA 1.04* 1.03* 1.03*  --  1.04*   CA 8.9 8.8 8.7  8.4*  --  8.6   ALB  --   --   --   --  2.8*   TBILI  --   --   --   --  0.3   ALT  --   --   --   --  33   INR  --   --   --  1.0  --          Signed: Nohemi Rome MD

## 2023-01-20 NOTE — PROGRESS NOTES
CM met with patient regarding choices for SNF vs Acute Rehab, pt reports choosing [facility] as first and only choice.   CM to send referral.

## 2023-01-20 NOTE — PERIOP NOTES
Handoff Report from Operating Room to PACU    Report received from [address]  and [facility] regarding Mandy Velasquez. Surgeon(s):  Mele Suazo MD  And Procedure(s) (LRB):  INTRA MEDULLARY NAIL INSERTION, RETROGRADE RIGHT FEMUR (Right)  confirmed   with allergies, drains, and dressings discussed. Anesthesia type, drugs, patient history, complications, estimated blood loss, vital signs, intake and output, and last pain medication and reversal medications were reviewed.

## 2023-01-20 NOTE — PERIOP NOTES
TRANSFER - OUT REPORT:    Verbal report given to Jean Claude Landaverde RN(name) on Shira Guerrier  being transferred to [facility](unit) for routine post - op       Report consisted of patients Situation, Background, Assessment and   Recommendations(SBAR). Information from the following report(s) SBAR, Kardex, OR Summary, Intake/Output, and MAR was reviewed with the receiving nurse. Opportunity for questions and clarification was provided.       Patient transported with:   O2 @ 2 liters  Registered Nurse

## 2023-01-20 NOTE — DISCHARGE INSTRUCTIONS
Discharge Instructions   Dr. Casa Gonzalez    Patient Name  Akua Salomon  Date of procedure  1/19/2023    Procedure  Procedure(s):  INTRA MEDULLARY NAIL INSERTION, RETROGRADE RIGHT FEMUR  Surgeon  Surgeon(s) and Role:     * Fidel Mauricio MD - Primary  Date of discharge: [unfilled]  PCP: @PCP@    Follow up care  Follow up visit with Dr. Casa Gonzalez in 2-3 weeks. Call [phone] Ellene Schwab) to make an appointment. If Home Health has been arranged for you, they will call you to arrange dates/times for visits. Call them if you do not hear from them within 24 hours after you go home. Disposable wound VAC to be in place for 7 days as able, then can remove and do dry dressing changes as needed. Staples to be removed on postop day 14. If home health unable to remove staples patient to follow-up to have staples removed. Activity at home  Toe-touch weight-bear on operative leg until follow-up  Bathing and caring for your incision  You may take a shower with your waterproof dressing on your hip. The waterproof dressing is to stay on your hip for 7 days. On the 7th day have someone gently peel the dressing off by lifting the edge and stretching it to break the seal.  You may then leave your incision open to air unless you see drainage from your hip. Preventing blood clots  Take Eliquis 2.5mg twice each day for one month (30 days) following surgery  Call Dr. Casa Gonzalez for signs of a blood clot in your leg: calf pain, tenderness, redness, swelling of lower leg   Preventing lung congestion  Use your incentive spirometer 4 times a day; do 10 repetitions each time  Remember to keep the small blue ball between the two arrows when taking a slow, deep breath     Diet after surgery  You may resume your normal diet. Include vegetables, fruit, whole grains, lean meats, and low-fat dairy products. Eat food high in fiber.    Drink plenty of fluids, including 8 cups of water daily Take a stool softener (Senokot-s or Colace) to prevent constipation. If constipation occurs you may take a laxative (Milk of Magnesia, Dulcolax tablets). Avoid after surgery  Do not take any over-the-counter medication for pain except Tylenol  Do not take more than 3000mg (3 Grams) of Tylenol in 24 hours  Do not drink alcoholic beverages  Do not smoke  Do not drive until seen for follow up appointment  Do not place frozen gel pack directly on your skin. It can cause frostbite. Do not take a tub bath, swim or get in a hot tub for 8 weeks  Prevention of falls and safety at home  Set up an area where you can rest comfortably leaving space around furniture to allow you to walk with your walker. Keep stairs, hallways and bathrooms well lit; especially at night. Arrange for care for your pets. Keep your home free of clutter. Call Dr. Prem Carlos at [phone] for:  Pain that is not relieved by pain medication, ice and activity  Side effects of medications  Increased/spread of bruising  Warning signs of infection:  persistent fever greater than 100 degrees  shaking or chills  increased redness, tenderness, swelling or drainage from incision  increased pain during activity or rest  Warning signs of a blood clot in your leg:  increased pain in your calf  tenderness or redness  increased swelling or knee, calf, ankle or foot    Call [phone] after 5pm or on a weekend.  The on call physician will return your phone call  Call your Primary Care Doctor for:   Concerns about your medical conditions such as diabetes, high blood pressure, asthma, congestive heart failure  Blood sugars greater than 180  Persistent headache or dizziness  Coughing or congestion  Constipation or diarrhea  Burning when you go to the bathroom  Abnormal heart rate (fast or slow)      Call 911 and go to the nearest hospital for:   Sudden increased shortness of breath  Sudden onset of chest pain  Difficulty breathing  Localized chest pain with coughing or taking a deep breath

## 2023-01-20 NOTE — PROGRESS NOTES
Patient tearful with pain. Has been medicated with all pain meds due and her PCA. Continue to monitor and calmly reassure pt.

## 2023-01-20 NOTE — PROGRESS NOTES
TRANSFER - IN REPORT:    Verbal report received from Jairo Espinal RN(name) on Josue Powell  being received from [facility]) for routine progression of care      Report consisted of patients Situation, Background, Assessment and   Recommendations(SBAR). Information from the following report(s) SBAR, Kardex, OR Summary, Procedure Summary, Intake/Output, MAR, and Recent Results was reviewed with the receiving nurse. Opportunity for questions and clarification was provided. Assessment completed upon patients arrival to unit and care assumed.

## 2023-01-20 NOTE — PROGRESS NOTES
Problem: Self Care Deficits Care Plan (Adult)  Goal: *Acute Goals and Plan of Care (Insert Text)  Description: FUNCTIONAL STATUS PRIOR TO ADMISSION: Patient was mod I using rollator and mod I for ADLs until 1/14 when she could no longer move her RLE. Patient's  was assisting with transfers using pivot disc into her son's w/c (which she reports is too small). HOME SUPPORT: The patient lived with  and 12year old son with autism. Occupational Therapy Goals  Initiated 1/20/2023  1. Patient will perform grooming sitting EOB with supervision within 7 day(s). 2.  Patient will perform upper body dressing with supervision within 7 day(s). 3.  Patient will perform lower body dressing using AE PRN with minimal assistance within 7 day(s). 4.  Patient will perform toilet transfers to [facility] with maximal assistance within 7 day(s). 5.  Patient will perform all aspects of toileting with maximal assistance within 7 day(s). Outcome: Not Met   OCCUPATIONAL THERAPY EVALUATION  Patient: Celeste Dawson (29 y.o. female)  Date: 1/20/2023  Primary Diagnosis: Femur fracture, right (HCC) [S72.91XA]  Procedure(s) (LRB):  INTRA MEDULLARY NAIL INSERTION, RETROGRADE RIGHT FEMUR (Right) 1 Day Post-Op   Precautions: Fall, Bed Alarm, TTWB (RLE)    ASSESSMENT  Based on the objective data described below, the patient presents with decreased independence in self-care and functional mobility secondary to general weakness, impaired balance, TTWB RLE, anxiety, increased pain, emotional lability, and decreased activity tolerance. Patient s/p R femur IM nailing POD 1. She is functioning below her baseline for ADLs and functional mobility, now completing ADLs with independence to total assist and functional mobility with contact guard to min/mod assist x2 using rolling walker. Patient received semisupine in bed on 2L O2 and cleared for therapy by nursing. She was placed on RA for session with SpO2 remaining >90%.  Patient initially reported increased anxiety about therapy but agreeable for coming EOB. Patient required total assist for donning socks this session secondary to increased pain and anxiety. She completed supine > sit with additional time and min assist to manage RLE. Patient demonstrated intact sitting balance while EOB and completed simple grooming tasks with set-up/stand-by assist. With encouragement, patient agreed to attempt sit > stand and required min/mod assist x2 to clear hips from EOB. Patient tolerated standing for approx 30 secs while maintaining TTWB on RLE. Once back on EOB, patient was able to laterally scoot towards [facility] with contact guard assist and cues for safety awareness. She returned to supine with min assist to manage BLE. Patient was left semisupine in bed with all needs met, VSS. Patient would continue to benefit from skilled OT services during acute hospital stay. Anticipate patient would benefit from intensive rehab prior to returning home, pending progress. Current Level of Function Impacting Discharge (ADLs/self-care): independent to total assist for ADLs, contact guard to min/mod assist x2 for functional mobility     Functional Outcome Measure: The patient scored 20/100 on the Barthel Index outcome measure which is indicative of being very dependent in ADLs. Other factors to consider for discharge: fall risk, TTWB LLE, anxious     Patient will benefit from skilled therapy intervention to address the above noted impairments. PLAN :  Recommendations and Planned Interventions: self care training, functional mobility training, therapeutic exercise, balance training, therapeutic activities, endurance activities, patient education, home safety training, and family training/education    Frequency/Duration: Patient will be followed by occupational therapy 5 times a week to address goals.     Recommendation for discharge: (in order for the patient to meet his/her long term goals)  Therapy 3 hours per day 5-7 days per week    This discharge recommendation:  Has not yet been discussed the attending provider and/or case management    IF patient discharges home will need the following DME: TBD in rehab       SUBJECTIVE:   Patient stated Antonia Phillip [place] for being so nice to me.     OBJECTIVE DATA SUMMARY:   HISTORY:   Past Medical History:   Diagnosis Date    ADHD (attention deficit hyperactivity disorder)     Arthritis     OSTEO    Chronic pain     YAHIR KNEES, LOWER BACK, RT SHOULDER & NECK    GERD (gastroesophageal reflux disease)     Graves disease     NO LONGER AN ISSUE    Hematuria 10/9/2018    HTN (hypertension)     CONTROLLED WITH MEDS    Hypothyroidism 2018    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    OCD (obsessive compulsive disorder)     Other ill-defined conditions(799.89)     graves    Psychiatric disorder     depression. RECENTLY LOST HER SON ON 19 ON HER BIRTHDAY.     Seizure ([address]     Seizures ([address]  &     REACTIVE TO HYPOGLYCEMIA / ALSO FROM FLASHING LIGHTS    Thromboembolus ([address] 2019    right arm     Past Surgical History:   Procedure Laterality Date    DELIVERY       HX ABDOMINOPLASTY      HX ADENOIDECTOMY      HX  SECTION      HX  SECTION  2006    HX CHOLECYSTECTOMY      HX GASTRIC BYPASS          HX GI  1993    CHOLEYCYSTECTOMY    HX ORTHOPAEDIC Right     CARPEL TUNNEL    HX ORTHOPAEDIC Left     CARPEL TUNNEL    HX ORTHOPAEDIC Left     ULNA SHORTENING    HX ORTHOPAEDIC Right     COLLAR BONE SURGERY     HX ORTHOPAEDIC Left 2019    LEFT KNEE REPLACEMENT    HX ORTHOPAEDIC Left     LEFT KNEE INCISION FOR INFECTION & REPAIR    HX ORTHOPAEDIC Left     LEFT KNEE REMOVAL OF PROSTHESIS & INSERTION OF SPACER, REPAIR OF INCISION    HX ORTHOPAEDIC  10/31/2019    left knee revision    HX TONSILLECTOMY  1975       Expanded or extensive additional review of patient history:     Home Situation  Home Environment: Private residence  # Steps to Enter: [facility] to Enter: Yes  Hand Rails : Bilateral (wide)  One/Two Story Residence: Two story, live on 1st floor  Living Alone: No  Support Systems: Spouse/Significant Other, Child(jazzmine) (13 year old son w/autism)  Patient Expects to be Discharged to[de-identified] Skilled nursing facility  Current DME Used/Available at Home: Sue Bile, rollator, Other (comment) (pivot disc)  Tub or Shower Type: Other (comment) (sponge bath)    Hand dominance: Right    EXAMINATION OF PERFORMANCE DEFICITS:  Cognitive/Behavioral Status:  Neurologic State: Alert  Orientation Level: Oriented X4  Cognition: Follows commands  Perception: Appears intact  Perseveration: No perseveration noted  Safety/Judgement: Awareness of environment;Decreased insight into deficits    Hearing: Auditory  Auditory Impairment: None    Vision/Perceptual:    Acuity: Within Defined Limits    Corrective Lenses: Glasses    Range of Motion:  AROM: Generally decreased, functional    Strength:  Strength: Generally decreased, functional    Coordination:  Coordination: Generally decreased, functional  Fine Motor Skills-Upper: Left Intact; Right Intact    Gross Motor Skills-Upper: Left Intact; Right Intact    Tone & Sensation:  Tone: Normal    Balance:  Sitting: Intact  Standing: Impaired; With support  Standing - Static: Fair;Constant support  Standing - Dynamic : Not tested    Functional Mobility and Transfers for ADLs:  Bed Mobility:  Supine to Sit: Minimum assistance; Additional time;Bed Modified (elevated HOB)  Sit to Supine: Minimum assistance (to manage RLE)  Scooting: Contact guard assistance    Transfers:  Sit to Stand: Minimum assistance; Moderate assistance;Assist x2  Stand to Sit: Minimum assistance;Assist x2    ADL Assessment:  Feeding: Independent  Oral Facial Hygiene/Grooming: Setup;Stand-by assistance (seated)  Bathing: Maximum assistance  Upper Body Dressing: Setup;Stand-by assistance  Lower Body Dressing: Total assistance  Toileting:  Total assistance (shore catheter)    ADL Intervention and task modifications:    Grooming  Position Performed: Seated edge of bed  Washing Face: Set-up; Stand-by assistance  Cues: Verbal cues provided    Lower Body Dressing Assistance  Socks: Total assistance (dependent)  Leg Crossed Method Used: No  Position Performed: Supine  Cues: Don;Doff;Physical assistance; Tactile cues provided;Verbal cues provided    Cognitive Retraining  Safety/Judgement: Awareness of environment;Decreased insight into deficits    Functional Measure:    Barthel Index:  Bathin  Bladder: 0  Bowels: 5  Groomin  Dressin  Feeding: 10  Mobility: 0  Stairs: 0  Toilet Use: 0  Transfer (Bed to Chair and Back): 0  Total: 20/100      The Barthel ADL Index: Guidelines  1. The index should be used as a record of what a patient does, not as a record of what a patient could do. 2. The main aim is to establish degree of independence from any help, physical or verbal, however minor and for whatever reason. 3. The need for supervision renders the patient not independent. 4. A patient's performance should be established using the best available evidence. Asking the patient, friends/relatives and nurses are the usual sources, but direct observation and common sense are also important. However direct testing is not needed. 5. Usually the patient's performance over the preceding 24-48 hours is important, but occasionally longer periods will be relevant. 6. Middle categories imply that the patient supplies over 50 per cent of the effort. 7. Use of aids to be independent is allowed. Score Interpretation (from [address] [address])    Independent   60-79 Minimally independent   40-59 Partially dependent   20-39 Very dependent   <20 Totally dependent     -Tim Bender., Barthel, D.W. (1965). Functional evaluation: the Barthel Index. [address] [facility] ([address] (1997).  The Barthel activities of daily living index: self-reporting versus actual performance in the old (> or = 75 years). Journal of [address] [place] [address] 457, [facility], .ANUPAMAF, Myrla Holter., Alex Snyder. (1999). Measuring the change in disability after inpatient rehabilitation; comparison of the responsiveness of the Barthel Index and Functional [place] Measure. Journal of Neurology, Neurosurgery, and Psychiatry, 66(4), 552-219. ALANA Hahn, LEONARDO Duff, & Marito Rivas M.A. (2004) Assessment of post-stroke quality of life in cost-effectiveness studies: The usefulness of the Barthel Index and the EuroQoL-5D. Quality of Life Research, 13, 597-99      Based on the above components, the patient evaluation is determined to be of the following complexity level: MEDIUM  Pain Rating:  Patient c/o LLE pain. Activity Tolerance:   Fair, SpO2 stable on RA, and requires rest breaks    After treatment patient left in no apparent distress:    Supine in bed, Call bell within reach, and Side rails x 3    COMMUNICATION/EDUCATION:   The patients plan of care was discussed with: Physical therapist and Registered nurse. Home safety education was provided and the patient/caregiver indicated understanding., Patient/family have participated as able in goal setting and plan of care. , and Patient/family agree to work toward stated goals and plan of care. This patients plan of care is appropriate for delegation to [facility] [place].     Thank you for this referral.  Ellie Ulloa OTR/L  Time Calculation: 43 mins

## 2023-01-20 NOTE — PROGRESS NOTES
Palliative Medicine Consult  [place]: [phone] ([phone])    Patient Name: Shira Guerrier  YOB: 1970    Date of Initial Consult: 23  Reason for Consult: help with pain control, pt on high doses opioids prior to admission, now here with femur fracture and difficult to control pain  Requesting Provider: Leon Ghosh MD   Primary Care Physician: Johnathon Sen MD     SUMMARY:   Shira Guerrier is a 46 y.o. with a past history of ADHD, chronic pain, HTN, OCD, MRSA carrier, depression, seizures, who was admitted on 2023 from home with a diagnosis of cute, transverse fracture of the distal diaphysis the right femur with moderate apex anterior angulation. .     Per , pt fills prescriptions for:  1/15/23: Morphine ER 30mg tab #60 tab/30 day supply,   1/15/23: dilaudid 4mg tab  #150 tab/30 day supply, Dr. Tanisha Caldwell  1/3/23: Vyvanse 70mg tab #90 tab/90 day supply, Dr. Genia Yang  23-10/2022: various types of marijuana hybrids prescribed by John Mathis    HPI:  : pt BIBA with c/o right knee and hip pain. She had a right knee replacement in . On  pt began to have significant right knee pain with no precipitating injury, now unable to bear weight, unable to move from home so she had EMS bring her to ER. Patient takes Morphine ER 60mg daily and dilaudid 20mg daily at baseline (140mg MME)     Admission course: : elevated wbc 13.0, h/h 10.1/33.6, UA suspicious of UTI, UC sent, right femur xray: shows acute, transverse fracture of the distal diaphysis the right femur with moderate apex anterior angulation. : refusing CT scan unless \"you knock me out. \"   Ortho planning OR intervention tomorrow. : surgery scheduled for later this afternoon. Current medical issues leading to Palliative Medicine involvement include: complicated pain management. Psychosocial: lives at home with  and 11 y/o son (autistic).   Another son (also autistic)  2019 from meningitis   PALLIATIVE DIAGNOSES:   Chronic pain syndrome (chronic knee, back, neck, clavicle)  Acute pain due to right femur fracture  Depression, complicated grief due to loss of son   PLAN:   Prior to visit, I completed a review of patient's medical records, including medical documentation, vital signs, MARs, and results of various labs and other diagnostics. PAIN:   Jackson Mendez feels better today, but cannot quantify her pain as she is very tangential talking more about how much her mental health is prohibiting her from making any decisions. Her distress appears to be more psychological rather than physical   For chronic pain:  Continue scheduled Morphine ER 30mg bid and dilaudid 4mg PO q.4 hours scheduled. (These 2 drugs should NOT be factored in when calculating opiates for acute pain)  For acute pain due to fracture: increase dilaudid PO to 6mg every 6 hours scheduled. Will stop PCA this morning. Would favor non-pharm interventions such as ice pack rather than increasing her Dilaudid over the weekend if she is not discharged. Do not adjust her Morphine ER  Ice pack. Narcan already on order. Continuous oximetry while adjusting opioid doses. Dr Holt See to follow her at discharge    Depression: her depression and anxiety are going to affect her coping with pain so would use extreme caution in increasing her opioids. It is also impacting her ability to make any decisions for discharge. She shared with me that she does not want to leave the hospital- more from the perspective that she doesn't want to make any decisions rather than where she actually is in the world.   Initial consult note routed to primary continuity provider and/or primary health care team members  Communicated plan of care with: Palliative [facility] Team     GOALS OF CARE / TREATMENT PREFERENCES:     GOALS OF CARE:   optimal pain control      Patient/Health Care Proxy Stated Goals: Prolong life   HISTORY:     History obtained from: chart, patient     The patient is:   [x] Verbal and participatory  [] Non-participatory due to:          Clinical Pain Assessment (nonverbal scale for severity on nonverbal patients):   Clinical Pain Assessment  Severity: 2  Location: right thigh  Character: sharp, \"very painful\"  Duration: days  Effect: cannot move  Factors: MOVEMENT, PAIN MEDICATION  Frequency: intermittent     Activity (Movement): Lying quietly, normal position    Duration: for how long has pt been experiencing pain (e.g., 2 days, 1 month, years)  Frequency: how often pain is an issue (e.g., several times per day, once every few days, constant)     FUNCTIONAL ASSESSMENT:     Palliative Performance Scale (PPS):  PPS: 30       PSYCHOSOCIAL/SPIRITUAL SCREENING:     Palliative IDT has assessed this patient for cultural preferences / practices and a referral made as appropriate to needs (Cultural Services, Patient Advocacy, Ethics, etc.)    Any spiritual / Congregation concerns:  [] Yes /  [x] No   If \"Yes\" to discuss with pastoral care during IDT     Does caregiver feel burdened by caring for their loved one:   [] Yes /  [x] No /  [] No Caregiver Present    If \"Yes\" to discuss with social work during IDT    Anticipatory grief assessment:   [x] Normal  / [] Maladaptive     If \"Maladaptive\" to discuss with social work during IDT    ESAS Anxiety: Anxiety: 5    ESAS Depression: Depression: 5        REVIEW OF SYSTEMS:     Positive and pertinent negative findings in ROS are noted above in HPI. The following systems were [x] reviewed / [] unable to be reviewed as noted in HPI  Other findings are noted below. Systems: constitutional, ears/nose/mouth/throat, respiratory, gastrointestinal, genitourinary, musculoskeletal, integumentary, neurologic, psychiatric, endocrine. Positive findings noted below.   Modified ESAS Completed by: provider   Fatigue: 0 Drowsiness: 0   Depression: 5 Pain: 2   Anxiety: 5 Nausea: 0   Anorexia: 0 Dyspnea: 0 Constipation: No              PHYSICAL EXAM:     From RN flowsheet:  Wt Readings from Last 3 Encounters:   23 262 lb 2 oz (118.9 kg)   19 202 lb 4 oz (91.7 kg)   19 202 lb 4 oz (91.7 kg)     Blood pressure (!) 143/78, pulse (!) 121, temperature 98.6 °F (37 °C), resp. rate 16, height 5' 1\" (1.549 m), weight 262 lb 2 oz (118.9 kg), SpO2 97 %. Pain Scale 1: Numeric (0 - 10)  Pain Intensity 1: 6  Pain Onset 1: postop  Pain Location 1: Hip  Pain Orientation 1: Right  Pain Description 1: Aching  Pain Intervention(s) 1: Medication (see MAR)  Last bowel movement, if known:     Constitutional: AAOx3, irritable this morning, very tearful  Eyes: pupils equal, anicteric  ENMT: no nasal discharge, moist mucous membranes  Cardiovascular: regular rhythm, distal pulses intact  Respiratory: breathing not labored, symmetric  Gastrointestinal: obese, soft non-tender, +bowel sounds  Skin: warm, dry  Neurologic: following commands, moving all extremities  Psychiatric: full affect, no hallucinations          HISTORY:     Active Problems:    Femur fracture, right ([address] (2023)    Past Medical History:   Diagnosis Date    ADHD (attention deficit hyperactivity disorder)     Arthritis     OSTEO    Chronic pain     YAHIR KNEES, LOWER BACK, RT SHOULDER & NECK    GERD (gastroesophageal reflux disease)     Graves disease     NO LONGER AN ISSUE    Hematuria 10/9/2018    HTN (hypertension)     CONTROLLED WITH MEDS    Hypothyroidism 2018    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    OCD (obsessive compulsive disorder)     Other ill-defined conditions(799.89)     graves    Psychiatric disorder     depression. RECENTLY LOST HER SON ON 19 ON HER BIRTHDAY.     Seizure ([address]     Seizures ([address]  &     REACTIVE TO HYPOGLYCEMIA / ALSO FROM FLASHING LIGHTS    Thromboembolus ([address] 2019    right arm      Past Surgical History:   Procedure Laterality Date    DELIVERY       HX ABDOMINOPLASTY      [address]    HX  SECTION      HX  SECTION  2006    HX CHOLECYSTECTOMY      HX GASTRIC BYPASS      2001    HX GI  1993    CHOLEYCYSTECTOMY    HX ORTHOPAEDIC Right     CARPEL TUNNEL    HX ORTHOPAEDIC Left     CARPEL TUNNEL    HX ORTHOPAEDIC Left     ULNA SHORTENING    HX ORTHOPAEDIC Right 2011    COLLAR BONE SURGERY     HX ORTHOPAEDIC Left 2019    LEFT KNEE REPLACEMENT    HX ORTHOPAEDIC Left     LEFT KNEE INCISION FOR INFECTION & REPAIR    HX ORTHOPAEDIC Left     LEFT KNEE REMOVAL OF PROSTHESIS & INSERTION OF SPACER, REPAIR OF INCISION    HX ORTHOPAEDIC  10/31/2019    left knee revision    HX TONSILLECTOMY  1975      Family History   Problem Relation Age of Onset    Heart Disease Mother     Heart Attack Mother     Hypertension Mother     OSTEOARTHRITIS Mother     Rashes/Skin Problems Mother         PLAQUE PSORIASIS    Heart Disease Father     Thyroid Disease Father     Diabetes Father     Hypertension Father     Diabetes Maternal Grandmother     Heart Attack Maternal Grandmother     Hypertension Maternal Grandmother     OSTEOARTHRITIS Maternal Grandmother     Hypertension Maternal Grandfather     Stroke Maternal Grandfather     High Cholesterol Sister     Other Son         meningitis    No Known Problems Son     Anesth Problems Neg Hx       History reviewed, no pertinent family history. Social History     Tobacco Use    Smoking status: Former     Types: Cigarettes     Quit date: 1994     Years since quittin.3    Smokeless tobacco: Never   Substance Use Topics    Alcohol use: No     Allergies   Allergen Reactions    Sulfa (Sulfonamide Antibiotics) Hives    Other Medication Rash     BANDAIDS MAY CAUSE RASH.     Pcn [Penicillins] Unproven on Challenge      Current Facility-Administered Medications   Medication Dose Route Frequency    HYDROmorphone (DILAUDID) tablet 6 mg  6 mg Oral Q4H    lactated Ringers infusion  25 mL/hr IntraVENous CONTINUOUS    calcium-vitamin D (OS-DELON +D3) 500 mg-200 unit per tablet 1 Tablet  1 Tablet Oral TID WITH MEALS    senna-docusate (PERICOLACE) 8.6-50 mg per tablet 1 Tablet  1 Tablet Oral BID    polyethylene glycol (MIRALAX) packet 17 g  17 g Oral DAILY    [START ON 1/21/2023] bisacodyL (DULCOLAX) suppository 10 mg  10 mg Rectal DAILY PRN    apixaban (ELIQUIS) tablet 2.5 mg  2.5 mg Oral BID    lactated Ringers infusion  25 mL/hr IntraVENous CONTINUOUS    sodium chloride (NS) flush 5-40 mL  5-40 mL IntraVENous Q8H    sodium chloride (NS) flush 5-40 mL  5-40 mL IntraVENous PRN    lidocaine (PF) (XYLOCAINE) 10 mg/mL (1 %) injection 0.1 mL  0.1 mL SubCUTAneous PRN    cholecalciferol (VITAMIN D3) (1000 Units /25 mcg) tablet 1,000 Units  1,000 Units Oral DAILY    sodium chloride (NS) flush 5-40 mL  5-40 mL IntraVENous Q8H    sodium chloride (NS) flush 5-40 mL  5-40 mL IntraVENous PRN    acetaminophen (TYLENOL) tablet 650 mg  650 mg Oral Q6H    naloxone (NARCAN) injection 0.4 mg  0.4 mg IntraVENous PRN    senna-docusate (PERICOLACE) 8.6-50 mg per tablet 2 Tablet  2 Tablet Oral BID    acyclovir (ZOVIRAX) tablet 400 mg  400 mg Oral Q4H PRN    losartan (COZAAR) tablet 100 mg  100 mg Oral DAILY    hydroCHLOROthiazide (HYDRODIURIL) tablet 50 mg  50 mg Oral DAILY    prazosin (MINIPRESS) capsule 5 mg  5 mg Oral QHS    busPIRone (BUSPAR) tablet 15 mg  15 mg Oral BID    OLANZapine (ZyPREXA) tablet 5 mg  5 mg Oral QPM    doxycycline (VIBRA-TABS) tablet 100 mg  100 mg Oral Q12H    pantoprazole (PROTONIX) tablet 40 mg  40 mg Oral ACB    sodium chloride (NS) flush 5-40 mL  5-40 mL IntraVENous Q8H    sodium chloride (NS) flush 5-40 mL  5-40 mL IntraVENous PRN    acetaminophen (TYLENOL) tablet 650 mg  650 mg Oral Q6H PRN    Or    acetaminophen (TYLENOL) suppository 650 mg  650 mg Rectal Q6H PRN    polyethylene glycol (MIRALAX) packet 17 g  17 g Oral DAILY PRN    ondansetron (ZOFRAN ODT) tablet 4 mg  4 mg Oral Q8H PRN    Or    ondansetron (ZOFRAN) injection 4 mg  4 mg IntraVENous Q6H PRN    labetaloL (NORMODYNE;TRANDATE) injection 5 mg  5 mg IntraVENous Q2H PRN    morphine ER (MS CONTIN) tablet 30 mg  30 mg Oral Q12H          LAB AND IMAGING FINDINGS:     Lab Results   Component Value Date/Time    WBC 12.8 (H) 01/20/2023 04:40 AM    HGB 10.7 (L) 01/20/2023 04:40 AM    PLATELET 774 66/12/1353 04:40 AM     Lab Results   Component Value Date/Time    Sodium 138 01/20/2023 04:40 AM    Potassium 4.5 01/20/2023 04:40 AM    Chloride 102 01/20/2023 04:40 AM    CO2 24 01/20/2023 04:40 AM    BUN 16 01/20/2023 04:40 AM    Creatinine 1.04 (H) 01/20/2023 04:40 AM    Calcium 8.9 01/20/2023 04:40 AM      Lab Results   Component Value Date/Time    Alk. phosphatase 97 01/17/2023 06:29 PM    Protein, total 6.2 (L) 01/17/2023 06:29 PM    Albumin 2.8 (L) 01/17/2023 06:29 PM    Globulin 3.4 01/17/2023 06:29 PM     Lab Results   Component Value Date/Time    INR 1.0 01/17/2023 09:15 PM    Prothrombin time 10.8 01/17/2023 09:15 PM    aPTT 31.3 01/26/2019 08:51 PM      Lab Results   Component Value Date/Time    Ferritin 31 01/29/2019 03:06 AM      No results found for: PH, PCO2, PO2  No components found for: GLPOC   No results found for: CPK, CKMB             Total time:   Counseling / coordination time, spent as noted above:   > 50% counseling / coordination?:     Prolonged service was provided for  []30 min   []75 min in face to face time in the presence of the patient, spent as noted above. Time Start:   Time End:   Note: this can only be billed with 95837 (initial) or 91217 (follow up). If multiple start / stop times, list each separately.

## 2023-01-20 NOTE — PROGRESS NOTES
End of Shift Note    Bedside shift change report given to  Caleb Crum RN(oncoming nurse) by Lorri Estrada RN (offgoing nurse). Report included the following information SBAR, Kardex, OR Summary, Procedure Summary, Intake/Output, MAR, and Recent Results    Shift worked:  7p -7a     Shift summary and any significant changes:     VSS   Continue to need pain control      Concerns for physician to address:       Zone phone for oncoming shift:          Activity:  Activity Level: Bed Rest  Number times ambulated in hallways past shift:   Number of times OOB to chair past shift: 0    Cardiac:   Cardiac Monitoring: No      Cardiac Rhythm: Sinus Rhythm    Access:  Current line(s): PIV     Genitourinary:   Urinary status: shore    Respiratory:   O2 Device: Nasal cannula  Chronic home O2 use?: NO  Incentive spirometer at bedside: YES       GI:  Last Bowel Movement Date: 01/17/23  Current diet:  ADULT DIET Regular  Passing flatus: YES  Tolerating current diet: YES       Pain Management:   Patient states pain is manageable on current regimen: YES    Skin:  Tyrese Score: 15  Interventions: float heels, increase time out of bed, PT/OT consult, limit briefs, internal/external urinary devices, and nutritional support     Patient Safety:  Fall Score:  Total Score: 2  Interventions: assistive device (walker, cane, etc), gripper socks, pt to call before getting OOB, and stay with me (per policy)  High Fall Risk: Yes    Length of Stay:  Expected LOS: 2d 16h  Actual LOS: Winsome Nickerson RN

## 2023-01-20 NOTE — PROGRESS NOTES
Problem: Falls - Risk of  Goal: *Absence of Falls  Description: Document Starleen Freeze Fall Risk and appropriate interventions in the flowsheet. Outcome: Progressing Towards Goal  Note: Fall Risk Interventions:  Mobility Interventions: OT consult for ADLs, Patient to call before getting OOB, PT Consult for mobility concerns, PT Consult for assist device competence         Medication Interventions: Patient to call before getting OOB, Teach patient to arise slowly    Elimination Interventions: Elevated toilet seat, Call light in reach, Bed/chair exit alarm              Problem: Pressure Injury - Risk of  Goal: *Prevention of pressure injury  Description: Document Tyrese Scale and appropriate interventions in the flowsheet.   Outcome: Progressing Towards Goal  Note: Pressure Injury Interventions:  Sensory Interventions: Assess changes in LOC    Moisture Interventions: Absorbent underpads, Apply protective barrier, creams and emollients, Internal/External urinary devices, Minimize layers, Maintain skin hydration (lotion/cream)    Activity Interventions: Increase time out of bed, Pressure redistribution bed/mattress(bed type), PT/OT evaluation    Mobility Interventions: HOB 30 degrees or less, Pressure redistribution bed/mattress (bed type), PT/OT evaluation    Nutrition Interventions: Document food/fluid/supplement intake    Friction and Shear Interventions: Apply protective barrier, creams and emollients

## 2023-01-21 ENCOUNTER — APPOINTMENT (OUTPATIENT)
Dept: ULTRASOUND IMAGING | Age: 53
DRG: 481 | End: 2023-01-21
Attending: PHYSICIAN ASSISTANT
Payer: COMMERCIAL

## 2023-01-21 LAB — HGB BLD-MCNC: 8.9 G/DL (ref 11.5–16)

## 2023-01-21 PROCEDURE — 65270000029 HC RM PRIVATE

## 2023-01-21 PROCEDURE — 74011250637 HC RX REV CODE- 250/637: Performed by: PHYSICIAN ASSISTANT

## 2023-01-21 PROCEDURE — 85018 HEMOGLOBIN: CPT

## 2023-01-21 PROCEDURE — 74011250637 HC RX REV CODE- 250/637: Performed by: NURSE PRACTITIONER

## 2023-01-21 PROCEDURE — 94760 N-INVAS EAR/PLS OXIMETRY 1: CPT

## 2023-01-21 PROCEDURE — 36415 COLL VENOUS BLD VENIPUNCTURE: CPT

## 2023-01-21 PROCEDURE — 74011000250 HC RX REV CODE- 250: Performed by: ANESTHESIOLOGY

## 2023-01-21 PROCEDURE — 74011000250 HC RX REV CODE- 250: Performed by: PHYSICIAN ASSISTANT

## 2023-01-21 PROCEDURE — 93970 EXTREMITY STUDY: CPT

## 2023-01-21 RX ADMIN — PRAZOSIN HYDROCHLORIDE 5 MG: 5 CAPSULE ORAL at 21:28

## 2023-01-21 RX ADMIN — CHOLECALCIFEROL TAB 25 MCG (1000 UNIT) 1000 UNITS: 25 TAB at 08:24

## 2023-01-21 RX ADMIN — BUSPIRONE HYDROCHLORIDE 15 MG: 5 TABLET ORAL at 08:25

## 2023-01-21 RX ADMIN — DOXYCYCLINE HYCLATE 100 MG: 100 TABLET, COATED ORAL at 21:28

## 2023-01-21 RX ADMIN — ACETAMINOPHEN 650 MG: 325 TABLET ORAL at 08:24

## 2023-01-21 RX ADMIN — OLANZAPINE 5 MG: 5 TABLET, FILM COATED ORAL at 21:28

## 2023-01-21 RX ADMIN — APIXABAN 2.5 MG: 2.5 TABLET, FILM COATED ORAL at 17:00

## 2023-01-21 RX ADMIN — MORPHINE SULFATE 30 MG: 15 TABLET, FILM COATED, EXTENDED RELEASE ORAL at 10:08

## 2023-01-21 RX ADMIN — Medication 1 TABLET: at 08:24

## 2023-01-21 RX ADMIN — SODIUM CHLORIDE, PRESERVATIVE FREE 10 ML: 5 INJECTION INTRAVENOUS at 16:56

## 2023-01-21 RX ADMIN — ACETAMINOPHEN 650 MG: 325 TABLET ORAL at 20:25

## 2023-01-21 RX ADMIN — ACETAMINOPHEN 650 MG: 325 TABLET ORAL at 14:09

## 2023-01-21 RX ADMIN — HYDROMORPHONE HYDROCHLORIDE 6 MG: 2 TABLET ORAL at 08:23

## 2023-01-21 RX ADMIN — HYDROMORPHONE HYDROCHLORIDE 6 MG: 2 TABLET ORAL at 20:25

## 2023-01-21 RX ADMIN — SODIUM CHLORIDE, PRESERVATIVE FREE 5 ML: 5 INJECTION INTRAVENOUS at 06:02

## 2023-01-21 RX ADMIN — Medication 1 TABLET: at 12:08

## 2023-01-21 RX ADMIN — PANTOPRAZOLE SODIUM 40 MG: 40 TABLET, DELAYED RELEASE ORAL at 08:24

## 2023-01-21 RX ADMIN — SODIUM CHLORIDE, PRESERVATIVE FREE 10 ML: 5 INJECTION INTRAVENOUS at 21:28

## 2023-01-21 RX ADMIN — BUSPIRONE HYDROCHLORIDE 15 MG: 5 TABLET ORAL at 17:00

## 2023-01-21 RX ADMIN — DOXYCYCLINE HYCLATE 100 MG: 100 TABLET, COATED ORAL at 08:24

## 2023-01-21 RX ADMIN — HYDROMORPHONE HYDROCHLORIDE 6 MG: 2 TABLET ORAL at 12:08

## 2023-01-21 RX ADMIN — Medication 1 TABLET: at 17:00

## 2023-01-21 RX ADMIN — APIXABAN 2.5 MG: 2.5 TABLET, FILM COATED ORAL at 08:25

## 2023-01-21 RX ADMIN — MORPHINE SULFATE 30 MG: 15 TABLET, FILM COATED, EXTENDED RELEASE ORAL at 21:28

## 2023-01-21 RX ADMIN — HYDROMORPHONE HYDROCHLORIDE 6 MG: 2 TABLET ORAL at 16:57

## 2023-01-21 NOTE — PROGRESS NOTES
Hospitalist Progress Note    NAME: Carrie Maria   :  1970   MRN:  [id number]       Assessment / Plan:  Acute transverse fracture of distal diaphysis of right femur with moderate apex anterior angulation-nontraumatic  Status post TKA BLE  Chronic pain  Patient not currently able to tolerate placement of knee immobilizer-counseled on not moving leg to prevent further injury or displacement  Pain is not well controlled. Pt refusing CT leg and ortho is aware. Duplex US neg for DVT  S/p surgical repaired with ortho,  pt has wound vac in place, leave in vs ?remove prior to discharge  Appreciate palliative following for pain management. Plan to dc PCA today. IPR at discharge. CM's help is appreciated     Obesity class III by BMI-49.53  History hyperthyroidism  TSH low, FT4 wnl. Repeat TSH outpt in 4 weeks     MDD/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 otherwise classified  Continue PTA BuSpar, olanzapine, prazosin     Essential hypertension  Continue PTA hydrochlorothiazide, losartan  cont' as needed IV labetalol    Comedonal acne  Continue PTA doxycycline    Herpes labialis  Resolving lesion on upper lip, continue PTA acyclovir     UTI  History of hematuria  Ucx without growth. Abx discontinued    Chronic normocytic anemia  Trend hemoglobin    RIANA: stable for discharge pending IPR     Code Status: Full   DVT Prophylaxis: Defer to orthopedics  GI Prophylaxis: Protonix  Diet: Diabetic/cardiac     Subjective:     Chief Complaint / Reason for Physician Visit  NAD. Pain is well controlled. Discussed with RN events overnight.      Review of Systems:  Symptom Y/N Comments  Symptom Y/N Comments   Fever/Chills    Chest Pain     Poor Appetite    Edema     Cough    Abdominal Pain     Sputum    Joint Pain     SOB/CHANG    Pruritis/Rash     Nausea/vomit    Tolerating PT/OT     Diarrhea    Tolerating Diet     Constipation    Other       Could NOT obtain due to:      Objective:     VITALS:   Last 24hrs VS reviewed since prior progress note. Most recent are:  Patient Vitals for the past 24 hrs:   Temp Pulse Resp BP SpO2   01/21/23 1119 98.6 °F (37 °C) (!) 109 16 101/60 96 %   01/21/23 0807 98.6 °F (37 °C) 97 15 105/64 97 %   01/21/23 0331 -- 69 16 110/63 97 %   01/20/23 2315 98.8 °F (37.1 °C) 88 17 122/67 97 %   01/20/23 1911 99.3 °F (37.4 °C) 89 16 130/81 99 %   01/20/23 1529 99 °F (37.2 °C) 98 16 (!) 148/87 97 %   01/20/23 1528 99 °F (37.2 °C) (!) 104 16 (!) 178/115 96 %         Intake/Output Summary (Last 24 hours) at 1/21/2023 1220  Last data filed at 1/20/2023 2126  Gross per 24 hour   Intake --   Output 1400 ml   Net -1400 ml          I had a face to face encounter and independently examined this patient on 1/21/2023, as outlined below:  PHYSICAL EXAM:  General: WD, WN. Alert, cooperative, no acute distress    EENT:  EOMI. Anicteric sclerae. MMM  Resp:  CTA bilaterally, no wheezing or rales. No accessory muscle use  CV:  Regular  rhythm,  No edema  GI:  Soft, Non distended, Non tender. +BS  Neurologic:  Alert and oriented X 3, normal speech  Psych:   Not anxious  Skin:  No rashes. No jaundice    Reviewed most current lab test results and cultures  YES  Reviewed most current radiology test results   YES  Review and summation of old records today    NO  Reviewed patient's current orders and MAR    YES  PMH/SH reviewed - no change compared to H&P  ________________________________________________________________________  Care Plan discussed with:    Comments   Patient x    Family      RN x    Care Manager     Consultant  x                      Multidiciplinary team rounds were held today with , nursing, pharmacist and clinical coordinator. Patient's plan of care was discussed; medications were reviewed and discharge planning was addressed.      ________________________________________________________________________  Total NON critical care TIME:  35   Minutes    Total CRITICAL CARE TIME Spent:   Minutes non procedure based      Comments   >50% of visit spent in counseling and coordination of care     ________________________________________________________________________  Anabella Ahumada MD     Procedures: see electronic medical records for all procedures/Xrays and details which were not copied into this note but were reviewed prior to creation of Plan. LABS:  I reviewed today's most current labs and imaging studies.   Pertinent labs include:  Recent Labs     01/21/23  0555 01/20/23  0440   WBC  --  12.8*   HGB 8.9* 10.7*   HCT  --  35.5   PLT  --  342       Recent Labs     01/20/23  0440 01/19/23  0451    141   K 4.5 4.2    112*   CO2 24 22   * 80   BUN 16 18   CREA 1.04* 1.03*   CA 8.9 8.8         Signed: Anabella Ahumada MD

## 2023-01-21 NOTE — PROGRESS NOTES
ORTHO - Progress Note  Post Op day: 2 Days Post-Op    Rene Jeff     [id number]  female    46 y.o.    1970    Admit date:2023  Date of Surgery:2023   Procedures:Procedure(s):  INTRA MEDULLARY NAIL INSERTION, RETROGRADE RIGHT FEMUR  Surgeon:Surgeon(s) and Role:     * Chelsea Coley MD - Primary        SUBJECTIVE:     Rene Jeff is a 46 y.o. female resting in the bed  Patient has complaints improved pain control since surgery. Denies F/C, nausea, vomiting, dizziness, lightheadedness, chest pain, or shortness of breath. OBJECTIVE:       Physical Exam:  General: alert, cooperative, no distress. Gastrointestinal:  non-distended . Cardiovascular: equal pulses in the lower extremities,  Brisk cap refill in all distal extremities   Genitourinary: purewick  Respiratory: No respiratory distress   Neurological:Neurovascular exam within normal limits. Senstion intact: LE bilat. Motor: + DF/PF/EHL. Musculoskeletal: Sri's sign negative in bilateral lower extremities. Calves soft, supple, non-tender upon palpation or with passive stretch. No sign of DVT  Dressing/Wound:  ACE and wound vac intact/in place RLE; No signs of drainage in cannister. Clean, dry and intact. No significant erythema or swelling.     Vital Signs:       Patient Vitals for the past 8 hrs:   BP Temp Pulse Resp SpO2   23 1447 131/79 98.4 °F (36.9 °C) 94 16 94 %   23 1119 101/60 98.6 °F (37 °C) (!) 109 16 96 %                                          Temp (24hrs), Av.7 °F (37.1 °C), Min:98.4 °F (36.9 °C), Max:99.3 °F (37.4 °C)      Labs:        Recent Labs     23  0555 23  0440   HCT  --  35.5   HGB 8.9* 10.7*     PT/OT:              ASSESSMENT / PLAN:   Active Problems:    Femur fracture, right (HCC) (2023)      Chronic pain syndrome (2023)      Acute leg pain, right (2023)      Moderate episode of recurrent major depressive disorder ([facility] [address] (3/90/4764)      Complicated grief (1/20/2023)         -  Continue PT/OT TTWB  -  DVT prophylaxis- SCD w/ Eliquis   -  DC planning - Pending    Signed By: Ricardo Holter, PA

## 2023-01-22 LAB
ATRIAL RATE: 95 BPM
CALCULATED R AXIS, ECG10: -176 DEGREES
CALCULATED T AXIS, ECG11: 144 DEGREES
DIAGNOSIS, 93000: NORMAL
P-R INTERVAL, ECG05: 120 MS
Q-T INTERVAL, ECG07: 340 MS
QRS DURATION, ECG06: 72 MS
QTC CALCULATION (BEZET), ECG08: 427 MS
VENTRICULAR RATE, ECG03: 95 BPM

## 2023-01-22 PROCEDURE — 74011000250 HC RX REV CODE- 250: Performed by: ANESTHESIOLOGY

## 2023-01-22 PROCEDURE — 74011250637 HC RX REV CODE- 250/637: Performed by: NURSE PRACTITIONER

## 2023-01-22 PROCEDURE — 74011250637 HC RX REV CODE- 250/637: Performed by: PHYSICIAN ASSISTANT

## 2023-01-22 PROCEDURE — 65270000029 HC RM PRIVATE

## 2023-01-22 RX ORDER — HYDROMORPHONE HYDROCHLORIDE 2 MG/1
6 TABLET ORAL EVERY 6 HOURS
Qty: 60 TABLET | Refills: 0 | Status: SHIPPED | OUTPATIENT
Start: 2023-01-22 | End: 2023-01-23 | Stop reason: SDUPTHER

## 2023-01-22 RX ORDER — NALOXONE HYDROCHLORIDE 4 MG/.1ML
SPRAY NASAL
Qty: 1 EACH | Refills: 0 | Status: SHIPPED | OUTPATIENT
Start: 2023-01-22

## 2023-01-22 RX ORDER — MORPHINE SULFATE 30 MG/1
30 TABLET, FILM COATED, EXTENDED RELEASE ORAL EVERY 12 HOURS
Qty: 20 TABLET | Refills: 0 | Status: SHIPPED | OUTPATIENT
Start: 2023-01-22 | End: 2023-01-23

## 2023-01-22 RX ADMIN — ACETAMINOPHEN 650 MG: 325 TABLET ORAL at 03:25

## 2023-01-22 RX ADMIN — OLANZAPINE 5 MG: 5 TABLET, FILM COATED ORAL at 21:25

## 2023-01-22 RX ADMIN — BUSPIRONE HYDROCHLORIDE 15 MG: 5 TABLET ORAL at 17:22

## 2023-01-22 RX ADMIN — HYDROMORPHONE HYDROCHLORIDE 6 MG: 2 TABLET ORAL at 03:25

## 2023-01-22 RX ADMIN — HYDROMORPHONE HYDROCHLORIDE 6 MG: 2 TABLET ORAL at 08:42

## 2023-01-22 RX ADMIN — APIXABAN 2.5 MG: 2.5 TABLET, FILM COATED ORAL at 08:43

## 2023-01-22 RX ADMIN — Medication 1 TABLET: at 08:42

## 2023-01-22 RX ADMIN — APIXABAN 2.5 MG: 2.5 TABLET, FILM COATED ORAL at 17:22

## 2023-01-22 RX ADMIN — ACETAMINOPHEN 650 MG: 325 TABLET ORAL at 13:11

## 2023-01-22 RX ADMIN — DOXYCYCLINE HYCLATE 100 MG: 100 TABLET, COATED ORAL at 21:25

## 2023-01-22 RX ADMIN — HYDROMORPHONE HYDROCHLORIDE 6 MG: 2 TABLET ORAL at 20:22

## 2023-01-22 RX ADMIN — Medication 1 TABLET: at 17:22

## 2023-01-22 RX ADMIN — SODIUM CHLORIDE, PRESERVATIVE FREE 10 ML: 5 INJECTION INTRAVENOUS at 06:03

## 2023-01-22 RX ADMIN — HYDROMORPHONE HYDROCHLORIDE 6 MG: 2 TABLET ORAL at 13:11

## 2023-01-22 RX ADMIN — ACETAMINOPHEN 650 MG: 325 TABLET ORAL at 08:42

## 2023-01-22 RX ADMIN — Medication 1 TABLET: at 13:11

## 2023-01-22 RX ADMIN — DOXYCYCLINE HYCLATE 100 MG: 100 TABLET, COATED ORAL at 08:42

## 2023-01-22 RX ADMIN — HYDROMORPHONE HYDROCHLORIDE 6 MG: 2 TABLET ORAL at 17:22

## 2023-01-22 RX ADMIN — BUSPIRONE HYDROCHLORIDE 15 MG: 5 TABLET ORAL at 08:42

## 2023-01-22 RX ADMIN — MORPHINE SULFATE 30 MG: 15 TABLET, FILM COATED, EXTENDED RELEASE ORAL at 10:00

## 2023-01-22 RX ADMIN — PANTOPRAZOLE SODIUM 40 MG: 40 TABLET, DELAYED RELEASE ORAL at 08:42

## 2023-01-22 RX ADMIN — PRAZOSIN HYDROCHLORIDE 5 MG: 5 CAPSULE ORAL at 21:25

## 2023-01-22 RX ADMIN — MORPHINE SULFATE 30 MG: 15 TABLET, FILM COATED, EXTENDED RELEASE ORAL at 21:25

## 2023-01-22 RX ADMIN — ACETAMINOPHEN 650 MG: 325 TABLET ORAL at 20:22

## 2023-01-22 RX ADMIN — CHOLECALCIFEROL TAB 25 MCG (1000 UNIT) 1000 UNITS: 25 TAB at 08:43

## 2023-01-22 NOTE — PROGRESS NOTES
ORTHO - Progress Note  Post Op day: 3 Days Post-Op    Mariangel Manriquez     [id number]  female    46 y.o.    1970    Admit date:2023  Date of Surgery:2023   Procedures:Procedure(s):  INTRA MEDULLARY NAIL INSERTION, RETROGRADE RIGHT FEMUR  Surgeon:Surgeon(s) and Role:     * Shanta Sim MD - Primary        SUBJECTIVE:     Mariangel Manriquez is a 46 y.o. female resting in the bed  Patient has no complaints today. Denies F/C, nausea, vomiting, dizziness, lightheadedness, chest pain, or shortness of breath. OBJECTIVE:       Physical Exam:  General: alert, cooperative, no distress. Gastrointestinal:  non-distended . Cardiovascular: equal pulses in the lower extremities,  Brisk cap refill in all distal extremities   Genitourinary: purewick  Respiratory: No respiratory distress   Neurological:Neurovascular exam within normal limits. Senstion intact: LE bilat. Motor: + DF/PF/EHL. Musculoskeletal: Sri's sign negative in bilateral lower extremities. Calves soft, supple, non-tender upon palpation or with passive stretch. No sign of DVT  Dressing/Wound:  wound vac intact/in place RLE; No signs of drainage in cannister. Clean, dry and intact. No significant erythema or swelling.     Vital Signs:       Patient Vitals for the past 8 hrs:   BP Temp Pulse Resp SpO2   23 0833 (!) 125/59 98.2 °F (36.8 °C) (!) 112 18 96 %                                          Temp (24hrs), Av.6 °F (37 °C), Min:98.2 °F (36.8 °C), Max:99.1 °F (37.3 °C)    Date 23 0700 - 23 0659   Shift 2600-8008 7043-4815 5020-1302 24 Hour Total   INTAKE   Shift Total(mL/kg)       OUTPUT   Urine(mL/kg/hr) 350   350   Shift Total(mL/kg) 350(2.9)   350(2.9)   Weight (kg) 118.9 118.9 118.9 118.9     Labs:        Recent Labs     23  0555 23  0440   HCT  --  35.5   HGB 8.9* 10.7*     PT/OT:              ASSESSMENT / PLAN:   Active Problems:    Femur fracture, right (HCC) (2023)      Chronic pain syndrome (1/20/2023)      Acute leg pain, right (1/20/2023)      Moderate episode of recurrent major depressive disorder ([facility] [address] (8/43/2081)      Complicated grief (1/55/1534)     - Pain medications written for SNF based on palliative recommendations   -  Continue PT/OT TTWB  -  DVT prophylaxis- SCD w/ Eliquis BID   -  DC planning - Pending    Signed By: LETICIA Amado

## 2023-01-22 NOTE — PROGRESS NOTES
Spiritual Care Assessment/Progress Note  [address]      NAME: Rigoberto Rey      MRN: [id number]  AGE: 46 y.o.  SEX: female  Jain Affiliation: Anabaptist   Language: English     1/22/2023     Total Time (in minutes): 52     Spiritual Assessment begun in [facility] through conversation with:         [x]Patient        [] Family    [] Friend(s)        Reason for Consult: Initial/Spiritual assessment, patient floor     Spiritual beliefs: (Please include comment if needed)     [] Identifies with a genaro tradition:         [] Supported by a genaro community:            [] Claims no spiritual orientation:           [x] Seeking spiritual identity:                [] Adheres to an individual form of spirituality:           [] Not able to assess:                           Identified resources for coping:      [x] Prayer                               [] Music                  [] Guided Imagery     [x] Family/friends                 [] Pet visits     [] Devotional reading                         [] Unknown     [] Other:                                               Interventions offered during this visit: (See comments for more details)    Patient Interventions: Affirmation of emotions/emotional suffering, Affirmation of genaro, Prayer (actual), Prayer (assurance of), Initial/Spiritual assessment, patient floor, Coping skills reviewed/reinforced, Normalization of emotional/spiritual concerns           Plan of Care:     [x] Support spiritual and/or cultural needs    [] Support AMD and/or advance care planning process      [x] Support grieving process   [] Coordinate Rites and/or Rituals    [] Coordination with community clergy   [] No spiritual needs identified at this time   [] Detailed Plan of Care below (See Comments)  [x] Make referral to Music Therapy  [] Make referral to Pet Therapy     [] Make referral to Addiction services  [] Make referral to [facility]  [] Make referral to Spiritual Care Partner  [] No future visits requested        [] Contact Spiritual Care for further referrals     Comments:  reviewed the patient's chart prior to the visit. Ms. Petty Mendez was sitting up in her chair when the  entered her room. She welcome the  into her room with a smile. She shared her thoughts in reference to the death of her son (22 yrs old) , her love for her family, her genaro and her sadness.  provided empathetic listening and a calming presence.  suggested a support group for parents who have lost their children and encourage her to reach out to her current therapist for additional support.  provided encouragement and prayer.  will refer Ms. Petty Mendez to the music therapist to help her focus on positive thoughts.  services are available 24 hours a day as requested. Rev. LADI Teixeira  [address] [facility]   Paging Service 287[facility] ([id number])

## 2023-01-22 NOTE — PROGRESS NOTES
Hospitalist Progress Note    NAME: Niurka Pa   :  1970   MRN:  [id number]       Assessment / Plan:  Acute transverse fracture of distal diaphysis of right femur with moderate apex anterior angulation-nontraumatic  Status post TKA BLE  Chronic pain  Patient not currently able to tolerate placement of knee immobilizer-counseled on not moving leg to prevent further injury or displacement  Pain is not well controlled. Pt refusing duplex US and CT leg and ortho is aware. S/p surgical repaired with ortho  Appreciate palliative following for pain management. PCA dilaudid was discontinued. IPR at discharge. CM's help is appreciated     Obesity class III by BMI-49.53  History hyperthyroidism  TSH low, FT4 wnl. Repeat TSH outpt in 4 weeks     MDD/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 otherwise classified  Continue PTA BuSpar, olanzapine, prazosin     Essential hypertension  Continue PTA hydrochlorothiazide, losartan  cont' as needed IV labetalol    Comedonal acne  Continue PTA doxycycline    Herpes labialis  Resolving lesion on upper lip, continue PTA acyclovir     UTI  History of hematuria  Ucx without growth. Abx discontinued    Chronic normocytic anemia  Trend hemoglobin    RIANA: stable for discharge pending IPR         Code Status: Full   DVT Prophylaxis: Defer to orthopedics  GI Prophylaxis: Protonix  Diet: Diabetic/cardiac     Subjective:     Chief Complaint / Reason for Physician Visit  NAD. No acute event overnight. Discussed with RN events overnight. Review of Systems:  Symptom Y/N Comments  Symptom Y/N Comments   Fever/Chills    Chest Pain     Poor Appetite    Edema     Cough    Abdominal Pain     Sputum    Joint Pain     SOB/CHANG    Pruritis/Rash     Nausea/vomit    Tolerating PT/OT     Diarrhea    Tolerating Diet     Constipation    Other       Could NOT obtain due to:      Objective:     VITALS:   Last 24hrs VS reviewed since prior progress note.  Most recent are:  Patient Vitals for the past 24 hrs:   Temp Pulse Resp BP SpO2   01/22/23 0833 98.2 °F (36.8 °C) (!) 112 18 (!) 125/59 96 %   01/21/23 2000 99.1 °F (37.3 °C) 72 18 100/70 99 %   01/21/23 1447 98.4 °F (36.9 °C) 94 16 131/79 94 %   01/21/23 1119 98.6 °F (37 °C) (!) 109 16 101/60 96 %         Intake/Output Summary (Last 24 hours) at 1/22/2023 1013  Last data filed at 1/22/2023 0710  Gross per 24 hour   Intake --   Output 850 ml   Net -850 ml          I had a face to face encounter and independently examined this patient on 1/22/2023, as outlined below:  PHYSICAL EXAM:  General: WD, WN. Alert, cooperative, no acute distress    EENT:  EOMI. Anicteric sclerae. MMM  Resp:  CTA bilaterally, no wheezing or rales. No accessory muscle use  CV:  Regular  rhythm,  No edema  GI:  Soft, Non distended, Non tender. +BS  Neurologic:  Alert and oriented X 3, normal speech  Psych:   Not anxious  Skin:  No rashes. No jaundice    Reviewed most current lab test results and cultures  YES  Reviewed most current radiology test results   YES  Review and summation of old records today    NO  Reviewed patient's current orders and MAR    YES  PMH/SH reviewed - no change compared to H&P  ________________________________________________________________________  Care Plan discussed with:    Comments   Patient x    Family      RN x    Care Manager     Consultant  x                      Multidiciplinary team rounds were held today with , nursing, pharmacist and clinical coordinator. Patient's plan of care was discussed; medications were reviewed and discharge planning was addressed.      ________________________________________________________________________  Total NON critical care TIME:  35   Minutes    Total CRITICAL CARE TIME Spent:   Minutes non procedure based      Comments   >50% of visit spent in counseling and coordination of care     ________________________________________________________________________  Karen Bonilla MD     Procedures: see electronic medical records for all procedures/Xrays and details which were not copied into this note but were reviewed prior to creation of Plan. LABS:  I reviewed today's most current labs and imaging studies.   Pertinent labs include:  Recent Labs     01/21/23  0555 01/20/23  0440   WBC  --  12.8*   HGB 8.9* 10.7*   HCT  --  35.5   PLT  --  342       Recent Labs     01/20/23  0440      K 4.5      CO2 24   *   BUN 16   CREA 1.04*   CA 8.9         Signed: Shahla Willis MD

## 2023-01-23 ENCOUNTER — HOME HEALTH ADMISSION (OUTPATIENT)
Dept: HOME HEALTH SERVICES | Facility: HOME HEALTH | Age: 53
End: 2023-01-23
Payer: COMMERCIAL

## 2023-01-23 VITALS
WEIGHT: 262.13 LBS | HEIGHT: 61 IN | HEART RATE: 94 BPM | RESPIRATION RATE: 18 BRPM | DIASTOLIC BLOOD PRESSURE: 85 MMHG | BODY MASS INDEX: 49.49 KG/M2 | SYSTOLIC BLOOD PRESSURE: 115 MMHG | TEMPERATURE: 98.4 F | OXYGEN SATURATION: 97 %

## 2023-01-23 PROCEDURE — 94760 N-INVAS EAR/PLS OXIMETRY 1: CPT

## 2023-01-23 PROCEDURE — 97530 THERAPEUTIC ACTIVITIES: CPT

## 2023-01-23 PROCEDURE — 74011250637 HC RX REV CODE- 250/637: Performed by: PHYSICIAN ASSISTANT

## 2023-01-23 PROCEDURE — 97116 GAIT TRAINING THERAPY: CPT

## 2023-01-23 PROCEDURE — 74011250637 HC RX REV CODE- 250/637: Performed by: NURSE PRACTITIONER

## 2023-01-23 PROCEDURE — 77030041034 HC KT HIP PATT -B

## 2023-01-23 PROCEDURE — 97535 SELF CARE MNGMENT TRAINING: CPT

## 2023-01-23 RX ORDER — NALOXONE HYDROCHLORIDE 4 MG/.1ML
SPRAY NASAL
Qty: 1 EACH | Refills: 0 | Status: SHIPPED | OUTPATIENT
Start: 2023-01-23

## 2023-01-23 RX ORDER — HYDROMORPHONE HYDROCHLORIDE 2 MG/1
6 TABLET ORAL EVERY 6 HOURS
Qty: 60 TABLET | Refills: 0 | Status: SHIPPED | OUTPATIENT
Start: 2023-01-23 | End: 2023-01-28

## 2023-01-23 RX ADMIN — HYDROMORPHONE HYDROCHLORIDE 6 MG: 2 TABLET ORAL at 15:25

## 2023-01-23 RX ADMIN — CHOLECALCIFEROL TAB 25 MCG (1000 UNIT) 1000 UNITS: 25 TAB at 08:05

## 2023-01-23 RX ADMIN — ACETAMINOPHEN 650 MG: 325 TABLET ORAL at 12:21

## 2023-01-23 RX ADMIN — HYDROMORPHONE HYDROCHLORIDE 6 MG: 2 TABLET ORAL at 12:21

## 2023-01-23 RX ADMIN — APIXABAN 2.5 MG: 2.5 TABLET, FILM COATED ORAL at 08:04

## 2023-01-23 RX ADMIN — Medication 1 TABLET: at 12:21

## 2023-01-23 RX ADMIN — ACETAMINOPHEN 650 MG: 325 TABLET ORAL at 08:05

## 2023-01-23 RX ADMIN — MORPHINE SULFATE 30 MG: 15 TABLET, FILM COATED, EXTENDED RELEASE ORAL at 08:04

## 2023-01-23 RX ADMIN — DOXYCYCLINE HYCLATE 100 MG: 100 TABLET, COATED ORAL at 08:04

## 2023-01-23 RX ADMIN — HYDROMORPHONE HYDROCHLORIDE 6 MG: 2 TABLET ORAL at 08:03

## 2023-01-23 RX ADMIN — PANTOPRAZOLE SODIUM 40 MG: 40 TABLET, DELAYED RELEASE ORAL at 08:04

## 2023-01-23 RX ADMIN — BUSPIRONE HYDROCHLORIDE 15 MG: 5 TABLET ORAL at 08:03

## 2023-01-23 RX ADMIN — Medication 1 TABLET: at 08:04

## 2023-01-23 NOTE — PROGRESS NOTES
Problem: Self Care Deficits Care Plan (Adult)  Goal: *Acute Goals and Plan of Care (Insert Text)  Description: FUNCTIONAL STATUS PRIOR TO ADMISSION: Patient was mod I using rollator and mod I for ADLs until 1/14 when she could no longer move her RLE. Patient's  was assisting with transfers using pivot disc into her son's w/c (which she reports is too small). HOME SUPPORT: The patient lived with  and 12year old son with autism. Occupational Therapy Goals  Initiated 1/20/2023  1. Patient will perform grooming sitting EOB with supervision within 7 day(s). 2.  Patient will perform upper body dressing with supervision within 7 day(s). 3.  Patient will perform lower body dressing using AE PRN with minimal assistance within 7 day(s). 4.  Patient will perform toilet transfers to [facility] with maximal assistance within 7 day(s). 5.  Patient will perform all aspects of toileting with maximal assistance within 7 day(s). Outcome: Progressing Towards Goal   OCCUPATIONAL THERAPY TREATMENT  Patient: Kun Garcia (89 y.o. female)  Date: 1/23/2023  Diagnosis: Femur fracture, right ([facility] [address] [S72.91XA] <principal problem not specified>  Procedure(s) (LRB):  INTRA MEDULLARY NAIL INSERTION, RETROGRADE RIGHT FEMUR (Right) 4 Days Post-Op  Precautions: Fall, Bed Alarm, TTWB (RLE)  Chart, occupational therapy assessment, plan of care, and goals were reviewed. ASSESSMENT  Patient continues with skilled OT services and is progressing towards goals. Patient received semisupine in bed and cleared for therapy by nursing. Patient appeared alert and eager to participate in therapy this date. Patient completed supine > sit with stand-by assist and demonstrated intact sitting balance. She stood with contact guard assist and ambulated into bathroom using rolling walker with cues for maintaining TTWB on RLE. Patient demonstrated toilet transfer and completed grooming standing at sink.  Cues provided for hand placement and safety awareness during bathroom ADLs with good carry over noted during tasks. Patient completed stair training with PT in rehab gym and required cues to maintain TTWB on RLE during task. She returned to room and completed LB dressing with education provided on AE to complete task. Patient educated on energy conservation techniques and pacing strategies to utilize once home. Patient was left sitting in bedside chair with all needs met. Patient would continue to benefit from skilled OT services during acute hospital stay. Anticipate patient can return home with 24/7 supervision and assist from family and HHOT/PT. Current Level of Function Impacting Discharge (ADLs): supervision to stand-by assist for ADLs, supervision to contact guard assist for functional mobility using rolling walker     Other factors to consider for discharge: fall risk, TTWB RLE, good family support         PLAN :  Patient continues to benefit from skilled intervention to address the above impairments. Continue treatment per established plan of care to address goals. Recommendation for discharge: (in order for the patient to meet his/her long term goals)  Home with 24/7 supervision and assist from family and HHOT/PT    This discharge recommendation:  Has been made in collaboration with the attending provider and/or case management    IF patient discharges home will need the following DME: Issued AE for LB dressing       SUBJECTIVE:   Patient stated I just feel like I could go home and do this.     OBJECTIVE DATA SUMMARY:   Cognitive/Behavioral Status:  Neurologic State: Alert  Orientation Level: Oriented X4  Cognition: Appropriate decision making; Appropriate for age attention/concentration; Follows commands  Perception: Appears intact  Perseveration: No perseveration noted  Safety/Judgement: Awareness of environment;Good awareness of safety precautions    Functional Mobility and Transfers for ADLs:  Bed Mobility:  Rolling: Stand-by assistance  Supine to Sit: Stand-by assistance  Scooting: Stand-by assistance    Transfers:  Sit to Stand: Contact guard assistance  Stand to Sit: Contact guard assistance  Functional Transfers  Bathroom Mobility: Contact guard assistance  Toilet Transfer : Supervision  Cues: Verbal cues provided  Bed to Chair: Contact guard assistance    Balance:  Sitting: Intact  Standing: Impaired  Standing - Static: Good;Constant support  Standing - Dynamic : Fair;Good;Constant support    ADL Intervention:    Grooming  Position Performed: Standing (at sink)  Washing Hands: Stand-by assistance  Cues: Verbal cues provided    Lower Body Bathing  Perineal  : Set-up; Stand-by assistance  Position Performed: Standing  Cues: Verbal cues provided    Lower Body Dressing Assistance  Underpants: Supervision; Compensatory technique training  Socks: Supervision; Compensatory technique training  Leg Crossed Method Used: No  Position Performed: Seated in chair;Standing  Cues: Don;Verbal cues provided  Adaptive Equipment Used: Dressing stick; Reacher;Sock aid    Cognitive Retraining  Safety/Judgement: Awareness of environment;Good awareness of safety precautions    Pain:  Patient c/o mild RLE pain. Activity Tolerance:   Good and tolerates ADLs without rest breaks    After treatment patient left in no apparent distress:   Sitting in chair and Call bell within reach    COMMUNICATION/COLLABORATION:   The patients plan of care was discussed with: Physical therapist and Registered nurse.      Zachery Taylor OTR/L  Time Calculation: 40 mins

## 2023-01-23 NOTE — PROGRESS NOTES
Music Therapy Assessment  [address] [place] [address] [id number]     1970  46 y.o.  female    Patient Telephone Number: [phone] (home)   Adventist Affiliation: [employer] [place]   Language: English   Patient Active Problem List    Diagnosis Date Noted    Chronic pain syndrome 01/20/2023    Acute leg pain, right 01/20/2023    Moderate episode of recurrent major depressive disorder ([facility] [address] 37/88/8285    Complicated grief 35/70/8396    Femur fracture, right ([facility] [address] 01/17/2023    Knee osteoarthritis 12/30/2019    Primary osteoarthritis of right knee 12/30/2019    Right knee DJD 12/30/2019    DVT (deep venous thrombosis) ([facility] 01/26/2019    Status post incision and drainage 01/08/2019    Status post left knee replacement 12/12/2018    H/O total knee replacement, left 11/24/2018    Dehiscence of incision 11/16/2018    Primary osteoarthritis of both knees 10/24/2018    Hematuria 10/09/2018    MRSA carrier 10/09/2018    Hyperthyroidism 10/17/2012        Date: 1/23/2023            Total Time (in minutes): 50          [facility]    Mental Status:   [x] Alert [  ] Nohemi Corry [  ]  Confused  [  ] Minimally responsive  [  ] Sleeping    Communication Status: [  ] Impaired Speech [  ] Nonverbal -N/A    Physical Status:   [  ] Oxygen in use  [  ] Hard of Hearing [  ] Vision Impaired  [  ] Ambulatory  [  ] Ambulatory with assistance [  ] Non-ambulatory -N/A    Music Preferences, Background: Pt said she enjoys most genres of music. She specifically mentioned liking \"Hair Metal\" Rock bands popular in the 1980's like AC/DC, Def Leppard, and Bon Alejandro. She said she has more trouble getting into and relating to [address]. Clinical Problem addressed: Emotional support. Goal(s) met in session:  Physical/Pain management (Scale of 1-10):    Pre-session rating: Pt reported having some pain, but didn't rate it. Post-session rating: Pt didn't report on pain.   [  ] Increased relaxation   [  ] Affected breathing patterns  [  ] Decreased muscle tension   [  ] Decreased agitation  [  ] Affected heart rate    [  ] Increased alertness     Emotional/Psychological:  [x] Increased self-expression   [  ] Decreased aggressive behavior   [  ] Decreased feelings of stress  [  ] Discussed healthy coping skills     [  ] Improved mood    [  ] Decreased withdrawn behavior     Social:  [  ] Decreased feelings of isolation/loneliness [x] Positive social interaction   [  ] Provided support and/or comfort for family/friends    Spiritual:  [  ] Spiritual support    [  ] Expressed peace  [  ] Expressed genaro    [  ] Discussed beliefs    Techniques Utilized (Check all that apply):   [  ] Procedural support MT [  ] Music for relaxation [x] Patient preferred music  [  ] Jaclyn analysis  [  ] Song choice  [  ] Music for validation  [  ] Entrainment  [  ] Movement to music [  ] Guided visualization  [  ] Apoorva Huitron  [  ] Patient instrument playing [  ] Josh holden  [  ] Adenike Cabrera along   [  ] Ricci Palma  [  ] Sensory stimulation  [x] Active Listening  [  ] Music for spiritual support [  ] Making of CDs as gifts    Session Observations:  Referral from Primo Agudelo. Patient (pt) was alert and oriented x 4 lying in bed. She said she had some pain, and shared she's been standing and walking more today than she has in the past several days. Pt expressed her love of the arts and music. This music therapist (MT) provided active listening. Pt asked about what a music therapy session entails and MT explained. MT asked the pt about her music background and music preferences and pt shared these. MT suggested a live music experience to support relaxation and comfort that pt could later use even without the MT being present. Pt declined this saying she had to be careful with music. She said it opens her up emotionally, and she did that yesterday with the .  Pt became tearful as she said explained that she shared much and became emotional yesterday while speaking with the . Pt said that since she's being discharged today she didn't want to go to as emotional a place so she can stay focused on doing what she needs to have a smooth transition home. MT expressed respect for the pt's wishes and offered another option of singing and playing a song of the pt's preference. Pt shared she enjoys the [employer] bands popular in the 1980's, and MT suggested the song Livin' on a Prayer by [employer]. Pt agreed to this, and MT sang and played this with Speek. Pt's affect brightened as the song started, and as MT continued singing the pt became tearful. MT observed her face to show what MT perceived as resolving herself not to cry. Seeing this, MT stopped the song and asked the pt how she was doing. Pt said she has so many memories attached to many songs, in part because her late son loved music. Pt shared a memory of her late son with that song and MT provided empathic listening. Pt said she enjoyed hearing the song. MT asked the pt if she'd like another song and then a  briefly entered to relay discharge information to the pt. After she left, the pt declined having more music, and expressed gratitude for the session and enjoyment in the music.     Christine Christian MT-BC (Music Therapist-Board Certified)  Spiritual Care Department  Referral-based service

## 2023-01-23 NOTE — PROGRESS NOTES
ORTHO - Progress Note  Post Op day: 4 Days Post-Op    Chas Wray     [id number]  female    46 y.o.    1970    Admit date:2023  Date of Surgery:2023   Procedures:Procedure(s):  INTRA MEDULLARY NAIL INSERTION, RETROGRADE RIGHT FEMUR  Surgeon:Surgeon(s) and Role:     * Lilian Hurd MD - Primary        SUBJECTIVE:     Chas Wray is a 46 y.o. female resting in the bed. Patient has no complaints today. Denies F/C, nausea, vomiting, dizziness, lightheadedness, chest pain, or shortness of breath. OBJECTIVE:       Physical Exam:  General: alert, cooperative, no distress. Gastrointestinal:  non-distended . Cardiovascular: equal pulses in the lower extremities,  Brisk cap refill in all distal extremities   Genitourinary: purewick  Respiratory: No respiratory distress   Neurological:Neurovascular exam within normal limits. Senstion intact: LE bilat. Motor: + DF/PF/EHL. Musculoskeletal: Sri's sign negative in bilateral lower extremities. Calves soft, supple, non-tender upon palpation or with passive stretch. No sign of DVT  Dressing/Wound:  wound vac intact/in place RLE; No signs of drainage in cannister. Incision clean and intact with staples after vac removal. Clean, dry and intact. No significant erythema or swelling.     Vital Signs:       Patient Vitals for the past 8 hrs:   BP Temp Pulse Resp SpO2   23 1117 115/85 98.4 °F (36.9 °C) 94 18 97 %                                          Temp (24hrs), Av.4 °F (36.9 °C), Min:98.2 °F (36.8 °C), Max:98.6 °F (37 °C)    Date 23 0700 - 23 0659   Shift 3896-5448 5103-8928 6969-4990 24 Hour Total   INTAKE   Shift Total(mL/kg)       OUTPUT   Urine(mL/kg/hr) 1000(1.1)   1000   Shift Total(mL/kg) 1000(8.4)   1000(8.4)   Weight (kg) 118.9 118.9 118.9 118.9     Labs:        Recent Labs     23  0555   HGB 8.9*     PT/OT:        Gait  Base of Support: Widened  Speed/Radha: Pace decreased (<100 feet/min)  Step Length: Left shortened, Right shortened  Stance: Right decreased (TTWB RLE)  Gait Abnormalities: Decreased step clearance, Step to gait  Ambulation - Level of Assistance: Contact guard assistance  Distance (ft): 60 Feet (ft) (15ft x4)  Assistive Device: Gait belt, Walker, rolling  Rail Use: Both  Stairs - Level of Assistance: Contact guard assistance  Number of Stairs Trained: 4  Interventions: Safety awareness training, Verbal cues  Interventions: Safety awareness training, Verbal cues  ASSESSMENT / PLAN:   Active Problems:    Femur fracture, right (HCC) (1/17/2023)      Chronic pain syndrome (1/20/2023)      Acute leg pain, right (1/20/2023)      Moderate episode of recurrent major depressive disorder ([facility] [address] (6/66/2868)      Complicated grief (5/77/8988)     - Pain medications prescribed based on palliative recommendations. Patient advised to contact pain management physician today for any adjustments to this plan. - Wound vac removed. Dry dressing applied.  -  Continue PT/OT TTWB  -  DVT prophylaxis- SCD w/ Eliquis BID   -  DC planning - Pending;  Likely home today with home health    Signed By: LETICIA Omalley

## 2023-01-23 NOTE — PROGRESS NOTES
Problem: Mobility Impaired (Adult and Pediatric)  Goal: *Acute Goals and Plan of Care (Insert Text)  Description: FUNCTIONAL STATUS PRIOR TO ADMISSION: The patient ambulates household distances using rollator. She tries to sit in her son's wheelchair (which is too small, so she takes off the arm rest). She hasn't gone upstairs since her son's passing in 2019, rather she sleeps on the love seat downstairs. She recently ordered a pivot disc to improve her ability to transfer from surfaces. HOME SUPPORT PRIOR TO ADMISSION: The patient lives with her 13-year old autistic son and her  (who works from home). 2-story home but she stays on the 1st floor, 4 steps with B rails (too wide to reach both) to enter from the outside. Physical Therapy Goals  Initiated 1/20/2023  1. Patient will move from supine to sit and sit to supine  in bed with supervision/set-up within 7 day(s). 2.  Patient will transfer from bed to chair and chair to bed with supervision/set-up using the least restrictive device within 7 day(s). 3.  Patient will perform sit to stand with supervision/set-up within 7 day(s). 4.  Patient will ambulate with minimal assistance/contact guard assist for 25 feet with the least restrictive device within 7 day(s). 5.  Patient will ascend/descend 4 stairs with 1 handrail(s) with minimal assistance/contact guard assist within 7 day(s). Outcome: Progressing Towards Goal   PHYSICAL THERAPY TREATMENT  Patient: Abran Marcos (6 y.o. female)  Date: 1/23/2023  Diagnosis: Femur fracture, right (HCC) [S72.91XA] <principal problem not specified>  Procedure(s) (LRB):  INTRA MEDULLARY NAIL INSERTION, RETROGRADE RIGHT FEMUR (Right) 4 Days Post-Op  Precautions: Fall, Bed Alarm, TTWB (RLE)  Chart, physical therapy assessment, plan of care and goals were reviewed. ASSESSMENT  Patient continues with skilled PT services and is progressing towards goals.  Pt was received in semi-supine and agreeable to participate with therapy services. Reports ambulating with nursing and transferring to [facility] over the weekend, and with minimal complaints of pain this morning. Reports understanding of her TTWB'ing precautions and with overall good adherence throughout. Pt was able to stand with CGA and ambulate 15ft x4 with use of RW throughout session and with multiple seated rest breaks throughout. Pt reports not feeling fatigued however with HR elevated into 130's with activity, educated with activity pacing and pt receptive to education provided throughout. Pt was wheeled to the gym via wheelchair and able to perform stair negotiation, ascending/descending 4 steps CGA using bilateral railings as support and cues throughout to maintain her TTWB'ing precautions, cues for proper sequencing and with no safety concerns. Pt would continue to benefit from additional skilled PT services upon discharge and recommend HHPT with 24/7 assistance for mobility & ADL's. Current Level of Function Impacting Discharge (mobility/balance): SBA with bed mobility, CGA with transfers, CGA with RW for gait    Other factors to consider for discharge: has wheelchair - reports using son's, has support -  works from home, cleared stair training         PLAN :  Patient continues to benefit from skilled intervention to address the above impairments. Continue treatment per established plan of care. to address goals. Recommendation for discharge: (in order for the patient to meet his/her long term goals)  Physical therapy at least 2 days/week in the home AND ensure assist and/or supervision for safety with mobility & ADL's    This discharge recommendation:  Has been made in collaboration with the attending provider and/or case management    IF patient discharges home will need the following DME: rolling walker     SUBJECTIVE:   Patient stated wow I'm impressing myself this morning.     OBJECTIVE DATA SUMMARY:   Critical Behavior:  Neurologic State: Alert  Orientation Level: Oriented X4  Cognition: Follows commands, Appropriate for age attention/concentration  Safety/Judgement: Awareness of environment, Decreased insight into deficits  Functional Mobility Training:  Bed Mobility:  Rolling: Stand-by assistance  Supine to Sit: Stand-by assistance     Scooting: Stand-by assistance    Transfers:  Sit to Stand: Contact guard assistance  Stand to Sit: Contact guard assistance (VC for hand placements; good carryover of education)        Bed to Chair: Contact guard assistance     Balance:  Sitting: Intact  Standing: Impaired  Standing - Static: Good;Constant support  Standing - Dynamic : Fair;Good;Constant support  Ambulation/Gait Training:  Distance (ft): 60 Feet (ft) (15ft x4)  Assistive Device: Gait belt;Walker, rolling  Ambulation - Level of Assistance: Contact guard assistance    Gait Abnormalities: Decreased step clearance; Step to gait     Base of Support: Widened  Stance: Right decreased (TTWB RLE)  Speed/Radha: Pace decreased (<100 feet/min)  Step Length: Left shortened;Right shortened    Interventions: Safety awareness training;Verbal cues    Stairs:  Number of Stairs Trained: 4  Stairs - Level of Assistance: Contact guard assistance   Rail Use: Both    Pain Rating:  No pain rating received from pt or complaints during mobility    Activity Tolerance:   Fair    After treatment patient left in no apparent distress:   Sitting in chair and Call bell within reach    COMMUNICATION/COLLABORATION:   The patients plan of care was discussed with: Occupational therapist, Registered nurse, and Case management.      Eloina Melendez PTA   Time Calculation: 29 mins

## 2023-01-23 NOTE — PROGRESS NOTES
CM spoke with Tamara Muse at [facility] - INPATIENT [phone]. Confirmed acceptance for home care services. Pt informed. Rolling walker to be pulled from supply closet per [facility]. No further discharge needs noted. Pt clear for discharge from CM perspective. Per patient  to . Primary nurse, Edgar informed.

## 2023-01-23 NOTE — PROGRESS NOTES
Transition of Care Plan:    RUR:1-%  Disposition: SNF  Accepting facility: [facility] pending. Follow up appointments: PCP as advised  Transportation at Discharge: Medical transport    CM called [facility] for follow up on referral, per admissions, they are reviewing now, CM to follow. Update 12:01pm: Provider and team discussed pt's request to go home with [facility] in multidisciplinary rounds this am.  Per team, [facility] referral to be sent. CM met with patient, FOC provided and signed, Pt's choice is [facility] but open to [place] [id number] and [facility] as well. CM to send referrals. Pt without any other anticipated discharge needs.

## 2023-01-23 NOTE — DISCHARGE SUMMARY
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  female with pertinent past medical history as listed below who presents with complaints of severe right knee/thigh pain. Patient reports no preceding trauma and states that she has been experiencing pains in her right knee since waking Saturday. She reports at baseline she is nonambulatory moving around with a wheelchair and utilizing a pivot chair to transition. She does report sensation of a pop in her right knee today followed with exquisite pain during a transfer, but denies any falls or overt injury to the knee. ROS otherwise negative. Patient denies tobacco, alcohol, or illicit drug use. Of note, she has multiple comorbidities and is not followed by primary care physician reporting that she receives her medications by her psychiatrist and pain management physician. In the ED, patient afebrile and hemodynamically stable (hypertensive 190s/120s) saturating upper 90s on room air. Right leg radiographs demonstrates acute, transverse fracture of distal diaphysis of right femur with moderate apex anterior angulation.   Orthopedics consulted by ED with plans for operative intervention Thursday requesting medicine admit as primary given multiple comorbidities. Labs demonstrate: WBC 13.0, hemoglobin 10.1 (baseline), platelets 907, sodium 143, potassium 4.4, glucose 115, BUN 23, creatinine 1.04, UA reflex to culture (moderate leukocyte Estrace, large blood, 1+ bacteria, yeast). Petty catheter placed in the ED. We were asked to admit for work up and evaluation of the above problems. Brief Hospital Course by Main Problems:   Acute transverse fracture of distal diaphysis of right femur with moderate apex anterior angulation-nontraumatic  Status post TKA BLE  Chronic pain  Patient not currently able to tolerate placement of knee immobilizer-counseled on not moving leg to prevent further injury or displacement. Initially it was recommended that pt undergo CT L leg however pt declined despite counseling. Duplex US was neg for DVT. Pt is now s/p surgical repaired. Her pain was controlled on PCA dilaudid along with her pta pain regimen. She is now off PCA pump and pain is well controlled on new regimen as prescribed. Pt was started on Eliquis for DVT prophylaxis as per ortho team.  Today patient is seen and examined, her vital signs are stable, her lab work is stable and she was cleared by all consultant parties including ortho to be discharged for outpatient follow-up. Will need to fu with PCP, ortho and pain management outpt. PT/OT recommended home with 24/7supervision and assist form family and HHOT/PT. Obesity class III by BMI-49.53  History hyperthyroidism  TSH low, FT4 wnl. 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 UTI  History of hematuria  Ucx without growth.    Abx discontinued     Chronic normocytic anemia  Trend hemoglobin    Discharge Exam:  Patient seen and examined by me on discharge day. Pertinent Findings:  Visit Vitals  /85   Pulse 94   Temp 98.4 °F (36.9 °C)   Resp 18   Ht 5' 1\" (1.549 m)   Wt 118.9 kg (262 lb 2 oz)   SpO2 97%   BMI 49.53 kg/m²     Gen:    Not in distress  Chest: Clear lungs  CVS:   Regular rhythm. No edema  Abd:  Soft, not distended, not tender    Discharge/Recent Laboratory Results:  No results for input(s): NA, K, CL, CO2, BUN, GLU, CA, PHOS, MG in the last 72 hours. No lab exists for component: CREATININE  Recent Labs     01/21/23  0555   HGB 8.9*       Discharge Medications:  Current Discharge Medication List        START taking these medications    Details   apixaban (ELIQUIS) 2.5 mg tablet Take 1 Tablet by mouth two (2) times a day for 30 days. Indications: deep vein thrombosis prevention  Qty: 60 Tablet, Refills: 0  Start date: 1/23/2023, End date: 2/22/2023      HYDROmorphone (DILAUDID) 2 mg tablet Take 3 Tablets by mouth every six (6) hours for 5 days. Max Daily Amount: 24 mg. Indications: For acute post op pain based on pallative care recommendations. Qty: 60 Tablet, Refills: 0  Start date: 1/23/2023, End date: 1/28/2023    Associated Diagnoses: Other fracture of right femur, initial encounter for closed fracture [facility])      ! ! naloxone (Narcan) 4 mg/actuation nasal spray Use 1 spray intranasally, then discard. Repeat with new spray every 2 min as needed for opioid overdose symptoms, alternating nostrils. Qty: 1 Each, Refills: 0  Start date: 1/23/2023      !! naloxone (Narcan) 4 mg/actuation nasal spray Use 1 spray intranasally, then discard. Repeat with new spray every 2 min as needed for opioid overdose symptoms, alternating nostrils. Qty: 1 Each, Refills: 0  Start date: 1/22/2023       !! - Potential duplicate medications found. Please discuss with provider. CONTINUE these medications which have NOT CHANGED    Details   hydrOXYzine pamoate (VISTARIL) 50 mg capsule Take 100 mg by mouth daily as needed.       losartan (COZAAR) 100 mg tablet Take 100 mg by mouth daily. OLANZapine (ZyPREXA) 5 mg tablet Take 5 mg by mouth nightly. prazosin (MINIPRESS) 5 mg capsule Take 5 mg by mouth nightly. predniSONE (DELTASONE) 50 mg tablet Take 50 mg by mouth daily. senna-docusate (PERICOLACE) 8.6-50 mg per tablet Take 1 Tab by mouth two (2) times a day. Indications: constipation  Qty: 30 Tab, Refills: 0      multivitamin (ONE A DAY) tablet Take 1 Tab by mouth nightly. Lisdexamfetamine (VYVANSE) 70 mg cap Take 70 mg by mouth every morning. morphine CR (MS CONTIN) 30 mg CR tablet Take  by mouth every twelve (12) hours. acetaminophen (TYLENOL) 500 mg tablet Take 500 mg by mouth every six (6) hours as needed for Pain. alpha-d-galactosidase (BEANO) 150 unit tab tablet Take 2 Tabs by mouth three (3) times daily as needed for Flatulence. amphetamine sulfate 10 mg tab Take 10 mg by mouth as needed. PT TAKES 2 TABS (20 MG) BID AT 0200 AND 1200       vortioxetine (TRINTELLIX) 10 mg tablet Take 30 mg by mouth every morning. PT TAKES A TOTAL OF 30 mg DAILY      albuterol (PROVENTIL HFA, VENTOLIN HFA, PROAIR HFA) 90 mcg/actuation inhaler Take 1 Puff by inhalation every four (4) hours as needed for Wheezing. simethicone (MYLICON) 80 mg chewable tablet Take 80 mg by mouth every six (6) hours as needed for Flatulence. cetirizine (ZYRTEC) 10 mg tablet Take 1 Tab by mouth daily.            STOP taking these medications       diclofenac EC (VOLTAREN) 75 mg EC tablet Comments:   Reason for Stopping:         fluconazole (DIFLUCAN) 150 mg tablet Comments:   Reason for Stopping:         doxycycline (ADOXA) 100 mg tablet Comments:   Reason for Stopping:                   DISPOSITION:    Home with Family:    Home with HH/PT/OT/RN: x   SNF/LTC:    JANET:    OTHER:          Follow up with:   PCP : Prerna Hernandez MD  Follow-up Information       Follow up With Specialties Details Why Brenton Sherwood MD Internal Medicine Physician Follow up in 1 week(s)  [address]  [phone]      Payal Dill MD Orthopedic Surgery Follow up in 1 week(s)  [address] [facility] [address] Follow up You will hear from them. If you have not heard from them within 48 hours please reach out to them.      Nurys Soares  Follow up in 1 week(s)  [facility] [place] [phone]                Total time in minutes spent coordinating this discharge (includes going over instructions, follow-up, prescriptions, and preparing report for sign off to her PCP) :  35minutes

## 2023-01-24 ENCOUNTER — HOME CARE VISIT (OUTPATIENT)
Dept: SCHEDULING | Facility: HOME HEALTH | Age: 53
End: 2023-01-24
Payer: COMMERCIAL

## 2023-01-24 VITALS
RESPIRATION RATE: 16 BRPM | HEART RATE: 118 BPM | SYSTOLIC BLOOD PRESSURE: 130 MMHG | TEMPERATURE: 97.8 F | DIASTOLIC BLOOD PRESSURE: 80 MMHG | OXYGEN SATURATION: 98 %

## 2023-01-24 PROCEDURE — G0151 HHCP-SERV OF PT,EA 15 MIN: HCPCS

## 2023-01-24 PROCEDURE — 400018 HH-NO PAY CLAIM PROCEDURE

## 2023-01-24 NOTE — Clinical Note
Dr. Tong Otero nurse states that patient should not take any supplements until she has her post op follow up 4 weeks after surgery. That means stopping the Vitamin D3, biotin, and magnesium. Also advised to NOT take doxycycline because of it masking possible infection.

## 2023-01-24 NOTE — ADT AUTH CERT NOTES
Utilization Reviews  CLINICAL FOR REVIEW 1/19/23-1/22/23         Femur Fracture, Shaft, Internal Fixation - Care Day 4 (1/22/2023) by Lambert Ortiz       Review Entered Review Status   1/23/2023 1150 Completed      Criteria Review      Care Day: 4 Care Date: 1/22/2023 Level of Care: Inpatient Floor    Guideline Day 3    Level Of Care    ( ) Floor to discharge    1/23/2023 11:50:16 EST by Titi Faust    Clinical Status    (X) * No evidence of postoperative or surgical site infection    (X) * Pain absent or managed    1/23/2023 11:50:16 EST by Lambert Ortiz      PO - Tylenol 650mg PO Q6 PRN X1,   Tylenol 650mg PO Q6 X3, Dilaudid 6mg PO Q4hrs X5,   Morphine ER 30mg PO Q12    ( ) * Discharge plans and education understood    Activity    (X) * Ambulatory or acceptable for next level of care    1/23/2023 11:50:16 EST by Tejal Robertson in chair cont; OOB w assistance PRN    Routes    (X) * Oral hydration    (X) * Oral medications or regimen acceptable for next level of care    1/23/2023 11:50:16 EST by Lambert Ortiz      Buspar 15mg PO BID,   Calcium-Vit D 1000 units PO daily,   Vitamin D3 1000 units PO Q12,   Vibra-tabs 100mg PO Q12,   Zyprexa 5mg PO HS,   Protonix 40mg PO daily,   Minipres 5mg PO HS    (X) * Oral diet or acceptable for next level of care    1/23/2023 11:50:16 EST by Lambert Ortiz      Adult diet regular    Interventions    (X) Possible DVT prophylaxis    1/23/2023 11:50:16 EST by Lambert Ortiz      DVT prophylaxis- SCD w/ Eliquis 2.5mg PO BID    (X) Physical therapy    1/23/2023 11:50:16 EST by Lambert Ortiz      Continue PT/OT TTWB    * Milestone   Additional Notes    01/22 - IP ORTHO       Pertinent Updates: 3 Days Post-Op      Vitals:  98.6 °F (37 °C) 116, 112 126/85 18 98 % RA       Physical Exam: No significant change      MD Consults/Assessments & Plans:   Hospitalist Progress Note   Assessment / Plan:   - PCA dilaudid was discontinued ORTHO - Progress Note   Plan:   - Pain medications written for SNF based on palliative recommendations            Femur Fracture, Shaft, Internal Fixation - Care Day 3 (1/21/2023) by Jackee Gosselin       Review Entered Review Status   1/23/2023 1140 Completed      Criteria Review      Care Day: 3 Care Date: 1/21/2023 Level of Care: Inpatient Floor    Guideline Day 3    Level Of Care    ( ) Floor to discharge    1/23/2023 11:40:17 EST by Vanda Pozo    Clinical Status    (X) * No evidence of postoperative or surgical site infection    1/23/2023 11:40:17 EST by Jackee Gosselin      Dressing/Wound: ACE and wound vac intact/in place RLE; No signs of drainage in cannister. Clean, dry and intact.  No significant erythema or swelling.    (X) * Pain absent or managed    1/23/2023 11:40:17 EST by Jackee Gosselin      PO - Tylenol 650mg PO Q6 PRN X1,   Tylenol 650mg PO Q6 X2, Morphine ER 30mg PO Q12, Dilaudid 6mg PO Q4hrs X4    ( ) * Discharge plans and education understood    1/23/2023 11:40:17 EST by Nils Ricketts. Bernard Barrera 62; IPR at discharge    Activity    (X) * Ambulatory or acceptable for next level of care    1/23/2023 11:40:17 EST by Alejandra Tanner in chair cont; OOB w assistance PRN    Routes    (X) * Oral hydration    (X) * Oral medications or regimen acceptable for next level of care    1/23/2023 11:40:17 EST by Jackee Gosselin      Buspar 15mg PO BID,   Calcium-Vit D 1000 units PO daily,   Vibra-tabs 100mg PO Q12,   Vitamin D3 1000 units PO Q12,   Zyprexa 5mg PO HS,   Protonix 40mg PO daily,   Minipres 5mg PO HS    (X) * Oral diet or acceptable for next level of care    1/23/2023 11:40:17 EST by Jackee Gosselin      Adult diet regular    Interventions    (X) Possible DVT prophylaxis    1/23/2023 11:40:17 EST by Jackee Gosselin      Maintain SCD's; Eliquis 2.5mg PO BID    * Milestone   Additional Notes    01/21 - IP ORTHO       Pertinent Updates: 2 Days Post-Op      Vitals: 98.6 °F (37 °C) 109 101/60 16 96 % RA      Abnl/Pertinent Labs/Radiology/Diagnostic Studies:   HGB: 8.9 (L)      Physical Exam:   Musculoskeletal: Sri's sign negative in bilateral lower extremities. Calves soft, supple, non-tender upon palpation or with passive stretch. No sign of DVT      MD Consults/Assessments & Plans:   Hospitalist Progress Note   Assessment / Plan:   - Duplex US neg for DVT   - S/p surgical repaired with ortho,  pt has wound vac in place, leave in vs remove prior to discharge      ORTHO - Progress Note   Assessment/Plan:   -  Continue PT/OT TTWB   -  DVT prophylaxis- SCD w/ Eliquis         Femur Fracture, Shaft, Internal Fixation - Care Day 2 (1/20/2023) by Peggy Merrill       Review Entered Review Status   1/23/2023 1129 Completed      Criteria Review      Care Day: 2 Care Date: 1/20/2023 Level of Care: Inpatient Floor    Guideline Day 2    Level Of Care    (X) Floor    1/23/2023 11:17:18 EST by Sebastián Jaramillo    Clinical Status    (X) * Procedure completed    1/23/2023 11:17:18 EST by Peggy Merrill      Patient s/p R femur IM nailing POD 1. (X) * Tolerates early weight-bearing    1/23/2023 11:17:18 EST by Peggy Merrill      PT noted she was able to maintain her TTWB precautions on her own; Per OT - Patient tolerated standing for approx 30 secs while maintaining TTWB on RLE. Activity    (X) * Partial weight-bearing or full weight-bearing    1/23/2023 11:17:18 EST by Peggy Merrill      Weight bearing: Toe down PRN; She required minimum assist x 1, moderate assist x 1 for sit<>stand transfer, using BUE to pull up to stand on walker.     Routes    (X) * Oral hydration    1/23/2023 11:17:18 EST by Peggy Merrill      on PO w IVF - LR 25ml/hr IV cont    (X) * Oral medications    1/23/2023 11:29:57 EST by Peggy Merrill      Vitamin D3 1000 units PO Q12    1/23/2023 11:17:18 EST by Peggy Merrill      Acyclovir 400mg PO Q4hrs PRN X1,   Eliquis 2.5mg PO BID,   Buspar 15mg PO BID,   Calcium-Vit D 1000 units PO daily,   Hydrodiuril 50mg PO daily,   Cozaar 100mg PO daily,   Zyprexa 5mg PO HS,   Protonix 40mg PO daily,   Minipres 5mg PO HS    (X) * Advance diet    1/23/2023 11:17:18 EST by Brain Lynne      Adult diet regular    Interventions    (X) Gait training    (X) Possible DVT prophylaxis    1/23/2023 11:17:18 EST by Jairon Mendieta SCD's    (X) Physical therapy    1/23/2023 11:17:18 EST by Brain Lynne      She was very anxious to move and tearful during the evaluation today due to her personal history as well LLE being her bad leg. She performed supine-sit transfer with minimum assistance for RLE management and HOB significantly elevated. Medications    (X) * PCA absent    1/23/2023 11:17:18 EST by Brain Lynne      Dilaudid PCA stopped; Remains on PO analgesia - Tylenol 650mg PO Q6 X4, Dilaudid 6mg PO Q4hrs X3, Dilaudid 4mg PO Q4hrs X3, Morphine ER 30mg PO Q12    ( ) Antibiotics absent    1/23/2023 11:17:18 EST by Brain Lynne      Ancef 2g IV Q8, Vibra-tabs 100mg PO Q12    * Milestone   Additional Notes    01/20 - IP ORTHO      Pertinent Updates:   *CM Note: CM met with patient regarding choices for SNF vs Acute Rehab, pt reports choosing [facility] as first and only choice. CM to send referral   *Per OT - She is functioning below her baseline for ADLs and functional mobility, now completing ADLs with independence to total assist and functional mobility with contact guard to min/mod assist x2 using rolling walker. *6/10 aching right hip pain, post-op      Vitals:  99 (37.2) 121, 119 178/115, 160/85 16 100% 2L/min NC      Abnl/Pertinent Labs/Radiology/Diagnostic Studies:   WBC: 12.8 (H)   HGB: 10.7 (L)   MCH: 25.2 (L)   RDW: 16.3 (H)   NEUTROPHILS: 85 (H)   LYMPHOCYTES: 10 (L)   MONOCYTES: 4 (L)   IMMATURE GRANULOCYTES: 1 (H)   ABS. NEUTROPHILS: 10.9 (H)   ABS. IMM.  GRANS.: 0.1 (H) Glucose: 127 (H)   Creatinine: 1.04 (H)      Physical Exam:   Constitutional: irritable this morning, very tearful   EXT: Dressing clean and dry   Musculoskeletal: Sri's sign negative in bilateral lower extremities. Calves soft, supple, non-tender upon palpation or with passive stretch. MD Consults/Assessments & Plans:   Palliative Medicine Consult   Plan:   - increase dilaudid PO to 6mg every 6 hours scheduled. Will stop PCA this morning. Would favor non-pharm interventions such as ice pack rather than increasing her Dilaudid over the weekend if she is not discharged. - Do not adjust her Morphine ER      Hospitalist Progress Note   Assessment / Plan:   S/p surgical repaired with ortho   Appreciate palliative following for pain management. Plan to dc PCA today. SNF at discharge. CM's help is appreciated       Orthopedic Surgery   Assessment & Plan:   sitting up in bed, OOB with pt      PT/OT/SLP/CM Assessments or Notes:   OCCUPATIONAL THERAPY EVALUATION   ASSESSMENT: the patient presents with decreased independence in self-care and functional mobility secondary to general weakness, impaired balance, TTWB RLE, anxiety, increased pain, emotional lability, and decreased activity tolerance. Patient initially reported increased anxiety about therapy but agreeable for coming EOB. Patient required total assist for donning socks this session secondary to increased pain and anxiety. She completed supine > sit with additional time and min assist to manage RLE. Patient demonstrated intact sitting balance while EOB and completed simple grooming tasks with set-up/stand-by assist. With encouragement, patient agreed to attempt sit > stand and required min/mod assist x2 to clear hips from EOB. Once back on EOB, patient was able to laterally scoot towards [facility] with contact guard assist and cues for safety awareness. Patient would continue to benefit from skilled OT services during acute hospital stay. Anticipate patient would benefit from intensive rehab prior to returning home, pending progress. Current Level of Function Impacting Discharge (ADLs/self-care): independent to total assist for ADLs, contact guard to min/mod assist x2 for functional mobility        Functional Outcome Measure: 20/100 on the Barthel Index outcome measure = very dependent in ADLs. Other factors to consider for discharge: fall risk, TTWB LLE, anxious       PHYSICAL THERAPY EVALUATION   ASSESSMENT: the patient presents with decreased activity tolerance, anxiety/pain with movement, impaired standing balance, and increased need for transfers s/p POD#1 IM nail RLE. Patient's young son passed away in 2019 due to meningitis and she admits she has been depressed/let herself go downhill since then. She admits to gaining a considerable amount of weight over the past 4 years. She states she walked sometimes with her rollator and sometimes used her son's recliner which was too small for her so she takes the armrest off. She recently purchased 2 pivot discs to help with her ability to transfer between surfaces. She has had 2 total knee surgeries on her LLE and reports that is her bad knee. She was able to scoot to the EOB and laterally to [facility] with contact guard assist. She reported only slight pain in RLE, more anxiety and fear than pain. Recommend acute rehab on discharge due to patient's young age and prior level of function, she would benefit from 3 hours of intensive therapy. Functional Outcome Measure: 3 on the Tinetti outcome measure which is indicative of high fall risk. PLAN :   Frequency/Duration: Patient will be followed by physical therapy:  5 times a week to address goals.        Recommendation for discharge:    Therapy 3 hours per day 5-7 days per week        Femur Fracture, Shaft, Internal Fixation - Care Day 1 (1/19/2023) by Thu Carlin Entered Review Status   1/23/2023 1053 Completed Criteria Review      Care Day: 3 Care Date: 1/19/2023 Level of Care: Inpatient Floor    Guideline Day 1    Level Of Care    (X) OR to floor    1/23/2023 10:53:55 EST by Natty Villa       ORTHOPEDICS    Clinical Status    (X) * Clinical Indications met    1/23/2023 10:53:55 EST by Natty Villa      Patient S/P Retrograde intramedullary nail on the right distal femur, with a post-op DX of distal femur fracture, periprosthetic. Activity    (X) Possibly up to chair postoperatively    1/23/2023 10:53:55 EST by Yecenia Whittaker in chair cont; OOB w assistance PRN    Routes    (X) IV fluids    1/23/2023 10:53:55 EST by Natty Villa      LR 25ml/hr IV cont    (X) IV medications    1/23/2023 10:53:55 EST by Natty Villa      Fentanyl Citrate 25mcg IV X2, Zofran 4mg IV Q6 PRN X1    (X) Diet as tolerated postoperatively    1/23/2023 10:53:55 EST by Natty Villa      Adult diet regular    Medications    (X) Perioperative antibiotics    1/23/2023 10:53:55 EST by Natty Villa      Ancef 2g IV Q8,   Rocephin 1g IV Q24; PO - Vibra-tabs 100mg PO Q12    (X) Multimodal analgesia, possible PCA    1/23/2023 10:53:55 EST by Natty Villa      Pain control with Dilaudid PCA ; PO analgesics - Morphine ER 30mg PO Q12, Tylenol 650mg PO Q6, Dilaudid 4mg PO Q4hrs X5    (X) DVT prophylaxis    1/23/2023 10:53:55 EST by SHAWNA Ocampo 99   Additional Notes    01/19 -  ORTHOPEDICS      Pertinent Updates:   *S/P Retrograde intramedullary nail on the right distal femur   *CM Note: CM will continue to follow for SNF options vs patient going home with home heatlh. Patient will need a [facility] [employer] auth for SNF placement.       Vitals: 98.4 (36.9) 99 174/104, 168/81, 154/100, 21 99% CPAP Nasal 10L/min      Abnl/Pertinent Labs/Radiology/Diagnostic Studies:   Chloride: 112 (H)   Creatinine: 1.03 (H)      Physical Exam:   RLE: shortened, rotated, did not range due to pain, skin intact, warm well perfused, SILT in al distributions L3-S1, foot warm, DP palpable, no calf tenderness   LLE: no pain with ROM, no swelling about knee or ankle, skin intact, warm well perfused, SILT in al distributions L3-S1, palpable pedal pulses, no calf tenderness      MD Consults/Assessments & Plans:   Palliative Medicine Consult   Plan:   - PAIN: Met with pt, pt reports pain is tolerable as long as she doesn't move. She voiced understanding that she will have pain, opioids will not make her pain 0/10, but hopefully will help make it tolerable. - For acute pain due to fracture: increase dilaudid PCA: 0.4mg bolus dose with 10 min lockout, no basal rate, 16mg four hour lockout. I anticipate this will need to be adjusted up following surgery. Hospitalist Progress Note   Assessment / Plan:   - Pt refusing duplex US and CT leg and ortho is aware. Plan for OR intervention today. Currently on dilaudid PCA along with her home dose morphine and dilaudid. Appreciate palliative following to help with pain management   - Repeat TSH outpt in 4 weeks   - Ucx without growth. Will stop IV rocephin given urinary symptoms. Orthopedic Surgery: FRACTURE CONSULT NOTE   Impression: Femur fracture, right    Plan: IM nail right femur- retrograde      Orthopedic Surgery: OPERATIVE REPORT   IMPLANTS: Cortney retrograde intramedullary nail measuring 13 x 360 with four distal screws and two proximal screws      Medications:   Buspar 15mg PO BID   Zyprexa 5mg PO HS   Protonix 40mg PO daily   Minipres 5mg PO HS      Orders:    Ice to specified area cont   Incentive spirometry every 2 hrs
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
## 2023-01-26 ENCOUNTER — HOME CARE VISIT (OUTPATIENT)
Dept: SCHEDULING | Facility: HOME HEALTH | Age: 53
End: 2023-01-26
Payer: COMMERCIAL

## 2023-01-26 VITALS
TEMPERATURE: 98.6 F | RESPIRATION RATE: 17 BRPM | DIASTOLIC BLOOD PRESSURE: 84 MMHG | OXYGEN SATURATION: 98 % | HEART RATE: 118 BPM | SYSTOLIC BLOOD PRESSURE: 136 MMHG

## 2023-01-26 PROCEDURE — G0152 HHCP-SERV OF OT,EA 15 MIN: HCPCS

## 2023-01-26 NOTE — Clinical Note
Mrs. Magdy Carbajal resting heart rate was 118 during [facility] OT visit on this date. Patient reports this is her baseline. Please provide heart rate parameters for resting and with activity.

## 2023-01-27 ENCOUNTER — HOME CARE VISIT (OUTPATIENT)
Dept: SCHEDULING | Facility: HOME HEALTH | Age: 53
End: 2023-01-27
Payer: COMMERCIAL

## 2023-01-27 PROCEDURE — G0151 HHCP-SERV OF PT,EA 15 MIN: HCPCS

## 2023-01-29 NOTE — HOME HEALTH
Skilled reason for admission/summary of clinical condition:  Mrs. Hazel Su admitted to home care for skilled [facility] OT needed for assessment and intervention of home safety and implementation of home rehab plan along with ability to perform ADLs and instruction on adaptive techniques. Diagnosis: s/p fracture of the right femur with surgical retrograde, intramedullary, nailing, 1/19/2023  Subjective: \"I really want to be able to do more for myself. \"   Caregiver: . Caregiver assists with: all aspects of care at present as he works from home. Caregiver unable to assist with: NA Caregiver is available: early mornings/evenings/weekends and throughout the day when working from home. Caregiver was present at this visit and didn't participate with clinician. Medications reconciled and all medications are available in the home this visit. Medications are effective at this time per patient report. Home health supplies by type and quantity ordered/delivered this visit include: NA   Patient/caregiver instructed on plan of care and are agreeable to plan of care at this time. Patient at risk for falls: Yes   Discharge planning discussed with patient and caregiver. Discharge planning as follows: discharge when goals are met or max potential achieved. Patient/caregiver did verbalize agreement with discharge planning. Clinical Assessment (What this means for the patient overall and need for ongoing skilled care): Patient lives with her  and 12year old son who has autism in a two level home currently living on the first level reporting she hasn't been to second level of the since the passing of her older son several years on that level .  She is currently using a wheelchair and RW for mobility requiring max A for sponge bathing tasks as she was performing this level of bathing prior, min A for UB dressing tasks when retrieving/transporting needed clothing items, max A for LB dressing, min A for toileting tasks and min A level for toilet transfers requiring verbal cueing throughout for TTWB on R LE. The Barthel index assessment administered and score at 40/100 indicating partially dependent with self care tasks. MACH-10 assessment score was 6/10 indicating patient is at risk for falls as instruction was provided during this evaluation. Patient would benefit from skilled [facility] OT to increase independence and reduce risk of falls with with ADLs, IADLs and functional transfers along with strength, endurance, balance deficits, DME/AE training and fall prevention training to return to highest level of independent functioning. Patient to benefit from skilled OT services for 1d1 and 2w4 to address the above deficits with patient verbalizing understanding and in agreement with POC. Written Teaching Material Utilized: NA   Specific plan for next visit: Initiate HEP   Interdisciplinary communication with: Jamison Zuniga PT for [address] collaboration     PCP: Olga Cruz MD  Next scheduled doctor appointment: TBD   Patient/Caregiver instructed to keep follow up appointment because lack of follow through with physician appointments could result in discontinuation of home care services for non-compliance. Patient/Caregiver verbalize knowledge of above through teach back with 100 percent accuracy. Emergency Preparedness: Patient/Caregiver instructed in the following:   Have one gallon of water per person for at least 3 days on hand. Have non-perishable food for at least 3 days that do not need to be cooked. Have flashlights and batteries. Charge your cell phones and any back up lithium batteries for your cell phones. Have 3+ days of back up oxygen in your home if applicable. Have a phone in your home that is hard wired and does not require power. Have medication for a week in your home. Make sure you have a caregiver in the home to provide care in case your home health nurse cannot get to your house.    Make sure you have all of your paperwork (i.e. Identification, insurance cards, DME phone number, physician and pharmacy phone number, agency phone number, and your medications) in one place for easy access and in a zip lock bag to protect them. If you have to relocate due to an emergency evacuation, where would you go? Shelter in place   Call agency if you relocate so we can contact you. Patient/Caregiver verbalize knowledge of above through teach back with 100 percent accuracy.

## 2023-01-30 VITALS
TEMPERATURE: 98.1 F | RESPIRATION RATE: 16 BRPM | HEART RATE: 99 BPM | OXYGEN SATURATION: 98 % | DIASTOLIC BLOOD PRESSURE: 70 MMHG | SYSTOLIC BLOOD PRESSURE: 130 MMHG

## 2023-01-30 NOTE — HOME HEALTH
Subjective: I never fell. My leg just broke. Is that bad??  Falls since last visit NO(if yes complete the Fall Tracking Form and include bsrifallreport):  Caregiver involvement changes:  assist patient daily. currently working from home and did not particiapte in [place] [id number] supplies by type and quantity ordered/delivered this visit include: na    Clinician asked if patient has had any physician contact since last home care visit and patient states: NO  Clinician asked if patient has any new or changed medications and patient states:  NO   If Yes, were medications reconciled? YES   Was the certifying physician notified of changes in medications? N/A     Clinical assessment (what this visit means for the patient overall and need for ongoing skilled care) and progress or lack of progress towards SPECIFIC goals: educ provided for ms contin and eliquis. patient demonstrates good understanding during verbal teach back. education provided for importance of pressure relief and pain mgt. patient demonstrates good understanding during verbal teach back. intiated hep in supine. written instructions provided. encouraged pt to perform 2 x daily. instructed pt in safety with transfers. pt requires cga but is unable to safely maintain TTWB right le. discussed patient should use wheelchair for mobility due to unable to maintain weight bearing restrictions  will cont to work towards improving patient mobility    Written Teaching Material Utilized: written instructions provided for hep  Interdisciplinary communication with: Keyur David PT for the purpose of [employer] collaboration and POC collaboration    Discharge planning as follows:  Will discharge when the patient has reached their maximum functional potential and maximum safety in their home and When goals are met    Specific plan for next visit: Re-instruct patient/caregiver on hep and upgrade if able, transfer training, fall prevention and pain mgt

## 2023-01-31 ENCOUNTER — HOME CARE VISIT (OUTPATIENT)
Dept: SCHEDULING | Facility: HOME HEALTH | Age: 53
End: 2023-01-31
Payer: COMMERCIAL

## 2023-01-31 PROCEDURE — G0151 HHCP-SERV OF PT,EA 15 MIN: HCPCS

## 2023-02-01 ENCOUNTER — HOME CARE VISIT (OUTPATIENT)
Dept: SCHEDULING | Facility: HOME HEALTH | Age: 53
End: 2023-02-01
Payer: COMMERCIAL

## 2023-02-01 VITALS
RESPIRATION RATE: 18 BRPM | OXYGEN SATURATION: 98 % | TEMPERATURE: 97.6 F | HEART RATE: 108 BPM | DIASTOLIC BLOOD PRESSURE: 80 MMHG | SYSTOLIC BLOOD PRESSURE: 130 MMHG

## 2023-02-01 PROCEDURE — G0152 HHCP-SERV OF OT,EA 15 MIN: HCPCS

## 2023-02-01 NOTE — Clinical Note
Mrs. Griselda Branch resting heart rate was 115 during [facility] OT visit on this date. Patient reports this is her baseline. Please provide heart rate parameters for resting and with activity.

## 2023-02-01 NOTE — HOME HEALTH
Subjective: I just cant lift my leg. Im not strong enough and my left leg hurts reallly bad worse than my right  Falls since last visit NO(if yes complete the Fall Tracking Form and include bsrifallreport):    Caregiver involvement changes:  assist patient daily. currently working from home and did not particiapte in [place] [address] supplies by type and quantity ordered/delivered this visit include: na  Clinician asked if patient has had any physician contact since last home care visit and patient states: NO  Clinician asked if patient has any new or changed medications and patient states:  NO   If Yes, were medications reconciled? YES   Was the certifying physician notified of changes in medications? N/A     Clinical assessment (what this visit means for the patient overall and need for ongoing skilled care) and progress or lack of progress towards SPECIFIC goals: educ provided for ms contin and eliquis. patient demonstrates good understanding during verbal teach back. education provided for importance of pressure relief, fall prevention and pain mgt. patient demonstrates good understanding during verbal teach back. reviewed  hep  in supine. pt tolerated upgraded hep well. reviewed written instructions provided. encouraged pt to perform 2 x daily. instructed pt in safety with transfers. pt requires cga but is unable to safely maintain TTWB right le. discussed patient should use wheelchair for mobility due to unable to maintain weight bearing restrictions  spoke with Annabelle Yung at Dr Courtney Yancey office re concern over elevated hr, pt unable to manage TTWB due to ue and left le weakness.  anson urged patient to schedule 4 week follow up appointment    will cont to work towards improving patient mobility         Written Teaching Material Utilized: written instructions provided for hep    Interdisciplinary communication with: Shoshana Spurling PT for the purpose of [employer] collaboration and POC collaboration         Discharge planning as follows:  Will discharge when the patient has reached their maximum functional potential and maximum safety in their home and When goals are met         Specific plan for next visit: Re-instruct patient/caregiver on hep and upgrade if able, transfer training, fall prevention and pain mgt

## 2023-02-02 ENCOUNTER — HOME CARE VISIT (OUTPATIENT)
Dept: SCHEDULING | Facility: HOME HEALTH | Age: 53
End: 2023-02-02
Payer: COMMERCIAL

## 2023-02-02 VITALS
TEMPERATURE: 97.4 F | DIASTOLIC BLOOD PRESSURE: 74 MMHG | OXYGEN SATURATION: 97 % | HEART RATE: 116 BPM | SYSTOLIC BLOOD PRESSURE: 124 MMHG

## 2023-02-02 VITALS
DIASTOLIC BLOOD PRESSURE: 78 MMHG | HEART RATE: 115 BPM | SYSTOLIC BLOOD PRESSURE: 131 MMHG | OXYGEN SATURATION: 97 % | TEMPERATURE: 97.7 F

## 2023-02-02 PROCEDURE — G0151 HHCP-SERV OF PT,EA 15 MIN: HCPCS

## 2023-02-02 PROCEDURE — G0152 HHCP-SERV OF OT,EA 15 MIN: HCPCS

## 2023-02-02 NOTE — HOME HEALTH
Subjective: \"I'm started to get the pain controlled. \"   Falls since last visit: (if yes complete the Fall Tracking Form and include bsrifallreport): No   Caregiver involvement changes: none   Home health supplies by type and quantity ordered/delivered this visit include: None     Clinician asked if patient has had any physician contact since last home care visit and patient states: No   Clinician asked if patient has any new or changed medications and patient states:  NO   If Yes, were medications reconciled? N/A   Was the certifying physician notified of changes in medications? N/A     Clinical assessment (what this visit means for the patient overall and need for ongoing skilled care) and progress or lack of progress towards SPECIFIC goals:  Patient continues to demonstrate an inability to complete toilet and toileting tasks without therapist providing verbal cueing for fall prevention and maintaining TTWB on R LE requiring additional instruction from skilled [facility] OT to achieve higher level of functional independence and reduce falls. Patient making steady progress towards mod I level goals set for toileting and toilet transfers this visit. Written Teaching Material Utilized: None   Interdisciplinary communication with: PCP regarding elevated reating heart rate   Discharge planning as follows: Discharge from [facility] OT when goals are met or maximum level of function is achieved.      Specific plan for next visit:  UE HEP for improving strength needed for increasing independence and reducing risk of falls with ADLs and functional transfers

## 2023-02-02 NOTE — Clinical Note
Mrs. Glenny Wolfe resting heart rate was 116 during [facility] OT visit on this date. Patient reports this is her baseline. Please provide heart rate parameters for resting and with activity.

## 2023-02-02 NOTE — HOME HEALTH
Subjective: \"I'm having more pain in my left leg than my right since that one is taking all my weight. \"   Falls since last visit: (if yes complete the Fall Tracking Form and include bsrifallreport): No   Caregiver involvement changes: none   Home health supplies by type and quantity ordered/delivered this visit include: Theraband     Clinician asked if patient has had any physician contact since last home care visit and patient states: No   Clinician asked if patient has any new or changed medications and patient states:  NO   If Yes, were medications reconciled? N/A   Was the certifying physician notified of changes in medications? N/A     Clinical assessment (what this visit means for the patient overall and need for ongoing skilled care) and progress or lack of progress towards SPECIFIC goals: Patient continues to demonstrate decreased strength and endurance needed for increased independence and reducing risk of falls with ADLs and functional transfers as she completed UE HEP this visit requiring verbal and visual cueing from therapist for pacing and form. Patient making steady progress to completing UE HEP with independence. Written Teaching Material Utilized: UE HEP handout consisting of resistive exercises using theraband   Interdisciplinary communication with: PCP regarding elevated resting heart rate   Discharge planning as follows: Discharge from [facility] OT when goals are met or maximum level of function is achieved.      Specific plan for next visit:  Focus on increasing independence and reducing risk of falls with LB dressing and bathing tasks using AE

## 2023-02-03 VITALS
SYSTOLIC BLOOD PRESSURE: 128 MMHG | HEART RATE: 101 BPM | DIASTOLIC BLOOD PRESSURE: 72 MMHG | RESPIRATION RATE: 16 BRPM | TEMPERATURE: 98.1 F | OXYGEN SATURATION: 98 %

## 2023-02-03 NOTE — HOME HEALTH
Subjective: Do you think its safe to take out my staples  Falls since last visit NO(if yes complete the Fall Tracking Form and include bsrifallreport):  Caregiver involvement changes:  assist patient daily. currently working from home and did not particiapte in [employer] supplies by type and quantity ordered/delivered this visit include: na  Clinician asked if patient has had any physician contact since last home care visit and patient states: NO  Clinician asked if patient has any new or changed medications and patient states:  NO   If Yes, were medications reconciled? YES   Was the certifying physician notified of changes in medications? N/A   Clinical assessment (what this visit means for the patient overall and need for ongoing skilled care) and progress or lack of progress towards SPECIFIC goals: discussed MD ordered staple removal for today. incision healing well. staples removed. no drainage noted. Patient tolerated well.  reviewed  hep in supine. pt cont to require vc and tc to improve tech. pt tolerated upgraded hep well.  encouraged pt to perform 2 x daily. instructed pt in safety with transfers. pt requires cga but is unable to safely maintain TTWB right le. discussed patient should use wheelchair for mobility due to unable to maintain weight bearing restrictions  will cont to work towards improving patient mobility    Written Teaching Material Utilized: written instructions provided for hep  Interdisciplinary communication with:Katy THOMAS for the purpose of OASIS collaboration and POC collaboration  Discharge planning as follows:  Will discharge when the patient has reached their maximum functional potential and maximum safety in their home and When goals are met                   Specific plan for next visit: Re-instruct

## 2023-02-07 ENCOUNTER — HOME CARE VISIT (OUTPATIENT)
Dept: SCHEDULING | Facility: HOME HEALTH | Age: 53
End: 2023-02-07
Payer: COMMERCIAL

## 2023-02-07 VITALS
SYSTOLIC BLOOD PRESSURE: 129 MMHG | OXYGEN SATURATION: 97 % | TEMPERATURE: 97.9 F | DIASTOLIC BLOOD PRESSURE: 81 MMHG | HEART RATE: 60 BPM

## 2023-02-07 PROCEDURE — G0151 HHCP-SERV OF PT,EA 15 MIN: HCPCS

## 2023-02-07 PROCEDURE — G0152 HHCP-SERV OF OT,EA 15 MIN: HCPCS

## 2023-02-08 ENCOUNTER — HOME CARE VISIT (OUTPATIENT)
Dept: HOME HEALTH SERVICES | Facility: HOME HEALTH | Age: 53
End: 2023-02-08
Payer: COMMERCIAL

## 2023-02-08 VITALS
RESPIRATION RATE: 18 BRPM | SYSTOLIC BLOOD PRESSURE: 130 MMHG | OXYGEN SATURATION: 98 % | TEMPERATURE: 98 F | DIASTOLIC BLOOD PRESSURE: 80 MMHG | HEART RATE: 78 BPM

## 2023-02-08 NOTE — HOME HEALTH
Subjective: \"I did my arm exercises this morning. \"   Falls since last visit: (if yes complete the Fall Tracking Form and include bsrifallreport): No   Caregiver involvement changes: none   Home health supplies by type and quantity ordered/delivered this visit include: None     Clinician asked if patient has had any physician contact since last home care visit and patient states: No   Clinician asked if patient has any new or changed medications and patient states: No   If Yes, were medications reconciled? NA   Was the certifying physician notified of changes in medications? NA     Clinical assessment (what this visit means for the patient overall and need for ongoing skilled care) and progress or lack of progress towards SPECIFIC goals:  Patient demonstrated the ability to complete dressing tasks with therapist providing verbal cueing for fall prevention and maintaining TTWB precautions on R LE implementing techniques at a mod I level. She remains at continued risk for falls with bathing tasks requiring additional instruction from skilled [facility] OT to achieve higher level of functional independence and reduce falls. Patient met mod I level goals set for dressing tasks this visit. Written Teaching Material Utilized: None   Interdisciplinary communication with: FROYLAN   Discharge planning as follows: Discharge from [facility] OT when goals are met or maximum level of function is achieved.      Specific plan for next visit:  Increasing independence and reducing risk of falls with bathing tasks

## 2023-02-08 NOTE — HOME HEALTH
Subjective: Do you think my incision looks ok? ?  Falls since last visit NO(if yes complete the Fall Tracking Form and include bsrifallreport):  Caregiver involvement changes:  assist patient daily. currently working from home and did not particiapte in [facility] supplies by type and quantity ordered/delivered this visit include: na  Clinician asked if patient has had any physician contact since last home care visit and patient states: NO  Clinician asked if patient has any new or changed medications and patient states:  NO   If Yes, were medications reconciled? YES   Was the certifying physician notified of changes in medications? N/A   Clinical assessment (what this visit means for the patient overall and need for ongoing skilled care) and progress or lack of progress towards SPECIFIC goals: incision healing well. edges well approximated. no issues noted. Patient instructed in  hep in supine and seated. .pt cont to require vc and tc to improve tech. encouraged pt to perform 2 x daily. patient unable to perform with left le due to increased pain. instructed pt in safety with transfers. pt requires cga but is unable to safely maintain TTWB right le. discussed patient should use wheelchair for mobility due to unable to maintain weight bearing restrictions  Patient has follow up with Dr Cristy Izaguirre scheduled for 2/17/23    will cont to work towards improving patient mobility    Written Teaching Material Utilized: written instructions provided for hep  Interdisciplinary communication with:Katy THOMAS for the purpose of OASIS collaboration and POC collaboration  Discharge planning as follows:  Will discharge when the patient has reached their maximum functional potential and maximum safety in their home and When goals are met  Specific plan for next visit: Re-instruct hep and upgrade if able, transfer training, fall prevention

## 2023-02-09 ENCOUNTER — HOME CARE VISIT (OUTPATIENT)
Dept: SCHEDULING | Facility: HOME HEALTH | Age: 53
End: 2023-02-09
Payer: COMMERCIAL

## 2023-02-09 PROCEDURE — G0152 HHCP-SERV OF OT,EA 15 MIN: HCPCS

## 2023-02-10 VITALS
HEART RATE: 105 BPM | DIASTOLIC BLOOD PRESSURE: 70 MMHG | OXYGEN SATURATION: 97 % | TEMPERATURE: 97.6 F | SYSTOLIC BLOOD PRESSURE: 105 MMHG

## 2023-02-11 NOTE — HOME HEALTH
Subjective: \"I'm having more pain in my left leg than my right leg. \"   Falls since last visit: (if yes complete the Fall Tracking Form and include bsrifallreport): No   Caregiver involvement changes: none   Home health supplies by type and quantity ordered/delivered this visit include: None     Clinician asked if patient has had any physician contact since last home care visit and patient states: No   Clinician asked if patient has any new or changed medications and patient states:  NO   If Yes, were medications reconciled? N/A   Was the certifying physician notified of changes in medications? N/A     Clinical assessment (what this visit means for the patient overall and need for ongoing skilled care) and progress or lack of progress towards SPECIFIC goals:  Patient continues to demonstrate decreased strength and endurance needed for increased independence and reducing risk of falls with ADLs and functional transfers as she completed UE HEP this visit requiring verbal and visual cueing from therapist for pacing and form. Patient making steady progress to completing UE HEP with independence. Written Teaching Material Utilized: None   Interdisciplinary communication with: FROYLAN   Discharge planning as follows: Discharge from [facility] OT when goals are met or maximum level of function is achieved.      Specific plan for next visit:  Increasing independence and reducing risk of falls with sponge bathing tasks at sink

## 2023-02-13 ENCOUNTER — HOME CARE VISIT (OUTPATIENT)
Dept: SCHEDULING | Facility: HOME HEALTH | Age: 53
End: 2023-02-13
Payer: COMMERCIAL

## 2023-02-13 VITALS
RESPIRATION RATE: 16 BRPM | DIASTOLIC BLOOD PRESSURE: 70 MMHG | TEMPERATURE: 97.6 F | SYSTOLIC BLOOD PRESSURE: 130 MMHG | OXYGEN SATURATION: 98 % | HEART RATE: 108 BPM

## 2023-02-13 PROCEDURE — G0151 HHCP-SERV OF PT,EA 15 MIN: HCPCS

## 2023-02-13 NOTE — HOME HEALTH
Subjective: Do you think my incision looks ok? it is itchy. Falls since last visit NO(if yes complete the Fall Tracking Form and include bsrifallreport):  Caregiver involvement changes:  assist patient daily. currently working from home and did not particiapte in [facility] supplies by type and quantity ordered/delivered this visit include: na  Clinician asked if patient has had any physician contact since last home care visit and patient states: NO  Clinician asked if patient has any new or changed medications and patient states:  NO   If Yes, were medications reconciled? YES     was the certifying physician notified of changes in medications? N/A     Clinical assessment (what this visit means for the patient overall and need for ongoing skilled care) and progress or lack of progress towards SPECIFIC goals: Reviewed  home ex program. Patient requires vc and tc to improve tech. right knee rom is improving and right le strength is improving. encouraged pt to perform 2 x daily. patient unable to perform with left le due to increased pain. instructed pt in safety with transfers. pt requires cga but is unable to safely maintain TTWB right le. discussed patient should use wheelchair for mobility due to unable to maintain weight bearing restrictions    Patient has follow up with Dr Evelyn Ahumada scheduled for 2/17/23         will cont to work towards improving patient mobility         Written Teaching Material Utilized: written instructions provided for hep    Interdisciplinary communication with:Katy THOMAS for the purpose of OASIS collaboration and POC collaboration    Discharge planning as follows:  Will discharge when the patient has reached their maximum functional potential and maximum safety in their home and When goals are met    Specific plan for next visit: Re-instruct hep and upgrade if able, transfer training, fall prevention

## 2023-02-14 ENCOUNTER — HOME CARE VISIT (OUTPATIENT)
Dept: SCHEDULING | Facility: HOME HEALTH | Age: 53
End: 2023-02-14
Payer: COMMERCIAL

## 2023-02-14 PROCEDURE — G0152 HHCP-SERV OF OT,EA 15 MIN: HCPCS

## 2023-02-14 NOTE — Clinical Note
Mrs. Vicente Arellano continues with elevated rest heart rate of 112 during [facility] OT visit.

## 2023-02-15 ENCOUNTER — HOME CARE VISIT (OUTPATIENT)
Dept: SCHEDULING | Facility: HOME HEALTH | Age: 53
End: 2023-02-15
Payer: COMMERCIAL

## 2023-02-15 VITALS
TEMPERATURE: 97.7 F | OXYGEN SATURATION: 98 % | SYSTOLIC BLOOD PRESSURE: 129 MMHG | HEART RATE: 114 BPM | DIASTOLIC BLOOD PRESSURE: 70 MMHG

## 2023-02-15 VITALS
TEMPERATURE: 97.8 F | SYSTOLIC BLOOD PRESSURE: 128 MMHG | DIASTOLIC BLOOD PRESSURE: 78 MMHG | HEART RATE: 112 BPM | OXYGEN SATURATION: 98 %

## 2023-02-15 PROCEDURE — G0151 HHCP-SERV OF PT,EA 15 MIN: HCPCS

## 2023-02-15 PROCEDURE — G0152 HHCP-SERV OF OT,EA 15 MIN: HCPCS

## 2023-02-15 NOTE — HOME HEALTH
Subjective: \"I've been doing my arm exercises. \"   Falls since last visit: (if yes complete the Fall Tracking Form and include bsrifallreport): No   Caregiver involvement changes: none   Home health supplies by type and quantity ordered/delivered this visit include: None     Clinician asked if patient has had any physician contact since last home care visit and patient states: No   Clinician asked if patient has any new or changed medications and patient states:  NO   If Yes, were medications reconciled? N/A   Was the certifying physician notified of changes in medications? N/A     Clinical assessment (what this visit means for the patient overall and need for ongoing skilled care) and progress or lack of progress towards SPECIFIC goals:  Patient demonstrated the ability to complete toilet and toileting tasks with therapist providing verbal cueing for fall prevention and maintaining TTWB on R LE requiring additional instruction from skilled [facility] OT to achieve higher level of functional independence and reduce falls. Patient met mod I level goals set for toileting and toilet transfers this visit. Written Teaching Material Utilized: None   Interdisciplinary communication with: PCP regarding elevated reating heart rate   Discharge planning as follows: Discharge from [facility] OT when goals are met or maximum level of function is achieved.      Specific plan for next visit:  UE HEP for improving strength needed for increasing independence and reducing risk of falls with ADLs and functional transfers

## 2023-02-15 NOTE — Clinical Note
Mrs. Nevaeh Mcqueen continues with elevated resting heart rate of 114 during [facility] OT visit.

## 2023-02-15 NOTE — HOME HEALTH
Subjective: \"I'm trying to use to sleeping in this new lift chair. \"   Falls since last visit: (if yes complete the Fall Tracking Form and include bsrifallreport): No   Caregiver involvement changes: none   Home health supplies by type and quantity ordered/delivered this visit include: Theraband     Clinician asked if patient has had any physician contact since last home care visit and patient states: No   Clinician asked if patient has any new or changed medications and patient states:  NO   If Yes, were medications reconciled? N/A   Was the certifying physician notified of changes in medications? N/A     Clinical assessment (what this visit means for the patient overall and need for ongoing skilled care) and progress or lack of progress towards SPECIFIC goals: Patient continues to demonstrate decreased strength and endurance needed for increased independence and reducing risk of falls with ADLs and functional transfers as she completed UE HEP this visit requiring verbal and visual cueing from therapist for pacing and form. Patient making steady progress to completing UE HEP with independence. Written Teaching Material Utilized: UE HEP handout consisting of resistive exercises using theraband   Interdisciplinary communication with: PCP regarding elevated resting heart rate   Discharge planning as follows: Discharge from [facility] OT when goals are met or maximum level of function is achieved.      Specific plan for next visit:  Focus on increasing independence and reducing risk of falls with bathing tasks at sink level

## 2023-02-16 VITALS
RESPIRATION RATE: 16 BRPM | TEMPERATURE: 97.6 F | DIASTOLIC BLOOD PRESSURE: 70 MMHG | HEART RATE: 105 BPM | SYSTOLIC BLOOD PRESSURE: 130 MMHG | OXYGEN SATURATION: 98 %

## 2023-02-16 NOTE — HOME HEALTH
Subjective: I see the doctor friday. what should I ask him    Falls since last visit NO(if yes complete the Fall Tracking Form and include bsrifallreport):  Caregiver involvement changes:  assist patient daily. currently working from home and did not particiapte in [facility] supplies by type and quantity ordered/delivered this visit include: na  Clinician asked if patient has had any physician contact since last home care visit and patient states: NO  Clinician asked if patient has any new or changed medications and patient states:  NO   If Yes, were medications reconciled? YES   was the certifying physician notified of changes in medications? N/A     Clinical assessment (what this visit means for the patient overall and need for ongoing skilled care) and progress or lack of progress towards SPECIFIC goals: patient seen today for PT re assessment visit. Patient is improving in right le strength, rom and is reporting decreased pain. left le pain continues to be an issue. Patient has been unable to maintain TTWB right le due to weakness so gait training has not been addressed. Feel PT cont to be warranted to address gait training and standing balance but patient needs weight bearing status advanced to safely address. Patient has follow up with Dr Renetta Chao scheduled for 2/17/23 so will wait to see if orders are updated    Written Teaching Material Utilized: written instructions provided for hep  Interdisciplinary communication with:Katy THOMAS for the purpose of OASIS collaboration and POC collaboration  Discharge planning as follows:  Will discharge when the patient has reached their maximum functional potential and maximum safety in their home and When goals are met  Specific plan for next visit: Re-instruct hep and upgrade if able, transfer training, fall prevention

## 2023-02-17 ENCOUNTER — OFFICE VISIT (OUTPATIENT)
Dept: ORTHOPEDIC SURGERY | Age: 53
End: 2023-02-17
Payer: MEDICARE

## 2023-02-17 VITALS — HEIGHT: 62 IN | WEIGHT: 262 LBS | BODY MASS INDEX: 48.21 KG/M2

## 2023-02-17 DIAGNOSIS — Z98.890 STATUS POST SURGERY: Primary | ICD-10-CM

## 2023-02-17 NOTE — PROGRESS NOTES
Khai Brumfield (: 1970) is a 46 y.o. female patient, here for evaluation of the following chief complaint(s):  Surgical Follow-up (Right leg follow up/)       ASSESSMENT/PLAN:  Below is the assessment and plan developed based on review of pertinent history, physical exam, labs, studies, and medications. Status post intramedullary nail for periprosthetic femur fracture. She is doing relatively well. She has been toe-touch weightbearing. She says the pain is very well tolerated. Her x-rays reveal interval healing. She can begin 50% weightbearing and can increase as tolerated. I would like to see her back in 4 weeks. At that time we will get x-rays of both knees      1. Status post surgery  -     XR FEMUR RT 2 VS; Future  -     REFERRAL TO PHYSICAL THERAPY      Encounter Diagnosis   Name Primary? Status post surgery Yes        No follow-ups on file. SUBJECTIVE/OBJECTIVE:  Khai Brumfield (: 1970) is a 46 y.o. female who presents today for the following:  Chief Complaint   Patient presents with    Surgical Follow-up     Right leg follow up         Status post intramedullary nail for periprosthetic femur fracture. She is doing relatively well. She has been toe-touch weightbearing. She says the pain is very well tolerated. IMAGING:  XR Results (most recent):  Results from Appointment encounter on 23    XR FEMUR RT 2 VS    Narrative  AP and lateral x-rays ordered and independently reviewed of the right femur. Status post intramedullary nail. Alignment appropriate. Interval callus formation       Allergies   Allergen Reactions    Sulfa (Sulfonamide Antibiotics) Hives    Other Medication Rash     BANDAIDS MAY CAUSE RASH. Pcn [Penicillins] Unproven on Challenge       Current Outpatient Medications   Medication Sig    metaxalone (SKELAXIN) 800 mg tablet Take 800 mg by mouth three (3) times daily.  Take 1 tablet by mouth three times daily    lansoprazole (PREVACID) 15 mg capsule Take 15 mg by mouth daily. Take 1 tablet by mouth daily    hydroCHLOROthiazide (HYDRODIURIL) 50 mg tablet Take 50 mg by mouth daily. Take 1 tablet by mouth every day    doxycycline (ADOXA) 100 mg tablet Take 100 mg by mouth two (2) times a day. Take 1 tablet by mouth twice daily    busPIRone (BUSPAR) 15 mg tablet Take 15 mg by mouth two (2) times daily as needed for PRN Reason (Other) (panic). Take 1 tablet by mouth up to twice daily as needed for panic or overwhelmed    biotin (VITAMIN B7) 5 mg capsule Take 5 mg by mouth daily. Take 1 capsule by mouth daily    cholecalciferol (VITAMIN D3) (5000 Units /125 mcg) capsule Take 5,000 Units by mouth daily. Take 1 capsule by mouth daily    MAGNESIUM GLYCINATE PO Take 120 mg by mouth three (3) times daily. Take 1 tablet by mouth 3 times daily    mupirocin (BACTROBAN) 2 % ointment Apply 0.5 g to affected area as needed for PRN Reason (Other) (for open sores). apply a thin layer to affected area(s) 5 times daily as directed    OTHER,NON-FORMULARY, Apply 2 g to affected area three (3) times daily as needed for Pain. Gabapentin/Lidocane/Prilocaine 6/2.25/2.25% cream, apply 2 grams (2 scoops) to the affected area 3 to 4 times daily as directed for pain. apixaban (ELIQUIS) 2.5 mg tablet Take 1 Tablet by mouth two (2) times a day for 30 days. Indications: deep vein thrombosis prevention    naloxone (Narcan) 4 mg/actuation nasal spray Use 1 spray intranasally, then discard. Repeat with new spray every 2 min as needed for opioid overdose symptoms, alternating nostrils. naloxone (Narcan) 4 mg/actuation nasal spray Use 1 spray intranasally, then discard. Repeat with new spray every 2 min as needed for opioid overdose symptoms, alternating nostrils. hydrOXYzine pamoate (VISTARIL) 50 mg capsule Take 100 mg by mouth daily as needed. losartan (COZAAR) 100 mg tablet Take 100 mg by mouth daily. OLANZapine (ZyPREXA) 5 mg tablet Take 5 mg by mouth nightly.     prazosin (MINIPRESS) 5 mg capsule Take 5 mg by mouth nightly. Take 1 capsule by mouth as needed for flashbacks/nightmares    predniSONE (DELTASONE) 50 mg tablet Take 50 mg by mouth daily as needed for Pain. Take 1 tablet by mouth daily as needed for pain and swelling    senna-docusate (PERICOLACE) 8.6-50 mg per tablet Take 1 Tab by mouth two (2) times a day. Indications: constipation    multivitamin (ONE A DAY) tablet Take 1 Tab by mouth nightly. Lisdexamfetamine (VYVANSE) 70 mg cap Take 70 mg by mouth every morning. morphine CR (MS CONTIN) 30 mg CR tablet Take  by mouth every twelve (12) hours. acetaminophen (TYLENOL) 500 mg tablet Take 500 mg by mouth every six (6) hours as needed for Pain. alpha-d-galactosidase (BEANO) 150 unit tab tablet Take 2 Tabs by mouth three (3) times daily as needed for Flatulence. amphetamine sulfate 10 mg tab Take 10 mg by mouth two (2) times a day   @RXO(OrdEarliestFillDate)@ Take 2 tablets by mouth in the morning and 2 tablets at noon          vortioxetine (TRINTELLIX) 10 mg tablet Take 30 mg by mouth every morning. PT TAKES A TOTAL OF 30 mg DAILY    albuterol (PROVENTIL HFA, VENTOLIN HFA, PROAIR HFA) 90 mcg/actuation inhaler Take 1 Puff by inhalation every four (4) hours as needed for Wheezing. simethicone (MYLICON) 80 mg chewable tablet Take 80 mg by mouth every six (6) hours as needed for Flatulence. cetirizine (ZYRTEC) 10 mg tablet Take 1 Tab by mouth daily. No current facility-administered medications for this visit.        Past Medical History:   Diagnosis Date    ADHD (attention deficit hyperactivity disorder)     Arthritis     OSTEO    Chronic pain     YAHIR KNEES, LOWER BACK, RT SHOULDER & NECK    GERD (gastroesophageal reflux disease)     Graves disease     NO LONGER AN ISSUE    Hematuria 10/9/2018    HTN (hypertension)     CONTROLLED WITH MEDS    Hypothyroidism 2018    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    OCD (obsessive compulsive disorder)     Other ill-defined conditions(799.89)     graves    Psychiatric disorder     depression. RECENTLY LOST HER SON ON 19 ON HER BIRTHDAY.     Seizure ([address]     Seizures ([address]  &     REACTIVE TO HYPOGLYCEMIA / ALSO FROM FLASHING LIGHTS    Thromboembolus ([address] 2019    right arm        Past Surgical History:   Procedure Laterality Date    DELIVERY       HX ABDOMINOPLASTY      HX ADENOIDECTOMY      HX  SECTION      HX  SECTION      HX CHOLECYSTECTOMY      HX GASTRIC BYPASS          HX GI  1993    CHOLEYCYSTECTOMY    HX ORTHOPAEDIC Right     CARPEL TUNNEL    HX ORTHOPAEDIC Left     CARPEL TUNNEL    HX ORTHOPAEDIC Left     ULNA SHORTENING    HX ORTHOPAEDIC Right 2011    COLLAR BONE SURGERY     HX ORTHOPAEDIC Left 2019    LEFT KNEE REPLACEMENT    HX ORTHOPAEDIC Left     LEFT KNEE INCISION FOR INFECTION & REPAIR    HX ORTHOPAEDIC Left     LEFT KNEE REMOVAL OF PROSTHESIS & INSERTION OF SPACER, REPAIR OF INCISION    HX ORTHOPAEDIC  10/31/2019    left knee revision    HX TONSILLECTOMY  1975       Family History   Problem Relation Age of Onset    Heart Disease Mother     Heart Attack Mother     Hypertension Mother     OSTEOARTHRITIS Mother     Rashes/Skin Problems Mother         PLAQUE PSORIASIS    Heart Disease Father     Thyroid Disease Father     Diabetes Father     Hypertension Father     Diabetes Maternal Grandmother     Heart Attack Maternal Grandmother     Hypertension Maternal Grandmother     OSTEOARTHRITIS Maternal Grandmother     Hypertension Maternal Grandfather     Stroke Maternal Grandfather     High Cholesterol Sister     Other Son         meningitis    No Known Problems Son     Anesth Problems Neg Hx         Social History     Tobacco Use    Smoking status: Former     Types: Cigarettes     Quit date: 1994     Years since quittin.4    Smokeless tobacco: Never   Substance Use Topics    Alcohol use: No        All systems reviewed x 12 and were negative with the exception of None      No flowsheet data found. Vitals:  Ht 5' 1.5\" (1.562 m)   Wt 262 lb (118.8 kg)   BMI 48.70 kg/m²    Body mass index is 48.7 kg/m². Physical Exam    Incisions healing well. No calf pain        An electronic signature was used to authenticate this note.   -- Trung Pedraza MD

## 2023-02-22 ENCOUNTER — HOME CARE VISIT (OUTPATIENT)
Dept: SCHEDULING | Facility: HOME HEALTH | Age: 53
End: 2023-02-22
Payer: COMMERCIAL

## 2023-02-22 PROCEDURE — G0151 HHCP-SERV OF PT,EA 15 MIN: HCPCS

## 2023-02-22 PROCEDURE — G0152 HHCP-SERV OF OT,EA 15 MIN: HCPCS

## 2023-02-24 ENCOUNTER — HOME CARE VISIT (OUTPATIENT)
Dept: SCHEDULING | Facility: HOME HEALTH | Age: 53
End: 2023-02-24
Payer: COMMERCIAL

## 2023-02-24 VITALS
TEMPERATURE: 97.8 F | SYSTOLIC BLOOD PRESSURE: 123 MMHG | OXYGEN SATURATION: 97 % | HEART RATE: 104 BPM | DIASTOLIC BLOOD PRESSURE: 82 MMHG

## 2023-02-24 VITALS
HEART RATE: 106 BPM | OXYGEN SATURATION: 98 % | SYSTOLIC BLOOD PRESSURE: 130 MMHG | DIASTOLIC BLOOD PRESSURE: 70 MMHG | TEMPERATURE: 97.6 F | RESPIRATION RATE: 18 BRPM

## 2023-02-24 PROCEDURE — G0151 HHCP-SERV OF PT,EA 15 MIN: HCPCS

## 2023-02-24 PROCEDURE — G0152 HHCP-SERV OF OT,EA 15 MIN: HCPCS

## 2023-02-24 NOTE — HOME HEALTH
Subjective: Heres your paper from the doctor    falls since last visit NO(if yes complete the Fall Tracking Form and include bsrifallreport):  Caregiver involvement changes:  assist patient daily. currently working from home and did not particiapte in [facility] [place] [id number] supplies by type and quantity ordered/delivered this visit include: na  Clinician asked if patient has had any physician contact since last home care visit and patient states: NO  Clinician asked if patient has any new or changed medications and patient states:  NO   If Yes, were medications reconciled? YES   was the certifying physician notified of changes in medications? N/A          Clinical assessment (what this visit means for the patient overall and need for ongoing skilled care) and progress or lack of progress towards SPECIFIC goals: patient seen today for PT treatment. Patient out to Dr Edith Romero. received orders for patient to advance to [facility] right le with orders for PT to cont 2 x weekly for 4-6 weeks. todays treatment focused on education  on maintaining PWB during gait. PT now safely able to address gait training but patient will require much training to return to ind level of mobility     Written Teaching Material Utilized: reviewed written instructions provided for hep    Interdisciplinary communication with:Katy THOMAS for the purpose of OASIS collaboration and POC collaboration    Discharge planning as follows:  Will discharge when the patient has reached their maximum functional potential and maximum safety in their home and When goals are met    Specific plan for next visit: Re-instruct gait training, transfer training, fall prevention

## 2023-02-25 VITALS
OXYGEN SATURATION: 99 % | HEART RATE: 102 BPM | TEMPERATURE: 97.8 F | SYSTOLIC BLOOD PRESSURE: 121 MMHG | DIASTOLIC BLOOD PRESSURE: 76 MMHG

## 2023-02-25 NOTE — HOME HEALTH
Subjective: \"I'm really feeling those arm exercises since I've been doing them twice a day. \"   Falls since last visit: (if yes complete the Fall Tracking Form and include bsrifallreport): No   Caregiver involvement changes: none   Home health supplies by type and quantity ordered/delivered this visit include: None     Clinician asked if patient has had any physician contact since last home care visit and patient states: Yes. Upgraded weight bearing status to PWB on R LE   Clinician asked if patient has any new or changed medications and patient states: No   If Yes, were medications reconciled? NA   Was the certifying physician notified of changes in medications? NA     Clinical assessment (what this visit means for the patient overall and need for ongoing skilled care) and progress or lack of progress towards SPECIFIC goals:  Patient demonstrated the ability to complete sponge bathing tasks with therapist providing verbal cueing for fall prevention and maintaining PWB precautions on R LE implementing techniques at a mod I level. She remains at continued risk for falls with IADL tasks requiring additional instruction from skilled [facility] OT to achieve higher level of functional independence and reduce falls. Patient met mod I level goals set for bathing tasks this visit. Care plan will be updated to include IADL tasks as patient's weight bearing status was upgraded to [facility]. Written Teaching Material Utilized: None   Interdisciplinary communication with: FROYLAN   Discharge planning as follows: Discharge from [facility] OT when goals are met or maximum level of function is achieved.      Specific plan for next visit:  Reassessment

## 2023-02-25 NOTE — HOME HEALTH
Subjective: \"I'm want to be able to do more around here. \"   Falls since last visit: (if yes complete the Fall Tracking Form and include bsrifallreport): No   Caregiver involvement changes: none   Home health supplies by type and quantity ordered/delivered this visit include: None     Clinician asked if patient has had any physician contact since last home care visit and patient states: No   Clinician asked if patient has any new or changed medications and patient states: No   If Yes, were medications reconciled? NA  Was the certifying physician notified of changes in medications? NA     Clinical assessment (what this visit means for the patient overall and need for ongoing skilled care) and progress or lack of progress towards SPECIFIC goals: Patient is making steady progress improving from max A for sponge bathing tasks as she was performing this level of bathing prior to mod I level, min A for UB dressing tasks when retrieving/transporting needed clothing items to mod I level, max A for LB dressing to mod I level, min A for toileting tasks and toilet transfers to mod I level. The Barthel index assessment at initial evaluation was 40/100 indicating partially dependent improving to 80/100 indicating total independence with self care tasks. MACH-10 assessment score was 6/10 at initial evaluation improving to 5/10 continueing indicating patient is at risk for falls requiring continued instruction and training for fall prevention. She remains limited by decreased strength, decreased balance and decreased tolerance along with PWB status on R LE. Patient continues to benefit [facility] skilled [facility] to address independence and safety with IADLs along with strength, balance deficits and fall prevention training to return to highest level of independent functioning. Patient to benefit from skilled OT services for 2w4 to address the above deficits with patient verbalizing understanding and in agreement with POC.      Written Teaching Material Utilized: None   Interdisciplinary communication with: Zachariah Mcqueen for [address] collaboration   Discharge planning as follows: Discharge from [facility] OT when goals are met or maximum level of function is achieved.      Specific plan for next visit:  Increasing independence and reducing risk of falls with meal prep tasks

## 2023-02-26 VITALS
HEART RATE: 108 BPM | OXYGEN SATURATION: 97 % | TEMPERATURE: 97.6 F | SYSTOLIC BLOOD PRESSURE: 122 MMHG | RESPIRATION RATE: 18 BRPM | DIASTOLIC BLOOD PRESSURE: 82 MMHG

## 2023-02-26 NOTE — HOME HEALTH
Subjective: You werent supposed to come until 1. I am just taking my pain medication    falls since last visit NO(if yes complete the Fall Tracking Form and include bsrifallreport):  Caregiver involvement changes:  assist patient daily. currently working from home and did not particiapte in [place] [address] supplies by type and quantity ordered/delivered this visit include: na  Clinician asked if patient has had any physician contact since last home care visit and patient states: NO  Clinician asked if patient has any new or changed medications and patient states:  NO   If Yes, were medications reconciled? YES   was the certifying physician notified of changes in medications? N/A     Clinical assessment (what this visit means for the patient overall and need for ongoing skilled care) and progress or lack of progress towards SPECIFIC goals: patient seen today for PT treatment. Patient forgot PT was coming in the morning. She has OT scheduled right after and reports unable to do both. Patient reports increased pain in ryan LE after gait training last visit. spent time discussing energy conservation, slowly spend more time in standing now that she is PWB right le. PT reached out to OT during visit so that we will come on separate days. Patient performed hep in supine and seated. has not been consistently performing. stressed importance of strengthening exercises daily in addition to gait training. pt is improving in standing balance. gait training held today per patient request. discussed limit standing time to 10 to 15 minutes every 1 to 2 hours during the day while completing tasks. patient reports it is hard to self limit but discussed importance of slowly increasing her time so as not to over due and have increased pain. patient reports good understanding during verbla teach back.  will cont to work toward patient ind in home       Written Teaching Material Utilized: reviewed written instructions provided for hep  Interdisciplinary communication with:Katy THOMAS for the purpose of [employer] collaboration and POC collaboration  Discharge planning as follows:  Will discharge when the patient has reached their maximum functional potential and maximum safety in their home and When goals are met  Specific plan for next visit: Re-instruct gait training, standing balance retraining, transfer training, fall prevention

## 2023-02-27 ENCOUNTER — HOME CARE VISIT (OUTPATIENT)
Dept: SCHEDULING | Facility: HOME HEALTH | Age: 53
End: 2023-02-27
Payer: COMMERCIAL

## 2023-02-27 PROCEDURE — G0151 HHCP-SERV OF PT,EA 15 MIN: HCPCS

## 2023-02-28 ENCOUNTER — HOME CARE VISIT (OUTPATIENT)
Dept: HOME HEALTH SERVICES | Facility: HOME HEALTH | Age: 53
End: 2023-02-28
Payer: COMMERCIAL

## 2023-02-28 VITALS
HEART RATE: 104 BPM | OXYGEN SATURATION: 98 % | DIASTOLIC BLOOD PRESSURE: 80 MMHG | RESPIRATION RATE: 16 BRPM | SYSTOLIC BLOOD PRESSURE: 122 MMHG | TEMPERATURE: 97.6 F

## 2023-02-28 NOTE — HOME HEALTH
Subjective: my legs hurt so bad. I forgot about doing your leg exercises    falls since last visit NO(if yes complete the Fall Tracking Form and include bsrifallreport):  Caregiver involvement changes:  assist patient daily. currently working from home and did not particiapte in [place] [address] supplies by type and quantity ordered/delivered this visit include: na  Clinician asked if patient has had any physician contact since last home care visit and patient states: NO  Clinician asked if patient has any new or changed medications and patient states:  NO   If Yes, were medications reconciled? YES   was the certifying physician notified of changes in medications? N/A     Clinical assessment (what this visit means for the patient overall and need for ongoing skilled care) and progress or lack of progress towards SPECIFIC goals: patient seen today for PT treatment.educ provided for importance of adhering to [facility] right le in order to allow bone healing. pt is ind in transfers. pt requires constant vc to adher to pwb and increase weight bearing thru ryan UE when performing right le stance phase. educ for energy conservation. feel patient has been standing and walking a great deal and unsure of compliance with weight bearing restrictions. discussed she cannot carry anything while walking with walker and maintaining PWB right le. rec use of wheelchair for  transporting items from kitchen to living room for safety and fall prevention. pt needs vc for correct tech for hep. discussed strengthening cont to be very important in her healing and she should be performing 2 x daily. vc and tc for posture during standing and gait training  will cont to work toward pt ind in home    Written Teaching Material Utilized: reviewed written instructions provided for hep  Interdisciplinary communication with:Katy THOMAS for the purpose of [employer] collaboration and POC collaboration  Discharge planning as follows:  Will discharge when the patient has reached their maximum functional potential and maximum safety in their home and When goals are met  Specific plan for next visit: Re-instruct gait training, standing balance retraining, and fall prevention

## 2023-03-01 ENCOUNTER — HOME CARE VISIT (OUTPATIENT)
Dept: SCHEDULING | Facility: HOME HEALTH | Age: 53
End: 2023-03-01
Payer: COMMERCIAL

## 2023-03-01 PROCEDURE — G0151 HHCP-SERV OF PT,EA 15 MIN: HCPCS

## 2023-03-02 ENCOUNTER — HOME CARE VISIT (OUTPATIENT)
Dept: SCHEDULING | Facility: HOME HEALTH | Age: 53
End: 2023-03-02
Payer: COMMERCIAL

## 2023-03-02 VITALS
DIASTOLIC BLOOD PRESSURE: 87 MMHG | HEART RATE: 89 BPM | TEMPERATURE: 98.6 F | OXYGEN SATURATION: 99 % | SYSTOLIC BLOOD PRESSURE: 132 MMHG

## 2023-03-02 VITALS
SYSTOLIC BLOOD PRESSURE: 124 MMHG | RESPIRATION RATE: 18 BRPM | TEMPERATURE: 97.6 F | OXYGEN SATURATION: 98 % | DIASTOLIC BLOOD PRESSURE: 82 MMHG | HEART RATE: 108 BPM

## 2023-03-02 PROCEDURE — G0152 HHCP-SERV OF OT,EA 15 MIN: HCPCS

## 2023-03-02 NOTE — HOME HEALTH
Subjective: I dont know how much I can do today. I have been nauseas all morning. I have a zoom meeting for my son. This really stresses me out     falls since last visit NO(if yes complete the Fall Tracking Form and include bsrifallreport):  Caregiver involvement changes:  assist patient daily. currently working from home and [facility]pte [facility] by type and quantity ordered/delivered this visit include: na  Clinician asked if patient has had any physician contact since last home care visit and patient states: NO  Clinician asked if patient has any new or changed medications and patient states:  NO   If Yes, were medications reconciled? YES   was the certifying physician notified of changes in medications? N/A     Clinical assessment (what this visit means for the patient overall and need for ongoing skilled care) and progress or lack of progress towards SPECIFIC goals: patient seen today for PT treatment.educ provided for importance of adhering to [facility] right le in order to allow bone healing. Patient dealing with her sons health issues, difficulty with his homebound school instructions and feeling very overwhelmed. she has an appointment with her psychologist next week. feel her stress taking care of her andres son is playing a role in her recovery. instructed pt in standing balance with focus on posture  prior to gait training. pt needs vc x 3 not to bend over and pick items off of the floor due to her weight bearing restrictions and increased risk for falls. pt requires constant vc to adher to pwb and increase weight bearing thru ryan UE when performing right le stance phase. educ for energy conservation. feel patient has been standing and walking a great deal and unsure of compliance with weight bearing restrictions. discussed she cannot pick anything off of the floor unless she uses her reacher due to her  PWB right le.  rec use of wheelchair for  transporting items from kitchen to living room for safety and fall prevention. pt needs vc for correct tech for hep. discussed strengthening cont to be very important in her healing and she should be performing 2 x daily. vc and tc for posture during standing and gait training    will cont to work toward pt ind in home  Written Teaching Material Utilized: reviewed written instructions provided for hep  Interdisciplinary communication with:Katy THOMAS for the purpose of [employer] collaboration and POC collaboration  Discharge planning as follows:  Will discharge when the patient has reached their maximum functional potential and maximum safety in their home and When goals are met  Specific plan for next visit: Re-instruct gait training, standing balance retraining, and fall prevention

## 2023-03-03 ENCOUNTER — HOME CARE VISIT (OUTPATIENT)
Dept: SCHEDULING | Facility: HOME HEALTH | Age: 53
End: 2023-03-03
Payer: COMMERCIAL

## 2023-03-03 PROCEDURE — G0152 HHCP-SERV OF OT,EA 15 MIN: HCPCS

## 2023-03-03 NOTE — HOME HEALTH
Subjective: \"I'm want to be able to do more around here. \"   Falls since last visit: (if yes complete the Fall Tracking Form and include bsrifallreport): No   Caregiver involvement changes: none   Home health supplies by type and quantity ordered/delivered this visit include: None     Clinician asked if patient has had any physician contact since last home care visit and patient states: No   Clinician asked if patient has any new or changed medications and patient states: No   If Yes, were medications reconciled? NA  Was the certifying physician notified of changes in medications? NA     Clinical assessment (what this visit means for the patient overall and need for ongoing skilled care) and progress or lack of progress towards SPECIFIC goals: Patient demonstrated the ability to complete simple meal prep tasks with therapist provided verbal cueing for fall prevention techniques and maintaining PWB precautions on R LE implementing at a mod I level. She remains at a continued risk of falls with housekeeping tasks requiring additional instruction from skilled [facility] OT to achieve higher level of functional independence and reduce falls. Patient met goal this date at a mod I level with meal prep tasks. Written Teaching Material Utilized: None   Interdisciplinary communication with: Korey Grimm for [address] collaboration   Discharge planning as follows: Discharge from [facility] OT when goals are met or maximum level of function is achieved.      Specific plan for next visit:  Increasing independence and reducing risk of falls with vacuuming tasks

## 2023-03-06 ENCOUNTER — HOME CARE VISIT (OUTPATIENT)
Dept: HOME HEALTH SERVICES | Facility: HOME HEALTH | Age: 53
End: 2023-03-06
Payer: COMMERCIAL

## 2023-03-07 ENCOUNTER — HOME CARE VISIT (OUTPATIENT)
Dept: HOME HEALTH SERVICES | Facility: HOME HEALTH | Age: 53
End: 2023-03-07
Payer: COMMERCIAL

## 2023-03-07 VITALS
DIASTOLIC BLOOD PRESSURE: 78 MMHG | TEMPERATURE: 97.7 F | HEART RATE: 93 BPM | OXYGEN SATURATION: 96 % | SYSTOLIC BLOOD PRESSURE: 128 MMHG

## 2023-03-07 NOTE — HOME HEALTH
Subjective: \"I'm having pain in my leg and knee. \"   Falls since last visit: (if yes complete the Fall Tracking Form and include bsrifallreport): No   Caregiver involvement changes: none   Home health supplies by type and quantity ordered/delivered this visit include: Theraband     Clinician asked if patient has had any physician contact since last home care visit and patient states: No   Clinician asked if patient has any new or changed medications and patient states:  NO   If Yes, were medications reconciled? N/A   Was the certifying physician notified of changes in medications? N/A     Clinical assessment (what this visit means for the patient overall and need for ongoing skilled care) and progress or lack of progress towards SPECIFIC goals: Patient demonstrate ability to complete UE HEP's for improving strength and endurance needed for increased independence and reducing risk of falls with ADLs, IADLs and functional transfers requiring verbal and visual cueing from therapist for pacing and form. Patient met goals set for completing UE HEP with independence. Written Teaching Material Utilized: UE HEP handout consisting of resistive exercises using whit and dumb bell   Interdisciplinary communication with: FROYLAN   Discharge planning as follows: Discharge from [facility] OT when goals are met or maximum level of function is achieved.      Specific plan for next visit:  Focus on increasing independence and reducing risk of falls with IADL tasks

## 2023-03-07 NOTE — HOME HEALTH
patient cancelled PT visit today due to increaesd pain  patient reports was able to make her son breakfast this morning and is trying to do more around the home  but her pain is too great to participate in PT now  Notified Dr Brandon Mariee patient cont to be in increased pain in ryan LE and cancelled PT appointment today

## 2023-03-08 ENCOUNTER — HOME CARE VISIT (OUTPATIENT)
Dept: SCHEDULING | Facility: HOME HEALTH | Age: 53
End: 2023-03-08
Payer: COMMERCIAL

## 2023-03-08 PROCEDURE — G0151 HHCP-SERV OF PT,EA 15 MIN: HCPCS

## 2023-03-09 ENCOUNTER — HOME CARE VISIT (OUTPATIENT)
Dept: SCHEDULING | Facility: HOME HEALTH | Age: 53
End: 2023-03-09
Payer: COMMERCIAL

## 2023-03-09 VITALS
OXYGEN SATURATION: 98 % | SYSTOLIC BLOOD PRESSURE: 130 MMHG | HEART RATE: 88 BPM | DIASTOLIC BLOOD PRESSURE: 78 MMHG | TEMPERATURE: 97.6 F | RESPIRATION RATE: 18 BRPM

## 2023-03-09 VITALS
OXYGEN SATURATION: 100 % | TEMPERATURE: 97.6 F | DIASTOLIC BLOOD PRESSURE: 85 MMHG | SYSTOLIC BLOOD PRESSURE: 119 MMHG | HEART RATE: 102 BPM

## 2023-03-09 PROCEDURE — G0152 HHCP-SERV OF OT,EA 15 MIN: HCPCS

## 2023-03-09 NOTE — HOME HEALTH
Subjective: I dont know how much I can do today. I am still in so much pain in my left leg  patient has had her psych appointment. pt acknowleges difficulty focusing on task. she reports cont stress over scheduling surgery for her son and caring for him     falls since last visit NO(if yes complete the Fall Tracking Form and include bsrifallreport):  Caregiver involvement changes:  assist patient daily. currently working from home and did not particiapte in [place] [address] supplies by type and quantity ordered/delivered this visit include: na  Clinician asked if patient has had any physician contact since last home care visit and patient states: NO  Clinician asked if patient has any new or changed medications and patient states:  NO   If Yes, were medications reconciled? YES   was the certifying physician notified of changes in medications? N/A     Clinical assessment (what this visit means for the patient overall and need for ongoing skilled care) and progress or lack of progress towards SPECIFIC goals: patient seen today for PT treatment.educ provided for importance of adhering to [facility] right le in order to allow bone healing. Patient dealing with her sons health issues, difficulty with his homebound school instructions and feeling very overwhelmed. instructed pt in standing balance with focus on posture  prior to gait training. patient is improving in posture with gait training and standing balance. question her compliance with PWB since PT had to stop her x 3 to perform actions that were either unsafe or would have been WBAT on right le. patient denies any pain in right le when weight bearing but discussed this does not mean it is safe. pt needs vc for correct tech for hep. discussed strengthening cont to be very important in her healing and she should be performing 2 x daily.  vc and tc for posture during standing and gait training  pt declined stair training today due to pain  patient has follow up with Dr Deng Culver on 3/17  will cont to work toward pt ind in home    Written Teaching Material Utilized: reviewed written instructions provided for hep  Interdisciplinary communication with:Katy THOMAS for the purpose of [employer] collaboration and POC collaboration  Discharge planning as follows:  Will discharge when the patient has reached their maximum functional potential and maximum safety in their home and When goals are met  Specific plan for next visit: Re-instruct gait training, standing balance retraining, stair training and fall prevention

## 2023-03-10 NOTE — HOME HEALTH
Subjective: \"I'm want to be able to do more around here but have to make sure I'm not over doing with my legs. \"   Falls since last visit: (if yes complete the Fall Tracking Form and include bsrifallreport): No   Caregiver involvement changes: none   Home health supplies by type and quantity ordered/delivered this visit include: None     Clinician asked if patient has had any physician contact since last home care visit and patient states: No   Clinician asked if patient has any new or changed medications and patient states: No   If Yes, were medications reconciled? NA  Was the certifying physician notified of changes in medications? NA     Clinical assessment (what this visit means for the patient overall and need for ongoing skilled care) and progress or lack of progress towards SPECIFIC goals: Patient demonstrated the ability to complete vacuuming tasks with therapist provided verbal cueing for fall prevention techniques completing from wheelchair level in order to maintain PWB precautions on R LE implementing at a mod I level. She remains at a continued risk of falls with housekeeping tasks requiring additional instruction from skilled [facility] OT to achieve higher level of functional independence and reduce falls. Patient met goal this date at a mod I level with vacuuming tasks from w/c level as she would be unable to complete in standing managing RW and vacuum along with maintaining PWB on R LE. Written Teaching Material Utilized: None   Interdisciplinary communication with: Marlon Heath for [address] collaboration   Discharge planning as follows: Discharge from [facility] OT when goals are met or maximum level of function is achieved.      Specific plan for next visit:  Increasing independence and reducing risk of falls with house cleaning tasks detailed exam

## 2023-03-13 ENCOUNTER — HOME CARE VISIT (OUTPATIENT)
Dept: SCHEDULING | Facility: HOME HEALTH | Age: 53
End: 2023-03-13
Payer: COMMERCIAL

## 2023-03-13 PROCEDURE — G0151 HHCP-SERV OF PT,EA 15 MIN: HCPCS

## 2023-03-14 ENCOUNTER — HOME CARE VISIT (OUTPATIENT)
Dept: HOME HEALTH SERVICES | Facility: HOME HEALTH | Age: 53
End: 2023-03-14
Payer: COMMERCIAL

## 2023-03-14 VITALS
SYSTOLIC BLOOD PRESSURE: 132 MMHG | HEART RATE: 90 BPM | DIASTOLIC BLOOD PRESSURE: 78 MMHG | RESPIRATION RATE: 18 BRPM | TEMPERATURE: 97.8 F | OXYGEN SATURATION: 98 %

## 2023-03-14 NOTE — HOME HEALTH
Subjective:My right leg is more sore today and my left leg is killing me. what do you think I did wrong??  falls since last visit NO(if yes complete the Fall Tracking Form and include bsrifallreport):  Caregiver involvement changes:  assist patient daily. currently working from home and did not particiapte in [place] [address] supplies by type and quantity ordered/delivered this visit include: na  Clinician asked if patient has had any physician contact since last home care visit and patient states: NO  Clinician asked if patient has any new or changed medications and patient states:  NO   If Yes, were medications reconciled? YES   was the certifying physician notified of changes in medications? N/A   Clinical assessment (what this visit means for the patient overall and need for ongoing skilled care) and progress or lack of progress towards SPECIFIC goals: patient seen today for PT re assessment visit. patient answered door forward flexed, walking pushing wheelchair by arm rest. discussed she is not maintaining good posture or adhering to [facility] right le. patient pushing wheelchair due to numerous items needing transport from kitchen to her living room. discussed she needs to be seated in wheelchair with items on her lap for safety, fall prevention and to adhere to weight bearing restriction. believe patient is standing more and not adhering to weight bearing restrictions which is increasing her pain. Patient is mkaing slow progress in ambulation however has difficulty adhering to [facility]. TUG score improved from 41 to 30 seconds. tinetti improved from 11 to 17 out of 28  patient has follow up with Dr Harvey Leigh on friday  will cont to work toward pt ind in home    Written Teaching Material Utilized: reviewed written instructions provided for hep  Interdisciplinary communication with:Katy THOMAS for the purpose of [employer] collaboration and POC collaboration  Discharge planning as follows:  Will discharge when the patient has reached their maximum functional potential and maximum safety in their home and When goals are met  Specific plan for next visit: Re-instruct gait training, standing balance retraining, stair training and fall prevention

## 2023-03-15 ENCOUNTER — HOME CARE VISIT (OUTPATIENT)
Dept: HOME HEALTH SERVICES | Facility: HOME HEALTH | Age: 53
End: 2023-03-15
Payer: COMMERCIAL

## 2023-03-16 ENCOUNTER — HOME CARE VISIT (OUTPATIENT)
Dept: HOME HEALTH SERVICES | Facility: HOME HEALTH | Age: 53
End: 2023-03-16
Payer: COMMERCIAL

## 2023-03-17 NOTE — HOME HEALTH
patient reports she is in too much pain to participate in PT today.  Notified Dr Pawan Aparicio only seen 1 x this week per patient request. patient has follow up with MD on friday

## 2023-03-21 ENCOUNTER — HOME CARE VISIT (OUTPATIENT)
Dept: SCHEDULING | Facility: HOME HEALTH | Age: 53
End: 2023-03-21
Payer: COMMERCIAL

## 2023-03-21 ENCOUNTER — HOME CARE VISIT (OUTPATIENT)
Dept: SCHEDULING | Facility: HOME HEALTH | Age: 53
End: 2023-03-21

## 2023-03-21 VITALS
RESPIRATION RATE: 18 BRPM | HEART RATE: 119 BPM | DIASTOLIC BLOOD PRESSURE: 86 MMHG | OXYGEN SATURATION: 98 % | TEMPERATURE: 97.6 F | SYSTOLIC BLOOD PRESSURE: 132 MMHG

## 2023-03-21 PROCEDURE — G0152 HHCP-SERV OF OT,EA 15 MIN: HCPCS

## 2023-03-21 PROCEDURE — G0151 HHCP-SERV OF PT,EA 15 MIN: HCPCS

## 2023-03-22 VITALS
OXYGEN SATURATION: 98 % | RESPIRATION RATE: 18 BRPM | HEART RATE: 120 BPM | DIASTOLIC BLOOD PRESSURE: 84 MMHG | SYSTOLIC BLOOD PRESSURE: 130 MMHG | TEMPERATURE: 97.6 F

## 2023-03-22 NOTE — HOME HEALTH
Physician Notification and Justification to Continue Services:     Justification for continued intermittent care: patient continues to receive home PT to address LE strength training, remains PWB right le, transfer training, gait training, standing balance retraining     MD notified of the following: the need for continued Physical Therapy home health services for above reasons, plan of care including visit frequency of PT Physical Therapy for: additional assessment and education on transfer training, gait training remain PWB right le, stair training, fall prevention and advance gait training when weight bearing status is advanced    Subjective:I missed my doctors appointment last week. I arrived too late. I rescheduled it for Monday  Caregiver: spouse. Caregiver assists with: Medications, Meals, IADL, Transportation and Housekeeping Caregiver unable to assist with:na Caregiver is available:Regularly Caregiver is not present at this visit and did not participate with clinician. Medications reconciled and all medications are available in the home this visit. A list of reconciled medications has been given to the patient/caregiver and a copy has been uploaded to media. Home health supplies by type and quantity ordered/delivered this visit include: na    Patient at risk for falls Yes:     Patient/caregiver instructed on plan of care and are agreeable to plan of care at this time. Discharge planning discussed with patient and caregiver. Discharge planning as follows: Will discharge when the patient has reached their maximum functional potential and maximum safety in their home and When goals are met. Patient/caregiver did verbalize agreement with discharge planning.    Specific plan for next visit: Re-instruct patient/caregiver on hep and upgrade if able, gait training, standing balance retraining    Interdisciplinary communication with: Bharat Silva for the purpose of [employer] collaboration    Emergency Preparedness: Patient/Caregiver instructed in the following:  Have one gallon of water per person for at least 3 days on hand. Have non-perishable food for at least 3 days that do not need to be cooked. Have flashlights and batteries. Charge your cell phones and any back up lithium batteries for your cell phones. Have 3+ days of back up oxygen in your home. Have a phone in your home that is hard wired and does not require power. Have medication for a week in your home. Make sure you have a caregiver in the home to provide care in case your home health nurse cannot get to your house. Make sure you have all of your paperwork i.e. written emergency preparedness plan, Identification, insurance cards, DME phone number, physician and pharmacy phone number, agency phone number, and your medications in one place for easy access and in a zip lock bag to protect them. Take your Admission Handbook, written emergency preparedness plan, written medication list, necessary supplies and folder if you relocate in the event of an emergency, if possible. Call agency if you relocate so we can contact you. Patient/Caregiver verbalize knowledge of above through teach back with 100 percent accuracy.

## 2023-03-23 ENCOUNTER — HOME CARE VISIT (OUTPATIENT)
Dept: HOME HEALTH SERVICES | Facility: HOME HEALTH | Age: 53
End: 2023-03-23
Payer: COMMERCIAL

## 2023-03-27 ENCOUNTER — OFFICE VISIT (OUTPATIENT)
Dept: ORTHOPEDIC SURGERY | Age: 53
End: 2023-03-27
Payer: COMMERCIAL

## 2023-03-27 DIAGNOSIS — M25.562 LEFT KNEE PAIN, UNSPECIFIED CHRONICITY: ICD-10-CM

## 2023-03-27 DIAGNOSIS — Z96.659 PAIN DUE TO KNEE JOINT PROSTHESIS, INITIAL ENCOUNTER (HCC): ICD-10-CM

## 2023-03-27 DIAGNOSIS — T84.84XA PAIN DUE TO KNEE JOINT PROSTHESIS, INITIAL ENCOUNTER (HCC): ICD-10-CM

## 2023-03-27 DIAGNOSIS — Z98.890 STATUS POST SURGERY: Primary | ICD-10-CM

## 2023-03-27 PROCEDURE — G8417 CALC BMI ABV UP PARAM F/U: HCPCS | Performed by: ORTHOPAEDIC SURGERY

## 2023-03-27 PROCEDURE — 99213 OFFICE O/P EST LOW 20 MIN: CPT | Performed by: ORTHOPAEDIC SURGERY

## 2023-03-27 PROCEDURE — 3017F COLORECTAL CA SCREEN DOC REV: CPT | Performed by: ORTHOPAEDIC SURGERY

## 2023-03-27 PROCEDURE — G9717 DOC PT DX DEP/BP F/U NT REQ: HCPCS | Performed by: ORTHOPAEDIC SURGERY

## 2023-03-27 PROCEDURE — G8427 DOCREV CUR MEDS BY ELIG CLIN: HCPCS | Performed by: ORTHOPAEDIC SURGERY

## 2023-03-27 NOTE — PROGRESS NOTES
David Huddleston (: 1970) is a 46 y.o. female patient, here for evaluation of the following chief complaint(s):  No chief complaint on file. ASSESSMENT/PLAN:  Below is the assessment and plan developed based on review of pertinent history, physical exam, labs, studies, and medications. 59-year-old female comes in today for 2 issues. She is postop from an intramedullary upper periprosthetic right distal femur fracture. This is generally well. She said she is up and walking at making progress. She complains of left-sided knee pain is anteriorly based radiates into her quad muscle. She has been on for extended period time. That she had history of a previous infection felt replant and does not have a patella button. She is a small shallow patella left this is likely the cause of her anterior type knee pain. I strongly encourage questioning any weight loss. Edema is around 50 currently which is certainly playing a role. We discussed resumption of anti-inflammatories. I think at this point that is okay. She can do this. Risks and benefits discussed. Plan for follow-up 6 weeks 1 repeat xray      1. Status post surgery  -     XR FEMUR RT 2 VS; Future  2. Left knee pain, unspecified chronicity  -     XR KNEE LT 3 V; Future  3. Pain due to knee joint prosthesis, initial encounter [facility])      Encounter Diagnoses   Name Primary? Status post surgery Yes    Left knee pain, unspecified chronicity     Pain due to knee joint prosthesis, initial encounter ([facility] [address]         No follow-ups on file. SUBJECTIVE/OBJECTIVE:  David Huddleston (: 1970) is a 46 y.o. female who presents today for the following:  No chief complaint on file. 59-year-old female comes in today for 2 issues. She is postop from an intramedullary upper periprosthetic right distal femur fracture. This is generally well. She said she is up and walking at making progress.   She complains of left-sided knee pain is anteriorly based radiates into her quad muscle. She has been on for extended period time. That she had history of a previous infection felt replant and does not have a patella button. She is a small shallow patella left this is likely the cause of her anterior type knee pain. IMAGING:    Risks and benefits of joint arthroplasty discussed at length including but not limited to bleeding, need for blood transfusion, infection, damage to surrounding structures, intra-operative fracture, blood clots, pulmonary embolism, death. The patient understands the risks of surgery. All questions answered. They elected to move forward. XR Results (most recent):  Results from Appointment encounter on 03/27/23    XR KNEE LT 3 V    Narrative  3 views left knee ordered and reviewed. Status post revision knee arthroplasty. There is a sliver of the patella that is remaining and  Is unchanged. This is a lucency under the plateau however she has a cone and stem which appear well fixed       Allergies   Allergen Reactions    Sulfa (Sulfonamide Antibiotics) Hives    Other Medication Rash     BANDAIDS MAY CAUSE RASH. Pcn [Penicillins] Unproven on Challenge       Current Outpatient Medications   Medication Sig    metaxalone (SKELAXIN) 800 mg tablet Take 800 mg by mouth three (3) times daily. Take 1 tablet by mouth three times daily    lansoprazole (PREVACID) 15 mg capsule Take 15 mg by mouth daily. Take 1 tablet by mouth daily    hydroCHLOROthiazide (HYDRODIURIL) 50 mg tablet Take 50 mg by mouth daily. Take 1 tablet by mouth every day    doxycycline (ADOXA) 100 mg tablet Take 100 mg by mouth two (2) times a day. Take 1 tablet by mouth twice daily    busPIRone (BUSPAR) 15 mg tablet Take 15 mg by mouth two (2) times daily as needed for PRN Reason (Other) (panic). Take 1 tablet by mouth up to twice daily as needed for panic or overwhelmed    biotin (VITAMIN B7) 5 mg capsule Take 5 mg by mouth daily.  Take 1 capsule by mouth daily    cholecalciferol (VITAMIN D3) (5000 Units /125 mcg) capsule Take 5,000 Units by mouth daily. Take 1 capsule by mouth daily    MAGNESIUM GLYCINATE PO Take 120 mg by mouth three (3) times daily. Take 1 tablet by mouth 3 times daily    mupirocin (BACTROBAN) 2 % ointment Apply 0.5 g to affected area as needed for PRN Reason (Other) (for open sores). apply a thin layer to affected area(s) 5 times daily as directed    OTHER,NON-FORMULARY, Apply 2 g to affected area three (3) times daily as needed for Pain. Gabapentin/Lidocane/Prilocaine 6/2.25/2.25% cream, apply 2 grams (2 scoops) to the affected area 3 to 4 times daily as directed for pain. hydrOXYzine pamoate (VISTARIL) 50 mg capsule Take 100 mg by mouth daily as needed. losartan (COZAAR) 100 mg tablet Take 100 mg by mouth daily. OLANZapine (ZyPREXA) 5 mg tablet Take 5 mg by mouth nightly. prazosin (MINIPRESS) 5 mg capsule Take 5 mg by mouth nightly. Take 1 capsule by mouth as needed for flashbacks/nightmares    predniSONE (DELTASONE) 50 mg tablet Take 50 mg by mouth daily as needed for Pain. Take 1 tablet by mouth daily as needed for pain and swelling    multivitamin (ONE A DAY) tablet Take 1 Tab by mouth nightly. Lisdexamfetamine (VYVANSE) 70 mg cap Take 70 mg by mouth every morning. morphine CR (MS CONTIN) 30 mg CR tablet Take  by mouth every twelve (12) hours. acetaminophen (TYLENOL) 500 mg tablet Take 500 mg by mouth every six (6) hours as needed for Pain. alpha-d-galactosidase (BEANO) 150 unit tab tablet Take 2 Tabs by mouth three (3) times daily as needed for Flatulence. amphetamine sulfate 10 mg tab Take 10 mg by mouth two (2) times a day   @RXO(OrdEarliestFillDate)@ Take 2 tablets by mouth in the morning and 2 tablets at noon          vortioxetine (TRINTELLIX) 10 mg tablet Take 30 mg by mouth every morning.  PT TAKES A TOTAL OF 30 mg DAILY    albuterol (PROVENTIL HFA, VENTOLIN HFA, PROAIR HFA) 90 mcg/actuation inhaler Take 1 Puff by inhalation every four (4) hours as needed for Wheezing. simethicone (MYLICON) 80 mg chewable tablet Take 80 mg by mouth every six (6) hours as needed for Flatulence. cetirizine (ZYRTEC) 10 mg tablet Take 1 Tab by mouth daily. naloxone (Narcan) 4 mg/actuation nasal spray Use 1 spray intranasally, then discard. Repeat with new spray every 2 min as needed for opioid overdose symptoms, alternating nostrils. (Patient not taking: Reported on 3/27/2023)    naloxone (Narcan) 4 mg/actuation nasal spray Use 1 spray intranasally, then discard. Repeat with new spray every 2 min as needed for opioid overdose symptoms, alternating nostrils. (Patient not taking: Reported on 3/27/2023)    senna-docusate (PERICOLACE) 8.6-50 mg per tablet Take 1 Tab by mouth two (2) times a day. Indications: constipation (Patient not taking: Reported on 3/27/2023)     No current facility-administered medications for this visit. Past Medical History:   Diagnosis Date    ADHD (attention deficit hyperactivity disorder)     Arthritis     OSTEO    Chronic pain     YAHIR KNEES, LOWER BACK, RT SHOULDER & NECK    GERD (gastroesophageal reflux disease)     Graves disease     NO LONGER AN ISSUE    Hematuria 10/9/2018    HTN (hypertension)     CONTROLLED WITH MEDS    Hypothyroidism 2018    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    OCD (obsessive compulsive disorder)     Other ill-defined conditions(799.89)     graves    Psychiatric disorder     depression. RECENTLY LOST HER SON ON 19 ON HER BIRTHDAY.     Seizure ([address]     Seizures ([address]  &     REACTIVE TO HYPOGLYCEMIA / ALSO FROM FLASHING LIGHTS    Thromboembolus ([address] 2019    right arm        Past Surgical History:   Procedure Laterality Date    DELIVERY       HX ABDOMINOPLASTY      HX ADENOIDECTOMY      HX  SECTION      HX  SECTION  2006    HX CHOLECYSTECTOMY      HX GASTRIC BYPASS          HX GI  1993    CHOLEYCYSTECTOMY    HX ORTHOPAEDIC Right CARPEL TUNNEL    HX ORTHOPAEDIC Left     CARPEL TUNNEL    HX ORTHOPAEDIC Left     ULNA SHORTENING    HX ORTHOPAEDIC Right     COLLAR BONE SURGERY     HX ORTHOPAEDIC Left 2019    LEFT KNEE REPLACEMENT    HX ORTHOPAEDIC Left     LEFT KNEE INCISION FOR INFECTION & REPAIR    HX ORTHOPAEDIC Left     LEFT KNEE REMOVAL OF PROSTHESIS & INSERTION OF SPACER, REPAIR OF INCISION    HX ORTHOPAEDIC  10/31/2019    left knee revision    HX TONSILLECTOMY  1975       Family History   Problem Relation Age of Onset    Heart Disease Mother     Heart Attack Mother     Hypertension Mother     OSTEOARTHRITIS Mother     Rashes/Skin Problems Mother         PLAQUE PSORIASIS    Heart Disease Father     Thyroid Disease Father     Diabetes Father     Hypertension Father     Diabetes Maternal Grandmother     Heart Attack Maternal Grandmother     Hypertension Maternal Grandmother     OSTEOARTHRITIS Maternal Grandmother     Hypertension Maternal Grandfather     Stroke Maternal Grandfather     High Cholesterol Sister     Other Son         meningitis    No Known Problems Son     Anesth Problems Neg Hx         Social History     Tobacco Use    Smoking status: Former     Types: Cigarettes     Quit date: 1994     Years since quittin.5    Smokeless tobacco: Never   Substance Use Topics    Alcohol use: No        All systems reviewed x 12 and were negative with the exception of None      No flowsheet data found. Vitals: There were no vitals taken for this visit. There is no height or weight on file to calculate BMI. Physical Exam    Gen: NAD    Resp: Non-labored    RLE: Midline incision is healing well with no evidence of infection. No erythema. Range of motion 0-120°. No extensor lag. Grossly stable in the coronal and sagittal planes. No calf tenderness. No evidence of a DVT. Motor grossly intact with 5 out of 5 strength. Sensation intact to light touch throughout. Palpable pedal pulses.       Left knee reveals small effusion, mild crepitus. Grossly stable        An electronic signature was used to authenticate this note.   -- Bong Ritter MD

## 2023-03-29 ENCOUNTER — HOME CARE VISIT (OUTPATIENT)
Dept: SCHEDULING | Facility: HOME HEALTH | Age: 53
End: 2023-03-29
Payer: COMMERCIAL

## 2023-03-29 PROCEDURE — G0151 HHCP-SERV OF PT,EA 15 MIN: HCPCS

## 2023-03-30 VITALS
RESPIRATION RATE: 18 BRPM | TEMPERATURE: 97.6 F | OXYGEN SATURATION: 98 % | DIASTOLIC BLOOD PRESSURE: 72 MMHG | SYSTOLIC BLOOD PRESSURE: 128 MMHG | HEART RATE: 104 BPM

## 2023-03-31 ENCOUNTER — HOME CARE VISIT (OUTPATIENT)
Dept: SCHEDULING | Facility: HOME HEALTH | Age: 53
End: 2023-03-31
Payer: COMMERCIAL

## 2023-03-31 PROCEDURE — G0151 HHCP-SERV OF PT,EA 15 MIN: HCPCS

## 2023-03-31 NOTE — HOME HEALTH
Subjective: Dr Kymberly Henry says im 80 to 90 % healed  Falls since last visit NO(if yes complete the Fall Tracking Form and include bsrifallreport):  Caregiver involvement changes: alone during visit  Home health supplies by type and quantity ordered/delivered this visit include: na    Clinician asked if patient has had any physician contact since last home care visit and patient states: YES  Clinician asked if patient has any new or changed medications and patient states:  NO   If Yes, were medications reconciled? YES   Was the certifying physician notified of changes in medications? N/A     Clinical assessment (what this visit means for the patient overall and need for ongoing skilled care) and progress or lack of progress towards SPECIFIC goals: Patient out to orthopedic MD Monday. She is now WBAT. focus on posture, improved tech for ambulation. upgraded hep to include suported stand exercises. spent time explaining how to slowly increase standing time during the day. patient cont to have ryan LE pain as limiting factor    Written Teaching Material Utilized: reviewed written instrcutions for  hep    Discharge planning as follows:  Will discharge when the patient has reached their maximum functional potential and maximum safety in their home and When goals are met    Specific plan for next visit: Re-instruct patient/caregiver on hep upgrade if able, standing balance retraiing, gait training progressing from walker to no assistive device and stair trainning

## 2023-04-02 VITALS
TEMPERATURE: 97.6 F | OXYGEN SATURATION: 98 % | RESPIRATION RATE: 18 BRPM | SYSTOLIC BLOOD PRESSURE: 130 MMHG | HEART RATE: 99 BPM | DIASTOLIC BLOOD PRESSURE: 72 MMHG

## 2023-04-04 ENCOUNTER — HOME CARE VISIT (OUTPATIENT)
Dept: SCHEDULING | Facility: HOME HEALTH | Age: 53
End: 2023-04-04
Payer: COMMERCIAL

## 2023-04-04 PROCEDURE — G0151 HHCP-SERV OF PT,EA 15 MIN: HCPCS

## 2023-04-05 NOTE — HOME HEALTH
Subjective: I just get so tired    Falls since last visit NO(if yes complete the Fall Tracking Form and include bsrifallreport):  Caregiver involvement changes: alone during visit  Home health supplies by type and quantity ordered/delivered this visit include: na  Clinician asked if patient has had any physician contact since last home care visit and patient states: YES  Clinician asked if patient has any new or changed medications and patient states:  NO   If Yes, were medications reconciled? YES   Was the certifying physician notified of changes in medications? N/A        Clinical assessment (what this visit means for the patient overall and need for ongoing skilled care) and progress or lack of progress towards SPECIFIC goals: Instructed patient in home exercise program. Patient requires hands on assist and vc to improve tech and posture. gait training with rollator  outside of home. instructed pt to only perform with . focus on posture, heel toe gait pattern. discussed walking program in home every 1 to 2 hours during the day  Written Teaching Material Utilized: reviewed written instrcutions for  hep  Discharge planning as follows:  Will discharge when the patient has reached their maximum functional potential and maximum safety in their home and When goals are met  Specific plan for next visit: Re-instruct patient/caregiver on hep upgrade if able, standing balance retraiing, gait training progressing from walker to no assistive device and stair trainning

## 2023-04-07 ENCOUNTER — HOME CARE VISIT (OUTPATIENT)
Dept: SCHEDULING | Facility: HOME HEALTH | Age: 53
End: 2023-04-07
Payer: COMMERCIAL

## 2023-04-18 ENCOUNTER — HOME CARE VISIT (OUTPATIENT)
Dept: SCHEDULING | Facility: HOME HEALTH | Age: 53
End: 2023-04-18
Payer: COMMERCIAL

## 2023-04-18 PROCEDURE — G0151 HHCP-SERV OF PT,EA 15 MIN: HCPCS

## 2023-04-19 VITALS
SYSTOLIC BLOOD PRESSURE: 130 MMHG | RESPIRATION RATE: 18 BRPM | OXYGEN SATURATION: 98 % | DIASTOLIC BLOOD PRESSURE: 80 MMHG | HEART RATE: 88 BPM | TEMPERATURE: 97.6 F

## 2023-04-19 NOTE — HOME HEALTH
Subjective: I really dont have any pain in my right leg. My left leg continues to hurt really bad    Falls since last visit NO(if yes complete the Fall Tracking Form and include bsrifallreport):  Caregiver involvement changes: alone during visit  Home health supplies by type and quantity ordered/delivered this visit include: na  Clinician asked if patient has had any physician contact since last home care visit and patient states: no  Clinician asked if patient has any new or changed medications and patient states:  NO   If Yes, were medications reconciled? YES   Was the certifying physician notified of changes in medications? N/A     Clinical assessment (what this visit means for the patient overall and need for ongoing skilled care) and progress or lack of progress towards SPECIFIC goals: NOtified Dr Court Joy patient is increased left le pain. denies falls. unsure of cause of increased pain. patient with significant antlagic gait today. treatment focused on pain mgt including gentle massage and stretching to left le. by end of session patient reports pain is a 7. notified MD patients heart rate cont to be elevated. will cont to work towards improving patient mobility. received orders for PT extension    Written Teaching Material Utilized: reviewed written instrcutions for  hep  Discharge planning as follows:  Will discharge when the patient has reached their maximum functional potential and maximum safety in their home and When goals are met  Specific plan for next visit: Re-instruct patient/caregiver on hep upgrade if able, standing balance retraiing, gait training progressing from walker to no assistive device and stair trainning

## 2023-04-20 ENCOUNTER — HOME CARE VISIT (OUTPATIENT)
Dept: HOME HEALTH SERVICES | Facility: HOME HEALTH | Age: 53
End: 2023-04-20
Payer: COMMERCIAL

## 2023-04-21 NOTE — HOME HEALTH
patient reports increased left leg pain and unable to participate in PT today.  called Dr Jenaro Callahan to notify no PT today due to increased pain

## 2023-04-25 ENCOUNTER — HOME CARE VISIT (OUTPATIENT)
Dept: SCHEDULING | Facility: HOME HEALTH | Age: 53
End: 2023-04-25
Payer: COMMERCIAL

## 2023-04-25 PROCEDURE — G0151 HHCP-SERV OF PT,EA 15 MIN: HCPCS

## 2023-04-27 VITALS
TEMPERATURE: 98.2 F | OXYGEN SATURATION: 98 % | RESPIRATION RATE: 18 BRPM | SYSTOLIC BLOOD PRESSURE: 130 MMHG | HEART RATE: 90 BPM | DIASTOLIC BLOOD PRESSURE: 80 MMHG

## 2023-04-27 NOTE — HOME HEALTH
Subjective: My left leg is really bad. I think my right leg is pretty good    Falls since last visit NO(if yes complete the Fall Tracking Form and include bsrifallreport):  Caregiver involvement changes: alone during visit  Home health supplies by type and quantity ordered/delivered this visit include: na  Clinician asked if patient has had any physician contact since last home care visit and patient states: no  Clinician asked if patient has any new or changed medications and patient states:  NO   If Yes, were medications reconciled? YES   Was the certifying physician notified of changes in medications? N/A     Clinical assessment (what this visit means for the patient overall and need for ongoing skilled care) and progress or lack of progress towards SPECIFIC goals: Notified Dr Gus Miguel patient cont to have increased left le pain. Patient reports right leg is feeling good without pain. discussed PT plans to dc at end of week. patient tolerated upgraded hep well. she is improving in ambulaiton and standing balance. rec pool therapy when home PT stops    Written Teaching Material Utilized: reviewed written instrcutions for  hep  Discharge planning as follows: plan to dc next visit.  bippa signed  Specific plan for next visit: Re-instruct patient/caregiver on hep upgrade if able, standing balance retraiing, gait training spc and stair trainning

## 2023-04-28 ENCOUNTER — HOME CARE VISIT (OUTPATIENT)
Dept: SCHEDULING | Facility: HOME HEALTH | Age: 53
End: 2023-04-28
Payer: COMMERCIAL

## 2023-04-28 PROCEDURE — G0151 HHCP-SERV OF PT,EA 15 MIN: HCPCS

## 2023-04-29 VITALS
RESPIRATION RATE: 18 BRPM | TEMPERATURE: 98.1 F | OXYGEN SATURATION: 99 % | SYSTOLIC BLOOD PRESSURE: 130 MMHG | HEART RATE: 94 BPM | DIASTOLIC BLOOD PRESSURE: 80 MMHG

## 2023-04-29 NOTE — HOME HEALTH
Subjective: I will keep doing all the exercises. i just wish my left leg didnt hurt so bad. My right leg is healed    Falls since last visit NO(if yes complete the Fall Tracking Form and include bsrifallreport):  Caregiver involvement changes: alone during visit  Home health supplies by type and quantity ordered/delivered this visit include: na  Clinician asked if patient has had any physician contact since last home care visit and patient states: no  Clinician asked if patient has any new or changed medications and patient states:  NO   If Yes, were medications reconciled? YES   Was the certifying physician notified of changes in medications? N/A     Clinical assessment (what this visit means for the patient overall and need for ongoing skilled care) and progress or lack of progress towards SPECIFIC goals:patient seen today for PT re assessment and discharge visit. patient has made signficant progress in all areas. she is ind in transfers, ind in ambulation in home without asst device and ind in community ambulation with spc, ind in stair navigation. She has improved in right le strength. SHe is ind in hep. she has improved in dynamic standing balance. tug score improved from 30 to 14 seconds. tinetti improved from 17 to 28 out of 28. Reviewed dc instructions with patient.  all goals met and ready for discharge

## (undated) DEVICE — GOWN,SIRUS,NONRNF,SETINSLV,2XL,18/CS: Brand: MEDLINE

## (undated) DEVICE — SOLUTION IRRIG 1000ML 0.9% SOD CHL USP POUR PLAS BTL

## (undated) DEVICE — CONTAINER,SPECIMEN,3OZ,OR STRL: Brand: MEDLINE

## (undated) DEVICE — Z DISCONTINUED USE 2744636  DRESSING AQUACEL 14 IN ALG W3.5XL14IN POLYUR FLM CVR W/ HYDRCOLL

## (undated) DEVICE — DRESSING NEG PRSS M 18X12.5X3.3CM POLYUR FOR WND THER VAC

## (undated) DEVICE — SOLUTION IV 1000ML 0.9% SOD CHL

## (undated) DEVICE — DEVON™ KNEE AND BODY STRAP 60" X 3" (1.5 M X 7.6 CM): Brand: DEVON

## (undated) DEVICE — SUTURE PDS II SZ 2-0 L36IN ABSRB VLT CT L40MM 1/2 CIR TAPR Z357H

## (undated) DEVICE — DEVICE TRNSF SPIK STL 2008S] MICROTEK MEDICAL INC]

## (undated) DEVICE — PREP SKN PREVAIL 40ML APPL --

## (undated) DEVICE — SUTURE VCRL 1 L27IN ABSRB CT BRAID COAT UD J281H

## (undated) DEVICE — PADDING CAST SPEC 6INX4YD COT --

## (undated) DEVICE — 3M™ IOBAN™ 2 ANTIMICROBIAL INCISE DRAPE 6651EZ: Brand: IOBAN™ 2

## (undated) DEVICE — ZIMMER® STERILE DISPOSABLE TOURNIQUET CUFF WITH PLC, DUAL PORT, SINGLE BLADDER, 34 IN. (86 CM)

## (undated) DEVICE — T4 HOOD

## (undated) DEVICE — APPLICATOR BNDG 1MM ADH PREMIERPRO EXOFIN

## (undated) DEVICE — SLIM BODY SKIN STAPLER: Brand: APPOSE ULC

## (undated) DEVICE — PADDING CST 6IN STERILE --

## (undated) DEVICE — GLOVE SURG SZ 65 L12IN FNGR THK79MIL GRN LTX FREE

## (undated) DEVICE — STRAP,POSITIONING,KNEE/BODY,FOAM,4X60": Brand: MEDLINE

## (undated) DEVICE — SOLUTION IRRIG 1000ML H2O STRL BLT

## (undated) DEVICE — STRETCH BANDAGE ROLL: Brand: DERMACEA

## (undated) DEVICE — DRSG AQUACEL SURG 3.5 X 10IN -- CONVERT TO ITEM 370183

## (undated) DEVICE — 4-PORT MANIFOLD: Brand: NEPTUNE 2

## (undated) DEVICE — SUTURE STRATAFIX SYMMETRIC PDS + SZ 1 L18IN ABSRB VLT L48MM SXPP1A400

## (undated) DEVICE — STERILE POLYISOPRENE POWDER-FREE SURGICAL GLOVES: Brand: PROTEXIS

## (undated) DEVICE — DRESSING PETRO W3XL8IN N ADH OIL EMUL GZ CURAD

## (undated) DEVICE — BLADE SAW W0.49XL3.15IN THK0.047IN CUT THK0.047IN REPL SAG

## (undated) DEVICE — FRAZIER SUCTION INSTRUMENT 12 FR W/CONTROL VENT & OBTURATOR: Brand: FRAZIER

## (undated) DEVICE — LOCKING DRILL

## (undated) DEVICE — SUTURE PDS II SZ 0 L36IN ABSRB VLT L36MM CT-1 1/2 CIR Z346H

## (undated) DEVICE — SOLUTION IRRIG 3000ML 0.9% SOD CHL FLX CONT 0797208] ICU MEDICAL INC]

## (undated) DEVICE — CONVERTORS STOCKINETTE: Brand: CONVERTORS

## (undated) DEVICE — DRAPE,EXTREMITY,89X128,STERILE: Brand: MEDLINE

## (undated) DEVICE — ROCKER SWITCH PENCIL BLADE ELECTRODE, HOLSTER: Brand: EDGE

## (undated) DEVICE — NEEDLE HYPO 18GA L1.5IN PNK S STL HUB POLYPR SHLD REG BVL

## (undated) DEVICE — SPONGE LAP 18X18IN STRL -- 5/PK

## (undated) DEVICE — SUTURE VCRL SZ 0 L36IN ABSRB UD L36MM CT-1 1/2 CIR J946H

## (undated) DEVICE — TRAY CATH 16F URIN MTR LTX -- CONVERT TO ITEM 363111

## (undated) DEVICE — REM POLYHESIVE ADULT PATIENT RETURN ELECTRODE: Brand: VALLEYLAB

## (undated) DEVICE — SUTURE VCRL SZ 2-0 L36IN ABSRB UD L40MM CT 1/2 CIR J957H

## (undated) DEVICE — REAMER SHAFT, MOD.TRINKLE: Brand: BIXCUT

## (undated) DEVICE — MAJOR LAPAROTOMY-MRMC: Brand: MEDLINE INDUSTRIES, INC.

## (undated) DEVICE — SUT ETHLN 2-0 18IN FS BLK --

## (undated) DEVICE — LOCKING SCREW
Type: IMPLANTABLE DEVICE | Site: FEMUR | Status: NON-FUNCTIONAL
Brand: T2 ALPHA
Removed: 2023-01-19

## (undated) DEVICE — SUTURE STRATAFIX SYMMETRIC SZ 1 L18IN ABSRB VLT CT1 L36CM SXPP1A404

## (undated) DEVICE — STERILE POLYISOPRENE POWDER-FREE SURGICAL GLOVES WITH EMOLLIENT COATING: Brand: PROTEXIS

## (undated) DEVICE — INFECTION CONTROL KIT SYS

## (undated) DEVICE — HOOK LOCK LATEX FREE ELASTIC BANDAGE D/L 6INX10YD

## (undated) DEVICE — HOOK LOCK LATEX FREE ELASTIC BANDAGE 4INX5YD

## (undated) DEVICE — BLADE SAW W083XL354IN THK0047IN CUT THK0047IN SAG FLR

## (undated) DEVICE — 5.0MM ROUND FLUTED

## (undated) DEVICE — SCRUB DRY SURG EZ SCRUB BRUSH PREOPERATIVE GRN

## (undated) DEVICE — SWAB CULT DBL W/O CHAR RAYON TIP AMIES GEL CLMN FOR COLL

## (undated) DEVICE — (D)PREP SKN CHLRAPRP APPL 26ML -- CONVERT TO ITEM 371833

## (undated) DEVICE — PRECISION OFFSET (13.0 X 0.51 X 39.0MM)

## (undated) DEVICE — Device

## (undated) DEVICE — HANDPIECE SET WITH BONE CLEANING TIP AND SUCTION TUBE: Brand: INTERPULSE

## (undated) DEVICE — ABDOMINAL PAD: Brand: DERMACEA

## (undated) DEVICE — PREP KIT PEEL PTCH POVIDONE IOD

## (undated) DEVICE — PREP SKN CHLRAPRP 26ML TNT -- CONVERT TO ITEM 373320

## (undated) DEVICE — GAUZE SPONGES,12 PLY: Brand: CURITY

## (undated) DEVICE — SOL IRR SOD CL 0.9% 1000ML BTL --

## (undated) DEVICE — BANDAGE COMPR SELF ADH 5 YDX6 IN TAN STRL PREMIERPRO LF

## (undated) DEVICE — SUTURE MCRYL SZ 3-0 L27IN ABSRB UD L19MM PS-2 3/8 CIR PRIM Y427H

## (undated) DEVICE — DRAPE,U/ SHT,SPLIT,PLAS,STERIL: Brand: MEDLINE

## (undated) DEVICE — 6619 2 PTNT ISO SYS INCISE AREA&LT;(&GT;&&LT;)&GT;P: Brand: STERI-DRAPE™ IOBAN™ 2

## (undated) DEVICE — BLADE SAW W098XL354IN THK0047IN CUT THK0047IN SAG

## (undated) DEVICE — GOWN,SIRUS,NONRNF,SETINSLV,XL,20/CS: Brand: MEDLINE

## (undated) DEVICE — PIN DRL QUIK HI PERF FOR SIG SYS

## (undated) DEVICE — SUTURE MCRYL SZ 4-0 L27IN ABSRB UD L19MM PS-2 1/2 CIR PRIM Y426H

## (undated) DEVICE — SPONGE GZ W4XL4IN COT 12 PLY TYP VII WVN C FLD DSGN STERILE

## (undated) DEVICE — COVER,TABLE,HEAVY DUTY,77"X90",STRL: Brand: MEDLINE

## (undated) DEVICE — TUBING SUCT 12FR MAL ALUM SHFT FN CAP VENT UNIV CONN W/ OBT

## (undated) DEVICE — BOWL BNE CEM MIX SPAT CURET SMARTMIX CTS

## (undated) DEVICE — SPONGE GZ W4XL4IN COT 12 PLY TYP VII WVN C FLD DSGN

## (undated) DEVICE — C-ARM: Brand: UNBRANDED

## (undated) DEVICE — ALCOHOL  RUBBING  70% ISOPROPYL  4-OZ

## (undated) DEVICE — SUTURE VCRL SZ 0 L27IN ABSRB UD L36MM CT-1 1/2 CIR J260H

## (undated) DEVICE — MEDI-VAC YANK SUCT HNDL W/TPRD BULBOUS TIP: Brand: CARDINAL HEALTH

## (undated) DEVICE — STERILE PVP: Brand: MEDLINE INDUSTRIES, INC.

## (undated) DEVICE — CATH KT URETH INTMIT 15FR LTX -- CONVERT TO ITEM 363176

## (undated) DEVICE — PACK,BASIC,SIRUS,V: Brand: MEDLINE

## (undated) DEVICE — HANDLE LT SNAP ON ULT DURABLE LENS FOR TRUMPF ALC DISPOSABLE

## (undated) DEVICE — SYRINGE MED 20ML STD CLR PLAS LUERLOCK TIP N CTRL DISP

## (undated) DEVICE — SUTURE VCRL SZ 2-0 L36IN ABSRB UD L36MM CT-1 1/2 CIR J945H

## (undated) DEVICE — GUIDE WIRE, BALL-TIPPED, STERILE

## (undated) DEVICE — GLOVE SURG SZ 8 L12IN FNGR THK94MIL STD WHT LTX FREE

## (undated) DEVICE — FRAZIER SUCTION INSTRUMENT 10 FR W/CONTROL VENT & OBTURATOR: Brand: FRAZIER

## (undated) DEVICE — SUTURE PDS II SZ 2-0 L27IN ABSRB VLT L26MM CT-2 1/2 CIR Z333H

## (undated) DEVICE — BOWL MED L 32OZ PLAS W/ MOLD GRAD EZ OPN PEEL PCH

## (undated) DEVICE — SUPER SPONGES,MEDIUM: Brand: DERMACEA

## (undated) DEVICE — DRSG VAC ASST CLSR GRNUFM MED -- 18X12.5X3.2CM

## (undated) DEVICE — SURGICAL PROCEDURE PACK BASIN MAJ SET CUST NO CAUT

## (undated) DEVICE — BLADE SAW W11.2XL77.5MM THK0.76MM CUT THK1.17MM REPL RECIP

## (undated) DEVICE — SUTURE VCRL SZ 1 L36IN ABSRB UD L36MM CT-1 1/2 CIR J947H

## (undated) DEVICE — NEEDLE HYPO 21GA L1.5IN INTRAMUSCULAR S STL LATCH BVL UP

## (undated) DEVICE — SUTURE VCRL SZ 2-0 L27IN ABSRB UD L26MM SH 1/2 CIR J417H

## (undated) DEVICE — SUTURE PDS II SZ 1 L27IN ABSRB VLT CT-1 L36MM 1/2 CIR Z341H

## (undated) DEVICE — GLOVE SURG SZ 65 THK91MIL LTX FREE SYN POLYISOPRENE

## (undated) DEVICE — ZIMMER® STERILE DISPOSABLE TOURNIQUET CUFF WITH PLC, DUAL PORT, SINGLE BLADDER, 42 IN. (107 CM)

## (undated) DEVICE — CANISTER WND VAC ASST CLSR --

## (undated) DEVICE — BUR SURG HD L5MM DIA5MM RND FLUT REPL CARB LINVATEC

## (undated) DEVICE — BANDAGE COMPR M W6INXL10YD WHT BGE VELC E MTRX HK AND LOOP

## (undated) DEVICE — GLOVE ORTHO 8   MSG9480

## (undated) DEVICE — ERASECAUTI INTERMIT TRAY: Brand: MEDLINE INDUSTRIES, INC.

## (undated) DEVICE — PICO SINGLE USE NEGATIVE PRESSURE WOUND THERAPY SYSTEM 10CM X 30CM 4IN. X 12IN.: Brand: PICO

## (undated) DEVICE — NON-ADHERENT STRIPS,OIL EMULSION: Brand: CURITY

## (undated) DEVICE — SOLUTION IRRIG 1000ML STRL H2O USP PLAS POUR BTL

## (undated) DEVICE — CANISTER SUC GEL INFOVAC 500ML --

## (undated) DEVICE — ADHESIVE SKIN CLSR 0.7ML TOP DERMBND ADV

## (undated) DEVICE — MARKER,SKIN,WI/RULER AND LABELS: Brand: MEDLINE

## (undated) DEVICE — PREP CHLORAPREP 10.5 ML ORG --

## (undated) DEVICE — SYR 20ML LL STRL LF --